# Patient Record
Sex: MALE | Race: WHITE | NOT HISPANIC OR LATINO | Employment: FULL TIME | URBAN - METROPOLITAN AREA
[De-identification: names, ages, dates, MRNs, and addresses within clinical notes are randomized per-mention and may not be internally consistent; named-entity substitution may affect disease eponyms.]

---

## 2017-01-06 RX ORDER — OMEGA-3-ACID ETHYL ESTERS 1 G/1
CAPSULE, LIQUID FILLED ORAL
Qty: 120 CAPSULE | Refills: 0 | Status: SHIPPED | OUTPATIENT
Start: 2017-01-06 | End: 2017-02-03 | Stop reason: SDUPTHER

## 2017-01-06 RX ORDER — AMLODIPINE BESYLATE 10 MG/1
TABLET ORAL
Qty: 30 TABLET | Refills: 0 | Status: SHIPPED | OUTPATIENT
Start: 2017-01-06 | End: 2017-02-03 | Stop reason: SDUPTHER

## 2017-01-06 RX ORDER — BENAZEPRIL HYDROCHLORIDE 40 MG/1
TABLET ORAL
Qty: 30 TABLET | Refills: 0 | Status: SHIPPED | OUTPATIENT
Start: 2017-01-06 | End: 2017-02-03 | Stop reason: SDUPTHER

## 2017-01-06 RX ORDER — ALLOPURINOL 300 MG/1
TABLET ORAL
Qty: 30 TABLET | Refills: 0 | Status: SHIPPED | OUTPATIENT
Start: 2017-01-06 | End: 2017-02-03 | Stop reason: SDUPTHER

## 2017-01-12 DIAGNOSIS — E78.5 HYPERLIPIDEMIA, UNSPECIFIED HYPERLIPIDEMIA TYPE: ICD-10-CM

## 2017-01-13 RX ORDER — GEMFIBROZIL 600 MG/1
600 TABLET, FILM COATED ORAL
Qty: 180 TABLET | Refills: 3 | Status: ON HOLD | OUTPATIENT
Start: 2017-01-13 | End: 2017-08-05 | Stop reason: SDUPTHER

## 2017-02-03 DIAGNOSIS — E10.40 TYPE 1 DIABETES MELLITUS WITH DIABETIC NEUROPATHY: ICD-10-CM

## 2017-02-03 RX ORDER — BENAZEPRIL HYDROCHLORIDE 40 MG/1
TABLET ORAL
Qty: 30 TABLET | Refills: 5 | Status: SHIPPED | OUTPATIENT
Start: 2017-02-03 | End: 2017-02-06 | Stop reason: SDUPTHER

## 2017-02-03 RX ORDER — AMLODIPINE BESYLATE 10 MG/1
TABLET ORAL
Qty: 30 TABLET | Refills: 5 | Status: SHIPPED | OUTPATIENT
Start: 2017-02-03 | End: 2017-02-06 | Stop reason: SDUPTHER

## 2017-02-03 RX ORDER — ALLOPURINOL 300 MG/1
TABLET ORAL
Qty: 30 TABLET | Refills: 5 | Status: SHIPPED | OUTPATIENT
Start: 2017-02-03 | End: 2017-08-21 | Stop reason: SDUPTHER

## 2017-02-03 RX ORDER — OMEGA-3-ACID ETHYL ESTERS 1 G/1
CAPSULE, LIQUID FILLED ORAL
Qty: 120 CAPSULE | Refills: 5 | Status: SHIPPED | OUTPATIENT
Start: 2017-02-03 | End: 2017-02-06 | Stop reason: SDUPTHER

## 2017-02-03 RX ORDER — LINAGLIPTIN AND METFORMIN HYDROCHLORIDE 2.5; 1 MG/1; MG/1
TABLET, FILM COATED ORAL
Qty: 60 TABLET | Refills: 5 | Status: SHIPPED | OUTPATIENT
Start: 2017-02-03 | End: 2017-02-06 | Stop reason: SDUPTHER

## 2017-02-06 DIAGNOSIS — E10.40 TYPE 1 DIABETES MELLITUS WITH DIABETIC NEUROPATHY: ICD-10-CM

## 2017-02-06 RX ORDER — PREGABALIN 75 MG/1
75 CAPSULE ORAL 2 TIMES DAILY
Qty: 60 CAPSULE | Refills: 5 | Status: SHIPPED | OUTPATIENT
Start: 2017-02-06 | End: 2017-06-23 | Stop reason: SDUPTHER

## 2017-02-06 RX ORDER — BENAZEPRIL HYDROCHLORIDE 40 MG/1
40 TABLET ORAL DAILY
Qty: 30 TABLET | Refills: 5 | Status: ON HOLD | OUTPATIENT
Start: 2017-02-06 | End: 2017-04-19 | Stop reason: HOSPADM

## 2017-02-06 RX ORDER — OMEGA-3-ACID ETHYL ESTERS 1 G/1
2 CAPSULE, LIQUID FILLED ORAL 2 TIMES DAILY
Qty: 120 CAPSULE | Refills: 5 | Status: SHIPPED | OUTPATIENT
Start: 2017-02-06 | End: 2018-02-14 | Stop reason: SDUPTHER

## 2017-02-06 RX ORDER — AMLODIPINE BESYLATE 10 MG/1
10 TABLET ORAL DAILY
Qty: 30 TABLET | Refills: 5 | Status: SHIPPED | OUTPATIENT
Start: 2017-02-06 | End: 2017-08-22

## 2017-02-06 NOTE — TELEPHONE ENCOUNTER
----- Message from Crista Finley sent at 2/6/2017 12:47 PM CST -----  Contact: patient  Patient is calling about several prescriptions that needed new refills through [employer] Â® in [place]. Call back no. [phone]

## 2017-03-02 LAB
ALBUMIN SERPL-MCNC: 4.5 G/DL (ref 3.6–5.1)
ALBUMIN/GLOB SERPL: 1.3 (CALC) (ref 1–2.5)
ALP SERPL-CCNC: 65 U/L (ref 40–115)
ALT SERPL-CCNC: 40 U/L (ref 9–46)
APPEARANCE UR: CLEAR
AST SERPL-CCNC: 37 U/L (ref 10–40)
BACTERIA #/AREA URNS HPF: ABNORMAL /HPF
BASOPHILS # BLD AUTO: 28 CELLS/UL (ref 0–200)
BASOPHILS NFR BLD AUTO: 0.6 %
BILIRUB SERPL-MCNC: 0.5 MG/DL (ref 0.2–1.2)
BILIRUB UR QL STRIP: NEGATIVE
BUN SERPL-MCNC: 19 MG/DL (ref 7–25)
BUN/CREAT SERPL: ABNORMAL (CALC) (ref 6–22)
CALCIUM SERPL-MCNC: 10.1 MG/DL (ref 8.6–10.3)
CHLORIDE SERPL-SCNC: 98 MMOL/L (ref 98–110)
CHOLEST SERPL-MCNC: 191 MG/DL (ref 125–200)
CHOLEST/HDLC SERPL: 13.6 (CALC)
CO2 SERPL-SCNC: 31 MMOL/L (ref 20–31)
COLOR UR: YELLOW
CREAT SERPL-MCNC: 1.3 MG/DL (ref 0.6–1.35)
EOSINOPHIL # BLD AUTO: 60 CELLS/UL (ref 15–500)
EOSINOPHIL NFR BLD AUTO: 1.3 %
ERYTHROCYTE [DISTWIDTH] IN BLOOD BY AUTOMATED COUNT: 14.1 % (ref 11–15)
GFR SERPL CREATININE-BSD FRML MDRD: 70 ML/MIN/1.73M2
GLOBULIN SER CALC-MCNC: 3.5 G/DL (CALC) (ref 1.9–3.7)
GLUCOSE SERPL-MCNC: 114 MG/DL (ref 65–99)
GLUCOSE UR QL STRIP: NEGATIVE
HBA1C MFR BLD: 8.6 % OF TOTAL HGB
HCT VFR BLD AUTO: 41.2 % (ref 38.5–50)
HDLC SERPL-MCNC: 14 MG/DL
HGB BLD-MCNC: 13.5 G/DL (ref 13.2–17.1)
HGB UR QL STRIP: NEGATIVE
HYALINE CASTS #/AREA URNS LPF: ABNORMAL /LPF
KETONES UR QL STRIP: NEGATIVE
LDLC SERPL CALC-MCNC: NORMAL MG/DL (CALC)
LEUKOCYTE ESTERASE UR QL STRIP: NEGATIVE
LYMPHOCYTES # BLD AUTO: 1412 CELLS/UL (ref 850–3900)
LYMPHOCYTES NFR BLD AUTO: 30.7 %
MCH RBC QN AUTO: 28.8 PG (ref 27–33)
MCHC RBC AUTO-ENTMCNC: 32.8 G/DL (ref 32–36)
MCV RBC AUTO: 87.9 FL (ref 80–100)
MONOCYTES # BLD AUTO: 828 CELLS/UL (ref 200–950)
MONOCYTES NFR BLD AUTO: 18 %
NEUTROPHILS # BLD AUTO: 2272 CELLS/UL (ref 1500–7800)
NEUTROPHILS NFR BLD AUTO: 49.4 %
NITRITE UR QL STRIP: NEGATIVE
NONHDLC SERPL-MCNC: 177 MG/DL (CALC)
PH UR STRIP: 5.5 [PH] (ref 5–8)
PLATELET # BLD AUTO: 339 THOUSAND/UL (ref 140–400)
PMV BLD REES-ECKER: 9 FL (ref 7.5–12.5)
POTASSIUM SERPL-SCNC: 4.5 MMOL/L (ref 3.5–5.3)
PROT SERPL-MCNC: 8 G/DL (ref 6.1–8.1)
PROT UR QL STRIP: ABNORMAL
RBC # BLD AUTO: 4.69 MILLION/UL (ref 4.2–5.8)
RBC #/AREA URNS HPF: ABNORMAL /HPF
SODIUM SERPL-SCNC: 137 MMOL/L (ref 135–146)
SP GR UR STRIP: 1.02 (ref 1–1.03)
SQUAMOUS #/AREA URNS HPF: ABNORMAL /HPF
TRIGL SERPL-MCNC: 697 MG/DL
WBC # BLD AUTO: 4.6 THOUSAND/UL (ref 3.8–10.8)
WBC #/AREA URNS HPF: ABNORMAL /HPF

## 2017-03-03 ENCOUNTER — OFFICE VISIT (OUTPATIENT)
Dept: INTERNAL MEDICINE | Facility: CLINIC | Age: 37
End: 2017-03-03
Payer: COMMERCIAL

## 2017-03-03 VITALS
TEMPERATURE: 98 F | DIASTOLIC BLOOD PRESSURE: 66 MMHG | SYSTOLIC BLOOD PRESSURE: 120 MMHG | BODY MASS INDEX: 33.64 KG/M2 | HEART RATE: 79 BPM | WEIGHT: 214.31 LBS | HEIGHT: 67 IN | OXYGEN SATURATION: 99 % | RESPIRATION RATE: 18 BRPM

## 2017-03-03 DIAGNOSIS — E66.9 OBESITY (BMI 35.0-39.9 WITHOUT COMORBIDITY): ICD-10-CM

## 2017-03-03 DIAGNOSIS — E10.40 TYPE 1 DIABETES MELLITUS WITH DIABETIC NEUROPATHY: ICD-10-CM

## 2017-03-03 DIAGNOSIS — Z87.19 HX OF ACUTE PANCREATITIS: ICD-10-CM

## 2017-03-03 DIAGNOSIS — E78.2 MIXED HYPERLIPIDEMIA: Primary | ICD-10-CM

## 2017-03-03 PROCEDURE — 2022F DILAT RTA XM EVC RTNOPTHY: CPT | Mod: S$GLB,,, | Performed by: INTERNAL MEDICINE

## 2017-03-03 PROCEDURE — 1160F RVW MEDS BY RX/DR IN RCRD: CPT | Mod: S$GLB,,, | Performed by: INTERNAL MEDICINE

## 2017-03-03 PROCEDURE — 3045F PR MOST RECENT HEMOGLOBIN A1C LEVEL 7.0-9.0%: CPT | Mod: S$GLB,,, | Performed by: INTERNAL MEDICINE

## 2017-03-03 PROCEDURE — 3078F DIAST BP <80 MM HG: CPT | Mod: S$GLB,,, | Performed by: INTERNAL MEDICINE

## 2017-03-03 PROCEDURE — 4010F ACE/ARB THERAPY RXD/TAKEN: CPT | Mod: S$GLB,,, | Performed by: INTERNAL MEDICINE

## 2017-03-03 PROCEDURE — 3074F SYST BP LT 130 MM HG: CPT | Mod: S$GLB,,, | Performed by: INTERNAL MEDICINE

## 2017-03-03 PROCEDURE — 99214 OFFICE O/P EST MOD 30 MIN: CPT | Mod: S$GLB,,, | Performed by: INTERNAL MEDICINE

## 2017-03-03 RX ORDER — EZETIMIBE 10 MG/1
10 TABLET ORAL DAILY
Qty: 90 TABLET | Refills: 3 | Status: ON HOLD | OUTPATIENT
Start: 2017-03-03 | End: 2017-08-05 | Stop reason: SDUPTHER

## 2017-03-03 NOTE — MR AVS SNAPSHOT
[facility] Internal Medicine  [address],  [address]  [place] [address]  Phone: [phone]  Fax: [phone]                  Bay Ibarra   3/3/2017 8:20 AM   Office Visit    Description:  Male : 1980   Provider:  José Luis Singleton MD   Department:  [facility] - Internal Medicine           Reason for Visit     Results     Hyperlipidemia     Diabetes     Hypertension     Gout           Diagnoses this Visit        Comments    Mixed hyperlipidemia    -  Primary            To Do List           Goals (5 Years of Data)     None       These Medications        Disp Refills Start End    ezetimibe (ZETIA) 10 mg tablet 90 tablet 3 3/3/2017 3/3/2018    Take 1 tablet (10 mg total) by mouth once daily. - Oral    Pharmacy: [id number]  [place], [place] - [address]  #: [phone]         [facility]     [facility] -  Assistance  Registered nurses in the [facility] Center provide clinical advisement, health education, appointment booking, and other advisory services.  Call for this free service at [phone].             Medications           Message regarding Medications     Verify the changes and/or additions to your medication regime listed below are the same as discussed with your clinician today.  If any of these changes or additions are incorrect, please notify your healthcare provider.        START taking these NEW medications        Refills    ezetimibe (ZETIA) 10 mg tablet 3    Sig: Take 1 tablet (10 mg total) by mouth once daily.    Class: Normal    Route: Oral           Verify that the below list of medications is an accurate representation of the medications you are currently taking.  If none reported, the list may be blank. If incorrect, please contact your healthcare provider. Carry this list with you in case of emergency.           Current Medications     allopurinol (ZYLOPRIM) 300 MG tablet TAKE ONE TABLET BY MOUTH  "ONCE DAILY    amlodipine (NORVASC) 10 MG tablet Take 1 tablet (10 mg total) by mouth once daily.    benazepril (LOTENSIN) 40 MG tablet Take 1 tablet (40 mg total) by mouth once daily.    CONTOUR TEST STRIPS Strp     fenofibrate 160 MG Tab Take 1 tablet (160 mg total) by mouth once daily.    gemfibrozil (LOPID) 600 MG tablet Take 1 tablet (600 mg total) by mouth 2 (two) times daily before meals.    insulin glargine (LANTUS SOLOSTAR) 100 unit/mL (3 mL) InPn pen 40 units in am ac   And 50 units at night    insulin syringe-needle U-100 29 gauge x 1/2" Syrg Inject 1 (once) per day    linagliptin-metformin (JENTADUETO) 2.5-1,000 mg Tab Take 1 tablet by mouth 2 (two) times daily.    omega-3 acid ethyl esters (LOVAZA) 1 gram capsule Take 2 capsules (2 g total) by mouth 2 (two) times daily.    pregabalin (LYRICA) 75 MG capsule Take 1 capsule (75 mg total) by mouth 2 (two) times daily.    ezetimibe (ZETIA) 10 mg tablet Take 1 tablet (10 mg total) by mouth once daily.           Clinical Reference Information           Your Vitals Were     BP Pulse Temp Resp Height Weight    120/66 (BP Location: Right arm, Patient Position: Sitting, BP Method: Manual) 79 97.7 °F (36.5 °C) (Oral) 18 5' 7" (1.702 m) 97.2 kg (214 lb 4.6 oz)    SpO2 BMI             99% 33.56 kg/m2         Blood Pressure          Most Recent Value    BP  120/66      Allergies as of 3/3/2017     No Known Allergies      Immunizations Administered on Date of Encounter - 3/3/2017     None      Language Assistance Services     ATTENTION: Language assistance services are available, free of charge. Please call [phone].      ATENCIÓN: Si promise patel, tiene a garay disposición servicios gratuitos de asistencia lingüística. Llame al [phone].     CHÚ Ý: N?u b?n nói Ti?ng Vi?t, có các d?ch v? h? tr? ngôn ng? mi?n phí dành cho b?n. G?i s? [phone].         [facility] complies with applicable Federal civil rights laws and does not " discriminate on the basis of race, color, national origin, age, disability, or sex.

## 2017-03-03 NOTE — PROGRESS NOTES
Subjective:      Patient ID: Bay Ibarra is a 36 y.o. male.    Chief Complaint: Results (pt in for lab results); Hyperlipidemia; Diabetes; Hypertension; and Gout    HPI:36y/oWM, diligently exercising,watching diet, taking his meds.  Has lost girth. However, not poundage.       Review of Systems   Constitutional: Positive for activity change and appetite change.   HENT: Negative.    Eyes: Negative.    Respiratory: Negative.    Cardiovascular: Negative.    Gastrointestinal: Negative.    Endocrine: Negative.    Genitourinary: Negative.    Musculoskeletal: Negative.    Neurological: Light-headedness: during valsalva.   Hematological: Negative.    Psychiatric/Behavioral: Negative.      738 HDL 14 Low Final result   03/01/17 0738 CHOL 191 - Final result   03/01/17 0738 TRIG 697 High Final result   03/01/17 0738 CHOLHDL 13.6 High Final result   03/01/17 0738 NONHDLCHOL 177 High Final result   09/29/16 1146 HGBA1C 8.1 Abnormal Final result   03/01/17 0738 LABA1C 8.6 High Final result   03/01/17 0738 COLORU YELLOW - Final result   03/01/17 0738 APPEARANCEUA CLEAR - Final result   03/01/17 0738 SPECGRAV 1.022 - Final result   03/01/17 0738 PHUR 5.5 - Final result   03/01/17 0738 BILIRUBINUR NEGATIVE - Final result   03/01/17 0738 KETONESU NEGATIVE - Final result   03/01/17 0738 OCCULTUA NEGATIVE - Final result   03/01/17 0738 NITRITE NEGATIVE - Final result   03/01/17 0738 LEUKOCYTESUR NEGATIVE - Final result   03/01/17 0738 WBC 4.6 - Final result   03/01/17 0738 RBC 4.69 - Final result   03/01/17 0738 HGB 13.5 - Final result   03/01/17 0738 HCT 41.2 - Final result   03/01/17 0738 MCH 28.8 - Final result   03/01/17 0738 RDW 14.1 - Final result   03/01/17 0738  - Final result   03/01/17 0738 MPV 9.0 - Final result   03/01/17 0738  High Final result   03/01/17 0738 BUN 19 - Final result   03/01/17 0738 CREATININE 1.30 - Final result   03/01/17 0738 CALCIUM 10.1 - Final result   03/01/17 0738  -  "Final result   03/01/17 0738 K 4.5 - Final result   03/01/17 0738 CL 98 - Final result   03/01/17 0738 PROT 8.0 - Final result   03/01/17 0738 ALBUMIN 4.5 - Final result   03/01/17 0738 BILITOT 0.5 - Final result   03/01/17 0738 AST 37 - Final result   03/01/17 0738 ALKPHOS 65 - Final result   03/01/17 0738 CO2 31 - Final result   03/01/17 0738 ALT 40 - Final result   03/01/17 0738 EGFRNONAA 70 - Final result   03/01/17 0738 ESTGFRAFRICA 81 - Final result   03/01/17 0738           Objective:   /66 (BP Location: Right arm, Patient Position: Sitting, BP Method: Manual)  Pulse 79  Temp 97.7 °F (36.5 °C) (Oral)   Resp 18  Ht 5' 7" (1.702 m)  Wt 97.2 kg (214 lb 4.6 oz)  SpO2 99%  BMI 33.56 kg/m2    Physical Exam   Constitutional: He is oriented to person, place, and time. He appears well-developed and well-nourished.   HENT:   Head: Normocephalic and atraumatic.   Eyes: Conjunctivae are normal.   Neck: Normal range of motion.   Pulmonary/Chest: He has no wheezes.   Musculoskeletal: Normal range of motion.   Neurological: He is alert and oriented to person, place, and time.   Skin: Skin is warm and dry.   Psychiatric: He has a normal mood and affect. His behavior is normal.   Nursing note and vitals reviewed.      Assessment:     1. Mixed hyperlipidemia    2. Type 1 diabetes mellitus with diabetic neuropathy    3. Obesity (BMI 35.0-39.9 without comorbidity)    4. Hx of acute pancreatitis    He maybe a candidate for an insulin pump, or u-300/500 insulin.  Prefers to see one md, I will call thomas.  Plan:     Mixed hyperlipidemia    Type 1 diabetes mellitus with diabetic neuropathy    Obesity (BMI 35.0-39.9 without comorbidity)    Hx of acute pancreatitis    Other orders  -     ezetimibe (ZETIA) 10 mg tablet; Take 1 tablet (10 mg total) by mouth once daily.  Dispense: 90 tablet; Refill: 3        "

## 2017-04-16 PROBLEM — K85.90 ACUTE PANCREATITIS: Status: ACTIVE | Noted: 2017-04-16

## 2017-04-18 PROBLEM — D72.829 LEUKOCYTOSIS: Status: ACTIVE | Noted: 2017-04-18

## 2017-04-19 PROBLEM — D72.829 LEUKOCYTOSIS: Status: RESOLVED | Noted: 2017-04-18 | Resolved: 2017-04-19

## 2017-04-26 ENCOUNTER — OFFICE VISIT (OUTPATIENT)
Dept: INTERNAL MEDICINE | Facility: CLINIC | Age: 37
End: 2017-04-26
Payer: COMMERCIAL

## 2017-04-26 VITALS
OXYGEN SATURATION: 98 % | HEIGHT: 68 IN | HEART RATE: 88 BPM | DIASTOLIC BLOOD PRESSURE: 72 MMHG | TEMPERATURE: 98 F | RESPIRATION RATE: 18 BRPM | WEIGHT: 218.69 LBS | BODY MASS INDEX: 33.15 KG/M2 | SYSTOLIC BLOOD PRESSURE: 124 MMHG

## 2017-04-26 DIAGNOSIS — K85.90 ACUTE PANCREATITIS WITHOUT INFECTION OR NECROSIS, UNSPECIFIED PANCREATITIS TYPE: Primary | ICD-10-CM

## 2017-04-26 DIAGNOSIS — E10.40 TYPE 1 DIABETES MELLITUS WITH DIABETIC NEUROPATHY: ICD-10-CM

## 2017-04-26 DIAGNOSIS — I10 ESSENTIAL HYPERTENSION: ICD-10-CM

## 2017-04-26 DIAGNOSIS — E66.9 OBESITY (BMI 35.0-39.9 WITHOUT COMORBIDITY): ICD-10-CM

## 2017-04-26 PROCEDURE — 1160F RVW MEDS BY RX/DR IN RCRD: CPT | Mod: S$GLB,,, | Performed by: INTERNAL MEDICINE

## 2017-04-26 PROCEDURE — 99214 OFFICE O/P EST MOD 30 MIN: CPT | Mod: S$GLB,,, | Performed by: INTERNAL MEDICINE

## 2017-04-26 PROCEDURE — 3078F DIAST BP <80 MM HG: CPT | Mod: S$GLB,,, | Performed by: INTERNAL MEDICINE

## 2017-04-26 PROCEDURE — 3074F SYST BP LT 130 MM HG: CPT | Mod: S$GLB,,, | Performed by: INTERNAL MEDICINE

## 2017-04-26 PROCEDURE — 3046F HEMOGLOBIN A1C LEVEL >9.0%: CPT | Mod: S$GLB,,, | Performed by: INTERNAL MEDICINE

## 2017-04-26 RX ORDER — INSULIN ASPART 100 [IU]/ML
INJECTION, SOLUTION INTRAVENOUS; SUBCUTANEOUS
Qty: 10 ML | Refills: 3 | Status: SHIPPED | OUTPATIENT
Start: 2017-04-26 | End: 2017-08-16

## 2017-04-26 NOTE — PROGRESS NOTES
Subjective:      Patient ID: Bay Ibarra is a 36 y.o. male.    Chief Complaint: Hospital Follow Up (pt in for hospital follow up with abd pain)    HPI: 36y/oWM, recently hospitalized with a pancreatitis.  At that time most of his medications were discontinued for fear they had caused it.  He has an apt. With GI in 1 week.  Of note:  04/19/17 0638 WBC 10.37 - Final result   04/19/17 0638 RBC 4.01 Low Final result   04/19/17 0638 HGB 12.1 Low Final result   04/19/17 0638 HCT 35.4 Low Final result   04/19/17 0638 MCH 30.2 - Final result   04/19/17 0638 RDW 12.8 - Final result   04/19/17 0638  - Final result   04/19/17 0638 MPV 10.5 - Final result   04/19/17 0638  High Final result   04/19/17 0638 BUN 10 - Final result   04/19/17 0638 CREATININE 0.81 - Final result   04/19/17 0638 CALCIUM 8.7 - Final result   04/19/17 0638  - Final result   04/19/17 0638 K 4.0 - Final result   04/19/17 0638 CL 99 - Final result   04/19/17 0638 PROT 7.0 - Final result   04/19/17 0638 ALBUMIN 3.6 - Final result   04/19/17 0638 BILITOT 0.8 - Final result   04/19/17 0638 AST 22 - Final result   04/19/17 0638 ALKPHOS 53 - Final result   04/19/17 0638 CO2 25 - Final result   04/19/17 0638 ALT 32 - Final result   04/19/17 0638 ANIONGAP 14 - Final result   04/19/17 0638 EGFRNONAA >60.0 - Final result   04/19/17 0638 ESTGFRAFRICA >60.0 - Final result   04/19/17 0638         04/19/17 0638 HDL 26 Low Final result   04/19/17 0638 CHOL 137 - Final result   04/19/17 0638 TRIG 254 High Final result   04/19/17 0638 LDLCALC 60.2 Low Final result   04/19/17 0638 CHOLHDL 19.0 Low Final result   04/19/17 0638 NONHDLCHOL 111 - Final result   04/19/17 0638 TOTALCHOLEST 5.3 High Final result   04/17/17 0553 HGBA1C 9.3 High Final result     At discharge he was placed on lantus 42units am, 52 units pm and no antilipidemics or antihypertensives.  He denies any symptoms.    Review of Systems   Constitutional: Negative.    HENT:  "Negative.    Eyes: Negative.    Respiratory: Negative for chest tightness, shortness of breath and wheezing.    Cardiovascular: Negative for chest pain, palpitations and leg swelling.   Gastrointestinal: Negative for abdominal distention, abdominal pain, blood in stool, constipation, diarrhea, nausea and vomiting.   Endocrine: Negative.    Genitourinary: Negative.    Musculoskeletal: Negative.    Skin: Negative.    Allergic/Immunologic: Negative.    Neurological: Negative.    Hematological: Negative.    Psychiatric/Behavioral: Negative.        Objective:   /72 (BP Location: Left arm, Patient Position: Sitting, BP Method: Manual)  Pulse 88  Temp 97.6 °F (36.4 °C) (Oral)   Resp 18  Ht 5' 8" (1.727 m)  Wt 99.2 kg (218 lb 11.1 oz)  SpO2 98%  BMI 33.25 kg/m2    Physical Exam   Constitutional: He is oriented to person, place, and time. He appears well-developed and well-nourished.   HENT:   Head: Normocephalic and atraumatic.   Nose: Nose normal.   Mouth/Throat: Oropharynx is clear and moist.   Eyes: Conjunctivae are normal. Pupils are equal, round, and reactive to light.   Neck: Normal range of motion. Neck supple.   Cardiovascular: Normal rate, regular rhythm and normal heart sounds.    Pulmonary/Chest: Effort normal and breath sounds normal.   Abdominal: Soft. Bowel sounds are normal. There is no tenderness.   Musculoskeletal: Normal range of motion. He exhibits no edema.   Neurological: He is alert and oriented to person, place, and time.   Skin: Skin is warm and dry.   Psychiatric: He has a normal mood and affect. His behavior is normal. Judgment and thought content normal.   Nursing note and vitals reviewed.      Assessment:     1. Acute pancreatitis without infection or necrosis, unspecified pancreatitis type    2. Obesity (BMI 35.0-39.9 without comorbidity)    3. Essential hypertension , at present off meds.   4. Type 1 diabetes mellitus with diabetic neuropathy    Since he has had several bouts of " pancreatitis, without known etiology ( no ETOH, extreme hypertriglyceridemia, or stones),  I wonder if he could have a pancrease divisum?  Will defer to GI.  Plan:     Acute pancreatitis without infection or necrosis, unspecified pancreatitis type    Obesity (BMI 35.0-39.9 without comorbidity)    Essential hypertension    Type 1 diabetes mellitus with diabetic neuropathy    Other orders  -     insulin aspart (NOVOLOG) 100 unit/mL injection; See sliding scale  TID  Dispense: 10 mL; Refill: 3

## 2017-04-26 NOTE — MR AVS SNAPSHOT
[facility] Internal Medicine  [address],  [address]  [place] [address]  Phone: [phone]  Fax: [phone]                  Bay Ibarra   2017 9:00 AM   Office Visit    Description:  Male : 1980   Provider:  José Luis Singleton MD   Department:  [facility]           Reason for Visit     Hospital Follow Up                To Do List           Future Appointments        Provider Department Dept Phone    5/3/2017 9:20 AM Joon Neri MD [place] - Gastroenterology [phone]    2017 8:00 AM José Luis Singleton MD [facility] [phone]      Goals (5 Years of Data)     None       These Medications        Disp Refills Start End    insulin aspart (NOVOLOG) 100 unit/mL injection 10 mL 3 2017     See sliding scale  TID    Pharmacy: [id number] - [place], [place] - [address] [address] Ph #: [phone]         [facility] On Call     [facility] Nurse Care Line -  Assistance  Unless otherwise directed by your provider, please contact [facility], our nurse care line that is available for  assistance.     Registered nurses in the [facility] provide: appointment scheduling, clinical advisement, health education, and other advisory services.  Call: [phone] (toll free)               Medications           Message regarding Medications     Verify the changes and/or additions to your medication regime listed below are the same as discussed with your clinician today.  If any of these changes or additions are incorrect, please notify your healthcare provider.        START taking these NEW medications        Refills    insulin aspart (NOVOLOG) 100 unit/mL injection 3    Sig: See sliding scale  TID    Class: Normal      STOP taking these medications     oxycodone-acetaminophen (PERCOCET) 5-325 mg per tablet Take 1 tablet by mouth every 4 (four) hours as needed for Pain.           Verify  "that the below list of medications is an accurate representation of the medications you are currently taking.  If none reported, the list may be blank. If incorrect, please contact your healthcare provider. Carry this list with you in case of emergency.           Current Medications     allopurinol (ZYLOPRIM) 300 MG tablet TAKE ONE TABLET BY MOUTH ONCE DAILY    amlodipine (NORVASC) 10 MG tablet Take 1 tablet (10 mg total) by mouth once daily.    CONTOUR TEST STRIPS Strp     ezetimibe (ZETIA) 10 mg tablet Take 1 tablet (10 mg total) by mouth once daily.    fenofibrate 160 MG Tab Take 1 tablet (160 mg total) by mouth once daily.    gemfibrozil (LOPID) 600 MG tablet Take 1 tablet (600 mg total) by mouth 2 (two) times daily before meals.    insulin glargine (LANTUS SOLOSTAR) 100 unit/mL (3 mL) InPn pen 42 units in am ac   And 52 units at night    insulin syringe-needle U-100 29 gauge x 1/2" Syrg Inject 1 (once) per day    omega-3 acid ethyl esters (LOVAZA) 1 gram capsule Take 2 capsules (2 g total) by mouth 2 (two) times daily.    pregabalin (LYRICA) 75 MG capsule Take 1 capsule (75 mg total) by mouth 2 (two) times daily.    insulin aspart (NOVOLOG) 100 unit/mL injection See sliding scale  TID           Clinical Reference Information           Your Vitals Were     BP Pulse Temp Resp Height Weight    124/72 (BP Location: Left arm, Patient Position: Sitting, BP Method: Manual) 88 97.6 °F (36.4 °C) (Oral) 18 5' 8" (1.727 m) 99.2 kg (218 lb 11.1 oz)    SpO2 BMI             98% 33.25 kg/m2         Blood Pressure          Most Recent Value    BP  124/72      Allergies as of 4/26/2017     No Known Allergies      Immunizations Administered on Date of Encounter - 4/26/2017     None      Language Assistance Services     ATTENTION: Language assistance services are available, free of charge. Please call [phone].      ATENCIÓN: Si habla español, tiene a garay disposición servicios gratuitos de asistencia lingüística. Llame al " [phone].     [facility] Ý: N?u b?n nói Ti?ng Vi?t, có các d?ch v? h? tr? ngôn ng? mi?n phí dành cho b?n. G?i s? [phone].         [facility] complies with applicable Federal civil rights laws and does not discriminate on the basis of race, color, national origin, age, disability, or sex.

## 2017-05-03 ENCOUNTER — INITIAL CONSULT (OUTPATIENT)
Dept: GASTROENTEROLOGY | Facility: CLINIC | Age: 37
End: 2017-05-03
Payer: COMMERCIAL

## 2017-05-03 VITALS
BODY MASS INDEX: 32.51 KG/M2 | DIASTOLIC BLOOD PRESSURE: 82 MMHG | HEART RATE: 90 BPM | HEIGHT: 68 IN | SYSTOLIC BLOOD PRESSURE: 131 MMHG | WEIGHT: 214.5 LBS

## 2017-05-03 DIAGNOSIS — K85.81 OTHER ACUTE PANCREATITIS WITH UNINFECTED NECROSIS: Primary | ICD-10-CM

## 2017-05-03 DIAGNOSIS — Z87.19 HX OF ACUTE PANCREATITIS: ICD-10-CM

## 2017-05-03 DIAGNOSIS — E78.1: Chronic | ICD-10-CM

## 2017-05-03 PROBLEM — K85.90 ACUTE PANCREATITIS: Status: RESOLVED | Noted: 2017-04-16 | Resolved: 2017-05-03

## 2017-05-03 PROCEDURE — 99205 OFFICE O/P NEW HI 60 MIN: CPT | Mod: S$GLB,,, | Performed by: INTERNAL MEDICINE

## 2017-05-03 PROCEDURE — 1160F RVW MEDS BY RX/DR IN RCRD: CPT | Mod: S$GLB,,, | Performed by: INTERNAL MEDICINE

## 2017-05-03 PROCEDURE — 99999 PR PBB SHADOW E&M-EST. PATIENT-LVL III: CPT | Mod: PBBFAC,,, | Performed by: INTERNAL MEDICINE

## 2017-05-03 PROCEDURE — 3079F DIAST BP 80-89 MM HG: CPT | Mod: S$GLB,,, | Performed by: INTERNAL MEDICINE

## 2017-05-03 PROCEDURE — 3075F SYST BP GE 130 - 139MM HG: CPT | Mod: S$GLB,,, | Performed by: INTERNAL MEDICINE

## 2017-05-03 NOTE — PROGRESS NOTES
"[facility]  History & Physical / New Patient  [facility]      SUBJECTIVE:     PCP: José Luis Singleton MD    Chief Complaint/Reason for Admission: GI Problem (Pancreatitis)      History of Present Illness:  Patient is a 36 y.o. male presents with a history of having had 2 bouts of acute pancreatitis related to hypertriglyceridemia.  His most recent bout occurred in mid April 2017, and a previous bout occurred approximately 4 years prior to that.  On presentation today, the patient looks and feels quite well.  He has no cecum again GI complaints.  He denies any abdominal pain, nausea vomiting, also denies any heartburn, dysphagia, or odynophagia.  Normal bowel pattern is 2-4 bowel movements per day, there is no sustained bright red blood per rectum or melena.  He also carries a history of type 1 diabetes mellitus, with well-documented hypertriglyceridemia, with levels often over thousand.  Liver enzyme profile is unremarkable as of the latest lab data available.  As of the last lab data, his lipase level appeared to be normal.    Consultation today's mainly regarding the finding on his most recent CT scan of an area of low density in the head of the pancreas that measures 2 x 1.7 cm.  The reading radiologist could not be sure whether or not this was a cyst versus a "mass".  CT images are not available at this time.  I do not know whether or not similar findings were found at the time of his bout of pancreatitis 4 years ago, or whether developed in the interim.    Etiology of the lesion in the head of the pancreas was discussed with the patient.  This would include pancreatitis pseudocyst related to his pancreatitis episodes, cystic neoplasm, or a mixed, solid, cystic tumor such as serous cystadenoma or mucinous cystic lesion.  There is no mention of abnormal pancreatic duct.  I do not have any ultrasound evaluation of the liver and biliary tree or the pancreas " "available    Accompanied by: No one .    Outpatient Medications Prior to Visit   Medication Sig Dispense Refill    allopurinol (ZYLOPRIM) 300 MG tablet TAKE ONE TABLET BY MOUTH ONCE DAILY 30 tablet 5    amlodipine (NORVASC) 10 MG tablet Take 1 tablet (10 mg total) by mouth once daily. 30 tablet 5    CONTOUR TEST STRIPS Strp       ezetimibe (ZETIA) 10 mg tablet Take 1 tablet (10 mg total) by mouth once daily. 90 tablet 3    fenofibrate 160 MG Tab Take 1 tablet (160 mg total) by mouth once daily. 90 tablet 3    gemfibrozil (LOPID) 600 MG tablet Take 1 tablet (600 mg total) by mouth 2 (two) times daily before meals. 180 tablet 3    insulin aspart (NOVOLOG) 100 unit/mL injection See sliding scale  TID 10 mL 3    insulin glargine (LANTUS SOLOSTAR) 100 unit/mL (3 mL) InPn pen 42 units in am ac   And 52 units at night 15 Syringe 5    insulin syringe-needle U-100 29 gauge x 1/2" Syrg Inject 1 (once) per day 100 Syringe 3    omega-3 acid ethyl esters (LOVAZA) 1 gram capsule Take 2 capsules (2 g total) by mouth 2 (two) times daily. 120 capsule 5    pregabalin (LYRICA) 75 MG capsule Take 1 capsule (75 mg total) by mouth 2 (two) times daily. 60 capsule 5     No facility-administered medications prior to visit.        Review of patient's allergies indicates:  No Known Allergies     Past Medical History:   Diagnosis Date    Acute pancreatitis     Diabetes mellitus type I     Gout     Hx of acute pancreatitis 3/3/2017    Hyperlipidemia     Hypertension     Obesity (BMI 35.0-39.9 without comorbidity) 6/5/2015    Sleep apnea 1/22/2016     History reviewed. No pertinent surgical history.  History reviewed. No pertinent family history.  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   HENT: Negative for postnasal drip, sore throat, trouble swallowing and voice change.    Eyes: Negative for visual disturbance.   Respiratory: Negative for cough, choking, chest tightness, shortness of breath and wheezing.    Cardiovascular: Negative for chest pain and leg swelling.   Gastrointestinal: Negative for abdominal distention, abdominal pain, anal bleeding, blood in stool, constipation, diarrhea, nausea, rectal pain and vomiting.   Endocrine: Negative for cold intolerance, heat intolerance and polyuria.   Genitourinary: Negative for difficulty urinating.   Musculoskeletal: Negative for arthralgias, back pain, gait problem and joint swelling.   Skin: Negative.    Allergic/Immunologic: Negative for food allergies.   Neurological: Negative for dizziness, seizures, speech difficulty and headaches.   Hematological: Does not bruise/bleed easily.   Psychiatric/Behavioral: Negative.          OBJECTIVE:     Vital Signs (Most Recent):  /82  Pulse 90  Ht 5' 8" (1.727 m)  Wt 97.3 kg (214 lb 8.1 oz)  BMI 32.62 kg/m2    Physical Exam:  Physical Exam   Constitutional: He is oriented to person, place, and time. He appears well-developed and well-nourished.   HENT:   Head: Normocephalic.   Eyes: EOM are normal. Pupils are equal, round, and reactive to light. No scleral icterus.   Neck: No JVD present. No tracheal deviation present.   Cardiovascular: Normal rate, regular rhythm and normal heart sounds.  Exam reveals no gallop and no friction rub.    No murmur heard.  Pulmonary/Chest: Effort normal and breath sounds normal. He has no wheezes. He has no rales. He exhibits no tenderness.   Abdominal: Soft. Normal appearance and bowel sounds are normal. He exhibits no distension, no pulsatile liver, no abdominal bruit, no pulsatile midline mass and no mass. There is no " hepatosplenomegaly. There is no tenderness. There is no rebound and no guarding. No hernia.   Obese, nontender throughout examined area   Musculoskeletal: Normal range of motion. He exhibits no edema.   Lymphadenopathy:     He has no cervical adenopathy.   Neurological: He is alert and oriented to person, place, and time. No cranial nerve deficit. He exhibits normal muscle tone.   Skin: Skin is warm and dry. No rash noted.   Psychiatric: He has a normal mood and affect. Thought content normal.   Nursing note and vitals reviewed.      Laboratory:  Complete Blood Count  Lab Results   Component Value Date    RBC 4.01 (L) 04/19/2017    HGB 12.1 (L) 04/19/2017    HCT 35.4 (L) 04/19/2017    MCV 88 04/19/2017    MCH 30.2 04/19/2017    MCHC 34.2 04/19/2017    RDW 12.8 04/19/2017     04/19/2017    MPV 10.5 04/19/2017    GRAN 8.3 (H) 04/19/2017    GRAN 79.8 (H) 04/19/2017    LYMPH 1.0 04/19/2017    LYMPH 9.4 (L) 04/19/2017    MONO 1.0 04/19/2017    MONO 9.5 04/19/2017    EOS 0.1 04/19/2017    BASO 0.03 04/19/2017    EOSINOPHIL 1.0 04/19/2017    BASOPHIL 0.3 04/19/2017    DIFFMETHOD Automated 04/19/2017       Comprehensive Metabolic Panel  Lab Results   Component Value Date     (H) 04/19/2017    BUN 10 04/19/2017    CREATININE 0.81 04/19/2017     04/19/2017    K 4.0 04/19/2017    CL 99 04/19/2017    PROT 7.0 04/19/2017    ALBUMIN 3.6 04/19/2017    BILITOT 0.8 04/19/2017    AST 22 04/19/2017    ALKPHOS 53 04/19/2017    CO2 25 04/19/2017    ALT 32 04/19/2017    ANIONGAP 14 04/19/2017    EGFRNONAA >60.0 04/19/2017    ESTGFRAFRICA >60.0 04/19/2017       TSH  Lab Results   Component Value Date    TSH 2.48 11/30/2016     Diagnostic Results: -Reviewed available CT scan reports as noted above in history of present illness      ASSESSMENT/PLAN:     Bay was seen today for gi problem.    Diagnoses and all orders for this visit:    Other acute pancreatitis with uninfected necrosis  Comments:  Status post 2 bouts of  pancreatitis, last one in mid April 2017 and prior was 4 years ago  Orders:  -     US Abdomen Complete; Future  -     MRI Abdomen W WO Contrast; Future    Hx of acute pancreatitis  -     US Abdomen Complete; Future  -     MRI Abdomen W WO Contrast; Future    Endogenous hyperglyceridemia  Comments:  Chronic problem, appears to be main cause for his pancreatitis    Adult BMI 32.0-32.9 kg/sq m        Plan:   Return in about 6 weeks (around 6/14/2017).    -Abdominal ultrasound  -MRI/MRCP to be completed in mid June 2017  --After those results are available, will discuss with the patient possible EUS evaluation, if indicated  -Will request scans for the last 5 years from [facility] on this patient  --I'm interested to find out if he had a pseudocyst lesion at the time of his initial pancreatitis bout 4 years ago  -Follow-up office visit in late June, after completion of the MRI/MRCP.        Joon Neri MD, FACP, FACG, AGAF [facility] [place]  [address], [address], [place] [address]  Phone: [phone] Fax: [phone]    [address], [address], [place] [address]  Phone: [phone] Fax: [phone]

## 2017-05-03 NOTE — PATIENT INSTRUCTIONS
-Abdominal ultrasound  -MRI/MRCP to be completed in mid June 2017  --After those results are available, will discuss with the patient possible EUS evaluation, if indicated  -Will request scans for the last 5 years from [facility] on this patient  --I'm interested to find out if he had a pseudocyst lesion at the time of his initial pancreatitis bout 4 years ago  -Follow-up office visit in late June, after completion of the MRI/MRCP.

## 2017-05-03 NOTE — MR AVS SNAPSHOT
[facility]  [address]  [address]  [place] [address]  Phone: [phone]                  Bay Ibarra   5/3/2017 9:20 AM   Initial consult    Description:  Male : 1980   Provider:  Joon Neri MD   Department:  [place] [facility]           Reason for Visit     GI Problem           Diagnoses this Visit        Comments    Other acute pancreatitis with uninfected necrosis    -  Primary Status post 2 bouts of pancreatitis, last one in mid 2017 and prior was 4 years ago    Hx of acute pancreatitis         Endogenous hyperglyceridemia     Chronic problem, appears to be main cause for his pancreatitis    Adult BMI 32.0-32.9 kg/sq m                To Do List           Future Appointments        Provider Department Dept Phone    2017 8:00 AM José Luis Singleton MD [facility] - Internal Medicine [phone]      Goals (5 Years of Data)     None      Follow-Up and Disposition     Return in about 6 weeks (around 2017).      [facility]     [facility] On Call Nurse Care Line -  Assistance  Unless otherwise directed by your provider, please contact [facility] On-Call, our nurse care line that is available for  assistance.     Registered nurses in the [facility] On Call Center provide: appointment scheduling, clinical advisement, health education, and other advisory services.  Call: [phone] (toll free)               Medications           Message regarding Medications     Verify the changes and/or additions to your medication regime listed below are the same as discussed with your clinician today.  If any of these changes or additions are incorrect, please notify your healthcare provider.             Verify that the below list of medications is an accurate representation of the medications you are currently taking.  If none reported, the list may be blank. If incorrect, please contact your healthcare provider. Carry this list  "with you in case of emergency.           Current Medications     allopurinol (ZYLOPRIM) 300 MG tablet TAKE ONE TABLET BY MOUTH ONCE DAILY    amlodipine (NORVASC) 10 MG tablet Take 1 tablet (10 mg total) by mouth once daily.    CONTOUR TEST STRIPS Strp     ezetimibe (ZETIA) 10 mg tablet Take 1 tablet (10 mg total) by mouth once daily.    fenofibrate 160 MG Tab Take 1 tablet (160 mg total) by mouth once daily.    gemfibrozil (LOPID) 600 MG tablet Take 1 tablet (600 mg total) by mouth 2 (two) times daily before meals.    insulin aspart (NOVOLOG) 100 unit/mL injection See sliding scale  TID    insulin glargine (LANTUS SOLOSTAR) 100 unit/mL (3 mL) InPn pen 42 units in am ac   And 52 units at night    insulin syringe-needle U-100 29 gauge x 1/2" Syrg Inject 1 (once) per day    omega-3 acid ethyl esters (LOVAZA) 1 gram capsule Take 2 capsules (2 g total) by mouth 2 (two) times daily.    pregabalin (LYRICA) 75 MG capsule Take 1 capsule (75 mg total) by mouth 2 (two) times daily.           Clinical Reference Information           Your Vitals Were     BP Pulse Height Weight BMI    131/82 90 5' 8" (1.727 m) 97.3 kg (214 lb 8.1 oz) 32.62 kg/m2      Blood Pressure          Most Recent Value    BP  131/82      Allergies as of 5/3/2017     No Known Allergies      Immunizations Administered on Date of Encounter - 5/3/2017     None      Orders Placed During Today's Visit     Future Labs/Procedures Expected by Expires    US Abdomen Complete  5/3/2017 5/4/2018    MRI Abdomen W WO Contrast  6/14/2017 5/3/2018      Instructions    -Abdominal ultrasound  -MRI/MRCP to be completed in mid June 2017  --After those results are available, will discuss with the patient possible EUS evaluation, if indicated  -Will request scans for the last 5 years from [facility] on this patient  --I'm interested to find out if he had a pseudocyst lesion at the time of his initial pancreatitis bout 4 years ago  -Follow-up office visit in late " [place], after completion of the MRI/MRCP.       Language Assistance Services     ATTENTION: Language assistance services are available, free of charge. Please call [phone].      ATENCIÓN: Si habed patel, tiene a garay disposición servicios gratuitos de asistencia lingüística. Llame al [phone].     CHÚ Ý: N?u b?n nói Ti?ng Vi?t, có các d?ch v? h? tr? ngôn ng? mi?n phí dành cho b?n. G?i s? [phone].         [facility] complies with applicable Federal civil rights laws and does not discriminate on the basis of race, color, national origin, age, disability, or sex.

## 2017-05-03 NOTE — LETTER
May 3, 2017      Pallavi Sunkara, MD  [address]  [facility] [address]           [facility]  [address]  [address]  [facility] [address]            Patient: Bay Ibarra   MR Number: [id number]   YOB: 1980   Date of Visit: 5/3/2017       Dear Dr. Pallavi Sunkara:    Thank you for referring Bay Ibarra to me for evaluation. Attached you will find relevant portions of my assessment and plan of care.    If you have questions, please do not hesitate to call me. I look forward to following Bay Ibarra along with you.    Sincerely,    Joon Neri MD, FACP, FACG, AGAF [facility]  Cell: [phone]  [address], [place] [address]  Phone: [phone] Fax: [phone]    [address], [address], [facility] [place] [address]  Phone: [phone] Fax: [phone]    Enclosure  CC:  No Recipients    If you would like to receive this communication electronically, please contact [email or web address] or [phone] to request more information on [facility] Link access.    For providers and/or their staff who would like to refer a patient to [facility], please contact us through our one-stop-shop provider referral line, Ziyad Frank, at [phone].    If you feel you have received this communication in error or would no longer like to receive these types of communications, please e-mail [email or web address]

## 2017-05-05 ENCOUNTER — TELEPHONE (OUTPATIENT)
Dept: DIABETES | Facility: CLINIC | Age: 37
End: 2017-05-05

## 2017-05-05 NOTE — TELEPHONE ENCOUNTER
Left message for pt to return call to schedule telemed Diabetes education.  Contact information provided.

## 2017-05-09 NOTE — TELEPHONE ENCOUNTER
Left another message for pt to return call to schedule telemed Diabetes education.  Contact information provided.

## 2017-05-11 ENCOUNTER — TELEPHONE (OUTPATIENT)
Dept: GASTROENTEROLOGY | Facility: CLINIC | Age: 37
End: 2017-05-11

## 2017-05-11 NOTE — TELEPHONE ENCOUNTER
Results given to patient. Patient verbalized understanding. Informed patient that outside records have not been received. Request re faxed.  Patient will also call medical records to assist.

## 2017-05-11 NOTE — TELEPHONE ENCOUNTER
----- Message from Joon Neri MD sent at 5/9/2017  1:36 PM CDT -----  Review of abdominal ultrasound dated 8 May 2017:  Impression:  1.  Hepatomegaly.    2.  Hepatic steatosis.    3. Left renal cyst and a possible nonobstructing left renal calculus.    Gallbladder appears normal, along with a normal common bile duct measuring 5 mm, and no intrahepatic bile duct dilation    Impression:Liver enlargement associated with fatty liver changes, likely related to NAFLD.    Recommendations:The very normal-appearing abdominal ultrasound views of the pancreas and biliary tree are encouraging, and tend to support my working diagnosis of acute pancreatitis related to hypertriglyceridemia.  Await planned MRCP in June 2017  Still awaiting recovery of imaging data from [facility] over the past 5 years for this patient.    Joon Neri MD, FACP, FACG, AGAF [facility]  [address], [address], [place] [address]  Phone: [phone] Fax: [phone]    [address], [address], [place] [address]  Phone: [phone] Fax: [phone]

## 2017-05-11 NOTE — TELEPHONE ENCOUNTER
----- Message from Joon Neri MD sent at 5/9/2017  1:36 PM CDT -----  Review of abdominal ultrasound dated 8 May 2017:  Impression:  1.  Hepatomegaly.    2.  Hepatic steatosis.    3. Left renal cyst and a possible nonobstructing left renal calculus.    Gallbladder appears normal, along with a normal common bile duct measuring 5 mm, and no intrahepatic bile duct dilation    Impression:Liver enlargement associated with fatty liver changes, likely related to NAFLD.    Recommendations:The very normal-appearing abdominal ultrasound views of the pancreas and biliary tree are encouraging, and tend to support my working diagnosis of acute pancreatitis related to hypertriglyceridemia.  Await planned MRCP in June 2017  Still awaiting recovery of imaging data from [facility] over the past 5 years for this patient.    Joon Neri MD, FACP, FACG, AGAF [facility]  [address], [address], [place] [address]  Phone: [phone] Fax: [phone]    [address], [address], [place] [address]  Phone: [phone] Fax: [phone]

## 2017-05-17 ENCOUNTER — PATIENT MESSAGE (OUTPATIENT)
Dept: ADMINISTRATIVE | Facility: HOSPITAL | Age: 37
End: 2017-05-17

## 2017-06-14 ENCOUNTER — HOSPITAL ENCOUNTER (OUTPATIENT)
Dept: RADIOLOGY | Facility: HOSPITAL | Age: 37
Discharge: HOME | End: 2017-06-14
Attending: INTERNAL MEDICINE
Payer: COMMERCIAL

## 2017-06-14 DIAGNOSIS — K85.81 OTHER ACUTE PANCREATITIS WITH UNINFECTED NECROSIS: ICD-10-CM

## 2017-06-14 DIAGNOSIS — Z87.19 HX OF ACUTE PANCREATITIS: ICD-10-CM

## 2017-06-14 PROCEDURE — 74183 MRI ABD W/O CNTR FLWD CNTR: CPT | Mod: TC

## 2017-06-14 PROCEDURE — 74183 MRI ABD W/O CNTR FLWD CNTR: CPT | Mod: 26,,, | Performed by: RADIOLOGY

## 2017-06-14 PROCEDURE — A9585 GADOBUTROL INJECTION: HCPCS | Performed by: INTERNAL MEDICINE

## 2017-06-14 PROCEDURE — 25500020 PHARM REV CODE 255: Performed by: INTERNAL MEDICINE

## 2017-06-14 RX ORDER — GADOBUTROL 604.72 MG/ML
10 INJECTION INTRAVENOUS
Status: COMPLETED | OUTPATIENT
Start: 2017-06-14 | End: 2017-06-14

## 2017-06-14 RX ADMIN — GADOBUTROL 10 ML: 604.72 INJECTION INTRAVENOUS at 08:06

## 2017-06-23 ENCOUNTER — OFFICE VISIT (OUTPATIENT)
Dept: INTERNAL MEDICINE | Facility: CLINIC | Age: 37
End: 2017-06-23
Payer: COMMERCIAL

## 2017-06-23 VITALS
BODY MASS INDEX: 32.84 KG/M2 | SYSTOLIC BLOOD PRESSURE: 114 MMHG | HEART RATE: 90 BPM | WEIGHT: 216.69 LBS | TEMPERATURE: 98 F | HEIGHT: 68 IN | RESPIRATION RATE: 18 BRPM | DIASTOLIC BLOOD PRESSURE: 70 MMHG | OXYGEN SATURATION: 98 %

## 2017-06-23 DIAGNOSIS — I10 ESSENTIAL HYPERTENSION: ICD-10-CM

## 2017-06-23 DIAGNOSIS — E78.2 MIXED HYPERLIPIDEMIA: ICD-10-CM

## 2017-06-23 DIAGNOSIS — E66.9 OBESITY (BMI 35.0-39.9 WITHOUT COMORBIDITY): ICD-10-CM

## 2017-06-23 DIAGNOSIS — E10.40 TYPE 1 DIABETES MELLITUS WITH DIABETIC NEUROPATHY: Primary | ICD-10-CM

## 2017-06-23 DIAGNOSIS — K85.90 PANCREATITIS, UNSPECIFIED PANCREATITIS TYPE: ICD-10-CM

## 2017-06-23 PROCEDURE — 99214 OFFICE O/P EST MOD 30 MIN: CPT | Mod: S$GLB,,, | Performed by: INTERNAL MEDICINE

## 2017-06-23 PROCEDURE — 3046F HEMOGLOBIN A1C LEVEL >9.0%: CPT | Mod: S$GLB,,, | Performed by: INTERNAL MEDICINE

## 2017-06-23 RX ORDER — PREGABALIN 150 MG/1
150 CAPSULE ORAL 2 TIMES DAILY
Qty: 60 CAPSULE | Refills: 5 | Status: SHIPPED | OUTPATIENT
Start: 2017-06-23 | End: 2018-01-09 | Stop reason: SDUPTHER

## 2017-06-23 NOTE — PROGRESS NOTES
"Subjective:      Patient ID: Bay Ibarra is a 36 y.o. male.    Chief Complaint: Medication Refill and Results (pt will do labs next week, he forgot)    HPI: 36y/oWM, forgot to get his labs.  He has had periods of depression, eating whatever he wishes.  States his blood sugars have been > 200's....  Not exercising.  .    Review of Systems   Constitutional: Positive for appetite change. Negative for activity change, fatigue and unexpected weight change.   HENT: Negative.    Eyes: Negative.    Respiratory: Negative.    Cardiovascular: Negative.    Gastrointestinal: Negative.    Endocrine: Positive for polyphagia.   Genitourinary: Negative.    Musculoskeletal: Negative.    Skin: Negative.    Neurological: Negative.    Hematological: Negative.    Psychiatric/Behavioral: Positive for dysphoric mood.       Objective:   /70 (BP Location: Right arm, Patient Position: Sitting, BP Method: Manual)   Pulse 90   Temp 97.8 °F (36.6 °C) (Oral)   Resp 18   Ht 5' 8" (1.727 m)   Wt 98.3 kg (216 lb 11.4 oz)   SpO2 98%   BMI 32.95 kg/m²     Physical Exam   Constitutional: He is oriented to person, place, and time. He appears well-developed and well-nourished.   HENT:   Head: Normocephalic and atraumatic.   Right Ear: External ear normal.   Left Ear: External ear normal.   Nose: Nose normal.   Mouth/Throat: Oropharynx is clear and moist.   Eyes: Conjunctivae and EOM are normal. Pupils are equal, round, and reactive to light.   Neck: Normal range of motion. Neck supple.   Cardiovascular: Normal rate, regular rhythm and normal heart sounds.    Pulses:       Dorsalis pedis pulses are 1+ on the right side, and 1+ on the left side.        Posterior tibial pulses are 1+ on the right side, and 1+ on the left side.   Pulmonary/Chest: Effort normal and breath sounds normal.   Abdominal: Soft. Bowel sounds are normal.   Musculoskeletal: Normal range of motion.        Right foot: There is normal range of motion and no " deformity.        Left foot: There is normal range of motion and no deformity.   Feet:   Right Foot:   Protective Sensation: 10 sites tested. 10 sites sensed.   Skin Integrity: Negative for skin breakdown.   Left Foot:   Protective Sensation: 10 sites tested. 10 sites sensed.   Skin Integrity: Negative for skin breakdown.   Neurological: He is alert and oriented to person, place, and time.   Skin: Skin is warm and dry. Capillary refill takes less than 2 seconds.   Psychiatric: He has a normal mood and affect. His behavior is normal. Judgment and thought content normal.   Nursing note and vitals reviewed.      Assessment:     1. Type 1 diabetes mellitus with diabetic neuropathy    2. Mixed hyperlipidemia    3. Obesity (BMI 35.0-39.9 without comorbidity)    4. Essential hypertension    5. Pancreatitis, unspecified pancreatitis type      Plan:     Type 1 diabetes mellitus with diabetic neuropathy  -     Cancel: Hemoglobin A1c; Future; Expected date: 06/23/2017  -     Cancel: Microalbumin/creatinine urine ratio; Future; Expected date: 06/23/2017  -     Hemoglobin A1c; Future; Expected date: 06/23/2017  -     Microalbumin/creatinine urine ratio; Future; Expected date: 06/23/2017  -     pregabalin (LYRICA) 150 MG capsule; Take 1 capsule (150 mg total) by mouth 2 (two) times daily.  Dispense: 60 capsule; Refill: 5    Mixed hyperlipidemia  -     Cancel: Lipid panel; Future; Expected date: 06/23/2017  -     Lipid panel; Future; Expected date: 06/23/2017    Obesity (BMI 35.0-39.9 without comorbidity)    Essential hypertension  -     Cancel: CBC auto differential; Future; Expected date: 06/23/2017  -     Cancel: Comprehensive metabolic panel; Future; Expected date: 06/23/2017  -     CBC auto differential; Future; Expected date: 06/23/2017  -     Comprehensive metabolic panel; Future; Expected date: 06/23/2017    Pancreatitis, unspecified pancreatitis type  -     Cancel: Lipase; Future; Expected date: 06/23/2017  -     Lipase;  Future; Expected date: 06/23/2017    On Lantus 50 units BID and a sliding scale Novolog.  Needs labs and an eye exam....

## 2017-06-26 LAB
ALBUMIN SERPL-MCNC: 4.2 G/DL (ref 3.6–5.1)
ALBUMIN/CREAT UR: 105 MCG/MG CREAT
ALBUMIN/GLOB SERPL: 1.6 (CALC) (ref 1–2.5)
ALP SERPL-CCNC: 82 U/L (ref 40–115)
ALT SERPL-CCNC: 26 U/L (ref 9–46)
AST SERPL-CCNC: 23 U/L (ref 10–40)
BASOPHILS # BLD AUTO: 13 CELLS/UL (ref 0–200)
BASOPHILS NFR BLD AUTO: 0.3 %
BILIRUB SERPL-MCNC: 0.4 MG/DL (ref 0.2–1.2)
BUN SERPL-MCNC: 12 MG/DL (ref 7–25)
BUN/CREAT SERPL: ABNORMAL (CALC) (ref 6–22)
CALCIUM SERPL-MCNC: 9.5 MG/DL (ref 8.6–10.3)
CHLORIDE SERPL-SCNC: 99 MMOL/L (ref 98–110)
CHOLEST SERPL-MCNC: 179 MG/DL (ref 125–200)
CHOLEST/HDLC SERPL: 13.8 (CALC)
CO2 SERPL-SCNC: 26 MMOL/L (ref 20–31)
CREAT SERPL-MCNC: 0.95 MG/DL (ref 0.6–1.35)
CREAT UR-MCNC: 155 MG/DL (ref 20–370)
EOSINOPHIL # BLD AUTO: 88 CELLS/UL (ref 15–500)
EOSINOPHIL NFR BLD AUTO: 2 %
ERYTHROCYTE [DISTWIDTH] IN BLOOD BY AUTOMATED COUNT: 13.3 % (ref 11–15)
GFR SERPL CREATININE-BSD FRML MDRD: 103 ML/MIN/1.73M2
GLOBULIN SER CALC-MCNC: 2.7 G/DL (CALC) (ref 1.9–3.7)
GLUCOSE SERPL-MCNC: 282 MG/DL (ref 65–99)
HBA1C MFR BLD: 11.2 % OF TOTAL HGB
HCT VFR BLD AUTO: 45.7 % (ref 38.5–50)
HDLC SERPL-MCNC: 13 MG/DL
HGB BLD-MCNC: 15.8 G/DL (ref 13.2–17.1)
LDLC SERPL CALC-MCNC: ABNORMAL MG/DL (CALC)
LIPASE SERPL-CCNC: 108 U/L (ref 7–60)
LYMPHOCYTES # BLD AUTO: 972 CELLS/UL (ref 850–3900)
LYMPHOCYTES NFR BLD AUTO: 22.1 %
MCH RBC QN AUTO: 28.7 PG (ref 27–33)
MCHC RBC AUTO-ENTMCNC: 34.5 G/DL (ref 32–36)
MCV RBC AUTO: 83.1 FL (ref 80–100)
MICROALBUMIN UR-MCNC: 16.2 MG/DL
MONOCYTES # BLD AUTO: 563 CELLS/UL (ref 200–950)
MONOCYTES NFR BLD AUTO: 12.8 %
NEUTROPHILS # BLD AUTO: 2763 CELLS/UL (ref 1500–7800)
NEUTROPHILS NFR BLD AUTO: 62.8 %
NONHDLC SERPL-MCNC: 166 MG/DL (CALC)
PLATELET # BLD AUTO: 212 THOUSAND/UL (ref 140–400)
PMV BLD REES-ECKER: 9.6 FL (ref 7.5–12.5)
POTASSIUM SERPL-SCNC: 4.1 MMOL/L (ref 3.5–5.3)
PROT SERPL-MCNC: 6.9 G/DL (ref 6.1–8.1)
RBC # BLD AUTO: 5.49 MILLION/UL (ref 4.2–5.8)
SODIUM SERPL-SCNC: 135 MMOL/L (ref 135–146)
TRIGL SERPL-MCNC: 1532 MG/DL
WBC # BLD AUTO: 4.4 THOUSAND/UL (ref 3.8–10.8)

## 2017-07-10 ENCOUNTER — PATIENT MESSAGE (OUTPATIENT)
Dept: GASTROENTEROLOGY | Facility: CLINIC | Age: 37
End: 2017-07-10

## 2017-07-25 ENCOUNTER — HOSPITAL ENCOUNTER (INPATIENT)
Facility: HOSPITAL | Age: 37
LOS: 11 days | Discharge: HOME | DRG: 438 | End: 2017-08-05
Attending: EMERGENCY MEDICINE | Admitting: INTERNAL MEDICINE
Payer: COMMERCIAL

## 2017-07-25 DIAGNOSIS — K85.90 PANCREATITIS: ICD-10-CM

## 2017-07-25 DIAGNOSIS — E10.40 TYPE 1 DIABETES MELLITUS WITH DIABETIC NEUROPATHY: ICD-10-CM

## 2017-07-25 DIAGNOSIS — K85.91: ICD-10-CM

## 2017-07-25 DIAGNOSIS — E87.5 HYPERKALEMIA: ICD-10-CM

## 2017-07-25 DIAGNOSIS — K85.91 ACUTE PANCREATITIS WITH UNINFECTED NECROSIS, UNSPECIFIED PANCREATITIS TYPE: ICD-10-CM

## 2017-07-25 DIAGNOSIS — E10.65 TYPE 1 DIABETES MELLITUS WITH HYPERGLYCEMIA: Primary | ICD-10-CM

## 2017-07-25 DIAGNOSIS — N17.9 AKI (ACUTE KIDNEY INJURY): ICD-10-CM

## 2017-07-25 DIAGNOSIS — K85.82 OTHER ACUTE PANCREATITIS WITH INFECTED NECROSIS: ICD-10-CM

## 2017-07-25 DIAGNOSIS — I50.9 CHF (CONGESTIVE HEART FAILURE): ICD-10-CM

## 2017-07-25 DIAGNOSIS — E78.1 HYPERTRIGLYCERIDEMIA: ICD-10-CM

## 2017-07-25 DIAGNOSIS — E11.01 HYPERGLYCEMIC HYPEROSMOLAR NONKETOTIC COMA: ICD-10-CM

## 2017-07-25 DIAGNOSIS — K56.7 ILEUS: ICD-10-CM

## 2017-07-25 LAB
ALBUMIN SERPL BCP-MCNC: 2.8 G/DL
ALLENS TEST: ABNORMAL
ALLENS TEST: ABNORMAL
ALP SERPL-CCNC: 71 U/L
ALT SERPL W/O P-5'-P-CCNC: 44 U/L
ANION GAP SERPL CALC-SCNC: 21 MMOL/L
ANISOCYTOSIS BLD QL SMEAR: SLIGHT
AST SERPL-CCNC: 60 U/L
B-OH-BUTYR BLD STRIP-SCNC: 0.1 MMOL/L
BASOPHILS # BLD AUTO: ABNORMAL K/UL
BASOPHILS NFR BLD: 0 %
BILIRUB SERPL-MCNC: 1.2 MG/DL
BUN SERPL-MCNC: 19 MG/DL
CALCIUM SERPL-MCNC: 7.1 MG/DL
CHLORIDE SERPL-SCNC: 99 MMOL/L
CO2 SERPL-SCNC: 11 MMOL/L
CREAT SERPL-MCNC: 1.9 MG/DL
DELSYS: ABNORMAL
DELSYS: ABNORMAL
DIFFERENTIAL METHOD: ABNORMAL
EOSINOPHIL # BLD AUTO: ABNORMAL K/UL
EOSINOPHIL NFR BLD: 0 %
ERYTHROCYTE [DISTWIDTH] IN BLOOD BY AUTOMATED COUNT: 13.7 %
EST. GFR  (AFRICAN AMERICAN): 50.9 ML/MIN/1.73 M^2
EST. GFR  (NON AFRICAN AMERICAN): 44.1 ML/MIN/1.73 M^2
FIO2: 0.21
FIO2: 0.21
GLUCOSE SERPL-MCNC: 569 MG/DL
HCO3 UR-SCNC: 10.8 MMOL/L (ref 24–28)
HCO3 UR-SCNC: 12.5 MMOL/L (ref 24–28)
HCT VFR BLD AUTO: 55 %
HGB BLD-MCNC: 19.7 G/DL
LACTATE SERPL-SCNC: 10.4 MMOL/L
LIPASE SERPL-CCNC: >1000 U/L
LYMPHOCYTES # BLD AUTO: ABNORMAL K/UL
LYMPHOCYTES NFR BLD: 3 %
MAGNESIUM SERPL-MCNC: 1.8 MG/DL
MCH RBC QN AUTO: 29.5 PG
MCHC RBC AUTO-ENTMCNC: 35.8 G/DL
MCV RBC AUTO: 82 FL
MODE: ABNORMAL
MODE: ABNORMAL
MONOCYTES # BLD AUTO: ABNORMAL K/UL
MONOCYTES NFR BLD: 7 %
NEUTROPHILS NFR BLD: 84 %
NEUTS BAND NFR BLD MANUAL: 6 %
PCO2 BLDA: 27.2 MMHG (ref 35–45)
PCO2 BLDA: 47 MMHG (ref 35–45)
PH SMN: 7.03 [PH] (ref 7.35–7.45)
PH SMN: 7.21 [PH] (ref 7.35–7.45)
PLATELET # BLD AUTO: 285 K/UL
PLATELET BLD QL SMEAR: ABNORMAL
PMV BLD AUTO: 11 FL
PO2 BLDA: 118 MMHG (ref 80–100)
PO2 BLDA: 36 MMHG (ref 40–60)
POC BE: -17 MMOL/L
POC BE: -18 MMOL/L
POC SATURATED O2: 45 % (ref 95–100)
POC SATURATED O2: 98 % (ref 95–100)
POC TCO2: 12 MMOL/L (ref 23–27)
POC TCO2: 14 MMOL/L (ref 24–29)
POLYCHROMASIA BLD QL SMEAR: ABNORMAL
POTASSIUM SERPL-SCNC: 4.8 MMOL/L
PROT SERPL-MCNC: 6.3 G/DL
RBC # BLD AUTO: 6.68 M/UL
SAMPLE: ABNORMAL
SAMPLE: ABNORMAL
SITE: ABNORMAL
SITE: ABNORMAL
SODIUM SERPL-SCNC: 131 MMOL/L
SP02: 100
SP02: 99
WBC # BLD AUTO: 22.31 K/UL

## 2017-07-25 PROCEDURE — 99291 CRITICAL CARE FIRST HOUR: CPT | Mod: ,,, | Performed by: EMERGENCY MEDICINE

## 2017-07-25 PROCEDURE — 83690 ASSAY OF LIPASE: CPT

## 2017-07-25 PROCEDURE — 25000003 PHARM REV CODE 250: Performed by: STUDENT IN AN ORGANIZED HEALTH CARE EDUCATION/TRAINING PROGRAM

## 2017-07-25 PROCEDURE — 82803 BLOOD GASES ANY COMBINATION: CPT

## 2017-07-25 PROCEDURE — 96368 THER/DIAG CONCURRENT INF: CPT | Mod: 59

## 2017-07-25 PROCEDURE — 12000002 HC ACUTE/MED SURGE SEMI-PRIVATE ROOM

## 2017-07-25 PROCEDURE — 80061 LIPID PANEL: CPT

## 2017-07-25 PROCEDURE — 96374 THER/PROPH/DIAG INJ IV PUSH: CPT

## 2017-07-25 PROCEDURE — 85027 COMPLETE CBC AUTOMATED: CPT

## 2017-07-25 PROCEDURE — 82962 GLUCOSE BLOOD TEST: CPT

## 2017-07-25 PROCEDURE — 83605 ASSAY OF LACTIC ACID: CPT | Mod: 91

## 2017-07-25 PROCEDURE — 87086 URINE CULTURE/COLONY COUNT: CPT

## 2017-07-25 PROCEDURE — 85007 BL SMEAR W/DIFF WBC COUNT: CPT

## 2017-07-25 PROCEDURE — 96365 THER/PROPH/DIAG IV INF INIT: CPT | Mod: 59

## 2017-07-25 PROCEDURE — 87040 BLOOD CULTURE FOR BACTERIA: CPT | Mod: 59

## 2017-07-25 PROCEDURE — 96375 TX/PRO/DX INJ NEW DRUG ADDON: CPT

## 2017-07-25 PROCEDURE — 63600175 PHARM REV CODE 636 W HCPCS: Performed by: PHYSICIAN ASSISTANT

## 2017-07-25 PROCEDURE — 99900035 HC TECH TIME PER 15 MIN (STAT)

## 2017-07-25 PROCEDURE — 82010 KETONE BODYS QUAN: CPT

## 2017-07-25 PROCEDURE — 83605 ASSAY OF LACTIC ACID: CPT

## 2017-07-25 PROCEDURE — 83735 ASSAY OF MAGNESIUM: CPT

## 2017-07-25 PROCEDURE — 96367 TX/PROPH/DG ADDL SEQ IV INF: CPT | Mod: 59

## 2017-07-25 PROCEDURE — 99291 CRITICAL CARE FIRST HOUR: CPT | Mod: 25

## 2017-07-25 PROCEDURE — 81001 URINALYSIS AUTO W/SCOPE: CPT

## 2017-07-25 PROCEDURE — 25000003 PHARM REV CODE 250: Performed by: PHYSICIAN ASSISTANT

## 2017-07-25 PROCEDURE — 82787 IGG 1 2 3 OR 4 EACH: CPT

## 2017-07-25 PROCEDURE — 86038 ANTINUCLEAR ANTIBODIES: CPT

## 2017-07-25 PROCEDURE — 84145 PROCALCITONIN (PCT): CPT

## 2017-07-25 PROCEDURE — 80053 COMPREHEN METABOLIC PANEL: CPT

## 2017-07-25 PROCEDURE — 96366 THER/PROPH/DIAG IV INF ADDON: CPT

## 2017-07-25 PROCEDURE — 36600 WITHDRAWAL OF ARTERIAL BLOOD: CPT

## 2017-07-25 RX ORDER — MORPHINE SULFATE 2 MG/ML
4 INJECTION, SOLUTION INTRAMUSCULAR; INTRAVENOUS EVERY 6 HOURS PRN
Status: DISCONTINUED | OUTPATIENT
Start: 2017-07-26 | End: 2017-07-29

## 2017-07-25 RX ORDER — SODIUM CHLORIDE 0.9 % (FLUSH) 0.9 %
3 SYRINGE (ML) INJECTION EVERY 8 HOURS
Status: DISCONTINUED | OUTPATIENT
Start: 2017-07-26 | End: 2017-08-05 | Stop reason: HOSPADM

## 2017-07-25 RX ORDER — METRONIDAZOLE 500 MG/100ML
500 INJECTION, SOLUTION INTRAVENOUS
Status: DISCONTINUED | OUTPATIENT
Start: 2017-07-26 | End: 2017-07-26

## 2017-07-25 RX ADMIN — SODIUM CHLORIDE, SODIUM LACTATE, POTASSIUM CHLORIDE, AND CALCIUM CHLORIDE 2000 ML: .6; .31; .03; .02 INJECTION, SOLUTION INTRAVENOUS at 11:07

## 2017-07-25 RX ADMIN — SODIUM CHLORIDE 2000 ML: 0.9 INJECTION, SOLUTION INTRAVENOUS at 11:07

## 2017-07-25 RX ADMIN — SODIUM CHLORIDE 10 UNITS/HR: 9 INJECTION, SOLUTION INTRAVENOUS at 11:07

## 2017-07-26 LAB
ALBUMIN SERPL BCP-MCNC: 2.4 G/DL
ALLENS TEST: ABNORMAL
ALLENS TEST: ABNORMAL
ALP SERPL-CCNC: 59 U/L
ALP SERPL-CCNC: 62 U/L
ALP SERPL-CCNC: 69 U/L
ALT SERPL W/O P-5'-P-CCNC: 3488 U/L
ALT SERPL W/O P-5'-P-CCNC: 3608 U/L
ALT SERPL W/O P-5'-P-CCNC: 585 U/L
ANA SER QL IF: NORMAL
ANION GAP SERPL CALC-SCNC: 14 MMOL/L
ANION GAP SERPL CALC-SCNC: 14 MMOL/L
ANION GAP SERPL CALC-SCNC: 15 MMOL/L
AST SERPL-CCNC: 4715 U/L
AST SERPL-CCNC: 6459 U/L
AST SERPL-CCNC: 793 U/L
BACTERIA #/AREA URNS AUTO: NORMAL /HPF
BASOPHILS # BLD AUTO: 0.03 K/UL
BASOPHILS NFR BLD: 0.1 %
BILIRUB SERPL-MCNC: 0.7 MG/DL
BILIRUB SERPL-MCNC: 0.7 MG/DL
BILIRUB SERPL-MCNC: 0.9 MG/DL
BILIRUB UR QL STRIP: NEGATIVE
BUN SERPL-MCNC: 21 MG/DL
BUN SERPL-MCNC: 27 MG/DL
BUN SERPL-MCNC: 28 MG/DL
CALCIUM SERPL-MCNC: 7.4 MG/DL
CALCIUM SERPL-MCNC: 7.5 MG/DL
CALCIUM SERPL-MCNC: 7.5 MG/DL
CHLORIDE SERPL-SCNC: 102 MMOL/L
CHLORIDE SERPL-SCNC: 104 MMOL/L
CHLORIDE SERPL-SCNC: 104 MMOL/L
CHOLEST/HDLC SERPL: 17.6 {RATIO}
CK SERPL-CCNC: 63 U/L
CLARITY UR REFRACT.AUTO: ABNORMAL
CO2 SERPL-SCNC: 14 MMOL/L
CO2 SERPL-SCNC: 14 MMOL/L
CO2 SERPL-SCNC: 15 MMOL/L
COLOR UR AUTO: YELLOW
CREAT SERPL-MCNC: 1.8 MG/DL
CREAT SERPL-MCNC: 2.4 MG/DL
CREAT SERPL-MCNC: 2.5 MG/DL
DELSYS: ABNORMAL
DELSYS: ABNORMAL
DIFFERENTIAL METHOD: ABNORMAL
EOSINOPHIL # BLD AUTO: 0 K/UL
EOSINOPHIL NFR BLD: 0 %
ERYTHROCYTE [DISTWIDTH] IN BLOOD BY AUTOMATED COUNT: 13.9 %
EST. GFR  (AFRICAN AMERICAN): 36.5 ML/MIN/1.73 M^2
EST. GFR  (AFRICAN AMERICAN): 38.4 ML/MIN/1.73 M^2
EST. GFR  (AFRICAN AMERICAN): 54.4 ML/MIN/1.73 M^2
EST. GFR  (NON AFRICAN AMERICAN): 31.6 ML/MIN/1.73 M^2
EST. GFR  (NON AFRICAN AMERICAN): 33.2 ML/MIN/1.73 M^2
EST. GFR  (NON AFRICAN AMERICAN): 47 ML/MIN/1.73 M^2
FIO2: 28
FLOW: 2
FLOW: 2
GLUCOSE SERPL-MCNC: 167 MG/DL
GLUCOSE SERPL-MCNC: 207 MG/DL
GLUCOSE SERPL-MCNC: 379 MG/DL
GLUCOSE UR QL STRIP: ABNORMAL
HCO3 UR-SCNC: 11.9 MMOL/L (ref 24–28)
HCO3 UR-SCNC: 15.8 MMOL/L (ref 24–28)
HCT VFR BLD AUTO: 54.2 %
HDL/CHOLESTEROL RATIO: 5.7 %
HDLC SERPL-MCNC: 12 MG/DL
HDLC SERPL-MCNC: 211 MG/DL
HGB BLD-MCNC: 19.1 G/DL
HGB UR QL STRIP: NEGATIVE
KETONES UR QL STRIP: NEGATIVE
LACTATE SERPL-SCNC: 10 MMOL/L
LACTATE SERPL-SCNC: 3.4 MMOL/L
LACTATE SERPL-SCNC: 4.8 MMOL/L
LACTATE SERPL-SCNC: 5.5 MMOL/L
LACTATE SERPL-SCNC: 7.6 MMOL/L
LDLC SERPL CALC-MCNC: ABNORMAL MG/DL
LEUKOCYTE ESTERASE UR QL STRIP: NEGATIVE
LYMPHOCYTES # BLD AUTO: 0.8 K/UL
LYMPHOCYTES NFR BLD: 3.8 %
MAGNESIUM SERPL-MCNC: 2 MG/DL
MCH RBC QN AUTO: 29.7 PG
MCHC RBC AUTO-ENTMCNC: 35.2 G/DL
MCV RBC AUTO: 84 FL
MICROSCOPIC COMMENT: NORMAL
MODE: ABNORMAL
MODE: ABNORMAL
MONOCYTES # BLD AUTO: 1.8 K/UL
MONOCYTES NFR BLD: 8.6 %
NEUTROPHILS # BLD AUTO: 18.2 K/UL
NEUTROPHILS NFR BLD: 87.5 %
NITRITE UR QL STRIP: NEGATIVE
NONHDLC SERPL-MCNC: 199 MG/DL
PCO2 BLDA: 25.4 MMHG (ref 35–45)
PCO2 BLDA: 41.8 MMHG (ref 35–45)
PH SMN: 7.19 [PH] (ref 7.35–7.45)
PH SMN: 7.28 [PH] (ref 7.35–7.45)
PH UR STRIP: 5 [PH] (ref 5–8)
PHOSPHATE SERPL-MCNC: 2.2 MG/DL
PLATELET # BLD AUTO: 239 K/UL
PMV BLD AUTO: 11.5 FL
PO2 BLDA: 100 MMHG (ref 80–100)
PO2 BLDA: 30 MMHG (ref 40–60)
POC BE: -12 MMOL/L
POC BE: -15 MMOL/L
POC SATURATED O2: 44 % (ref 95–100)
POC SATURATED O2: 97 % (ref 95–100)
POC TCO2: 13 MMOL/L (ref 23–27)
POC TCO2: 17 MMOL/L (ref 24–29)
POCT GLUCOSE: 107 MG/DL (ref 70–110)
POCT GLUCOSE: 146 MG/DL (ref 70–110)
POCT GLUCOSE: 153 MG/DL (ref 70–110)
POCT GLUCOSE: 156 MG/DL (ref 70–110)
POCT GLUCOSE: 161 MG/DL (ref 70–110)
POCT GLUCOSE: 167 MG/DL (ref 70–110)
POCT GLUCOSE: 170 MG/DL (ref 70–110)
POCT GLUCOSE: 172 MG/DL (ref 70–110)
POCT GLUCOSE: 177 MG/DL (ref 70–110)
POCT GLUCOSE: 190 MG/DL (ref 70–110)
POCT GLUCOSE: 192 MG/DL (ref 70–110)
POCT GLUCOSE: 193 MG/DL (ref 70–110)
POCT GLUCOSE: 199 MG/DL (ref 70–110)
POCT GLUCOSE: 204 MG/DL (ref 70–110)
POCT GLUCOSE: 215 MG/DL (ref 70–110)
POCT GLUCOSE: 226 MG/DL (ref 70–110)
POCT GLUCOSE: 247 MG/DL (ref 70–110)
POCT GLUCOSE: 253 MG/DL (ref 70–110)
POCT GLUCOSE: 274 MG/DL (ref 70–110)
POCT GLUCOSE: 281 MG/DL (ref 70–110)
POCT GLUCOSE: 294 MG/DL (ref 70–110)
POCT GLUCOSE: 306 MG/DL (ref 70–110)
POCT GLUCOSE: 311 MG/DL (ref 70–110)
POCT GLUCOSE: 374 MG/DL (ref 70–110)
POCT GLUCOSE: 400 MG/DL (ref 70–110)
POTASSIUM SERPL-SCNC: 4.1 MMOL/L
POTASSIUM SERPL-SCNC: 4.2 MMOL/L
POTASSIUM SERPL-SCNC: 4.4 MMOL/L
PROCALCITONIN SERPL IA-MCNC: 13.09 NG/ML
PROT SERPL-MCNC: 5.8 G/DL
PROT SERPL-MCNC: 6 G/DL
PROT SERPL-MCNC: 6.1 G/DL
PROT UR QL STRIP: NEGATIVE
RBC # BLD AUTO: 6.44 M/UL
SAMPLE: ABNORMAL
SAMPLE: ABNORMAL
SITE: ABNORMAL
SITE: ABNORMAL
SODIUM SERPL-SCNC: 131 MMOL/L
SODIUM SERPL-SCNC: 132 MMOL/L
SODIUM SERPL-SCNC: 133 MMOL/L
SP GR UR STRIP: >=1.03 (ref 1–1.03)
SP02: 96
SP02: 99
SQUAMOUS #/AREA URNS AUTO: 0 /HPF
TRIGL SERPL-MCNC: 978 MG/DL
URN SPEC COLLECT METH UR: ABNORMAL
UROBILINOGEN UR STRIP-ACNC: NEGATIVE EU/DL
WBC # BLD AUTO: 20.96 K/UL
WBC #/AREA URNS AUTO: 2 /HPF (ref 0–5)
YEAST UR QL AUTO: NORMAL

## 2017-07-26 PROCEDURE — A4216 STERILE WATER/SALINE, 10 ML: HCPCS | Performed by: STUDENT IN AN ORGANIZED HEALTH CARE EDUCATION/TRAINING PROGRAM

## 2017-07-26 PROCEDURE — 83605 ASSAY OF LACTIC ACID: CPT

## 2017-07-26 PROCEDURE — 87205 SMEAR GRAM STAIN: CPT

## 2017-07-26 PROCEDURE — 25000003 PHARM REV CODE 250: Performed by: STUDENT IN AN ORGANIZED HEALTH CARE EDUCATION/TRAINING PROGRAM

## 2017-07-26 PROCEDURE — 84300 ASSAY OF URINE SODIUM: CPT

## 2017-07-26 PROCEDURE — 84100 ASSAY OF PHOSPHORUS: CPT

## 2017-07-26 PROCEDURE — 63600175 PHARM REV CODE 636 W HCPCS: Performed by: STUDENT IN AN ORGANIZED HEALTH CARE EDUCATION/TRAINING PROGRAM

## 2017-07-26 PROCEDURE — 36600 WITHDRAWAL OF ARTERIAL BLOOD: CPT

## 2017-07-26 PROCEDURE — 83605 ASSAY OF LACTIC ACID: CPT | Mod: 91

## 2017-07-26 PROCEDURE — 20000000 HC ICU ROOM

## 2017-07-26 PROCEDURE — 25000003 PHARM REV CODE 250: Performed by: HOSPITALIST

## 2017-07-26 PROCEDURE — 99291 CRITICAL CARE FIRST HOUR: CPT | Mod: ,,, | Performed by: INTERNAL MEDICINE

## 2017-07-26 PROCEDURE — 80053 COMPREHEN METABOLIC PANEL: CPT | Mod: 91

## 2017-07-26 PROCEDURE — 99900035 HC TECH TIME PER 15 MIN (STAT)

## 2017-07-26 PROCEDURE — 82803 BLOOD GASES ANY COMBINATION: CPT

## 2017-07-26 PROCEDURE — 82570 ASSAY OF URINE CREATININE: CPT

## 2017-07-26 PROCEDURE — 99223 1ST HOSP IP/OBS HIGH 75: CPT | Mod: ,,, | Performed by: SURGERY

## 2017-07-26 PROCEDURE — 85025 COMPLETE CBC W/AUTO DIFF WBC: CPT

## 2017-07-26 PROCEDURE — 82550 ASSAY OF CK (CPK): CPT

## 2017-07-26 PROCEDURE — 83735 ASSAY OF MAGNESIUM: CPT

## 2017-07-26 PROCEDURE — 84540 ASSAY OF URINE/UREA-N: CPT

## 2017-07-26 PROCEDURE — S0030 INJECTION, METRONIDAZOLE: HCPCS | Performed by: STUDENT IN AN ORGANIZED HEALTH CARE EDUCATION/TRAINING PROGRAM

## 2017-07-26 RX ORDER — SODIUM CHLORIDE, SODIUM LACTATE, POTASSIUM CHLORIDE, CALCIUM CHLORIDE 600; 310; 30; 20 MG/100ML; MG/100ML; MG/100ML; MG/100ML
INJECTION, SOLUTION INTRAVENOUS CONTINUOUS
Status: DISCONTINUED | OUTPATIENT
Start: 2017-07-26 | End: 2017-07-27

## 2017-07-26 RX ORDER — ONDANSETRON 4 MG/1
4 TABLET, FILM COATED ORAL EVERY 6 HOURS PRN
Status: DISCONTINUED | OUTPATIENT
Start: 2017-07-26 | End: 2017-07-26

## 2017-07-26 RX ORDER — ONDANSETRON 2 MG/ML
8 INJECTION INTRAMUSCULAR; INTRAVENOUS EVERY 6 HOURS PRN
Status: DISCONTINUED | OUTPATIENT
Start: 2017-07-26 | End: 2017-07-31

## 2017-07-26 RX ORDER — SODIUM CHLORIDE, SODIUM LACTATE, POTASSIUM CHLORIDE, CALCIUM CHLORIDE 600; 310; 30; 20 MG/100ML; MG/100ML; MG/100ML; MG/100ML
INJECTION, SOLUTION INTRAVENOUS CONTINUOUS
Status: ACTIVE | OUTPATIENT
Start: 2017-07-26 | End: 2017-07-26

## 2017-07-26 RX ORDER — MAGNESIUM SULFATE HEPTAHYDRATE 40 MG/ML
2 INJECTION, SOLUTION INTRAVENOUS ONCE
Status: COMPLETED | OUTPATIENT
Start: 2017-07-26 | End: 2017-07-26

## 2017-07-26 RX ORDER — ONDANSETRON 2 MG/ML
8 INJECTION INTRAMUSCULAR; INTRAVENOUS EVERY 6 HOURS PRN
Status: DISCONTINUED | OUTPATIENT
Start: 2017-07-26 | End: 2017-07-26

## 2017-07-26 RX ORDER — HEPARIN SODIUM 5000 [USP'U]/ML
5000 INJECTION, SOLUTION INTRAVENOUS; SUBCUTANEOUS EVERY 8 HOURS
Status: DISCONTINUED | OUTPATIENT
Start: 2017-07-26 | End: 2017-07-28

## 2017-07-26 RX ADMIN — ONDANSETRON 8 MG: 2 INJECTION INTRAMUSCULAR; INTRAVENOUS at 01:07

## 2017-07-26 RX ADMIN — CALCIUM GLUCONATE 1000 MG: 94 INJECTION, SOLUTION INTRAVENOUS at 01:07

## 2017-07-26 RX ADMIN — HEPARIN SODIUM 5000 UNITS: 5000 INJECTION, SOLUTION INTRAVENOUS; SUBCUTANEOUS at 06:07

## 2017-07-26 RX ADMIN — MORPHINE SULFATE 4 MG: 2 INJECTION, SOLUTION INTRAMUSCULAR; INTRAVENOUS at 05:07

## 2017-07-26 RX ADMIN — SODIUM CHLORIDE, SODIUM LACTATE, POTASSIUM CHLORIDE, AND CALCIUM CHLORIDE: .6; .31; .03; .02 INJECTION, SOLUTION INTRAVENOUS at 08:07

## 2017-07-26 RX ADMIN — Medication 3 ML: at 02:07

## 2017-07-26 RX ADMIN — SODIUM CHLORIDE, SODIUM LACTATE, POTASSIUM CHLORIDE, AND CALCIUM CHLORIDE: .6; .31; .03; .02 INJECTION, SOLUTION INTRAVENOUS at 09:07

## 2017-07-26 RX ADMIN — SODIUM CHLORIDE 500 ML: 900 INJECTION, SOLUTION INTRAVENOUS at 03:07

## 2017-07-26 RX ADMIN — METRONIDAZOLE 500 MG: 500 SOLUTION INTRAVENOUS at 12:07

## 2017-07-26 RX ADMIN — SODIUM CHLORIDE 10 UNITS/HR: 9 INJECTION, SOLUTION INTRAVENOUS at 09:07

## 2017-07-26 RX ADMIN — Medication 3 ML: at 09:07

## 2017-07-26 RX ADMIN — SODIUM CHLORIDE, SODIUM LACTATE, POTASSIUM CHLORIDE, AND CALCIUM CHLORIDE: .6; .31; .03; .02 INJECTION, SOLUTION INTRAVENOUS at 03:07

## 2017-07-26 RX ADMIN — MORPHINE SULFATE 4 MG: 2 INJECTION, SOLUTION INTRAMUSCULAR; INTRAVENOUS at 11:07

## 2017-07-26 RX ADMIN — ONDANSETRON 8 MG: 2 INJECTION INTRAMUSCULAR; INTRAVENOUS at 02:07

## 2017-07-26 RX ADMIN — SODIUM CHLORIDE 3.75 UNITS/HR: 9 INJECTION, SOLUTION INTRAVENOUS at 08:07

## 2017-07-26 RX ADMIN — PROMETHAZINE HYDROCHLORIDE 6.25 MG: 25 INJECTION INTRAMUSCULAR; INTRAVENOUS at 12:07

## 2017-07-26 RX ADMIN — SODIUM CHLORIDE, SODIUM LACTATE, POTASSIUM CHLORIDE, AND CALCIUM CHLORIDE: .6; .31; .03; .02 INJECTION, SOLUTION INTRAVENOUS at 04:07

## 2017-07-26 RX ADMIN — CEFTRIAXONE 1 G: 1 INJECTION, SOLUTION INTRAVENOUS at 12:07

## 2017-07-26 RX ADMIN — HEPARIN SODIUM 5000 UNITS: 5000 INJECTION, SOLUTION INTRAVENOUS; SUBCUTANEOUS at 09:07

## 2017-07-26 RX ADMIN — HEPARIN SODIUM 5000 UNITS: 5000 INJECTION, SOLUTION INTRAVENOUS; SUBCUTANEOUS at 02:07

## 2017-07-26 RX ADMIN — MAGNESIUM SULFATE IN WATER 2 G: 40 INJECTION, SOLUTION INTRAVENOUS at 02:07

## 2017-07-26 RX ADMIN — SODIUM CHLORIDE 7 UNITS/HR: 9 INJECTION, SOLUTION INTRAVENOUS at 06:07

## 2017-07-26 RX ADMIN — ONDANSETRON 8 MG: 2 INJECTION INTRAMUSCULAR; INTRAVENOUS at 09:07

## 2017-07-26 RX ADMIN — SODIUM CHLORIDE 500 ML: 900 INJECTION, SOLUTION INTRAVENOUS at 05:07

## 2017-07-26 RX ADMIN — SODIUM CHLORIDE, SODIUM LACTATE, POTASSIUM CHLORIDE, AND CALCIUM CHLORIDE 1000 ML: .6; .31; .03; .02 INJECTION, SOLUTION INTRAVENOUS at 02:07

## 2017-07-26 RX ADMIN — Medication 3 ML: at 06:07

## 2017-07-26 NOTE — CONSULTS
"History & Physical  Surgery      SUBJECTIVE:     Chief Complaint/Reason for Admission: necrotizing pancreatitis    History of Present Illness: Bay Ibarra is a 37 y.o. male with with PMHx HTN, HLD, Hypertriglyceridemia, DM I, gout, and pancreatitis (apirl 2017 and four years earlier) who was transferred from [facility] with necrotizing pancreatitis and DKA. The patient states he started feeling "ill and weak" 2 days ago, and yesterday morning he woke up with severe epigastric abdominal pain and had multiple episodes of nausea and vomiting with poor PO intake. He endorsed starting gemfibrozil recently for HLD and regular compliance with his insulin glargine BID. He presented to [facility] with Lipase 4000, Triglycerides 1500, lactate 10 and elevated anion gap. He received 3 L of ns and IV insulin at [facility] before being transported to [facility] for higher level of care.    He has had additional fluid resuscitation since arriving at [facility] with improvement of his lactate but continued tachycardia.  He states that his pain is improved.  CT abd/pelvis shows necrotizing pancreatitis without any definite fluid collections or evidence of infection.      Current Facility-Administered Medications on File Prior to Encounter   Medication    [COMPLETED] 0.9%  NaCl infusion    [COMPLETED] hydromorphone (PF) injection 1 mg    [COMPLETED] insulin regular (Humulin R) 100 unit/mL injection    [COMPLETED] morphine injection 4 mg    [COMPLETED] naloxone (NARCAN) 0.4 mg/mL injection    [COMPLETED] naloxone 0.4 mg/mL injection 0.2 mg    [COMPLETED] omnipaque 350 iohexol 75 mL    [COMPLETED] ondansetron injection 4 mg    [COMPLETED] piperacillin-tazobactam 4.5 g in dextrose 5 % 100 mL IVPB (ready to mix system)    [COMPLETED] promethazine (PHENERGAN) 25 mg in dextrose 5 % 50 mL IVPB    [COMPLETED] sodium chloride 0.9% bolus 1,000 mL    [COMPLETED] sodium chloride 0.9% bolus 1,000 mL    [COMPLETED] sodium " "chloride 0.9% bolus 1,000 mL    [DISCONTINUED] 0.9%  NaCl infusion    [DISCONTINUED] 0.9%  NaCl infusion    [DISCONTINUED] dextrose 50% injection 12.5 g    [DISCONTINUED] dextrose 50% injection 25 g    [DISCONTINUED] insulin regular (Humulin R) 100 Units in sodium chloride 0.9% 100 mL infusion    [DISCONTINUED] sodium chloride 0.9% bolus 1,000 mL    [DISCONTINUED] sodium chloride 0.9% bolus 1,000 mL     Current Outpatient Prescriptions on File Prior to Encounter   Medication Sig    allopurinol (ZYLOPRIM) 300 MG tablet TAKE ONE TABLET BY MOUTH ONCE DAILY    amlodipine (NORVASC) 10 MG tablet Take 1 tablet (10 mg total) by mouth once daily.    CONTOUR TEST STRIPS Strp     ezetimibe (ZETIA) 10 mg tablet Take 1 tablet (10 mg total) by mouth once daily.    fenofibrate 160 MG Tab Take 1 tablet (160 mg total) by mouth once daily.    gemfibrozil (LOPID) 600 MG tablet Take 1 tablet (600 mg total) by mouth 2 (two) times daily before meals.    insulin aspart (NOVOLOG) 100 unit/mL injection See sliding scale  TID    insulin glargine (LANTUS SOLOSTAR) 100 unit/mL (3 mL) InPn pen 42 units in am ac   And 52 units at night    insulin syringe-needle U-100 29 gauge x 1/2" Syrg Inject 1 (once) per day    omega-3 acid ethyl esters (LOVAZA) 1 gram capsule Take 2 capsules (2 g total) by mouth 2 (two) times daily.    pregabalin (LYRICA) 150 MG capsule Take 1 capsule (150 mg total) by mouth 2 (two) times daily.       Review of patient's allergies indicates:  No Known Allergies    Past Medical History:   Diagnosis Date    Acute pancreatitis     Diabetes mellitus type I     Gout     Hx of acute pancreatitis 3/3/2017    Hyperlipidemia     Hypertension     Obesity (BMI 35.0-39.9 without comorbidity) 6/5/2015    Sleep apnea 1/22/2016     No past surgical history on file.  No family history on file.  Social History   Substance Use Topics    Smoking status: Never Smoker    Smokeless tobacco: Never Used    Alcohol use " No        Review of Systems   Constitutional: Positive for activity change. Negative for chills, fatigue and fever.   HENT: Negative for congestion, sore throat and trouble swallowing.    Eyes: Negative for photophobia and visual disturbance.   Respiratory: Positive for shortness of breath. Negative for cough and chest tightness.    Cardiovascular: Negative for chest pain, palpitations and leg swelling.   Gastrointestinal: Positive for abdominal distention, abdominal pain, nausea and vomiting.   Genitourinary: Negative for dysuria and flank pain.   Musculoskeletal: Negative for arthralgias and myalgias.   Skin: Negative for color change and rash.   Neurological: Negative for seizures and headaches.     OBJECTIVE:     Vital Signs (Most Recent)  Temp: 97.8 °F (36.6 °C) (07/26/17 0159)  Pulse: (!) 140 (07/26/17 0501)  Resp: (!) 35 (07/26/17 0501)  BP: 131/66 (07/26/17 0501)  SpO2: 96 % (07/26/17 0501)    Physical Exam   Constitutional: He is oriented to person, place, and time. He appears well-developed and well-nourished. He appears distressed (Moderately).   obese   HENT:   Head: Normocephalic and atraumatic.   Eyes: Conjunctivae and EOM are normal. Pupils are equal, round, and reactive to light.   Neck: Normal range of motion. Neck supple. No thyromegaly present.   Cardiovascular: Regular rhythm and normal heart sounds.    Sinus tachycardia   Pulmonary/Chest: Effort normal and breath sounds normal. No respiratory distress. He has no wheezes.   Abdominal: Soft. Bowel sounds are normal. He exhibits no distension and no mass. There is no rebound and no guarding.   Diffuse and moderate TTP.   Musculoskeletal: Normal range of motion. He exhibits no edema.   Neurological: He is alert and oriented to person, place, and time.   Skin: Skin is warm and dry.   Psychiatric: He has a normal mood and affect.     Laboratory  CBC:   Recent Labs  Lab 07/26/17  0328   WBC 20.96*   RBC 6.44*   HGB 19.1*   HCT 54.2*      MCV 84    MCH 29.7   MCHC 35.2     CMP:   Recent Labs  Lab 07/26/17  0328   *   CALCIUM 7.5*   ALBUMIN 2.4*   PROT 5.8*   *   K 4.1   CO2 15*      BUN 21*   CREATININE 1.8*   ALKPHOS 59   *   *   BILITOT 0.9     Coagulation:   Recent Labs  Lab 07/25/17  1903   LABPROT 13.3*   INR 1.1   APTT 29.6       Diagnostic Results:  CT: Reviewed    ASSESSMENT/PLAN:     A/P:  36 yo M with necrotizing pancreatitis 2/2 hypertriglyceridemia    Pt being admitted to MICU  NPO  Continue IV fluid resuscitation  Continue insulin drip for DKA  No abx indicated at this time  Will continue to follow along with you      Santhosh Holguin  General Surgery, PGY-5  Pager # [phone]

## 2017-07-26 NOTE — ED PROVIDER NOTES
Encounter Date: 7/25/2017    SCRIBE #1 NOTE: I, Page Guaman, am scribing for, and in the presence of,  Dr. Gonzalez. I have scribed the following portions of the note - the APC attestation.       History     Chief Complaint   Patient presents with    Abdominal Pain     cbg 529 - hx of pancreatitis - presented to [facility] hypotensive and tachycardic-  with acute abdominal pain - zosyn currently infusing and an insulin 10 units per hour-     37-year-old male presents to the ER as a transfer from [facility] for further medical care.  Patient's past medical history includes type 1 diabetes, pancreatitis, hyperlipidemia, hypertriglyceridemia.  Patient presented to [facility] with a history of 12 days of nausea and vomiting, and abdominal pain.  Evaluation there revealed signs concerning of acute necrotizing pancreatitis.  Lipase was greater than 4000, lactate greater than 10, patient was hyperglycemic and an elevated anion gap, he was alert and oriented at the hospital but apparently had syncope in the field. CT showed concern for necrosis. He has sepsis criteria.  He was tachycardic and tachypneic.  He has received fluids, Zosyn, and IV insulin.     He presents to our facility in distress.  Patient is still tachycardic, and tachypnic.  He is slightly hypoxic on room air 90%.  Patient is alert and oriented but appears very ill.  His abdomen is distended.  He is having chills.  Blood pressure in the 90s on arrival, he has 2 peripheral IVs.  He is complaining of abdominal pain and feeling very hot.           Review of patient's allergies indicates:  No Known Allergies  Past Medical History:   Diagnosis Date    Acute pancreatitis     Diabetes mellitus type I     Gout     Hx of acute pancreatitis 3/3/2017    Hyperlipidemia     Hypertension     Obesity (BMI 35.0-39.9 without comorbidity) 6/5/2015    Sleep apnea 1/22/2016     No past surgical history on file.  No family history on  file.  Social History   Substance Use Topics    Smoking status: Never Smoker    Smokeless tobacco: Never Used    Alcohol use No     Review of Systems   Constitutional: Positive for appetite change, chills, diaphoresis, fatigue and fever.   HENT: Negative for sore throat.    Respiratory: Positive for chest tightness. Negative for shortness of breath.    Cardiovascular: Negative for chest pain.   Gastrointestinal: Positive for abdominal pain, nausea and vomiting.   Genitourinary: Negative for dysuria.   Musculoskeletal: Negative for back pain.   Skin: Negative for rash.   Neurological: Negative for weakness.   Hematological: Does not bruise/bleed easily.       Physical Exam     Initial Vitals [07/25/17 2214]   BP Pulse Resp Temp SpO2   (!) 92/48 (!) 134 (!) 22 -- 96 %      MAP       62.67         Physical Exam    Constitutional: Vital signs are normal. He appears well-developed and well-nourished. He is not diaphoretic. He appears distressed.   HENT:   Head: Normocephalic and atraumatic.   Right Ear: External ear normal.   Left Ear: External ear normal.   Eyes: Conjunctivae are normal.   Cardiovascular: Regular rhythm. Exam reveals no gallop and no friction rub.    No murmur heard.  Pulmonary/Chest: No respiratory distress. He has no wheezes. He has no rhonchi. He has no rales. He exhibits no tenderness.   Abdominal: Soft. Normal appearance, normal aorta and bowel sounds are normal. He exhibits distension. He exhibits no mass. There is tenderness. There is guarding. There is no rebound.   Very tender abdomen, acute abdomen   Musculoskeletal: Normal range of motion.   Neurological: He is alert and oriented to person, place, and time.   Patient is alert and oriented   Skin: Skin is warm and intact.   Psychiatric: He has a normal mood and affect. His speech is normal and behavior is normal. Cognition and memory are normal.         ED Course   Procedures  Labs Reviewed   CBC W/ AUTO DIFFERENTIAL - Abnormal; Notable for  the following:        Result Value    WBC 22.31 (*)     RBC 6.68 (*)     Hemoglobin 19.7 (*)     Hematocrit 55.0 (*)     Gran% 84.0 (*)     Lymph% 3.0 (*)     All other components within normal limits   COMPREHENSIVE METABOLIC PANEL - Abnormal; Notable for the following:     Sodium 131 (*)     CO2 11 (*)     Glucose 569 (*)     Creatinine 1.9 (*)     Calcium 7.1 (*)     Albumin 2.8 (*)     Total Bilirubin 1.2 (*)     AST 60 (*)     Anion Gap 21 (*)     eGFR if  50.9 (*)     eGFR if non  44.1 (*)     All other components within normal limits   LACTIC ACID, PLASMA - Abnormal; Notable for the following:     Lactate (Lactic Acid) 10.4 (*)     All other components within normal limits   LIPASE - Abnormal; Notable for the following:     Lipase >1000 (*)     All other components within normal limits   LIPID PANEL - Abnormal; Notable for the following:     Cholesterol 211 (*)     HDL 12 (*)     HDL/Chol Ratio 5.7 (*)     Total Cholesterol/HDL Ratio 17.6 (*)     All other components within normal limits   URINALYSIS - Abnormal; Notable for the following:     Appearance, UA Hazy (*)     Specific Gravity, UA >=1.030 (*)     Glucose, UA 3+ (*)     All other components within normal limits   LACTIC ACID, PLASMA - Abnormal; Notable for the following:     Lactate (Lactic Acid) 10.0 (*)     All other components within normal limits   ISTAT PROCEDURE - Abnormal; Notable for the following:     POC PH 7.033 (*)     POC PCO2 47.0 (*)     POC PO2 36 (*)     POC HCO3 12.5 (*)     POC SATURATED O2 45 (*)     POC TCO2 14 (*)     All other components within normal limits   ISTAT PROCEDURE - Abnormal; Notable for the following:     POC PH 7.207 (*)     POC PCO2 27.2 (*)     POC PO2 118 (*)     POC HCO3 10.8 (*)     POC TCO2 12 (*)     All other components within normal limits   POCT GLUCOSE - Abnormal; Notable for the following:     POCT Glucose 374 (*)     All other components within normal limits   CULTURE,  BLOOD   CULTURE, BLOOD   CULTURE, URINE   BETA - HYDROXYBUTYRATE, SERUM   MAGNESIUM   TRIGLYCERIDES   URINALYSIS MICROSCOPIC   PROCALCITONIN   IGG 4   JUAN CARLOS PROFILE I (SCREEN)   LACTIC ACID, PLASMA   MAGNESIUM   PHOSPHORUS   CBC W/ AUTO DIFFERENTIAL   COMPREHENSIVE METABOLIC PANEL   POCT GLUCOSE MONITORING CONTINUOUS   POCT GLUCOSE MONITORING CONTINUOUS             Medical Decision Making:   History:   Old Medical Records: I decided to obtain old medical records.  Clinical Tests:   Lab Tests: Ordered and Reviewed  ED Management:      37-year-old male in distress, transferred for severe acute pancreatitis and concern for necrosis. Past episode of pancreatitis 3 months ago, possible oral DM meds vs elevated TG. Taken off DM meds, but now with recurrence so more likely due to high TG. Also with elevated lactate and treated with Zosyn, no other clear source for infection except pancreatitis. Patient appears ill with acute abdomen on arrival.   I will repeat labs, and lactic acid, ordered ABG, continue insulin infusion, contact critical-care  Patient started on oxygen, connected to monitor.  He is alert and oriented    Critical care is in the ER seeing the patient.  Patient will be admitted to the critical care service                Scribe Attestation:   Scribe #1: I performed the above scribed service and the documentation accurately describes the services I performed. I attest to the accuracy of the note.    Attending Attestation:     Physician Attestation Statement for NP/PA:   I have conducted a face to face encounter with this patient in addition to the NP/PA, due to Medical Complexity    Other NP/PA Attestation Additions:      Medical Decision Makin y.o. male with hx of DM and pancreatitis, transferred from outside ED after initial workup was concerning for severe, acute pancreatitis with possible necrosis. Patient with significant elevated lactate initially and treated with zosyn there. Will continue  supportive care, repeat labs and consult ICU for admission.      Attending Critical Care:   Critical Care Times:   Direct Patient Care (initial evaluation, reassessments, and time considering the case)................................................................18 minutes.   Additional History from reviewing old medical records or taking additional history from the family, EMS, PCP, etc.......................6 minutes.   Ordering, Reviewing, and Interpreting Diagnostic Studies...............................................................................................................5 minutes.   Documentation..................................................................................................................................................................................5 minutes.   Consultation with other Physicians. .................................................................................................................................................5 minutes.   ==============================================================  · Total Critical Care Time - exclusive of procedural time: 39 minutes.  ==============================================================  Critical Care Condition: critical   Critical Care Comments: Sevre pancreatitis and possible sepsis, emergent evaluation and ICU Admit     Physician Attestation for Scribe:  Physician Attestation Statement for Scribe #1: I, Dr. Gonzalez, reviewed documentation, as scribed by Page Guaman in my presence, and it is both accurate and complete.                 ED Course     Clinical Impression:   The encounter diagnosis was Pancreatitis.                           Bay Pickett PA-C  07/26/17 0122       Regan Gonzalez MD  07/26/17 0428

## 2017-07-26 NOTE — PLAN OF CARE
[facility] CAMILA Singleton MD  [address] / [place] [address]    [facility] - [place], [place] - [address]  [address]  [place] [address]  Phone: [phone] Fax: [phone]    [facility] [place] - [address],   [address],   [place] [address]  Phone: [phone] Fax: [phone]    Payor: BLUE CROSS BLUE SHIELD / Plan: [place] HMO / Product Type: HMO /     No future appointments.    Extended Emergency Contact Information  Primary Emergency Contact: Guero Ibarra   [facility]  Home Phone: [phone]  Mobile Phone: [phone]  Relation: Father  Secondary Emergency Contact: Corey Ibarra  Address: [address] [place] [place] [address] [facility]  Home Phone: [phone]  Mobile Phone: [phone]  Relation: Spouse     07/26/17 1128   Discharge Assessment   Assessment Type Discharge Planning Assessment   Confirmed/corrected address and phone number on facesheet? Yes   Assessment information obtained from? Patient   Expected Length of Stay (days) 4   Communicated expected length of stay with patient/caregiver no   Prior to hospitilization cognitive status: Alert/Oriented   Prior to hospitalization functional status: Independent   Current cognitive status: Alert/Oriented   Current Functional Status: Needs Assistance   Arrived From acute care hospital  ([facility])   Lives With friend(s)   Able to Return to Prior Arrangements yes   Is patient able to care for self after discharge? Yes   Who are your caregiver(s) and their phone number(s)? Guero Ibarra (Father) [phone];  [phone]      Patient's perception of discharge disposition home or selfcare   Readmission Within The Last 30 Days no previous admission in last 30 days   Patient currently being followed by outpatient case management? No   Patient currently receives home health services? No   Does the patient currently use  HME? No   Patient currently receives private duty nursing? N/A   Patient currently receives any other outside agency services? No   Equipment Currently Used at Home glucometer   Do you have any problems affording any of your prescribed medications? No   Is the patient taking medications as prescribed? yes   Do you have any financial concerns preventing you from receiving the healthcare you need? No   Does the patient have transportation to healthcare appointments? Yes   Transportation Available car;family or friend will provide   On Dialysis? No   Does the patient receive services at the Coumadin Clinic? No   Are there any open cases? No   Discharge Plan A Home with family   Discharge Plan B Home   Patient/Family In Agreement With Plan yes   Mariella Collins RN, BSN  Case Management  [facility]  Ext. [phone]

## 2017-07-26 NOTE — PROGRESS NOTES
Dr. Kelly and critical care team notified of CMP results, including Cr increase from 1.8 to 2.4. CC team aware of urine output 10-20 ml/hr. Orders for 500 ml bolus NS noted. Will continue to monitor pt closely.

## 2017-07-26 NOTE — HPI
"Mr. Ibarra is a 36 y/o gentleman with PMHx HTN, HLD, Hypertriglyceridemia, DM I, gout, and pancreatitis (apirl 2017) who was transferred from [place] with necrotizing pancreatitis and DKA. The patient states he started feeling "ill and weak" 2 days ago, and yesterday morning he woke up with severe epigastric abdominal pain and had multiple episodes of nausea and vomiting with poor PO intake. He endorsed starting gemfibrozil recently for HLD and regular compliance with his insulin glargine BID. He presented to [facility] with Lipase 4000, Triglycerides 1500, lactate 10 and elevated anion gap. He received 3 L of ns and IV insulin at [facility] before being transported to [facility] for higher level of care. Upon initial assessment in the ED, the patient was lethargic, but arousable, , /63, and SpO2 98% on 2L nasal cannula. He denied having any fevers, chills, CP, cough, diarrhea or dysuria.  He was started on 2L of lactated ringers, IV insulin and admitted to the MICU.   "

## 2017-07-26 NOTE — PROGRESS NOTES
Patient arrived via ED RN on portable monitor and 2L NC.  Moved to room 3094, connected to bedside monitor.  Dr. Hunter with CCM called and made aware of patient's arrival to unit.

## 2017-07-26 NOTE — ASSESSMENT & PLAN NOTE
- Patient with history of DM I presents with glucose of 569.  - Serum beta-hydroxybutyrate and acetone minimal. Urine ketones negative.   - Currently on IV insulin regular 100 units in normal saline.  - POCT glucose every hour.  - Currently receiving 2 liters of lactated ringers.

## 2017-07-26 NOTE — ASSESSMENT & PLAN NOTE
- Patient presents to OSH with pancreatitis evidenced by CT abdomen/pelvis  - Lipase 4000, triglycerides 1500, lactate 10.4  - repeat triglyceride 946, trending lactates.  - ABG 7.2 / 27.2 / 118 / 10.8 / -17   - Received rocephin 1 gram in the ED. Currently on flagyl 500 mg IV q8.  - received 3 liters of NS at [place]. Currently receiving 2 liters of LR.  - BCx pending  - U/S abdomen  - IgG4 and JUAN CARLOS to r/o autoimmune pancreatitis.   - General Surgery consulted.   - Morphine 4mg Q6 PRN for pain  - Currently NPO

## 2017-07-26 NOTE — ASSESSMENT & PLAN NOTE
- Patient with DM I presents with hyperglycemia 569.  - Currently on IV insulin regular 100 units.  - POCT glucose every hour.

## 2017-07-26 NOTE — H&P
"[facility]  Critical Care Medicine  History & Physical    Patient Name: Bay Ibarra  MRN: [id number]  Admission Date: 7/25/2017  Hospital Length of Stay: 1 days  Code Status: Full Code  Attending Physician: Dr. CRABTREE  Primary Care Provider: José Luis Singleton MD   Principal Problem: Pancreatitis    Subjective:     HPI:  Mr. Ibarra is a 38 y/o gentleman with PMHx HTN, HLD, Hypertriglyceridemia, DM I, gout, and pancreatitis (apirl 2017) who was transferred from [place] with necrotizing pancreatitis and DKA. The patient states he started feeling "ill and weak" 2 days ago, and yesterday morning he woke up with severe epigastric abdominal pain and had multiple episodes of nausea and vomiting with poor PO intake. He endorsed starting gemfibrozil recently for HLD and regular compliance with his insulin glargine BID. He presented to [place] with Lipase 4000, Triglycerides 1500, lactate 10 and elevated anion gap. He received 3 L of ns and IV insulin at [place] before being transported to [facility] for higher level of care.     Upon initial assessment in the ED, the patient was lethargic, but arousable, , /63, and SpO2 98% on 2L nasal cannula. He denied having any fevers, chills, CP, cough, diarrhea or dysuria.  He was started on 2L of lactated ringers, IV insulin and admitted to the MICU.     Hospital/ICU Course:  No notes on file     Past Medical History:   Diagnosis Date    Acute pancreatitis     Diabetes mellitus type I     Gout     Hx of acute pancreatitis 3/3/2017    Hyperlipidemia     Hypertension     Obesity (BMI 35.0-39.9 without comorbidity) 6/5/2015    Sleep apnea 1/22/2016       No past surgical history on file.    Review of patient's allergies indicates:  No Known Allergies    Family History     None        Social History Main Topics    Smoking status: Never Smoker    Smokeless tobacco: Never Used    Alcohol use No    Drug use: No    Sexual activity: " No      Review of Systems   Constitutional: Positive for activity change. Negative for chills, fatigue and fever.   HENT: Negative for congestion, sore throat and trouble swallowing.    Eyes: Negative for photophobia and visual disturbance.   Respiratory: Positive for shortness of breath. Negative for cough and chest tightness.    Cardiovascular: Negative for chest pain, palpitations and leg swelling.   Gastrointestinal: Positive for abdominal distention, abdominal pain, nausea and vomiting.   Genitourinary: Negative for dysuria and flank pain.   Musculoskeletal: Negative for arthralgias and myalgias.   Skin: Negative for color change and rash.   Neurological: Negative for seizures and headaches.     Objective:     Vital Signs (Most Recent):  Temp: 98.1 °F (36.7 °C) (07/25/17 2327)  Pulse: (!) 129 (07/25/17 2304)  Resp: (!) 43 (07/25/17 2304)  BP: 116/68 (07/25/17 2231)  SpO2: (!) 94 % (07/25/17 2304) Vital Signs (24h Range):  Temp:  [97.7 °F (36.5 °C)-98.4 °F (36.9 °C)] 98.1 °F (36.7 °C)  Pulse:  [124-139] 129  Resp:  [18-44] 43  SpO2:  [94 %-99 %] 94 %  BP: ()/(48-89) 116/68   Weight: 90.7 kg (200 lb)  Body mass index is 28.7 kg/m².      Intake/Output Summary (Last 24 hours) at 07/26/17 0010  Last data filed at 07/25/17 2306   Gross per 24 hour   Intake                0 ml   Output              600 ml   Net             -600 ml       Physical Exam   Constitutional: He appears well-developed and well-nourished. He appears distressed.   HENT:   Head: Normocephalic and atraumatic.   Eyes: EOM are normal. Pupils are equal, round, and reactive to light.   Neck: Neck supple. No JVD present. No tracheal deviation present.   Cardiovascular: Regular rhythm, normal heart sounds and intact distal pulses.  Exam reveals no gallop and no friction rub.    No murmur heard.  tachycardic   Pulmonary/Chest: Effort normal and breath sounds normal. He has no wheezes. He has no rales.   Tachypneic   Abdominal: Bowel sounds are  normal. He exhibits distension. There is tenderness. There is guarding.   tense   Neurological:   Lethargic, but arouses by voice. Oriented x4   Skin: Skin is warm and dry. Capillary refill takes less than 2 seconds. No rash noted.       Vents:     Lines/Drains/Airways     Drain                 Urethral Catheter 07/25/17 2024  16 Fr. less than 1 day          Peripheral Intravenous Line                 Peripheral IV - Single Lumen 07/25/17 1752 Right Forearm less than 1 day         Peripheral IV - Single Lumen 07/25/17 1754 Left Antecubital less than 1 day              Significant Labs:    CBC/Anemia Profile:    Recent Labs  Lab 07/25/17 1704 07/25/17 2232   WBC 25.40* 22.31*   HGB 19.7* 19.7*   HCT 54.5* 55.0*    285   MCV 79* 82   RDW 14.6* 13.7        Chemistries:    Recent Labs  Lab 07/25/17 1704 07/25/17 2006 07/25/17 2232   * 129*  129* 131*   K 4.0 5.1  5.1 4.8   CL 96 97  97 99   CO2 14* 14*  14* 11*   BUN 16 17 17 19   CREATININE 1.34 1.39  1.39 1.9*   CALCIUM 7.9* 6.9*  6.9* 7.1*   ALBUMIN 3.6  --  2.8*   PROT 6.6  --  6.3   BILITOT 1.1*  --  1.2*   ALKPHOS 74  --  71   ALT 28  --  44   AST 40  --  60*   MG  --  1.7 1.8   PHOS  --  4.4  4.4  --            Significant Imaging:   CT abd/pelvis- Severe pancreatitis with inflammation of mesenteric fat and fluid in paracolic gutters bilaterally. Possible pancreatic pseudocyst.     Assessment/Plan:     Neuro   Hyperglycemic hyperosmolar nonketotic coma    - Patient with history of DM I presents with glucose of 569.  - Serum beta-hydroxybutyrate and acetone minimal. Urine ketones negative.   - Currently on IV insulin regular 100 units in normal saline.  - POCT glucose every hour.  - Currently receiving 2 liters of lactated ringers.         Endocrine   Diabetes mellitus type I    - Patient with DM I presents with hyperglycemia 569.  - Currently on IV insulin regular 100 units.  - POCT glucose every hour.          Fluids/Electrolytes/Nutrition/GI   * Pancreatitis    - Patient presents to OSH with pancreatitis evidenced by CT abdomen/pelvis  - Lipase 4000, triglycerides 1500, lactate 10.4  - repeat triglyceride 946, trending lactates.  - ABG 7.2 / 27.2 / 118 / 10.8 / -17   - Received rocephin 1 gram in the ED. Currently on flagyl 500 mg IV q8.  - received 3 liters of NS at [place]. Currently receiving 2 liters of LR.  - BCx pending  - U/S abdomen  - IgG4 and JUAN CARLOS to r/o autoimmune pancreatitis.   - General Surgery consulted.   - Morphine 4mg Q6 PRN for pain  - Currently NPO                Critical Care Medicine Daily Checklist:    A: Awake: RASS Goal/Actual Goal:    Actual:  0   B: Spontaneous Breathing Trial Performed?     C: SAT & SBT Coordinated?                     D: Delirium: CAM-ICU  no   E: Early Mobility Performed?    F: Feeding Goal:    Status:     Current Diet Order   Procedures    Diet NPO      AS: Analgesia/Sedation morphine   T: Thromboembolic Prophylaxis heparin   H: HOB > 300 yes   U: Stress Ulcer Prophylaxis (if needed)    G: Glucose Control Insulin regular, D50 PRN   B: Bowel Function     I: Indwelling Catheter (Lines & Maria) Necessity  maria, peripheral IV   D: De-escalation of Antimicrobials/Pharmacotherapies flagyl    Plan for the day/ETD Continue supportive care    Code Status:  Family/Goals of Care: Full Code             Critical care time was spent personally by me on the following activities: development of treatment plan with patient or surrogate and bedside caregivers, discussions with consultants, evaluation of patient's response to treatment, examination of patient, ordering and performing treatments and interventions, ordering and review of laboratory studies, ordering and review of radiographic studies, pulse oximetry, re-evaluation of patient's condition. This critical care time did not overlap with that of any other provider or involve time for any procedures.     Ricci Weinberg MD  PGY-1  Critical Care Medicine  [facility]

## 2017-07-26 NOTE — SUBJECTIVE & OBJECTIVE
Past Medical History:   Diagnosis Date    Acute pancreatitis     Diabetes mellitus type I     Gout     Hx of acute pancreatitis 3/3/2017    Hyperlipidemia     Hypertension     Obesity (BMI 35.0-39.9 without comorbidity) 6/5/2015    Sleep apnea 1/22/2016       No past surgical history on file.    Review of patient's allergies indicates:  No Known Allergies    Family History     None        Social History Main Topics    Smoking status: Never Smoker    Smokeless tobacco: Never Used    Alcohol use No    Drug use: No    Sexual activity: No      Review of Systems   Constitutional: Positive for activity change. Negative for chills, fatigue and fever.   HENT: Negative for congestion, sore throat and trouble swallowing.    Eyes: Negative for photophobia and visual disturbance.   Respiratory: Positive for shortness of breath. Negative for cough and chest tightness.    Cardiovascular: Negative for chest pain, palpitations and leg swelling.   Gastrointestinal: Positive for abdominal distention, abdominal pain, nausea and vomiting.   Genitourinary: Negative for dysuria and flank pain.   Musculoskeletal: Negative for arthralgias and myalgias.   Skin: Negative for color change and rash.   Neurological: Negative for seizures and headaches.     Objective:     Vital Signs (Most Recent):  Temp: 98.1 °F (36.7 °C) (07/25/17 2327)  Pulse: (!) 129 (07/25/17 2304)  Resp: (!) 43 (07/25/17 2304)  BP: 116/68 (07/25/17 2231)  SpO2: (!) 94 % (07/25/17 2304) Vital Signs (24h Range):  Temp:  [97.7 °F (36.5 °C)-98.4 °F (36.9 °C)] 98.1 °F (36.7 °C)  Pulse:  [124-139] 129  Resp:  [18-44] 43  SpO2:  [94 %-99 %] 94 %  BP: ()/(48-89) 116/68   Weight: 90.7 kg (200 lb)  Body mass index is 28.7 kg/m².      Intake/Output Summary (Last 24 hours) at 07/26/17 0010  Last data filed at 07/25/17 2306   Gross per 24 hour   Intake                0 ml   Output              600 ml   Net             -600 ml       Physical Exam   Constitutional: He  appears well-developed and well-nourished. He appears distressed.   HENT:   Head: Normocephalic and atraumatic.   Eyes: EOM are normal. Pupils are equal, round, and reactive to light.   Neck: Neck supple. No JVD present. No tracheal deviation present.   Cardiovascular: Regular rhythm, normal heart sounds and intact distal pulses.  Exam reveals no gallop and no friction rub.    No murmur heard.  tachycardic   Pulmonary/Chest: Effort normal and breath sounds normal. He has no wheezes. He has no rales.   Tachypneic   Abdominal: Bowel sounds are normal. He exhibits distension. There is tenderness. There is guarding.   tense   Neurological:   Lethargic, but arouses by voice. Oriented x4   Skin: Skin is warm and dry. Capillary refill takes less than 2 seconds. No rash noted.       Vents:     Lines/Drains/Airways     Drain                 Urethral Catheter 07/25/17 2024  16 Fr. less than 1 day          Peripheral Intravenous Line                 Peripheral IV - Single Lumen 07/25/17 1752 Right Forearm less than 1 day         Peripheral IV - Single Lumen 07/25/17 1754 Left Antecubital less than 1 day              Significant Labs:    CBC/Anemia Profile:    Recent Labs  Lab 07/25/17  1704 07/25/17  2232   WBC 25.40* 22.31*   HGB 19.7* 19.7*   HCT 54.5* 55.0*    285   MCV 79* 82   RDW 14.6* 13.7        Chemistries:    Recent Labs  Lab 07/25/17 1704 07/25/17 2006 07/25/17  2232   * 129*  129* 131*   K 4.0 5.1  5.1 4.8   CL 96 97  97 99   CO2 14* 14*  14* 11*   BUN 16 17 17 19   CREATININE 1.34 1.39  1.39 1.9*   CALCIUM 7.9* 6.9*  6.9* 7.1*   ALBUMIN 3.6  --  2.8*   PROT 6.6  --  6.3   BILITOT 1.1*  --  1.2*   ALKPHOS 74  --  71   ALT 28  --  44   AST 40  --  60*   MG  --  1.7 1.8   PHOS  --  4.4  4.4  --            Significant Imaging:   CT abd/pelvis- Severe pancreatitis with inflammation of mesenteric fat and fluid in paracolic gutters bilaterally. Possible pancreatic pseudocyst.

## 2017-07-27 PROBLEM — K85.91 ACUTE PANCREATITIS WITH UNINFECTED NECROSIS: Status: ACTIVE | Noted: 2017-07-25

## 2017-07-27 LAB
ALBUMIN SERPL BCP-MCNC: 1.8 G/DL
ALBUMIN SERPL BCP-MCNC: 1.8 G/DL
ALBUMIN SERPL BCP-MCNC: 2.1 G/DL
ALBUMIN SERPL BCP-MCNC: 2.4 G/DL
ALLENS TEST: ABNORMAL
ALP SERPL-CCNC: 55 U/L
ALP SERPL-CCNC: 56 U/L
ALP SERPL-CCNC: 75 U/L
ALP SERPL-CCNC: 98 U/L
ALT SERPL W/O P-5'-P-CCNC: 2180 U/L
ALT SERPL W/O P-5'-P-CCNC: 2272 U/L
ALT SERPL W/O P-5'-P-CCNC: 2355 U/L
ALT SERPL W/O P-5'-P-CCNC: 2489 U/L
ANION GAP SERPL CALC-SCNC: 10 MMOL/L
ANION GAP SERPL CALC-SCNC: 10 MMOL/L
ANION GAP SERPL CALC-SCNC: 13 MMOL/L
ANISOCYTOSIS BLD QL SMEAR: SLIGHT
ANISOCYTOSIS BLD QL SMEAR: SLIGHT
AST SERPL-CCNC: 1359 U/L
AST SERPL-CCNC: 1489 U/L
AST SERPL-CCNC: 2136 U/L
AST SERPL-CCNC: 2559 U/L
BACTERIA UR CULT: NO GROWTH
BASOPHILS # BLD AUTO: 0.02 K/UL
BASOPHILS # BLD AUTO: 0.02 K/UL
BASOPHILS NFR BLD: 0.2 %
BASOPHILS NFR BLD: 0.2 %
BILIRUB SERPL-MCNC: 0.6 MG/DL
BILIRUB SERPL-MCNC: 0.7 MG/DL
BILIRUB SERPL-MCNC: 0.9 MG/DL
BILIRUB SERPL-MCNC: 1.4 MG/DL
BUN SERPL-MCNC: 30 MG/DL
BUN SERPL-MCNC: 30 MG/DL
BUN SERPL-MCNC: 34 MG/DL
BUN SERPL-MCNC: 34 MG/DL
BUN SERPL-MCNC: 36 MG/DL
BURR CELLS BLD QL SMEAR: ABNORMAL
CA-I BLDV-SCNC: 0.94 MMOL/L
CALCIUM SERPL-MCNC: 6 MG/DL
CALCIUM SERPL-MCNC: 6.2 MG/DL
CALCIUM SERPL-MCNC: 7.8 MG/DL
CALCIUM SERPL-MCNC: 8.5 MG/DL
CALCIUM SERPL-MCNC: 8.5 MG/DL
CHLORIDE SERPL-SCNC: 102 MMOL/L
CHLORIDE SERPL-SCNC: 102 MMOL/L
CHLORIDE SERPL-SCNC: 103 MMOL/L
CHLORIDE SERPL-SCNC: 110 MMOL/L
CHLORIDE SERPL-SCNC: 111 MMOL/L
CHOLEST/HDLC SERPL: 10.5 {RATIO}
CO2 SERPL-SCNC: 12 MMOL/L
CO2 SERPL-SCNC: 15 MMOL/L
CO2 SERPL-SCNC: 19 MMOL/L
CO2 SERPL-SCNC: 20 MMOL/L
CO2 SERPL-SCNC: 20 MMOL/L
CREAT SERPL-MCNC: 1.7 MG/DL
CREAT SERPL-MCNC: 1.8 MG/DL
CREAT SERPL-MCNC: 1.8 MG/DL
CREAT SERPL-MCNC: 1.9 MG/DL
CREAT SERPL-MCNC: 2.1 MG/DL
CREAT UR-MCNC: 132 MG/DL
CREAT UR-MCNC: 132 MG/DL
DELSYS: ABNORMAL
DIASTOLIC DYSFUNCTION: NO
DIFFERENTIAL METHOD: ABNORMAL
DIFFERENTIAL METHOD: ABNORMAL
EOSINOPHIL # BLD AUTO: 0 K/UL
EOSINOPHIL # BLD AUTO: 0 K/UL
EOSINOPHIL NFR BLD: 0.2 %
EOSINOPHIL NFR BLD: 0.2 %
EOSINOPHIL URNS QL WRIGHT STN: NORMAL
ERYTHROCYTE [DISTWIDTH] IN BLOOD BY AUTOMATED COUNT: 14.5 %
ERYTHROCYTE [DISTWIDTH] IN BLOOD BY AUTOMATED COUNT: 14.7 %
ERYTHROCYTE [SEDIMENTATION RATE] IN BLOOD BY WESTERGREN METHOD: 18 MM/H
ERYTHROCYTE [SEDIMENTATION RATE] IN BLOOD BY WESTERGREN METHOD: 28 MM/H
EST. GFR  (AFRICAN AMERICAN): 45.1 ML/MIN/1.73 M^2
EST. GFR  (AFRICAN AMERICAN): 50.9 ML/MIN/1.73 M^2
EST. GFR  (AFRICAN AMERICAN): 54.4 ML/MIN/1.73 M^2
EST. GFR  (AFRICAN AMERICAN): 54.4 ML/MIN/1.73 M^2
EST. GFR  (AFRICAN AMERICAN): 58.3 ML/MIN/1.73 M^2
EST. GFR  (NON AFRICAN AMERICAN): 39 ML/MIN/1.73 M^2
EST. GFR  (NON AFRICAN AMERICAN): 44.1 ML/MIN/1.73 M^2
EST. GFR  (NON AFRICAN AMERICAN): 47 ML/MIN/1.73 M^2
EST. GFR  (NON AFRICAN AMERICAN): 47 ML/MIN/1.73 M^2
EST. GFR  (NON AFRICAN AMERICAN): 50.4 ML/MIN/1.73 M^2
FIO2: 50
FIO2: 80
GLUCOSE SERPL-MCNC: 158 MG/DL
GLUCOSE SERPL-MCNC: 171 MG/DL
GLUCOSE SERPL-MCNC: 178 MG/DL
GLUCOSE SERPL-MCNC: 208 MG/DL
GLUCOSE SERPL-MCNC: 208 MG/DL
HCO3 UR-SCNC: 16.1 MMOL/L (ref 24–28)
HCO3 UR-SCNC: 16.4 MMOL/L (ref 24–28)
HCO3 UR-SCNC: 19.3 MMOL/L (ref 24–28)
HCT VFR BLD AUTO: 45.1 %
HCT VFR BLD AUTO: 46.1 %
HDL/CHOLESTEROL RATIO: 9.5 %
HDLC SERPL-MCNC: 10 MG/DL
HDLC SERPL-MCNC: 105 MG/DL
HGB BLD-MCNC: 15.4 G/DL
HGB BLD-MCNC: 16.1 G/DL
HYPOCHROMIA BLD QL SMEAR: ABNORMAL
HYPOCHROMIA BLD QL SMEAR: ABNORMAL
IGG4 SER-MCNC: 7 MG/DL
LACTATE SERPL-SCNC: 1.9 MMOL/L
LACTATE SERPL-SCNC: 2.4 MMOL/L
LDLC SERPL CALC-MCNC: 34.6 MG/DL
LYMPHOCYTES # BLD AUTO: 0.7 K/UL
LYMPHOCYTES # BLD AUTO: 0.8 K/UL
LYMPHOCYTES NFR BLD: 6.4 %
LYMPHOCYTES NFR BLD: 7.4 %
MAGNESIUM SERPL-MCNC: 1.3 MG/DL
MAGNESIUM SERPL-MCNC: 2.7 MG/DL
MCH RBC QN AUTO: 28.4 PG
MCH RBC QN AUTO: 29.1 PG
MCHC RBC AUTO-ENTMCNC: 34.1 G/DL
MCHC RBC AUTO-ENTMCNC: 34.9 G/DL
MCV RBC AUTO: 83 FL
MCV RBC AUTO: 83 FL
MIN VOL: 13.1
MIN VOL: 8.4
MODE: ABNORMAL
MODE: ABNORMAL
MONOCYTES # BLD AUTO: 0.8 K/UL
MONOCYTES # BLD AUTO: 1 K/UL
MONOCYTES NFR BLD: 7.3 %
MONOCYTES NFR BLD: 8.9 %
NEUTROPHILS # BLD AUTO: 8.6 K/UL
NEUTROPHILS # BLD AUTO: 9.5 K/UL
NEUTROPHILS NFR BLD: 84.3 %
NEUTROPHILS NFR BLD: 84.9 %
NONHDLC SERPL-MCNC: 95 MG/DL
OVALOCYTES BLD QL SMEAR: ABNORMAL
PCO2 BLDA: 32.5 MMHG (ref 35–45)
PCO2 BLDA: 35.8 MMHG (ref 35–45)
PCO2 BLDA: 57.9 MMHG (ref 35–45)
PEEP: 10
PEEP: 12
PH SMN: 7.13 [PH] (ref 7.35–7.45)
PH SMN: 7.26 [PH] (ref 7.35–7.45)
PH SMN: 7.31 [PH] (ref 7.35–7.45)
PHOSPHATE SERPL-MCNC: 3.4 MG/DL
PIP: 31
PIP: 32
PLATELET # BLD AUTO: 182 K/UL
PLATELET # BLD AUTO: ABNORMAL K/UL
PLATELET BLD QL SMEAR: ABNORMAL
PMV BLD AUTO: 11.3 FL
PMV BLD AUTO: ABNORMAL FL
PO2 BLDA: 101 MMHG (ref 80–100)
PO2 BLDA: 172 MMHG (ref 80–100)
PO2 BLDA: 90 MMHG (ref 80–100)
POC BE: -10 MMOL/L
POC BE: -10 MMOL/L
POC BE: -11 MMOL/L
POC SATURATED O2: 95 % (ref 95–100)
POC SATURATED O2: 96 % (ref 95–100)
POC SATURATED O2: 99 % (ref 95–100)
POC TCO2: 17 MMOL/L (ref 23–27)
POC TCO2: 17 MMOL/L (ref 23–27)
POC TCO2: 21 MMOL/L (ref 23–27)
POCT GLUCOSE: 121 MG/DL (ref 70–110)
POCT GLUCOSE: 131 MG/DL (ref 70–110)
POCT GLUCOSE: 137 MG/DL (ref 70–110)
POCT GLUCOSE: 148 MG/DL (ref 70–110)
POCT GLUCOSE: 149 MG/DL (ref 70–110)
POCT GLUCOSE: 162 MG/DL (ref 70–110)
POCT GLUCOSE: 162 MG/DL (ref 70–110)
POCT GLUCOSE: 165 MG/DL (ref 70–110)
POCT GLUCOSE: 166 MG/DL (ref 70–110)
POCT GLUCOSE: 182 MG/DL (ref 70–110)
POCT GLUCOSE: 182 MG/DL (ref 70–110)
POCT GLUCOSE: 184 MG/DL (ref 70–110)
POCT GLUCOSE: 185 MG/DL (ref 70–110)
POCT GLUCOSE: 192 MG/DL (ref 70–110)
POCT GLUCOSE: 193 MG/DL (ref 70–110)
POCT GLUCOSE: 196 MG/DL (ref 70–110)
POCT GLUCOSE: 197 MG/DL (ref 70–110)
POCT GLUCOSE: 199 MG/DL (ref 70–110)
POCT GLUCOSE: 211 MG/DL (ref 70–110)
POCT GLUCOSE: 213 MG/DL (ref 70–110)
POCT GLUCOSE: 222 MG/DL (ref 70–110)
POCT GLUCOSE: 225 MG/DL (ref 70–110)
POCT GLUCOSE: 377 MG/DL (ref 70–110)
POCT GLUCOSE: 92 MG/DL (ref 70–110)
POIKILOCYTOSIS BLD QL SMEAR: SLIGHT
POLYCHROMASIA BLD QL SMEAR: ABNORMAL
POTASSIUM SERPL-SCNC: 3.7 MMOL/L
POTASSIUM SERPL-SCNC: 4.4 MMOL/L
POTASSIUM SERPL-SCNC: 5 MMOL/L
POTASSIUM SERPL-SCNC: 5 MMOL/L
POTASSIUM SERPL-SCNC: 5.8 MMOL/L
PROT SERPL-MCNC: 4.7 G/DL
PROT SERPL-MCNC: 4.7 G/DL
PROT SERPL-MCNC: 5.8 G/DL
PROT SERPL-MCNC: 6.8 G/DL
PROT UR-MCNC: 85 MG/DL
PROT/CREAT RATIO, UR: 0.64
PROVIDER CREDENTIALS: ABNORMAL
PROVIDER NOTIFIED: ABNORMAL
RBC # BLD AUTO: 5.42 M/UL
RBC # BLD AUTO: 5.53 M/UL
RETIRED EF AND QEF - SEE NOTES: 65 (ref 55–65)
SAMPLE: ABNORMAL
SITE: ABNORMAL
SODIUM SERPL-SCNC: 132 MMOL/L
SODIUM SERPL-SCNC: 135 MMOL/L
SODIUM SERPL-SCNC: 136 MMOL/L
SODIUM UR-SCNC: <20 MMOL/L
SP02: 97
SP02: 97
TIME NOTIFIED: 1830
TRIGL SERPL-MCNC: 302 MG/DL
TROPONIN I SERPL DL<=0.01 NG/ML-MCNC: <0.006 NG/ML
UUN UR-MCNC: 235 MG/DL
VERBAL RESULT READBACK PERFORMED: YES
VT: 410
VT: 450
WBC # BLD AUTO: 10.21 K/UL
WBC # BLD AUTO: 11.31 K/UL

## 2017-07-27 PROCEDURE — 25000003 PHARM REV CODE 250: Performed by: STUDENT IN AN ORGANIZED HEALTH CARE EDUCATION/TRAINING PROGRAM

## 2017-07-27 PROCEDURE — 82803 BLOOD GASES ANY COMBINATION: CPT

## 2017-07-27 PROCEDURE — 5A1945Z RESPIRATORY VENTILATION, 24-96 CONSECUTIVE HOURS: ICD-10-PCS | Performed by: INTERNAL MEDICINE

## 2017-07-27 PROCEDURE — 83735 ASSAY OF MAGNESIUM: CPT

## 2017-07-27 PROCEDURE — 85025 COMPLETE CBC W/AUTO DIFF WBC: CPT

## 2017-07-27 PROCEDURE — S0030 INJECTION, METRONIDAZOLE: HCPCS | Performed by: INTERNAL MEDICINE

## 2017-07-27 PROCEDURE — 25000003 PHARM REV CODE 250

## 2017-07-27 PROCEDURE — 27000221 HC OXYGEN, UP TO 24 HOURS

## 2017-07-27 PROCEDURE — 99291 CRITICAL CARE FIRST HOUR: CPT | Mod: 25,,, | Performed by: INTERNAL MEDICINE

## 2017-07-27 PROCEDURE — 63600175 PHARM REV CODE 636 W HCPCS: Performed by: STUDENT IN AN ORGANIZED HEALTH CARE EDUCATION/TRAINING PROGRAM

## 2017-07-27 PROCEDURE — 25000003 PHARM REV CODE 250: Performed by: HOSPITALIST

## 2017-07-27 PROCEDURE — 80053 COMPREHEN METABOLIC PANEL: CPT

## 2017-07-27 PROCEDURE — A4216 STERILE WATER/SALINE, 10 ML: HCPCS | Performed by: STUDENT IN AN ORGANIZED HEALTH CARE EDUCATION/TRAINING PROGRAM

## 2017-07-27 PROCEDURE — 36600 WITHDRAWAL OF ARTERIAL BLOOD: CPT

## 2017-07-27 PROCEDURE — 93306 TTE W/DOPPLER COMPLETE: CPT

## 2017-07-27 PROCEDURE — 84100 ASSAY OF PHOSPHORUS: CPT

## 2017-07-27 PROCEDURE — 0BH17EZ INSERTION OF ENDOTRACHEAL AIRWAY INTO TRACHEA, VIA NATURAL OR ARTIFICIAL OPENING: ICD-10-PCS | Performed by: INTERNAL MEDICINE

## 2017-07-27 PROCEDURE — 63600175 PHARM REV CODE 636 W HCPCS: Performed by: INTERNAL MEDICINE

## 2017-07-27 PROCEDURE — 83605 ASSAY OF LACTIC ACID: CPT

## 2017-07-27 PROCEDURE — 20000000 HC ICU ROOM

## 2017-07-27 PROCEDURE — 83735 ASSAY OF MAGNESIUM: CPT | Mod: 91

## 2017-07-27 PROCEDURE — 80053 COMPREHEN METABOLIC PANEL: CPT | Mod: 91

## 2017-07-27 PROCEDURE — 93306 TTE W/DOPPLER COMPLETE: CPT | Mod: 26,,, | Performed by: INTERNAL MEDICINE

## 2017-07-27 PROCEDURE — 31500 INSERT EMERGENCY AIRWAY: CPT | Mod: ,,, | Performed by: INTERNAL MEDICINE

## 2017-07-27 PROCEDURE — 25000003 PHARM REV CODE 250: Performed by: INTERNAL MEDICINE

## 2017-07-27 PROCEDURE — 80061 LIPID PANEL: CPT

## 2017-07-27 PROCEDURE — 84484 ASSAY OF TROPONIN QUANT: CPT

## 2017-07-27 PROCEDURE — 82330 ASSAY OF CALCIUM: CPT

## 2017-07-27 PROCEDURE — 94002 VENT MGMT INPAT INIT DAY: CPT

## 2017-07-27 PROCEDURE — 83605 ASSAY OF LACTIC ACID: CPT | Mod: 91

## 2017-07-27 PROCEDURE — 94761 N-INVAS EAR/PLS OXIMETRY MLT: CPT

## 2017-07-27 PROCEDURE — S0028 INJECTION, FAMOTIDINE, 20 MG: HCPCS | Performed by: HOSPITALIST

## 2017-07-27 PROCEDURE — 63600175 PHARM REV CODE 636 W HCPCS: Performed by: HOSPITALIST

## 2017-07-27 RX ORDER — PHENYLEPHRINE HCL IN 0.9% NACL 1 MG/10 ML
SYRINGE (ML) INTRAVENOUS
Status: COMPLETED
Start: 2017-07-27 | End: 2017-07-27

## 2017-07-27 RX ORDER — AMLODIPINE BESYLATE 10 MG/1
10 TABLET ORAL DAILY
Status: DISCONTINUED | OUTPATIENT
Start: 2017-07-28 | End: 2017-07-28

## 2017-07-27 RX ORDER — SUCCINYLCHOLINE CHLORIDE 20 MG/ML
INJECTION INTRAMUSCULAR; INTRAVENOUS
Status: COMPLETED
Start: 2017-07-27 | End: 2017-07-27

## 2017-07-27 RX ORDER — PROPOFOL 10 MG/ML
INJECTION, EMULSION INTRAVENOUS
Status: COMPLETED
Start: 2017-07-27 | End: 2017-07-27

## 2017-07-27 RX ORDER — MAGNESIUM SULFATE HEPTAHYDRATE 40 MG/ML
2 INJECTION, SOLUTION INTRAVENOUS ONCE
Status: DISCONTINUED | OUTPATIENT
Start: 2017-07-27 | End: 2017-07-27

## 2017-07-27 RX ORDER — ETOMIDATE 2 MG/ML
INJECTION INTRAVENOUS
Status: COMPLETED
Start: 2017-07-27 | End: 2017-07-27

## 2017-07-27 RX ORDER — ETOMIDATE 2 MG/ML
30 INJECTION INTRAVENOUS ONCE
Status: COMPLETED | OUTPATIENT
Start: 2017-07-27 | End: 2017-07-27

## 2017-07-27 RX ORDER — CEFEPIME HYDROCHLORIDE 2 G/50ML
2 INJECTION, SOLUTION INTRAVENOUS EVERY 8 HOURS
Status: DISCONTINUED | OUTPATIENT
Start: 2017-07-27 | End: 2017-08-02

## 2017-07-27 RX ORDER — ROCURONIUM BROMIDE 10 MG/ML
INJECTION, SOLUTION INTRAVENOUS
Status: COMPLETED
Start: 2017-07-27 | End: 2017-07-27

## 2017-07-27 RX ORDER — FAMOTIDINE 10 MG/ML
20 INJECTION INTRAVENOUS 2 TIMES DAILY
Status: DISCONTINUED | OUTPATIENT
Start: 2017-07-27 | End: 2017-08-04

## 2017-07-27 RX ORDER — ROCURONIUM BROMIDE 10 MG/ML
100 INJECTION, SOLUTION INTRAVENOUS ONCE
Status: COMPLETED | OUTPATIENT
Start: 2017-07-27 | End: 2017-07-27

## 2017-07-27 RX ORDER — PROPOFOL 10 MG/ML
50 INJECTION, EMULSION INTRAVENOUS CONTINUOUS
Status: DISCONTINUED | OUTPATIENT
Start: 2017-07-27 | End: 2017-07-28

## 2017-07-27 RX ORDER — LABETALOL HYDROCHLORIDE 5 MG/ML
10 INJECTION, SOLUTION INTRAVENOUS ONCE
Status: COMPLETED | OUTPATIENT
Start: 2017-07-27 | End: 2017-07-27

## 2017-07-27 RX ORDER — METRONIDAZOLE 500 MG/100ML
500 INJECTION, SOLUTION INTRAVENOUS
Status: DISCONTINUED | OUTPATIENT
Start: 2017-07-27 | End: 2017-08-05 | Stop reason: HOSPADM

## 2017-07-27 RX ORDER — FUROSEMIDE 10 MG/ML
60 INJECTION INTRAMUSCULAR; INTRAVENOUS ONCE
Status: COMPLETED | OUTPATIENT
Start: 2017-07-27 | End: 2017-07-27

## 2017-07-27 RX ORDER — MAGNESIUM SULFATE HEPTAHYDRATE 40 MG/ML
2 INJECTION, SOLUTION INTRAVENOUS ONCE
Status: COMPLETED | OUTPATIENT
Start: 2017-07-27 | End: 2017-07-27

## 2017-07-27 RX ORDER — CHLORHEXIDINE GLUCONATE ORAL RINSE 1.2 MG/ML
15 SOLUTION DENTAL 2 TIMES DAILY
Status: DISCONTINUED | OUTPATIENT
Start: 2017-07-27 | End: 2017-07-30

## 2017-07-27 RX ADMIN — ROCURONIUM BROMIDE 50 MG: 10 INJECTION, SOLUTION INTRAVENOUS at 06:07

## 2017-07-27 RX ADMIN — CEFEPIME HYDROCHLORIDE 2 G: 2 INJECTION, SOLUTION INTRAVENOUS at 09:07

## 2017-07-27 RX ADMIN — SODIUM CHLORIDE 13.25 UNITS/HR: 9 INJECTION, SOLUTION INTRAVENOUS at 08:07

## 2017-07-27 RX ADMIN — HEPARIN SODIUM 5000 UNITS: 5000 INJECTION, SOLUTION INTRAVENOUS; SUBCUTANEOUS at 01:07

## 2017-07-27 RX ADMIN — MORPHINE SULFATE 4 MG: 2 INJECTION, SOLUTION INTRAMUSCULAR; INTRAVENOUS at 06:07

## 2017-07-27 RX ADMIN — FUROSEMIDE 60 MG: 10 INJECTION, SOLUTION INTRAVENOUS at 09:07

## 2017-07-27 RX ADMIN — HEPARIN SODIUM 5000 UNITS: 5000 INJECTION, SOLUTION INTRAVENOUS; SUBCUTANEOUS at 10:07

## 2017-07-27 RX ADMIN — CHLORHEXIDINE GLUCONATE 15 ML: 1.2 RINSE ORAL at 09:07

## 2017-07-27 RX ADMIN — PROMETHAZINE HYDROCHLORIDE 6.25 MG: 25 INJECTION INTRAMUSCULAR; INTRAVENOUS at 09:07

## 2017-07-27 RX ADMIN — Medication 3 ML: at 06:07

## 2017-07-27 RX ADMIN — CALCIUM GLUCONATE 1 G: 94 INJECTION, SOLUTION INTRAVENOUS at 07:07

## 2017-07-27 RX ADMIN — POTASSIUM BICARBONATE 50 MEQ: 25 TABLET, EFFERVESCENT ORAL at 01:07

## 2017-07-27 RX ADMIN — LABETALOL HYDROCHLORIDE 10 MG: 5 INJECTION, SOLUTION INTRAVENOUS at 05:07

## 2017-07-27 RX ADMIN — ONDANSETRON 8 MG: 2 INJECTION INTRAMUSCULAR; INTRAVENOUS at 06:07

## 2017-07-27 RX ADMIN — HEPARIN SODIUM 5000 UNITS: 5000 INJECTION, SOLUTION INTRAVENOUS; SUBCUTANEOUS at 06:07

## 2017-07-27 RX ADMIN — DEXTROSE 1 G: 50 INJECTION, SOLUTION INTRAVENOUS at 05:07

## 2017-07-27 RX ADMIN — SODIUM CHLORIDE 500 ML: 0.9 INJECTION, SOLUTION INTRAVENOUS at 12:07

## 2017-07-27 RX ADMIN — Medication 3 ML: at 01:07

## 2017-07-27 RX ADMIN — ETOMIDATE 40 MG: 2 INJECTION INTRAVENOUS at 06:07

## 2017-07-27 RX ADMIN — FAMOTIDINE 20 MG: 10 INJECTION, SOLUTION INTRAVENOUS at 09:07

## 2017-07-27 RX ADMIN — METRONIDAZOLE 500 MG: 500 INJECTION, SOLUTION INTRAVENOUS at 09:07

## 2017-07-27 RX ADMIN — MORPHINE SULFATE 4 MG: 2 INJECTION, SOLUTION INTRAMUSCULAR; INTRAVENOUS at 12:07

## 2017-07-27 RX ADMIN — ONDANSETRON 8 MG: 2 INJECTION INTRAMUSCULAR; INTRAVENOUS at 12:07

## 2017-07-27 RX ADMIN — MAGNESIUM SULFATE IN WATER 2 G: 40 INJECTION, SOLUTION INTRAVENOUS at 11:07

## 2017-07-27 RX ADMIN — ETOMIDATE 40 MG: 2 INJECTION, SOLUTION INTRAVENOUS at 06:07

## 2017-07-27 RX ADMIN — PROPOFOL 10 MCG/KG/MIN: 10 INJECTION, EMULSION INTRAVENOUS at 07:07

## 2017-07-27 NOTE — ASSESSMENT & PLAN NOTE
- Patient with history of DM I presents with glucose of 569.  - Serum beta-hydroxybutyrate and acetone minimal. Urine ketones negative.   - Currently on IV insulin regular 100 units in normal saline.  - POCT glucose every hour.

## 2017-07-27 NOTE — PLAN OF CARE
Problem: Patient Care Overview  Goal: Plan of Care Review  Outcome: Ongoing (interventions implemented as appropriate)  Plan of care updated and reviewed with pt. VS and assessments per flowsheet. Pt complains of nausea and vomiting throughout the night, medications given. Pt remains on insulin gtt. All questions and concerns addressed. Continue to monitor.

## 2017-07-27 NOTE — HOSPITAL COURSE
7/27: Having pain and nausea without vomiting overnight, controlled with morphine 4 x 4 IV. Patient also developed acute respiratory failure requiring intubation. Elevated bladder pressures overnight improving with treatment of ileus    7/28: NG tube for elevated bladder pressures.     7/29: Bladder pressures resolved with NG. NG removed. Patient with continued NPO but dry heaving and nauseous.     7/30: Dry heaving is much improved. Got some NG decompression last night but now removed. Small BMs overnight but not substantial. Did not sleep last night due to abdominal discomfort.

## 2017-07-27 NOTE — SUBJECTIVE & OBJECTIVE
Interval History/Significant Events:     Overnight, he was having pain in his abdomen and nausea better than before, his managed with morphine 4 mg x 4 times. He asked to start some water and ice chips.     Review of Systems   Constitutional: Positive for fatigue. Negative for activity change, appetite change, diaphoresis and fever.   HENT: Negative for congestion and dental problem.    Eyes: Negative for discharge and itching.   Respiratory: Negative for cough and shortness of breath.    Gastrointestinal: Positive for abdominal distention, abdominal pain and nausea. Negative for anal bleeding, blood in stool, constipation, diarrhea and vomiting.   Genitourinary: Negative for dysuria and flank pain.   Musculoskeletal: Positive for back pain. Negative for neck pain.   Psychiatric/Behavioral: Negative for agitation and behavioral problems.     Objective:     Vital Signs (Most Recent):  Temp: 98.1 °F (36.7 °C) (07/27/17 0701)  Pulse: (!) 121 (07/27/17 1000)  Resp: (!) 47 (07/27/17 1000)  BP: (!) 177/78 (07/27/17 1000)  SpO2: 99 % (07/27/17 1000) Vital Signs (24h Range):  Temp:  [97.7 °F (36.5 °C)-98.9 °F (37.2 °C)] 98.1 °F (36.7 °C)  Pulse:  [121-140] 121  Resp:  [26-50] 47  SpO2:  [89 %-100 %] 99 %  BP: (112-177)/(63-98) 177/78   Weight: 111.3 kg (245 lb 6 oz)  Body mass index is 35.21 kg/m².      Intake/Output Summary (Last 24 hours) at 07/27/17 1029  Last data filed at 07/27/17 1000   Gross per 24 hour   Intake          4661.46 ml   Output              805 ml   Net          3856.46 ml       Physical Exam   Constitutional: He is oriented to person, place, and time. He appears distressed.   HENT:   Head: Normocephalic.   Right Ear: External ear normal.   Left Ear: External ear normal.   Eyes: Pupils are equal, round, and reactive to light. Right eye exhibits no discharge. Left eye exhibits no discharge.   Neck: Normal range of motion. Neck supple. No tracheal deviation present. No thyromegaly present.    Cardiovascular: Regular rhythm and normal heart sounds.  Exam reveals no friction rub.    No murmur heard.  Sinus Tachycardia    Pulmonary/Chest: Effort normal. He has no wheezes. He has no rales.   Abdominal: He exhibits distension. He exhibits no mass. There is tenderness. There is no guarding.   Musculoskeletal: He exhibits no edema or deformity.   Neurological: He is alert and oriented to person, place, and time.   Skin: He is not diaphoretic.       Vents:  Oxygen Concentration (%): 28 (07/27/17 0709)  Lines/Drains/Airways     Drain                 Urethral Catheter 07/25/17 2024  16 Fr. 1 day          Peripheral Intravenous Line                 Peripheral IV - Single Lumen 07/25/17 1752 Right Forearm 1 day         Peripheral IV - Single Lumen 07/25/17 1754 Left Antecubital 1 day              Significant Labs:    CBC/Anemia Profile:    Recent Labs  Lab 07/25/17 2232 07/26/17 0328 07/27/17  0527 07/27/17  0913   WBC 22.31* 20.96* 10.21  --    HGB 19.7* 19.1* 15.4 16.1   HCT 55.0* 54.2* 45.1 46.1    239 SEE COMMENT 182   MCV 82 84 83 83   RDW 13.7 13.9 14.7* 14.5        Chemistries:    Recent Labs  Lab 07/25/17 2006 07/25/17 2232 07/26/17 0328 07/26/17 1750 07/27/17  0057 07/27/17  0527   *  129* 131* 131*  < > 132* 135* 136   K 5.1  5.1 4.8 4.1  < > 4.4 4.4 3.7   CL 97  97 99 102  < > 104 110 111*   CO2 14*  14* 11* 15*  < > 14* 15* 12*   BUN 17  17 19 21*  < > 28* 30* 30*   CREATININE 1.39  1.39 1.9* 1.8*  < > 2.5* 2.1* 1.7*   CALCIUM 6.9*  6.9* 7.1* 7.5*  < > 7.5* 6.2* 6.0*   ALBUMIN  --  2.8* 2.4*  < > 2.4* 1.8* 1.8*   PROT  --  6.3 5.8*  < > 6.0 4.7* 4.7*   BILITOT  --  1.2* 0.9  < > 0.7 0.7 0.6   ALKPHOS  --  71 59  < > 69 56 55   ALT  --  44 585*  < > 3,608* 2,489* 2,355*   AST  --  60* 793*  < > 4,715* 2,559* 2,136*   MG 1.7 1.8 2.0  --   --   --  1.3*   PHOS 4.4  4.4  --  2.2*  --   --   --  3.4   < > = values in this interval not displayed.

## 2017-07-27 NOTE — HPI
"Bay Ibarra is a 37 y.o. male with with PMHx HTN, HLD, Hypertriglyceridemia, DM I, gout, and pancreatitis (apirl 2017 and four years earlier) who was transferred from [place] with necrotizing pancreatitis and DKA. The patient states he started feeling "ill and weak" 2 days ago, and yesterday morning he woke up with severe epigastric abdominal pain and had multiple episodes of nausea and vomiting with poor PO intake. He endorsed starting gemfibrozil recently for HLD and regular compliance with his insulin glargine BID. He presented to [place] with Lipase 4000, Triglycerides 1500, lactate 10 and elevated anion gap. He received 3 L of ns and IV insulin at [place] before being transported to [facility] for higher level of care.     He has had additional fluid resuscitation since arriving at [facility] with improvement of his lactate but continued tachycardia.  He states that his pain is improved.  CT abd/pelvis shows necrotizing pancreatitis without any definite fluid collections or evidence of infection.  "

## 2017-07-27 NOTE — PROGRESS NOTES
"[facility]  General Surgery  Progress Note    Subjective:     History of Present Illness:  Bay Ibarra is a 37 y.o. male with with PMHx HTN, HLD, Hypertriglyceridemia, DM I, gout, and pancreatitis (apirl 2017 and four years earlier) who was transferred from [place] with necrotizing pancreatitis and DKA. The patient states he started feeling "ill and weak" 2 days ago, and yesterday morning he woke up with severe epigastric abdominal pain and had multiple episodes of nausea and vomiting with poor PO intake. He endorsed starting gemfibrozil recently for HLD and regular compliance with his insulin glargine BID. He presented to [place] with Lipase 4000, Triglycerides 1500, lactate 10 and elevated anion gap. He received 3 L of ns and IV insulin at [place] before being transported to [facility] for higher level of care.     He has had additional fluid resuscitation since arriving at [facility] with improvement of his lactate but continued tachycardia.  He states that his pain is improved.  CT abd/pelvis shows necrotizing pancreatitis without any definite fluid collections or evidence of infection.    Post-Op Info:  * No surgery found *         Interval History: Remains tachycardic. States that abdominal pain is improved. He was nauseated last night but not anymore this morning. Started passing flatus as well last night. No bowel movements yet.    Medications:  Continuous Infusions:   insulin (HUMAN R) infusion (adults) 13.25 Units/hr (07/27/17 0800)     Scheduled Meds:   calcium gluconate 1g in dextrose 5% 100mL (ready to mix system)  1 g Intravenous Once    heparin (porcine)  5,000 Units Subcutaneous Q8H    magnesium sulfate IVPB  2 g Intravenous Once    sodium chloride 0.9%  3 mL Intravenous Q8H     PRN Meds:dextrose 50%, dextrose 50%, morphine, ondansetron, promethazine (PHENERGAN) IVPB     Review of patient's allergies indicates:  No Known Allergies  Objective:     Vital Signs " (Most Recent):  Temp: 98.1 °F (36.7 °C) (07/27/17 0701)  Pulse: (!) 123 (07/27/17 0800)  Resp: (!) 30 (07/27/17 0800)  BP: 138/66 (07/27/17 0800)  SpO2: 99 % (07/27/17 0800) Vital Signs (24h Range):  Temp:  [97.7 °F (36.5 °C)-98.9 °F (37.2 °C)] 98.1 °F (36.7 °C)  Pulse:  [122-141] 123  Resp:  [27-53] 30  SpO2:  [89 %-100 %] 99 %  BP: (112-160)/(63-98) 138/66     Weight: 111.3 kg (245 lb 6 oz)  Body mass index is 35.21 kg/m².    Intake/Output - Last 3 Shifts       07/25 0700 - 07/26 0659 07/26 0700 - 07/27 0659 07/27 0700 - 07/28 0659    I.V. (mL/kg) 589.5 (5.6) 4171.1 (37.5) 76.6 (0.7)    IV Piggyback  1050 100    Total Intake(mL/kg) 589.5 (5.6) 5221.1 (46.9) 176.6 (1.6)    Urine (mL/kg/hr) 1030 640 (0.2) 80 (0.4)    Emesis/NG output  0 (0)     Total Output 1030 640 80    Net -440.5 +4581.1 +96.6           Emesis Occurrence  2 x           Physical Exam   Constitutional: He is oriented to person, place, and time. He appears well-developed and well-nourished. No distress.   HENT:   Head: Normocephalic and atraumatic.   Eyes: EOM are normal.   Neck: Neck supple.   Cardiovascular: Regular rhythm and normal heart sounds.    tachycardic   Pulmonary/Chest: Breath sounds normal. No respiratory distress. He has no wheezes.   Abdominal: Soft. He exhibits distension. There is no tenderness. There is no guarding.   Very distended abdomen, tympanic, minimal TTP, no masses, no hernia, hypoactive bowel sounds   Neurological: He is alert and oriented to person, place, and time.   Skin: Skin is warm and dry. Capillary refill takes less than 2 seconds.   Psychiatric: He has a normal mood and affect. His behavior is normal.       Significant Labs:  CBC:   Recent Labs  Lab 07/26/17 0328 07/27/17 0527   WBC 20.96*  --    RBC 6.44* 5.42   HGB 19.1* 15.4   HCT 54.2* 45.1     --    MCV 84 83   MCH 29.7 28.4   MCHC 35.2 34.1     CMP:   Recent Labs  Lab 07/27/17 0527   *   CALCIUM 6.0*   ALBUMIN 1.8*   PROT 4.7*      K  3.7   CO2 12*   *   BUN 30*   CREATININE 1.7*   ALKPHOS 55   ALT 2,355*   AST 2,136*   BILITOT 0.6       Significant Diagnostics:  None    Assessment/Plan:     * Pancreatitis    - Continue conservative management for non-infected necrotizing pancreatitis  -No surgical intervention indicated  -Would not advance diet yet, patient is distended however the fact that he is passing flatus is encouraging; also we do not want to stimulate pancreas too early but diet initiation  -Continue medical management per primary team  -Will sign off; please call with questions            Salena Finnegan MD  General Surgery  [facility]

## 2017-07-27 NOTE — SUBJECTIVE & OBJECTIVE
Interval History: Remains tachycardic. States that abdominal pain is improved. He was nauseated last night but not anymore this morning. Started passing flatus as well last night. No bowel movements yet.    Medications:  Continuous Infusions:   insulin (HUMAN R) infusion (adults) 13.25 Units/hr (07/27/17 0800)     Scheduled Meds:   calcium gluconate 1g in dextrose 5% 100mL (ready to mix system)  1 g Intravenous Once    heparin (porcine)  5,000 Units Subcutaneous Q8H    magnesium sulfate IVPB  2 g Intravenous Once    sodium chloride 0.9%  3 mL Intravenous Q8H     PRN Meds:dextrose 50%, dextrose 50%, morphine, ondansetron, promethazine (PHENERGAN) IVPB     Review of patient's allergies indicates:  No Known Allergies  Objective:     Vital Signs (Most Recent):  Temp: 98.1 °F (36.7 °C) (07/27/17 0701)  Pulse: (!) 123 (07/27/17 0800)  Resp: (!) 30 (07/27/17 0800)  BP: 138/66 (07/27/17 0800)  SpO2: 99 % (07/27/17 0800) Vital Signs (24h Range):  Temp:  [97.7 °F (36.5 °C)-98.9 °F (37.2 °C)] 98.1 °F (36.7 °C)  Pulse:  [122-141] 123  Resp:  [27-53] 30  SpO2:  [89 %-100 %] 99 %  BP: (112-160)/(63-98) 138/66     Weight: 111.3 kg (245 lb 6 oz)  Body mass index is 35.21 kg/m².    Intake/Output - Last 3 Shifts       07/25 0700 - 07/26 0659 07/26 0700 - 07/27 0659 07/27 0700 - 07/28 0659    I.V. (mL/kg) 589.5 (5.6) 4171.1 (37.5) 76.6 (0.7)    IV Piggyback  1050 100    Total Intake(mL/kg) 589.5 (5.6) 5221.1 (46.9) 176.6 (1.6)    Urine (mL/kg/hr) 1030 640 (0.2) 80 (0.4)    Emesis/NG output  0 (0)     Total Output 1030 640 80    Net -440.5 +4581.1 +96.6           Emesis Occurrence  2 x           Physical Exam   Constitutional: He is oriented to person, place, and time. He appears well-developed and well-nourished. No distress.   HENT:   Head: Normocephalic and atraumatic.   Eyes: EOM are normal.   Neck: Neck supple.   Cardiovascular: Regular rhythm and normal heart sounds.    tachycardic   Pulmonary/Chest: Breath sounds normal. No  respiratory distress. He has no wheezes.   Abdominal: Soft. He exhibits distension. There is no tenderness. There is no guarding.   Very distended abdomen, tympanic, minimal TTP, no masses, no hernia, hypoactive bowel sounds   Neurological: He is alert and oriented to person, place, and time.   Skin: Skin is warm and dry. Capillary refill takes less than 2 seconds.   Psychiatric: He has a normal mood and affect. His behavior is normal.       Significant Labs:  CBC:   Recent Labs  Lab 07/26/17  0328 07/27/17 0527   WBC 20.96*  --    RBC 6.44* 5.42   HGB 19.1* 15.4   HCT 54.2* 45.1     --    MCV 84 83   MCH 29.7 28.4   MCHC 35.2 34.1     CMP:   Recent Labs  Lab 07/27/17 0527   *   CALCIUM 6.0*   ALBUMIN 1.8*   PROT 4.7*      K 3.7   CO2 12*   *   BUN 30*   CREATININE 1.7*   ALKPHOS 55   ALT 2,355*   AST 2,136*   BILITOT 0.6       Significant Diagnostics:  None

## 2017-07-27 NOTE — PROCEDURES
"Bay Ibarra is a 37 y.o. male patient.    Temp: 97.7 °F (36.5 °C) (07/27/17 1500)  Pulse: (!) 113 (07/27/17 1700)  Resp: (!) 27 (07/27/17 1700)  BP: (!) 180/88 (07/27/17 1700)  SpO2: 98 % (07/27/17 1700)  Weight: 111.3 kg (245 lb 6 oz) (07/27/17 0400)  Height: 5' 10" (177.8 cm) (07/25/17 2214)       Intubation  Date/Time: 7/27/2017 6:46 PM  Performed by: MARTINEZ PERDUE.  Authorized by: MARTINEZ PERDUE.   Consent Done: Emergent Situation  Indications: respiratory failure (unsustainable work of breathing)  Intubation method: direct  Patient status: paralyzed (RSI)  Preoxygenation: BVM  Sedatives: etomidate (etomidate 30 mg)  Paralytic: rocuronium (rocuronium 100 mg)  Laryngoscope size: Mac 4  Tube size: 8.0 mm  Tube type: cuffed  Number of attempts: 1  Cricoid pressure: yes  Cords visualized: yes (grade 2 view)  Post-procedure assessment: chest rise and ETCO2 monitor  Breath sounds: clear and equal  Cuff inflated: yes  ETT to lip: 24 cm  Tube secured with: adhesive tape and ETT kelsey  Patient tolerance: Patient tolerated the procedure well with no immediate complications  Complications: No  Specimens: No        Emergent intubation for acute respiratory distress, etiology undetermined, with unsustainable work of breathing. Cords were not anterior but there was mild difficulty due to very fleshy pharynx. Procedure supervised by staff, Dr. Rush. CXR ordered to confirm ETT placement.     Martinez Perdue, PGY-5  Pulmonary & Critical Care Medicine  pager [phone]    "

## 2017-07-27 NOTE — PLAN OF CARE
Problem: Patient Care Overview  Goal: Plan of Care Review  Outcome: Ongoing (interventions implemented as appropriate)  Pt remains afebrile and VSS. Antihypertension med prescribed and administered for BP of 180/88, regularly 150s/70s. HR Tachycardia entire shift up to 120s. Pt. Continues to have Nausea/vomitting. PRN phenergan and zofran administered. Abnormally high electrolytes, MD nodified and EKG run.  See flow sheet for full assessment. Pt remains on continuous tele and pulse ox monitor. No falls. Pt remains on insulin gtt and normogram followed. 500mL NS fluid bolus administered. UO usually 75mL/hr.  POC reviewed w/Pt who verbalized understanding.

## 2017-07-27 NOTE — PROGRESS NOTES
"[facility]  Critical Care Medicine  Progress Note    Patient Name: Bay Ibarra  MRN: [id number]  Admission Date: 7/25/2017  Hospital Length of Stay: 2 days  Code Status: Full Code  Attending Provider: Cam Rush MD  Primary Care Provider: José Luis Singleton MD   Principal Problem: Pancreatitis    Subjective:     HPI:  Mr. Ibarra is a 38 y/o gentleman with PMHx HTN, HLD, Hypertriglyceridemia, DM I, gout, and pancreatitis (apirl 2017) who was transferred from [facility] with necrotizing pancreatitis and DKA. The patient states he started feeling "ill and weak" 2 days ago, and yesterday morning he woke up with severe epigastric abdominal pain and had multiple episodes of nausea and vomiting with poor PO intake. He endorsed starting gemfibrozil recently for HLD and regular compliance with his insulin glargine BID. He presented to [facility] with Lipase 4000, Triglycerides 1500, lactate 10 and elevated anion gap. He received 3 L of ns and IV insulin at [facility] before being transported to [facility] for higher level of care. Upon initial assessment in the ED, the patient was lethargic, but arousable, , /63, and SpO2 98% on 2L nasal cannula. He denied having any fevers, chills, CP, cough, diarrhea or dysuria.  He was started on 2L of lactated ringers, IV insulin and admitted to the MICU.     Hospital/ICU Course:  7/27: Having pain and nausea without vomiting overnight, controlled with morphine 4 x 4 IV.     Interval History/Significant Events:     Overnight, he was having pain in his abdomen and nausea better than before, his managed with morphine 4 mg x 4 times. He asked to start some water and ice chips.     Review of Systems   Constitutional: Positive for fatigue. Negative for activity change, appetite change, diaphoresis and fever.   HENT: Negative for congestion and dental problem.    Eyes: Negative for discharge and itching.   Respiratory: Negative for cough and " shortness of breath.    Gastrointestinal: Positive for abdominal distention, abdominal pain and nausea. Negative for anal bleeding, blood in stool, constipation, diarrhea and vomiting.   Genitourinary: Negative for dysuria and flank pain.   Musculoskeletal: Positive for back pain. Negative for neck pain.   Psychiatric/Behavioral: Negative for agitation and behavioral problems.     Objective:     Vital Signs (Most Recent):  Temp: 98.1 °F (36.7 °C) (07/27/17 0701)  Pulse: (!) 121 (07/27/17 1000)  Resp: (!) 47 (07/27/17 1000)  BP: (!) 177/78 (07/27/17 1000)  SpO2: 99 % (07/27/17 1000) Vital Signs (24h Range):  Temp:  [97.7 °F (36.5 °C)-98.9 °F (37.2 °C)] 98.1 °F (36.7 °C)  Pulse:  [121-140] 121  Resp:  [26-50] 47  SpO2:  [89 %-100 %] 99 %  BP: (112-177)/(63-98) 177/78   Weight: 111.3 kg (245 lb 6 oz)  Body mass index is 35.21 kg/m².      Intake/Output Summary (Last 24 hours) at 07/27/17 1029  Last data filed at 07/27/17 1000   Gross per 24 hour   Intake          4661.46 ml   Output              805 ml   Net          3856.46 ml       Physical Exam   Constitutional: He is oriented to person, place, and time. He appears distressed.   HENT:   Head: Normocephalic.   Right Ear: External ear normal.   Left Ear: External ear normal.   Eyes: Pupils are equal, round, and reactive to light. Right eye exhibits no discharge. Left eye exhibits no discharge.   Neck: Normal range of motion. Neck supple. No tracheal deviation present. No thyromegaly present.   Cardiovascular: Regular rhythm and normal heart sounds.  Exam reveals no friction rub.    No murmur heard.  Sinus Tachycardia    Pulmonary/Chest: Effort normal. He has no wheezes. He has no rales.   Abdominal: He exhibits distension. He exhibits no mass. There is tenderness. There is no guarding.   Musculoskeletal: He exhibits no edema or deformity.   Neurological: He is alert and oriented to person, place, and time.   Skin: He is not diaphoretic.       Vents:  Oxygen  Concentration (%): 28 (07/27/17 0709)  Lines/Drains/Airways     Drain                 Urethral Catheter 07/25/17 2024  16 Fr. 1 day          Peripheral Intravenous Line                 Peripheral IV - Single Lumen 07/25/17 1752 Right Forearm 1 day         Peripheral IV - Single Lumen 07/25/17 1754 Left Antecubital 1 day              Significant Labs:    CBC/Anemia Profile:    Recent Labs  Lab 07/25/17 2232 07/26/17 0328 07/27/17  0527 07/27/17  0913   WBC 22.31* 20.96* 10.21  --    HGB 19.7* 19.1* 15.4 16.1   HCT 55.0* 54.2* 45.1 46.1    239 SEE COMMENT 182   MCV 82 84 83 83   RDW 13.7 13.9 14.7* 14.5        Chemistries:    Recent Labs  Lab 07/25/17 2006 07/25/17 2232 07/26/17 0328 07/26/17 1750 07/27/17  0057 07/27/17  0527   *  129* 131* 131*  < > 132* 135* 136   K 5.1  5.1 4.8 4.1  < > 4.4 4.4 3.7   CL 97  97 99 102  < > 104 110 111*   CO2 14*  14* 11* 15*  < > 14* 15* 12*   BUN 17  17 19 21*  < > 28* 30* 30*   CREATININE 1.39  1.39 1.9* 1.8*  < > 2.5* 2.1* 1.7*   CALCIUM 6.9*  6.9* 7.1* 7.5*  < > 7.5* 6.2* 6.0*   ALBUMIN  --  2.8* 2.4*  < > 2.4* 1.8* 1.8*   PROT  --  6.3 5.8*  < > 6.0 4.7* 4.7*   BILITOT  --  1.2* 0.9  < > 0.7 0.7 0.6   ALKPHOS  --  71 59  < > 69 56 55   ALT  --  44 585*  < > 3,608* 2,489* 2,355*   AST  --  60* 793*  < > 4,715* 2,559* 2,136*   MG 1.7 1.8 2.0  --   --   --  1.3*   PHOS 4.4  4.4  --  2.2*  --   --   --  3.4   < > = values in this interval not displayed.      Assessment/Plan:     Neuro   Hyperglycemic hyperosmolar nonketotic coma    - Patient with history of DM I presents with glucose of 569.  - Serum beta-hydroxybutyrate and acetone minimal. Urine ketones negative.   - Currently on IV insulin regular 100 units in normal saline.  - POCT glucose every hour.        Endocrine   Diabetes mellitus type I    - Patient with DM I presents with hyperglycemia  - Currently on IV insulin regular 100 units.  Recent Labs      07/27/17   0403  07/27/17   0516   07/27/17   0605  07/27/17   0715  07/27/17   0806  07/27/17   0847  07/27/17   1015  07/27/17   1017   POCTGLUCOSE  213*  192*  182*  165*  162*  148*  377*  131*             Fluids/Electrolytes/Nutrition/GI   * Pancreatitis    - Patient presents to OSH with pancreatitis evidenced by CT abdomen/pelvis  - Lipase 4000, triglycerides 1500, lactate 10.4 >> 1.9   - repeat triglyceride 946 and down to ~ 300.   - Blood culture showed No Growth to date, and in ED received Ceftriaxone, and received Flagyl.  - Was on Ringer Lactate infusion and his blood pressure was stable.   - U/S abdomen Mildly elevated renal resistive indices bilaterally. This is nonspecific and can be seen in a variety of acute or chronic renal conditions.  - IgG4 and JUAN CARLOS to r/o autoimmune pancreatitis.   - Morphine 4mg Q6 PRN for pain  - Diet advanced to clear liquid and will monitor his abdominal pain            Critical Care Daily Checklist:    A: Awake: RASS Goal/Actual Goal:    Actual: German Agitation Sedation Scale (RASS): Alert and calm   B: Spontaneous Breathing Trial Performed?     C: SAT & SBT Coordinated?  No                      D: Delirium: CAM-ICU     E: Early Mobility Performed? No   F: Feeding Goal:    Status:     Current Diet Order   Procedures    Diet clear liquid Clear Liquid Sugar-Free     Order Specific Question:   Additional restrictions:     Answer:   Clear Liquid Sugar-Free      AS: Analgesia/Sedation Yes   T: Thromboembolic Prophylaxis Yes   H: HOB > 300 Yes   U: Stress Ulcer Prophylaxis (if needed) No   G: Glucose Control Yes   B: Bowel Function     I: Indwelling Catheter (Lines & Louise) Necessity Yes   D: De-escalation of Antimicrobials/Pharmacotherapies Yes    Plan for the day/ETD No    Code Status:  Family/Goals of Care: Full Code  No       Critical secondary to Patient has a condition that poses threat to life and bodily function: Necrotizing Pancreatitis       Critical care was time spent personally by me on the  following activities: development of treatment plan with patient or surrogate and bedside caregivers, discussions with consultants, evaluation of patient's response to treatment, examination of patient, ordering and performing treatments and interventions, ordering and review of laboratory studies, ordering and review of radiographic studies, pulse oximetry, re-evaluation of patient's condition. This critical care time did not overlap with that of any other provider or involve time for any procedures.     John Atkinson MD  Critical Care Medicine  [facility]

## 2017-07-27 NOTE — ASSESSMENT & PLAN NOTE
- Patient with DM I presents with hyperglycemia  - Currently on IV insulin regular 100 units.  Recent Labs      07/27/17   0403  07/27/17   0516  07/27/17   0605  07/27/17   0715  07/27/17   0806  07/27/17   0847  07/27/17   1015  07/27/17   1017   POCTGLUCOSE  213*  192*  182*  165*  162*  148*  377*  131*

## 2017-07-27 NOTE — PROGRESS NOTES
Intubated pt with 8.0 ETT. Positive color change on etco2.BBS noted. Put pt on  on documented settings.

## 2017-07-27 NOTE — PLAN OF CARE
Problem: Patient Care Overview  Goal: Plan of Care Review  Outcome: Ongoing (interventions implemented as appropriate)  Pt remains on 2L NC. Fluid resuscitation continued with LR @ 200 ml/hr. 500 ml bolus NS x 2 given for low urine output and rising Cr (currently 2.5). Urine output remains 10-35 ml/hr. Insulin drip continued, blood glucose monitored q1h and drip adjusted according to algorithm. CMP and lactic acid monitored q6h. Abd/retroperitoneal/kidney US completed. Pain and nausea controlled with frequent PRN meds. Plan of care reviewed with patient and family, all questions answered.

## 2017-07-27 NOTE — ASSESSMENT & PLAN NOTE
- Continue conservative management for non-infected necrotizing pancreatitis  -No surgical intervention indicated  -Would not advance diet yet, patient is distended however the fact that he is passing flatus is encouraging; also we do not want to stimulate pancreas too early but diet initiation  -Continue medical management per primary team  -Will sign off; please call with questions

## 2017-07-27 NOTE — PROGRESS NOTES
MD Schwarz notified of pt. Abnormal electrolyte values and BP of 180 /80s. Antihypertensive prescribed and EKG ordered. Will continue to monitor

## 2017-07-27 NOTE — ASSESSMENT & PLAN NOTE
- Patient presents to OSH with pancreatitis evidenced by CT abdomen/pelvis  - Lipase 4000, triglycerides 1500, lactate 10.4 >> 1.9   - repeat triglyceride 946 and down to ~ 300.   - Blood culture showed No Growth to date, and in ED received Ceftriaxone, and received Flagyl.  - Was on Ringer Lactate infusion and his blood pressure was stable.   - U/S abdomen Mildly elevated renal resistive indices bilaterally. This is nonspecific and can be seen in a variety of acute or chronic renal conditions.  - IgG4 and JUAN CARLOS to r/o autoimmune pancreatitis.   - Morphine 4mg Q6 PRN for pain  - Diet advanced to clear liquid and will monitor his abdominal pain

## 2017-07-27 NOTE — EICU
18:29 Called into room for time out prior to emergent intubation. /76, , SP02 100%. Dr Kelly and Dr Rush at the bedside  18:35 30mg etomidate and 100mg of rocuronium given per Dr Rush IVP /79, , SP02 99%  18:38 Intubated with 8.0 ETT succesfully, /64, , [id number]%

## 2017-07-28 PROBLEM — N17.9 AKI (ACUTE KIDNEY INJURY): Status: ACTIVE | Noted: 2017-07-28

## 2017-07-28 PROBLEM — E11.01 HYPERGLYCEMIC HYPEROSMOLAR NONKETOTIC COMA: Status: RESOLVED | Noted: 2017-07-25 | Resolved: 2017-07-28

## 2017-07-28 PROBLEM — K56.7 ILEUS: Status: ACTIVE | Noted: 2017-07-28

## 2017-07-28 PROBLEM — J96.01 ACUTE RESPIRATORY FAILURE WITH HYPOXIA: Status: ACTIVE | Noted: 2017-07-28

## 2017-07-28 LAB
ALBUMIN SERPL BCP-MCNC: 1.6 G/DL
ALBUMIN SERPL BCP-MCNC: 1.9 G/DL
ALBUMIN SERPL BCP-MCNC: 1.9 G/DL
ALLENS TEST: ABNORMAL
ALP SERPL-CCNC: 63 U/L
ALP SERPL-CCNC: 69 U/L
ALP SERPL-CCNC: 72 U/L
ALT SERPL W/O P-5'-P-CCNC: 1120 U/L
ALT SERPL W/O P-5'-P-CCNC: 1257 U/L
ALT SERPL W/O P-5'-P-CCNC: 967 U/L
ANION GAP SERPL CALC-SCNC: 10 MMOL/L
ANION GAP SERPL CALC-SCNC: 11 MMOL/L
ANION GAP SERPL CALC-SCNC: 9 MMOL/L
AST SERPL-CCNC: 343 U/L
AST SERPL-CCNC: 462 U/L
AST SERPL-CCNC: 510 U/L
BASOPHILS # BLD AUTO: 0.02 K/UL
BASOPHILS NFR BLD: 0.2 %
BILIRUB SERPL-MCNC: 0.7 MG/DL
BILIRUB SERPL-MCNC: 0.8 MG/DL
BILIRUB SERPL-MCNC: 0.9 MG/DL
BUN SERPL-MCNC: 29 MG/DL
BUN SERPL-MCNC: 35 MG/DL
BUN SERPL-MCNC: 43 MG/DL
CA-I BLDV-SCNC: 0.72 MMOL/L
CALCIUM SERPL-MCNC: 6.6 MG/DL
CALCIUM SERPL-MCNC: 7.9 MG/DL
CALCIUM SERPL-MCNC: 8.4 MG/DL
CHLORIDE SERPL-SCNC: 104 MMOL/L
CHLORIDE SERPL-SCNC: 105 MMOL/L
CHLORIDE SERPL-SCNC: 108 MMOL/L
CO2 SERPL-SCNC: 19 MMOL/L
CO2 SERPL-SCNC: 20 MMOL/L
CO2 SERPL-SCNC: 22 MMOL/L
CREAT SERPL-MCNC: 1.4 MG/DL
CREAT SERPL-MCNC: 1.8 MG/DL
CREAT SERPL-MCNC: 1.9 MG/DL
DELSYS: ABNORMAL
DIFFERENTIAL METHOD: ABNORMAL
EOSINOPHIL # BLD AUTO: 0.1 K/UL
EOSINOPHIL NFR BLD: 0.7 %
ERYTHROCYTE [DISTWIDTH] IN BLOOD BY AUTOMATED COUNT: 14.8 %
ERYTHROCYTE [SEDIMENTATION RATE] IN BLOOD BY WESTERGREN METHOD: 24 MM/H
ERYTHROCYTE [SEDIMENTATION RATE] IN BLOOD BY WESTERGREN METHOD: 24 MM/H
ERYTHROCYTE [SEDIMENTATION RATE] IN BLOOD BY WESTERGREN METHOD: 28 MM/H
EST. GFR  (AFRICAN AMERICAN): 50.9 ML/MIN/1.73 M^2
EST. GFR  (AFRICAN AMERICAN): 54.4 ML/MIN/1.73 M^2
EST. GFR  (AFRICAN AMERICAN): >60 ML/MIN/1.73 M^2
EST. GFR  (NON AFRICAN AMERICAN): 44.1 ML/MIN/1.73 M^2
EST. GFR  (NON AFRICAN AMERICAN): 47 ML/MIN/1.73 M^2
EST. GFR  (NON AFRICAN AMERICAN): >60 ML/MIN/1.73 M^2
FIO2: 30
FIO2: 35
FIO2: 35
GLUCOSE SERPL-MCNC: 123 MG/DL
GLUCOSE SERPL-MCNC: 171 MG/DL
GLUCOSE SERPL-MCNC: 208 MG/DL
HCO3 UR-SCNC: 20.5 MMOL/L (ref 24–28)
HCO3 UR-SCNC: 21.2 MMOL/L (ref 24–28)
HCO3 UR-SCNC: 21.9 MMOL/L (ref 24–28)
HCT VFR BLD AUTO: 35.9 %
HGB BLD-MCNC: 12.1 G/DL
LYMPHOCYTES # BLD AUTO: 0.6 K/UL
LYMPHOCYTES NFR BLD: 7.7 %
MAGNESIUM SERPL-MCNC: 1.7 MG/DL
MCH RBC QN AUTO: 28.5 PG
MCHC RBC AUTO-ENTMCNC: 33.7 G/DL
MCV RBC AUTO: 85 FL
MIN VOL: 13
MODE: ABNORMAL
MONOCYTES # BLD AUTO: 0.9 K/UL
MONOCYTES NFR BLD: 11.5 %
NEUTROPHILS # BLD AUTO: 6.4 K/UL
NEUTROPHILS NFR BLD: 79.9 %
PCO2 BLDA: 33.2 MMHG (ref 35–45)
PCO2 BLDA: 33.8 MMHG (ref 35–45)
PCO2 BLDA: 41.3 MMHG (ref 35–45)
PEEP: 8
PH SMN: 7.32 [PH] (ref 7.35–7.45)
PH SMN: 7.4 [PH] (ref 7.35–7.45)
PH SMN: 7.42 [PH] (ref 7.35–7.45)
PHOSPHATE SERPL-MCNC: 4.9 MG/DL
PIP: 29
PLATELET # BLD AUTO: 151 K/UL
PMV BLD AUTO: 10.5 FL
PO2 BLDA: 25 MMHG (ref 40–60)
PO2 BLDA: 83 MMHG (ref 80–100)
PO2 BLDA: 98 MMHG (ref 80–100)
POC BE: -3 MMOL/L
POC BE: -4 MMOL/L
POC BE: -5 MMOL/L
POC SATURATED O2: 40 % (ref 95–100)
POC SATURATED O2: 96 % (ref 95–100)
POC SATURATED O2: 98 % (ref 95–100)
POC TCO2: 22 MMOL/L (ref 23–27)
POC TCO2: 22 MMOL/L (ref 24–29)
POC TCO2: 23 MMOL/L (ref 23–27)
POCT GLUCOSE: 122 MG/DL (ref 70–110)
POCT GLUCOSE: 122 MG/DL (ref 70–110)
POCT GLUCOSE: 137 MG/DL (ref 70–110)
POCT GLUCOSE: 150 MG/DL (ref 70–110)
POCT GLUCOSE: 150 MG/DL (ref 70–110)
POCT GLUCOSE: 152 MG/DL (ref 70–110)
POCT GLUCOSE: 156 MG/DL (ref 70–110)
POCT GLUCOSE: 160 MG/DL (ref 70–110)
POCT GLUCOSE: 165 MG/DL (ref 70–110)
POCT GLUCOSE: 166 MG/DL (ref 70–110)
POCT GLUCOSE: 176 MG/DL (ref 70–110)
POCT GLUCOSE: 180 MG/DL (ref 70–110)
POCT GLUCOSE: 182 MG/DL (ref 70–110)
POCT GLUCOSE: 183 MG/DL (ref 70–110)
POCT GLUCOSE: 184 MG/DL (ref 70–110)
POCT GLUCOSE: 188 MG/DL (ref 70–110)
POCT GLUCOSE: 188 MG/DL (ref 70–110)
POCT GLUCOSE: 191 MG/DL (ref 70–110)
POCT GLUCOSE: 192 MG/DL (ref 70–110)
POCT GLUCOSE: 214 MG/DL (ref 70–110)
POCT GLUCOSE: 231 MG/DL (ref 70–110)
POTASSIUM SERPL-SCNC: 3 MMOL/L
POTASSIUM SERPL-SCNC: 3.9 MMOL/L
POTASSIUM SERPL-SCNC: 5.2 MMOL/L
PROT SERPL-MCNC: 4.6 G/DL
PROT SERPL-MCNC: 5.5 G/DL
PROT SERPL-MCNC: 5.6 G/DL
RBC # BLD AUTO: 4.24 M/UL
SAMPLE: ABNORMAL
SITE: ABNORMAL
SODIUM SERPL-SCNC: 134 MMOL/L
SODIUM SERPL-SCNC: 136 MMOL/L
SODIUM SERPL-SCNC: 138 MMOL/L
SP02: 97
VT: 450
WBC # BLD AUTO: 8.07 K/UL

## 2017-07-28 PROCEDURE — 20000000 HC ICU ROOM

## 2017-07-28 PROCEDURE — 83735 ASSAY OF MAGNESIUM: CPT

## 2017-07-28 PROCEDURE — 84100 ASSAY OF PHOSPHORUS: CPT

## 2017-07-28 PROCEDURE — 63600175 PHARM REV CODE 636 W HCPCS: Performed by: INTERNAL MEDICINE

## 2017-07-28 PROCEDURE — 93010 ELECTROCARDIOGRAM REPORT: CPT | Mod: ,,, | Performed by: INTERNAL MEDICINE

## 2017-07-28 PROCEDURE — 99291 CRITICAL CARE FIRST HOUR: CPT | Mod: ,,, | Performed by: INTERNAL MEDICINE

## 2017-07-28 PROCEDURE — S0028 INJECTION, FAMOTIDINE, 20 MG: HCPCS | Performed by: HOSPITALIST

## 2017-07-28 PROCEDURE — 25000003 PHARM REV CODE 250: Performed by: STUDENT IN AN ORGANIZED HEALTH CARE EDUCATION/TRAINING PROGRAM

## 2017-07-28 PROCEDURE — A4216 STERILE WATER/SALINE, 10 ML: HCPCS | Performed by: STUDENT IN AN ORGANIZED HEALTH CARE EDUCATION/TRAINING PROGRAM

## 2017-07-28 PROCEDURE — 80053 COMPREHEN METABOLIC PANEL: CPT | Mod: 91

## 2017-07-28 PROCEDURE — 25000003 PHARM REV CODE 250: Performed by: HOSPITALIST

## 2017-07-28 PROCEDURE — 94761 N-INVAS EAR/PLS OXIMETRY MLT: CPT

## 2017-07-28 PROCEDURE — 36600 WITHDRAWAL OF ARTERIAL BLOOD: CPT

## 2017-07-28 PROCEDURE — 94003 VENT MGMT INPAT SUBQ DAY: CPT

## 2017-07-28 PROCEDURE — 63600175 PHARM REV CODE 636 W HCPCS: Performed by: STUDENT IN AN ORGANIZED HEALTH CARE EDUCATION/TRAINING PROGRAM

## 2017-07-28 PROCEDURE — 82803 BLOOD GASES ANY COMBINATION: CPT

## 2017-07-28 PROCEDURE — 99900026 HC AIRWAY MAINTENANCE (STAT)

## 2017-07-28 PROCEDURE — 85025 COMPLETE CBC W/AUTO DIFF WBC: CPT

## 2017-07-28 PROCEDURE — 25000003 PHARM REV CODE 250: Performed by: INTERNAL MEDICINE

## 2017-07-28 PROCEDURE — 82330 ASSAY OF CALCIUM: CPT

## 2017-07-28 PROCEDURE — 27200966 HC CLOSED SUCTION SYSTEM

## 2017-07-28 PROCEDURE — S0030 INJECTION, METRONIDAZOLE: HCPCS | Performed by: INTERNAL MEDICINE

## 2017-07-28 PROCEDURE — 27000221 HC OXYGEN, UP TO 24 HOURS

## 2017-07-28 PROCEDURE — 97802 MEDICAL NUTRITION INDIV IN: CPT

## 2017-07-28 RX ORDER — POTASSIUM CHLORIDE 7.45 MG/ML
10 INJECTION INTRAVENOUS
Status: DISCONTINUED | OUTPATIENT
Start: 2017-07-28 | End: 2017-07-28

## 2017-07-28 RX ORDER — ACETAMINOPHEN 120 MG/1
120 SUPPOSITORY RECTAL EVERY 6 HOURS PRN
Status: DISCONTINUED | OUTPATIENT
Start: 2017-07-28 | End: 2017-07-31

## 2017-07-28 RX ORDER — FENTANYL CITRAT/DEXTROSE 5%/PF 100 MCG/10
25 PATIENT CONTROLLED ANALGESIA SYRINGE INTRAVENOUS CONTINUOUS
Status: DISCONTINUED | OUTPATIENT
Start: 2017-07-28 | End: 2017-07-29

## 2017-07-28 RX ORDER — ENOXAPARIN SODIUM 100 MG/ML
40 INJECTION SUBCUTANEOUS EVERY 24 HOURS
Status: DISCONTINUED | OUTPATIENT
Start: 2017-07-28 | End: 2017-08-05 | Stop reason: HOSPADM

## 2017-07-28 RX ORDER — POLYETHYLENE GLYCOL 3350 17 G/17G
17 POWDER, FOR SOLUTION ORAL 2 TIMES DAILY
Status: DISCONTINUED | OUTPATIENT
Start: 2017-07-28 | End: 2017-07-28

## 2017-07-28 RX ORDER — AMOXICILLIN 250 MG
1 CAPSULE ORAL 2 TIMES DAILY
Status: DISCONTINUED | OUTPATIENT
Start: 2017-07-28 | End: 2017-07-28

## 2017-07-28 RX ORDER — MAGNESIUM SULFATE HEPTAHYDRATE 40 MG/ML
2 INJECTION, SOLUTION INTRAVENOUS ONCE
Status: COMPLETED | OUTPATIENT
Start: 2017-07-28 | End: 2017-07-28

## 2017-07-28 RX ORDER — DEXMEDETOMIDINE HYDROCHLORIDE 4 UG/ML
0.2 INJECTION, SOLUTION INTRAVENOUS CONTINUOUS
Status: DISCONTINUED | OUTPATIENT
Start: 2017-07-28 | End: 2017-07-29

## 2017-07-28 RX ORDER — FUROSEMIDE 10 MG/ML
40 INJECTION INTRAMUSCULAR; INTRAVENOUS ONCE
Status: COMPLETED | OUTPATIENT
Start: 2017-07-28 | End: 2017-07-28

## 2017-07-28 RX ADMIN — ENOXAPARIN SODIUM 40 MG: 100 INJECTION SUBCUTANEOUS at 04:07

## 2017-07-28 RX ADMIN — DEXMEDETOMIDINE HYDROCHLORIDE 0.2 MCG/KG/HR: 4 INJECTION, SOLUTION INTRAVENOUS at 04:07

## 2017-07-28 RX ADMIN — FAMOTIDINE 20 MG: 10 INJECTION, SOLUTION INTRAVENOUS at 08:07

## 2017-07-28 RX ADMIN — DEXMEDETOMIDINE HYDROCHLORIDE 0.5 MCG/KG/HR: 4 INJECTION, SOLUTION INTRAVENOUS at 10:07

## 2017-07-28 RX ADMIN — Medication 3 ML: at 09:07

## 2017-07-28 RX ADMIN — CEFEPIME HYDROCHLORIDE 2 G: 2 INJECTION, SOLUTION INTRAVENOUS at 01:07

## 2017-07-28 RX ADMIN — METRONIDAZOLE 500 MG: 500 INJECTION, SOLUTION INTRAVENOUS at 08:07

## 2017-07-28 RX ADMIN — ACETAMINOPHEN 120 MG: 120 SUPPOSITORY RECTAL at 10:07

## 2017-07-28 RX ADMIN — Medication 25 MCG/HR: at 01:07

## 2017-07-28 RX ADMIN — Medication 3 ML: at 01:07

## 2017-07-28 RX ADMIN — DEXMEDETOMIDINE HYDROCHLORIDE 0.5 MCG/KG/HR: 4 INJECTION, SOLUTION INTRAVENOUS at 03:07

## 2017-07-28 RX ADMIN — FUROSEMIDE 40 MG: 10 INJECTION, SOLUTION INTRAVENOUS at 10:07

## 2017-07-28 RX ADMIN — POTASSIUM CHLORIDE 10 MEQ: 10 INJECTION, SOLUTION INTRAVENOUS at 10:07

## 2017-07-28 RX ADMIN — Medication 200 MCG/HR: at 12:07

## 2017-07-28 RX ADMIN — MAGNESIUM SULFATE IN WATER 2 G: 40 INJECTION, SOLUTION INTRAVENOUS at 08:07

## 2017-07-28 RX ADMIN — POTASSIUM CHLORIDE 10 MEQ: 10 INJECTION, SOLUTION INTRAVENOUS at 08:07

## 2017-07-28 RX ADMIN — CEFEPIME HYDROCHLORIDE 2 G: 2 INJECTION, SOLUTION INTRAVENOUS at 09:07

## 2017-07-28 RX ADMIN — METRONIDAZOLE 500 MG: 500 INJECTION, SOLUTION INTRAVENOUS at 05:07

## 2017-07-28 RX ADMIN — HEPARIN SODIUM 5000 UNITS: 5000 INJECTION, SOLUTION INTRAVENOUS; SUBCUTANEOUS at 05:07

## 2017-07-28 RX ADMIN — CEFEPIME HYDROCHLORIDE 2 G: 2 INJECTION, SOLUTION INTRAVENOUS at 05:07

## 2017-07-28 RX ADMIN — CHLORHEXIDINE GLUCONATE 15 ML: 1.2 RINSE ORAL at 08:07

## 2017-07-28 RX ADMIN — METRONIDAZOLE 500 MG: 500 INJECTION, SOLUTION INTRAVENOUS at 12:07

## 2017-07-28 RX ADMIN — CALCIUM GLUCONATE 1 G: 94 INJECTION, SOLUTION INTRAVENOUS at 09:07

## 2017-07-28 NOTE — CONSULTS
[facility]  Adult Nutrition  Consult Note    SUMMARY     Recommendations    1. If able to extubate & advance diet, recommend 2200 kcal ADA, cardiac (texture per SLP).   2. If unable to advance diet & enteral nutrition warranted, recommend Glucerna 1.5 @ 65 mL/hr to provide 2340 kcals, 129 g of protein, 1184 mL fluid.  3. If parenteral nutrition desired, recommend Clinimix 5/20 @ 85 mL/hr + 250 mL 20% lipids to provide 2295 kcals, 102 g of protein, 2040 mL fluid (GIR: 2.53).   4. RD to monitor & follow-up.    Goals: Meet % EEN, EPN  Nutrition Goal Status: new  Communication of RD Recs: reviewed with RN    Reason for Assessment    Reason for Assessment: physician consult  Diagnosis: other (see comments) (Pancreatitis)  Relevent Medical History: DM, HLD   Interdisciplinary Rounds: did not attend     General Information Comments: Pt intubated, no family at bedside. NGT placed for gastric decompression.   Per MD note, possible ileus from pancreatitis causing abd. distention. Per RN, bowel sounds present.    Nutrition Discharge Planning: Unable to determine    Nutrition Prescription Ordered    Current Diet Order: NPO    Nutrition Risk Screen     Nutrition Risk Screen: other (see comments) (decreased intake 2/2 medical condition)    Nutrition/Diet History    Patient Reported Diet/Restrictions/Preferences: other (see comments) (VICKI)     Factors Affecting Nutritional Intake: NPO, on mechanical ventilation, altered gastrointestinal function    Labs/Tests/Procedures/Meds    Pertinent Labs Reviewed: reviewed, pertinent  Pertinent Labs Comments: BUN 29, P 4.9, Gluc 123, , ALT 1120, A1C 11.6,   Pertinent Medications Reviewed: reviewed, pertinent  Pertinent Medications Comments: Insulin, Fentanyl    Physical Findings    Overall Physical Appearance: obese, on ventilator support  Tubes: orogastric tube, nasogastric tube  Oral/Mouth Cavity: WDL  Skin: intact    Anthropometrics    Height: 5'  "10" (177.8 cm)  Weight Method: Bed Scale  Weight: 112.2 kg (247 lb 5.7 oz)    Ideal Body Weight (IBW), Male: 166 lb  % Ideal Body Weight, Male (lb): 149.01 lb     BMI (Calculated): 35.6  BMI Grade: 35 - 39.9 - obesity - grade II    Estimated/Assessed Needs    Weight Used For Calorie Calculations: 112.2 kg (247 lb 5.7 oz)      Energy Calorie Requirements (kcal): 2386 kcal/d  Energy Need Method: [facility]     Weight Used For Protein Calculations: 75.5 kg (166 lb 7.2 oz)  Protein Requirements: 113-151 g/d     Fluid Need Method: RDA Method, other (see comments) (Per MD or 1 mL/kcal)    Assessment and Plan    Inadequate energy intake r/t inability to consume sufficient energy intake AEB NPO with no alternate means of nutrition.   Status: New    Monitor and Evaluation    Food and Nutrient Intake: energy intake, enteral nutrition intake, food and beverage intake, parenteral nutrition intake  Food and Nutrient Adminstration: diet order, enteral and parenteral nutrition administration     Physical Activity and Function: nutrition-related ADLs and IADLs  Anthropometric Measurements: weight, weight change, body mass index  Biochemical Data, Medical Tests and Procedures: electrolyte and renal panel, gastrointestinal profile, glucose/endocrine profile, inflammatory profile, lipid profile  Nutrition-Focused Physical Findings: overall appearance    Nutrition Risk    Level of Risk: other (see comments) (2x/week)    Nutrition Follow-Up    RD Follow-up?: Yes      "

## 2017-07-28 NOTE — PLAN OF CARE
Problem: Patient Care Overview  Goal: Plan of Care Review  Outcome: Ongoing (interventions implemented as appropriate)  Patient intubated just prior to shift change last Pm. Throughout night, ongoing communication with residents/critical care physicians regarding care, lab results, test results. All orders carried out as ordered. VS as documented. Assessments as documented. Patient now with Fentanyl gtt and Precedex gtt for sedation purposes. Remains on ventilator. No s/s discomfort at this time. Patient without complaints of pain at this time. Will give report to oncoming nurse.

## 2017-07-28 NOTE — PROGRESS NOTES
MD Duarte notified of SVO2 on VBG of 40%. Also reported last serum potassium and told to hold next doses.

## 2017-07-28 NOTE — PROGRESS NOTES
"[facility]  General Surgery  Progress Note    Subjective:     History of Present Illness:  Bay Ibarra is a 37 y.o. male with with PMHx HTN, HLD, Hypertriglyceridemia, DM I, gout, and pancreatitis (apirl 2017 and four years earlier) who was transferred from [place] with necrotizing pancreatitis and DKA. The patient states he started feeling "ill and weak" 2 days ago, and yesterday morning he woke up with severe epigastric abdominal pain and had multiple episodes of nausea and vomiting with poor PO intake. He endorsed starting gemfibrozil recently for HLD and regular compliance with his insulin glargine BID. He presented to [place] with Lipase 4000, Triglycerides 1500, lactate 10 and elevated anion gap. He received 3 L of ns and IV insulin at [place] before being transported to [facility] for higher level of care.     He has had additional fluid resuscitation since arriving at [facility] with improvement of his lactate but continued tachycardia.  He states that his pain is improved.  CT abd/pelvis shows necrotizing pancreatitis without any definite fluid collections or evidence of infection.    Post-Op Info:  * No surgery found *         Interval History: Developed respiratory distress requiring intubated yesterday evening. Initial concern for abdominal compartment syndrome. Not on pressors. UOP over 2L overnight. Peak pressures on vent at 30; currently on A/C vent mode, 30% FiO2 and 8 of PEEP.    Medications:  Continuous Infusions:   dexmedetomidine (PRECEDEX) infusion 0.5 mcg/kg/hr (07/28/17 0701)    fentanyl 200 mcg/hr (07/28/17 0701)    insulin (HUMAN R) infusion (adults) 2.7 Units/hr (07/28/17 0600)     Scheduled Meds:   amlodipine  10 mg Oral Daily    ceFEPime (MAXIPIME) IVPB  2 g Intravenous Q8H    chlorhexidine  15 mL Mouth/Throat BID    famotidine (PF)  20 mg Intravenous BID    heparin (porcine)  5,000 Units Subcutaneous Q8H    metronidazole  500 mg Intravenous Q8H "    sodium chloride 0.9%  3 mL Intravenous Q8H     PRN Meds:dextrose 50%, dextrose 50%, morphine, ondansetron, promethazine (PHENERGAN) IVPB     Review of patient's allergies indicates:  No Known Allergies  Objective:     Vital Signs (Most Recent):  Temp: 100 °F (37.8 °C) (07/28/17 0701)  Pulse: 102 (07/28/17 0727)  Resp: (!) 28 (07/28/17 0701)  BP: (!) 96/55 (07/28/17 0701)  SpO2: 95 % (07/28/17 0727) Vital Signs (24h Range):  Temp:  [97.7 °F (36.5 °C)-100 °F (37.8 °C)] 100 °F (37.8 °C)  Pulse:  [101-123] 102  Resp:  [] 28  SpO2:  [69 %-100 %] 95 %  BP: ()/(55-98) 96/55     Weight: 112.2 kg (247 lb 5.7 oz)  Body mass index is 35.49 kg/m².    Intake/Output - Last 3 Shifts       07/26 0700 - 07/27 0659 07/27 0700 - 07/28 0659 07/28 0700 - 07/29 0659    P.O.  480     I.V. (mL/kg) 4171.1 (37.5) 436.8 (3.9) 34.6 (0.3)    IV Piggyback 1050 1050     Total Intake(mL/kg) 5221.1 (46.9) 1966.8 (17.5) 34.6 (0.3)    Urine (mL/kg/hr) 640 (0.2) 3125 (1.2) 45 (0.6)    Emesis/NG output 0 (0)      Total Output 640 3125 45    Net +4581.1 -1158.2 -10.4           Emesis Occurrence 2 x            Intake/Output Summary (Last 24 hours) at 07/28/17 0802  Last data filed at 07/28/17 0701   Gross per 24 hour   Intake          1874.74 ml   Output             3090 ml   Net         -1215.26 ml         Physical Exam   Constitutional: He appears well-developed and well-nourished.   HENT:   Head: Normocephalic and atraumatic.   NGT in place with slightly bilious drainage in tubing   Neck: Normal range of motion. Neck supple.   Cardiovascular: Regular rhythm.  Exam reveals no friction rub.    No murmur heard.  Tachycardic in the low 100's   Pulmonary/Chest: Effort normal and breath sounds normal. No respiratory distress. He has no wheezes.   Abdominal: Soft. He exhibits distension. He exhibits no mass. No hernia.   Hypoactive bowel sounds   Neurological:   Sedated on precedex   Skin: Skin is warm and dry.       Significant Labs:  CBC:    Recent Labs  Lab 07/27/17  0913 07/28/17  0529   WBC 11.31  --    RBC 5.53 4.24*   HGB 16.1 12.1*   HCT 46.1 35.9*    151   MCV 83 85   MCH 29.1 28.5   MCHC 34.9 33.7     CMP:   Recent Labs  Lab 07/28/17  0529   *   CALCIUM 6.6*   ALBUMIN 1.6*   PROT 4.6*      K 3.0*   CO2 19*      BUN 29*   CREATININE 1.4   ALKPHOS 63   ALT 1,120*   *   BILITOT 0.7       Significant Diagnostics:  CXR 7/28/17:    Endotracheal tube tip lies 4 cm above the level of the wilberto.  No significant interval change in the appearance of the chest since 7/27/17 at 8:43 PM is appreciated.  No pneumothorax.    Impression: As above    Assessment/Plan:     * Acute pancreatitis with uninfected necrosis    -Continue conservative management for non-infected necrotizing pancreatitis  -No surgical intervention indicated  -Keep patient NPO please  -Continue medical management per primary team  -Will continue to follow peripherally  -Call with questions            Salena Finnegan MD  General Surgery  [facility]

## 2017-07-28 NOTE — SUBJECTIVE & OBJECTIVE
Interval History/Significant Events: intubated yesterday evening for acute hypoxic resp failure; also with increasing bladder pressures overnight; improved with NGT to suction    Review of Systems   Unable to perform ROS: Intubated     Objective:     Vital Signs (Most Recent):  Temp: 99.6 °F (37.6 °C) (07/28/17 1100)  Pulse: 95 (07/28/17 1114)  Resp: 19 (07/28/17 1100)  BP: (!) 108/59 (07/28/17 1100)  SpO2: (!) 90 % (07/28/17 1114) Vital Signs (24h Range):  Temp:  [97.7 °F (36.5 °C)-101.4 °F (38.6 °C)] 99.6 °F (37.6 °C)  Pulse:  [] 95  Resp:  [] 19  SpO2:  [69 %-100 %] 90 %  BP: ()/(52-98) 108/59   Weight: 112.2 kg (247 lb 5.7 oz)  Body mass index is 35.49 kg/m².      Intake/Output Summary (Last 24 hours) at 07/28/17 1150  Last data filed at 07/28/17 1100   Gross per 24 hour   Intake          2224.33 ml   Output             3045 ml   Net          -820.67 ml       Physical Exam   Constitutional: He appears distressed.   HENT:   Head: Normocephalic.   Right Ear: External ear normal.   Left Ear: External ear normal.   Eyes: Pupils are equal, round, and reactive to light. Right eye exhibits no discharge. Left eye exhibits no discharge.   Neck: Normal range of motion. Neck supple. No tracheal deviation present. No thyromegaly present.   Cardiovascular: Regular rhythm and normal heart sounds.  Exam reveals no friction rub.    No murmur heard.  Sinus Tachycardia    Pulmonary/Chest: Effort normal. He has no wheezes. He has no rales.   Abdominal: He exhibits distension. He exhibits no mass. There is no tenderness. There is no guarding.   Musculoskeletal: He exhibits no edema or deformity.   Neurological: He is alert.   Skin: He is not diaphoretic.       Vents:  Vent Mode: A/C (07/28/17 1114)  Ventilator Initiated: Yes (07/27/17 1837)  Set Rate: 24 bmp (07/28/17 1114)  Vt Set: 450 mL (07/28/17 1114)  PEEP/CPAP: 5 cmH20 (07/28/17 1114)  Oxygen Concentration (%): 30 (07/28/17 1114)  Peak Airway Pressure: 31  cmH2O (07/28/17 1114)  Plateau Pressure: 23 cmH20 (07/28/17 0727)  Total Ve: 11.2 mL (07/28/17 1114)  F/VT Ratio<105 (RSBI): (!) 61.14 (07/28/17 0513)  Lines/Drains/Airways     Drain                 Urethral Catheter 07/25/17 2024  16 Fr. 2 days         NG/OG Tube 07/28/17 0100 14 Fr. Right mouth less than 1 day          Airway                 Airway - Non-Surgical 07/27/17 1837 Endotracheal Tube less than 1 day          Peripheral Intravenous Line                 Peripheral IV - Single Lumen 07/25/17 1752 Right Forearm 2 days         Peripheral IV - Single Lumen 07/25/17 1754 Left Antecubital 2 days         Peripheral IV - Single Lumen 07/28/17 1000 Right Antecubital less than 1 day              Significant Labs:    CBC/Anemia Profile:    Recent Labs  Lab 07/27/17  0527 07/27/17  0913 07/28/17  0529   WBC 10.21 11.31 8.07   HGB 15.4 16.1 12.1*   HCT 45.1 46.1 35.9*   PLT SEE COMMENT 182 151   MCV 83 83 85   RDW 14.7* 14.5 14.8*        Chemistries:    Recent Labs  Lab 07/27/17  0527 07/27/17  1510 07/27/17  1946 07/28/17  0529 07/28/17  0806    132* 135*  135* 138 134*   K 3.7 5.8* 5.0  5.0 3.0* 3.9   * 103 102  102 108 105   CO2 12* 19* 20*  20* 19* 20*   BUN 30* 36* 34*  34* 29* 35*   CREATININE 1.7* 1.9* 1.8*  1.8* 1.4 1.8*   CALCIUM 6.0* 7.8* 8.5*  8.5* 6.6* 7.9*   ALBUMIN 1.8* 2.1* 2.4* 1.6* 1.9*   PROT 4.7* 5.8* 6.8 4.6* 5.5*   BILITOT 0.6 0.9 1.4* 0.7 0.9   ALKPHOS 55 75 98 63 69   ALT 2,355* 2,180* 2,272* 1,120* 1,257*   AST 2,136* 1,489* 1,359* 462* 510*   MG 1.3* 2.7*  --  1.7  --    PHOS 3.4  --   --  4.9*  --        All pertinent labs within the past 24 hours have been reviewed.    Significant Imaging:  I have reviewed all pertinent imaging results/findings within the past 24 hours.

## 2017-07-28 NOTE — PROGRESS NOTES
Notified by primary team that patient took turn for the worse this evening and required intubation. Abdomen is distended as noted earlier when I first saw the patient. He was started on a clear liquid diet, though we did not recommend that given his severe pancreatitis. Concern for increased bladder pressures.     He is making great urine output (last hr was 450ccs) so far. NGT place for gastric decompression.    Recommend continuing conservative management. Likely that he has significant ileus from pancreatitis causing his abdominal distention. With no urinary compromise, unlikely that he currently has abdominal compartment syndrome. Will continue to follow.    Salena Finnegan MD  General Surgery  PGY-4  [phone] (pager)

## 2017-07-28 NOTE — PLAN OF CARE
Problem: Patient Care Overview  Goal: Plan of Care Review  Outcome: Ongoing (interventions implemented as appropriate)  Pt remains with VSS. Had temp and received rectal acetaminophen. Fever broke and has remained 99 since. Pt still intubated and sedated. 02 adjusted to 35% when pt. o2 sat dropped to 88%. Responding well to lasix with about 75mL/hr. 200 OG output. ROM performed for restraints. Pt. Is -2 RASS sedation and follows commands.  See flow sheet for full assessment. Pt remains on continuous tele and pulse ox monitor. No falls. No complaints of pain or nausea. Pt remains on insulin, precedex, fentanyl gtt. Also received electrolyte replacement and abx. Last bladder scan pressure was 25. POC reviewed w/Pt who was unable to  verbalize understanding and father who verbalized understanding.

## 2017-07-28 NOTE — PROGRESS NOTES
0640: Pt finished eating and was c/o difficulty breathing. Sats in 80s after position change, facial mask 15L put on and sats still in 80s. MD Rush, CCS called.  MD reported to pts. Bedside. ABG ran, need for intubation evident. Pt verbalized consent and was intubated. Pt sating 96% and on sedation with propofol per order. Assessed and lung sounds evident.

## 2017-07-28 NOTE — ASSESSMENT & PLAN NOTE
-- intubated 7/27 for acute onset respiratory distress with hypoxia  -- CXR with pulmonary edema; possible retrocardiac consolidation  -- lasix x1 yesterday with good UOP; will administer another dose today  -- wean vent as tolerated; SBT tomorrow

## 2017-07-28 NOTE — ASSESSMENT & PLAN NOTE
- Patient presents to OSH with pancreatitis evidenced by CT abdomen/pelvis  - Lipase 4000, triglycerides 1500, lactate 10.4 >> 1.9   - repeat triglyceride 946 and down to ~ 300.   - now on fentanyl and precedex gtt  - insulin gtt

## 2017-07-28 NOTE — PT/OT/SLP PROGRESS
Physical Therapy      Bay Ibarra  MRN: [id number]    Patient not seen today secondary to  (per MD, pt not appropriate at this time). Will follow-up as appropriate.    Marla Calle, PT   7/28/2017

## 2017-07-28 NOTE — PLAN OF CARE
Problem: Patient Care Overview  Goal: Plan of Care Review  Outcome: Ongoing (interventions implemented as appropriate)  1. If able to extubate & advance diet, recommend 2200 kcal ADA, cardiac (texture per SLP).   2. If unable to advance diet & enteral nutrition warranted, recommend Glucerna 1.5 @ 65 mL/hr to provide 2340 kcals, 129 g of protein, 1184 mL fluid.  3. If parenteral nutrition desired, recommend Clinimix 5/20 @ 85 mL/hr + 250 mL 20% lipids to provide 2295 kcals, 102 g of protein, 2040 mL fluid (GIR: 2.53).   4. RD to monitor & follow-up.

## 2017-07-28 NOTE — ASSESSMENT & PLAN NOTE
-- baseline cr unknown  -- FeNa cannot calculate 2/2 urine Na<20; FeUrea 13 also indicating likely has a component of pre-renal etiology  -- P/C ratio elevated   -- renal US: Mildly elevated renal resistive indices bilaterally. This is nonspecific and can be seen in a variety of acute or chronic renal conditions.  -- has good UOP s/p lasix 40 IV x1; will give another dose today  -- bladder pressures elevated; gen surg following;   -- cont to monitor as patient has good UOP

## 2017-07-28 NOTE — PROGRESS NOTES
"[facility]  Critical Care Medicine  Progress Note    Patient Name: Bay Ibarra  MRN: [id number]  Admission Date: 7/25/2017  Hospital Length of Stay: 3 days  Code Status: Full Code  Attending Provider: Cam Rush MD  Primary Care Provider: José Luis Singleton MD   Principal Problem: Acute pancreatitis with uninfected necrosis    Subjective:     HPI:  Mr. Ibarra is a 36 y/o gentleman with PMHx HTN, HLD, Hypertriglyceridemia, DM I, gout, and pancreatitis (apirl 2017) who was transferred from [place] with necrotizing pancreatitis and DKA. The patient states he started feeling "ill and weak" 2 days ago, and yesterday morning he woke up with severe epigastric abdominal pain and had multiple episodes of nausea and vomiting with poor PO intake. He endorsed starting gemfibrozil recently for HLD and regular compliance with his insulin glargine BID. He presented to [place] with Lipase 4000, Triglycerides 1500, lactate 10 and elevated anion gap. He received 3 L of ns and IV insulin at [place] before being transported to [facility] for higher level of care. Upon initial assessment in the ED, the patient was lethargic, but arousable, , /63, and SpO2 98% on 2L nasal cannula. He denied having any fevers, chills, CP, cough, diarrhea or dysuria.  He was started on 2L of lactated ringers, IV insulin and admitted to the MICU.     Hospital/ICU Course:  7/27: Having pain and nausea without vomiting overnight, controlled with morphine 4 x 4 IV. Patient also developed acute respiratory failure requiring intubation. Elevated bladder pressures overnight improving with treatment of ileus  7/28:    Interval History/Significant Events: intubated yesterday evening for acute hypoxic resp failure; also with increasing bladder pressures overnight; improved with NGT to suction    Review of Systems   Unable to perform ROS: Intubated     Objective:     Vital Signs (Most Recent):  Temp: 99.6 " °F (37.6 °C) (07/28/17 1100)  Pulse: 95 (07/28/17 1114)  Resp: 19 (07/28/17 1100)  BP: (!) 108/59 (07/28/17 1100)  SpO2: (!) 90 % (07/28/17 1114) Vital Signs (24h Range):  Temp:  [97.7 °F (36.5 °C)-101.4 °F (38.6 °C)] 99.6 °F (37.6 °C)  Pulse:  [] 95  Resp:  [] 19  SpO2:  [69 %-100 %] 90 %  BP: ()/(52-98) 108/59   Weight: 112.2 kg (247 lb 5.7 oz)  Body mass index is 35.49 kg/m².      Intake/Output Summary (Last 24 hours) at 07/28/17 1150  Last data filed at 07/28/17 1100   Gross per 24 hour   Intake          2224.33 ml   Output             3045 ml   Net          -820.67 ml       Physical Exam   Constitutional: He appears distressed.   HENT:   Head: Normocephalic.   Right Ear: External ear normal.   Left Ear: External ear normal.   Eyes: Pupils are equal, round, and reactive to light. Right eye exhibits no discharge. Left eye exhibits no discharge.   Neck: Normal range of motion. Neck supple. No tracheal deviation present. No thyromegaly present.   Cardiovascular: Regular rhythm and normal heart sounds.  Exam reveals no friction rub.    No murmur heard.  Sinus Tachycardia    Pulmonary/Chest: Effort normal. He has no wheezes. He has no rales.   Abdominal: He exhibits distension. He exhibits no mass. There is no tenderness. There is no guarding.   Musculoskeletal: He exhibits no edema or deformity.   Neurological: He is alert.   Skin: He is not diaphoretic.       Vents:  Vent Mode: A/C (07/28/17 1114)  Ventilator Initiated: Yes (07/27/17 1837)  Set Rate: 24 bmp (07/28/17 1114)  Vt Set: 450 mL (07/28/17 1114)  PEEP/CPAP: 5 cmH20 (07/28/17 1114)  Oxygen Concentration (%): 30 (07/28/17 1114)  Peak Airway Pressure: 31 cmH2O (07/28/17 1114)  Plateau Pressure: 23 cmH20 (07/28/17 0727)  Total Ve: 11.2 mL (07/28/17 1114)  F/VT Ratio<105 (RSBI): (!) 61.14 (07/28/17 0513)  Lines/Drains/Airways     Drain                 Urethral Catheter 07/25/17 2024  16 Fr. 2 days         NG/OG Tube 07/28/17 0100 14 Fr. Right  mouth less than 1 day          Airway                 Airway - Non-Surgical 07/27/17 1837 Endotracheal Tube less than 1 day          Peripheral Intravenous Line                 Peripheral IV - Single Lumen 07/25/17 1752 Right Forearm 2 days         Peripheral IV - Single Lumen 07/25/17 1754 Left Antecubital 2 days         Peripheral IV - Single Lumen 07/28/17 1000 Right Antecubital less than 1 day              Significant Labs:    CBC/Anemia Profile:    Recent Labs  Lab 07/27/17  0527 07/27/17  0913 07/28/17  0529   WBC 10.21 11.31 8.07   HGB 15.4 16.1 12.1*   HCT 45.1 46.1 35.9*   PLT SEE COMMENT 182 151   MCV 83 83 85   RDW 14.7* 14.5 14.8*        Chemistries:    Recent Labs  Lab 07/27/17  0527 07/27/17  1510 07/27/17  1946 07/28/17  0529 07/28/17  0806    132* 135*  135* 138 134*   K 3.7 5.8* 5.0  5.0 3.0* 3.9   * 103 102  102 108 105   CO2 12* 19* 20*  20* 19* 20*   BUN 30* 36* 34*  34* 29* 35*   CREATININE 1.7* 1.9* 1.8*  1.8* 1.4 1.8*   CALCIUM 6.0* 7.8* 8.5*  8.5* 6.6* 7.9*   ALBUMIN 1.8* 2.1* 2.4* 1.6* 1.9*   PROT 4.7* 5.8* 6.8 4.6* 5.5*   BILITOT 0.6 0.9 1.4* 0.7 0.9   ALKPHOS 55 75 98 63 69   ALT 2,355* 2,180* 2,272* 1,120* 1,257*   AST 2,136* 1,489* 1,359* 462* 510*   MG 1.3* 2.7*  --  1.7  --    PHOS 3.4  --   --  4.9*  --        All pertinent labs within the past 24 hours have been reviewed.    Significant Imaging:  I have reviewed all pertinent imaging results/findings within the past 24 hours.    Assessment/Plan:     Pulmonary   Acute respiratory failure with hypoxia    -- intubated 7/27 for acute onset respiratory distress with hypoxia  -- CXR with pulmonary edema; possible retrocardiac consolidation  -- lasix x1 yesterday with good UOP; will administer another dose today  -- wean vent as tolerated; SBT tomorrow         Renal   TROY (acute kidney injury)    -- baseline cr unknown  -- FeNa cannot calculate 2/2 urine Na<20; FeUrea 13 also indicating likely has a component of pre-renal  etiology  -- P/C ratio elevated   -- renal US: Mildly elevated renal resistive indices bilaterally. This is nonspecific and can be seen in a variety of acute or chronic renal conditions.  -- has good UOP s/p lasix 40 IV x1; will give another dose today  -- bladder pressures elevated; gen surg following;   -- cont to monitor as patient has good UOP        Endocrine   Diabetes mellitus type I    - Patient with DM I presents with hyperglycemia  - insulin gtt        Fluids/Electrolytes/Nutrition/GI   * Acute pancreatitis with uninfected necrosis    - Patient presents to OSH with pancreatitis evidenced by CT abdomen/pelvis  - Lipase 4000, triglycerides 1500, lactate 10.4 >> 1.9   - repeat triglyceride 946 and down to ~ 300.   - now on fentanyl and precedex gtt  - insulin gtt          Ileus    -- bowel sounds hypoactive; no BM in days  -- NG to suction and bowel rest for now             Critical Care Daily Checklist:    A: Awake: RASS Goal/Actual Goal: RASS Goal: 0-->alert and calm  Actual: German Agitation Sedation Scale (RASS): Light sedation   B: Spontaneous Breathing Trial Performed?     C: SAT & SBT Coordinated?                        D: Delirium: CAM-ICU Overall CAM-ICU: Negative   E: Early Mobility Performed? Yes   F: Feeding Goal:    Status:     Current Diet Order   Procedures    Diet NPO Except for: Medication     Order Specific Question:   Except for     Answer:   Medication      AS: Analgesia/Sedation Fentanyl, propofol   T: Thromboembolic Prophylaxis lovenox   H: HOB > 300 Yes   U: Stress Ulcer Prophylaxis (if needed) famotidine   G: Glucose Control Insulin gtt   B: Bowel Function  poor   I: Indwelling Catheter (Lines & Louise) Necessity ET tube; peripherals; foely   D: De-escalation of Antimicrobials/Pharmacotherapies     Plan for the day/ETD Supportive care    Code Status:  Family/Goals of Care: Full Code         Critical secondary to Patient has a condition that poses threat to life and bodily function:  acute pancreatitis with pancreatic necrosis, respiratory failure and renal dysfunction      Critical care was time spent personally by me on the following activities: development of treatment plan with patient or surrogate and bedside caregivers, discussions with consultants, evaluation of patient's response to treatment, examination of patient, ordering and performing treatments and interventions, ordering and review of laboratory studies, ordering and review of radiographic studies, pulse oximetry, re-evaluation of patient's condition. This critical care time did not overlap with that of any other provider or involve time for any procedures.     Corin Duarte MD  Critical Care Medicine  [facility]

## 2017-07-28 NOTE — SUBJECTIVE & OBJECTIVE
Interval History: Developed respiratory distress requiring intubated yesterday evening. Initial concern for abdominal compartment syndrome. Not on pressors. UOP over 2L overnight. Peak pressures on vent at 30; currently on A/C vent mode, 30% FiO2 and 8 of PEEP.    Medications:  Continuous Infusions:   dexmedetomidine (PRECEDEX) infusion 0.5 mcg/kg/hr (07/28/17 0701)    fentanyl 200 mcg/hr (07/28/17 0701)    insulin (HUMAN R) infusion (adults) 2.7 Units/hr (07/28/17 0600)     Scheduled Meds:   amlodipine  10 mg Oral Daily    ceFEPime (MAXIPIME) IVPB  2 g Intravenous Q8H    chlorhexidine  15 mL Mouth/Throat BID    famotidine (PF)  20 mg Intravenous BID    heparin (porcine)  5,000 Units Subcutaneous Q8H    metronidazole  500 mg Intravenous Q8H    sodium chloride 0.9%  3 mL Intravenous Q8H     PRN Meds:dextrose 50%, dextrose 50%, morphine, ondansetron, promethazine (PHENERGAN) IVPB     Review of patient's allergies indicates:  No Known Allergies  Objective:     Vital Signs (Most Recent):  Temp: 100 °F (37.8 °C) (07/28/17 0701)  Pulse: 102 (07/28/17 0727)  Resp: (!) 28 (07/28/17 0701)  BP: (!) 96/55 (07/28/17 0701)  SpO2: 95 % (07/28/17 0727) Vital Signs (24h Range):  Temp:  [97.7 °F (36.5 °C)-100 °F (37.8 °C)] 100 °F (37.8 °C)  Pulse:  [101-123] 102  Resp:  [] 28  SpO2:  [69 %-100 %] 95 %  BP: ()/(55-98) 96/55     Weight: 112.2 kg (247 lb 5.7 oz)  Body mass index is 35.49 kg/m².    Intake/Output - Last 3 Shifts       07/26 0700 - 07/27 0659 07/27 0700 - 07/28 0659 07/28 0700 - 07/29 0659    P.O.  480     I.V. (mL/kg) 4171.1 (37.5) 436.8 (3.9) 34.6 (0.3)    IV Piggyback 1050 1050     Total Intake(mL/kg) 5221.1 (46.9) 1966.8 (17.5) 34.6 (0.3)    Urine (mL/kg/hr) 640 (0.2) 3125 (1.2) 45 (0.6)    Emesis/NG output 0 (0)      Total Output 640 3125 45    Net +4581.1 -1158.2 -10.4           Emesis Occurrence 2 x            Physical Exam   Constitutional: He appears well-developed and well-nourished.    HENT:   Head: Normocephalic and atraumatic.   NGT in place with slightly bilious drainage in tubing   Neck: Normal range of motion. Neck supple.   Cardiovascular: Regular rhythm.  Exam reveals no friction rub.    No murmur heard.  Tachycardic in the low 100's   Pulmonary/Chest: Effort normal and breath sounds normal. No respiratory distress. He has no wheezes.   Abdominal: Soft. He exhibits distension. He exhibits no mass. No hernia.   Hypoactive bowel sounds   Neurological:   Sedated on precedex   Skin: Skin is warm and dry.       Significant Labs:  CBC:   Recent Labs  Lab 07/27/17  0913 07/28/17  0529   WBC 11.31  --    RBC 5.53 4.24*   HGB 16.1 12.1*   HCT 46.1 35.9*    151   MCV 83 85   MCH 29.1 28.5   MCHC 34.9 33.7     CMP:   Recent Labs  Lab 07/28/17  0529   *   CALCIUM 6.6*   ALBUMIN 1.6*   PROT 4.6*      K 3.0*   CO2 19*      BUN 29*   CREATININE 1.4   ALKPHOS 63   ALT 1,120*   *   BILITOT 0.7       Significant Diagnostics:  CXR 7/28/17:    Endotracheal tube tip lies 4 cm above the level of the wilberto.  No significant interval change in the appearance of the chest since 7/27/17 at 8:43 PM is appreciated.  No pneumothorax.    Impression: As above

## 2017-07-28 NOTE — ASSESSMENT & PLAN NOTE
-Continue conservative management for non-infected necrotizing pancreatitis  -No surgical intervention indicated  -Keep patient NPO please  -Continue medical management per primary team  -Will continue to follow peripherally  -Call with questions

## 2017-07-29 LAB
ALBUMIN SERPL BCP-MCNC: 1.9 G/DL
ALLENS TEST: ABNORMAL
ALP SERPL-CCNC: 68 U/L
ALT SERPL W/O P-5'-P-CCNC: 827 U/L
ANION GAP SERPL CALC-SCNC: 11 MMOL/L
ANISOCYTOSIS BLD QL SMEAR: SLIGHT
AST SERPL-CCNC: 247 U/L
BASO STIPL BLD QL SMEAR: ABNORMAL
BASOPHILS NFR BLD: 0 %
BILIRUB SERPL-MCNC: 0.8 MG/DL
BUN SERPL-MCNC: 49 MG/DL
CALCIUM SERPL-MCNC: 8.5 MG/DL
CHLORIDE SERPL-SCNC: 106 MMOL/L
CO2 SERPL-SCNC: 21 MMOL/L
CREAT SERPL-MCNC: 1.8 MG/DL
DELSYS: ABNORMAL
DIFFERENTIAL METHOD: ABNORMAL
EOSINOPHIL NFR BLD: 1 %
ERYTHROCYTE [DISTWIDTH] IN BLOOD BY AUTOMATED COUNT: 14.8 %
ERYTHROCYTE [SEDIMENTATION RATE] IN BLOOD BY WESTERGREN METHOD: 22 MM/H
EST. GFR  (AFRICAN AMERICAN): 54.4 ML/MIN/1.73 M^2
EST. GFR  (NON AFRICAN AMERICAN): 47 ML/MIN/1.73 M^2
FIO2: 35
GLUCOSE SERPL-MCNC: 195 MG/DL
HCO3 UR-SCNC: 21.6 MMOL/L (ref 24–28)
HCT VFR BLD AUTO: 36.3 %
HGB BLD-MCNC: 12.2 G/DL
LIPASE SERPL-CCNC: 315 U/L
LYMPHOCYTES NFR BLD: 7.5 %
MAGNESIUM SERPL-MCNC: 3.2 MG/DL
MCH RBC QN AUTO: 28.6 PG
MCHC RBC AUTO-ENTMCNC: 33.6 G/DL
MCV RBC AUTO: 85 FL
METAMYELOCYTES NFR BLD MANUAL: 0.5 %
MODE: ABNORMAL
MONOCYTES NFR BLD: 11 %
MYELOCYTES NFR BLD MANUAL: 0.5 %
NEUTROPHILS NFR BLD: 77.5 %
NEUTS BAND NFR BLD MANUAL: 2 %
NRBC BLD-RTO: ABNORMAL /100 WBC
PCO2 BLDA: 38.8 MMHG (ref 35–45)
PEEP: 8
PH SMN: 7.35 [PH] (ref 7.35–7.45)
PHOSPHATE SERPL-MCNC: 3 MG/DL
PLATELET # BLD AUTO: 159 K/UL
PLATELET BLD QL SMEAR: ABNORMAL
PMV BLD AUTO: 11 FL
PO2 BLDA: 84 MMHG (ref 80–100)
POC BE: -4 MMOL/L
POC SATURATED O2: 96 % (ref 95–100)
POC TCO2: 23 MMOL/L (ref 23–27)
POCT GLUCOSE: 117 MG/DL (ref 70–110)
POCT GLUCOSE: 120 MG/DL (ref 70–110)
POCT GLUCOSE: 126 MG/DL (ref 70–110)
POCT GLUCOSE: 156 MG/DL (ref 70–110)
POCT GLUCOSE: 159 MG/DL (ref 70–110)
POCT GLUCOSE: 164 MG/DL (ref 70–110)
POCT GLUCOSE: 183 MG/DL (ref 70–110)
POCT GLUCOSE: 186 MG/DL (ref 70–110)
POCT GLUCOSE: 186 MG/DL (ref 70–110)
POCT GLUCOSE: 196 MG/DL (ref 70–110)
POCT GLUCOSE: 199 MG/DL (ref 70–110)
POCT GLUCOSE: 220 MG/DL (ref 70–110)
POCT GLUCOSE: 339 MG/DL (ref 70–110)
POLYCHROMASIA BLD QL SMEAR: ABNORMAL
POTASSIUM SERPL-SCNC: 4.5 MMOL/L
PROT SERPL-MCNC: 5.6 G/DL
RBC # BLD AUTO: 4.26 M/UL
SAMPLE: ABNORMAL
SITE: ABNORMAL
SODIUM SERPL-SCNC: 138 MMOL/L
SP02: 96
TRIGL SERPL-MCNC: 216 MG/DL
VT: 450
WBC # BLD AUTO: 9.85 K/UL

## 2017-07-29 PROCEDURE — 84100 ASSAY OF PHOSPHORUS: CPT

## 2017-07-29 PROCEDURE — S0030 INJECTION, METRONIDAZOLE: HCPCS | Performed by: INTERNAL MEDICINE

## 2017-07-29 PROCEDURE — 63600175 PHARM REV CODE 636 W HCPCS: Performed by: INTERNAL MEDICINE

## 2017-07-29 PROCEDURE — 99900026 HC AIRWAY MAINTENANCE (STAT)

## 2017-07-29 PROCEDURE — 25000003 PHARM REV CODE 250: Performed by: HOSPITALIST

## 2017-07-29 PROCEDURE — 63600175 PHARM REV CODE 636 W HCPCS: Performed by: STUDENT IN AN ORGANIZED HEALTH CARE EDUCATION/TRAINING PROGRAM

## 2017-07-29 PROCEDURE — 36600 WITHDRAWAL OF ARTERIAL BLOOD: CPT

## 2017-07-29 PROCEDURE — 25000003 PHARM REV CODE 250: Performed by: STUDENT IN AN ORGANIZED HEALTH CARE EDUCATION/TRAINING PROGRAM

## 2017-07-29 PROCEDURE — 94003 VENT MGMT INPAT SUBQ DAY: CPT

## 2017-07-29 PROCEDURE — S0028 INJECTION, FAMOTIDINE, 20 MG: HCPCS | Performed by: HOSPITALIST

## 2017-07-29 PROCEDURE — 27000221 HC OXYGEN, UP TO 24 HOURS

## 2017-07-29 PROCEDURE — 99291 CRITICAL CARE FIRST HOUR: CPT | Mod: ,,, | Performed by: INTERNAL MEDICINE

## 2017-07-29 PROCEDURE — 27200966 HC CLOSED SUCTION SYSTEM

## 2017-07-29 PROCEDURE — 80053 COMPREHEN METABOLIC PANEL: CPT

## 2017-07-29 PROCEDURE — A4216 STERILE WATER/SALINE, 10 ML: HCPCS | Performed by: STUDENT IN AN ORGANIZED HEALTH CARE EDUCATION/TRAINING PROGRAM

## 2017-07-29 PROCEDURE — 94150 VITAL CAPACITY TEST: CPT

## 2017-07-29 PROCEDURE — 94761 N-INVAS EAR/PLS OXIMETRY MLT: CPT

## 2017-07-29 PROCEDURE — 83735 ASSAY OF MAGNESIUM: CPT

## 2017-07-29 PROCEDURE — 84478 ASSAY OF TRIGLYCERIDES: CPT

## 2017-07-29 PROCEDURE — 82803 BLOOD GASES ANY COMBINATION: CPT

## 2017-07-29 PROCEDURE — 20000000 HC ICU ROOM

## 2017-07-29 PROCEDURE — 85007 BL SMEAR W/DIFF WBC COUNT: CPT

## 2017-07-29 PROCEDURE — 85027 COMPLETE CBC AUTOMATED: CPT

## 2017-07-29 PROCEDURE — 25000003 PHARM REV CODE 250: Performed by: INTERNAL MEDICINE

## 2017-07-29 PROCEDURE — 99900035 HC TECH TIME PER 15 MIN (STAT)

## 2017-07-29 PROCEDURE — 83690 ASSAY OF LIPASE: CPT

## 2017-07-29 RX ORDER — MORPHINE SULFATE 2 MG/ML
2 INJECTION, SOLUTION INTRAMUSCULAR; INTRAVENOUS EVERY 6 HOURS PRN
Status: DISCONTINUED | OUTPATIENT
Start: 2017-07-29 | End: 2017-08-04

## 2017-07-29 RX ORDER — INSULIN ASPART 100 [IU]/ML
1-10 INJECTION, SOLUTION INTRAVENOUS; SUBCUTANEOUS EVERY 6 HOURS PRN
Status: DISCONTINUED | OUTPATIENT
Start: 2017-07-29 | End: 2017-08-02

## 2017-07-29 RX ORDER — GLUCAGON 1 MG
1 KIT INJECTION
Status: DISCONTINUED | OUTPATIENT
Start: 2017-07-29 | End: 2017-08-05 | Stop reason: HOSPADM

## 2017-07-29 RX ORDER — INSULIN ASPART 100 [IU]/ML
0-5 INJECTION, SOLUTION INTRAVENOUS; SUBCUTANEOUS EVERY 6 HOURS PRN
Status: DISCONTINUED | OUTPATIENT
Start: 2017-07-29 | End: 2017-07-29

## 2017-07-29 RX ADMIN — FAMOTIDINE 20 MG: 10 INJECTION, SOLUTION INTRAVENOUS at 08:07

## 2017-07-29 RX ADMIN — ONDANSETRON 8 MG: 2 INJECTION INTRAMUSCULAR; INTRAVENOUS at 04:07

## 2017-07-29 RX ADMIN — ENOXAPARIN SODIUM 40 MG: 100 INJECTION SUBCUTANEOUS at 05:07

## 2017-07-29 RX ADMIN — DEXMEDETOMIDINE HYDROCHLORIDE 0.3 MCG/KG/HR: 4 INJECTION, SOLUTION INTRAVENOUS at 03:07

## 2017-07-29 RX ADMIN — METRONIDAZOLE 500 MG: 500 INJECTION, SOLUTION INTRAVENOUS at 02:07

## 2017-07-29 RX ADMIN — CEFEPIME HYDROCHLORIDE 2 G: 2 INJECTION, SOLUTION INTRAVENOUS at 02:07

## 2017-07-29 RX ADMIN — METRONIDAZOLE 500 MG: 500 INJECTION, SOLUTION INTRAVENOUS at 05:07

## 2017-07-29 RX ADMIN — INSULIN DETEMIR 10 UNITS: 100 INJECTION, SOLUTION SUBCUTANEOUS at 11:07

## 2017-07-29 RX ADMIN — METRONIDAZOLE 500 MG: 500 INJECTION, SOLUTION INTRAVENOUS at 09:07

## 2017-07-29 RX ADMIN — FAMOTIDINE 20 MG: 10 INJECTION, SOLUTION INTRAVENOUS at 11:07

## 2017-07-29 RX ADMIN — CEFEPIME HYDROCHLORIDE 2 G: 2 INJECTION, SOLUTION INTRAVENOUS at 10:07

## 2017-07-29 RX ADMIN — Medication 3 ML: at 10:07

## 2017-07-29 RX ADMIN — CEFEPIME HYDROCHLORIDE 2 G: 2 INJECTION, SOLUTION INTRAVENOUS at 06:07

## 2017-07-29 RX ADMIN — CHLORHEXIDINE GLUCONATE 15 ML: 1.2 RINSE ORAL at 11:07

## 2017-07-29 RX ADMIN — Medication 125 MCG/HR: at 03:07

## 2017-07-29 RX ADMIN — Medication 3 ML: at 06:07

## 2017-07-29 NOTE — PLAN OF CARE
Problem: Patient Care Overview  Goal: Plan of Care Review  Outcome: Ongoing (interventions implemented as appropriate)  Plan of care reviewed with patient

## 2017-07-29 NOTE — PLAN OF CARE
Problem: Diabetes, Type 1 (Adult)  Intervention: Optimize Glycemic Control  Patient with tight glycemic monitoring throughout shift. Insulin gtt adjusted as per titration orders, per glucose level overnight. Patient monitored for s/s hypoglycemia. No complications noted r/t DM I overnight.

## 2017-07-29 NOTE — PLAN OF CARE
Problem: Patient Care Overview  Goal: Plan of Care Review  Patient with uneventful shift. No s/s discomfort, no s/s complications noted overnight. VS as documented. Assessments as documented. Patient in no apparent distress overnight. Medications and gtts titrated as per orders. Patient assisted in turning throughout night, as tolerated. Restraints removed 0300 with no s/s behavior needing reapplication of restraints at this time.. Patient aware of current plan of care, expresses understanding. Will continue to monitor closely and will give report to oncoming nurse at shift change.

## 2017-07-29 NOTE — ASSESSMENT & PLAN NOTE
- Dx of necrotizing pancreatitis with triglyeridemia  - Patient presents to OSH with pancreatitis evidenced by CT abdomen/pelvis  - Lipase 4000, triglycerides 1500, lactate 10.4 >> 1.9   - repeat triglyceride 946 and down to ~ 300.   - now on fentanyl and precedex gtt  - insulin gtt- now on Sliding Scale with long term basal insulin.   - Now with reduced bladder pressures (10). Removed NG tube with extubation.

## 2017-07-29 NOTE — ASSESSMENT & PLAN NOTE
-- baseline cr unknown  -- FeNa cannot calculate 2/2 urine Na<20; FeUrea 13 also indicating likely has a component of pre-renal etiology  -- P/C ratio elevated   -- renal US: Mildly elevated renal resistive indices bilaterally. This is nonspecific and can be seen in a variety of acute or chronic renal conditions.  -- --good UOP, net negative (1.7L, -407mL)  -- bladder pressures now at 10; gen surg following;

## 2017-07-29 NOTE — SUBJECTIVE & OBJECTIVE
Interval History/Significant Events: NAEON. Extubated today.     Review of Systems   Constitutional: Positive for fatigue. Negative for activity change, appetite change, diaphoresis and fever.   HENT: Negative for congestion and dental problem.    Eyes: Negative for discharge and itching.   Respiratory: Negative for cough and shortness of breath.    Gastrointestinal: Positive for abdominal distention, abdominal pain and nausea. Negative for anal bleeding, blood in stool, constipation, diarrhea and vomiting.   Genitourinary: Negative for dysuria and flank pain.   Musculoskeletal: Positive for back pain. Negative for neck pain.   Psychiatric/Behavioral: Negative for agitation and behavioral problems.     Objective:     Vital Signs (Most Recent):  Temp: 99.2 °F (37.3 °C) (07/29/17 0715)  Pulse: 83 (07/29/17 0815)  Resp: (!) 21 (07/29/17 0815)  BP: 124/60 (07/29/17 0815)  SpO2: 95 % (07/29/17 0815) Vital Signs (24h Range):  Temp:  [99.1 °F (37.3 °C)-100 °F (37.8 °C)] 99.2 °F (37.3 °C)  Pulse:  [72-95] 83  Resp:  [19-25] 21  SpO2:  [90 %-99 %] 95 %  BP: ()/(37-60) 124/60   Weight: 112.1 kg (247 lb 2.2 oz)  Body mass index is 35.46 kg/m².      Intake/Output Summary (Last 24 hours) at 07/29/17 1039  Last data filed at 07/29/17 0715   Gross per 24 hour   Intake          1208.34 ml   Output             1970 ml   Net          -761.66 ml       Physical Exam   Constitutional: He is oriented to person, place, and time. He appears distressed.   HENT:   Head: Normocephalic.   Right Ear: External ear normal.   Left Ear: External ear normal.   Eyes: Pupils are equal, round, and reactive to light. Right eye exhibits no discharge. Left eye exhibits no discharge.   Neck: Normal range of motion. Neck supple. No tracheal deviation present. No thyromegaly present.   Cardiovascular: Regular rhythm and normal heart sounds.  Exam reveals no friction rub.    No murmur heard.  Sinus Tachycardia    Pulmonary/Chest: Effort normal. He has  no wheezes. He has no rales.   Abdominal: He exhibits distension. He exhibits no mass. There is tenderness. There is no guarding.   Musculoskeletal: He exhibits no edema or deformity.   Neurological: He is alert and oriented to person, place, and time.   Skin: He is not diaphoretic.       Vents:  Vent Mode: Spont (07/29/17 0759)  Ventilator Initiated: Yes (07/27/17 1837)  Set Rate: 22 bmp (07/29/17 0541)  Vt Set: 450 mL (07/29/17 0541)  Pressure Support: 5 cmH20 (07/29/17 0759)  PEEP/CPAP: 5 cmH20 (07/29/17 0759)  Oxygen Concentration (%): 4 (07/29/17 0810)  Peak Airway Pressure: 11 cmH2O (07/29/17 0759)  Plateau Pressure: 25 cmH20 (07/29/17 0300)  Total Ve: 10.1 mL (07/29/17 0759)  Negative Inspiratory Force (cm H2O): -34 (07/29/17 0810)  F/VT Ratio<105 (RSBI): (!) 46.62 (07/29/17 0759)  Lines/Drains/Airways     Drain                 Urethral Catheter 07/25/17 2024  16 Fr. 3 days         NG/OG Tube 07/28/17 0100 14 Fr. Right mouth 1 day          Peripheral Intravenous Line                 Peripheral IV - Single Lumen 07/25/17 1752 Right Forearm 3 days         Peripheral IV - Single Lumen 07/25/17 1754 Left Antecubital 3 days         Peripheral IV - Single Lumen 07/28/17 1000 Right Antecubital 1 day              Significant Labs:    CBC/Anemia Profile:    Recent Labs  Lab 07/28/17  0529 07/29/17  0300   WBC 8.07 9.85   HGB 12.1* 12.2*   HCT 35.9* 36.3*    159   MCV 85 85   RDW 14.8* 14.8*        Chemistries:    Recent Labs  Lab 07/27/17  1510  07/28/17  0529 07/28/17  0806 07/28/17  1915 07/29/17  0300   *  < > 138 134* 136 138   K 5.8*  < > 3.0* 3.9 5.2* 4.5     < > 108 105 104 106   CO2 19*  < > 19* 20* 22* 21*   BUN 36*  < > 29* 35* 43* 49*   CREATININE 1.9*  < > 1.4 1.8* 1.9* 1.8*   CALCIUM 7.8*  < > 6.6* 7.9* 8.4* 8.5*   ALBUMIN 2.1*  < > 1.6* 1.9* 1.9* 1.9*   PROT 5.8*  < > 4.6* 5.5* 5.6* 5.6*   BILITOT 0.9  < > 0.7 0.9 0.8 0.8   ALKPHOS 75  < > 63 69 72 68   ALT 2,180*  < > 1,120* 1,257*  967* 827*   AST 1,489*  < > 462* 510* 343* 247*   MG 2.7*  --  1.7  --   --  3.2*   PHOS  --   --  4.9*  --   --  3.0   < > = values in this interval not displayed.    ABGs:   Recent Labs  Lab 07/29/17  0456   PH 7.354   PCO2 38.8   HCO3 21.6*   POCSATURATED 96   BE -4     Bilirubin:   Recent Labs  Lab 07/27/17  1946 07/28/17  0529 07/28/17  0806 07/28/17 1915 07/29/17  0300   BILITOT 1.4* 0.7 0.9 0.8 0.8     CMP:   Recent Labs  Lab 07/28/17  0806 07/28/17 1915 07/29/17  0300   * 136 138   K 3.9 5.2* 4.5    104 106   CO2 20* 22* 21*   * 171* 195*   BUN 35* 43* 49*   CREATININE 1.8* 1.9* 1.8*   CALCIUM 7.9* 8.4* 8.5*   PROT 5.5* 5.6* 5.6*   ALBUMIN 1.9* 1.9* 1.9*   BILITOT 0.9 0.8 0.8   ALKPHOS 69 72 68   * 343* 247*   ALT 1,257* 967* 827*   ANIONGAP 9 10 11   EGFRNONAA 47.0* 44.1* 47.0*     Coagulation: No results for input(s): INR, APTT in the last 48 hours.    Invalid input(s): PT  Lactic Acid: No results for input(s): LACTATE in the last 48 hours.  Lipid Panel:   Recent Labs  Lab 07/29/17  0300   TRIG 216*     All pertinent labs within the past 24 hours have been reviewed.    Significant Imaging:  I have reviewed and interpreted all pertinent imaging results/findings within the past 24 hours.

## 2017-07-29 NOTE — PROGRESS NOTES
Pt.extubated per Dr. Mcdonnell orders to 4 lpm NC; pt. tolerated well; SpO2@ 95%; will continue to monitor.

## 2017-07-29 NOTE — PLAN OF CARE
Problem: Ventilation, Mechanical Invasive (Adult)  Intervention: Promote Early Weaning/Extubation  RASS monitored throughout shift, gtts adjusted per orders/RASS. Patient educated on rationale for minimal sedation r/t early attempts to wean from ventilator this Am. Patient in understanding, expresses agreement. Patient with no s/s complications overnight r/t ventilator/oxygenation.

## 2017-07-29 NOTE — ASSESSMENT & PLAN NOTE
-- intubated 7/27 for acute onset respiratory distress with hypoxia  -- CXR with pulmonary edema; possible retrocardiac consolidation  --Extubated today. Monitor today, and hold sedation. Evaluate later today for step down.

## 2017-07-30 LAB
ALBUMIN SERPL BCP-MCNC: 2.2 G/DL
ALP SERPL-CCNC: 85 U/L
ALT SERPL W/O P-5'-P-CCNC: 546 U/L
ANION GAP SERPL CALC-SCNC: 20 MMOL/L
AST SERPL-CCNC: 168 U/L
BILIRUB SERPL-MCNC: 0.8 MG/DL
BUN SERPL-MCNC: 35 MG/DL
CALCIUM SERPL-MCNC: 9.5 MG/DL
CHLORIDE SERPL-SCNC: 106 MMOL/L
CO2 SERPL-SCNC: 17 MMOL/L
CREAT SERPL-MCNC: 1.3 MG/DL
ERYTHROCYTE [DISTWIDTH] IN BLOOD BY AUTOMATED COUNT: 15.5 %
EST. GFR  (AFRICAN AMERICAN): >60 ML/MIN/1.73 M^2
EST. GFR  (NON AFRICAN AMERICAN): >60 ML/MIN/1.73 M^2
GLUCOSE SERPL-MCNC: 263 MG/DL
HCT VFR BLD AUTO: 40.6 %
HGB BLD-MCNC: 13.4 G/DL
MAGNESIUM SERPL-MCNC: 2.8 MG/DL
MCH RBC QN AUTO: 28.6 PG
MCHC RBC AUTO-ENTMCNC: 33 G/DL
MCV RBC AUTO: 87 FL
PHOSPHATE SERPL-MCNC: 2.9 MG/DL
PLATELET # BLD AUTO: 247 K/UL
PMV BLD AUTO: 11.9 FL
POCT GLUCOSE: 153 MG/DL (ref 70–110)
POCT GLUCOSE: 216 MG/DL (ref 70–110)
POCT GLUCOSE: 217 MG/DL (ref 70–110)
POCT GLUCOSE: 221 MG/DL (ref 70–110)
POCT GLUCOSE: 261 MG/DL (ref 70–110)
POCT GLUCOSE: 329 MG/DL (ref 70–110)
POTASSIUM SERPL-SCNC: 5.4 MMOL/L
PROT SERPL-MCNC: 7 G/DL
RBC # BLD AUTO: 4.68 M/UL
SODIUM SERPL-SCNC: 143 MMOL/L
WBC # BLD AUTO: 13.8 K/UL

## 2017-07-30 PROCEDURE — 25000003 PHARM REV CODE 250: Performed by: HOSPITALIST

## 2017-07-30 PROCEDURE — 99233 SBSQ HOSP IP/OBS HIGH 50: CPT | Mod: ,,, | Performed by: INTERNAL MEDICINE

## 2017-07-30 PROCEDURE — 36415 COLL VENOUS BLD VENIPUNCTURE: CPT

## 2017-07-30 PROCEDURE — 85027 COMPLETE CBC AUTOMATED: CPT

## 2017-07-30 PROCEDURE — S0030 INJECTION, METRONIDAZOLE: HCPCS | Performed by: INTERNAL MEDICINE

## 2017-07-30 PROCEDURE — 63600175 PHARM REV CODE 636 W HCPCS: Performed by: INTERNAL MEDICINE

## 2017-07-30 PROCEDURE — 20600001 HC STEP DOWN PRIVATE ROOM

## 2017-07-30 PROCEDURE — 84100 ASSAY OF PHOSPHORUS: CPT

## 2017-07-30 PROCEDURE — 83735 ASSAY OF MAGNESIUM: CPT

## 2017-07-30 PROCEDURE — 25000003 PHARM REV CODE 250: Performed by: INTERNAL MEDICINE

## 2017-07-30 PROCEDURE — S0028 INJECTION, FAMOTIDINE, 20 MG: HCPCS | Performed by: HOSPITALIST

## 2017-07-30 PROCEDURE — 25000003 PHARM REV CODE 250: Performed by: STUDENT IN AN ORGANIZED HEALTH CARE EDUCATION/TRAINING PROGRAM

## 2017-07-30 PROCEDURE — 63600175 PHARM REV CODE 636 W HCPCS: Performed by: STUDENT IN AN ORGANIZED HEALTH CARE EDUCATION/TRAINING PROGRAM

## 2017-07-30 PROCEDURE — A4216 STERILE WATER/SALINE, 10 ML: HCPCS | Performed by: STUDENT IN AN ORGANIZED HEALTH CARE EDUCATION/TRAINING PROGRAM

## 2017-07-30 PROCEDURE — 80053 COMPREHEN METABOLIC PANEL: CPT

## 2017-07-30 RX ORDER — RAMELTEON 8 MG/1
8 TABLET ORAL ONCE
Status: COMPLETED | OUTPATIENT
Start: 2017-07-30 | End: 2017-07-30

## 2017-07-30 RX ADMIN — CEFEPIME HYDROCHLORIDE 2 G: 2 INJECTION, SOLUTION INTRAVENOUS at 09:07

## 2017-07-30 RX ADMIN — CEFEPIME HYDROCHLORIDE 2 G: 2 INJECTION, SOLUTION INTRAVENOUS at 02:07

## 2017-07-30 RX ADMIN — FAMOTIDINE 20 MG: 10 INJECTION, SOLUTION INTRAVENOUS at 09:07

## 2017-07-30 RX ADMIN — METRONIDAZOLE 500 MG: 500 INJECTION, SOLUTION INTRAVENOUS at 12:07

## 2017-07-30 RX ADMIN — INSULIN ASPART 2 UNITS: 100 INJECTION, SOLUTION INTRAVENOUS; SUBCUTANEOUS at 01:07

## 2017-07-30 RX ADMIN — RAMELTEON 8 MG: 8 TABLET, FILM COATED ORAL at 12:07

## 2017-07-30 RX ADMIN — METRONIDAZOLE 500 MG: 500 INJECTION, SOLUTION INTRAVENOUS at 05:07

## 2017-07-30 RX ADMIN — INSULIN ASPART 6 UNITS: 100 INJECTION, SOLUTION INTRAVENOUS; SUBCUTANEOUS at 07:07

## 2017-07-30 RX ADMIN — INSULIN ASPART 4 UNITS: 100 INJECTION, SOLUTION INTRAVENOUS; SUBCUTANEOUS at 09:07

## 2017-07-30 RX ADMIN — CEFEPIME HYDROCHLORIDE 2 G: 2 INJECTION, SOLUTION INTRAVENOUS at 06:07

## 2017-07-30 RX ADMIN — INSULIN DETEMIR 10 UNITS: 100 INJECTION, SOLUTION SUBCUTANEOUS at 09:07

## 2017-07-30 RX ADMIN — ENOXAPARIN SODIUM 40 MG: 100 INJECTION SUBCUTANEOUS at 05:07

## 2017-07-30 RX ADMIN — Medication 3 ML: at 06:07

## 2017-07-30 RX ADMIN — Medication 3 ML: at 02:07

## 2017-07-30 RX ADMIN — INSULIN ASPART 4 UNITS: 100 INJECTION, SOLUTION INTRAVENOUS; SUBCUTANEOUS at 06:07

## 2017-07-30 RX ADMIN — METRONIDAZOLE 500 MG: 500 INJECTION, SOLUTION INTRAVENOUS at 08:07

## 2017-07-30 NOTE — ASSESSMENT & PLAN NOTE
-- intubated 7/27 for acute onset respiratory distress with hypoxia  -- CXR with pulmonary edema; possible retrocardiac consolidation  --Extubated 7/29/2017. Mentating well and protecting airway. Doing well with appropriate vitals and oxygen saturation (100%) on RA. Stable for step down.

## 2017-07-30 NOTE — NURSING TRANSFER
Nursing Transfer Note      7/30/2017     Transfer To: 604    Transfer via wheelchair    Transfer with cardiac monitoring    Transported by this RN    Medicines sent: insulin pens, cefepime currently infusing     Chart send with patient: Yes    Notified: Father    Report given and care assumed by Maryan RN on 6th floor.

## 2017-07-30 NOTE — PROGRESS NOTES
"[facility]  Critical Care Medicine  Progress Note    Patient Name: Bay Ibarra  MRN: [id number]  Admission Date: 7/25/2017  Hospital Length of Stay: 5 days  Code Status: Full Code  Attending Provider: Cam Rush MD  Primary Care Provider: José Luis Singleton MD   Principal Problem: Acute pancreatitis with uninfected necrosis    Subjective:     HPI:  Mr. Ibarra is a 36 y/o gentleman with PMHx HTN, HLD, Hypertriglyceridemia, DM I, gout, and pancreatitis (apirl 2017) who was transferred from [place] with necrotizing pancreatitis and DKA. The patient states he started feeling "ill and weak" 2 days ago, and yesterday morning he woke up with severe epigastric abdominal pain and had multiple episodes of nausea and vomiting with poor PO intake. He endorsed starting gemfibrozil recently for HLD and regular compliance with his insulin glargine BID. He presented to [place] with Lipase 4000, Triglycerides 1500, lactate 10 and elevated anion gap. He received 3 L of ns and IV insulin at [place] before being transported to [facility] for higher level of care. Upon initial assessment in the ED, the patient was lethargic, but arousable, , /63, and SpO2 98% on 2L nasal cannula. He denied having any fevers, chills, CP, cough, diarrhea or dysuria.  He was started on 2L of lactated ringers, IV insulin and admitted to the MICU.     Hospital/ICU Course:  7/27: Having pain and nausea without vomiting overnight, controlled with morphine 4 x 4 IV. Patient also developed acute respiratory failure requiring intubation. Elevated bladder pressures overnight improving with treatment of ileus    7/28: NG tube for elevated bladder pressures.     7/29: Bladder pressures resolved with NG. NG removed. Patient with continued NPO but dry heaving and nauseous.     7/30: Dry heaving is much improved. Got some NG decompression last night but now removed. Small BMs overnight but not substantial. " Did not sleep last night due to abdominal discomfort.     Interval History/Significant Events: See above.     Review of Systems   Constitutional: Positive for fatigue. Negative for activity change, appetite change, diaphoresis and fever.   HENT: Negative for congestion and dental problem.    Eyes: Negative for discharge and itching.   Respiratory: Negative for cough and shortness of breath.    Gastrointestinal: Positive for abdominal distention and nausea. Negative for abdominal pain, anal bleeding, blood in stool, constipation, diarrhea and vomiting.   Genitourinary: Negative for dysuria and flank pain.   Musculoskeletal: Negative for back pain and neck pain.   Psychiatric/Behavioral: Negative for agitation and behavioral problems.     Objective:     Vital Signs (Most Recent):  Temp: 98.8 °F (37.1 °C) (07/30/17 0700)  Pulse: 87 (07/30/17 0900)  Resp: (!) 25 (07/30/17 0900)  BP: (!) 144/79 (07/30/17 0900)  SpO2: 100 % (07/30/17 0900) Vital Signs (24h Range):  Temp:  [98 °F (36.7 °C)-99.2 °F (37.3 °C)] 98.8 °F (37.1 °C)  Pulse:  [] 87  Resp:  [19-45] 25  SpO2:  [94 %-100 %] 100 %  BP: (134-174)/(63-91) 144/79   Weight: 112.1 kg (247 lb 2.2 oz)  Body mass index is 35.46 kg/m².      Intake/Output Summary (Last 24 hours) at 07/30/17 1015  Last data filed at 07/30/17 0900   Gross per 24 hour   Intake              570 ml   Output             2585 ml   Net            -2015 ml       Physical Exam   Constitutional: He is oriented to person, place, and time. No distress.   HENT:   Head: Normocephalic.   Right Ear: External ear normal.   Left Ear: External ear normal.   Eyes: Pupils are equal, round, and reactive to light. Right eye exhibits no discharge. Left eye exhibits no discharge.   Neck: Normal range of motion. Neck supple. No tracheal deviation present. No thyromegaly present.   Cardiovascular: Regular rhythm and normal heart sounds.  Exam reveals no friction rub.    No murmur heard.  Sinus Tachycardia     Pulmonary/Chest: Effort normal. He has no wheezes. He has no rales.   Abdominal: He exhibits distension. He exhibits no mass. There is no tenderness. There is no guarding.   Musculoskeletal: He exhibits no edema or deformity.   Neurological: He is alert and oriented to person, place, and time.   Skin: He is not diaphoretic.       Vents:  Vent Mode: Spont (07/29/17 0759)  Ventilator Initiated: Yes (07/27/17 1837)  Set Rate: 22 bmp (07/29/17 0541)  Vt Set: 450 mL (07/29/17 0541)  Pressure Support: 5 cmH20 (07/29/17 0759)  PEEP/CPAP: 5 cmH20 (07/29/17 0759)  Oxygen Concentration (%): 4 (07/30/17 0700)  Peak Airway Pressure: 11 cmH2O (07/29/17 0759)  Plateau Pressure: 25 cmH20 (07/29/17 0300)  Total Ve: 10.1 mL (07/29/17 0759)  Negative Inspiratory Force (cm H2O): -34 (07/29/17 0759)  F/VT Ratio<105 (RSBI): (!) 46.62 (07/29/17 0759)  Lines/Drains/Airways     Drain                 Urethral Catheter 07/25/17 2024  16 Fr. 4 days          Peripheral Intravenous Line                 Peripheral IV - Single Lumen 07/25/17 1752 Right Forearm 4 days         Peripheral IV - Single Lumen 07/25/17 1754 Left Antecubital 4 days         Peripheral IV - Single Lumen 07/28/17 1000 Right Antecubital 2 days              Significant Labs:    CBC/Anemia Profile:    Recent Labs  Lab 07/29/17  0300 07/30/17  0450   WBC 9.85 13.80*   HGB 12.2* 13.4*   HCT 36.3* 40.6    247   MCV 85 87   RDW 14.8* 15.5*        Chemistries:    Recent Labs  Lab 07/28/17  1915 07/29/17  0300 07/30/17  0450    138 143   K 5.2* 4.5 5.4*    106 106   CO2 22* 21* 17*   BUN 43* 49* 35*   CREATININE 1.9* 1.8* 1.3   CALCIUM 8.4* 8.5* 9.5   ALBUMIN 1.9* 1.9* 2.2*   PROT 5.6* 5.6* 7.0   BILITOT 0.8 0.8 0.8   ALKPHOS 72 68 85   * 827* 546*   * 247* 168*   MG  --  3.2* 2.8*   PHOS  --  3.0 2.9       Bilirubin:   Recent Labs  Lab 07/28/17  0529 07/28/17  0806 07/28/17  1915 07/29/17  0300 07/30/17  0450   BILITOT 0.7 0.9 0.8 0.8 0.8     CMP:    Recent Labs  Lab 07/28/17  1915 07/29/17  0300 07/30/17  0450    138 143   K 5.2* 4.5 5.4*    106 106   CO2 22* 21* 17*   * 195* 263*   BUN 43* 49* 35*   CREATININE 1.9* 1.8* 1.3   CALCIUM 8.4* 8.5* 9.5   PROT 5.6* 5.6* 7.0   ALBUMIN 1.9* 1.9* 2.2*   BILITOT 0.8 0.8 0.8   ALKPHOS 72 68 85   * 247* 168*   * 827* 546*   ANIONGAP 10 11 20*   EGFRNONAA 44.1* 47.0* >60.0     Coagulation: No results for input(s): INR, APTT in the last 48 hours.    Invalid input(s): PT  POCT Glucose:   Recent Labs  Lab 07/30/17  0122 07/30/17  0603 07/30/17  0909   POCTGLUCOSE 217* 221* 216*     All pertinent labs within the past 24 hours have been reviewed.    Significant Imaging:  I have reviewed and interpreted all pertinent imaging results/findings within the past 24 hours.    Assessment/Plan:     Pulmonary   Acute respiratory failure with hypoxia    -- intubated 7/27 for acute onset respiratory distress with hypoxia  -- CXR with pulmonary edema; possible retrocardiac consolidation  --Extubated 7/29/2017. Mentating well and protecting airway. Doing well with appropriate vitals and oxygen saturation (100%) on RA. Stable for step down.         Renal   TROY (acute kidney injury)    -- baseline cr unknown  -- FeNa cannot calculate 2/2 urine Na<20; FeUrea 13 also indicating likely has a component of pre-renal etiology  -- P/C ratio elevated   -- renal US: Mildly elevated renal resistive indices bilaterally. This is nonspecific and can be seen in a variety of acute or chronic renal conditions.  -- --good UOP, net negative (1.7L, -407mL)  -- bladder pressures now at 10; gen surg following;           Endocrine   Diabetes mellitus type I    - Patient with DM I presents with hyperglycemia  - Moderate sliding scale with basal Insulin        Fluids/Electrolytes/Nutrition/GI   * Acute pancreatitis with uninfected necrosis    - Dx of necrotizing pancreatitis with triglyeridemia  - Improving, liver enzymes down  trending. Patient desires to eat. Had large BM after enema. Will start him on clears first and can advance diet as tolerated.   - Patient presents to OSH with pancreatitis evidenced by CT abdomen/pelvis  - Lipase 4000, triglycerides 1500, lactate 10.4 >> 1.9   - repeat triglyceride 946 and down to ~ 300.   - now on fentanyl and precedex gtt  - insulin gtt- now on Sliding Scale with long term basal insulin.   - Now with reduced bladder pressures (10). Removed NG tube with extubation.   - Patient is clinically improving, but did have white cell count. Will continue antibiotics cefepime and metro and can deescalate when transferred from unit.   - PT/OT eval for expected step down.           Ileus    -- Bladder pressures improving (10).   -- When patient ready will advance diet as tolerated.              Critical Care Daily Checklist:    A: Awake: RASS Goal/Actual Goal: RASS Goal: 0-->alert and calm  Actual: German Agitation Sedation Scale (RASS): Alert and calm   B: Spontaneous Breathing Trial Performed? Spon. Breathing Trial Initiated?: Initiated (07/29/17 0810)   C: SAT & SBT Coordinated?  Yes                      D: Delirium: CAM-ICU Overall CAM-ICU: Negative   E: Early Mobility Performed? Yes   F: Feeding Goal: Goals: Meet % EEN, EPN  Status: Nutrition Goal Status: new   Current Diet Order   Procedures    Diet clear liquid      AS: Analgesia/Sedation Off sedation   T: Thromboembolic Prophylaxis Enoxaparin   H: HOB > 300 Yes   U: Stress Ulcer Prophylaxis (if needed)    G: Glucose Control Detemir, Aspart   B: Bowel Function Stool Occurrence: 1   I: Indwelling Catheter (Lines & Maria) Necessity No maria    D: De-escalation of Antimicrobials/Pharmacotherapies On Cefepime and metro for now    Plan for the day/ETD Step down     Code Status:  Family/Goals of Care: Full Code          Critical care was time spent personally by me on the following activities: development of treatment plan with patient or surrogate  and bedside caregivers, discussions with consultants, evaluation of patient's response to treatment, examination of patient, ordering and performing treatments and interventions, ordering and review of laboratory studies, ordering and review of radiographic studies, pulse oximetry, re-evaluation of patient's condition. This critical care time did not overlap with that of any other provider or involve time for any procedures.     Luke Park MD  Critical Care Medicine  [facility]

## 2017-07-30 NOTE — RESIDENT HANDOFF
"Handoff     [place] [facility] Room Number: 604/604A     Patient Name: Bay Ibarra MRN: [id number]     Date of Birth: [id number] Allergies: Review of patient's allergies indicates no known allergies.     Age: 37 y.o. Admit Date: 7/25/2017     Sex: male  BMI: Body mass index is 35.46 kg/m².     Code Status: Full Code        Illness Level (current clinical status): Watcher - No    Reason for Admission: Acute pancreatitis with uninfected necrosis    Brief HPI (pertinent PMH and diagnosis or differential diagnosis): Mr. Ibarra is a 38 y/o gentleman with PMHx HTN, HLD, Hypertriglyceridemia, DM I, gout, and pancreatitis (4/2017) who was transferred from [place] with a diagnosis of necrotizing pancreatitis and DKA. The patient states he started feeling "ill and weak" 2 days ago, and yesterday morning he woke up with severe epigastric abdominal pain and had multiple episodes of nausea and vomiting with poor PO intake. He endorsed starting gemfibrozil recently for HLD and regular compliance with his insulin glargine BID. He presented to [facility] with Lipase 4000, Triglycerides 1500, lactate 10 and elevated anion gap. He received 3 L of ns and IV insulin at [facility] before being transported to [facility] for higher level of care. Upon initial assessment in the ED, the patient was lethargic, but arousable, , /63, and SpO2 98% on 2L nasal cannula. He denied having any fevers, chills, CP, cough, diarrhea or dysuria.  He was started on 2L of lactated ringers, IV insulin and admitted to the MICU.      Hospital/ICU Course:  7/27: Having pain and nausea without vomiting overnight, controlled with morphine 4 x 4 IV. Patient also developed acute respiratory failure requiring intubation. Elevated bladder pressures overnight improving with treatment of ileus     7/28: NG tube for elevated bladder pressures.      7/29: Bladder pressures resolved with NG. NG removed. Patient with continued " NPO but dry heaving and nauseous.      7/30: Dry heaving is much improved. Got some NG decompression last night but now removed. Small BMs overnight but not substantial. Did not sleep last night due to abdominal discomfort.    Procedure Date: Intubation- 7/27/2017    Hospital Course (updated, brief assessment by system or problem, significant events):   Pulmonary- Mentating well and protecting airway. Sat 100% on room air. Monitor respiratory status and if decreased mental status or not protecting airway.     Renal- Good urine output with improved bladder pressures (10 yesterday). Continue to monitor urine output and renal function.     FENGI- Improving liver function with downtrending liver enzymes. On broad spectrum, but can deescalate. Patient is NPO for now with ice chips only. He states that he is ready to start eating, but would advance slowly first to clears. Previously concerned about ileus vs constipation, but NG decompression and enemas have improved his symptoms. If he continues to have no BMs, abdominal distension/nausea/vomiting and you are concerned, would recommend trying enemas first. If that does not work, would try NG decompression.     Endocrine- DM1- On aspart and detemir with sliding scale.       Tasks (specific, using if-then statements): See above.     Contingency Plan (special circumstances anticipated and plan): See above.     Estimated Discharge Date: Pending tolerating po intake and PT/OT evaluation    Discharge Disposition: Home or Self Care    Mentored By: Dr. Cam Park M.D.  PGY1  [phone]

## 2017-07-30 NOTE — ASSESSMENT & PLAN NOTE
- Dx of necrotizing pancreatitis with triglyeridemia  - Improving, liver enzymes down trending. Patient desires to eat. Had large BM after enema. Will start him on clears first and can advance diet as tolerated.   - Patient presents to OSH with pancreatitis evidenced by CT abdomen/pelvis  - Lipase 4000, triglycerides 1500, lactate 10.4 >> 1.9   - repeat triglyceride 946 and down to ~ 300.   - now on fentanyl and precedex gtt  - insulin gtt- now on Sliding Scale with long term basal insulin.   - Now with reduced bladder pressures (10). Removed NG tube with extubation.   - Patient is clinically improving, but did have white cell count. Will continue antibiotics cefepime and metro and can deescalate when transferred from unit.   - PT/OT eval for expected step down.

## 2017-07-30 NOTE — PLAN OF CARE
Problem: Patient Care Overview  Goal: Plan of Care Review  Outcome: Ongoing (interventions implemented as appropriate)  VSS stable within the shift. Conscious and coherent. Restlessness noted throughout the night because of abdominal discomfort. On sinus rhythm. With O2 support at 4lpm via nasal cannula. O2 saturations were within normal limits. Patient denies nausea but frequently puts out small amounts of bilous gastric outputs. Loose bowel movements were noted. Characteristics and estimated amounts relayed to CCS MD. Antibiotic thrapy continued. POC discussed with patient. Will continue to monitor.

## 2017-07-30 NOTE — PLAN OF CARE
Problem: Patient Care Overview  Goal: Plan of Care Review  Outcome: Ongoing (interventions implemented as appropriate)  POC reviewed with pt and pt's father. Both verbalized understanding. AAOx4. VSS on room air. Denies chest pain/SOB. Tolerating clear liquids without nausea/emesis. Denies pain. IV antibiotics infusing. LBM 7/30/17 X2 after enema given by ICU RN. Accuchecks changed to ACHS with s/s coverage. Pt due to void at 1900 (6 hour ketty)- 2100. Father at bedside. No falls or injuries, call bell within reach, bed low. WCTM.

## 2017-07-30 NOTE — CARE UPDATE
Dr. Schwarz spoke with Nia with hospital medicine, patient to be stepped down to floor. ICU will continue to care for the patient until 7am.    Corin Duarte MD  Internal medicine PGY3

## 2017-07-30 NOTE — SUBJECTIVE & OBJECTIVE
Interval History/Significant Events: See above.     Review of Systems   Constitutional: Positive for fatigue. Negative for activity change, appetite change, diaphoresis and fever.   HENT: Negative for congestion and dental problem.    Eyes: Negative for discharge and itching.   Respiratory: Negative for cough and shortness of breath.    Gastrointestinal: Positive for abdominal distention and nausea. Negative for abdominal pain, anal bleeding, blood in stool, constipation, diarrhea and vomiting.   Genitourinary: Negative for dysuria and flank pain.   Musculoskeletal: Negative for back pain and neck pain.   Psychiatric/Behavioral: Negative for agitation and behavioral problems.     Objective:     Vital Signs (Most Recent):  Temp: 98.8 °F (37.1 °C) (07/30/17 0700)  Pulse: 87 (07/30/17 0900)  Resp: (!) 25 (07/30/17 0900)  BP: (!) 144/79 (07/30/17 0900)  SpO2: 100 % (07/30/17 0900) Vital Signs (24h Range):  Temp:  [98 °F (36.7 °C)-99.2 °F (37.3 °C)] 98.8 °F (37.1 °C)  Pulse:  [] 87  Resp:  [19-45] 25  SpO2:  [94 %-100 %] 100 %  BP: (134-174)/(63-91) 144/79   Weight: 112.1 kg (247 lb 2.2 oz)  Body mass index is 35.46 kg/m².      Intake/Output Summary (Last 24 hours) at 07/30/17 1015  Last data filed at 07/30/17 0900   Gross per 24 hour   Intake              570 ml   Output             2585 ml   Net            -2015 ml       Physical Exam   Constitutional: He is oriented to person, place, and time. No distress.   HENT:   Head: Normocephalic.   Right Ear: External ear normal.   Left Ear: External ear normal.   Eyes: Pupils are equal, round, and reactive to light. Right eye exhibits no discharge. Left eye exhibits no discharge.   Neck: Normal range of motion. Neck supple. No tracheal deviation present. No thyromegaly present.   Cardiovascular: Regular rhythm and normal heart sounds.  Exam reveals no friction rub.    No murmur heard.  Sinus Tachycardia    Pulmonary/Chest: Effort normal. He has no wheezes. He has no  rales.   Abdominal: He exhibits distension. He exhibits no mass. There is no tenderness. There is no guarding.   Musculoskeletal: He exhibits no edema or deformity.   Neurological: He is alert and oriented to person, place, and time.   Skin: He is not diaphoretic.       Vents:  Vent Mode: Spont (07/29/17 0759)  Ventilator Initiated: Yes (07/27/17 1837)  Set Rate: 22 bmp (07/29/17 0541)  Vt Set: 450 mL (07/29/17 0541)  Pressure Support: 5 cmH20 (07/29/17 0759)  PEEP/CPAP: 5 cmH20 (07/29/17 0759)  Oxygen Concentration (%): 4 (07/30/17 0700)  Peak Airway Pressure: 11 cmH2O (07/29/17 0759)  Plateau Pressure: 25 cmH20 (07/29/17 0300)  Total Ve: 10.1 mL (07/29/17 0759)  Negative Inspiratory Force (cm H2O): -34 (07/29/17 0759)  F/VT Ratio<105 (RSBI): (!) 46.62 (07/29/17 0759)  Lines/Drains/Airways     Drain                 Urethral Catheter 07/25/17 2024  16 Fr. 4 days          Peripheral Intravenous Line                 Peripheral IV - Single Lumen 07/25/17 1752 Right Forearm 4 days         Peripheral IV - Single Lumen 07/25/17 1754 Left Antecubital 4 days         Peripheral IV - Single Lumen 07/28/17 1000 Right Antecubital 2 days              Significant Labs:    CBC/Anemia Profile:    Recent Labs  Lab 07/29/17  0300 07/30/17  0450   WBC 9.85 13.80*   HGB 12.2* 13.4*   HCT 36.3* 40.6    247   MCV 85 87   RDW 14.8* 15.5*        Chemistries:    Recent Labs  Lab 07/28/17  1915 07/29/17  0300 07/30/17  0450    138 143   K 5.2* 4.5 5.4*    106 106   CO2 22* 21* 17*   BUN 43* 49* 35*   CREATININE 1.9* 1.8* 1.3   CALCIUM 8.4* 8.5* 9.5   ALBUMIN 1.9* 1.9* 2.2*   PROT 5.6* 5.6* 7.0   BILITOT 0.8 0.8 0.8   ALKPHOS 72 68 85   * 827* 546*   * 247* 168*   MG  --  3.2* 2.8*   PHOS  --  3.0 2.9       Bilirubin:   Recent Labs  Lab 07/28/17  0529 07/28/17  0806 07/28/17 1915 07/29/17  0300 07/30/17  0450   BILITOT 0.7 0.9 0.8 0.8 0.8     CMP:   Recent Labs  Lab 07/28/17 1915 07/29/17  0300  07/30/17  0450    138 143   K 5.2* 4.5 5.4*    106 106   CO2 22* 21* 17*   * 195* 263*   BUN 43* 49* 35*   CREATININE 1.9* 1.8* 1.3   CALCIUM 8.4* 8.5* 9.5   PROT 5.6* 5.6* 7.0   ALBUMIN 1.9* 1.9* 2.2*   BILITOT 0.8 0.8 0.8   ALKPHOS 72 68 85   * 247* 168*   * 827* 546*   ANIONGAP 10 11 20*   EGFRNONAA 44.1* 47.0* >60.0     Coagulation: No results for input(s): INR, APTT in the last 48 hours.    Invalid input(s): PT  POCT Glucose:   Recent Labs  Lab 07/30/17  0122 07/30/17  0603 07/30/17  0909   POCTGLUCOSE 217* 221* 216*     All pertinent labs within the past 24 hours have been reviewed.    Significant Imaging:  I have reviewed and interpreted all pertinent imaging results/findings within the past 24 hours.

## 2017-07-31 LAB
ALBUMIN SERPL BCP-MCNC: 2.1 G/DL
ALBUMIN SERPL BCP-MCNC: 2.1 G/DL
ALP SERPL-CCNC: 90 U/L
ALP SERPL-CCNC: 93 U/L
ALT SERPL W/O P-5'-P-CCNC: 264 U/L
ALT SERPL W/O P-5'-P-CCNC: 323 U/L
ANION GAP SERPL CALC-SCNC: 13 MMOL/L
ANION GAP SERPL CALC-SCNC: 15 MMOL/L
ANISOCYTOSIS BLD QL SMEAR: SLIGHT
AST SERPL-CCNC: 59 U/L
AST SERPL-CCNC: 80 U/L
BACTERIA BLD CULT: NORMAL
BACTERIA BLD CULT: NORMAL
BASOPHILS NFR BLD: 0 %
BILIRUB SERPL-MCNC: 0.6 MG/DL
BILIRUB SERPL-MCNC: 0.6 MG/DL
BUN SERPL-MCNC: 22 MG/DL
BUN SERPL-MCNC: 26 MG/DL
CALCIUM SERPL-MCNC: 8.8 MG/DL
CALCIUM SERPL-MCNC: 9 MG/DL
CHLORIDE SERPL-SCNC: 101 MMOL/L
CHLORIDE SERPL-SCNC: 97 MMOL/L
CO2 SERPL-SCNC: 23 MMOL/L
CO2 SERPL-SCNC: 29 MMOL/L
CREAT SERPL-MCNC: 0.8 MG/DL
CREAT SERPL-MCNC: 0.9 MG/DL
DIFFERENTIAL METHOD: ABNORMAL
EOSINOPHIL NFR BLD: 0 %
ERYTHROCYTE [DISTWIDTH] IN BLOOD BY AUTOMATED COUNT: 15.1 %
EST. GFR  (AFRICAN AMERICAN): >60 ML/MIN/1.73 M^2
EST. GFR  (AFRICAN AMERICAN): >60 ML/MIN/1.73 M^2
EST. GFR  (NON AFRICAN AMERICAN): >60 ML/MIN/1.73 M^2
EST. GFR  (NON AFRICAN AMERICAN): >60 ML/MIN/1.73 M^2
GIANT PLATELETS BLD QL SMEAR: PRESENT
GLUCOSE SERPL-MCNC: 228 MG/DL
GLUCOSE SERPL-MCNC: 242 MG/DL
HCT VFR BLD AUTO: 36.6 %
HGB BLD-MCNC: 11.8 G/DL
HYPOCHROMIA BLD QL SMEAR: ABNORMAL
LYMPHOCYTES NFR BLD: 3 %
MAGNESIUM SERPL-MCNC: 1.9 MG/DL
MCH RBC QN AUTO: 28.3 PG
MCHC RBC AUTO-ENTMCNC: 32.2 G/DL
MCV RBC AUTO: 88 FL
MONOCYTES NFR BLD: 2 %
NEUTROPHILS NFR BLD: 94 %
NEUTS BAND NFR BLD MANUAL: 1 %
NRBC BLD-RTO: ABNORMAL /100 WBC
OVALOCYTES BLD QL SMEAR: ABNORMAL
PHOSPHATE SERPL-MCNC: 1.9 MG/DL
PLATELET # BLD AUTO: 192 K/UL
PLATELET BLD QL SMEAR: ABNORMAL
PMV BLD AUTO: 11.2 FL
POCT GLUCOSE: 195 MG/DL (ref 70–110)
POCT GLUCOSE: 201 MG/DL (ref 70–110)
POCT GLUCOSE: 210 MG/DL (ref 70–110)
POCT GLUCOSE: 252 MG/DL (ref 70–110)
POCT GLUCOSE: 265 MG/DL (ref 70–110)
POIKILOCYTOSIS BLD QL SMEAR: SLIGHT
POLYCHROMASIA BLD QL SMEAR: ABNORMAL
POTASSIUM SERPL-SCNC: 3.6 MMOL/L
POTASSIUM SERPL-SCNC: 4.1 MMOL/L
PROCALCITONIN SERPL IA-MCNC: 2.24 NG/ML
PROT SERPL-MCNC: 5.8 G/DL
PROT SERPL-MCNC: 5.9 G/DL
RBC # BLD AUTO: 4.17 M/UL
SODIUM SERPL-SCNC: 139 MMOL/L
SODIUM SERPL-SCNC: 139 MMOL/L
WBC # BLD AUTO: 9.49 K/UL

## 2017-07-31 PROCEDURE — 63600175 PHARM REV CODE 636 W HCPCS: Performed by: STUDENT IN AN ORGANIZED HEALTH CARE EDUCATION/TRAINING PROGRAM

## 2017-07-31 PROCEDURE — 20600001 HC STEP DOWN PRIVATE ROOM

## 2017-07-31 PROCEDURE — A4216 STERILE WATER/SALINE, 10 ML: HCPCS | Performed by: STUDENT IN AN ORGANIZED HEALTH CARE EDUCATION/TRAINING PROGRAM

## 2017-07-31 PROCEDURE — 63600175 PHARM REV CODE 636 W HCPCS: Performed by: INTERNAL MEDICINE

## 2017-07-31 PROCEDURE — 25000003 PHARM REV CODE 250: Performed by: HOSPITALIST

## 2017-07-31 PROCEDURE — 36415 COLL VENOUS BLD VENIPUNCTURE: CPT

## 2017-07-31 PROCEDURE — 63600175 PHARM REV CODE 636 W HCPCS: Performed by: HOSPITALIST

## 2017-07-31 PROCEDURE — 25000003 PHARM REV CODE 250: Performed by: INTERNAL MEDICINE

## 2017-07-31 PROCEDURE — 25000003 PHARM REV CODE 250: Performed by: STUDENT IN AN ORGANIZED HEALTH CARE EDUCATION/TRAINING PROGRAM

## 2017-07-31 PROCEDURE — 97165 OT EVAL LOW COMPLEX 30 MIN: CPT

## 2017-07-31 PROCEDURE — 99233 SBSQ HOSP IP/OBS HIGH 50: CPT | Mod: ,,, | Performed by: HOSPITALIST

## 2017-07-31 PROCEDURE — S0030 INJECTION, METRONIDAZOLE: HCPCS | Performed by: INTERNAL MEDICINE

## 2017-07-31 PROCEDURE — 84100 ASSAY OF PHOSPHORUS: CPT

## 2017-07-31 PROCEDURE — 85027 COMPLETE CBC AUTOMATED: CPT

## 2017-07-31 PROCEDURE — 83735 ASSAY OF MAGNESIUM: CPT

## 2017-07-31 PROCEDURE — 85007 BL SMEAR W/DIFF WBC COUNT: CPT

## 2017-07-31 PROCEDURE — 84145 PROCALCITONIN (PCT): CPT

## 2017-07-31 PROCEDURE — 97161 PT EVAL LOW COMPLEX 20 MIN: CPT | Performed by: PHYSICAL THERAPIST

## 2017-07-31 PROCEDURE — S0028 INJECTION, FAMOTIDINE, 20 MG: HCPCS | Performed by: HOSPITALIST

## 2017-07-31 PROCEDURE — 80053 COMPREHEN METABOLIC PANEL: CPT | Mod: 91

## 2017-07-31 PROCEDURE — 97116 GAIT TRAINING THERAPY: CPT | Performed by: PHYSICAL THERAPIST

## 2017-07-31 RX ORDER — ONDANSETRON 2 MG/ML
8 INJECTION INTRAMUSCULAR; INTRAVENOUS EVERY 8 HOURS
Status: DISCONTINUED | OUTPATIENT
Start: 2017-07-31 | End: 2017-08-04

## 2017-07-31 RX ORDER — ACETAMINOPHEN 325 MG/1
650 TABLET ORAL EVERY 8 HOURS PRN
Status: DISCONTINUED | OUTPATIENT
Start: 2017-07-31 | End: 2017-08-05 | Stop reason: HOSPADM

## 2017-07-31 RX ORDER — SODIUM,POTASSIUM PHOSPHATES 280-250MG
2 POWDER IN PACKET (EA) ORAL ONCE
Status: COMPLETED | OUTPATIENT
Start: 2017-07-31 | End: 2017-07-31

## 2017-07-31 RX ORDER — SODIUM CHLORIDE 9 MG/ML
INJECTION, SOLUTION INTRAVENOUS CONTINUOUS
Status: DISCONTINUED | OUTPATIENT
Start: 2017-07-31 | End: 2017-08-02

## 2017-07-31 RX ORDER — ASPIRIN 81 MG/1
81 TABLET ORAL DAILY
COMMUNITY

## 2017-07-31 RX ADMIN — INSULIN ASPART 1 UNITS: 100 INJECTION, SOLUTION INTRAVENOUS; SUBCUTANEOUS at 11:07

## 2017-07-31 RX ADMIN — CEFEPIME HYDROCHLORIDE 2 G: 2 INJECTION, SOLUTION INTRAVENOUS at 11:07

## 2017-07-31 RX ADMIN — Medication 3 ML: at 02:07

## 2017-07-31 RX ADMIN — MORPHINE SULFATE 2 MG: 2 INJECTION, SOLUTION INTRAMUSCULAR; INTRAVENOUS at 08:07

## 2017-07-31 RX ADMIN — METRONIDAZOLE 500 MG: 500 INJECTION, SOLUTION INTRAVENOUS at 08:07

## 2017-07-31 RX ADMIN — CEFEPIME HYDROCHLORIDE 2 G: 2 INJECTION, SOLUTION INTRAVENOUS at 05:07

## 2017-07-31 RX ADMIN — SODIUM CHLORIDE: 0.9 INJECTION, SOLUTION INTRAVENOUS at 09:07

## 2017-07-31 RX ADMIN — ONDANSETRON 8 MG: 2 INJECTION INTRAMUSCULAR; INTRAVENOUS at 09:07

## 2017-07-31 RX ADMIN — POTASSIUM & SODIUM PHOSPHATES POWDER PACK 280-160-250 MG 2 PACKET: 280-160-250 PACK at 08:07

## 2017-07-31 RX ADMIN — ONDANSETRON 8 MG: 2 INJECTION INTRAMUSCULAR; INTRAVENOUS at 02:07

## 2017-07-31 RX ADMIN — INSULIN ASPART 4 UNITS: 100 INJECTION, SOLUTION INTRAVENOUS; SUBCUTANEOUS at 07:07

## 2017-07-31 RX ADMIN — FAMOTIDINE 20 MG: 10 INJECTION, SOLUTION INTRAVENOUS at 08:07

## 2017-07-31 RX ADMIN — INSULIN ASPART 6 UNITS: 100 INJECTION, SOLUTION INTRAVENOUS; SUBCUTANEOUS at 02:07

## 2017-07-31 RX ADMIN — METRONIDAZOLE 500 MG: 500 INJECTION, SOLUTION INTRAVENOUS at 12:07

## 2017-07-31 RX ADMIN — METRONIDAZOLE 500 MG: 500 INJECTION, SOLUTION INTRAVENOUS at 05:07

## 2017-07-31 RX ADMIN — CEFEPIME HYDROCHLORIDE 2 G: 2 INJECTION, SOLUTION INTRAVENOUS at 07:07

## 2017-07-31 RX ADMIN — INSULIN ASPART 6 UNITS: 100 INJECTION, SOLUTION INTRAVENOUS; SUBCUTANEOUS at 10:07

## 2017-07-31 RX ADMIN — ONDANSETRON 8 MG: 2 INJECTION INTRAMUSCULAR; INTRAVENOUS at 08:07

## 2017-07-31 RX ADMIN — ENOXAPARIN SODIUM 40 MG: 100 INJECTION SUBCUTANEOUS at 05:07

## 2017-07-31 NOTE — HOSPITAL COURSE
7/27: Having pain and nausea without vomiting overnight, controlled with morphine 4 x 4 IV. Patient also developed acute respiratory failure requiring intubation. Elevated bladder pressures overnight improving with treatment of ileus  7/28: NG tube for elevated bladder pressures.   7/29: Extubated. Bladder pressures resolved with NG. NG removed. Patient with continued NPO but dry heaving and nauseous.   7/30: Dry heaving is much improved. Got some NG decompression last night but now removed. Small BMs overnight but not substantial. Did not sleep last night due to abdominal discomfort.  7/31: Pt continued to have abdominal pain and bloating, so NG tube was replaced. US abd showed hepatosplenomegaly with hepatic steatosis, edematous pancreas, and no evidence of gallstones.    8/1: slowly improving with significant output per NGT. C diff negative  8/2: Continues to improve  8/3: Restarted diet; tolerated with mild discomfort. NG tube removed.

## 2017-07-31 NOTE — PLAN OF CARE
Problem: Patient Care Overview  Goal: Plan of Care Review  Outcome: Ongoing (interventions implemented as appropriate)  POC reviewed with pt who verbalized understanding. AAOx4. VSS on room air. Monitoring on tele, NSR- low ST. Denies chest pain/SOB. NG tube placed to remove air per MD. NG has put out 200 ml green drainage. Pt denies nausea after given zofran once this am, zofran now scheduled. No emesis prior to NGT. Pt remains distended, now going to Ultrasound. Denies pain. IV antibiotics infusing. LBM 7/31/17 X3. Accuchecks changed to Q4H with s/s coverage. Pt voiding adequately in toilet with bowel movements. No falls or injuries, call bell within reach, bed low. WCTM.

## 2017-07-31 NOTE — HPI
"Mr. Ibarra is a 38 y/o gentleman with PMHx HTN, HLD, Hypertriglyceridemia, DM I, gout, and pancreatitis (apirl 2017) who was transferred from [place] with necrotizing pancreatitis and DKA. The patient states he started feeling "ill and weak" 2 days ago, and yesterday morning he woke up with severe epigastric abdominal pain and had multiple episodes of nausea and vomiting with poor PO intake. He endorsed starting gemfibrozil recently for HLD and regular compliance with his insulin glargine BID. He presented to [place] with Lipase 4000, Triglycerides 1500, lactate 10 and elevated anion gap. He received 3 L of ns and IV insulin at [place] before being transported to [facility] for higher level of care. Upon initial assessment in the ED, the patient was lethargic, but arousable, , /63, and SpO2 98% on 2L nasal cannula. He denied having any fevers, chills, CP, cough, diarrhea or dysuria.  He was started on 2L of lactated ringers, IV insulin and admitted to the MICU.   "

## 2017-07-31 NOTE — PROGRESS NOTES
Pt's  after eating, RN notified and spoke with dietary supervisor about not being notified of pt's try delivery. Will administer insulin per s/s/ orders.

## 2017-07-31 NOTE — PROGRESS NOTES
NG tube inserted, green drainage returned. Pt denies chest pain/SOB. KUB ordered for verification. Also, BG have been over 250, MD to order accuchecks Q4H. WCTM.

## 2017-07-31 NOTE — PT/OT/SLP EVAL
"Occupational Therapy  Evaluation    Bay Ibarra   MRN: [id number]   Admitting Diagnosis: Acute pancreatitis with uninfected necrosis    OT Date of Treatment: 07/31/17   OT Start Time: 0735  OT Stop Time: 0800  OT Total Time (min): 25 min    Billable Minutes:  Evaluation : 25 minutes    Diagnosis: Acute pancreatitis with uninfected necrosis     Past Medical History:   Diagnosis Date    Acute pancreatitis     Diabetes mellitus type I     Gout     Hx of acute pancreatitis 3/3/2017    Hyperlipidemia     Hypertension     Obesity (BMI 35.0-39.9 without comorbidity) 6/5/2015    Sleep apnea 1/22/2016      History reviewed. No pertinent surgical history.    Referring physician: Dr Kaylene Mistry  Date referred to OT: 7/31/2017    General Precautions: Standard, fall    Patient History:  Living Environment  Lives With: alone  Living Arrangements: house  Home Accessibility: stairs to enter home  Home Layout: Able to live on 1st floor  Number of Stairs to Enter Home: 1  Stair Railings at Home: none  Transportation Available: family or friend will provide, car  Living Environment Comment: Pt lives alone and will not have steady support upon d/c. Pt was (I) PTA and was not using any AD. Pt's bathroom has a tub/shower combo.   Equipment Currently Used at Home: glucometer    Prior level of function:   Bed Mobility/Transfers: independent  Grooming: independent  Bathing: independent  Upper Body Dressing: independent  Lower Body Dressing: independent  Toileting: independent  Home Management Skills: independent  Homemaking Responsibilities: Yes  Driving License: Yes  Mode of Transportation: Car  Occupation: Full time employment       Subjective:  Communicated with nursing prior to session. As per nursing, pt able to be seen for therapy at this time.   " I am in so much pain-my stomach!"  Chief Complaint: pain in abdomen  Patient/Family stated goals: return home    Pain/Comfort  Pain Rating 1: 5/10  Location - Side 1: " Bilateral  Location 1: abdomen  Pain Addressed 1: Nurse notified  Pain Rating Post-Intervention 1: 6/10    Objective:  Patient found with: peripheral IV    Cognitive Exam:  Oriented to: Person, Place, Time and Situation  Follows Commands/attention: Follows one-step commands; delayed responses  Communication: clear/fluent  Memory:  No Deficits noted  Safety awareness/insight to disability: impaired; possible malingering  Coping skills/emotional control: Labile and Tearful    Visual/perceptual:  Intact    Physical Exam:  Postural examination/scapula alignment: No postural abnormalities identified  Skin integrity: Visible skin intact  Edema: Mild in BLE's    Sensation:   Intact    Upper Extremity Range of Motion:  Right Upper Extremity: WFL  Left Upper Extremity: WFL    Upper Extremity Strength:  Right Upper Extremity: WFL  Left Upper Extremity: WFL   Strength: WFL    Gross motor coordination: WFL    Functional Mobility:  Bed Mobility:  Supine to Sit: Supervision (HOB flat)    Transfers:  Sit <> Stand Assistance: Supervision  Sit <> Stand Assistive Device:  (support of IV pole intermittently and pt noted utilizing railing in the hallway to maintain balance)  Bed <> Chair Technique: Stand Pivot  Bed <> Chair Transfer Assistance: Supervision  Bed <> Chair Assistive Device: No Assistive Device    Functional Ambulation: During functional ambulation to household distances, pt noted utilizing IV pole and handrails in hallway to maintain balance. Pt appeared to have impaired balance during ambulation req CGA. Possible malingering noted during functional mobility, as pt  noted with lability and leaning head against wall in hallway without communication and unexpectedly.    Activities of Daily Living:  Feeding Level of Assistance: Independent (ability to bring hand to face in prep for feeding/grooming)  UE Dressing Level of Assistance: Modified independent (donning front open gown in standing)  LE Dressing Level of  "Assistance: CGA (additional time req); pt able to doff/don socks with mod I, however pt req CGA in standing for maintenance of balance    Balance:   Static Sit: GOOD+: Takes MAXIMAL challenges from all directions.    Dynamic Sit: GOOD: Maintains balance through MODERATE excursions of active trunk movement  Static Stand: FAIR: Maintains without assist but unable to take challenges  Dynamic stand: FAIR: Needs CONTACT GUARD during gait    AM-PAC 6 CLICK ADL  How much help from another person does this patient currently need?  1 = Unable, Total/Dependent Assistance  2 = A lot, Maximum/Moderate Assistance  3 = A little, Minimum/Contact Guard/Supervision  4 = None, Modified [place]/Independent    Putting on and taking off regular lower body clothing? : 3  Bathing (including washing, rinsing, drying)?: 3  Toileting, which includes using toilet, bedpan, or urinal? : 3  Putting on and taking off regular upper body clothing?: 4  Taking care of personal grooming such as brushing teeth?: 4  Eating meals?: 4  Total Score: 21    AM-PAC Raw Score CMS "G-Code Modifier Level of Impairment Assistance   6 % Total / Unable   7 - 9 CM 80 - 100% Maximal Assist   10-14 CL 60 - 80% Moderate Assist   15 - 19 CK 40 - 60% Moderate Assist   20 - 22 CJ 20 - 40% Minimal Assist   23 CI 1-20% SBA / CGA   24 CH 0% Independent/ Mod I       Patient left with bed in chair position with all lines intact, call button in reach and nursing notified    Assessment:  Bay Ibarra is a 37 y.o. male with a medical diagnosis of Acute pancreatitis with uninfected necrosis. Pt alert and oriented with good insight into condition and ability to follow commands. Pt noted with decreased motivation to engage in therapy session and with some potential malingering behavior during functional mobility. Pt p/w decreased independence with ADLs, impaired standing balance, and decreased safety awareness. Pt would continue to benefit from skilled OT " services to maximize independence with ADL's/functional mobility.     Rehab identified problem list/impairments: Rehab identified problem list/impairments: weakness, impaired endurance, impaired self care skills, impaired functional mobilty, impaired balance, decreased safety awareness    Rehab potential is good.    Activity tolerance: Good    Discharge recommendations: Discharge Facility/Level Of Care Needs: home     Barriers to discharge: Barriers to Discharge: None    Equipment recommendations: none     GOALS:    Occupational Therapy Goals        Problem: Occupational Therapy Goal    Goal Priority Disciplines Outcome Interventions   Occupational Therapy Goal     OT, PT/OT     Description:  Goals to be met by: 8/14/2017    Patient will increase functional independence with ADLs by performing:    LE Dressing with [place].  Grooming while standing with [place].  Toileting from toilet with [place] for hygiene and clothing management.   Toilet transfer to toilet with [place].                      PLAN:  Patient to be seen 3 x/week to address the above listed problems via therapeutic activities, self-care/home management  Plan of Care expires: 08/31/17  Plan of Care reviewed with: patient         Erika Mao, OT  07/31/2017

## 2017-07-31 NOTE — PLAN OF CARE
Problem: Physical Therapy Goal  Goal: Physical Therapy Goal  Goals to be met by:      Patient will increase functional independence with mobility by performin. Sit to stand transfer with Supervision  2. Gait  x 200 feet with Supervision using no device.   3. Ascend/Descend 6 inch curb step with Supervision using no device.    Outcome: Ongoing (interventions implemented as appropriate)  PT darin completed    Felipe Ni, PT  2017

## 2017-07-31 NOTE — PLAN OF CARE
Problem: Occupational Therapy Goal  Goal: Occupational Therapy Goal  Goals to be met by: 8/14/2017    Patient will increase functional independence with ADLs by performing:    LE Dressing with [place].  Grooming while standing with [place].  Toileting from toilet with [place] for hygiene and clothing management.   Toilet transfer to toilet with [place].    Outcome: Ongoing (interventions implemented as appropriate)  OT Evaluation completed 7/31/2017.

## 2017-07-31 NOTE — ASSESSMENT & PLAN NOTE
- intubated 7/27 for acute respiratory failure with hypoxia, extubated 7/29  - CXR showed pulmonary edema  - currently breathing well on room air

## 2017-07-31 NOTE — ASSESSMENT & PLAN NOTE
- presented with TROY, Cr peaked at 2.5  - now improved to 0.9, pt maintaining good UOP  - US RP showed mildly elevated RIs, a small L renal cyst and ill-defined perinephric fluid likely related to pancreatitis

## 2017-07-31 NOTE — PHYSICIAN QUERY
PT Name: Bay Ibarra  MR #: [id number]    Physician Query Form - Respiratory Condition Clarification      CDS/: Brittany COBB, RN, CCDS               Contact information: [email or web address]    This form is a permanent document in the medical record.    Query Date: July 31, 2017    By submitting this query, we are merely seeking further clarification of documentation. Please utilize your independent clinical judgment when addressing the question(s) below.    The Medical record contains the following:   Indicators   Supporting Clinical Findings Location in Medical Record     x Pulmonary Edema documented  -- CXR with pulmonary edema; possible retrocardiac consolidation   Intensive Care Progress 7/31/17    Wheezing, Productive cough, SOB, CARRION, Use of accessory muscles documented      x Radiology Findings  Increased groundglass opacification suggesting layering of pleural fluid.  There is some increased consolidation in the retrocardiac region on the left as well.  No pneumothorax.  CXR 7/28/17     Hypoxia , Hypoxemia Hypoxic documented       Respiratory Distress documented      RR                  PaO2                  PaCO2                     O2 sat      x Treatment:  Acute respiratory failure with hypoxia -- intubated 7/27 for acute onset respiratory distress with hypoxia        -- lasix x1 yesterday with good UOP; will administer another dose today    Intensive Care Progress 7/31/17        Intensive Care Progress 7/31/17    Supplemental O2:      Oxygen dependence      Other:     Provider, please specify diagnosis or diagnoses associated with above clinical findings.     Doctor, please further specify the acuity of pulmonary edema. Thank you.    Pulmonary Edema (please specify acuity and type)   Acuity    Type   [ ] Acute  [ ] Cardiac (Specify cause) ____________________________   [ ] Chronic   [ ] Non Cardiac (Specify cause) ________________________   [ ] Acute on Chronic [ ]  Unspecified    [x ] Other Respiratory Diagnosis (please specify): ___Volume overload______________________________  [ ] Clinically Undetermined    Please document in your progress notes daily for the duration of treatment, until resolved, and include in your discharge summary.

## 2017-07-31 NOTE — NURSING TRANSFER
Nursing Transfer Note      7/31/2017     Transfer To: ultrasound    Transfer via stretcher    Transfer with cardiac monitoring    Transported by transporter    Medicines sent: IV cefepime infusing    Chart send with patient: No    Notified: none

## 2017-07-31 NOTE — PT/OT/SLP EVAL
Physical Therapy  Evaluation/Treatment    Bay Ibarra   MRN: [id number]   Admitting Diagnosis: Acute pancreatitis with uninfected necrosis    PT Received On: 07/31/17  PT Start Time: 0735     PT Stop Time: 0801    PT Total Time (min): 26 min       Billable Minutes:  Evaluation 15 and Gait [id number]    Diagnosis: Acute pancreatitis with uninfected necrosis      Past Medical History:   Diagnosis Date    Acute pancreatitis     Diabetes mellitus type I     Gout     Hx of acute pancreatitis 3/3/2017    Hyperlipidemia     Hypertension     Obesity (BMI 35.0-39.9 without comorbidity) 6/5/2015    Sleep apnea 1/22/2016      History reviewed. No pertinent surgical history.    General Precautions: Standard, fall  Orthopedic Precautions: N/A   Braces:         Do you have any cultural, spiritual, Zoroastrian conflicts, given your current situation?: Mosque    Patient History:  Lives With: alone  Living Arrangements: house  Home Accessibility: stairs to enter home  Home Layout: Able to live on 1st floor  Number of Stairs to Enter Home: 1  Transportation Available: family or friend will provide  Living Environment Comment: Pt lives alone but reports he has family who can assist if needed.  he has no DME and is independent and works for [employer]  Equipment Currently Used at Home: none  DME owned (not currently used): none    Previous Level of Function:  Ambulation Skills: independent  Transfer Skills: independent  ADL Skills: independent    Subjective:  Communicated with RN prior to session.    Chief Complaint: abdominal pain  Patient goals: to get better and have less pain    Pain/Comfort  Pain Rating 1: 5/10  Location 1: abdomen  Pain Addressed 1: Reposition  Pain Rating Post-Intervention 1: 5/10      Objective:   Patient found with: peripheral IV, telemetry     Cognitive Exam:  Oriented to: Person, Place, Time and Situation    Follows Commands/attention: Follows multistep  commands  Communication:  clear/fluent  Safety awareness/insight to disability: intact    Physical Exam:  Postural examination/scapula alignment: No postural abnormalities identified    Skin integrity: Visible skin intact  Edema: None noted     Sensation:   Intact    Lower Extremity Range of Motion:  Right Lower Extremity: WFL  Left Lower Extremity: WFL    Lower Extremity Strength:  Right Lower Extremity: WFL  Left Lower Extremity: WFL     Fine motor coordination:  Intact    Gross motor coordination: WFL    Functional Mobility:  Bed Mobility:  Supine to Sit: Supervision    Transfers:  Sit <> Stand Assistance: Supervision  Sit <> Stand Assistive Device: No Assistive Device    Gait:   Gait Distance: 140ft.  CGA.  pt reaching for rails in the yi and at one point stopping and just leaning on the wall due to abdominal pain.  pt with slighlty narrowed base of support and occassional instability that seemed largely to be as a result of his increasd pain  Assistance 1: Contact Guard Assistance  Gait Assistive Device: No device  Gait Pattern: reciprocal  Gait Deviation(s): decreased angelic, decreased step length, decreased velocity of limb motion    Balance:   Static Sit: FAIR+: Able to take MINIMAL challenges from all directions  Dynamic Sit: FAIR+: Maintains balance through MINIMAL excursions of active trunk motion  Static Stand: FAIR: Maintains without assist but unable to take challenges  Dynamic stand: FAIR: Needs CONTACT GUARD during gait    Therapeutic Activities and Exercises:  Patient educated extensively on benefit of OOB activity and mobility.  Pt educated on call bell system and need for contacting nurse for assit    AM-PAC 6 CLICK MOBILITY  How much help from another person does this patient currently need?   1 = Unable, Total/Dependent Assistance  2 = A lot, Maximum/Moderate Assistance  3 = A little, Minimum/Contact Guard/Supervision  4 = None, Modified [place]/Independent    Turning over in bed (including adjusting bedclothes,  sheets and blankets)?: 3  Sitting down on and standing up from a chair with arms (e.g., wheelchair, bedside commode, etc.): 3  Moving from lying on back to sitting on the side of the bed?: 3  Moving to and from a bed to a chair (including a wheelchair)?: 3  Need to walk in hospital room?: 3  Climbing 3-5 steps with a railing?: 3  Total Score: 18     AM-PAC Raw Score CMS G-Code Modifier Level of Impairment Assistance   6 % Total / Unable   7 - 9 CM 80 - 100% Maximal Assist   10 - 14 CL 60 - 80% Moderate Assist   15 - 19 CK 40 - 60% Moderate Assist   20 - 22 CJ 20 - 40% Minimal Assist   23 CI 1-20% SBA / CGA   24 CH 0% Independent/ Mod I     Patient left up in chair with all lines intact and call button in reach.    Assessment:   Bay Ibarra is a 37 y.o. male with a medical diagnosis of Acute pancreatitis with uninfected necrosis and presents with gait and mobility limited largely by pain complaints.  He will benefit from mobiltiy and time OOB with nursing as well as PT at a low frequency to ensure that balance normalizes.  The majority of his deficits with mobiltiy seem more related to his abdominal pain than to his actual LE strength or balance.  .    Rehab identified problem list/impairments: Rehab identified problem list/impairments: weakness, impaired balance, impaired endurance, impaired functional mobilty, gait instability, decreased lower extremity function    Rehab potential is good.    Activity tolerance: Fair    Discharge recommendations: Discharge Facility/Level Of Care Needs: home     Barriers to discharge: Barriers to Discharge: None    Equipment recommendations: Equipment Needed After Discharge: none     GOALS:    Physical Therapy Goals        Problem: Physical Therapy Goal    Goal Priority Disciplines Outcome Goal Variances Interventions   Physical Therapy Goal     PT/OT, PT Ongoing (interventions implemented as appropriate)     Description:  Goals to be met by: 8/7     Patient will  increase functional independence with mobility by performin. Sit to stand transfer with Supervision  2. Gait  x 200 feet with Supervision using no device.   3. Ascend/Descend 6 inch curb step with Supervision using no device.                      PLAN:    Patient to be seen 3 x/week to address the above listed problems via gait training, therapeutic activities, therapeutic exercises  Plan of Care expires: 17  Plan of Care reviewed with: patient          Felipe Jefferyers, PT  2017

## 2017-07-31 NOTE — PHARMACY MED REC
"Admission Medication Reconciliation - Pharmacy Consult Note    The home medication history was taken by Steph John Pharmacy Tech.  Based on information gathered and subsequent review by the clinical pharmacist, the items below may need attention.    You may go to "Admission" then "Reconcile Home Medications" tabs to review and/or act upon these items.      No issues noted with the medication reconciliation.      Potential issues to be addressed PRIOR TO DISCHARGE  o Duplicate therapy:  o Patient has bottles for gemfibrozil and fenofibrate    Christin Smith, PharmD  [id number]      Patient's prior to admission medication regimen was as follows:  Medication Sig    allopurinol (ZYLOPRIM) 300 MG tablet TAKE ONE TABLET BY MOUTH ONCE DAILY    amlodipine (NORVASC) 10 MG tablet Take 1 tablet (10 mg total) by mouth once daily.    aspirin (ECOTRIN) 81 MG EC tablet Take 81 mg by mouth once daily.    CONTOUR TEST STRIPS Strp     ezetimibe (ZETIA) 10 mg tablet Take 1 tablet (10 mg total) by mouth once daily.    fenofibrate 160 MG Tab Take 1 tablet (160 mg total) by mouth once daily.    gemfibrozil (LOPID) 600 MG tablet Take 1 tablet (600 mg total) by mouth 2 (two) times daily before meals.    insulin aspart (NOVOLOG) 100 unit/mL injection See sliding scale  TID    insulin glargine (LANTUS SOLOSTAR) 100 unit/mL (3 mL) InPn pen 42 units in am ac   And 52 units at night (Patient taking differently: Inject 50 Units into the skin 2 (two) times daily. )    insulin syringe-needle U-100 29 gauge x 1/2" Syrg Inject 1 (once) per day    omega-3 acid ethyl esters (LOVAZA) 1 gram capsule Take 2 capsules (2 g total) by mouth 2 (two) times daily.    pregabalin (LYRICA) 150 MG capsule Take 1 capsule (150 mg total) by mouth 2 (two) times daily.         Please add appropriate    SmartPhrase below:        "

## 2017-07-31 NOTE — ASSESSMENT & PLAN NOTE
- hospital course complicated by ileus, reportedly improved after receiving enemas  - 6 recorded bowel movements on 7/30; last BM was today  - will replace NG for significant abdominal distention and discomfort  - KUB shows no evidence to suggest obstruction

## 2017-07-31 NOTE — SUBJECTIVE & OBJECTIVE
Interval History: NAEON. Pt was started on a clear liquid diet prior to stepdown. This morning, he continues to have abdominal pain with distention and bloating of is abdomen. He reports significant discomfort related to the bloating and continues to dry heave. Breathing well currently.     Review of Systems  Objective:     Vital Signs (Most Recent):  Temp: 98.6 °F (37 °C) (07/31/17 1124)  Pulse: 90 (07/31/17 1124)  Resp: 16 (07/31/17 1124)  BP: (!) 130/56 (07/31/17 1124)  SpO2: (!) 94 % (07/31/17 1124) Vital Signs (24h Range):  Temp:  [97 °F (36.1 °C)-99 °F (37.2 °C)] 98.6 °F (37 °C)  Pulse:  [87-98] 90  Resp:  [16-21] 16  SpO2:  [90 %-98 %] 94 %  BP: (130-155)/(56-79) 130/56     Weight: 112.1 kg (247 lb 2.2 oz)  Body mass index is 35.46 kg/m².    Intake/Output Summary (Last 24 hours) at 07/31/17 1318  Last data filed at 07/31/17 1229   Gross per 24 hour   Intake             2290 ml   Output             2450 ml   Net             -160 ml      Physical Exam   Constitutional: He is oriented to person, place, and time. No distress.   Eyes: EOM are normal. Right eye exhibits no discharge. Left eye exhibits no discharge.   Neck: Normal range of motion.   Cardiovascular: Normal rate, regular rhythm, normal heart sounds and intact distal pulses.    No murmur heard.  Pulmonary/Chest: Effort normal. He has no wheezes. He has no rales.   Abdominal: He exhibits distension. He exhibits no mass. There is tenderness (diffuse). There is no guarding.   Normal bowel sounds as well as high-pitched tinkles appreciated   Musculoskeletal: He exhibits no edema or deformity.   Neurological: He is alert and oriented to person, place, and time.   Skin: Skin is warm and dry. He is not diaphoretic.       Significant Labs:   CBC:   Recent Labs  Lab 07/30/17  0450 07/31/17  0430   WBC 13.80* 9.49   HGB 13.4* 11.8*   HCT 40.6 36.6*    192     CMP:   Recent Labs  Lab 07/30/17  0450 07/31/17  0430    139   K 5.4* 4.1    101    CO2 17* 23   * 242*   BUN 35* 26*   CREATININE 1.3 0.9   CALCIUM 9.5 9.0   PROT 7.0 5.8*   ALBUMIN 2.2* 2.1*   BILITOT 0.8 0.6   ALKPHOS 85 90   * 80*   * 323*   ANIONGAP 20* 15   EGFRNONAA >60.0 >60.0     Lipase: No results for input(s): LIPASE in the last 48 hours.  POCT Glucose:   Recent Labs  Lab 07/30/17  1917 07/30/17  2137 07/31/17  0939   POCTGLUCOSE 261* 329* 265*     All pertinent labs within the past 24 hours have been reviewed.    Significant Imaging: I have reviewed all pertinent imaging results/findings within the past 24 hours.

## 2017-07-31 NOTE — PROGRESS NOTES
"[facility] Hospital Medicine  Progress Note    Patient Name: Bay Ibarra  MRN: [id number]  Patient Class: IP- Inpatient   Admission Date: 7/25/2017  Length of Stay: 6 days  Attending Physician: Connor Nobles MD  Primary Care Provider: José Luis Singleton MD    [facility] Medicine Team: [facility] HOSP MED 1 Marco Medel MD    Subjective:     Principal Problem:Acute pancreatitis with uninfected necrosis    HPI:  Mr. Ibarra is a 36 y/o gentleman with PMHx HTN, HLD, Hypertriglyceridemia, DM I, gout, and pancreatitis (apirl 2017) who was transferred from [place] with necrotizing pancreatitis and DKA. The patient states he started feeling "ill and weak" 2 days ago, and yesterday morning he woke up with severe epigastric abdominal pain and had multiple episodes of nausea and vomiting with poor PO intake. He endorsed starting gemfibrozil recently for HLD and regular compliance with his insulin glargine BID. He presented to [place] with Lipase 4000, Triglycerides 1500, lactate 10 and elevated anion gap. He received 3 L of ns and IV insulin at [place] before being transported to [facility] for higher level of care. Upon initial assessment in the ED, the patient was lethargic, but arousable, , /63, and SpO2 98% on 2L nasal cannula. He denied having any fevers, chills, CP, cough, diarrhea or dysuria.  He was started on 2L of lactated ringers, IV insulin and admitted to the MICU.     Hospital Course:  7/27: Having pain and nausea without vomiting overnight, controlled with morphine 4 x 4 IV. Patient also developed acute respiratory failure requiring intubation. Elevated bladder pressures overnight improving with treatment of ileus  7/28: NG tube for elevated bladder pressures.   7/29: Extubated. Bladder pressures resolved with NG. NG removed. Patient with continued NPO but dry heaving and nauseous.   7/30: Dry heaving is much improved. Got some NG decompression last night but " now removed. Small BMs overnight but not substantial. Did not sleep last night due to abdominal discomfort.    Interval History: NAEON. Pt was started on a clear liquid diet prior to stepdown. This morning, he continues to have abdominal pain with distention and bloating of is abdomen. He reports significant discomfort related to the bloating and continues to dry heave. Breathing well currently.     Review of Systems  Objective:     Vital Signs (Most Recent):  Temp: 98.6 °F (37 °C) (07/31/17 1124)  Pulse: 90 (07/31/17 1124)  Resp: 16 (07/31/17 1124)  BP: (!) 130/56 (07/31/17 1124)  SpO2: (!) 94 % (07/31/17 1124) Vital Signs (24h Range):  Temp:  [97 °F (36.1 °C)-99 °F (37.2 °C)] 98.6 °F (37 °C)  Pulse:  [87-98] 90  Resp:  [16-21] 16  SpO2:  [90 %-98 %] 94 %  BP: (130-155)/(56-79) 130/56     Weight: 112.1 kg (247 lb 2.2 oz)  Body mass index is 35.46 kg/m².    Intake/Output Summary (Last 24 hours) at 07/31/17 1318  Last data filed at 07/31/17 1229   Gross per 24 hour   Intake             2290 ml   Output             2450 ml   Net             -160 ml      Physical Exam   Constitutional: He is oriented to person, place, and time. No distress.   Eyes: EOM are normal. Right eye exhibits no discharge. Left eye exhibits no discharge.   Neck: Normal range of motion.   Cardiovascular: Normal rate, regular rhythm, normal heart sounds and intact distal pulses.    No murmur heard.  Pulmonary/Chest: Effort normal. He has no wheezes. He has no rales.   Abdominal: He exhibits distension. He exhibits no mass. There is tenderness (diffuse). There is no guarding.   Normal bowel sounds as well as high-pitched tinkles appreciated   Musculoskeletal: He exhibits no edema or deformity.   Neurological: He is alert and oriented to person, place, and time.   Skin: Skin is warm and dry. He is not diaphoretic.       Significant Labs:   CBC:   Recent Labs  Lab 07/30/17  0450 07/31/17  0430   WBC 13.80* 9.49   HGB 13.4* 11.8*   HCT 40.6 36.6*     192     CMP:   Recent Labs  Lab 07/30/17  0450 07/31/17  0430    139   K 5.4* 4.1    101   CO2 17* 23   * 242*   BUN 35* 26*   CREATININE 1.3 0.9   CALCIUM 9.5 9.0   PROT 7.0 5.8*   ALBUMIN 2.2* 2.1*   BILITOT 0.8 0.6   ALKPHOS 85 90   * 80*   * 323*   ANIONGAP 20* 15   EGFRNONAA >60.0 >60.0     Lipase: No results for input(s): LIPASE in the last 48 hours.  POCT Glucose:   Recent Labs  Lab 07/30/17  1917 07/30/17  2137 07/31/17  0939   POCTGLUCOSE 261* 329* 265*     All pertinent labs within the past 24 hours have been reviewed.    Significant Imaging: I have reviewed all pertinent imaging results/findings within the past 24 hours.    Assessment/Plan:      * Acute pancreatitis with uninfected necrosis    - 2/2 hypertriglyceridemia, labs on admission: Lipase 4000, triglycerides 1500, lactate 10.4 (all improved since admission)  - CT showed extensive acute pancreatitis. The pancreas is hypoperfused which may be due to an edematous state however cannot exclude vascular compromise  - currently on cefepime and flagyl  - repeating US abdomen to eval for gallstones (last study limited by overlying gas and inflammation)  - replacing NG tube for decompression  - NPO  - continuing supportive care, famotidine, lyte replacement, pain and nausea control        Ileus    - hospital course complicated by ileus, reportedly improved after receiving enemas  - 6 recorded bowel movements on 7/30; last BM was today  - will replace NG for significant abdominal distention and discomfort  - KUB shows no evidence to suggest obstruction        Diabetes mellitus type I    - increased detemir to 20 BID for hyperglycemia  - SSI  - POCT glucose checks        Acute respiratory failure with hypoxia    - intubated 7/27 for acute respiratory failure with hypoxia, extubated 7/29  - CXR showed pulmonary edema  - currently breathing well on room air        TROY (acute kidney injury)    - presented with TROY, Cr  peaked at 2.5  - now improved to 0.9, pt maintaining good UOP  - US RP showed mildly elevated RIs, a small L renal cyst and ill-defined perinephric fluid likely related to pancreatitis          VTE Risk Mitigation         Ordered     enoxaparin injection 40 mg  Daily     Route:  Subcutaneous        07/28/17 0805     Medium Risk of VTE  Once      07/25/17 2244     Place LEI hose  Until discontinued      07/25/17 2244     Place sequential compression device  Until discontinued      07/25/17 2244          Marco Medel MD  Department of Hospital Medicine   [facility]

## 2017-07-31 NOTE — ASSESSMENT & PLAN NOTE
- 2/2 hypertriglyceridemia, labs on admission: Lipase 4000, triglycerides 1500, lactate 10.4 (all improved since admission)  - CT showed extensive acute pancreatitis. The pancreas is hypoperfused which may be due to an edematous state however cannot exclude vascular compromise  - currently on cefepime and flagyl  - repeating US abdomen to eval for gallstones (last study limited by overlying gas and inflammation)  - replacing NG tube for decompression  - NPO  - continuing supportive care, famotidine, lyte replacement, pain and nausea control

## 2017-08-01 PROBLEM — R19.7 DIARRHEA: Status: ACTIVE | Noted: 2017-08-01

## 2017-08-01 LAB
ALBUMIN SERPL BCP-MCNC: 2 G/DL
ALBUMIN SERPL BCP-MCNC: 2.1 G/DL
ALP SERPL-CCNC: 100 U/L
ALP SERPL-CCNC: 87 U/L
ALT SERPL W/O P-5'-P-CCNC: 169 U/L
ALT SERPL W/O P-5'-P-CCNC: 200 U/L
ANION GAP SERPL CALC-SCNC: 12 MMOL/L
ANION GAP SERPL CALC-SCNC: 15 MMOL/L
ANISOCYTOSIS BLD QL SMEAR: SLIGHT
AST SERPL-CCNC: 40 U/L
AST SERPL-CCNC: 50 U/L
BASOPHILS NFR BLD: 0 %
BILIRUB SERPL-MCNC: 0.6 MG/DL
BILIRUB SERPL-MCNC: 0.6 MG/DL
BUN SERPL-MCNC: 18 MG/DL
BUN SERPL-MCNC: 19 MG/DL
C DIFF GDH STL QL: NEGATIVE
C DIFF TOX A+B STL QL IA: NEGATIVE
CALCIUM SERPL-MCNC: 8.4 MG/DL
CALCIUM SERPL-MCNC: 8.7 MG/DL
CHLORIDE SERPL-SCNC: 97 MMOL/L
CHLORIDE SERPL-SCNC: 99 MMOL/L
CO2 SERPL-SCNC: 29 MMOL/L
CO2 SERPL-SCNC: 32 MMOL/L
CREAT SERPL-MCNC: 0.8 MG/DL
CREAT SERPL-MCNC: 0.8 MG/DL
DIFFERENTIAL METHOD: ABNORMAL
EOSINOPHIL NFR BLD: 1 %
ERYTHROCYTE [DISTWIDTH] IN BLOOD BY AUTOMATED COUNT: 15.1 %
EST. GFR  (AFRICAN AMERICAN): >60 ML/MIN/1.73 M^2
EST. GFR  (AFRICAN AMERICAN): >60 ML/MIN/1.73 M^2
EST. GFR  (NON AFRICAN AMERICAN): >60 ML/MIN/1.73 M^2
EST. GFR  (NON AFRICAN AMERICAN): >60 ML/MIN/1.73 M^2
GLUCOSE SERPL-MCNC: 188 MG/DL
GLUCOSE SERPL-MCNC: 193 MG/DL
HCT VFR BLD AUTO: 40 %
HGB BLD-MCNC: 12.6 G/DL
HYPOCHROMIA BLD QL SMEAR: ABNORMAL
LYMPHOCYTES NFR BLD: 3 %
MAGNESIUM SERPL-MCNC: 1.6 MG/DL
MCH RBC QN AUTO: 28.1 PG
MCHC RBC AUTO-ENTMCNC: 31.5 G/DL
MCV RBC AUTO: 89 FL
MONOCYTES NFR BLD: 6 %
NEUTROPHILS NFR BLD: 90 %
OVALOCYTES BLD QL SMEAR: ABNORMAL
PHOSPHATE SERPL-MCNC: 1.7 MG/DL
PLATELET # BLD AUTO: 192 K/UL
PLATELET BLD QL SMEAR: ABNORMAL
PMV BLD AUTO: 11.3 FL
POCT GLUCOSE: 150 MG/DL (ref 70–110)
POCT GLUCOSE: 195 MG/DL (ref 70–110)
POCT GLUCOSE: 206 MG/DL (ref 70–110)
POCT GLUCOSE: 207 MG/DL (ref 70–110)
POCT GLUCOSE: 214 MG/DL (ref 70–110)
POIKILOCYTOSIS BLD QL SMEAR: SLIGHT
POLYCHROMASIA BLD QL SMEAR: ABNORMAL
POTASSIUM SERPL-SCNC: 3.2 MMOL/L
POTASSIUM SERPL-SCNC: 3.5 MMOL/L
PROT SERPL-MCNC: 5.9 G/DL
PROT SERPL-MCNC: 6.1 G/DL
RBC # BLD AUTO: 4.48 M/UL
SODIUM SERPL-SCNC: 141 MMOL/L
SODIUM SERPL-SCNC: 143 MMOL/L
TOXIC GRANULES BLD QL SMEAR: PRESENT
WBC # BLD AUTO: 11.88 K/UL

## 2017-08-01 PROCEDURE — 25000003 PHARM REV CODE 250: Performed by: INTERNAL MEDICINE

## 2017-08-01 PROCEDURE — 25000003 PHARM REV CODE 250: Performed by: HOSPITALIST

## 2017-08-01 PROCEDURE — 87449 NOS EACH ORGANISM AG IA: CPT

## 2017-08-01 PROCEDURE — S0030 INJECTION, METRONIDAZOLE: HCPCS | Performed by: INTERNAL MEDICINE

## 2017-08-01 PROCEDURE — 36415 COLL VENOUS BLD VENIPUNCTURE: CPT

## 2017-08-01 PROCEDURE — 97803 MED NUTRITION INDIV SUBSEQ: CPT

## 2017-08-01 PROCEDURE — 63600175 PHARM REV CODE 636 W HCPCS: Performed by: INTERNAL MEDICINE

## 2017-08-01 PROCEDURE — 63600175 PHARM REV CODE 636 W HCPCS: Performed by: STUDENT IN AN ORGANIZED HEALTH CARE EDUCATION/TRAINING PROGRAM

## 2017-08-01 PROCEDURE — 85007 BL SMEAR W/DIFF WBC COUNT: CPT

## 2017-08-01 PROCEDURE — 80053 COMPREHEN METABOLIC PANEL: CPT

## 2017-08-01 PROCEDURE — 84100 ASSAY OF PHOSPHORUS: CPT

## 2017-08-01 PROCEDURE — S0028 INJECTION, FAMOTIDINE, 20 MG: HCPCS | Performed by: HOSPITALIST

## 2017-08-01 PROCEDURE — 63600175 PHARM REV CODE 636 W HCPCS: Performed by: HOSPITALIST

## 2017-08-01 PROCEDURE — A4216 STERILE WATER/SALINE, 10 ML: HCPCS | Performed by: STUDENT IN AN ORGANIZED HEALTH CARE EDUCATION/TRAINING PROGRAM

## 2017-08-01 PROCEDURE — 99232 SBSQ HOSP IP/OBS MODERATE 35: CPT | Mod: ,,, | Performed by: HOSPITALIST

## 2017-08-01 PROCEDURE — 83735 ASSAY OF MAGNESIUM: CPT

## 2017-08-01 PROCEDURE — 85027 COMPLETE CBC AUTOMATED: CPT

## 2017-08-01 PROCEDURE — 25000003 PHARM REV CODE 250: Performed by: STUDENT IN AN ORGANIZED HEALTH CARE EDUCATION/TRAINING PROGRAM

## 2017-08-01 PROCEDURE — 20600001 HC STEP DOWN PRIVATE ROOM

## 2017-08-01 RX ADMIN — CEFEPIME HYDROCHLORIDE 2 G: 2 INJECTION, SOLUTION INTRAVENOUS at 07:08

## 2017-08-01 RX ADMIN — MORPHINE SULFATE 2 MG: 2 INJECTION, SOLUTION INTRAMUSCULAR; INTRAVENOUS at 03:08

## 2017-08-01 RX ADMIN — Medication 3 ML: at 02:08

## 2017-08-01 RX ADMIN — METRONIDAZOLE 500 MG: 500 INJECTION, SOLUTION INTRAVENOUS at 01:08

## 2017-08-01 RX ADMIN — ONDANSETRON 8 MG: 2 INJECTION INTRAMUSCULAR; INTRAVENOUS at 05:08

## 2017-08-01 RX ADMIN — FAMOTIDINE 20 MG: 10 INJECTION, SOLUTION INTRAVENOUS at 08:08

## 2017-08-01 RX ADMIN — Medication 3 ML: at 09:08

## 2017-08-01 RX ADMIN — INSULIN ASPART 4 UNITS: 100 INJECTION, SOLUTION INTRAVENOUS; SUBCUTANEOUS at 05:08

## 2017-08-01 RX ADMIN — CEFEPIME HYDROCHLORIDE 2 G: 2 INJECTION, SOLUTION INTRAVENOUS at 03:08

## 2017-08-01 RX ADMIN — ONDANSETRON 8 MG: 2 INJECTION INTRAMUSCULAR; INTRAVENOUS at 01:08

## 2017-08-01 RX ADMIN — MORPHINE SULFATE 2 MG: 2 INJECTION, SOLUTION INTRAMUSCULAR; INTRAVENOUS at 09:08

## 2017-08-01 RX ADMIN — METRONIDAZOLE 500 MG: 500 INJECTION, SOLUTION INTRAVENOUS at 05:08

## 2017-08-01 RX ADMIN — ONDANSETRON 8 MG: 2 INJECTION INTRAMUSCULAR; INTRAVENOUS at 09:08

## 2017-08-01 RX ADMIN — INSULIN ASPART 4 UNITS: 100 INJECTION, SOLUTION INTRAVENOUS; SUBCUTANEOUS at 08:08

## 2017-08-01 RX ADMIN — METRONIDAZOLE 500 MG: 500 INJECTION, SOLUTION INTRAVENOUS at 09:08

## 2017-08-01 RX ADMIN — INSULIN ASPART 4 UNITS: 100 INJECTION, SOLUTION INTRAVENOUS; SUBCUTANEOUS at 01:08

## 2017-08-01 RX ADMIN — POTASSIUM PHOSPHATE, MONOBASIC AND POTASSIUM PHOSPHATE, DIBASIC 30 MMOL: 224; 236 INJECTION, SOLUTION, CONCENTRATE INTRAVENOUS at 08:08

## 2017-08-01 RX ADMIN — INSULIN ASPART 2 UNITS: 100 INJECTION, SOLUTION INTRAVENOUS; SUBCUTANEOUS at 03:08

## 2017-08-01 RX ADMIN — FAMOTIDINE 20 MG: 10 INJECTION, SOLUTION INTRAVENOUS at 09:08

## 2017-08-01 RX ADMIN — ENOXAPARIN SODIUM 40 MG: 100 INJECTION SUBCUTANEOUS at 04:08

## 2017-08-01 NOTE — ASSESSMENT & PLAN NOTE
- 2/2 hypertriglyceridemia, labs on admission: Lipase 4000, triglycerides 1500, lactate 10.4 (all improved since admission)  - CT showed extensive acute pancreatitis. The pancreas is hypoperfused which may be due to an edematous state however cannot exclude vascular compromise  - currently on cefepime and flagyl  - repeat US abdomen shows hepatosplenomegaly with hepatic steatosis, edematous pancreas, no e/o gallstones  - replaced NG tube for decompression  - NPO  - continuing supportive care, famotidine, lyte replacement, pain and nausea control

## 2017-08-01 NOTE — SUBJECTIVE & OBJECTIVE
Interval History: NAEON. Pt reports improvement in abdominal symptoms since replacing NG tube. This morning denies pain or nausea. Continues to have loose stools.    Review of Systems  Objective:     Vital Signs (Most Recent):  Temp: 97.9 °F (36.6 °C) (08/01/17 1100)  Pulse: 103 (08/01/17 1100)  Resp: 16 (08/01/17 1100)  BP: (!) 140/79 (08/01/17 1100)  SpO2: (!) 93 % (08/01/17 1100) Vital Signs (24h Range):  Temp:  [97.6 °F (36.4 °C)-98.8 °F (37.1 °C)] 97.9 °F (36.6 °C)  Pulse:  [] 103  Resp:  [16-18] 16  SpO2:  [92 %-96 %] 93 %  BP: (140-152)/(67-79) 140/79     Weight: 112.1 kg (247 lb 2.2 oz)  Body mass index is 35.46 kg/m².    Intake/Output Summary (Last 24 hours) at 08/01/17 1426  Last data filed at 08/01/17 0626   Gross per 24 hour   Intake           409.17 ml   Output             1100 ml   Net          -690.83 ml      Physical Exam   Constitutional: He is oriented to person, place, and time. No distress.   Eyes: EOM are normal. Right eye exhibits no discharge. Left eye exhibits no discharge.   Neck: Normal range of motion.   Cardiovascular: Normal rate, regular rhythm, normal heart sounds and intact distal pulses.    No murmur heard.  Pulmonary/Chest: Effort normal. He has no wheezes. He has no rales.   Abdominal: He exhibits distension. He exhibits no mass. There is tenderness (diffuse). There is no guarding.   Hypoactive bowel sounds   Musculoskeletal: He exhibits no edema or deformity.   Neurological: He is alert and oriented to person, place, and time.   Skin: Skin is warm and dry. He is not diaphoretic.       Significant Labs:   CBC:   Recent Labs  Lab 07/31/17  0430 08/01/17  0604   WBC 9.49 11.88   HGB 11.8* 12.6*   HCT 36.6* 40.0    192     CMP:   Recent Labs  Lab 07/31/17  0430 07/31/17  1546 08/01/17  0603    139 141   K 4.1 3.6 3.2*    97 97   CO2 23 29 29   * 228* 188*   BUN 26* 22* 19   CREATININE 0.9 0.8 0.8   CALCIUM 9.0 8.8 8.4*   PROT 5.8* 5.9* 5.9*   ALBUMIN  2.1* 2.1* 2.0*   BILITOT 0.6 0.6 0.6   ALKPHOS 90 93 100   AST 80* 59* 50*   * 264* 200*   ANIONGAP 15 13 15   EGFRNONAA >60.0 >60.0 >60.0     POCT Glucose:   Recent Labs  Lab 08/01/17  0352 08/01/17  0842 08/01/17  1114   POCTGLUCOSE 195* 207* 214*     All pertinent labs within the past 24 hours have been reviewed.    Significant Imaging: I have reviewed all pertinent imaging results/findings within the past 24 hours.

## 2017-08-01 NOTE — ASSESSMENT & PLAN NOTE
- hospital course complicated by ileus, reportedly improved after receiving enemas  - will replace NG for significant abdominal distention and discomfort  - KUB shows no evidence to suggest obstruction  - now having loose stools, will check c diff

## 2017-08-01 NOTE — ASSESSMENT & PLAN NOTE
- presented with TROY, Cr peaked at 2.5  - now improved to 0.8, pt maintaining good UOP  - US RP showed mildly elevated RIs, a small L renal cyst and ill-defined perinephric fluid likely related to pancreatitis

## 2017-08-01 NOTE — PLAN OF CARE
Problem: Patient Care Overview  Goal: Plan of Care Review  Outcome: Ongoing (interventions implemented as appropriate)  POC reviewed with patient who verbalized understanding. Pt NG tube to LIWS, output has been 450 ml during the night shift of green drainage. Pt being checked Q 4 for blood glucose, using coverage when needed. Pt's abdomen remaining distended, but pt states that there is a lot less pressure in stomach. Pt had two liquid bowel movements during the night shift, urine output adequate. Pain being controlled with PRN pain medication. Pt remained free from falls throughout the shift VSS. No distress noted, will continue to monitor.

## 2017-08-01 NOTE — PROGRESS NOTES
"[facility] Hospital Medicine  Progress Note    Patient Name: Bay Ibarra  MRN: [id number]  Patient Class: IP- Inpatient   Admission Date: 7/25/2017  Length of Stay: 7 days  Attending Physician: Connor Nobles MD  Primary Care Provider: José Luis Singleton MD    [facility] Medicine Team: [facility]OSP MED 1 Marco Medel MD    Subjective:     Principal Problem:Acute pancreatitis with uninfected necrosis    HPI:  Mr. Ibarra is a 38 y/o gentleman with PMHx HTN, HLD, Hypertriglyceridemia, DM I, gout, and pancreatitis (apirl 2017) who was transferred from [place] with necrotizing pancreatitis and DKA. The patient states he started feeling "ill and weak" 2 days ago, and yesterday morning he woke up with severe epigastric abdominal pain and had multiple episodes of nausea and vomiting with poor PO intake. He endorsed starting gemfibrozil recently for HLD and regular compliance with his insulin glargine BID. He presented to [place] with Lipase 4000, Triglycerides 1500, lactate 10 and elevated anion gap. He received 3 L of ns and IV insulin at [place] before being transported to [facility] for higher level of care. Upon initial assessment in the ED, the patient was lethargic, but arousable, , /63, and SpO2 98% on 2L nasal cannula. He denied having any fevers, chills, CP, cough, diarrhea or dysuria.  He was started on 2L of lactated ringers, IV insulin and admitted to the MICU.     Hospital Course:  7/27: Having pain and nausea without vomiting overnight, controlled with morphine 4 x 4 IV. Patient also developed acute respiratory failure requiring intubation. Elevated bladder pressures overnight improving with treatment of ileus  7/28: NG tube for elevated bladder pressures.   7/29: Extubated. Bladder pressures resolved with NG. NG removed. Patient with continued NPO but dry heaving and nauseous.   7/30: Dry heaving is much improved. Got some NG decompression last night but " now removed. Small BMs overnight but not substantial. Did not sleep last night due to abdominal discomfort.  7/31: Pt continued to have abdominal pain and bloating, so NG tube was replaced. US abd showed hepatosplenomegaly with hepatic steatosis, edematous pancreas, and no evidence of gallstones.      Interval History: NAEON. Pt reports improvement in abdominal symptoms since replacing NG tube. This morning denies pain or nausea. Continues to have loose stools.    Review of Systems  Objective:     Vital Signs (Most Recent):  Temp: 97.9 °F (36.6 °C) (08/01/17 1100)  Pulse: 103 (08/01/17 1100)  Resp: 16 (08/01/17 1100)  BP: (!) 140/79 (08/01/17 1100)  SpO2: (!) 93 % (08/01/17 1100) Vital Signs (24h Range):  Temp:  [97.6 °F (36.4 °C)-98.8 °F (37.1 °C)] 97.9 °F (36.6 °C)  Pulse:  [] 103  Resp:  [16-18] 16  SpO2:  [92 %-96 %] 93 %  BP: (140-152)/(67-79) 140/79     Weight: 112.1 kg (247 lb 2.2 oz)  Body mass index is 35.46 kg/m².    Intake/Output Summary (Last 24 hours) at 08/01/17 1426  Last data filed at 08/01/17 0626   Gross per 24 hour   Intake           409.17 ml   Output             1100 ml   Net          -690.83 ml      Physical Exam   Constitutional: He is oriented to person, place, and time. No distress.   Eyes: EOM are normal. Right eye exhibits no discharge. Left eye exhibits no discharge.   Neck: Normal range of motion.   Cardiovascular: Normal rate, regular rhythm, normal heart sounds and intact distal pulses.    No murmur heard.  Pulmonary/Chest: Effort normal. He has no wheezes. He has no rales.   Abdominal: He exhibits distension. He exhibits no mass. There is tenderness (diffuse). There is no guarding.   Hypoactive bowel sounds   Musculoskeletal: He exhibits no edema or deformity.   Neurological: He is alert and oriented to person, place, and time.   Skin: Skin is warm and dry. He is not diaphoretic.       Significant Labs:   CBC:   Recent Labs  Lab 07/31/17  0430 08/01/17  0604   WBC 9.49 11.88    HGB 11.8* 12.6*   HCT 36.6* 40.0    192     CMP:   Recent Labs  Lab 07/31/17  0430 07/31/17  1546 08/01/17  0603    139 141   K 4.1 3.6 3.2*    97 97   CO2 23 29 29   * 228* 188*   BUN 26* 22* 19   CREATININE 0.9 0.8 0.8   CALCIUM 9.0 8.8 8.4*   PROT 5.8* 5.9* 5.9*   ALBUMIN 2.1* 2.1* 2.0*   BILITOT 0.6 0.6 0.6   ALKPHOS 90 93 100   AST 80* 59* 50*   * 264* 200*   ANIONGAP 15 13 15   EGFRNONAA >60.0 >60.0 >60.0     POCT Glucose:   Recent Labs  Lab 08/01/17  0352 08/01/17  0842 08/01/17  1114   POCTGLUCOSE 195* 207* 214*     All pertinent labs within the past 24 hours have been reviewed.    Significant Imaging: I have reviewed all pertinent imaging results/findings within the past 24 hours.    Assessment/Plan:      * Acute pancreatitis with uninfected necrosis    - 2/2 hypertriglyceridemia, labs on admission: Lipase 4000, triglycerides 1500, lactate 10.4 (all improved since admission)  - CT showed extensive acute pancreatitis. The pancreas is hypoperfused which may be due to an edematous state however cannot exclude vascular compromise  - currently on cefepime and flagyl  - repeat US abdomen shows hepatosplenomegaly with hepatic steatosis, edematous pancreas, no e/o gallstones  - replaced NG tube for decompression  - NPO  - continuing supportive care, famotidine, lyte replacement, pain and nausea control        Ileus    - hospital course complicated by ileus, reportedly improved after receiving enemas  - will replace NG for significant abdominal distention and discomfort  - KUB shows no evidence to suggest obstruction  - now having loose stools, will check c diff        Diabetes mellitus type I    - increased detemir to 20 BID for hyperglycemia  - SSI  - POCT glucose checks        Acute respiratory failure with hypoxia    - intubated 7/27 for acute respiratory failure with hypoxia, extubated 7/29  - CXR showed pulmonary edema  - currently breathing well on room air        Diarrhea     - in the setting of abx administration  - will check c diff        TROY (acute kidney injury)    - presented with TROY, Cr peaked at 2.5  - now improved to 0.8, pt maintaining good UOP  - US RP showed mildly elevated RIs, a small L renal cyst and ill-defined perinephric fluid likely related to pancreatitis          VTE Risk Mitigation         Ordered     enoxaparin injection 40 mg  Daily     Route:  Subcutaneous        07/28/17 0805     Medium Risk of VTE  Once      07/25/17 2244     Place LEI hose  Until discontinued      07/25/17 2244     Place sequential compression device  Until discontinued      07/25/17 2244          Marco Medel MD  Department of Hospital Medicine   [facility]

## 2017-08-01 NOTE — PROGRESS NOTES
"[facility]  Adult Nutrition  Progress Note    SUMMARY     Recommendations  Recommendation/Intervention:   1. As medically appropriate, ADAT to Low Fat.   2. If unable to advance diet, recommend initiating TPN of Clinimix E 5/20 at 75 mL/hr + 250 mL 20% lipids daily - to provide 2084 kcal/day, 90 g protein/day, and 1800 mL fluid/day (GIR = 2.2 mg/kg/min).   RD to monitor.    Goals: Meet % EEN, EPN  Nutrition Goal Status: goal not met  Communication of RD Recs: reviewed with RN    Reason for Assessment  Reason for Assessment: RD follow-up  Diagnosis:  (acute pancreatitis)  Relevent Medical History: HTN. HLD, type 1 DM   Interdisciplinary Rounds: did not attend  General Information Comments: Patient NPO/CL x 7 days.  Nutrition Discharge Planning: Unable to determine at this time.    Nutrition Prescription Ordered  Current Diet Order: NPO    Evaluation of Received Nutrients/Fluid Intake  IV Fluid (mL): 1200  Comments: LBM 7/31  % Intake of Estimated Energy Needs: 0 - 25 %  % Meal Intake: NPO     Nutrition Risk Screen   Nutrition Risk Screen: other (see comments) (decreased intake 2/2 medical condition)    Nutrition/Diet History  Patient Reported Diet/Restrictions/Preferences: general  Typical Food/Fluid Intake: Patient with poor PO intake PTA 2/2 abdominal pain and N/V.  Food Preferences: No Gnosticist or cultural needs identified at this time.  Factors Affecting Nutritional Intake: NPO, altered gastrointestinal function    Labs/Tests/Procedures/Meds   Pertinent Labs Reviewed: reviewed  Pertinent Labs Comments: K 3.2, Glu 188, POCT Glu 195-252, HgbA1c 11.6, Ca 8.4, Phos 1.7, Alb 2.0, AST 50,   Pertinent Medications Reviewed: reviewed  Pertinent Medications Comments: famotidine, insulin, zofran, IVF    Physical Findings  Overall Physical Appearance: overweight  Tubes: nasogastric tube  Oral/Mouth Cavity: WDL  Skin: edema    Anthropometrics  Height: 5' 10" (177.8 cm)  Weight Method: Bed " Scale  Weight: 112.1 kg (247 lb 2.2 oz)  Ideal Body Weight (IBW), Male: 166 lb  % Ideal Body Weight, Male (lb): 148.88 lb  BMI (Calculated): 35.5  BMI Grade: 35 - 39.9 - obesity - grade II    Estimated/Assessed Needs  Weight Used For Calorie Calculations: 106 kg (233 lb 11 oz)   Energy Calorie Requirements (kcal): 2290 kcal/day  Energy Need Method: [place]-St Jeor (x 1.15)  RMR ([place] James-St. Jeor Equation): 1991.25  Weight Used For Protein Calculations: 106 kg (233 lb 11 oz)  Protein Requirements:  g/day (0.8-1.0 g/kg)  Fluid Requirements (mL): 1 mL/kcal or per MD  Fluid Need Method: RDA Method  RDA Method (mL): 2290    Assessment and Plan  Nutrition Problem  Inadequate energy intake    Related to (etiology):   Decreased ability to consume sufficient energy    Signs and Symptoms (as evidenced by):   NPO with no alternative means of nutrition    Interventions/Recommendations (treatment strategy):  See RD recs above.    Nutrition Diagnosis Status:   New    Monitor and Evaluation  Food and Nutrient Intake: energy intake  Food and Nutrient Adminstration: diet order  Physical Activity and Function: nutrition-related ADLs and IADLs  Anthropometric Measurements: weight, weight change  Biochemical Data, Medical Tests and Procedures: electrolyte and renal panel, gastrointestinal profile, glucose/endocrine profile, inflammatory profile  Nutrition-Focused Physical Findings: overall appearance    Nutrition Risk  Level of Risk:  (2x/week)    Nutrition Follow-Up  RD Follow-up?: Yes

## 2017-08-02 PROBLEM — R19.7 DIARRHEA: Status: RESOLVED | Noted: 2017-08-01 | Resolved: 2017-08-02

## 2017-08-02 PROBLEM — N17.9 AKI (ACUTE KIDNEY INJURY): Status: RESOLVED | Noted: 2017-07-28 | Resolved: 2017-08-02

## 2017-08-02 PROBLEM — J96.01 ACUTE RESPIRATORY FAILURE WITH HYPOXIA: Status: RESOLVED | Noted: 2017-07-28 | Resolved: 2017-08-02

## 2017-08-02 PROBLEM — E78.1 HYPERTRIGLYCERIDEMIA: Status: ACTIVE | Noted: 2017-08-02

## 2017-08-02 LAB
ALBUMIN SERPL BCP-MCNC: 1.9 G/DL
ALBUMIN SERPL BCP-MCNC: 2 G/DL
ALP SERPL-CCNC: 86 U/L
ALP SERPL-CCNC: 93 U/L
ALT SERPL W/O P-5'-P-CCNC: 106 U/L
ALT SERPL W/O P-5'-P-CCNC: 124 U/L
ANION GAP SERPL CALC-SCNC: 12 MMOL/L
ANION GAP SERPL CALC-SCNC: 15 MMOL/L
ANISOCYTOSIS BLD QL SMEAR: SLIGHT
AST SERPL-CCNC: 42 U/L
AST SERPL-CCNC: 45 U/L
BASOPHILS NFR BLD: 0 %
BILIRUB SERPL-MCNC: 0.5 MG/DL
BILIRUB SERPL-MCNC: 0.6 MG/DL
BUN SERPL-MCNC: 17 MG/DL
BUN SERPL-MCNC: 17 MG/DL
CALCIUM SERPL-MCNC: 8.5 MG/DL
CALCIUM SERPL-MCNC: 8.6 MG/DL
CHLORIDE SERPL-SCNC: 103 MMOL/L
CHLORIDE SERPL-SCNC: 99 MMOL/L
CO2 SERPL-SCNC: 22 MMOL/L
CO2 SERPL-SCNC: 31 MMOL/L
CREAT SERPL-MCNC: 0.7 MG/DL
CREAT SERPL-MCNC: 0.8 MG/DL
DIFFERENTIAL METHOD: ABNORMAL
EOSINOPHIL NFR BLD: 1 %
ERYTHROCYTE [DISTWIDTH] IN BLOOD BY AUTOMATED COUNT: 15.3 %
EST. GFR  (AFRICAN AMERICAN): >60 ML/MIN/1.73 M^2
EST. GFR  (AFRICAN AMERICAN): >60 ML/MIN/1.73 M^2
EST. GFR  (NON AFRICAN AMERICAN): >60 ML/MIN/1.73 M^2
EST. GFR  (NON AFRICAN AMERICAN): >60 ML/MIN/1.73 M^2
GLUCOSE SERPL-MCNC: 156 MG/DL
GLUCOSE SERPL-MCNC: 208 MG/DL
HCT VFR BLD AUTO: 40.7 %
HGB BLD-MCNC: 12.6 G/DL
HYPOCHROMIA BLD QL SMEAR: ABNORMAL
LIPASE SERPL-CCNC: 204 U/L
LYMPHOCYTES NFR BLD: 3 %
MAGNESIUM SERPL-MCNC: 1.7 MG/DL
MCH RBC QN AUTO: 28 PG
MCHC RBC AUTO-ENTMCNC: 31 G/DL
MCV RBC AUTO: 90 FL
MONOCYTES NFR BLD: 8 %
NEUTROPHILS NFR BLD: 87 %
NEUTS BAND NFR BLD MANUAL: 1 %
OVALOCYTES BLD QL SMEAR: ABNORMAL
PHOSPHATE SERPL-MCNC: 1.9 MG/DL
PLATELET # BLD AUTO: 162 K/UL
PLATELET BLD QL SMEAR: ABNORMAL
PMV BLD AUTO: 11.2 FL
POCT GLUCOSE: 165 MG/DL (ref 70–110)
POCT GLUCOSE: 178 MG/DL (ref 70–110)
POCT GLUCOSE: 196 MG/DL (ref 70–110)
POCT GLUCOSE: 197 MG/DL (ref 70–110)
POCT GLUCOSE: 203 MG/DL (ref 70–110)
POCT GLUCOSE: 206 MG/DL (ref 70–110)
POCT GLUCOSE: 208 MG/DL (ref 70–110)
POIKILOCYTOSIS BLD QL SMEAR: SLIGHT
POLYCHROMASIA BLD QL SMEAR: ABNORMAL
POTASSIUM SERPL-SCNC: 3.3 MMOL/L
POTASSIUM SERPL-SCNC: 4.5 MMOL/L
PROT SERPL-MCNC: 5.7 G/DL
PROT SERPL-MCNC: 6.1 G/DL
RBC # BLD AUTO: 4.5 M/UL
SODIUM SERPL-SCNC: 140 MMOL/L
SODIUM SERPL-SCNC: 142 MMOL/L
TOXIC GRANULES BLD QL SMEAR: PRESENT
WBC # BLD AUTO: 11.73 K/UL

## 2017-08-02 PROCEDURE — 25000003 PHARM REV CODE 250: Performed by: STUDENT IN AN ORGANIZED HEALTH CARE EDUCATION/TRAINING PROGRAM

## 2017-08-02 PROCEDURE — 63600175 PHARM REV CODE 636 W HCPCS: Performed by: HOSPITALIST

## 2017-08-02 PROCEDURE — 25000003 PHARM REV CODE 250: Performed by: INTERNAL MEDICINE

## 2017-08-02 PROCEDURE — A4216 STERILE WATER/SALINE, 10 ML: HCPCS | Performed by: STUDENT IN AN ORGANIZED HEALTH CARE EDUCATION/TRAINING PROGRAM

## 2017-08-02 PROCEDURE — S0030 INJECTION, METRONIDAZOLE: HCPCS | Performed by: INTERNAL MEDICINE

## 2017-08-02 PROCEDURE — S0030 INJECTION, METRONIDAZOLE: HCPCS | Performed by: HOSPITALIST

## 2017-08-02 PROCEDURE — 25000003 PHARM REV CODE 250: Performed by: HOSPITALIST

## 2017-08-02 PROCEDURE — 84100 ASSAY OF PHOSPHORUS: CPT

## 2017-08-02 PROCEDURE — 80053 COMPREHEN METABOLIC PANEL: CPT | Mod: 91

## 2017-08-02 PROCEDURE — 83690 ASSAY OF LIPASE: CPT

## 2017-08-02 PROCEDURE — 85007 BL SMEAR W/DIFF WBC COUNT: CPT

## 2017-08-02 PROCEDURE — 85027 COMPLETE CBC AUTOMATED: CPT

## 2017-08-02 PROCEDURE — 63600175 PHARM REV CODE 636 W HCPCS: Performed by: INTERNAL MEDICINE

## 2017-08-02 PROCEDURE — 99232 SBSQ HOSP IP/OBS MODERATE 35: CPT | Mod: ,,, | Performed by: HOSPITALIST

## 2017-08-02 PROCEDURE — S0028 INJECTION, FAMOTIDINE, 20 MG: HCPCS | Performed by: HOSPITALIST

## 2017-08-02 PROCEDURE — 20600001 HC STEP DOWN PRIVATE ROOM

## 2017-08-02 PROCEDURE — 63600175 PHARM REV CODE 636 W HCPCS: Performed by: STUDENT IN AN ORGANIZED HEALTH CARE EDUCATION/TRAINING PROGRAM

## 2017-08-02 PROCEDURE — 83735 ASSAY OF MAGNESIUM: CPT

## 2017-08-02 PROCEDURE — 36415 COLL VENOUS BLD VENIPUNCTURE: CPT

## 2017-08-02 RX ORDER — INSULIN ASPART 100 [IU]/ML
1-10 INJECTION, SOLUTION INTRAVENOUS; SUBCUTANEOUS EVERY 4 HOURS PRN
Status: DISCONTINUED | OUTPATIENT
Start: 2017-08-02 | End: 2017-08-05 | Stop reason: HOSPADM

## 2017-08-02 RX ORDER — MAGNESIUM SULFATE HEPTAHYDRATE 40 MG/ML
2 INJECTION, SOLUTION INTRAVENOUS ONCE
Status: COMPLETED | OUTPATIENT
Start: 2017-08-02 | End: 2017-08-02

## 2017-08-02 RX ORDER — RAMELTEON 8 MG/1
8 TABLET ORAL NIGHTLY PRN
Status: DISCONTINUED | OUTPATIENT
Start: 2017-08-02 | End: 2017-08-05 | Stop reason: HOSPADM

## 2017-08-02 RX ORDER — CEFEPIME HYDROCHLORIDE 1 G/50ML
1 INJECTION, SOLUTION INTRAVENOUS EVERY 8 HOURS
Status: DISCONTINUED | OUTPATIENT
Start: 2017-08-02 | End: 2017-08-05 | Stop reason: HOSPADM

## 2017-08-02 RX ORDER — POTASSIUM CHLORIDE 7.45 MG/ML
10 INJECTION INTRAVENOUS
Status: COMPLETED | OUTPATIENT
Start: 2017-08-02 | End: 2017-08-02

## 2017-08-02 RX ADMIN — POTASSIUM CHLORIDE 10 MEQ: 10 INJECTION, SOLUTION INTRAVENOUS at 11:08

## 2017-08-02 RX ADMIN — CEFEPIME HYDROCHLORIDE 2 G: 2 INJECTION, SOLUTION INTRAVENOUS at 08:08

## 2017-08-02 RX ADMIN — POTASSIUM PHOSPHATE, MONOBASIC AND POTASSIUM PHOSPHATE, DIBASIC 30 MMOL: 224; 236 INJECTION, SOLUTION, CONCENTRATE INTRAVENOUS at 08:08

## 2017-08-02 RX ADMIN — INSULIN ASPART 4 UNITS: 100 INJECTION, SOLUTION INTRAVENOUS; SUBCUTANEOUS at 09:08

## 2017-08-02 RX ADMIN — METRONIDAZOLE 500 MG: 500 INJECTION, SOLUTION INTRAVENOUS at 12:08

## 2017-08-02 RX ADMIN — INSULIN ASPART 2 UNITS: 100 INJECTION, SOLUTION INTRAVENOUS; SUBCUTANEOUS at 05:08

## 2017-08-02 RX ADMIN — INSULIN ASPART 1 UNITS: 100 INJECTION, SOLUTION INTRAVENOUS; SUBCUTANEOUS at 12:08

## 2017-08-02 RX ADMIN — POTASSIUM CHLORIDE 10 MEQ: 10 INJECTION, SOLUTION INTRAVENOUS at 01:08

## 2017-08-02 RX ADMIN — CEFEPIME HYDROCHLORIDE 1 G: 1 INJECTION, SOLUTION INTRAVENOUS at 04:08

## 2017-08-02 RX ADMIN — POTASSIUM CHLORIDE 10 MEQ: 10 INJECTION, SOLUTION INTRAVENOUS at 08:08

## 2017-08-02 RX ADMIN — METRONIDAZOLE 500 MG: 500 INJECTION, SOLUTION INTRAVENOUS at 04:08

## 2017-08-02 RX ADMIN — POTASSIUM CHLORIDE 10 MEQ: 10 INJECTION, SOLUTION INTRAVENOUS at 10:08

## 2017-08-02 RX ADMIN — Medication 3 ML: at 01:08

## 2017-08-02 RX ADMIN — FAMOTIDINE 20 MG: 10 INJECTION, SOLUTION INTRAVENOUS at 08:08

## 2017-08-02 RX ADMIN — Medication 3 ML: at 09:08

## 2017-08-02 RX ADMIN — MORPHINE SULFATE 2 MG: 2 INJECTION, SOLUTION INTRAMUSCULAR; INTRAVENOUS at 11:08

## 2017-08-02 RX ADMIN — ONDANSETRON 8 MG: 2 INJECTION INTRAMUSCULAR; INTRAVENOUS at 01:08

## 2017-08-02 RX ADMIN — ONDANSETRON 8 MG: 2 INJECTION INTRAMUSCULAR; INTRAVENOUS at 05:08

## 2017-08-02 RX ADMIN — Medication 3 ML: at 05:08

## 2017-08-02 RX ADMIN — CEFEPIME HYDROCHLORIDE 2 G: 2 INJECTION, SOLUTION INTRAVENOUS at 12:08

## 2017-08-02 RX ADMIN — INSULIN ASPART 2 UNITS: 100 INJECTION, SOLUTION INTRAVENOUS; SUBCUTANEOUS at 02:08

## 2017-08-02 RX ADMIN — MORPHINE SULFATE 2 MG: 2 INJECTION, SOLUTION INTRAMUSCULAR; INTRAVENOUS at 04:08

## 2017-08-02 RX ADMIN — ENOXAPARIN SODIUM 40 MG: 100 INJECTION SUBCUTANEOUS at 04:08

## 2017-08-02 RX ADMIN — INSULIN ASPART 2 UNITS: 100 INJECTION, SOLUTION INTRAVENOUS; SUBCUTANEOUS at 09:08

## 2017-08-02 RX ADMIN — ONDANSETRON 8 MG: 2 INJECTION INTRAMUSCULAR; INTRAVENOUS at 09:08

## 2017-08-02 RX ADMIN — INSULIN ASPART 2 UNITS: 100 INJECTION, SOLUTION INTRAVENOUS; SUBCUTANEOUS at 06:08

## 2017-08-02 RX ADMIN — METRONIDAZOLE 500 MG: 500 INJECTION, SOLUTION INTRAVENOUS at 09:08

## 2017-08-02 RX ADMIN — RAMELTEON 8 MG: 8 TABLET, FILM COATED ORAL at 09:08

## 2017-08-02 RX ADMIN — MAGNESIUM SULFATE IN WATER 2 G: 40 INJECTION, SOLUTION INTRAVENOUS at 09:08

## 2017-08-02 RX ADMIN — FAMOTIDINE 20 MG: 10 INJECTION, SOLUTION INTRAVENOUS at 09:08

## 2017-08-02 NOTE — ASSESSMENT & PLAN NOTE
- hospital course complicated by ileus, reportedly improved after receiving enemas  - replaced NG for significant abdominal distention and discomfort  - now improving, so plan to clamp the NGT tomorrow and possibly dc it tomorrow

## 2017-08-02 NOTE — ASSESSMENT & PLAN NOTE
- 2/2 hypertriglyceridemia, labs on admission: Lipase 4000 (trended down to 200 now), triglycerides 1500, lactate 10.4 (all improved since admission)  - CT showed extensive acute pancreatitis. The pancreas is hypoperfused which may be due to an edematous state however cannot exclude vascular compromise  - currently on cefepime and flagyl- day 7/10 for presumed superimposed infection   - repeat US abdomen shows hepatosplenomegaly with hepatic steatosis, edematous pancreas, no e/o gallstones  - replaced NG tube for decompression --> plan to clamp the NGT tomorrow and possibly d/c it based on symptoms   - continuing supportive care, famotidine, lyte replacement, pain and nausea control

## 2017-08-02 NOTE — ASSESSMENT & PLAN NOTE
- TG >1500 on admit, causing acute pancreatitis. Concern for familial hyperlipidemia possibly  - TGs trended down to 200  - will require outpatient eval by a lipid specialist -- metabolic d/o specialist vs endocrine

## 2017-08-02 NOTE — PLAN OF CARE
Problem: Patient Care Overview  Goal: Plan of Care Review  Outcome: Ongoing (interventions implemented as appropriate)  POC reviewed with pt and pt's family, all verbalized understanding. AAOx4. VSS on room air, tele monitoring d/c'd. Pt has been NSR. Denies chest pain/SOB. NG to R nare to LIWS. Adequate UOP, using toilet. x1 bowel movement during shift. Denies pain. Denies nausea. Pt stating he is hungry and is expecting NGT to be clamped tomorrow AM so he can try clear liquids again. accuchecks Q4H with s/s coverage needed. Pt given Sprite for comfort measures by MD. Pt up with standby assist. No falls or injuries, family at bedside. WCTM.

## 2017-08-02 NOTE — PROGRESS NOTES
"[facility] Hospital Medicine  Progress Note    Patient Name: Bay Ibarra  MRN: [id number]  Patient Class: IP- Inpatient   Admission Date: 7/25/2017  Length of Stay: 8 days  Attending Physician: Connor Nobles MD  Primary Care Provider: José Luis Singleton MD    [facility] Medicine Team: [facility] HOSP MED 1 Skyla Bai MD    Subjective:     Principal Problem:Acute pancreatitis with uninfected necrosis    HPI:  Mr. Ibarra is a 38 y/o gentleman with PMHx HTN, HLD, Hypertriglyceridemia, DM I, gout, and pancreatitis (apirl 2017) who was transferred from [place] with necrotizing pancreatitis and DKA. The patient states he started feeling "ill and weak" 2 days ago, and yesterday morning he woke up with severe epigastric abdominal pain and had multiple episodes of nausea and vomiting with poor PO intake. He endorsed starting gemfibrozil recently for HLD and regular compliance with his insulin glargine BID. He presented to [place] with Lipase 4000, Triglycerides 1500, lactate 10 and elevated anion gap. He received 3 L of ns and IV insulin at [place] before being transported to [facility] for higher level of care. Upon initial assessment in the ED, the patient was lethargic, but arousable, , /63, and SpO2 98% on 2L nasal cannula. He denied having any fevers, chills, CP, cough, diarrhea or dysuria.  He was started on 2L of lactated ringers, IV insulin and admitted to the MICU.     Hospital Course:  7/27: Having pain and nausea without vomiting overnight, controlled with morphine 4 x 4 IV. Patient also developed acute respiratory failure requiring intubation. Elevated bladder pressures overnight improving with treatment of ileus  7/28: NG tube for elevated bladder pressures.   7/29: Extubated. Bladder pressures resolved with NG. NG removed. Patient with continued NPO but dry heaving and nauseous.   7/30: Dry heaving is much improved. Got some NG decompression last night but " now removed. Small BMs overnight but not substantial. Did not sleep last night due to abdominal discomfort.  7/31: Pt continued to have abdominal pain and bloating, so NG tube was replaced. US abd showed hepatosplenomegaly with hepatic steatosis, edematous pancreas, and no evidence of gallstones.    8/1: slowly improving with significant output per NGT    Interval History:    Improving over night. NGT with 1350 cc over past 24 hours, but patient has been eating some ice so it may increase the reported output. Having BMs. No nausea. Very very hungry. No fevers, VSS. Overall, improving. Asking about d/c. Glucose well controlled    Day 7/10 of cefepime and flagyl     Review of Systems   Constitutional: Negative for chills and fever.   Respiratory: Negative for cough and shortness of breath.    Cardiovascular: Negative for chest pain.   Gastrointestinal: Negative for abdominal distention and abdominal pain.        Abdomen improving  + hunger   Genitourinary: Negative for flank pain.   Neurological: Negative for weakness, light-headedness and numbness.   Psychiatric/Behavioral: Negative for confusion.     Objective:     Vital Signs (Most Recent):  Temp: 98.2 °F (36.8 °C) (08/02/17 1635)  Pulse: 98 (08/02/17 1635)  Resp: 20 (08/02/17 1635)  BP: 134/75 (08/02/17 1635)  SpO2: 95 % (08/02/17 1635) Vital Signs (24h Range):  Temp:  [98.2 °F (36.8 °C)-98.9 °F (37.2 °C)] 98.2 °F (36.8 °C)  Pulse:  [] 98  Resp:  [16-20] 20  SpO2:  [92 %-96 %] 95 %  BP: (134-163)/(74-83) 134/75     Weight: 112.1 kg (247 lb 2.2 oz)  Body mass index is 35.46 kg/m².    Intake/Output Summary (Last 24 hours) at 08/02/17 1636  Last data filed at 08/02/17 1306   Gross per 24 hour   Intake             2240 ml   Output             2100 ml   Net              140 ml      Physical Exam   Constitutional: He is oriented to person, place, and time. No distress.   Eyes: EOM are normal. Right eye exhibits no discharge. Left eye exhibits no discharge.   Neck:  Normal range of motion.   Cardiovascular: Normal rate, regular rhythm, normal heart sounds and intact distal pulses.    No murmur heard.  Pulmonary/Chest: Effort normal. He has no wheezes. He has no rales.   Abdominal: He exhibits no distension. There is no tenderness. There is no guarding.   Hypoactive bowel sounds   Musculoskeletal: He exhibits no edema or deformity.   Neurological: He is alert and oriented to person, place, and time. No cranial nerve deficit.   Skin: Skin is warm and dry. He is not diaphoretic.       Significant Labs:   CBC:   Recent Labs  Lab 08/01/17  0604 08/02/17  0401   WBC 11.88 11.73   HGB 12.6* 12.6*   HCT 40.0 40.7    162     CMP:   Recent Labs  Lab 08/01/17  0603 08/01/17  1554 08/02/17  0401    143 142   K 3.2* 3.5 3.3*   CL 97 99 99   CO2 29 32* 31*   * 193* 156*   BUN 19 18 17   CREATININE 0.8 0.8 0.7   CALCIUM 8.4* 8.7 8.5*   PROT 5.9* 6.1 5.7*   ALBUMIN 2.0* 2.1* 1.9*   BILITOT 0.6 0.6 0.5   ALKPHOS 100 87 93   AST 50* 40 42*   * 169* 124*   ANIONGAP 15 12 12   EGFRNONAA >60.0 >60.0 >60.0     Lipase:   Recent Labs  Lab 08/02/17  0401   LIPASE 204*         Assessment/Plan:      * Acute pancreatitis with uninfected necrosis    - 2/2 hypertriglyceridemia, labs on admission: Lipase 4000 (trended down to 200 now), triglycerides 1500, lactate 10.4 (all improved since admission)  - CT showed extensive acute pancreatitis. The pancreas is hypoperfused which may be due to an edematous state however cannot exclude vascular compromise  - currently on cefepime and flagyl- day 7/10 for presumed superimposed infection   - repeat US abdomen shows hepatosplenomegaly with hepatic steatosis, edematous pancreas, no e/o gallstones  - replaced NG tube for decompression --> plan to clamp the NGT tomorrow and possibly d/c it based on symptoms   - continuing supportive care, famotidine, lyte replacement, pain and nausea control        Ileus    - hospital course complicated by  ileus, reportedly improved after receiving enemas  - replaced NG for significant abdominal distention and discomfort  - now improving, so plan to clamp the NGT tomorrow and possibly dc it tomorrow        Diabetes mellitus type I    - detemir to 20 BID for hyperglycemia  - SSI  - POCT glucose checks  - possibly advance diet tomorrow        Hypertriglyceridemia    - TG >1500 on admit, causing acute pancreatitis. Concern for familial hyperlipidemia possibly  - TGs trended down to 200  - will require outpatient eval by a lipid specialist -- metabolic d/o specialist vs endocrine             VTE Risk Mitigation         Ordered     enoxaparin injection 40 mg  Daily     Route:  Subcutaneous        07/28/17 0805     Medium Risk of VTE  Once      07/25/17 2244     Place LEI hose  Until discontinued      07/25/17 2244     Place sequential compression device  Until discontinued      07/25/17 2244          Skyla Bai MD  Department of Hospital Medicine   [facility]

## 2017-08-02 NOTE — ASSESSMENT & PLAN NOTE
- detemir to 20 BID for hyperglycemia  - SSI  - POCT glucose checks  - possibly advance diet tomorrow

## 2017-08-02 NOTE — PLAN OF CARE
Problem: Patient Care Overview  Goal: Plan of Care Review  Plan of care reviewed with patient. All questions and concerns addressed. Pt is tolerating NG tube therapy with no complaints of SOB or Nausea. Pt has complaints of pain that are relieved with ordered pain medications. Pt CBG was treated with ordered sliding scale. Pt is resting quietly in bed. Will continue to monitor.

## 2017-08-03 PROBLEM — E10.40 TYPE 1 DIABETES MELLITUS WITH DIABETIC NEUROPATHY: Status: ACTIVE | Noted: 2017-08-03

## 2017-08-03 LAB
ALBUMIN SERPL BCP-MCNC: 1.9 G/DL
ALBUMIN SERPL BCP-MCNC: 1.9 G/DL
ALP SERPL-CCNC: 100 U/L
ALP SERPL-CCNC: 94 U/L
ALT SERPL W/O P-5'-P-CCNC: 68 U/L
ALT SERPL W/O P-5'-P-CCNC: 81 U/L
ANION GAP SERPL CALC-SCNC: 12 MMOL/L
ANION GAP SERPL CALC-SCNC: 9 MMOL/L
AST SERPL-CCNC: 32 U/L
AST SERPL-CCNC: 41 U/L
BASOPHILS # BLD AUTO: 0.01 K/UL
BASOPHILS NFR BLD: 0.1 %
BILIRUB SERPL-MCNC: 0.5 MG/DL
BILIRUB SERPL-MCNC: 0.8 MG/DL
BUN SERPL-MCNC: 15 MG/DL
BUN SERPL-MCNC: 16 MG/DL
CALCIUM SERPL-MCNC: 8.4 MG/DL
CALCIUM SERPL-MCNC: 8.6 MG/DL
CHLORIDE SERPL-SCNC: 100 MMOL/L
CHLORIDE SERPL-SCNC: 99 MMOL/L
CO2 SERPL-SCNC: 30 MMOL/L
CO2 SERPL-SCNC: 36 MMOL/L
CREAT SERPL-MCNC: 0.7 MG/DL
CREAT SERPL-MCNC: 0.7 MG/DL
DIFFERENTIAL METHOD: ABNORMAL
EOSINOPHIL # BLD AUTO: 0.1 K/UL
EOSINOPHIL NFR BLD: 1.2 %
ERYTHROCYTE [DISTWIDTH] IN BLOOD BY AUTOMATED COUNT: 15.5 %
EST. GFR  (AFRICAN AMERICAN): >60 ML/MIN/1.73 M^2
EST. GFR  (AFRICAN AMERICAN): >60 ML/MIN/1.73 M^2
EST. GFR  (NON AFRICAN AMERICAN): >60 ML/MIN/1.73 M^2
EST. GFR  (NON AFRICAN AMERICAN): >60 ML/MIN/1.73 M^2
GLUCOSE SERPL-MCNC: 146 MG/DL
GLUCOSE SERPL-MCNC: 201 MG/DL
HCT VFR BLD AUTO: 41.5 %
HGB BLD-MCNC: 12.7 G/DL
LYMPHOCYTES # BLD AUTO: 0.7 K/UL
LYMPHOCYTES NFR BLD: 6.5 %
MAGNESIUM SERPL-MCNC: 1.8 MG/DL
MCH RBC QN AUTO: 28 PG
MCHC RBC AUTO-ENTMCNC: 30.6 G/DL
MCV RBC AUTO: 91 FL
MONOCYTES # BLD AUTO: 1.1 K/UL
MONOCYTES NFR BLD: 9.6 %
NEUTROPHILS # BLD AUTO: 8.9 K/UL
NEUTROPHILS NFR BLD: 81.2 %
PHOSPHATE SERPL-MCNC: 2.4 MG/DL
PLATELET # BLD AUTO: 177 K/UL
PMV BLD AUTO: 11.6 FL
POCT GLUCOSE: 136 MG/DL (ref 70–110)
POCT GLUCOSE: 137 MG/DL (ref 70–110)
POCT GLUCOSE: 165 MG/DL (ref 70–110)
POCT GLUCOSE: 173 MG/DL (ref 70–110)
POCT GLUCOSE: 224 MG/DL (ref 70–110)
POCT GLUCOSE: 242 MG/DL (ref 70–110)
POTASSIUM SERPL-SCNC: 3.4 MMOL/L
POTASSIUM SERPL-SCNC: 3.9 MMOL/L
PROT SERPL-MCNC: 5.8 G/DL
PROT SERPL-MCNC: 5.8 G/DL
RBC # BLD AUTO: 4.54 M/UL
SODIUM SERPL-SCNC: 141 MMOL/L
SODIUM SERPL-SCNC: 145 MMOL/L
WBC # BLD AUTO: 10.98 K/UL

## 2017-08-03 PROCEDURE — 63600175 PHARM REV CODE 636 W HCPCS: Performed by: STUDENT IN AN ORGANIZED HEALTH CARE EDUCATION/TRAINING PROGRAM

## 2017-08-03 PROCEDURE — S0030 INJECTION, METRONIDAZOLE: HCPCS | Performed by: HOSPITALIST

## 2017-08-03 PROCEDURE — 25000003 PHARM REV CODE 250: Performed by: HOSPITALIST

## 2017-08-03 PROCEDURE — 97535 SELF CARE MNGMENT TRAINING: CPT

## 2017-08-03 PROCEDURE — 20600001 HC STEP DOWN PRIVATE ROOM

## 2017-08-03 PROCEDURE — 83735 ASSAY OF MAGNESIUM: CPT

## 2017-08-03 PROCEDURE — 99232 SBSQ HOSP IP/OBS MODERATE 35: CPT | Mod: ,,, | Performed by: HOSPITALIST

## 2017-08-03 PROCEDURE — 63600175 PHARM REV CODE 636 W HCPCS: Performed by: HOSPITALIST

## 2017-08-03 PROCEDURE — S0028 INJECTION, FAMOTIDINE, 20 MG: HCPCS | Performed by: HOSPITALIST

## 2017-08-03 PROCEDURE — A4216 STERILE WATER/SALINE, 10 ML: HCPCS | Performed by: STUDENT IN AN ORGANIZED HEALTH CARE EDUCATION/TRAINING PROGRAM

## 2017-08-03 PROCEDURE — 97803 MED NUTRITION INDIV SUBSEQ: CPT

## 2017-08-03 PROCEDURE — 85025 COMPLETE CBC W/AUTO DIFF WBC: CPT

## 2017-08-03 PROCEDURE — 97116 GAIT TRAINING THERAPY: CPT

## 2017-08-03 PROCEDURE — 25000003 PHARM REV CODE 250: Performed by: STUDENT IN AN ORGANIZED HEALTH CARE EDUCATION/TRAINING PROGRAM

## 2017-08-03 PROCEDURE — 63600175 PHARM REV CODE 636 W HCPCS: Performed by: INTERNAL MEDICINE

## 2017-08-03 PROCEDURE — 80053 COMPREHEN METABOLIC PANEL: CPT | Mod: 91

## 2017-08-03 PROCEDURE — 84100 ASSAY OF PHOSPHORUS: CPT

## 2017-08-03 PROCEDURE — 36415 COLL VENOUS BLD VENIPUNCTURE: CPT

## 2017-08-03 RX ORDER — POTASSIUM CHLORIDE 7.45 MG/ML
10 INJECTION INTRAVENOUS
Status: COMPLETED | OUTPATIENT
Start: 2017-08-03 | End: 2017-08-03

## 2017-08-03 RX ADMIN — MORPHINE SULFATE 2 MG: 2 INJECTION, SOLUTION INTRAMUSCULAR; INTRAVENOUS at 12:08

## 2017-08-03 RX ADMIN — Medication 3 ML: at 05:08

## 2017-08-03 RX ADMIN — METRONIDAZOLE 500 MG: 500 INJECTION, SOLUTION INTRAVENOUS at 05:08

## 2017-08-03 RX ADMIN — FAMOTIDINE 20 MG: 10 INJECTION, SOLUTION INTRAVENOUS at 08:08

## 2017-08-03 RX ADMIN — INSULIN ASPART 4 UNITS: 100 INJECTION, SOLUTION INTRAVENOUS; SUBCUTANEOUS at 06:08

## 2017-08-03 RX ADMIN — METRONIDAZOLE 500 MG: 500 INJECTION, SOLUTION INTRAVENOUS at 12:08

## 2017-08-03 RX ADMIN — ENOXAPARIN SODIUM 40 MG: 100 INJECTION SUBCUTANEOUS at 04:08

## 2017-08-03 RX ADMIN — FAMOTIDINE 20 MG: 10 INJECTION, SOLUTION INTRAVENOUS at 09:08

## 2017-08-03 RX ADMIN — Medication 3 ML: at 01:08

## 2017-08-03 RX ADMIN — POTASSIUM CHLORIDE 10 MEQ: 10 INJECTION, SOLUTION INTRAVENOUS at 07:08

## 2017-08-03 RX ADMIN — POTASSIUM CHLORIDE 10 MEQ: 10 INJECTION, SOLUTION INTRAVENOUS at 06:08

## 2017-08-03 RX ADMIN — INSULIN ASPART 2 UNITS: 100 INJECTION, SOLUTION INTRAVENOUS; SUBCUTANEOUS at 01:08

## 2017-08-03 RX ADMIN — ONDANSETRON 8 MG: 2 INJECTION INTRAMUSCULAR; INTRAVENOUS at 01:08

## 2017-08-03 RX ADMIN — CEFEPIME HYDROCHLORIDE 1 G: 1 INJECTION, SOLUTION INTRAVENOUS at 08:08

## 2017-08-03 RX ADMIN — METRONIDAZOLE 500 MG: 500 INJECTION, SOLUTION INTRAVENOUS at 09:08

## 2017-08-03 RX ADMIN — INSULIN ASPART 2 UNITS: 100 INJECTION, SOLUTION INTRAVENOUS; SUBCUTANEOUS at 10:08

## 2017-08-03 RX ADMIN — CEFEPIME HYDROCHLORIDE 1 G: 1 INJECTION, SOLUTION INTRAVENOUS at 11:08

## 2017-08-03 RX ADMIN — CEFEPIME HYDROCHLORIDE 1 G: 1 INJECTION, SOLUTION INTRAVENOUS at 04:08

## 2017-08-03 RX ADMIN — ONDANSETRON 8 MG: 2 INJECTION INTRAMUSCULAR; INTRAVENOUS at 05:08

## 2017-08-03 RX ADMIN — Medication 3 ML: at 10:08

## 2017-08-03 RX ADMIN — CEFEPIME HYDROCHLORIDE 1 G: 1 INJECTION, SOLUTION INTRAVENOUS at 12:08

## 2017-08-03 RX ADMIN — INSULIN ASPART 1 UNITS: 100 INJECTION, SOLUTION INTRAVENOUS; SUBCUTANEOUS at 12:08

## 2017-08-03 RX ADMIN — ONDANSETRON 8 MG: 2 INJECTION INTRAMUSCULAR; INTRAVENOUS at 09:08

## 2017-08-03 RX ADMIN — POTASSIUM PHOSPHATE, MONOBASIC AND POTASSIUM PHOSPHATE, DIBASIC 20 MMOL: 224; 236 INJECTION, SOLUTION, CONCENTRATE INTRAVENOUS at 07:08

## 2017-08-03 NOTE — PT/OT/SLP PROGRESS
"Occupational Therapy  Treatment / Discharge Summary    Bay Ibarra   MRN: [id number]   Admitting Diagnosis: Acute pancreatitis with uninfected necrosis    OT Date of Treatment: 08/03/17       Billable Minutes:  Self Care/Home Management 38    General Precautions: Standard, fall  Orthopedic Precautions: N/A  Braces: N/A    Subjective:  Communicated with NSG prior to session.  "I just feel weak from not eating."  Pain/Comfort  Pain Rating 1: 0/10  Pain Rating Post-Intervention 1: 0/10    Objective:  Patient found with: peripheral IV, NG tube     Functional Mobility:  Bed Mobility:  Supine to Sit: Independent  Sit to Supine: Independent    Transfers:   Sit <> Stand Assistance: Independent  Sit <> Stand Assistive Device: No Assistive Device  Toilet Transfer Technique:  (functional mobility)  Toilet Transfer Assistance: Independent  Toilet Transfer Assistive Device: No Assistive Device    Functional Ambulation: (I) w/in room, bathroom and hallway pushing IV pole.    Activities of Daily Living:  UE Dressing Level of Assistance: Independent  LE Dressing Level of Assistance: Independent  Grooming Position: Standing  Grooming Level of Assistance: Independent  Toileting Where Assessed: Toilet  Toileting Level of Assistance: Independent     AM-PAC 6 CLICK ADL   How much help from another person does this patient currently need?   1 = Unable, Total/Dependent Assistance  2 = A lot, Maximum/Moderate Assistance  3 = A little, Minimum/Contact Guard/Supervision  4 = None, Modified [place]/Independent    Putting on and taking off regular lower body clothing? : 4  Bathing (including washing, rinsing, drying)?: 4  Toileting, which includes using toilet, bedpan, or urinal? : 4  Putting on and taking off regular upper body clothing?: 4  Taking care of personal grooming such as brushing teeth?: 4  Eating meals?: 4  Total Score: 24     AM-PAC Raw Score CMS "G-Code Modifier Level of Impairment Assistance   6 % Total / " Unable   7 - 8 CM 80 - 100% Maximal Assist   9-13 CL 60 - 80% Moderate Assist   14 - 19 CK 40 - 60% Moderate Assist   20 - 22 CJ 20 - 40% Minimal Assist   23 CI 1-20% SBA / CGA   24 CH 0% Independent/ Mod I     Patient left supine with all lines intact and call button in reach    ASSESSMENT:  Bay Ibarra is a 37 y.o. male with a medical diagnosis of Acute pancreatitis with uninfected necrosis. Pt is (I) w/ ADL and functional mobilty. No further acute OT services req'd at this time.    Discharge recommendations: Discharge Facility/Level Of Care Needs: home     Barriers to discharge: Barriers to Discharge: None    Equipment recommendations: none     GOALS:    Occupational Therapy Goals     Not on file          Multidisciplinary Problems (Resolved)        Problem: Occupational Therapy Goal    Goal Priority Disciplines Outcome Interventions   Occupational Therapy Goal   (Resolved)     OT, PT/OT Outcome(s) achieved    Description:  Goals to be met by: 8/14/2017    Patient will increase functional independence with ADLs by performing:    LE Dressing with Wise. - Met  Grooming while standing with Wise. - Met  Toileting from toilet with Wise for hygiene and clothing management. - Met  Toilet transfer to toilet with Wise. - Met                   Plan:  D/C acute OT services.  Plan of Care reviewed with: patient  REGINALDO Xiong  08/03/2017

## 2017-08-03 NOTE — PLAN OF CARE
Problem: Occupational Therapy Goal  Goal: Occupational Therapy Goal  Goals to be met by: 8/14/2017    Patient will increase functional independence with ADLs by performing:    LE Dressing with Harper. - Met  Grooming while standing with Harper. - Met  Toileting from toilet with Harper for hygiene and clothing management. - Met  Toilet transfer to toilet with Harper. - Met     Outcome: Outcome(s) achieved Date Met: 08/03/17  OT goals met.

## 2017-08-03 NOTE — PLAN OF CARE
Problem: Patient Care Overview  Goal: Plan of Care Review  Plan of Care reviewed with patient and all questions/concerns addressed. Pt denies nausea and vomiting. Pt has no complaints of pain and has rested comfortably overnight. Pt CBG remained well controled with ordered insulin regimen. Pt's ng tube output continued to trend downward and was tolerating ice and sips of water as ordered by team. Vitals remain stable. Pt resting quietly. Will continue to monitor patient

## 2017-08-03 NOTE — PLAN OF CARE
Problem: Physical Therapy Goal  Goal: Physical Therapy Goal  Goals to be met by:      Patient will increase functional independence with mobility by performin. Sit to stand transfer with Supervision Met 8/3/17  2. Gait  x 200 feet with Supervision using no device.   3. Ascend/Descend 6 inch curb step with Supervision using no device.     Outcome: Ongoing (interventions implemented as appropriate)  Goals remain appropriate

## 2017-08-03 NOTE — ASSESSMENT & PLAN NOTE
- TG >1500 on admit, causing acute pancreatitis. Concern for familial hyperlipidemia possibly  - TGs trended down to 216  - will require outpatient eval by a lipid specialist -- metabolic d/o specialist vs endocrine

## 2017-08-03 NOTE — PLAN OF CARE
PLAN- HOME WITH NO NEEDS       08/03/17 1619   Right Care Assessment   Can the patient answer the patient profile reliably? Yes, cognitively intact   How often would a person be available to care for the patient? Whenever needed   Describe the patient's ability to walk at the present time. No restrictions   How does the patient rate their overall health at the present time? Good   Number of comorbid conditions (as recorded on the chart) Three

## 2017-08-03 NOTE — PLAN OF CARE
Problem: Patient Care Overview  Goal: Plan of Care Review  Outcome: Ongoing (interventions implemented as appropriate)  POC reviewed with pt, verbalized understanding.  Pt is AAOx4. VSS. Pt tolerating a clear liquid diet well.  Pt NGT is clamped. Pt denies pain this shift. Pt up ad ashley in room, remains free of falls/injury.

## 2017-08-03 NOTE — ASSESSMENT & PLAN NOTE
- 2/2 hypertriglyceridemia, labs on admission: Lipase 4000 (trended down to 200 now), triglycerides 1500, lactate 10.4 (all improved since admission)  - CT showed extensive acute pancreatitis. The pancreas is hypoperfused which may be due to an edematous state however cannot exclude vascular compromise  - currently on cefepime and flagyl- day 8/10 for presumed superimposed infection   - repeat US abdomen shows hepatosplenomegaly with hepatic steatosis, edematous pancreas, no e/o gallstones  - replaced NG tube for decompression --> clamped today, if pt tolerates will remove and advance diet  - continuing supportive care, famotidine, lyte replacement, pain and nausea control

## 2017-08-03 NOTE — PT/OT/SLP PROGRESS
Physical Therapy  Treatment    Bay Ibarra   MRN: [id number]   Admitting Diagnosis: Acute pancreatitis with uninfected necrosis    PT Received On: 08/03/17  PT Start Time: 1445     PT Stop Time: 1506    PT Total Time (min): 21 min       Billable Minutes:  Gait [id number]    Treatment Type: Treatment  PT/PTA: PT             General Precautions: Standard, fall  Orthopedic Precautions: N/A   Braces: N/A    Do you have any cultural, spiritual, Adventist conflicts, given your current situation?: Cong[employer]    Subjective:  Communicated with pt and nurse prior to session.  Pt agreeable to therapy stating that he has been sitting up more and walking further.    Pain/Comfort  Pain Rating 1: 0/10    Objective:   Patient found with: peripheral IV, NG tube  Pt found supine in bed with mother present.    Functional Mobility:  Bed Mobility:   Scooting/Bridging: Modified Independent  Supine to Sit: Modified Independent    Transfers:  Sit <> Stand Assistance: Supervision  Sit <> Stand Assistive Device: No Assistive Device    Gait:   Gait Distance: approx 300 feet   Assistance 1: Stand by Assistance  Gait Assistive Device: No device  Gait Pattern: reciprocal  Gait Deviation(s): decreased angelic, increased time in double stance, decreased stride length, increased stride width   Pt able to perform head turns and changing directions without LOB.    Stairs:  Pt ascended/descend 1 step with No Assistive Device with left rail with Contact Guard Assistance x3 trials    Balance:   Static Sit: GOOD: Takes MODERATE challenges from all directions  Dynamic Sit: GOOD: Maintains balance through MODERATE excursions of active trunk movement  Static Stand: FAIR+: Takes MINIMAL challenges from all directions  Dynamic stand: FAIR+: Needs CLOSE SUPERVISION during gait and is able to right self with minor LOB     Therapeutic Activities and Exercises:  PT provided skilled verbal instruction to slow angelic due to slight unsteady gait and for proper  technique to negotiate single step to simulate stepping into/out of home.      AM-PAC 6 CLICK MOBILITY  How much help from another person does this patient currently need?   1 = Unable, Total/Dependent Assistance  2 = A lot, Maximum/Moderate Assistance  3 = A little, Minimum/Contact Guard/Supervision  4 = None, Modified [place]/Independent    Turning over in bed (including adjusting bedclothes, sheets and blankets)?: 4  Sitting down on and standing up from a chair with arms (e.g., wheelchair, bedside commode, etc.): 4  Moving from lying on back to sitting on the side of the bed?: 4  Moving to and from a bed to a chair (including a wheelchair)?: 4  Need to walk in hospital room?: 3  Climbing 3-5 steps with a railing?: 3  Total Score: 22    AM-PAC Raw Score CMS G-Code Modifier Level of Impairment Assistance   6 % Total / Unable   7 - 9 CM 80 - 100% Maximal Assist   10 - 14 CL 60 - 80% Moderate Assist   15 - 19 CK 40 - 60% Moderate Assist   20 - 22 CJ 20 - 40% Minimal Assist   23 CI 1-20% SBA / CGA   24 CH 0% Independent/ Mod I     Patient left up in chair with call button in reach and mother and father present present.    Assessment:  Bay Ibarra is a 37 y.o. male with a medical diagnosis of Acute pancreatitis with uninfected necrosis and presents with slightly impaired balance during standing tasks and ambulation without AD especially during turns and changing directions requiring SBA for safety. Pt will benefit from continued PT treatment to address remaining impairments and improve functional mobility and independence.    Rehab identified problem list/impairments: Rehab identified problem list/impairments: weakness, impaired endurance, impaired functional mobilty, gait instability, impaired balance    Rehab potential is good.    Activity tolerance: Good    Discharge recommendations: Discharge Facility/Level Of Care Needs: home     Barriers to discharge: Barriers to Discharge: None    Equipment  recommendations: Equipment Needed After Discharge: none     GOALS:    Physical Therapy Goals        Problem: Physical Therapy Goal    Goal Priority Disciplines Outcome Goal Variances Interventions   Physical Therapy Goal     PT/OT, PT Ongoing (interventions implemented as appropriate)     Description:  Goals to be met by:      Patient will increase functional independence with mobility by performin. Sit to stand transfer with Supervision Met 8/3/17  2. Gait  x 200 feet with Supervision using no device.   3. Ascend/Descend 6 inch curb step with Supervision using no device.                       PLAN:    Patient to be seen 3 x/week  to address the above listed problems via gait training, therapeutic activities, therapeutic exercises  Plan of Care expires: 17  Plan of Care reviewed with: patient         Radha KARY Hu, PT  2017

## 2017-08-03 NOTE — PROGRESS NOTES
"[facility]  Adult Nutrition  Progress Note    SUMMARY     Recommendations  Recommendation/Intervention:   1. As medically appropriate, ADAT to Low Fat.   2. If unable to advance diet, recommend initiating TPN of Clinimix E 5/20 at 75 mL/hr + 250 mL 20% lipids daily - to provide 2084 kcal/day, 90 g protein/day, and 1800 mL fluid/day (GIR = 2.2 mg/kg/min).   RD to monitor.    Goals: Meet % EEN, EPN  Nutrition Goal Status: goal not met  Communication of RD Recs: reviewed with RN    Reason for Assessment  Reason for Assessment: RD follow-up  Diagnosis:  (acute pancreatitis)  Relevent Medical History: HTN. HLD, type 1 DM   Interdisciplinary Rounds: did not attend  General Information Comments: Patient NPO/CL x 9 days.   Nutrition Discharge Planning: Unable to determine at this time.    Nutrition Prescription Ordered  Current Diet Order: NPO    Evaluation of Received Nutrients/Fluid Intake  Comments: LBM 8/1  % Intake of Estimated Energy Needs: 0 - 25 %  % Meal Intake: NPO     Nutrition Risk Screen   Nutrition Risk Screen: other (see comments) (decreased intake 2/2 medical condition)    Nutrition/Diet History  Patient Reported Diet/Restrictions/Preferences: general  Typical Food/Fluid Intake: Patient with poor PO intake PTA 2/2 abdominal pain and N/V.  Food Preferences: No Yazidism or cultural needs identified at this time.  Factors Affecting Nutritional Intake: NPO, altered gastrointestinal function    Labs/Tests/Procedures/Meds   Pertinent Labs Reviewed: reviewed  Pertinent Labs Comments: K 3.4, Glu 146, POCT Glu 136-208, HgbA1c 11.6, Ca 8.4, Phos 2.4, Alb 1.9, ALT 81  Pertinent Medications Reviewed: reviewed  Pertinent Medications Comments: famotidine, insulin, zofran    Physical Findings  Overall Physical Appearance: overweight  Tubes: nasogastric tube  Oral/Mouth Cavity: WDL  Skin: edema    Anthropometrics  Height: 5' 10" (177.8 cm)  Weight Method: Bed Scale  Weight: 112.1 kg (247 lb 2.2 " oz)  Ideal Body Weight (IBW), Male: 166 lb  % Ideal Body Weight, Male (lb): 148.88 lb  BMI (Calculated): 35.5  BMI Grade: 35 - 39.9 - obesity - grade II    Estimated/Assessed Needs  Weight Used For Calorie Calculations: 106 kg (233 lb 11 oz)   Energy Calorie Requirements (kcal): 2290 kcal/day  Energy Need Method: Dickens-St Jeor (x 1.15)  RMR (Dickens-St. Jeor Equation): 1991.25  Weight Used For Protein Calculations: 106 kg (233 lb 11 oz)  Protein Requirements:  g/day (0.8-1.0 g/kg)  Fluid Requirements (mL): 1 mL/kcal or per MD  Fluid Need Method: RDA Method  RDA Method (mL): 2290    Assessment and Plan  Nutrition Problem  Inadequate energy intake     Related to (etiology):   Decreased ability to consume sufficient energy     Signs and Symptoms (as evidenced by):   NPO with no alternative means of nutrition     Interventions/Recommendations (treatment strategy):  See RD recs above.     Nutrition Diagnosis Status:   Continues    Monitor and Evaluation  Food and Nutrient Intake: energy intake  Food and Nutrient Adminstration: diet order  Physical Activity and Function: nutrition-related ADLs and IADLs  Anthropometric Measurements: weight, weight change  Biochemical Data, Medical Tests and Procedures: electrolyte and renal panel, gastrointestinal profile, glucose/endocrine profile, inflammatory profile  Nutrition-Focused Physical Findings: overall appearance    Nutrition Risk  Level of Risk:  (2x/week)    Nutrition Follow-Up  RD Follow-up?: Yes

## 2017-08-03 NOTE — ASSESSMENT & PLAN NOTE
- detemir to 20 BID for hyperglycemia  - SSI  - POCT glucose checks  - will consider advancing diet today

## 2017-08-03 NOTE — ASSESSMENT & PLAN NOTE
- hospital course complicated by ileus, reportedly improved after receiving enemas  - replaced NG for significant abdominal distention and discomfort  - now improving, will clamp NG today and remove if patient tolerates and advance diet as tolerated

## 2017-08-04 LAB
ALBUMIN SERPL BCP-MCNC: 1.9 G/DL
ALP SERPL-CCNC: 106 U/L
ALT SERPL W/O P-5'-P-CCNC: 58 U/L
ANION GAP SERPL CALC-SCNC: 11 MMOL/L
AST SERPL-CCNC: 36 U/L
BASOPHILS # BLD AUTO: 0.02 K/UL
BASOPHILS NFR BLD: 0.2 %
BILIRUB SERPL-MCNC: 0.7 MG/DL
BUN SERPL-MCNC: 13 MG/DL
CALCIUM SERPL-MCNC: 8.4 MG/DL
CHLORIDE SERPL-SCNC: 97 MMOL/L
CO2 SERPL-SCNC: 30 MMOL/L
CREAT SERPL-MCNC: 0.7 MG/DL
DIFFERENTIAL METHOD: ABNORMAL
EOSINOPHIL # BLD AUTO: 0.1 K/UL
EOSINOPHIL NFR BLD: 1.2 %
ERYTHROCYTE [DISTWIDTH] IN BLOOD BY AUTOMATED COUNT: 14.9 %
EST. GFR  (AFRICAN AMERICAN): >60 ML/MIN/1.73 M^2
EST. GFR  (NON AFRICAN AMERICAN): >60 ML/MIN/1.73 M^2
GLUCOSE SERPL-MCNC: 132 MG/DL
HCT VFR BLD AUTO: 35.9 %
HGB BLD-MCNC: 11.6 G/DL
LYMPHOCYTES # BLD AUTO: 0.5 K/UL
LYMPHOCYTES NFR BLD: 6 %
MAGNESIUM SERPL-MCNC: 1.3 MG/DL
MCH RBC QN AUTO: 28.5 PG
MCHC RBC AUTO-ENTMCNC: 32.3 G/DL
MCV RBC AUTO: 88 FL
MONOCYTES # BLD AUTO: 1 K/UL
MONOCYTES NFR BLD: 11.4 %
NEUTROPHILS # BLD AUTO: 7.2 K/UL
NEUTROPHILS NFR BLD: 80.9 %
PHOSPHATE SERPL-MCNC: 2.4 MG/DL
PLATELET # BLD AUTO: 162 K/UL
PMV BLD AUTO: 10.4 FL
POCT GLUCOSE: 139 MG/DL (ref 70–110)
POCT GLUCOSE: 143 MG/DL (ref 70–110)
POCT GLUCOSE: 178 MG/DL (ref 70–110)
POCT GLUCOSE: 212 MG/DL (ref 70–110)
POCT GLUCOSE: 213 MG/DL (ref 70–110)
POCT GLUCOSE: 230 MG/DL (ref 70–110)
POTASSIUM SERPL-SCNC: 3.3 MMOL/L
PROT SERPL-MCNC: 5.6 G/DL
RBC # BLD AUTO: 4.07 M/UL
SODIUM SERPL-SCNC: 138 MMOL/L
WBC # BLD AUTO: 8.89 K/UL

## 2017-08-04 PROCEDURE — 85025 COMPLETE CBC W/AUTO DIFF WBC: CPT

## 2017-08-04 PROCEDURE — 63600175 PHARM REV CODE 636 W HCPCS: Performed by: INTERNAL MEDICINE

## 2017-08-04 PROCEDURE — S0030 INJECTION, METRONIDAZOLE: HCPCS | Performed by: HOSPITALIST

## 2017-08-04 PROCEDURE — 80053 COMPREHEN METABOLIC PANEL: CPT

## 2017-08-04 PROCEDURE — 99232 SBSQ HOSP IP/OBS MODERATE 35: CPT | Mod: ,,, | Performed by: HOSPITALIST

## 2017-08-04 PROCEDURE — 63600175 PHARM REV CODE 636 W HCPCS: Performed by: HOSPITALIST

## 2017-08-04 PROCEDURE — 25000003 PHARM REV CODE 250: Performed by: STUDENT IN AN ORGANIZED HEALTH CARE EDUCATION/TRAINING PROGRAM

## 2017-08-04 PROCEDURE — 83735 ASSAY OF MAGNESIUM: CPT

## 2017-08-04 PROCEDURE — 84100 ASSAY OF PHOSPHORUS: CPT

## 2017-08-04 PROCEDURE — 97116 GAIT TRAINING THERAPY: CPT

## 2017-08-04 PROCEDURE — 63600175 PHARM REV CODE 636 W HCPCS: Performed by: STUDENT IN AN ORGANIZED HEALTH CARE EDUCATION/TRAINING PROGRAM

## 2017-08-04 PROCEDURE — 36415 COLL VENOUS BLD VENIPUNCTURE: CPT

## 2017-08-04 PROCEDURE — 20600001 HC STEP DOWN PRIVATE ROOM

## 2017-08-04 PROCEDURE — 25000003 PHARM REV CODE 250: Performed by: HOSPITALIST

## 2017-08-04 PROCEDURE — A4216 STERILE WATER/SALINE, 10 ML: HCPCS | Performed by: STUDENT IN AN ORGANIZED HEALTH CARE EDUCATION/TRAINING PROGRAM

## 2017-08-04 RX ORDER — MAGNESIUM SULFATE HEPTAHYDRATE 40 MG/ML
2 INJECTION, SOLUTION INTRAVENOUS ONCE
Status: COMPLETED | OUTPATIENT
Start: 2017-08-04 | End: 2017-08-04

## 2017-08-04 RX ORDER — SODIUM,POTASSIUM PHOSPHATES 280-250MG
2 POWDER IN PACKET (EA) ORAL ONCE
Status: COMPLETED | OUTPATIENT
Start: 2017-08-04 | End: 2017-08-04

## 2017-08-04 RX ORDER — ONDANSETRON 4 MG/1
4 TABLET, ORALLY DISINTEGRATING ORAL EVERY 8 HOURS PRN
Status: DISCONTINUED | OUTPATIENT
Start: 2017-08-04 | End: 2017-08-05 | Stop reason: HOSPADM

## 2017-08-04 RX ORDER — ONDANSETRON 4 MG/1
4 TABLET, FILM COATED ORAL ONCE
Status: DISCONTINUED | OUTPATIENT
Start: 2017-08-04 | End: 2017-08-04

## 2017-08-04 RX ORDER — ROSUVASTATIN CALCIUM 5 MG/1
5 TABLET, COATED ORAL DAILY
Qty: 90 TABLET | Refills: 0 | Status: CANCELLED | OUTPATIENT
Start: 2017-08-04 | End: 2018-08-04

## 2017-08-04 RX ADMIN — Medication 3 ML: at 05:08

## 2017-08-04 RX ADMIN — ONDANSETRON 4 MG: 4 TABLET, ORALLY DISINTEGRATING ORAL at 09:08

## 2017-08-04 RX ADMIN — METRONIDAZOLE 500 MG: 500 INJECTION, SOLUTION INTRAVENOUS at 01:08

## 2017-08-04 RX ADMIN — CEFEPIME HYDROCHLORIDE 1 G: 1 INJECTION, SOLUTION INTRAVENOUS at 08:08

## 2017-08-04 RX ADMIN — INSULIN ASPART 1 UNITS: 100 INJECTION, SOLUTION INTRAVENOUS; SUBCUTANEOUS at 02:08

## 2017-08-04 RX ADMIN — CEFEPIME HYDROCHLORIDE 1 G: 1 INJECTION, SOLUTION INTRAVENOUS at 04:08

## 2017-08-04 RX ADMIN — MAGNESIUM SULFATE IN WATER 2 G: 40 INJECTION, SOLUTION INTRAVENOUS at 06:08

## 2017-08-04 RX ADMIN — Medication 3 ML: at 01:08

## 2017-08-04 RX ADMIN — INSULIN ASPART 4 UNITS: 100 INJECTION, SOLUTION INTRAVENOUS; SUBCUTANEOUS at 01:08

## 2017-08-04 RX ADMIN — ENOXAPARIN SODIUM 40 MG: 100 INJECTION SUBCUTANEOUS at 04:08

## 2017-08-04 RX ADMIN — METRONIDAZOLE 500 MG: 500 INJECTION, SOLUTION INTRAVENOUS at 05:08

## 2017-08-04 RX ADMIN — ONDANSETRON 8 MG: 2 INJECTION INTRAMUSCULAR; INTRAVENOUS at 06:08

## 2017-08-04 RX ADMIN — METRONIDAZOLE 500 MG: 500 INJECTION, SOLUTION INTRAVENOUS at 08:08

## 2017-08-04 RX ADMIN — DIBASIC SODIUM PHOSPHATE, MONOBASIC POTASSIUM PHOSPHATE AND MONOBASIC SODIUM PHOSPHATE 2 PACKET: 852; 155; 130 TABLET ORAL at 07:08

## 2017-08-04 RX ADMIN — INSULIN ASPART 4 UNITS: 100 INJECTION, SOLUTION INTRAVENOUS; SUBCUTANEOUS at 04:08

## 2017-08-04 RX ADMIN — INSULIN ASPART 2 UNITS: 100 INJECTION, SOLUTION INTRAVENOUS; SUBCUTANEOUS at 09:08

## 2017-08-04 NOTE — PLAN OF CARE
Problem: Physical Therapy Goal  Goal: Physical Therapy Goal  Goals to be met by:      Patient will increase functional independence with mobility by performin. Sit to stand transfer with Supervision Met 8/3/17  2. Gait  x 200 feet with Supervision using no device.   3. Ascend/Descend 6 inch curb step with Supervision using no device.      Outcome: Ongoing (interventions implemented as appropriate)  Goals remain appropriate. Cont with plan of care.

## 2017-08-04 NOTE — PLAN OF CARE
Problem: Patient Care Overview  Goal: Plan of Care Review  Outcome: Ongoing (interventions implemented as appropriate)  POC reviewed with patient. VSS. Patient denied pain and nausea. Tolerating diet. Accuchecks completed and covered as needed. Ambulating to the bathroom independently. Patient remained free from falls and trauma. Call light in reach. TM.

## 2017-08-04 NOTE — PLAN OF CARE
Problem: Patient Care Overview  Goal: Plan of Care Review  Outcome: Ongoing (interventions implemented as appropriate)  Patient is alert, awake, and oriented. Complains of intermittent nausea. Scheduled Zofran. VS stable. Accuchecks q4hrs. VS stable. Verbalizes understanding of plan of care. Nurse will continue to monitor.

## 2017-08-04 NOTE — ASSESSMENT & PLAN NOTE
- detemir to 20 BID for hyperglycemia  - SSI  - POCT glucose checks  - advanced to diabetic diet yesterday

## 2017-08-04 NOTE — PT/OT/SLP PROGRESS
"Physical Therapy  Treatment    Bay Ibarra   MRN: [id number]   Admitting Diagnosis: Acute pancreatitis with uninfected necrosis    PT Received On: 08/04/17  PT Start Time: 1240     PT Stop Time: 1303    PT Total Time (min): 23 min       Billable Minutes:  Gait [id number]    Treatment Type: Treatment  PT/PTA: PTA     PTA Visit Number: 1       General Precautions: Standard, fall  Orthopedic Precautions: N/A   Braces: N/A    Do you have any cultural, spiritual, Jew conflicts, given your current situation?: Church    Subjective:  Communicated with Salud de la cruz prior to session.  Pt agreeable to therapy.   "I'm really tired today."    Pain/Comfort  Pain Rating 1:  (Pt reported pain but did not rate. )    Objective:   Patient found with: peripheral IV, NG tube    Functional Mobility:  Bed Mobility:   Supine to Sit: Modified Independent    Transfers:  Sit <> Stand Assistance: Supervision  Sit <> Stand Assistive Device: No Assistive Device    Gait:   Gait Distance: > 300 feet   Pt ascended and descended 2 steps then1 step with L HR. Pt with lateral swaying and dizziness after stairs.   Pt became agitated after conversation with someone in the yi and began to increase his angelic thus becoming unsafe 2/2 being lightheaded.  Vcs to slow angelic. Pt somewhat compliant.     Assistance 1: Contact Guard Assistance, Stand by Assistance, Minimum assistance  Gait Assistive Device: No device, Hand held assist  Gait Pattern: reciprocal  Gait Deviation(s): decreased angelic, increased time in double stance, decreased stride length, increased stride width, lateral lean, decreased weight-shifting ability      Balance:   Static Sit: GOOD: Takes MODERATE challenges from all directions  Dynamic Sit: GOOD: Maintains balance through MODERATE excursions of active trunk movement  Static Stand: FAIR+: Takes MINIMAL challenges from all directions  Dynamic stand: FAIR+: Needs CLOSE SUPERVISION during gait and is able to right " self with minor LOB     Therapeutic Activities and Exercises:  Pt educated about the importance of sitting up in his chair. He verbalized understanding and demonstrated it at the end of the session.   Set up of equipment for transfers and gait.   Donned skid proof socks and gown around back.      AM-PAC 6 CLICK MOBILITY  How much help from another person does this patient currently need?   1 = Unable, Total/Dependent Assistance  2 = A lot, Maximum/Moderate Assistance  3 = A little, Minimum/Contact Guard/Supervision  4 = None, Modified [place]/Independent    Turning over in bed (including adjusting bedclothes, sheets and blankets)?: 4  Sitting down on and standing up from a chair with arms (e.g., wheelchair, bedside commode, etc.): 4  Moving from lying on back to sitting on the side of the bed?: 4  Moving to and from a bed to a chair (including a wheelchair)?: 4  Need to walk in hospital room?: 3  Climbing 3-5 steps with a railing?: 3  Total Score: 22    AM-PAC Raw Score CMS G-Code Modifier Level of Impairment Assistance   6 % Total / Unable   7 - 9 CM 80 - 100% Maximal Assist   10 - 14 CL 60 - 80% Moderate Assist   15 - 19 CK 40 - 60% Moderate Assist   20 - 22 CJ 20 - 40% Minimal Assist   23 CI 1-20% SBA / CGA   24 CH 0% Independent/ Mod I     Patient left up in chair with all lines intact, call button in reach and nsg notified.    Assessment:  Bay Ibarra is a 37 y.o. male with a medical diagnosis of Acute pancreatitis with uninfected necrosis and presents with the rehab identified problem list below. The patient dash treatment well. He was lightheaded for part of his session. Nsg notified. The patient would continue to benefit from skilled PT to address the deficits in his plan of care.     Rehab identified problem list/impairments: Rehab identified problem list/impairments: weakness, impaired endurance, impaired functional mobilty, gait instability, impaired balance    Rehab potential is  good.    Activity tolerance: Good    Discharge recommendations: Discharge Facility/Level Of Care Needs: home     Barriers to discharge: Barriers to Discharge: None    Equipment recommendations: Equipment Needed After Discharge: none     GOALS:    Physical Therapy Goals        Problem: Physical Therapy Goal    Goal Priority Disciplines Outcome Goal Variances Interventions   Physical Therapy Goal     PT/OT, PT Ongoing (interventions implemented as appropriate)     Description:  Goals to be met by:      Patient will increase functional independence with mobility by performin. Sit to stand transfer with Supervision Met 8/3/17  2. Gait  x 200 feet with Supervision using no device.   3. Ascend/Descend 6 inch curb step with Supervision using no device.                       PLAN:    Patient to be seen 3 x/week  to address the above listed problems via gait training, therapeutic activities, therapeutic exercises  Plan of Care expires: 17  Plan of Care reviewed with: patient         Sharath Edouard, PTA  2017

## 2017-08-04 NOTE — ASSESSMENT & PLAN NOTE
- 2/2 hypertriglyceridemia, labs on admission: Lipase 4000 (trended down to 200 now), triglycerides 1500, lactate 10.4 (all improved since admission)  - CT showed extensive acute pancreatitis. The pancreas is hypoperfused which may be due to an edematous state however cannot exclude vascular compromise  - repeat US abdomen shows hepatosplenomegaly with hepatic steatosis, edematous pancreas, no e/o gallstones  - currently on cefepime and flagyl- day 9/10 for presumed superimposed infection   - removed NG 8/3 and advanced diet- no further episodes of vomiting  - continuing supportive care, famotidine, lyte replacement, pain and nausea control  - at discharge, will refer to endocrinology for DM and cardiology for further management of hypertriglyceridemia

## 2017-08-04 NOTE — PROGRESS NOTES
"[facility] Hospital Medicine  Progress Note    Patient Name: Bay Ibarra  MRN: [id number]  Patient Class: IP- Inpatient   Admission Date: 7/25/2017  Length of Stay: 10 days  Attending Physician: Connor Nobles MD  Primary Care Provider: José Luis Singleton MD    [facility] Medicine Team: [facility] 1 Marco Medel MD    Subjective:     Principal Problem:Acute pancreatitis with uninfected necrosis    HPI:  Mr. Ibarra is a 38 y/o gentleman with PMHx HTN, HLD, Hypertriglyceridemia, DM I, gout, and pancreatitis (apirl 2017) who was transferred from [place] with necrotizing pancreatitis and DKA. The patient states he started feeling "ill and weak" 2 days ago, and yesterday morning he woke up with severe epigastric abdominal pain and had multiple episodes of nausea and vomiting with poor PO intake. He endorsed starting gemfibrozil recently for HLD and regular compliance with his insulin glargine BID. He presented to [place] with Lipase 4000, Triglycerides 1500, lactate 10 and elevated anion gap. He received 3 L of ns and IV insulin at [place] before being transported to [facility] for higher level of care. Upon initial assessment in the ED, the patient was lethargic, but arousable, , /63, and SpO2 98% on 2L nasal cannula. He denied having any fevers, chills, CP, cough, diarrhea or dysuria.  He was started on 2L of lactated ringers, IV insulin and admitted to the MICU.     Hospital Course:  7/27: Having pain and nausea without vomiting overnight, controlled with morphine 4 x 4 IV. Patient also developed acute respiratory failure requiring intubation. Elevated bladder pressures overnight improving with treatment of ileus  7/28: NG tube for elevated bladder pressures.   7/29: Extubated. Bladder pressures resolved with NG. NG removed. Patient with continued NPO but dry heaving and nauseous.   7/30: Dry heaving is much improved. Got some NG decompression last night but " now removed. Small BMs overnight but not substantial. Did not sleep last night due to abdominal discomfort.  7/31: Pt continued to have abdominal pain and bloating, so NG tube was replaced. US abd showed hepatosplenomegaly with hepatic steatosis, edematous pancreas, and no evidence of gallstones.    8/1: slowly improving with significant output per NGT. C diff negative  8/2: Continues to improve  8/3: Restarted diet; tolerated with mild discomfort. NG tube removed.    Interval History: NAEON. He tolerated clamping of his NG yesterday and the tube was removed. He was started on clear liquids and advanced to a regular diet. He denies vomiting yesterday but did have some moderate abdominal discomfort that he attributes to eating too much to quickly. Also reports that his bowel movements are becoming formed.     Review of Systems  Objective:     Vital Signs (Most Recent):  Temp: 98.8 °F (37.1 °C) (08/04/17 1157)  Pulse: 95 (08/04/17 1157)  Resp: 20 (08/04/17 1157)  BP: (!) 149/77 (08/04/17 1157)  SpO2: 95 % (08/04/17 1157) Vital Signs (24h Range):  Temp:  [98.6 °F (37 °C)-99.7 °F (37.6 °C)] 98.8 °F (37.1 °C)  Pulse:  [] 95  Resp:  [20] 20  SpO2:  [95 %-96 %] 95 %  BP: (135-158)/(70-83) 149/77     Weight: 112.1 kg (247 lb 2.2 oz)  Body mass index is 35.46 kg/m².    Intake/Output Summary (Last 24 hours) at 08/04/17 1312  Last data filed at 08/04/17 0600   Gross per 24 hour   Intake             1000 ml   Output                0 ml   Net             1000 ml      Physical Exam   Constitutional: He is oriented to person, place, and time. No distress.   Eyes: EOM are normal. Right eye exhibits no discharge. Left eye exhibits no discharge.   Neck: Normal range of motion.   Cardiovascular: Normal rate, regular rhythm, normal heart sounds and intact distal pulses.    No murmur heard.  Pulmonary/Chest: Effort normal. He has no wheezes. He has no rales.   Abdominal: He exhibits no distension. There is tenderness (mild,  diffuse). There is no guarding.   Hypoactive bowel sounds   Musculoskeletal: He exhibits no edema or deformity.   Neurological: He is alert and oriented to person, place, and time. No cranial nerve deficit.   Skin: Skin is warm and dry. He is not diaphoretic.       Significant Labs:   CBC:   Recent Labs  Lab 08/03/17  0400 08/04/17  0346   WBC 10.98 8.89   HGB 12.7* 11.6*   HCT 41.5 35.9*    162     CMP:   Recent Labs  Lab 08/03/17  0400 08/03/17  1604 08/04/17  0345    141 138   K 3.4* 3.9 3.3*    99 97   CO2 36* 30* 30*   * 201* 132*   BUN 16 15 13   CREATININE 0.7 0.7 0.7   CALCIUM 8.4* 8.6* 8.4*   PROT 5.8* 5.8* 5.6*   ALBUMIN 1.9* 1.9* 1.9*   BILITOT 0.5 0.8 0.7   ALKPHOS 100 94 106   AST 32 41* 36   ALT 81* 68* 58*   ANIONGAP 9 12 11   EGFRNONAA >60.0 >60.0 >60.0     POCT Glucose:   Recent Labs  Lab 08/04/17  0201 08/04/17  0611 08/04/17  0932   POCTGLUCOSE 178* 143* 139*     All pertinent labs within the past 24 hours have been reviewed.    Significant Imaging: I have reviewed all pertinent imaging results/findings within the past 24 hours.    Assessment/Plan:      * Acute pancreatitis with uninfected necrosis    - 2/2 hypertriglyceridemia, labs on admission: Lipase 4000 (trended down to 200 now), triglycerides 1500, lactate 10.4 (all improved since admission)  - CT showed extensive acute pancreatitis. The pancreas is hypoperfused which may be due to an edematous state however cannot exclude vascular compromise  - repeat US abdomen shows hepatosplenomegaly with hepatic steatosis, edematous pancreas, no e/o gallstones  - currently on cefepime and flagyl- day 9/10 for presumed superimposed infection   - removed NG 8/3 and advanced diet- no further episodes of vomiting  - continuing supportive care, famotidine, lyte replacement, pain and nausea control  - at discharge, will refer to endocrinology for DM and cardiology for further management of hypertriglyceridemia        Ileus    -  hospital course complicated by ileus, reportedly improved after receiving enemas  - replaced NG for significant abdominal distention and discomfort  - now improving, NG removed 8/3, tolerating a diet        Diabetes mellitus type I    - detemir to 20 BID for hyperglycemia  - SSI  - POCT glucose checks  - advanced to diabetic diet yesterday        Hypertriglyceridemia    - TG >1500 on admit, causing acute pancreatitis. Concern for familial hyperlipidemia possibly  - TGs trended down to 216  - will require outpatient eval by a lipid specialist -- will refer to Dr. Martin with cardiology on discharge          VTE Risk Mitigation         Ordered     enoxaparin injection 40 mg  Daily     Route:  Subcutaneous        07/28/17 0805     Medium Risk of VTE  Once      07/25/17 2244     Place LEI hose  Until discontinued      07/25/17 2244     Place sequential compression device  Until discontinued      07/25/17 2244              Marco Medel MD  Department of Hospital Medicine   [facility]

## 2017-08-04 NOTE — PROGRESS NOTES
Notified primary team about patient's concerns about going home tomorrow and that lab was unable to collect blood specimen. MD to come to bedside to talk to patient. MAYI.

## 2017-08-04 NOTE — ASSESSMENT & PLAN NOTE
- hospital course complicated by ileus, reportedly improved after receiving enemas  - replaced NG for significant abdominal distention and discomfort  - now improving, NG removed 8/3, tolerating a diet

## 2017-08-04 NOTE — ASSESSMENT & PLAN NOTE
- TG >1500 on admit, causing acute pancreatitis. Concern for familial hyperlipidemia possibly  - TGs trended down to 216  - will require outpatient eval by a lipid specialist -- will refer to Dr. Martin with cardiology on discharge

## 2017-08-04 NOTE — SUBJECTIVE & OBJECTIVE
Interval History: NAEON. He tolerated clamping of his NG yesterday and the tube was removed. He was started on clear liquids and advanced to a regular diet. He denies vomiting yesterday but did have some moderate abdominal discomfort that he attributes to eating too much to quickly. Also reports that his bowel movements are becoming formed.     Review of Systems  Objective:     Vital Signs (Most Recent):  Temp: 98.8 °F (37.1 °C) (08/04/17 1157)  Pulse: 95 (08/04/17 1157)  Resp: 20 (08/04/17 1157)  BP: (!) 149/77 (08/04/17 1157)  SpO2: 95 % (08/04/17 1157) Vital Signs (24h Range):  Temp:  [98.6 °F (37 °C)-99.7 °F (37.6 °C)] 98.8 °F (37.1 °C)  Pulse:  [] 95  Resp:  [20] 20  SpO2:  [95 %-96 %] 95 %  BP: (135-158)/(70-83) 149/77     Weight: 112.1 kg (247 lb 2.2 oz)  Body mass index is 35.46 kg/m².    Intake/Output Summary (Last 24 hours) at 08/04/17 1312  Last data filed at 08/04/17 0600   Gross per 24 hour   Intake             1000 ml   Output                0 ml   Net             1000 ml      Physical Exam   Constitutional: He is oriented to person, place, and time. No distress.   Eyes: EOM are normal. Right eye exhibits no discharge. Left eye exhibits no discharge.   Neck: Normal range of motion.   Cardiovascular: Normal rate, regular rhythm, normal heart sounds and intact distal pulses.    No murmur heard.  Pulmonary/Chest: Effort normal. He has no wheezes. He has no rales.   Abdominal: He exhibits no distension. There is tenderness (mild, diffuse). There is no guarding.   Hypoactive bowel sounds   Musculoskeletal: He exhibits no edema or deformity.   Neurological: He is alert and oriented to person, place, and time. No cranial nerve deficit.   Skin: Skin is warm and dry. He is not diaphoretic.       Significant Labs:   CBC:   Recent Labs  Lab 08/03/17  0400 08/04/17  0346   WBC 10.98 8.89   HGB 12.7* 11.6*   HCT 41.5 35.9*    162     CMP:   Recent Labs  Lab 08/03/17  0400 08/03/17  1604 08/04/17  0346     141 138   K 3.4* 3.9 3.3*    99 97   CO2 36* 30* 30*   * 201* 132*   BUN 16 15 13   CREATININE 0.7 0.7 0.7   CALCIUM 8.4* 8.6* 8.4*   PROT 5.8* 5.8* 5.6*   ALBUMIN 1.9* 1.9* 1.9*   BILITOT 0.5 0.8 0.7   ALKPHOS 100 94 106   AST 32 41* 36   ALT 81* 68* 58*   ANIONGAP 9 12 11   EGFRNONAA >60.0 >60.0 >60.0     POCT Glucose:   Recent Labs  Lab 08/04/17  0201 08/04/17  0611 08/04/17  0932   POCTGLUCOSE 178* 143* 139*     All pertinent labs within the past 24 hours have been reviewed.    Significant Imaging: I have reviewed all pertinent imaging results/findings within the past 24 hours.

## 2017-08-05 VITALS
RESPIRATION RATE: 18 BRPM | BODY MASS INDEX: 35.38 KG/M2 | HEIGHT: 70 IN | HEART RATE: 90 BPM | DIASTOLIC BLOOD PRESSURE: 75 MMHG | WEIGHT: 247.13 LBS | TEMPERATURE: 98 F | OXYGEN SATURATION: 96 % | SYSTOLIC BLOOD PRESSURE: 147 MMHG

## 2017-08-05 PROBLEM — K56.7 ILEUS: Status: RESOLVED | Noted: 2017-07-28 | Resolved: 2017-08-05

## 2017-08-05 PROBLEM — K85.91 ACUTE PANCREATITIS WITH UNINFECTED NECROSIS: Status: RESOLVED | Noted: 2017-07-25 | Resolved: 2017-08-05

## 2017-08-05 LAB
ALBUMIN SERPL BCP-MCNC: 1.9 G/DL
ALP SERPL-CCNC: 124 U/L
ALT SERPL W/O P-5'-P-CCNC: 48 U/L
ANION GAP SERPL CALC-SCNC: 11 MMOL/L
AST SERPL-CCNC: 50 U/L
BILIRUB SERPL-MCNC: 1 MG/DL
BUN SERPL-MCNC: 11 MG/DL
CALCIUM SERPL-MCNC: 8.5 MG/DL
CHLORIDE SERPL-SCNC: 97 MMOL/L
CO2 SERPL-SCNC: 29 MMOL/L
CREAT SERPL-MCNC: 0.7 MG/DL
EST. GFR  (AFRICAN AMERICAN): >60 ML/MIN/1.73 M^2
EST. GFR  (NON AFRICAN AMERICAN): >60 ML/MIN/1.73 M^2
GLUCOSE SERPL-MCNC: 159 MG/DL
MAGNESIUM SERPL-MCNC: 1.4 MG/DL
PHOSPHATE SERPL-MCNC: 2.6 MG/DL
POCT GLUCOSE: 145 MG/DL (ref 70–110)
POCT GLUCOSE: 161 MG/DL (ref 70–110)
POCT GLUCOSE: 185 MG/DL (ref 70–110)
POCT GLUCOSE: 197 MG/DL (ref 70–110)
POTASSIUM SERPL-SCNC: 3.3 MMOL/L
PROT SERPL-MCNC: 5.8 G/DL
SODIUM SERPL-SCNC: 137 MMOL/L

## 2017-08-05 PROCEDURE — 63600175 PHARM REV CODE 636 W HCPCS: Performed by: STUDENT IN AN ORGANIZED HEALTH CARE EDUCATION/TRAINING PROGRAM

## 2017-08-05 PROCEDURE — 25000003 PHARM REV CODE 250: Performed by: HOSPITALIST

## 2017-08-05 PROCEDURE — A4216 STERILE WATER/SALINE, 10 ML: HCPCS | Performed by: STUDENT IN AN ORGANIZED HEALTH CARE EDUCATION/TRAINING PROGRAM

## 2017-08-05 PROCEDURE — 25000003 PHARM REV CODE 250: Performed by: STUDENT IN AN ORGANIZED HEALTH CARE EDUCATION/TRAINING PROGRAM

## 2017-08-05 PROCEDURE — 83735 ASSAY OF MAGNESIUM: CPT

## 2017-08-05 PROCEDURE — 84100 ASSAY OF PHOSPHORUS: CPT

## 2017-08-05 PROCEDURE — 99238 HOSP IP/OBS DSCHRG MGMT 30/<: CPT | Mod: ,,, | Performed by: HOSPITALIST

## 2017-08-05 PROCEDURE — 63600175 PHARM REV CODE 636 W HCPCS: Performed by: HOSPITALIST

## 2017-08-05 PROCEDURE — S0030 INJECTION, METRONIDAZOLE: HCPCS | Performed by: HOSPITALIST

## 2017-08-05 PROCEDURE — 80053 COMPREHEN METABOLIC PANEL: CPT

## 2017-08-05 RX ORDER — INSULIN GLARGINE 100 [IU]/ML
INJECTION, SOLUTION SUBCUTANEOUS
Qty: 15 SYRINGE | Refills: 5 | Status: SHIPPED | OUTPATIENT
Start: 2017-08-05 | End: 2017-08-16 | Stop reason: SDUPTHER

## 2017-08-05 RX ORDER — ROSUVASTATIN CALCIUM 5 MG/1
5 TABLET, COATED ORAL DAILY
Qty: 90 TABLET | Refills: 0 | Status: SHIPPED | OUTPATIENT
Start: 2017-08-05 | End: 2017-08-05 | Stop reason: HOSPADM

## 2017-08-05 RX ORDER — MAGNESIUM SULFATE HEPTAHYDRATE 40 MG/ML
2 INJECTION, SOLUTION INTRAVENOUS ONCE
Status: COMPLETED | OUTPATIENT
Start: 2017-08-05 | End: 2017-08-05

## 2017-08-05 RX ADMIN — METRONIDAZOLE 500 MG: 500 INJECTION, SOLUTION INTRAVENOUS at 05:08

## 2017-08-05 RX ADMIN — CEFEPIME HYDROCHLORIDE 1 G: 1 INJECTION, SOLUTION INTRAVENOUS at 10:08

## 2017-08-05 RX ADMIN — MAGNESIUM SULFATE IN WATER 2 G: 40 INJECTION, SOLUTION INTRAVENOUS at 06:08

## 2017-08-05 RX ADMIN — CEFEPIME HYDROCHLORIDE 1 G: 1 INJECTION, SOLUTION INTRAVENOUS at 12:08

## 2017-08-05 RX ADMIN — Medication 3 ML: at 06:08

## 2017-08-05 RX ADMIN — INSULIN ASPART 1 UNITS: 100 INJECTION, SOLUTION INTRAVENOUS; SUBCUTANEOUS at 05:08

## 2017-08-05 RX ADMIN — INSULIN ASPART 1 UNITS: 100 INJECTION, SOLUTION INTRAVENOUS; SUBCUTANEOUS at 12:08

## 2017-08-05 RX ADMIN — INSULIN ASPART 2 UNITS: 100 INJECTION, SOLUTION INTRAVENOUS; SUBCUTANEOUS at 12:08

## 2017-08-05 NOTE — SUBJECTIVE & OBJECTIVE
Interval History: NAEON. Tolerated a full diet yesterday with minimal discomfort. This morning, no pain or nausea.     Review of Systems  Objective:     Vital Signs (Most Recent):  Temp: 99.9 °F (37.7 °C) (08/05/17 0731)  Pulse: 92 (08/05/17 0731)  Resp: 20 (08/05/17 0731)  BP: (!) 142/70 (08/05/17 0731)  SpO2: (!) 93 % (08/05/17 0731) Vital Signs (24h Range):  Temp:  [98.4 °F (36.9 °C)-99.9 °F (37.7 °C)] 99.9 °F (37.7 °C)  Pulse:  [89-95] 92  Resp:  [16-20] 20  SpO2:  [93 %-97 %] 93 %  BP: (139-169)/(70-81) 142/70     Weight: 112.1 kg (247 lb 2.2 oz)  Body mass index is 35.46 kg/m².    Intake/Output Summary (Last 24 hours) at 08/05/17 0958  Last data filed at 08/04/17 2220   Gross per 24 hour   Intake              760 ml   Output                0 ml   Net              760 ml      Physical Exam   Constitutional: He is oriented to person, place, and time. No distress.   Eyes: EOM are normal. Right eye exhibits no discharge. Left eye exhibits no discharge.   Neck: Normal range of motion.   Cardiovascular: Normal rate, regular rhythm, normal heart sounds and intact distal pulses.    No murmur heard.  Pulmonary/Chest: Effort normal. He has no wheezes. He has no rales.   Abdominal: He exhibits no distension. There is no tenderness. There is no guarding.   Hypoactive bowel sounds   Musculoskeletal: He exhibits no edema or deformity.   Neurological: He is alert and oriented to person, place, and time. No cranial nerve deficit.   Skin: Skin is warm and dry. He is not diaphoretic.        Significant Labs:   CMP:   Recent Labs  Lab 08/03/17  1604 08/04/17  0345 08/05/17  0445    138 137   K 3.9 3.3* 3.3*   CL 99 97 97   CO2 30* 30* 29   * 132* 159*   BUN 15 13 11   CREATININE 0.7 0.7 0.7   CALCIUM 8.6* 8.4* 8.5*   PROT 5.8* 5.6* 5.8*   ALBUMIN 1.9* 1.9* 1.9*   BILITOT 0.8 0.7 1.0   ALKPHOS 94 106 124   AST 41* 36 50*   ALT 68* 58* 48*   ANIONGAP 12 11 11   EGFRNONAA >60.0 >60.0 >60.0     Magnesium:   Recent  Labs  Lab 08/04/17  0345 08/05/17  0445   MG 1.3* 1.4*     POCT Glucose:   Recent Labs  Lab 08/05/17  0055 08/05/17  0506 08/05/17  0858   POCTGLUCOSE 185* 161* 145*     All pertinent labs within the past 24 hours have been reviewed.    Significant Imaging: I have reviewed all pertinent imaging results/findings within the past 24 hours.

## 2017-08-05 NOTE — PLAN OF CARE
Problem: Patient Care Overview  Goal: Plan of Care Review  Outcome: Ongoing (interventions implemented as appropriate)  POC reviewed with patient, who verbalized understanding. AAOx4. Remains free of falls and injury. VSS. Bilateral hand edema, elevated on pillows. Tolerating 2000 DM diet. Intermittent nausea controlled with PRN zofran and no pain. Abx infusing. x3 BM, voiding per commode. Up ad ashley. Blood Glucose monitor q4, slide 15.  TEDS SCDS refused. No acute events. No distress noted. Possible DC. Call bell in reach. Will continue to monitor.

## 2017-08-05 NOTE — DISCHARGE SUMMARY
"DISCHARGE SUMMARY  Hospital Medicine    Team: [facility] 1    Patient Name: Bay Ibarra  YOB: 1980    Admit Date: 7/25/2017    Discharge Date: 08/05/2017    Discharge Attending Physician: Connor Nobles MD     Diagnoses:  Active Hospital Problems    Diagnosis  POA    Diabetes mellitus type I [E10.9]  Yes     Priority: 3     Type 1 diabetes mellitus with diabetic neuropathy [E10.40]  Yes    Hypertriglyceridemia [E78.1]  Yes    Sleep apnea [G47.30]  Yes      Resolved Hospital Problems    Diagnosis Date Resolved POA    *Acute pancreatitis with uninfected necrosis [K85.91] 08/05/2017 Yes     Priority: 1 - High    Ileus [K56.7] 08/05/2017 Yes     Priority: 2     Acute respiratory failure with hypoxia [J96.01] 08/02/2017 No     Priority: 4     Diarrhea [R19.7] 08/02/2017 No    TROY (acute kidney injury) [N17.9] 08/02/2017 Yes    Hyperglycemic hyperosmolar nonketotic coma [E11.01] 07/28/2017 Yes       Discharged Condition: admit problems have stabilized      HOSPITAL COURSE:    Initial Presentation:  Mr. Ibarra is a 36 y/o gentleman with PMHx HTN, HLD, Hypertriglyceridemia, DM I, gout, and pancreatitis (apirl 2017) who was transferred from [place] with necrotizing pancreatitis and DKA. The patient states he started feeling "ill and weak" 2 days ago, and yesterday morning he woke up with severe epigastric abdominal pain and had multiple episodes of nausea and vomiting with poor PO intake. He endorsed starting gemfibrozil recently for HLD and regular compliance with his insulin glargine BID. He presented to [place] with Lipase 4000, Triglycerides 1500, lactate 10 and elevated anion gap. He received 3 L of ns and IV insulin at [place] before being transported to [facility] for higher level of care. Upon initial assessment in the ED, the patient was lethargic, but arousable, , /63, and SpO2 98% on 2L nasal cannula. He denied having any fevers, chills, CP, cough, " diarrhea or dysuria.  He was started on 2L of lactated ringers, IV insulin and admitted to the MICU.     Course of Principle Problem for Admission:  7/27: Having pain and nausea without vomiting overnight, controlled with morphine 4 x 4 IV. Patient also developed acute respiratory failure requiring intubation. Elevated bladder pressures overnight improving with treatment of ileus  7/28: NG tube for elevated bladder pressures.   7/29: Extubated. Bladder pressures resolved with NG. NG removed. Patient with continued NPO but dry heaving and nauseous.   7/30: Dry heaving is much improved. Got some NG decompression last night but now removed. Small BMs overnight but not substantial. Did not sleep last night due to abdominal discomfort.  7/31: Pt continued to have abdominal pain and bloating, so NG tube was replaced. US abd showed hepatosplenomegaly with hepatic steatosis, edematous pancreas, and no evidence of gallstones.    8/1: slowly improving with significant output per NGT. C diff negative  8/2: Continues to improve  8/3: Restarted diet; tolerated with mild discomfort. NG tube removed.  8/4: Tolerating regular diet with minimal discomfort    Mr Ibarra presented with severe abdominal pain, nausea and vomiting with elevated lipase, triglycerides and lactate. He was initially admitted to the ICU where he developed acute respiratory failure and was intubated. He also developed acute hepatic injury, but transaminases normalized throughout hospitalization. NG tube was placed for abdominal decompression. He was extubated on 7/29 and had no other respiratory issues during his admission. Attempted to remove NG tube twice but patient subsequently developed bloating and nausea and the tube was replaced. NG was finally discontinued on 8/3 and pt was advanced to a full diet over the next two days. He was able to tolerate a regular diet with minimal discomfort and had resolution of his vomiting and ileus. Also triglycerides  trended down from ~1000 to 216. Imaging with CT and US showed extensive pancreatitis without evidence of gallstones. He was discharged on fenofibrate and omega-3s with instructions to eat small portions of bland food until his symptoms completely resolve. Referrals sent to Dr. Martin with cardiology for further management of his hypertriglyceridemia and endocrinology for diabetic management.    Other Medical Problems Addressed in the Hospital:  Ileus     - hospital course complicated by ileus, reportedly improved after receiving enemas  - replaced NG for significant abdominal distention and discomfort  - now improving, NG removed 8/3, tolerating a diet       Diabetes mellitus type I     - detemir to 20 BID for hyperglycemia  - SSI  - POCT glucose checks  - advanced to diabetic diet yesterday       Hypertriglyceridemia     - TG >1500 on admit, causing acute pancreatitis. Concern for familial hyperlipidemia possibly  - TGs trended down to 216  - will require outpatient eval by a lipid specialist -- will refer to Dr. Martin with cardiology on discharge  - discharging with fenofibrate and omega 3     Acute respiratory failure with hypoxia     - intubated 7/27 for acute respiratory failure with hypoxia, extubated 7/29  - CXR showed pulmonary edema  - currently breathing well on room air       Diarrhea     - in the setting of abx administration  - c diff negative       TROY (acute kidney injury)     - presented with TROY, Cr peaked at 2.5  - now improved to 0.7, baseline, pt maintaining good UOP  - US RP showed mildly elevated RIs, a small L renal cyst and ill-defined perinephric fluid likely related to pancreatitis       CONSULTS: Critical Care Medicine, General Surgery, Nutrition     Other Pertinent Lab Findings:    Triglycerides: 978-->216  Lipase: >4000-->204  Lactate: 10.4-->1.9  Hgb A1C: 11.6    Pertinent/Significant Diagnostic Studies:    CT abd/pel: Extensive acute pancreatitis. The pancreas is hypoperfused which may  be due to an edematous state however cannot exclude vascular compromise.  Fatty liver.    US Abdomen: Hepatosplenomegaly with hepatic steatosis.  Edematous pancreas compatible with pancreatitis as seen on recent CT.   No evidence for cholelithiasis.  Trace ascites. Left pleural effusion.     Disposition:  Home     Future Scheduled Appointments:  No future appointments.    Follow-up Plans from This Hospitalization:  Referrals sent to Dr. Martin with cardiology for further management of his hypertriglyceridemia and endocrinology for diabetic management. Pt instructed to return to the ED should he develop uncontrollable n/v or severe abdominal pain.    Last CBC/BMP/HgbA1c (if applicable):  Recent Results (from the past 336 hour(s))   CBC auto differential    Collection Time: 08/04/17  3:46 AM   Result Value Ref Range    WBC 8.89 3.90 - 12.70 K/uL    Hemoglobin 11.6 (L) 14.0 - 18.0 g/dL    Hematocrit 35.9 (L) 40.0 - 54.0 %    Platelets 162 150 - 350 K/uL   CBC auto differential    Collection Time: 08/03/17  4:00 AM   Result Value Ref Range    WBC 10.98 3.90 - 12.70 K/uL    Hemoglobin 12.7 (L) 14.0 - 18.0 g/dL    Hematocrit 41.5 40.0 - 54.0 %    Platelets 177 150 - 350 K/uL   CBC auto differential    Collection Time: 08/02/17  4:01 AM   Result Value Ref Range    WBC 11.73 3.90 - 12.70 K/uL    Hemoglobin 12.6 (L) 14.0 - 18.0 g/dL    Hematocrit 40.7 40.0 - 54.0 %    Platelets 162 150 - 350 K/uL     Recent Results (from the past 336 hour(s))   Basic metabolic panel    Collection Time: 07/27/17  7:46 PM   Result Value Ref Range    Sodium 135 (L) 136 - 145 mmol/L    Potassium 5.0 3.5 - 5.1 mmol/L    Chloride 102 95 - 110 mmol/L    CO2 20 (L) 23 - 29 mmol/L    BUN, Bld 34 (H) 6 - 20 mg/dL    Creatinine 1.8 (H) 0.5 - 1.4 mg/dL    Calcium 8.5 (L) 8.7 - 10.5 mg/dL    Anion Gap 13 8 - 16 mmol/L   Basic metabolic panel    Collection Time: 07/25/17  8:06 PM   Result Value Ref Range    Sodium 129 (L) 136 - 145 mmol/L    Potassium 5.1  "3.5 - 5.1 mmol/L    Chloride 97 95 - 110 mmol/L    CO2 14 (L) 23 - 29 mmol/L    BUN, Bld 17 2 - 20 mg/dL    Creatinine 1.39 0.50 - 1.40 mg/dL    Calcium 6.9 (LL) 8.7 - 10.5 mg/dL    Anion Gap 18 (H) 8 - 16 mmol/L   Basic metabolic panel    Collection Time: 07/25/17  8:06 PM   Result Value Ref Range    Sodium 129 (L) 136 - 145 mmol/L    Potassium 5.1 3.5 - 5.1 mmol/L    Chloride 97 95 - 110 mmol/L    CO2 14 (L) 23 - 29 mmol/L    BUN, Bld 17 2 - 20 mg/dL    Creatinine 1.39 0.50 - 1.40 mg/dL    Calcium 6.9 (LL) 8.7 - 10.5 mg/dL    Anion Gap 18 (H) 8 - 16 mmol/L     Lab Results   Component Value Date    HGBA1C 11.6 (H) 07/25/2017       Discharge Medication List:     Medication List      CHANGE how you take these medications    insulin glargine 100 unit/mL (3 mL) Inpn pen  Commonly known as:  LANTUS SOLOSTAR  25 units in the morning and 25 units at night.  What changed:  additional instructions        CONTINUE taking these medications    allopurinol 300 MG tablet  Commonly known as:  ZYLOPRIM  TAKE ONE TABLET BY MOUTH ONCE DAILY     amlodipine 10 MG tablet  Commonly known as:  NORVASC  Take 1 tablet (10 mg total) by mouth once daily.     aspirin 81 MG EC tablet  Commonly known as:  ECOTRIN     CONTOUR TEST STRIPS Strp  Generic drug:  blood sugar diagnostic     fenofibrate 160 MG Tab  Take 1 tablet (160 mg total) by mouth once daily.     insulin aspart 100 unit/mL injection  Commonly known as:  NOVOLOG  See sliding scale  TID     insulin syringe-needle U-100 29 gauge x 1/2" Syrg  Inject 1 (once) per day     omega-3 acid ethyl esters 1 gram capsule  Commonly known as:  LOVAZA  Take 2 capsules (2 g total) by mouth 2 (two) times daily.     pregabalin 150 MG capsule  Commonly known as:  LYRICA  Take 1 capsule (150 mg total) by mouth 2 (two) times daily.           Where to Get Your Medications      These medications were sent to [id number] - [place] - [id number]  [id number], [place] [id number]    Phone:  " [phone]   · insulin glargine 100 unit/mL (3 mL) Inpn pen         Patient Instructions:    Discharge Procedure Orders  Ambulatory consult to Cardiology   Referral Priority: Routine Referral Type: Consultation   Referral Reason: Specialty Services Required    Requested Specialty: Cardiology    Number of Visits Requested: 1      Ambulatory Referral to Endocrinology   Referral Priority: Routine Referral Type: Consultation   Requested Specialty: Endocrinology    Number of Visits Requested: 1      Ambulatory Referral to Cardiology   Referral Priority: Routine Referral Type: Consultation   Referral Reason: Specialty Services Required    Requested Specialty: Cardiology    Number of Visits Requested: 1          Signing Physician:  Marco Medel MD

## 2017-08-05 NOTE — ASSESSMENT & PLAN NOTE
- TG >1500 on admit, causing acute pancreatitis. Concern for familial hyperlipidemia possibly  - TGs trended down to 216  - will require outpatient eval by a lipid specialist -- will refer to Dr. Martin with cardiology on discharge  - discharging with fenofibrate and omega 3

## 2017-08-05 NOTE — PROGRESS NOTES
"[facility] Hospital Medicine  Progress Note    Patient Name: Bay Ibarra  MRN: [id number]  Patient Class: IP- Inpatient   Admission Date: 7/25/2017  Length of Stay: 11 days  Attending Physician: Connor Nobles MD  Primary Care Provider: José Luis Singleton MD    [facility] Medicine Team: [facility] HOSP MED 1 Marco Medel MD    Subjective:     Principal Problem:Acute pancreatitis with uninfected necrosis    HPI:  Mr. Ibarra is a 38 y/o gentleman with PMHx HTN, HLD, Hypertriglyceridemia, DM I, gout, and pancreatitis (apirl 2017) who was transferred from [place] with necrotizing pancreatitis and DKA. The patient states he started feeling "ill and weak" 2 days ago, and yesterday morning he woke up with severe epigastric abdominal pain and had multiple episodes of nausea and vomiting with poor PO intake. He endorsed starting gemfibrozil recently for HLD and regular compliance with his insulin glargine BID. He presented to [facility] with Lipase 4000, Triglycerides 1500, lactate 10 and elevated anion gap. He received 3 L of ns and IV insulin at [facility] before being transported to [facility] for higher level of care. Upon initial assessment in the ED, the patient was lethargic, but arousable, , /63, and SpO2 98% on 2L nasal cannula. He denied having any fevers, chills, CP, cough, diarrhea or dysuria.  He was started on 2L of lactated ringers, IV insulin and admitted to the MICU.     Hospital Course:  7/27: Having pain and nausea without vomiting overnight, controlled with morphine 4 x 4 IV. Patient also developed acute respiratory failure requiring intubation. Elevated bladder pressures overnight improving with treatment of ileus  7/28: NG tube for elevated bladder pressures.   7/29: Extubated. Bladder pressures resolved with NG. NG removed. Patient with continued NPO but dry heaving and nauseous.   7/30: Dry heaving is much improved. Got some NG decompression last night but " now removed. Small BMs overnight but not substantial. Did not sleep last night due to abdominal discomfort.  7/31: Pt continued to have abdominal pain and bloating, so NG tube was replaced. US abd showed hepatosplenomegaly with hepatic steatosis, edematous pancreas, and no evidence of gallstones.    8/1: slowly improving with significant output per NGT. C diff negative  8/2: Continues to improve  8/3: Restarted diet; tolerated with mild discomfort. NG tube removed.    Interval History: NAEON. Tolerated a full diet yesterday with minimal discomfort. This morning, no pain or nausea.     Review of Systems  Objective:     Vital Signs (Most Recent):  Temp: 99.9 °F (37.7 °C) (08/05/17 0731)  Pulse: 92 (08/05/17 0731)  Resp: 20 (08/05/17 0731)  BP: (!) 142/70 (08/05/17 0731)  SpO2: (!) 93 % (08/05/17 0731) Vital Signs (24h Range):  Temp:  [98.4 °F (36.9 °C)-99.9 °F (37.7 °C)] 99.9 °F (37.7 °C)  Pulse:  [89-95] 92  Resp:  [16-20] 20  SpO2:  [93 %-97 %] 93 %  BP: (139-169)/(70-81) 142/70     Weight: 112.1 kg (247 lb 2.2 oz)  Body mass index is 35.46 kg/m².    Intake/Output Summary (Last 24 hours) at 08/05/17 0958  Last data filed at 08/04/17 2220   Gross per 24 hour   Intake              760 ml   Output                0 ml   Net              760 ml      Physical Exam   Constitutional: He is oriented to person, place, and time. No distress.   Eyes: EOM are normal. Right eye exhibits no discharge. Left eye exhibits no discharge.   Neck: Normal range of motion.   Cardiovascular: Normal rate, regular rhythm, normal heart sounds and intact distal pulses.    No murmur heard.  Pulmonary/Chest: Effort normal. He has no wheezes. He has no rales.   Abdominal: He exhibits no distension. There is no tenderness. There is no guarding.   Hypoactive bowel sounds   Musculoskeletal: He exhibits no edema or deformity.   Neurological: He is alert and oriented to person, place, and time. No cranial nerve deficit.   Skin: Skin is warm and dry. He  is not diaphoretic.        Significant Labs:   CMP:   Recent Labs  Lab 08/03/17  1604 08/04/17  0345 08/05/17  0445    138 137   K 3.9 3.3* 3.3*   CL 99 97 97   CO2 30* 30* 29   * 132* 159*   BUN 15 13 11   CREATININE 0.7 0.7 0.7   CALCIUM 8.6* 8.4* 8.5*   PROT 5.8* 5.6* 5.8*   ALBUMIN 1.9* 1.9* 1.9*   BILITOT 0.8 0.7 1.0   ALKPHOS 94 106 124   AST 41* 36 50*   ALT 68* 58* 48*   ANIONGAP 12 11 11   EGFRNONAA >60.0 >60.0 >60.0     Magnesium:   Recent Labs  Lab 08/04/17  0345 08/05/17  0445   MG 1.3* 1.4*     POCT Glucose:   Recent Labs  Lab 08/05/17  0055 08/05/17  0506 08/05/17  0858   POCTGLUCOSE 185* 161* 145*     All pertinent labs within the past 24 hours have been reviewed.    Significant Imaging: I have reviewed all pertinent imaging results/findings within the past 24 hours.    Assessment/Plan:      * Acute pancreatitis with uninfected necrosis    - 2/2 hypertriglyceridemia, labs on admission: Lipase 4000 (trended down to 200 now), triglycerides 1500, lactate 10.4 (all improved since admission)  - CT showed extensive acute pancreatitis. The pancreas is hypoperfused which may be due to an edematous state however cannot exclude vascular compromise  - repeat US abdomen shows hepatosplenomegaly with hepatic steatosis, edematous pancreas, no e/o gallstones  - currently on cefepime and flagyl- day 9/10 for presumed superimposed infection   - removed NG 8/3 and advanced diet- no further episodes of vomiting  - continuing supportive care, famotidine, lyte replacement, pain and nausea control  - at discharge, will refer to endocrinology for DM and cardiology for further management of hypertriglyceridemia        Ileus    - hospital course complicated by ileus, reportedly improved after receiving enemas  - replaced NG for significant abdominal distention and discomfort  - now improving, NG removed 8/3, tolerating a diet        Diabetes mellitus type I    - detemir to 20 BID for hyperglycemia  - SSI  - POCT  glucose checks  - advanced to diabetic diet yesterday        Hypertriglyceridemia    - TG >1500 on admit, causing acute pancreatitis. Concern for familial hyperlipidemia possibly  - TGs trended down to 216  - will require outpatient eval by a lipid specialist -- will refer to Dr. Martin with cardiology on discharge  - discharging with fenofibrate and omega 3          VTE Risk Mitigation         Ordered     enoxaparin injection 40 mg  Daily     Route:  Subcutaneous        07/28/17 0805     Medium Risk of VTE  Once      07/25/17 2244     Place LEI hose  Until discontinued      07/25/17 2244     Place sequential compression device  Until discontinued      07/25/17 2244              Marco Medel MD  Department of Hospital Medicine   [facility]

## 2017-08-06 NOTE — PT/OT/SLP DISCHARGE
Physical Therapy Discharge Summary    Bay Ibarra  MRN: [id number]   Acute pancreatitis with uninfected necrosis   Patient Discharged from acute Physical Therapy on 17.  Please refer to prior PT noted date on 17 for functional status.     Assessment:   Patient appropriate for care in another setting.  GOALS:    Physical Therapy Goals        Problem: Physical Therapy Goal    Goal Priority Disciplines Outcome Goal Variances Interventions   Physical Therapy Goal     PT/OT, PT Ongoing (interventions implemented as appropriate)     Description:  Goals to be met by:      Patient will increase functional independence with mobility by performin. Sit to stand transfer with Supervision Met 8/3/17  2. Gait  x 200 feet with Supervision using no device.   3. Ascend/Descend 6 inch curb step with Supervision using no device.                     Reasons for Discontinuation of Therapy Services  Transfer to alternate level of care.      Plan:  Patient Discharged to: Home.    Radha Hu, PT  17

## 2017-08-08 ENCOUNTER — TELEPHONE (OUTPATIENT)
Dept: INTERNAL MEDICINE | Facility: CLINIC | Age: 37
End: 2017-08-08

## 2017-08-08 NOTE — TELEPHONE ENCOUNTER
Called pt home and cell number both are the same number and both are out of service. Called pt father Guero Ibarra, he will give pt our phone to give me a call in the am. Father Guero Ibarra Ph# [phone]. This patient need a hospital follow up ASAP. Vf/ma

## 2017-08-11 ENCOUNTER — OFFICE VISIT (OUTPATIENT)
Dept: INTERNAL MEDICINE | Facility: CLINIC | Age: 37
End: 2017-08-11
Payer: COMMERCIAL

## 2017-08-11 VITALS
HEIGHT: 70 IN | WEIGHT: 210.13 LBS | DIASTOLIC BLOOD PRESSURE: 65 MMHG | TEMPERATURE: 98 F | BODY MASS INDEX: 30.08 KG/M2 | RESPIRATION RATE: 18 BRPM | SYSTOLIC BLOOD PRESSURE: 130 MMHG | HEART RATE: 109 BPM | OXYGEN SATURATION: 97 %

## 2017-08-11 DIAGNOSIS — E10.65 TYPE 1 DIABETES MELLITUS WITH HYPERGLYCEMIA: ICD-10-CM

## 2017-08-11 DIAGNOSIS — K85.81 OTHER ACUTE PANCREATITIS WITH UNINFECTED NECROSIS: Primary | ICD-10-CM

## 2017-08-11 DIAGNOSIS — E78.2 MIXED HYPERLIPIDEMIA: ICD-10-CM

## 2017-08-11 DIAGNOSIS — I10 ESSENTIAL HYPERTENSION: ICD-10-CM

## 2017-08-11 PROCEDURE — 99496 TRANSJ CARE MGMT HIGH F2F 7D: CPT | Mod: S$GLB,,, | Performed by: INTERNAL MEDICINE

## 2017-08-11 RX ORDER — PREGABALIN 75 MG/1
CAPSULE ORAL
COMMUNITY
Start: 2017-06-13 | End: 2017-08-11 | Stop reason: SDUPTHER

## 2017-08-11 NOTE — PROGRESS NOTES
Subjective:      Patient ID: Bay Ibarra is a 37 y.o. male.    Chief Complaint: No chief complaint on file.    HPI:     Review of Systems    Objective:   There were no vitals taken for this visit.    Physical Exam    Assessment:     No diagnosis found.  Plan:     There are no diagnoses linked to this encounter.

## 2017-08-16 ENCOUNTER — OFFICE VISIT (OUTPATIENT)
Dept: ENDOCRINOLOGY | Facility: CLINIC | Age: 37
End: 2017-08-16
Payer: COMMERCIAL

## 2017-08-16 VITALS
BODY MASS INDEX: 30.87 KG/M2 | SYSTOLIC BLOOD PRESSURE: 140 MMHG | HEIGHT: 68 IN | WEIGHT: 203.69 LBS | DIASTOLIC BLOOD PRESSURE: 80 MMHG | HEART RATE: 72 BPM

## 2017-08-16 DIAGNOSIS — E66.9 OBESITY (BMI 35.0-39.9 WITHOUT COMORBIDITY): ICD-10-CM

## 2017-08-16 DIAGNOSIS — E10.40 TYPE 1 DIABETES MELLITUS WITH DIABETIC NEUROPATHY: Primary | ICD-10-CM

## 2017-08-16 PROBLEM — E78.1: Status: RESOLVED | Noted: 2017-05-03 | Resolved: 2017-08-16

## 2017-08-16 LAB
ALBUMIN SERPL-MCNC: 3.8 G/DL (ref 3.6–5.1)
ALBUMIN/CREAT UR: 6 MCG/MG CREAT
ALBUMIN/GLOB SERPL: 1.2 (CALC) (ref 1–2.5)
ALP SERPL-CCNC: 79 U/L (ref 40–115)
ALT SERPL-CCNC: 21 U/L (ref 9–46)
AST SERPL-CCNC: 31 U/L (ref 10–40)
BASOPHILS # BLD AUTO: 80 CELLS/UL (ref 0–200)
BASOPHILS NFR BLD AUTO: 1.9 %
BILIRUB SERPL-MCNC: 0.5 MG/DL (ref 0.2–1.2)
BUN SERPL-MCNC: 13 MG/DL (ref 7–25)
BUN/CREAT SERPL: ABNORMAL (CALC) (ref 6–22)
CALCIUM SERPL-MCNC: 9.7 MG/DL (ref 8.6–10.3)
CHLORIDE SERPL-SCNC: 98 MMOL/L (ref 98–110)
CHOLEST SERPL-MCNC: 176 MG/DL (ref 125–200)
CHOLEST/HDLC SERPL: 6.5 (CALC)
CO2 SERPL-SCNC: 29 MMOL/L (ref 20–31)
CREAT SERPL-MCNC: 0.67 MG/DL (ref 0.6–1.35)
CREAT UR-MCNC: 90 MG/DL (ref 20–370)
EOSINOPHIL # BLD AUTO: 172 CELLS/UL (ref 15–500)
EOSINOPHIL NFR BLD AUTO: 4.1 %
ERYTHROCYTE [DISTWIDTH] IN BLOOD BY AUTOMATED COUNT: 13.2 % (ref 11–15)
GFR SERPL CREATININE-BSD FRML MDRD: 123 ML/MIN/1.73M2
GLOBULIN SER CALC-MCNC: 3.2 G/DL (CALC) (ref 1.9–3.7)
GLUCOSE SERPL-MCNC: 137 MG/DL (ref 65–99)
HBA1C MFR BLD: 9.7 % OF TOTAL HGB
HCT VFR BLD AUTO: 40.1 % (ref 38.5–50)
HDLC SERPL-MCNC: 27 MG/DL
HGB BLD-MCNC: 12.7 G/DL (ref 13.2–17.1)
LDLC SERPL CALC-MCNC: 110 MG/DL (CALC)
LYMPHOCYTES # BLD AUTO: 781 CELLS/UL (ref 850–3900)
LYMPHOCYTES NFR BLD AUTO: 18.6 %
MAGNESIUM SERPL-MCNC: 1.8 MG/DL (ref 1.5–2.5)
MCH RBC QN AUTO: 26.8 PG (ref 27–33)
MCHC RBC AUTO-ENTMCNC: 31.7 G/DL (ref 32–36)
MCV RBC AUTO: 84.6 FL (ref 80–100)
MICROALBUMIN UR-MCNC: 0.5 MG/DL
MONOCYTES # BLD AUTO: 664 CELLS/UL (ref 200–950)
MONOCYTES NFR BLD AUTO: 15.8 %
NEUTROPHILS # BLD AUTO: 2503 CELLS/UL (ref 1500–7800)
NEUTROPHILS NFR BLD AUTO: 59.6 %
NONHDLC SERPL-MCNC: 149 MG/DL (CALC)
PLATELET # BLD AUTO: 356 THOUSAND/UL (ref 140–400)
PMV BLD REES-ECKER: 10.9 FL (ref 7.5–12.5)
POTASSIUM SERPL-SCNC: 4.1 MMOL/L (ref 3.5–5.3)
PROT SERPL-MCNC: 7 G/DL (ref 6.1–8.1)
RBC # BLD AUTO: 4.74 MILLION/UL (ref 4.2–5.8)
SODIUM SERPL-SCNC: 138 MMOL/L (ref 135–146)
TRIGL SERPL-MCNC: 196 MG/DL
WBC # BLD AUTO: 4.2 THOUSAND/UL (ref 3.8–10.8)

## 2017-08-16 PROCEDURE — 3079F DIAST BP 80-89 MM HG: CPT | Mod: S$GLB,,, | Performed by: INTERNAL MEDICINE

## 2017-08-16 PROCEDURE — 99214 OFFICE O/P EST MOD 30 MIN: CPT | Mod: S$GLB,,, | Performed by: INTERNAL MEDICINE

## 2017-08-16 PROCEDURE — 3008F BODY MASS INDEX DOCD: CPT | Mod: S$GLB,,, | Performed by: INTERNAL MEDICINE

## 2017-08-16 PROCEDURE — 99999 PR PBB SHADOW E&M-EST. PATIENT-LVL III: CPT | Mod: PBBFAC,,, | Performed by: INTERNAL MEDICINE

## 2017-08-16 PROCEDURE — 3077F SYST BP >= 140 MM HG: CPT | Mod: S$GLB,,, | Performed by: INTERNAL MEDICINE

## 2017-08-16 PROCEDURE — 3046F HEMOGLOBIN A1C LEVEL >9.0%: CPT | Mod: S$GLB,,, | Performed by: INTERNAL MEDICINE

## 2017-08-16 RX ORDER — INSULIN GLARGINE 100 [IU]/ML
60 INJECTION, SOLUTION SUBCUTANEOUS NIGHTLY
Qty: 15 SYRINGE | Refills: 11 | Status: SHIPPED | OUTPATIENT
Start: 2017-08-16 | End: 2017-08-24 | Stop reason: SDUPTHER

## 2017-08-16 RX ORDER — INSULIN ASPART 100 [IU]/ML
INJECTION, SOLUTION INTRAVENOUS; SUBCUTANEOUS
Qty: 15 ML | Refills: 11 | Status: SHIPPED | OUTPATIENT
Start: 2017-08-16 | End: 2017-10-16 | Stop reason: SDUPTHER

## 2017-08-16 NOTE — PATIENT INSTRUCTIONS
changed insulin dosing  Lantus 60 units at bedtime  Novolog 20 units before meals + sliding scale --> before meals only.   Take additional insulin when sugars are 150 or higher    Please drop off log next week    Prescriptions updated    Diabetes education appointment   F/u with us in two months.

## 2017-08-16 NOTE — PROGRESS NOTES
Subjective:     Patient ID: Bay Ibarra is a 37 y.o. male.    Chief Complaint: Diabetes Mellitus    HPI:   Mr. Ibarra is a 37 y.o. male who is here for a consult visit for evaluation of diabetes complicated by neuropathy; he is unclear if he has type 1 or type 2. He thinks he has type 2 because both mother and father have type 2 diabetes.     Initial diagnosed was made on abnormal blood glucose values done by PCP eight years ago.     Regimen:  Lantus 40 units at night and in the morning. Denies skipping doses. (25 units bid when discharged from the hospital - admitted for pancreatitis)  Novolog sliding scale (depends on readings, > 230 takes 20 units)' before meals    Denies any difficulty with sites of injections or injections. Occasional pain and burning pain with lantus. Moves it around his abdomen.     Denies hypoglycemic episodes in the past year. One year ago had a significant low blood sugar, required help getting a glass of juice.     Numbness, tingling of left  Feet, very faint over right foot.     Denies any symptomatic CAD, strokes, kidney disease.   Eye exam is two years.     Checks blood sugars before meals and bedtime.     August 5th discharged following admission for acute pancreatitis (third episode). Four years ago had first episode, second episode was five months ago.     Review of Systems   Constitutional: Negative for chills and fever.   HENT: Negative for congestion and sinus pressure.    Eyes: Negative for visual disturbance.   Respiratory: Negative for chest tightness and shortness of breath.    Cardiovascular: Negative for chest pain and palpitations.   Gastrointestinal: Negative for abdominal distention, diarrhea, nausea and vomiting.   Genitourinary: Negative for dysuria and flank pain.   Musculoskeletal: Negative for back pain.   Skin: Negative for rash.   Neurological: Negative for weakness.   Hematological: Does not bruise/bleed easily.   Psychiatric/Behavioral: Negative for  "sleep disturbance.        Objective:     Physical Exam   Constitutional: He is oriented to person, place, and time. He appears well-developed and well-nourished. No distress.   HENT:   Head: Normocephalic and atraumatic.   Nose: Nose normal.   Mouth/Throat: Oropharynx is clear and moist. No oropharyngeal exudate.   Eyes: Conjunctivae and EOM are normal. Pupils are equal, round, and reactive to light. No scleral icterus.   Neck: Normal range of motion. Neck supple. No thyromegaly present.   Cardiovascular: Normal rate, regular rhythm, normal heart sounds and intact distal pulses.    Pulmonary/Chest: Effort normal and breath sounds normal.   Abdominal: Soft. Bowel sounds are normal. He exhibits no distension.   Normal sites of insulin administration.   Musculoskeletal: Normal range of motion. He exhibits no edema or tenderness.   Lymphadenopathy:     He has no cervical adenopathy.   Neurological: He is alert and oriented to person, place, and time. He has normal reflexes.   Skin: Skin is warm and dry.   FOOT EXAM:  Visual inspection reveals no abrasions, bruises or calluses.  Vibratory sense is absent over feet b/l   Microfilament test is absent over left, intact right.  Distal pulses present b/l.   Psychiatric: He has a normal mood and affect.       Vitals:    08/16/17 0859   BP: (!) 140/80   BP Location: Left arm   Patient Position: Sitting   BP Method: Medium (Manual)   Pulse: 72   Weight: 92.4 kg (203 lb 11.3 oz)   Height: 5' 8" (1.727 m)     255 in the office     Assessment/Plan:     1. Type 1 diabetes mellitus with diabetic neuropathy  - redistributed insulin as follows:  lantus 60 units at bedtime  Novolog 20 units before meals + sliding scale  To drop off log next week    - C-peptide; Future  - Glucose, random; Future  - Ambulatory Referral to Diabetes Education    2. Obesity (BMI 35.0-39.9 without comorbidity)  - discussed healthy options  - exercise         "

## 2017-08-16 NOTE — LETTER
August 16, 2017      Marco Medel MD  [address]  [facility] [address]           [facility]  [address]  [facility] [address]  Phone: [phone]  Fax: [phone]          Patient: Bay Ibarra   MR Number: [id number]   YOB: 1980   Date of Visit: 8/16/2017       Dear Dr. Marco Medel:    Thank you for referring Bay Ibarra to me for evaluation. Attached you will find relevant portions of my assessment and plan of care.    If you have questions, please do not hesitate to call me. I look forward to following Bay Ibarra along with you.    Sincerely,    eMlissa Thompson MD    Enclosure  CC:  No Recipients    If you would like to receive this communication electronically, please contact [email or web address] or [phone] to request more information on [employer] access.    For providers and/or their staff who would like to refer a patient to [facility], please contact us through our one-stop-shop provider referral line, [facility], at [phone].    If you feel you have received this communication in error or would no longer like to receive these types of communications, please e-mail [email or web address]

## 2017-08-17 ENCOUNTER — CLINICAL SUPPORT (OUTPATIENT)
Dept: DIABETES | Facility: CLINIC | Age: 37
End: 2017-08-17
Payer: COMMERCIAL

## 2017-08-17 DIAGNOSIS — E10.40 TYPE 1 DIABETES MELLITUS WITH DIABETIC NEUROPATHY: ICD-10-CM

## 2017-08-17 PROCEDURE — G0108 DIAB MANAGE TRN  PER INDIV: HCPCS | Mod: S$GLB,,, | Performed by: DIETITIAN, REGISTERED

## 2017-08-17 NOTE — PROGRESS NOTES
"Diabetes Education  Author: Radha Burks RD  Date: 8/17/2017    Diabetes Education Visit  Diabetes Education Record Assessment/Progress: Initial    Diabetes Type  Diabetes Type : Type I (Reports he thinks dx of type I was a mistake years ago at outside hospital. C-Pep and fasting glucose yesterday. C-Peptide was 1.53.)    Diabetes History  Diabetes Diagnosis: 5-10 years    Nutrition  Meal Planning: diet drinks, drinks regular soda, snacks between meal, 3 meals per day  Meal Plan 24 Hour Recall - Breakfast: cheerios (~1.25 cups), 1% milk, OR eggs, with 2 wheat toast, diet cranberry juice  Meal Plan 24 Hour Recall - Lunch: yesterday [employer] sandwich 12 inch with baked potato chips OR home - turkey sandwich, 1 cup grapes, water or powerade zero  Meal Plan 24 Hour Recall - Dinner: baked or boiled chicken, can green beans  Meal Plan 24 Hour Recall - Snack: fruit    Monitoring   Self Monitoring : SMBG 4 times daily AC/HS - brought log today written in notebook and shared log with Dr. Thompson yesterday - am and lunch readings mostly 110s-140s, dinner readings highest sometimes going into 200s  Blood Glucose Logs: Yes    Exercise   Exercise Type: walking    Current Diabetes Treatment   Current Treatment: Insulin (Novolog 20 AC + correction 1:25/150, Lantus 60 units every evening)    Social History  Preferred Learning Method: Face to Face, Reading Materials  Primary Support: Self       Barriers to Change  Barriers to Change: None  Learning Challenges : None    Readiness to Learn   Readiness to Learn : Acceptance    Cultural Influences  Cultural Influences: No    Diabetes Education Assessment/Progress     Acute Complications (preventing, detecting, and treating acute complications): Discussion, Instructed, Competent (verbalizes/demonstrates), Written Materials Provided, Individual Session (Does not keep hypoglycemia treatment with him. Reviewed causes, s/s and appropriate treatment of hypoglycemia with "rule of 15." " Encouraged always keep treatment nearbly. He had 1 hypo event >1 year ago in the middle of the night requiring assistance. )    Diabetes Disease Process (diabetes disease process and treatment options): Discussion, Instructed, Competent (verbalizes/demonstrates), Written Materials Provided, Individual Session    Nutrition (Incorporating nutritional management into one's lifestyle): Discussion, Instructed, Competent (verbalizes/demonstrates), Written Materials Provided, Individual Session (Has made significant changes - avoiding sugary beverages, choosing more whole grains, non-starchy veg, and baked/grilled proteins. Portion sizes sometimes large - especially for lunch. Provided education on basic CHO counting - sources of CHO, serving sizes, label reading, spacing meals at least 4 hours apart, and rec'd eating pattern with 45-60g CHO/meal, 3 meals daily and limiting snacks between to mostly 0-5g CHO. Provided examples of meal planning and portion control. )    Physical Activity (incorporating physical activity into one's lifestyle): Discussion, Instructed, Competent (verbalizes/demonstrates), Written Materials Provided, Individual Session (Discussed benefits and goals. He is planning to start going to gym with his friend beginning next week. )    Medications (states correct name, dose, onset, peak, duration, side effects & timing of meds): Discussion, Instructed, Competent (verbalizes/demonstrates), Written Materials Provided, Individual Session (Reviewed timing, dosage, and action time of Novolog and Lantus. He reports appropriate self-administration of both. Reviewed always take Novolog 5-15 min before meal, encouraged lantus same time every day, and reviewed site selection and rotation. )    Monitoring (monitoring blood glucose/other parameters & using results): Discussion, Instructed, Competent (verbalizes/demonstrates), Written Materials Provided, Individual Session (Reviewed SMBG 4 times daily AC/HS, goal BG  readings, and continue bringing log to appts .)    Goal Setting and Problem Solving (verbalizes behavior change strategies & sets realistic goals): Discussion, Individual Session    Behavior Change (developing personal strategies to health & behavior change): Discussion, Individual Session    Goals  Healthy Eating: Set (Use basic CHO counting to control intake. Avoid CHO snacks. )  Start Date: 08/17/17  Target Date: 11/17/17  Reducing Risks: Set (Keep treatment for hypoglycemia at bedside. )  Start Date: 08/17/17  Target Date: 11/17/17         Diabetes Care Plan/Intervention  Education Plan/Intervention: Individual Follow-Up DSMT    Diabetes Meal Plan  Carbohydrate Per Meal: 45-60g  Carbohydrate Per Snack :  (mostly 0-5g CHO snacks)    Education Units of Time   Time Spent: 60 min      Health Maintenance Topics with due status: Not Due       Topic Last Completion Date    Foot Exam 06/23/2017    Lipid Panel 08/15/2017    Hemoglobin A1c 08/15/2017    Urine Microalbumin 08/15/2017     Health Maintenance Due   Topic Date Due    TETANUS VACCINE  06/26/1998    Pneumococcal PPSV23 (Medium Risk) (1) 06/26/1998    Eye Exam  02/15/2017    Influenza Vaccine  08/01/2017

## 2017-08-18 NOTE — PHYSICIAN QUERY
PT Name: Bay Ibarra  MR #: [id number]    Physician Query Form -Present on Admission (POA) Diagnosis Clarification     CDS: JORDYN Kilpatrick, RN, CCM, CCDS               Contact information: [email or web address]    This form is a permanent document in the medical record.     Query Date: August 18, 2017    By submitting this query, we are merely seeking further clarification of documentation. Please utilize your independent clinical judgment when addressing the question(s) below.       The Medical record contains the following:    Diagnosis      Supporting Clinical Information   Location in Medical Record     Acute respiratory failure with hypoxia         Acute respiratory failure with hypoxia   Date Resolved 08/02/2017 POA No    Patient is still tachycardic, and tachypnic. He is slightly hypoxic on room air 90%.    Patient started on oxygen, connected to monitor.     Respiratory rate 22 > 44      He appears distressed.  Pulmonary/Chest: No respiratory distress. He has no wheezes. He has no rhonchi. He has no rales. He exhibits no tenderness.    PH       7.22  PCO2  31  PO2     91  HCO3  12.70    PH        7.033  PCO2   47.0  PO2      36  HCO3   12.5      from [facility] with necrotizing pancreatitis and DKA.      0640: Pt finished eating and was c/o difficulty breathing. Sats in 80s after position change, facial mask 15L put on and sats still in 80s. MD Rush, CCS called. MD reported to pts. Bedside. ABG ran, need for intubation evident. Pt verbalized consent and was intubated. Pt sating 96% and on sedation with propofol per order     DC summary 08/05/2017    ED note 07/25/2017              ED VS flowsheet 07/25/2017    ED Physical Exam 07/25/2017          ABG's 07/25/2017 1800        ABG's 07/25/2017 2243          H&P 07/26/2017        ICU RN note 07/27/2017           To ensure accurate reporting, Doctor, Please specify Present On Admission (POA) status of _Acute respiratory failure with hypoxia_.    [ x  ] Present on Admission   [  ] Not Present on Admission  [  ] Clinically undetermined

## 2017-08-22 ENCOUNTER — PATIENT MESSAGE (OUTPATIENT)
Dept: DIABETES | Facility: CLINIC | Age: 37
End: 2017-08-22

## 2017-08-22 ENCOUNTER — OFFICE VISIT (OUTPATIENT)
Dept: CARDIOLOGY | Facility: CLINIC | Age: 37
End: 2017-08-22
Payer: COMMERCIAL

## 2017-08-22 VITALS
SYSTOLIC BLOOD PRESSURE: 140 MMHG | BODY MASS INDEX: 31.07 KG/M2 | HEIGHT: 68 IN | WEIGHT: 205 LBS | HEART RATE: 86 BPM | DIASTOLIC BLOOD PRESSURE: 84 MMHG

## 2017-08-22 DIAGNOSIS — Z87.19 HX OF ACUTE PANCREATITIS: ICD-10-CM

## 2017-08-22 DIAGNOSIS — G47.30 SLEEP APNEA, UNSPECIFIED TYPE: ICD-10-CM

## 2017-08-22 DIAGNOSIS — E13.9 DIABETES 1.5, MANAGED AS TYPE 1: ICD-10-CM

## 2017-08-22 DIAGNOSIS — E66.9 OBESITY (BMI 30-39.9): ICD-10-CM

## 2017-08-22 DIAGNOSIS — E78.1 HYPERTRIGLYCERIDEMIA: ICD-10-CM

## 2017-08-22 DIAGNOSIS — I10 ESSENTIAL HYPERTENSION: Primary | ICD-10-CM

## 2017-08-22 PROCEDURE — 99203 OFFICE O/P NEW LOW 30 MIN: CPT | Mod: S$GLB,,, | Performed by: STUDENT IN AN ORGANIZED HEALTH CARE EDUCATION/TRAINING PROGRAM

## 2017-08-22 PROCEDURE — 99999 PR PBB SHADOW E&M-EST. PATIENT-LVL IV: CPT | Mod: PBBFAC,,,

## 2017-08-22 RX ORDER — LOSARTAN POTASSIUM 100 MG/1
50 TABLET ORAL DAILY
Qty: 45 TABLET | Refills: 3 | Status: SHIPPED | OUTPATIENT
Start: 2017-08-22 | End: 2018-08-28 | Stop reason: SDUPTHER

## 2017-08-22 RX ORDER — ALLOPURINOL 300 MG/1
TABLET ORAL
Qty: 30 TABLET | Refills: 5 | Status: SHIPPED | OUTPATIENT
Start: 2017-08-22 | End: 2018-02-22 | Stop reason: SDUPTHER

## 2017-08-22 NOTE — PROGRESS NOTES
I have personally interviewed and examined the patient. I reviewed the notes, assessments, and plan performed by Dr Rich and Dr Nakita Arguello and I CONCUR with their documentation of Bay Ibarra.

## 2017-08-22 NOTE — PROGRESS NOTES
CARDIOLOGY CLINIC  CLINIC NOTE    Patient Name: Bay Ibarra  YOB: 1980    PRESENTING HISTORY       History of Present Illness:  Mr. Bay Ibarra is a 37 y.o. male w/ HTN, HLD, poorly controlled DM on insulin, 3 previous episodes of pancreatitis here for evaluation of hypertrigylceridemia. Last episode of pancreatitis at the end of July, associated with DKA and triglycerides of 1600. First episode about 4 years ago, with another in April of this year. Attributed to hypertriglyceridemia in absence of EtOH and stones in setting of very high triglycerides. Pt also with HTN on amlodipine, sleep apnea, obesity. Father had an MI at age 40 with 4 vessel bypass. No other family history of cardiac disease. No known family hx of dyslipidemia. Since discharge at beginning of the month pt has been seen by endocrinology and his BG has been better controlled since changes were made per review of home logs. Last A1c >11. Never had A1c < high 8s per record review. Met with DM educator and has made dietary changes and has lost weight.     Review of Systems:  Constitutional: no fever or chills  Eyes: no visual changes  ENT: no nasal congestion or sore throat  Respiratory: no cough or shortness of breath  Cardiovascular: no chest pain or palpitations  Gastrointestinal: no nausea or vomiting, no abdominal pain or change in bowel habits  Genitourinary: no hematuria or dysuria  Musculoskeletal: no arthralgias or myalgias  Skin: No rashes  Neurological: no HAs    PAST HISTORY:     Past Medical History:   Diagnosis Date    Acute pancreatitis     Diabetes mellitus type I     Gout     Hx of acute pancreatitis 3/3/2017    Hyperlipidemia     Hypertension     Obesity (BMI 35.0-39.9 without comorbidity) 6/5/2015    Sleep apnea 1/22/2016       History reviewed. No pertinent surgical history.    Family History   Problem Relation Age of Onset    Coronary artery disease Father      4 vessel bypass at 40,  "possible stent at 36    Diabetes type II Father     No Known Problems Sister     No Known Problems Brother     Aneurysm Maternal Grandmother      Possible AAA    Diabetes type II Paternal Grandmother        Social History     Social History    Marital status:      Spouse name: N/A    Number of children: N/A    Years of education: N/A     Social History Main Topics    Smoking status: Never Smoker    Smokeless tobacco: Never Used    Alcohol use No    Drug use: No    Sexual activity: No     Other Topics Concern    Not on file     Social History Narrative    No narrative on file       MEDICATIONS & ALLERGIES:     Current Outpatient Prescriptions on File Prior to Visit   Medication Sig    allopurinol (ZYLOPRIM) 300 MG tablet TAKE ONE TABLET BY MOUTH ONCE DAILY    amlodipine (NORVASC) 10 MG tablet Take 1 tablet (10 mg total) by mouth once daily.    aspirin (ECOTRIN) 81 MG EC tablet Take 81 mg by mouth once daily.    CONTOUR TEST STRIPS Strp     fenofibrate 160 MG Tab Take 1 tablet (160 mg total) by mouth once daily.    insulin aspart (NOVOLOG FLEXPEN) 100 unit/mL InPn pen 20 units before meals + sliding scale    insulin glargine (LANTUS SOLOSTAR) 100 unit/mL (3 mL) InPn pen Inject 60 Units into the skin every evening.    insulin syringe-needle U-100 29 gauge x 1/2" Syrg Inject 1 (once) per day    omega-3 acid ethyl esters (LOVAZA) 1 gram capsule Take 2 capsules (2 g total) by mouth 2 (two) times daily.    pregabalin (LYRICA) 150 MG capsule Take 1 capsule (150 mg total) by mouth 2 (two) times daily.     No current facility-administered medications on file prior to visit.        Review of patient's allergies indicates:  No Known Allergies    OBJECTIVE:   Vital Signs:  Vitals:    08/22/17 1001   BP: (!) 140/84   Pulse: 86   Weight: 93 kg (205 lb 0.4 oz)   Height: 5' 8" (1.727 m)       No results found for this or any previous visit (from the past 24 hour(s)).      Physical Exam:   General:  " Mildly obese male in NAD  HEENT:  Normocephalic, atraumatic, PERRL, EOMI, clear sclera, ears normal, throat clear without erythema or exudates  Neck: Thick and short. No carotid bruits  CVS:  RRR, S1 and S2 normal, no murmurs, rubs, gallops. Mild lower extremity edema  Resp:  Lungs clear to auscultation, no wheezes, rales, rhonchi, cough  GI:  Abdomen soft, minimal tenderness over left quadrants. non-distended, normoactive bowel sounds, no masses  MSK:  No muscle atrophy, cyanosis, peripheral edema, full range of motion.  Skin:  No rashes, ulcers, erythema. No xanthelasma  Neuro:  CNII-XII grossly intact. No motor/sensory deficits  Psych:  Alert and oriented to person, place, and time    Results for AMBAR NEWBY (MRN [id number]) as of 8/22/2017 09:40   Ref. Range 3/1/2017 07:38 4/19/2017 06:38 6/23/2017 08:42 7/25/2017 22:32 7/25/2017 23:06 7/27/2017 00:57 7/29/2017 03:00 8/15/2017 07:51   Triglycerides Latest Ref Range: <150 mg/dL 697 (H) 254 (H) 1,532 (H) 978 (H) 946 (H) 302 (H) 216 (H) 196 (H)     Results for AMBAR NEWBY (MRN [id number]) as of 8/22/2017 09:47   Ref. Range 9/21/2016 07:06 11/30/2016 07:15 3/1/2017 07:38 6/23/2017 08:42 8/15/2017 07:51   A1c Latest Ref Range: <5.7 % of total Hgb 9.7 (H) 10.8 (H) 8.6 (H) 11.2 (H) 9.7 (H)     ASSESSMENT & PLAN:     38 y/o male with multiple episodes of pancreatitis in the past in the setting of hypertriglyceridemia. Also with HTN, obesity, sleep apnea, poorly controlled IDDM (likely type 2 with recent c-peptide) and family history of premature CAD in father at age 40. No known hypertriglyceridemia in family members.     Hypertriglyceridemia  -Seems to correlate with diet and poorly controlled blood glucose  -Most recent was 196, low risk  -Do not believe this is 2/2 familial hypertriglyceridemia because of other comorbidities which could precipitate  -Will recheck lipids in 6 weeks and RTC in 8 weeks.   -Continue fenofibrate    Essential hypertension    -On Norvasc with leg swelling  -Will switch to losartan for renal benefits. Experimentally confers some benefit in pancreatitis  -Will recheck BP at next visit  -May also have a component of sleep apnea contributing, cannot tolerate CPAP.   -     losartan (COZAAR) 100 MG tablet; Take 0.5 tablets (50 mg total) by mouth once daily.    Sleep apnea, unspecified type  -Not tolerating CPAP. Continue with weight loss  -F/u with other interventions in sleep medicine if able    Hx of acute pancreatitis  -Continue fenofibrate for management of hypertriglyceridemia.   -Dietary changes discussed, pt seems motivated.     Obesity (BMI 30-39.9)  -Diet and lifestyle modifications being followed by patient. Seen by diabetes educator    Diabetes  -Type 1 vs Type 2. Following with endocrine  -Better control on review of his log today since seeing endocrine recently. No BGs over 200.   -Continue dietary changes and f/u with endocrine    Discussed and examined with Fellow Dr. Mace and Staff Dr. West. Attestation to follow.     Juan Manuel Rich MD   Internal Medicine PGY-1  [phone]

## 2017-08-22 NOTE — PATIENT INSTRUCTIONS
Stop taking amlodipine and start taking losartan. Get lipids checked in 6 weeks and return to clinic in 8 weeks.

## 2017-08-24 ENCOUNTER — TELEPHONE (OUTPATIENT)
Dept: ENDOCRINOLOGY | Facility: CLINIC | Age: 37
End: 2017-08-24

## 2017-08-24 DIAGNOSIS — E10.40 TYPE 1 DIABETES MELLITUS WITH DIABETIC NEUROPATHY: ICD-10-CM

## 2017-08-24 RX ORDER — INSULIN GLARGINE 100 [IU]/ML
54 INJECTION, SOLUTION SUBCUTANEOUS NIGHTLY
Qty: 15 SYRINGE | Refills: 11
Start: 2017-08-24 | End: 2017-10-16 | Stop reason: SDUPTHER

## 2017-08-24 NOTE — TELEPHONE ENCOUNTER
Lantus 60 units  Novolog 20 units AC    Fastin, 93, 153, 152, 118, 141  Before lunch: x, 82, 88, 177, 80, 184, 156  Before dinner: 96, 101, 135, 78, 119, 204  Bedtime: 115, 112, 155, 143, 117, 150    PLAN:  Please decrease lantus to 54 units at bedtime   Continue novolog 20 units before meal with sliding scale.

## 2017-08-25 ENCOUNTER — PATIENT MESSAGE (OUTPATIENT)
Dept: ENDOCRINOLOGY | Facility: CLINIC | Age: 37
End: 2017-08-25

## 2017-08-29 ENCOUNTER — TELEPHONE (OUTPATIENT)
Dept: GASTROENTEROLOGY | Facility: CLINIC | Age: 37
End: 2017-08-29

## 2017-08-29 NOTE — TELEPHONE ENCOUNTER
Tried to contact patient to schedule appointment. Patient voicemail was full. Was not able to leave message. Attempted x2.  ----- Message from Maritza Wheat MD sent at 8/26/2017  1:58 PM CDT -----  Please call patient. He needs a FU visit regarding the MRI result form June. Monik had contacted him, but I don't know that he made an appointment

## 2017-08-30 ENCOUNTER — PATIENT MESSAGE (OUTPATIENT)
Dept: ENDOCRINOLOGY | Facility: CLINIC | Age: 37
End: 2017-08-30

## 2017-08-30 NOTE — TELEPHONE ENCOUNTER
Please decrease your insulin as follows:  Lantus 48 units at bedtime  Novolog 15 units before meals.

## 2017-08-31 ENCOUNTER — PATIENT MESSAGE (OUTPATIENT)
Dept: GASTROENTEROLOGY | Facility: CLINIC | Age: 37
End: 2017-08-31

## 2017-08-31 ENCOUNTER — TELEPHONE (OUTPATIENT)
Dept: GASTROENTEROLOGY | Facility: CLINIC | Age: 37
End: 2017-08-31

## 2017-08-31 NOTE — TELEPHONE ENCOUNTER
----- Message from Maritza Wheat MD sent at 8/26/2017  1:58 PM CDT -----  Please call patient. He needs a FU visit regarding the MRI result form June. Monik had contacted him, but I don't know that he made an appointment

## 2017-09-11 ENCOUNTER — OFFICE VISIT (OUTPATIENT)
Dept: INTERNAL MEDICINE | Facility: CLINIC | Age: 37
End: 2017-09-11
Payer: COMMERCIAL

## 2017-09-11 VITALS
RESPIRATION RATE: 18 BRPM | BODY MASS INDEX: 30.91 KG/M2 | WEIGHT: 203.94 LBS | HEIGHT: 68 IN | HEART RATE: 82 BPM | SYSTOLIC BLOOD PRESSURE: 118 MMHG | DIASTOLIC BLOOD PRESSURE: 78 MMHG | OXYGEN SATURATION: 98 % | TEMPERATURE: 98 F

## 2017-09-11 DIAGNOSIS — E10.40 TYPE 1 DIABETES MELLITUS WITH DIABETIC NEUROPATHY: Primary | ICD-10-CM

## 2017-09-11 DIAGNOSIS — E66.9 OBESITY (BMI 30-39.9): ICD-10-CM

## 2017-09-11 DIAGNOSIS — I10 ESSENTIAL HYPERTENSION: ICD-10-CM

## 2017-09-11 DIAGNOSIS — Z87.19 HX OF ACUTE PANCREATITIS: ICD-10-CM

## 2017-09-11 DIAGNOSIS — E78.1 HYPERTRIGLYCERIDEMIA: ICD-10-CM

## 2017-09-11 PROCEDURE — 4010F ACE/ARB THERAPY RXD/TAKEN: CPT | Mod: S$GLB,,, | Performed by: INTERNAL MEDICINE

## 2017-09-11 PROCEDURE — 3008F BODY MASS INDEX DOCD: CPT | Mod: S$GLB,,, | Performed by: INTERNAL MEDICINE

## 2017-09-11 PROCEDURE — 99214 OFFICE O/P EST MOD 30 MIN: CPT | Mod: S$GLB,,, | Performed by: INTERNAL MEDICINE

## 2017-09-11 PROCEDURE — 3046F HEMOGLOBIN A1C LEVEL >9.0%: CPT | Mod: S$GLB,,, | Performed by: INTERNAL MEDICINE

## 2017-09-11 PROCEDURE — 3074F SYST BP LT 130 MM HG: CPT | Mod: S$GLB,,, | Performed by: INTERNAL MEDICINE

## 2017-09-11 PROCEDURE — 3078F DIAST BP <80 MM HG: CPT | Mod: S$GLB,,, | Performed by: INTERNAL MEDICINE

## 2017-09-11 RX ORDER — PEN NEEDLE, DIABETIC 31 GX5/16"
NEEDLE, DISPOSABLE MISCELLANEOUS
Status: ON HOLD | COMMUNITY
Start: 2017-08-18 | End: 2019-08-30

## 2017-09-11 NOTE — PROGRESS NOTES
"Subjective:      Patient ID: Bay Ibarra is a 37 y.o. male.    Chief Complaint: Results    HPI: 37 y.o. White male being followed by Endo:  -Lantus 48 units at night  -Novolog 15 units at meals + sliding scale    ( 150-200  +2    200-250  +4     250-300  +6    300-350   +8  > 350 10units  ).  Has more energy, Going to the Gym, watching portions and foods.  BS:     Highest,lowest in last 2 weeks.    His most recent HGBA1C 9.7    Review of Systems   Constitutional: Positive for activity change and appetite change.        Doing more, eating less.   HENT: Negative.    Eyes: Negative.    Respiratory: Negative.  Negative for cough and shortness of breath.    Cardiovascular: Negative.  Negative for chest pain and leg swelling.   Gastrointestinal: Negative.    Endocrine: Negative.    Genitourinary: Negative.    Musculoskeletal: Negative.    Allergic/Immunologic: Negative.    Neurological: Negative.    Hematological: Negative.    Psychiatric/Behavioral: Negative.        Objective:   /78 (BP Location: Right arm, Patient Position: Sitting, BP Method: Large (Manual))   Pulse 82   Temp 97.8 °F (36.6 °C) (Oral)   Resp 18   Ht 5' 8" (1.727 m)   Wt 92.5 kg (203 lb 14.8 oz)   SpO2 98%   BMI 31.01 kg/m²     Physical Exam   Constitutional: He is oriented to person, place, and time. He appears well-developed and well-nourished.   HENT:   Head: Normocephalic and atraumatic.   Left Ear: External ear normal.   Nose: Nose normal.   Mouth/Throat: Oropharynx is clear and moist.   Eyes: Conjunctivae are normal. Pupils are equal, round, and reactive to light.   Neck: Normal range of motion.   Cardiovascular: Normal rate, regular rhythm and normal heart sounds.    Pulmonary/Chest: Effort normal and breath sounds normal.   Abdominal: Soft. Bowel sounds are normal.   Musculoskeletal: Normal range of motion. He exhibits no edema.   Neurological: He is alert and oriented to person, place, and time.   Skin: Skin is warm " and dry.   Psychiatric: He has a normal mood and affect. His behavior is normal.   Nursing note and vitals reviewed.      Assessment:     1. Type 1 diabetes mellitus with diabetic neuropathy    2. Hypertriglyceridemia    3. Obesity (BMI 30-39.9)    4. Hx of acute pancreatitis    5. Essential hypertension   Same therapy so far.     SPlan:     Type 1 diabetes mellitus with diabetic neuropathy    Hypertriglyceridemia    Obesity (BMI 30-39.9)    Hx of acute pancreatitis    Essential hypertension

## 2017-09-21 ENCOUNTER — PATIENT MESSAGE (OUTPATIENT)
Dept: ENDOCRINOLOGY | Facility: CLINIC | Age: 37
End: 2017-09-21

## 2017-09-28 ENCOUNTER — CLINICAL SUPPORT (OUTPATIENT)
Dept: OTHER | Facility: CLINIC | Age: 37
End: 2017-09-28
Payer: COMMERCIAL

## 2017-09-28 VITALS
WEIGHT: 205 LBS | DIASTOLIC BLOOD PRESSURE: 70 MMHG | HEIGHT: 68 IN | BODY MASS INDEX: 31.07 KG/M2 | SYSTOLIC BLOOD PRESSURE: 121 MMHG

## 2017-09-28 DIAGNOSIS — Z00.8 HEALTH EXAMINATION IN POPULATION SURVEYS: Primary | ICD-10-CM

## 2017-09-28 LAB
GLUCOSE SERPL-MCNC: 252 MG/DL (ref 60–140)
HBA1C MFR BLD: 7.7 % (ref 0–5.7)
POC CHOLESTEROL, HDL: 23 MG/DL (ref 40–?)
POC CHOLESTEROL, LDL: 74 MG/DL (ref ?–160)
POC CHOLESTEROL, TOTAL: 133 MG/DL (ref ?–240)
POC GLUCOSE FASTING: ABNORMAL MG/DL (ref 60–110)
POC TOTAL CHOLESTEROL / HDL RATIO: 5.78 (ref ?–6)
POC TRIGLYCERIDES: 177 MG/DL (ref ?–160)

## 2017-09-28 PROCEDURE — 82947 ASSAY GLUCOSE BLOOD QUANT: CPT | Mod: QW,S$GLB,, | Performed by: INTERNAL MEDICINE

## 2017-09-28 PROCEDURE — 99401 PREV MED CNSL INDIV APPRX 15: CPT | Mod: S$GLB,,, | Performed by: INTERNAL MEDICINE

## 2017-09-28 PROCEDURE — 83036 HEMOGLOBIN GLYCOSYLATED A1C: CPT | Mod: QW,S$GLB,, | Performed by: INTERNAL MEDICINE

## 2017-09-28 PROCEDURE — 80061 LIPID PANEL: CPT | Mod: QW,S$GLB,, | Performed by: INTERNAL MEDICINE

## 2017-10-10 ENCOUNTER — LAB VISIT (OUTPATIENT)
Dept: LAB | Facility: HOSPITAL | Age: 37
End: 2017-10-10
Attending: STUDENT IN AN ORGANIZED HEALTH CARE EDUCATION/TRAINING PROGRAM
Payer: COMMERCIAL

## 2017-10-10 DIAGNOSIS — I10 ESSENTIAL HYPERTENSION: ICD-10-CM

## 2017-10-10 LAB
CHOLEST SERPL-MCNC: 150 MG/DL
CHOLEST/HDLC SERPL: 5.6 {RATIO}
HDLC SERPL-MCNC: 27 MG/DL
HDLC SERPL: 18 %
LDLC SERPL CALC-MCNC: 96 MG/DL
NONHDLC SERPL-MCNC: 123 MG/DL
TRIGL SERPL-MCNC: 135 MG/DL

## 2017-10-10 PROCEDURE — 80061 LIPID PANEL: CPT

## 2017-10-10 PROCEDURE — 36415 COLL VENOUS BLD VENIPUNCTURE: CPT

## 2017-10-12 ENCOUNTER — TELEPHONE (OUTPATIENT)
Dept: INTERNAL MEDICINE | Facility: CLINIC | Age: 37
End: 2017-10-12

## 2017-10-12 NOTE — TELEPHONE ENCOUNTER
----- Message from Crista Finley sent at 10/12/2017  9:16 AM CDT -----  Contact: patient   Patient called to let  know that he had done his labs and it was only a lipid panel not a full work up.

## 2017-10-12 NOTE — TELEPHONE ENCOUNTER
Spoke with patient and he states that he did a screening on 9/28/17 and they did an A1C and POCT Lipid. He also had a Lipid drawn on 10/10/17. Patient would like you to review these labs and wants to know if he needs labs done before his appt in 12/2017? Please advise

## 2017-10-16 ENCOUNTER — CLINICAL SUPPORT (OUTPATIENT)
Dept: DIABETES | Facility: CLINIC | Age: 37
End: 2017-10-16
Payer: COMMERCIAL

## 2017-10-16 ENCOUNTER — OFFICE VISIT (OUTPATIENT)
Dept: ENDOCRINOLOGY | Facility: CLINIC | Age: 37
End: 2017-10-16
Payer: COMMERCIAL

## 2017-10-16 VITALS
SYSTOLIC BLOOD PRESSURE: 128 MMHG | DIASTOLIC BLOOD PRESSURE: 80 MMHG | HEIGHT: 67 IN | WEIGHT: 199 LBS | BODY MASS INDEX: 31.23 KG/M2 | HEART RATE: 78 BPM

## 2017-10-16 DIAGNOSIS — E11.42 TYPE 2 DIABETES MELLITUS WITH DIABETIC POLYNEUROPATHY, WITH LONG-TERM CURRENT USE OF INSULIN: ICD-10-CM

## 2017-10-16 DIAGNOSIS — Z79.4 TYPE 2 DIABETES MELLITUS WITH HYPERGLYCEMIA, WITH LONG-TERM CURRENT USE OF INSULIN: Primary | ICD-10-CM

## 2017-10-16 DIAGNOSIS — E10.40 TYPE 1 DIABETES MELLITUS WITH DIABETIC NEUROPATHY: ICD-10-CM

## 2017-10-16 DIAGNOSIS — E78.2 MIXED HYPERLIPIDEMIA: ICD-10-CM

## 2017-10-16 DIAGNOSIS — Z87.19 HX OF ACUTE PANCREATITIS: ICD-10-CM

## 2017-10-16 DIAGNOSIS — I10 ESSENTIAL HYPERTENSION: ICD-10-CM

## 2017-10-16 DIAGNOSIS — Z79.4 TYPE 2 DIABETES MELLITUS WITH DIABETIC POLYNEUROPATHY, WITH LONG-TERM CURRENT USE OF INSULIN: ICD-10-CM

## 2017-10-16 DIAGNOSIS — E11.65 TYPE 2 DIABETES MELLITUS WITH HYPERGLYCEMIA, WITH LONG-TERM CURRENT USE OF INSULIN: Primary | ICD-10-CM

## 2017-10-16 DIAGNOSIS — E66.9 OBESITY (BMI 30-39.9): ICD-10-CM

## 2017-10-16 PROCEDURE — 99214 OFFICE O/P EST MOD 30 MIN: CPT | Mod: S$GLB,,, | Performed by: NURSE PRACTITIONER

## 2017-10-16 PROCEDURE — 99999 PR PBB SHADOW E&M-EST. PATIENT-LVL III: CPT | Mod: PBBFAC,,, | Performed by: NURSE PRACTITIONER

## 2017-10-16 PROCEDURE — G0108 DIAB MANAGE TRN  PER INDIV: HCPCS | Mod: S$GLB,,, | Performed by: DIETITIAN, REGISTERED

## 2017-10-16 RX ORDER — INSULIN ASPART 100 [IU]/ML
15 INJECTION, SOLUTION INTRAVENOUS; SUBCUTANEOUS
Qty: 2 BOX | Refills: 3
Start: 2017-10-16 | End: 2018-01-16 | Stop reason: SDUPTHER

## 2017-10-16 RX ORDER — INSULIN GLARGINE 100 [IU]/ML
48 INJECTION, SOLUTION SUBCUTANEOUS NIGHTLY
Qty: 15 SYRINGE | Refills: 11
Start: 2017-10-16 | End: 2018-01-16 | Stop reason: SDUPTHER

## 2017-10-16 NOTE — PATIENT INSTRUCTIONS
Canagliflozin oral tablets  What is this medicine?  CANAGLIFLOZIN (AIME a gli FLOE zin) helps to treat type 2 diabetes. It helps to control blood sugar. Treatment is combined with diet and exercise.  How should I use this medicine?  Take this medicine by mouth with a glass of water. Follow the directions on the prescription label. Take it before the first meal of the day. Take your dose at the same time each day. Do not take more often than directed. Do not stop taking except on your doctor's advice.  A special MedGuide will be given to you by the pharmacist with each prescription and refill. Be sure to read this information carefully each time.  Talk to your pediatrician regarding the use of this medicine in children. Special care may be needed.  What side effects may I notice from receiving this medicine?  Side effects that you should report to your doctor or health care professional as soon as possible:  · allergic reactions like skin rash, itching or hives, swelling of the face, lips, or tongue  · breathing problems  · chest pain  · dizziness  · fast or irregular heartbeat  · feeling faint or lightheaded, falls  · muscle weakness  · nausea, vomiting, unusual stomach upset or pain  · new pain or tenderness, change in skin color, sores or ulcers, or infection in legs or feet  · signs and symptoms of low blood sugar such as feeling anxious, confusion, dizziness, increased hunger, unusually weak or tired, sweating, shakiness, cold, irritable, headache, blurred vision, fast heartbeat, loss of consciousness  · signs and symptoms of a urinary tract infection, such as fever, chills, a burning feeling when urinating, blood in the urine, back pain  · trouble passing urine or change in the amount of urine, including an urgent need to urinate more often, in larger amounts, or at night  · penile discharge, itching, or pain in men  · unusual tiredness  · vaginal discharge, itching, or odor in women  Side effects that usually  do not require medical attention (Report these to your doctor or health care professional if they continue or are bothersome.):  · constipation  · mild increase in urination  · thirsty  What may interact with this medicine?  Do not take this medicine with any of the following medications:  · gatifloxacinThis medicine may also interact with the following medications:  · alcohol  · certain medicines for blood pressure, heart disease  · digoxin  · diuretics  · insulin  · nateglinide  · phenobarbital  · phenytoin  · repaglinide  · rifampin  · ritonavir  · sulfonylureas like glimepiride, glipizide, glyburide  What if I miss a dose?  If you miss a dose, take it as soon as you can. If it is almost time for your next dose, take only that dose. Do not take double or extra doses.  Where should I keep my medicine?  Keep out of the reach of children.  Store at room temperature between 20 and 25 degrees C (68 and 77 degrees F). Throw away any unused medicine after the expiration date.  What should I tell my health care provider before I take this medicine?  They need to know if you have any of these conditions:  · dehydration  · diabetic ketoacidosis  · diet low in salt  · eating less due to illness, surgery, dieting, or any other reason  · having surgery  · high cholesterol  · high levels of potassium in the blood  · history of pancreatitis or pancreas problems  · history of yeast infection of the penis or vagina  · if you often drink alcohol  · infections in the bladder, kidneys, or urinary tract  · kidney disease  · liver disease  · low blood pressure  · on hemodialysis  · problems urinating  · type 1 diabetes  · uncircumcised male  · an unusual or allergic reaction to canagliflozin, other medicines, foods, dyes, or preservatives  · pregnant or trying to get pregnant  · breast-feeding  What should I watch for while using this medicine?  Visit your doctor or health care professional for regular checks on your progress.  This  medicine can cause a serious condition in which there is too much acid in the blood. If you develop nausea, vomiting, stomach pain, unusual tiredness, or breathing problems, stop taking this medicine and call your doctor right away. If possible, use a ketone dipstick to check for ketones in your urine.  A test called the HbA1C (A1C) will be monitored. This is a simple blood test. It measures your blood sugar control over the last 2 to 3 months. You will receive this test every 3 to 6 months.  Learn how to check your blood sugar. Learn the symptoms of low and high blood sugar and how to manage them.  Always carry a quick-source of sugar with you in case you have symptoms of low blood sugar. Examples include hard sugar candy or glucose tablets. Make sure others know that you can choke if you eat or drink when you develop serious symptoms of low blood sugar, such as seizures or unconsciousness. They must get medical help at once.  Tell your doctor or health care professional if you have high blood sugar. You might need to change the dose of your medicine. If you are sick or exercising more than usual, you might need to change the dose of your medicine.  Do not skip meals. Ask your doctor or health care professional if you should avoid alcohol. Many nonprescription cough and cold products contain sugar or alcohol. These can affect blood sugar.  Wear a medical ID bracelet or chain, and carry a card that describes your disease and details of your medicine and dosage times.  NOTE:This sheet is a summary. It may not cover all possible information. If you have questions about this medicine, talk to your doctor, pharmacist, or health care provider. Copyright© 2017 [employer]        Empagliflozin oral tablets  What is this medicine?  EMPAGLIGLOZIN (EM pa gli FLOE zin) helps to treat type 2 diabetes. It helps to control blood sugar. Treatment is combined with diet and exercise.  How should I use this medicine?  Take this  medicine by mouth with a glass of water. Follow the directions on the prescription label. Take it in the morning, with or without food. Take your dose at the same time each day. Do not take more often than directed. Do not stop taking except on your doctor's advice.  Talk to your pediatrician regarding the use of this medicine in children. Special care may be needed.  What side effects may I notice from receiving this medicine?  Side effects that you should report to your doctor or health care professional as soon as possible:  · allergic reactions like skin rash, itching or hives, swelling of the face, lips, or tongue  · breathing problems  · dizziness  · fast or irregular heartbeat  · feeling faint or lightheaded, falls  · muscle weakness  · nausea, vomiting, unusual stomach upset or pain  · signs and symptoms of low blood sugar such as feeling anxious, confusion, dizziness, increased hunger, unusually weak or tired, sweating, shakiness, cold, irritable, headache, blurred vision, fast heartbeat, loss of consciousness  · signs and symptoms of a urinary tract infection, such as fever, chills, a burning feeling when urinating, blood in the urine, back pain  · trouble passing urine or change in the amount of urine, including an urgent need to urinate more often, in larger amounts, or at night  · penile discharge, itching, or pain in men  · unusual tiredness  · vaginal discharge, itching, or odor in women  Side effects that usually do not require medical attention (Report these to your doctor or health care professional if they continue or are bothersome.):  · joint pain  · mild increase in urination  · thirsty  What may interact with this medicine?  Do not take this medicine with any of the following medications:  · gatifloxacin  This medicine may also interact with the following medications:  · alcohol  · certain medicines for blood pressure, heart disease  · diuretics  What if I miss a dose?  If you miss a dose,  take it as soon as you can. If it is almost time for your next dose, take only that dose. Do not take double or extra doses.  Where should I keep my medicine?  Keep out of the reach of children.  Store at room temperature between 20 and 25 degrees C (68 and 77 degrees F). Throw away any unused medicine after the expiration date.  What should I tell my health care provider before I take this medicine?  They need to know if you have any of these conditions:  · dehydration  · diabetic ketoacidosis  · diet low in salt  · eating less due to illness, surgery, dieting, or any other reason  · having surgery  · high cholesterol  · high levels of potassium in the blood  · history of pancreatitis or pancreas problems  · history of yeast infection of the penis or vagina  · if you often drink alcohol  · infections in the bladder, kidneys, or urinary tract  · kidney disease  · liver disease  · low blood pressure  · on hemodialysis  · problems urinating  · type 1 diabetes  · uncircumcised male  · an unusual or allergic reaction to empagliflozin, other medicines, foods, dyes, or preservatives  · pregnant or trying to get pregnant  · breast-feeding  What should I watch for while using this medicine?  Visit your doctor or health care professional for regular checks on your progress.  This medicine can cause a serious condition in which there is too much acid in the blood. If you develop nausea, vomiting, stomach pain, unusual tiredness, or breathing problems, stop taking this medicine and call your doctor right away. If possible, use a ketone dipstick to check for ketones in your urine.  A test called the HbA1C (A1C) will be monitored. This is a simple blood test. It measures your blood sugar control over the last 2 to 3 months. You will receive this test every 3 to 6 months.  Learn how to check your blood sugar. Learn the symptoms of low and high blood sugar and how to manage them.  Always carry a quick-source of sugar with you in  case you have symptoms of low blood sugar. Examples include hard sugar candy or glucose tablets. Make sure others know that you can choke if you eat or drink when you develop serious symptoms of low blood sugar, such as seizures or unconsciousness. They must get medical help at once.  Tell your doctor or health care professional if you have high blood sugar. You might need to change the dose of your medicine. If you are sick or exercising more than usual, you might need to change the dose of your medicine.  Do not skip meals. Ask your doctor or health care professional if you should avoid alcohol. Many nonprescription cough and cold products contain sugar or alcohol. These can affect blood sugar.  Wear a medical ID bracelet or chain, and carry a card that describes your disease and details of your medicine and dosage times.  NOTE:This sheet is a summary. It may not cover all possible information. If you have questions about this medicine, talk to your doctor, pharmacist, or health care provider. Copyright© 2017 [employer]        Dapagliflozin tablets  What is this medicine?  DAPAGLIFLOZIN (DAP a gli FLOE zin) helps to treat type 2 diabetes. It helps to control blood sugar. Treatment is combined with diet and exercise.  How should I use this medicine?  Take this medicine by mouth with a glass of water. Follow the directions on the prescription label. You can take it with or without food. If it upsets your stomach, take it with food. Take this medicine in the morning. Take your dose at the same time each day. Do not take more often than directed. Do not stop taking except on your doctor's advice.  A special MedGuide will be given to you by the pharmacist with each prescription and refill. Be sure to read this information carefully each time.  Talk to your pediatrician regarding the use of this medicine in children. Special care may be needed.  What side effects may I notice from receiving this medicine?  Side  effects that you should report to your doctor or health care professional as soon as possible:  · allergic reactions like skin rash, itching or hives, swelling of the face, lips, or tongue  · breathing problems  · dizziness  · feeling faint or lightheaded, falls  · muscle weakness  · nausea, vomiting, unusual stomach upset or pain  · signs and symptoms of low blood sugar such as feeling anxious, confusion, dizziness, increased hunger, unusually weak or tired, sweating, shakiness, cold, irritable, headache, blurred vision, fast heartbeat, loss of consciousness  · signs and symptoms of a urinary tract infection, such as fever, chills, a burning feeling when urinating, blood in the urine, back pain  · trouble passing urine or change in the amount of urine, including an urgent need to urinate more often, in larger amounts, or at night  · penile discharge, itching, or pain in men  · unusual tiredness  · vaginal discharge, itching, or odor in women  Side effects that usually do not require medical attention (Report these to your doctor or health care professional if they continue or are bothersome.):  · constipation  · mild increase in urination  · stuffy or runny nose  · sore throat  · thirsty  What may interact with this medicine?  Do not take this medicine with any of the following medications:  · gatifloxacinThis medicine may also interact with the following medications:  · alcohol  · certain medicines for blood pressure, heart disease  · diuretics  · insulin  · nateglinide  · pioglitazone  · quinolone antibiotics like ciprofloxacin, levofloxacin, ofloxacin  · repaglinide  · some herbal dietary supplements  · steroid medicines like prednisone or cortisone  · sulfonylureas like glimepiride, glipizide, glyburide  · thyroid medicine  What if I miss a dose?  If you miss a dose, take it as soon as you can. If it is almost time for your next dose, take only that dose. Do not take double or extra doses.  Where should I keep  my medicine?  Keep out of the reach of children.  Store at room temperature between 15 and 30 degrees C (59 and 86 degrees F). Throw away any unused medicine after the expiration date.  What should I tell my health care provider before I take this medicine?  They need to know if you have any of these conditions:  · bladder cancer  · dehydration  · diabetic ketoacidosis  · diet low in salt  · eating less due to illness, surgery, dieting, or any other reason  · having surgery  · high cholesterol  · history of pancreatitis or pancreas problems  · history of yeast infection of the penis or vagina  · if you often drink alcohol  · infections in the bladder, kidneys, or urinary tract  · kidney disease  · low blood pressure  · on hemodialysis  · problems urinating  · type 1 diabetes  · uncircumcised male  · an unusual or allergic reaction to dapagliflozin, other medicines, foods, dyes, or preservatives  · pregnant or trying to get pregnant  · breast-feeding  What should I watch for while using this medicine?  Visit your doctor or health care professional for regular checks on your progress.  This medicine can cause a serious condition in which there is too much acid in the blood. If you develop nausea, vomiting, stomach pain, unusual tiredness, or breathing problems, stop taking this medicine and call your doctor right away. If possible, use a ketone dipstick to check for ketones in your urine.  A test called the HbA1C (A1C) will be monitored. This is a simple blood test. It measures your blood sugar control over the last 2 to 3 months. You will receive this test every 3 to 6 months.  Learn how to check your blood sugar. Learn the symptoms of low and high blood sugar and how to manage them.  Always carry a quick-source of sugar with you in case you have symptoms of low blood sugar. Examples include hard sugar candy or glucose tablets. Make sure others know that you can choke if you eat or drink when you develop serious  symptoms of low blood sugar, such as seizures or unconsciousness. They must get medical help at once.  Tell your doctor or health care professional if you have high blood sugar. You might need to change the dose of your medicine. If you are sick or exercising more than usual, you might need to change the dose of your medicine.  Do not skip meals. Ask your doctor or health care professional if you should avoid alcohol. Many nonprescription cough and cold products contain sugar or alcohol. These can affect blood sugar.  Wear a medical ID bracelet or chain, and carry a card that describes your disease and details of your medicine and dosage times.  NOTE:This sheet is a summary. It may not cover all possible information. If you have questions about this medicine, talk to your doctor, pharmacist, or health care provider. Copyright© 2017 [employer]

## 2017-10-16 NOTE — PROGRESS NOTES
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  Meal Plan 24 Hour Recall - Dinner: home cooked - chili with beans, 10-15 crackers  Meal Plan 24 Hour Recall - Snack: rarely snacking - if does, has a sugar-free jello or snack pack of nuts    Monitoring   Self Monitoring : SMBG 4 times daily AC/HS - log in notebook - reviewed with Nova Nichole NP in earlier appt with her today  Blood Glucose Logs: Yes    Exercise   Exercise Type: use exercise equipment  Frequency: 3-5 Times per week  Duration: 1 hour    Current Diabetes Treatment   Current Treatment: Diet, Exercise, Insulin (Novolog 15 AC + correction 1:25, target 150, Lantus 48 units every evening)    Social History  Preferred Learning Method: Face to Face, Reading Materials  Primary Support: Self, Friends (goes to gym with friend)       Barriers to Change  Barriers to Change: None  Learning Challenges : None    Readiness to Learn   Readiness to Learn : Acceptance    Cultural Influences  Cultural Influences: No    Diabetes Education Assessment/Progress    Diabetes Disease Process (diabetes disease process and treatment options): Discussion, Individual Session, Comprehends Key Points    Nutrition (Incorporating nutritional management into one's lifestyle): Discussion, Individual Session, Needs Review, Instructed, Comprehends Key Points, Written Materials Provided (Shows good understanding of carb counting and portion control when at home or prepares his own lunch. Occasionally with working in the field, has to get fast food. Discussed strategies to choose better menu  "selections when fast food is his only option. Provided Nutrition in the [employer] reference guide for looking up nutrition info and discussed examples. Encouraged continuing to stick to ~45g CHO/meal. )     Physical Activity (incorporating physical activity into one's lifestyle): Discussion, Individual Session, Demonstrates Understanding/Competency (verbalizes/demonstrates) (Has been going to gym consistently and meeting goal of at least 150 min per week. Applauded efforts and encouraged continuing. )    Medications (states correct name, dose, onset, peak, duration, side effects & timing of meds): Discussion, Individual Session, Instructed, Comprehends Key Points, Needs Review (Verbalized appropriate self-injection technique. Reports he injects mainly in abdomen "close to pancreas." Explained absorption of insulin into fatty tissue and reviewed site selection and rotation. Also, he leaves insulin in car when at the gym for 1 hour - explained heat will break down insulin and will not be as potent. Advised to always keep at room temperature or can keep in insulated bag with cold pack. )    Monitoring (monitoring blood glucose/other parameters & using results): Discussion, Individual Session, Instructed, Demonstrates Understanding/Competency (verbalizes/demonstrates) (Reviewed goal BG readings. SMBG consistently 4 times daily. Encouraged continuing.)    Acute Complications (preventing, detecting, and treating acute complications): Discussion, Individual Session, Demonstrates Understanding/Competency (verbalizes/demonstrates)    Chronic Complications (preventing, detecting, and treating chronic complications): Discussion, Individual Session, Instructed, Demonstrates Understanding/Competency (verbalizes/demonstrates)    Cognitive (knowledge of self-management skills, functional health literacy): Discussion, Individual Session, Demonstrates Understanding/Competency (verbalizes/demonstrates)    Psychosocial (emotional " response to diabetes): Discussion, Individual Session    Behavioral (readiness for change, lifestyle practices, self-care behaviors): Discussion, Individual Session, Demonstrates Understanding/Competency (verbalizes/demonstrates)    Goals  Patient has selected goal during today's session: Yes  Healthy Eating: % Met  Met Percentage : 100%  Reducing Risks: % Met  Met Percentage : 100%         Diabetes Care Plan/Intervention  Education Plan/Intervention: Individual Follow-Up DSMT (3 month follow-up scheduled)    Diabetes Meal Plan  Carbohydrate Per Meal: 45-60g  Carbohydrate Per Snack :  (mostly 0-5g CHO)    Education Units of Time   Time Spent: 30 min      Health Maintenance Topics with due status: Not Due       Topic Last Completion Date    Foot Exam 06/23/2017    Urine Microalbumin 08/15/2017    Hemoglobin A1c 09/28/2017    Lipid Panel 10/10/2017     Health Maintenance Due   Topic Date Due    TETANUS VACCINE  06/26/1998    Pneumococcal PPSV23 (Medium Risk) (1) 06/26/1998    Eye Exam  02/15/2017    Influenza Vaccine  08/01/2017

## 2017-10-17 ENCOUNTER — PATIENT MESSAGE (OUTPATIENT)
Dept: DIABETES | Facility: CLINIC | Age: 37
End: 2017-10-17

## 2017-10-25 ENCOUNTER — OFFICE VISIT (OUTPATIENT)
Dept: CARDIOLOGY | Facility: CLINIC | Age: 37
End: 2017-10-25
Payer: COMMERCIAL

## 2017-10-25 VITALS
HEIGHT: 67 IN | HEART RATE: 80 BPM | BODY MASS INDEX: 31.83 KG/M2 | SYSTOLIC BLOOD PRESSURE: 121 MMHG | DIASTOLIC BLOOD PRESSURE: 69 MMHG | WEIGHT: 202.81 LBS

## 2017-10-25 DIAGNOSIS — E78.1 HYPERTRIGLYCERIDEMIA: Primary | ICD-10-CM

## 2017-10-25 PROCEDURE — 99213 OFFICE O/P EST LOW 20 MIN: CPT | Mod: S$GLB,,, | Performed by: INTERNAL MEDICINE

## 2017-10-25 PROCEDURE — 99999 PR PBB SHADOW E&M-EST. PATIENT-LVL IV: CPT | Mod: PBBFAC,,, | Performed by: INTERNAL MEDICINE

## 2017-10-25 NOTE — PROGRESS NOTES
Subjective:    Patient ID:  Bay Ibarra is a 37 y.o. male who presents for follow-up of hypertriglyceridemia    MrOsmany Ibarra is a 37 y.o. male w/ HTN, HLD, poorly controlled DM on insulin, 3 previous episodes of pancreatitis here for evaluation of hypertrigylceridemia. Last episode of pancreatitis at the end of July, associated with DKA and triglycerides of 1600. First episode about 4 years ago, with another in April of this year. Attributed to hypertriglyceridemia in absence of EtOH and stones in setting of very high triglycerides. Pt also with HTN on amlodipine, sleep apnea, obesity. Father had an MI at age 40 with 4 vessel bypass. No other family history of cardiac disease. No known family hx of dyslipidemia. Last A1c >11.     Interval history: Patient had his lipid level rechecked earlier this month. He has been trying to exercise more and control his blood sugars better. Now it is in 170s( prior visit much higher than 200s) Denies any new complaints.       Past Medical History:   Diagnosis Date    Acute pancreatitis     Diabetes mellitus, type 2     Gout     Hx of acute pancreatitis 3/3/2017    Hyperlipidemia     Hypertension     Obesity (BMI 35.0-39.9 without comorbidity) 6/5/2015    Sleep apnea 1/22/2016     No past surgical history on file.   Current Outpatient Prescriptions on File Prior to Visit   Medication Sig Dispense Refill    allopurinol (ZYLOPRIM) 300 MG tablet TAKE ONE TABLET BY MOUTH ONCE DAILY 30 tablet 5    aspirin (ECOTRIN) 81 MG EC tablet Take 81 mg by mouth once daily.      CONTOUR TEST STRIPS Strp       fenofibrate 160 MG Tab Take 1 tablet (160 mg total) by mouth once daily. 90 tablet 3    insulin aspart (NOVOLOG FLEXPEN) 100 unit/mL InPn pen Inject 15 Units into the skin 3 (three) times daily with meals. + sliding scale- max TDD of 75 units 2 Box 3    insulin glargine (LANTUS SOLOSTAR) 100 unit/mL (3 mL) InPn pen Inject 48 Units into the skin every evening.  "15 Syringe 11    insulin syringe-needle U-100 29 gauge x 1/2" Syrg Inject 1 (once) per day 100 Syringe 3    losartan (COZAAR) 100 MG tablet Take 0.5 tablets (50 mg total) by mouth once daily. 45 tablet 3    omega-3 acid ethyl esters (LOVAZA) 1 gram capsule Take 2 capsules (2 g total) by mouth 2 (two) times daily. 120 capsule 5    pregabalin (LYRICA) 150 MG capsule Take 1 capsule (150 mg total) by mouth 2 (two) times daily. 60 capsule 5    ULTRA THIN LANCETS 31 gauge Misc        No current facility-administered medications on file prior to visit.      Review of patient's allergies indicates:  No Known Allergies  Review of Systems   Constitution: Positive for malaise/fatigue. Negative for decreased appetite.   HENT: Negative.    Eyes: Negative for blurred vision.   Cardiovascular: Negative for chest pain, dyspnea on exertion, leg swelling and palpitations.   Respiratory: Positive for sleep disturbances due to breathing and snoring. Negative for shortness of breath, sputum production and wheezing.    Endocrine: Negative.    Skin: Negative.    Musculoskeletal: Negative.    Gastrointestinal: Negative for abdominal pain and heartburn.   Genitourinary: Negative.    Neurological: Negative for dizziness, focal weakness and light-headedness.   Psychiatric/Behavioral: Negative.      /69 (BP Location: Left arm, Patient Position: Sitting, BP Method: Large (Automatic))   Pulse 80   Ht 5' 7" (1.702 m)   Wt 92 kg (202 lb 13.2 oz)   BMI 31.77 kg/m²      Objective:    Physical Exam   Constitutional: He is oriented to person, place, and time. He appears well-developed and well-nourished.   HENT:   Head: Normocephalic and atraumatic.   Eyes: Conjunctivae are normal. Pupils are equal, round, and reactive to light.   Neck: Normal range of motion. Neck supple. No JVD present.   Cardiovascular: Normal rate and regular rhythm.  Exam reveals no friction rub.    No murmur heard.  Pulmonary/Chest: Effort normal and breath sounds " normal.   Abdominal: Soft. Bowel sounds are normal. He exhibits no distension.   Musculoskeletal: Normal range of motion. He exhibits no edema.   Neurological: He is alert and oriented to person, place, and time.   Skin: Skin is warm and dry.   Psychiatric: He has a normal mood and affect. His behavior is normal.   Vitals reviewed.      Results for AMBAR NEWBY (MRN [id number]) as of 10/25/2017 16:17   Ref. Range 10/10/2017 07:27   Cholesterol Latest Ref Range: 120 - 199 mg/dL 150   HDL Latest Ref Range: 40 - 75 mg/dL 27 (L)   LDL Cholesterol Latest Ref Range: 63.0 - 159.0 mg/dL 96.0   Total Cholesterol/HDL Ratio Latest Ref Range: 2.0 - 5.0  5.6 (H)   Triglycerides Latest Ref Range: 30 - 150 mg/dL 135         Assessment:   Hyperlipidemia  HTN  DM type 1  Obesity  JAZMYN- not tolerating CPAP    Plan:       37 year old male patient with DM type 1, HTN, HLD, JAZMYN- not tolerating CPAP, recurrent pancreatitis with elevated triglycerides.  His blood sugars are getting better and a repeat lipid panel 10/10 as above. normalizing. He is currently not on Statin. With his medical history , per current guidelines he will benefit from moderate intensity statin band it should be considered. Patient reports that he was started on low dose lipitor but taken off it after a brief duration.   Continue aggressive blood sugar management. Follow up with Endocrinology and Primary care.    D/w Dr Lyn.    Guille Enrique MD, MPH, FACP  Cardiovascular Disease Fellow.  Pager: [phone]

## 2017-10-26 NOTE — PROGRESS NOTES
I have personally taken the history and examined this patient and agree with the Fellow's note as stated above.    I agree that he meets criteria for benefit from moderate to high dose statin therapy, in addition to his fibric acid Rx.

## 2017-11-16 ENCOUNTER — PATIENT MESSAGE (OUTPATIENT)
Dept: INTERNAL MEDICINE | Facility: CLINIC | Age: 37
End: 2017-11-16

## 2017-12-20 DIAGNOSIS — E78.2 MIXED HYPERLIPIDEMIA: ICD-10-CM

## 2017-12-21 RX ORDER — FENOFIBRATE 160 MG/1
TABLET ORAL
Qty: 90 TABLET | Refills: 3 | Status: SHIPPED | OUTPATIENT
Start: 2017-12-21 | End: 2019-04-05 | Stop reason: SDUPTHER

## 2018-01-03 ENCOUNTER — OFFICE VISIT (OUTPATIENT)
Dept: INTERNAL MEDICINE | Facility: CLINIC | Age: 38
End: 2018-01-03
Payer: COMMERCIAL

## 2018-01-03 VITALS
WEIGHT: 207.13 LBS | SYSTOLIC BLOOD PRESSURE: 116 MMHG | BODY MASS INDEX: 31.39 KG/M2 | OXYGEN SATURATION: 98 % | DIASTOLIC BLOOD PRESSURE: 74 MMHG | HEART RATE: 88 BPM | HEIGHT: 68 IN

## 2018-01-03 DIAGNOSIS — E11.42 TYPE 2 DIABETES MELLITUS WITH DIABETIC POLYNEUROPATHY, WITH LONG-TERM CURRENT USE OF INSULIN: Primary | ICD-10-CM

## 2018-01-03 DIAGNOSIS — E78.1 HYPERTRIGLYCERIDEMIA: ICD-10-CM

## 2018-01-03 DIAGNOSIS — M10.00 IDIOPATHIC GOUT, UNSPECIFIED CHRONICITY, UNSPECIFIED SITE: ICD-10-CM

## 2018-01-03 DIAGNOSIS — Z87.19 HX OF ACUTE PANCREATITIS: ICD-10-CM

## 2018-01-03 DIAGNOSIS — Z79.4 TYPE 2 DIABETES MELLITUS WITH DIABETIC POLYNEUROPATHY, WITH LONG-TERM CURRENT USE OF INSULIN: Primary | ICD-10-CM

## 2018-01-03 PROBLEM — K85.90 ACUTE PANCREATITIS: Status: RESOLVED | Noted: 2017-04-16 | Resolved: 2018-01-03

## 2018-01-03 PROCEDURE — 99999 PR PBB SHADOW E&M-EST. PATIENT-LVL IV: CPT | Mod: PBBFAC,,, | Performed by: INTERNAL MEDICINE

## 2018-01-03 PROCEDURE — 99214 OFFICE O/P EST MOD 30 MIN: CPT | Mod: S$GLB,,, | Performed by: INTERNAL MEDICINE

## 2018-01-03 NOTE — PROGRESS NOTES
"Subjective:      Patient ID: Bay Ibarra is a 37 y.o. male.    Chief Complaint: Follow-up (3 Month)    HPI: 37 y.o. White male, has been on vacation and not quite adhering to his regimen of diet  And exercise. However, states that this is the best he has ever felt.  Going to [facility] to get his labs for Endo next week.        Review of Systems   Constitutional: Negative for activity change, appetite change and fatigue.   HENT: Negative.    Eyes: Negative for visual disturbance.   Respiratory: Negative for cough, chest tightness, shortness of breath and wheezing.    Cardiovascular: Negative for chest pain and palpitations.   Gastrointestinal: Negative for abdominal pain, constipation, diarrhea, nausea and vomiting.   Endocrine: Negative for polydipsia, polyphagia and polyuria.   Genitourinary: Negative for dysuria, frequency and urgency.   Musculoskeletal: Negative.    Skin: Negative.    Neurological: Negative for weakness.   Hematological: Negative.    All other systems reviewed and are negative.      Objective:   /74   Pulse 88   Ht 5' 8" (1.727 m)   Wt 93.9 kg (207 lb 1.6 oz)   SpO2 98%   BMI 31.49 kg/m²     Physical Exam   Constitutional: He appears well-developed and well-nourished.   HENT:   Head: Normocephalic and atraumatic.   Right Ear: External ear normal.   Left Ear: External ear normal.   Nose: Nose normal.   Mouth/Throat: Oropharynx is clear and moist.   Eyes: Conjunctivae and EOM are normal. Pupils are equal, round, and reactive to light.   Neck: Normal range of motion. Neck supple.   Cardiovascular: Normal rate, regular rhythm and normal heart sounds.    Pulmonary/Chest: Effort normal and breath sounds normal.   Abdominal: Soft. Bowel sounds are normal. There is no hepatosplenomegaly. There is no tenderness.   Musculoskeletal: Normal range of motion.   Neurological: He is alert.   Skin: Skin is warm and dry.   Psychiatric: He has a normal mood and affect. His behavior is normal. "   Nursing note and vitals reviewed.      Assessment:     1. Type 2 diabetes mellitus with diabetic polyneuropathy, with long-term current use of insulin    2. Hypertriglyceridemia    3. Idiopathic gout, unspecified chronicity, unspecified site    4. Hx of acute pancreatitis , this seems to be completely resolved, without recurrence.     Plan:     Type 2 diabetes mellitus with diabetic polyneuropathy, with long-term current use of insulin  -     Cancel: CBC auto differential; Future; Expected date: 01/03/2018  -     Cancel: Microalbumin/creatinine urine ratio; Future; Expected date: 01/03/2018  -     CBC auto differential; Future; Expected date: 01/03/2018  -     Microalbumin/creatinine urine ratio; Future; Expected date: 01/03/2018    Hypertriglyceridemia    Idiopathic gout, unspecified chronicity, unspecified site    Hx of acute pancreatitis  -     Cancel: CBC auto differential; Future; Expected date: 01/03/2018  -     CBC auto differential; Future; Expected date: 01/03/2018

## 2018-01-09 DIAGNOSIS — E10.40 TYPE 1 DIABETES MELLITUS WITH DIABETIC NEUROPATHY: ICD-10-CM

## 2018-01-10 RX ORDER — PREGABALIN 150 MG/1
CAPSULE ORAL
Qty: 60 CAPSULE | Refills: 5 | Status: SHIPPED | OUTPATIENT
Start: 2018-01-10 | End: 2018-07-17 | Stop reason: SDUPTHER

## 2018-01-11 ENCOUNTER — PATIENT MESSAGE (OUTPATIENT)
Dept: ENDOCRINOLOGY | Facility: CLINIC | Age: 38
End: 2018-01-11

## 2018-01-12 LAB
ALBUMIN/CREAT UR: 1 MCG/MG CREAT
BASOPHILS # BLD AUTO: 51 CELLS/UL (ref 0–200)
BASOPHILS NFR BLD AUTO: 1 %
CREAT UR-MCNC: 143 MG/DL (ref 20–370)
EOSINOPHIL # BLD AUTO: 112 CELLS/UL (ref 15–500)
EOSINOPHIL NFR BLD AUTO: 2.2 %
ERYTHROCYTE [DISTWIDTH] IN BLOOD BY AUTOMATED COUNT: 13 % (ref 11–15)
HCT VFR BLD AUTO: 44.8 % (ref 38.5–50)
HGB BLD-MCNC: 14.8 G/DL (ref 13.2–17.1)
LYMPHOCYTES # BLD AUTO: 1102 CELLS/UL (ref 850–3900)
LYMPHOCYTES NFR BLD AUTO: 21.6 %
MCH RBC QN AUTO: 27.6 PG (ref 27–33)
MCHC RBC AUTO-ENTMCNC: 33 G/DL (ref 32–36)
MCV RBC AUTO: 83.4 FL (ref 80–100)
MICROALBUMIN UR-MCNC: 0.2 MG/DL
MONOCYTES # BLD AUTO: 709 CELLS/UL (ref 200–950)
MONOCYTES NFR BLD AUTO: 13.9 %
NEUTROPHILS # BLD AUTO: 3126 CELLS/UL (ref 1500–7800)
NEUTROPHILS NFR BLD AUTO: 61.3 %
PLATELET # BLD AUTO: 223 THOUSAND/UL (ref 140–400)
PMV BLD REES-ECKER: 11.5 FL (ref 7.5–12.5)
RBC # BLD AUTO: 5.37 MILLION/UL (ref 4.2–5.8)
WBC # BLD AUTO: 5.1 THOUSAND/UL (ref 3.8–10.8)

## 2018-01-16 ENCOUNTER — CLINICAL SUPPORT (OUTPATIENT)
Dept: DIABETES | Facility: CLINIC | Age: 38
End: 2018-01-16
Payer: COMMERCIAL

## 2018-01-16 ENCOUNTER — OFFICE VISIT (OUTPATIENT)
Dept: ENDOCRINOLOGY | Facility: CLINIC | Age: 38
End: 2018-01-16
Payer: COMMERCIAL

## 2018-01-16 VITALS
HEIGHT: 68 IN | BODY MASS INDEX: 32.48 KG/M2 | DIASTOLIC BLOOD PRESSURE: 81 MMHG | WEIGHT: 214.31 LBS | SYSTOLIC BLOOD PRESSURE: 118 MMHG

## 2018-01-16 DIAGNOSIS — Z79.4 TYPE 2 DIABETES MELLITUS WITH HYPERGLYCEMIA, WITH LONG-TERM CURRENT USE OF INSULIN: ICD-10-CM

## 2018-01-16 DIAGNOSIS — E11.65 TYPE 2 DIABETES MELLITUS WITH HYPERGLYCEMIA, WITH LONG-TERM CURRENT USE OF INSULIN: ICD-10-CM

## 2018-01-16 DIAGNOSIS — E11.42 TYPE 2 DIABETES MELLITUS WITH DIABETIC POLYNEUROPATHY, WITH LONG-TERM CURRENT USE OF INSULIN: Primary | ICD-10-CM

## 2018-01-16 DIAGNOSIS — Z87.19 HX OF ACUTE PANCREATITIS: ICD-10-CM

## 2018-01-16 DIAGNOSIS — E78.2 MIXED HYPERLIPIDEMIA: ICD-10-CM

## 2018-01-16 DIAGNOSIS — Z79.4 TYPE 2 DIABETES MELLITUS WITH DIABETIC POLYNEUROPATHY, WITH LONG-TERM CURRENT USE OF INSULIN: Primary | ICD-10-CM

## 2018-01-16 DIAGNOSIS — G47.33 OBSTRUCTIVE SLEEP APNEA SYNDROME: ICD-10-CM

## 2018-01-16 DIAGNOSIS — E66.9 OBESITY (BMI 30-39.9): ICD-10-CM

## 2018-01-16 PROCEDURE — G0108 DIAB MANAGE TRN  PER INDIV: HCPCS | Mod: S$GLB,,, | Performed by: DIETITIAN, REGISTERED

## 2018-01-16 PROCEDURE — 99999 PR PBB SHADOW E&M-EST. PATIENT-LVL III: CPT | Mod: PBBFAC,,, | Performed by: NURSE PRACTITIONER

## 2018-01-16 PROCEDURE — 99215 OFFICE O/P EST HI 40 MIN: CPT | Mod: S$GLB,,, | Performed by: NURSE PRACTITIONER

## 2018-01-16 RX ORDER — INSULIN ASPART 100 [IU]/ML
20 INJECTION, SOLUTION INTRAVENOUS; SUBCUTANEOUS
Qty: 2 BOX | Refills: 3
Start: 2018-01-16 | End: 2019-01-25

## 2018-01-16 RX ORDER — INSULIN GLARGINE 100 [IU]/ML
52 INJECTION, SOLUTION SUBCUTANEOUS NIGHTLY
Qty: 18 ML | Refills: 11
Start: 2018-01-16 | End: 2019-09-06 | Stop reason: SDUPTHER

## 2018-01-16 NOTE — LETTER
January 17, 2018      [facility] CIARA Singleton MD  [address]  [address]  [facility] [address]         Patient: Bay Ibarra   MR Number: [id number]   YOB: 1980   Date of Visit: 1/16/2018       Dear Dr. Singleton:    Thank you for referring Bay for diabetes self-management education and support. He has completed all components of our Diabetes Management Program and his Self-Management Support Plan. Below is a summary of his clinical outcomes and goal progress.    Patient Outcomes:    A1c Status:   Lab Results   Component Value Date    HGBA1C 7.7 (A) 09/28/2017    HGBA1C 11.6 (H) 07/25/2017    HGBA1C 9.3 (H) 04/17/2017     Goals  Patient has selected/evaluated goals during today's session: Yes, evaluated    Diabetes Self-Management Support Plan  Exercise/Nutrition: join a gym  Review Status: Patient has selected and agrees to support plan.    Follow up:   · Bay to follow diabetes support plan indicated above  · Bay to attend medical appointments as scheduled  · Bay to update you on his DM education progress as needed      If you have questions, please do not hesitate to call me. I look forward to providing additional education and support as needed.    Sincerely,    Radha Burks RD

## 2018-01-16 NOTE — PATIENT INSTRUCTIONS
Snacks can be an important part of a balanced, healthy meal plan. They allow you to eat more frequently, feeling full and satisfied throughout the day. Also, they allow you to spread carbohydrates evenly, which may stabilize blood sugars.  Plus, snacks are enjoyable!     The amount of carbohydrate needed at snacks varies. Generally, about 15-30 grams of carbohydrate per snack is recommended.  Below you will find some tasty treats.       0-5 gm carb   Crystal Light   Vitamin Water Zero   Herbal tea, unsweetened   2 tsp peanut butter on celery   1./2 cup sugar-free jell-o   1 sugar-free popsicle   ¼ cup blueberries   8oz Blue Misty unsweetened almond milk   5 baby carrots & celery sticks, cucumbers, bell peppers dipped in ¼ cup salsa, 2Tbsp light ranch dressing or 2Tbsp plain Greek yogurt   10 Goldfish crackers   ½ oz low-fat cheese or string cheese   1 closed handful of nuts, unsalted   1 Tbsp of sunflower seeds, unsalted   1 cup Smart Pop popcorn   1 whole grain brown rice cake        15 gm carb   1 small piece of fruit or ½ banana or 1/2 cup lite canned fruit   3 theresa cracker squares   3 cups Smart Pop popcorn, top spray butter, Arias lite salt or cinnamon and Truvia   5 Vanilla Wafers   ½ cup low fat, no added sugar ice cream or frozen yogurt (Blue bell, Blue Bunny, Weight Watchers, Skinny Cow)   ½ turkey, ham, or chicken sandwich   ½ c fruit with ½ c Cottage cheese   4-6 unsalted wheat crackers with 1 oz low fat cheese or 1 tbsp peanut butter    30-45 goldfish crackers (depending on flavor)    7-8 Sabianist mini brown rice cakes (caramel, apple cinnamon, chocolate)    12 Sabianist mini brown rice cakes (cheddar, bbq, ranch)    1/3 cup hummus dip with raw veg   1/2 whole wheat tushar, 1Tbsp hummus   Mini Pizza (1/2 whole wheat English muffin, low-fat  cheese, tomato sauce)   100 calorie snack pack (Oreo, Chips Ahoy, Ritz Mix, Baked Cheetos)   4-6 oz. light or Greek Style yogurt  (Nader, Pauly, [facility], [facility])   ½ cup sugar-free pudding     6 in. wheat tortilla or tushar oven toasted chips (topped with spray butter flavoring, cinnamon, Truvia OR spray butter, garlic powder, chili powder)    18 BBQ Popchips (available at Target, Whole [employer], Fresh Market)

## 2018-01-16 NOTE — PROGRESS NOTES
CC: Bay Ibarra arrives today for management of Type 2 DM and review of chronic medical conditions.      HPI: Mr. Bay Ibarra is a 37 y.o. White male who was diagnosed with Type 2 DM in ~ 2009 on routine lab work. He was initially on oral agents and transitioned to insulin therapy ~ 1 year after diagnosis. Hx of noncompliance but now back on track. Hx pancreatitis - 2013 and 2x in 2017.   + family hx of T2DM in mother and father.     He was seen last by Dr. Thompson in August 2017 for an initial evaluation. He was unsure if he had T1 or T2 DM.  C-peptide WNL with BG in the 200's on 8/2017.     Last seen by Nova Nichole NP in October 2017 and is now being seen by me for the first time.  [facility]waiting [facility] per [facility]: a1c, cpeptide.    Recently seen by Radha Burks RD, CDE - possible interest in vgo or freestyle anish.    Lab Results   Component Value Date    LABA1C 9.7 (H) 08/15/2017    HGBA1C 7.7 (A) 09/28/2017         DIABETES COMPLICATIONS: peripheral neuropathy     HOSPITALIZATIONS FOR DIABETES OR RELATED COMPLICATIONS:  No    CURRENT A1C:    Hemoglobin A1C   Date Value Ref Range Status   09/28/2017 7.7 (A) 0 - 5.7 % Final   07/25/2017 11.6 (H) 4.0 - 5.6 % Final     Comment:     According to ADA guidelines, hemoglobin A1c <7.0% represents  optimal control in non-pregnant diabetic patients. Different  metrics may apply to specific patient populations.   Standards of Medical Care in Diabetes-2016.  For the purpose of screening for the presence of diabetes:  <5.7%     Consistent with the absence of diabetes  5.7-6.4%  Consistent with increasing risk for diabetes   (prediabetes)  >or=6.5%  Consistent with diabetes  Currently, no consensus exists for use of hemoglobin A1c  for diagnosis of diabetes for children.  This Hemoglobin A1c assay has significant interference with fetal   hemoglobin   (HbF). The results are invalid for patients with abnormal amounts of   HbF,   including those with known  Hereditary Persistence   of Fetal Hemoglobin. Heterozygous hemoglobin variants (HbAS, HbAC,   HbAD, HbAE, HbA2) do not significantly interfere with this assay;   however, presence of multiple variants in a sample may impact the %   interference.     04/17/2017 9.3 (H) 4.5 - 6.2 % Final     Comment:     According to ADA guidelines, hemoglobin A1C <7.0% represents  optimal control in non-pregnant diabetic patients.  Different  metrics may apply to specific populations.   Standards of Medical Care in Diabetes - 2016.  For the purpose of screening for the presence of diabetes:  <5.7%     Consistent with the absence of diabetes  5.7-6.4%  Consistent with increasing risk for diabetes   (prediabetes)  >or=6.5%  Consistent with diabetes  Currently no consensus exists for use of hemoglobin A1C  for diagnosis of diabetes for children.     09/29/2016 8.1 (A) 0 - 5.7 % Final       GOAL A1C: < 7%     DM MEDICATIONS USED IN THE PAST: Metformin- was on combo with DDP4- off after pancreatitis episode.   DPP4- off hx of pancreatitis.       CURRENT DIABETIC MEDS: Lantus 48 units nightly and Novolog 15 units + correction scale goal 150, ISF 25.   Uses Vials or Pens: pens.   Type of Glucose Meter: Contour Next       STANDARDS of CARE:  Statin:  No- on Lovaza and fenofibrate.   ACEI or ARB:  Yes -Losartan   ASA:  Yes - 81 mg   Last eye exam few years ago- has appointment scheduled for 10/31/17- outside facility  Last Podiatry Exam: 2012, neuropathy  Microalbumin/Creatinine ratio:  Lab Results   Component Value Date    MICALBCREAT 1 01/11/2018        BG readings are reportedly checked 3x/day.                      Notices readings have been higher lately r/t diet.     Hypoglycemia: No    Skipped/missed glycemic medications: rarely missing doses.     Prandial injections taken with meals: Yes    Physical Activity: treadmill- 1 mile - 3x/week.  Weightlifting at the gym 3x/week-just started back, on break for past 4 weeks    Skipping meals  "and/or snacking: eating 3 meals per day.   BF- wheat waffles or cereal  XIOMARA- sandwich or leftovers   DIN- home cooked- protein, wheat pasta, vegetables.   Snacking- rare- SF pudding or jello. Almonds.   Drinking mostly water throughout the day. Sometime SF beverages.     OCCUPATION: Works for [employer].     MEDICATIONS, ALLERGIES, LABS, VS's, MEDICAL, SURGICAL, SOCIAL, AND FAMILY HISTORY reviewed and updated in the appropriate sections during this encounter    ROS  Constitutional: Negative for weight change #12 gain  Endocrine:  Denies polyuria, polydipsia, nocturia.  HENT: Denies neck swelling, lumps, hoarseness or trouble swallowing. Denies any personal or family history of thyroid cancer.    Eyes: Negative for visual disturbance. Wears glasses.   Respiratory: Negative for cough or shortness of breath.  Cardiovascular: Negative for chest pain or claudication.   Gastrointestinal: Negative for nausea, diarrhea, vomiting, bloating, abd pain.  + personal hx of pancreatitis- r/t hypertriglyceridemia   Genitourinary: Negative for urgency, frequency, frequent urinary tract infections.  Skin: Denies prolonged wound healing.  Neurological: Negative for syncope, + constant numbness/tingling of extremities.  Psychiatric/Behavioral: Negative for depression or anxiety      Vital Signs  /81 (BP Location: Left arm, Patient Position: Sitting)   Ht 5' 8" (1.727 m)   Wt 97.2 kg (214 lb 4.8 oz)   BMI 32.58 kg/m²         Chemistry        Component Value Date/Time     08/15/2017 0751    K 4.1 08/15/2017 0751    CL 98 08/15/2017 0751    CO2 29 08/15/2017 0751    BUN 13 08/15/2017 0751    CREATININE 0.67 08/15/2017 0751     (H) 08/16/2017 1040        Component Value Date/Time    CALCIUM 9.7 08/15/2017 0751    ALKPHOS 79 08/15/2017 0751    AST 31 08/15/2017 0751    ALT 21 08/15/2017 0751    BILITOT 0.5 08/15/2017 0751    ESTGFRAFRICA 142 08/15/2017 0751    EGFRNONAA 123 08/15/2017 0751          Lab Results "   Component Value Date    CHOL 150 10/10/2017    CHOL 133 09/28/2017    CHOL 176 08/15/2017     Lab Results   Component Value Date    HDL 27 (L) 10/10/2017    HDL 27 (L) 08/15/2017    HDL 10 (L) 07/27/2017     Lab Results   Component Value Date    LDLCALC 96.0 10/10/2017    LDLCALC 110 08/15/2017    LDLCALC 34.6 (L) 07/27/2017     Lab Results   Component Value Date    TRIG 135 10/10/2017    TRIG 196 (H) 08/15/2017    TRIG 216 (H) 07/29/2017     Lab Results   Component Value Date    CHOLHDL 18.0 (L) 10/10/2017    CHOLHDL 6.5 (H) 08/15/2017    CHOLHDL 9.5 (L) 07/27/2017       Lab Results   Component Value Date    TSH 2.48 11/30/2016       Lab Results   Component Value Date    MICALBCREAT 1 01/11/2018       No results found for: [id number]      PHYSICAL EXAMINATION  Constitutional: Well developed, well nourished, NAD.   Psych: AAOx3, appropriate mood and affect, pleasant expression, conversant, appears relaxed, well groomed.   Eyes: Conjunctiva and corneas clear.  Neck: Supple, trachea midline  Respiratory: Resp even and unlabored, no wheezes  Cardiac: RRR, S1, S2 heard, no murmurs; radial pulses +2 bilaterally.   Abdomen: deferred   Vascular: Same temperature peripherally to centrally, no edema.   Neuro: Gait steady.   Skin: Normal skin turgor. Skin warm and dry. No acanthosis nigracans observed. Injection sites with no complications. Rotating sites.  Feet:  Footwear appropriate, nails in good condition.     Diabetes Foot Exam:   Defer, done last visit.    Assessment/Plan  1. Type 2 diabetes mellitus with diabetic polyneuropathy, with long-term current use of insulin  Hemoglobin A1c next time w/ [facility] (printed)  Get labs from last week-message support staff    Discussed metformin use- stopped during pancreatitis episode-defer for now    Not candidate for glp1a or dpp4i    Increase basal by 10% to 52 units qhs, increase prandial to 20 units ac w/ mod dose correction scale 150/+2    1 log sheet given w/  directions  Continue use of journal  Info given on support group/contact info given as well    Discussed vgo briefly and freestyle anish    Pt will look into freestyle anish-brochure given. Encouraged use of [facility] in between visits.    Counseling >35 mins     2. Type 2 diabetes mellitus with hyperglycemia, with long-term current use of insulin  Hemoglobin A1c next time per [facility],  Awaiting other results, done on thurs/fri last week.    3. Mixed hyperlipidemia  Lab Results   Component Value Date    LDLCALC 96.0 10/10/2017     At goal    4. Obesity (BMI 30-39.9)  Body mass index is 32.58 kg/m². may increase insulin resistance  Encourage exercise 3-4 times a week for at least 30 mins.  Wt loss 5-10% in the next 3-4 mos.     5. Hx of acute pancreatitis  See above   6. Obstructive sleep apnea syndrome  Uses cpap here and there      FOLLOW UP  Follow-up in about 3 months (around 4/16/2018).  Labs at [facility].     Orders Placed This Encounter   Procedures    Hemoglobin A1c     Standing Status:   Future     Number of Occurrences:   1     Standing Expiration Date:   3/17/2019

## 2018-01-17 LAB
ALBUMIN SERPL-MCNC: 4.2 G/DL (ref 3.6–5.1)
ALBUMIN/GLOB SERPL: 1.9 (CALC) (ref 1–2.5)
ALP SERPL-CCNC: 58 U/L (ref 40–115)
ALT SERPL-CCNC: 22 U/L (ref 9–46)
AST SERPL-CCNC: 19 U/L (ref 10–40)
BILIRUB SERPL-MCNC: 0.6 MG/DL (ref 0.2–1.2)
BUN SERPL-MCNC: 20 MG/DL (ref 7–25)
BUN/CREAT SERPL: NORMAL (CALC) (ref 6–22)
CALCIUM SERPL-MCNC: 9.3 MG/DL (ref 8.6–10.3)
CHLORIDE SERPL-SCNC: 105 MMOL/L (ref 98–110)
CHOLEST SERPL-MCNC: 138 MG/DL
CHOLEST/HDLC SERPL: 5.5 (CALC)
CO2 SERPL-SCNC: 28 MMOL/L (ref 20–31)
CREAT SERPL-MCNC: 1.03 MG/DL (ref 0.6–1.35)
GFR SERPL CREATININE-BSD FRML MDRD: 92 ML/MIN/1.73M2
GLOBULIN SER CALC-MCNC: 2.2 G/DL (CALC) (ref 1.9–3.7)
GLUCOSE SERPL-MCNC: 99 MG/DL (ref 65–99)
HBA1C MFR BLD: 8.8 % OF TOTAL HGB
HDLC SERPL-MCNC: 25 MG/DL
INSULIN AB SER RIA-ACNC: <0.4 U/ML
LDLC SERPL CALC-MCNC: 91 MG/DL (CALC)
NONHDLC SERPL-MCNC: 113 MG/DL (CALC)
POTASSIUM SERPL-SCNC: 3.6 MMOL/L (ref 3.5–5.3)
PROT SERPL-MCNC: 6.4 G/DL (ref 6.1–8.1)
SODIUM SERPL-SCNC: 139 MMOL/L (ref 135–146)
TRIGL SERPL-MCNC: 120 MG/DL
TSH SERPL-ACNC: 1.91 MIU/L (ref 0.4–4.5)

## 2018-01-17 NOTE — PROGRESS NOTES
Diabetes Education  Author: Radha Burks RD  Date: 1/17/2018    Diabetes Education Visit  Diabetes Education Record Assessment/Progress: Post Program/Follow-up    Diabetes Type  Diabetes Type : Type II    Had lab work done at outside facility ([facility]) Thursday of last week - new A1C not yet available.     Nutrition  Meal Planning: 3 meals per day, water, diet drinks  Meal Plan 24 Hour Recall - Breakfast: 2 wheat waffles, breakfast sandwich, diet cranberry juice  Meal Plan 24 Hour Recall - Lunch: leftover dinner  Meal Plan 24 Hour Recall - Dinner: shrimp wrap (wheat tortilla) last night OR normal dinner- chicken, 1 cup wheat noodles, green beans, water with crystal light  Meal Plan 24 Hour Recall - Snack: rarely - maybe once weekly - almonds, every now and then oreos    Monitoring   Self Monitoring : SMBG 3 times daily - log in notebook - improvement overall in BG over past 1 week, however, still having variability 110s-250s, no hypos  Blood Glucose Logs: Yes    Exercise   Exercise Type: use exercise equipment  Frequency: 3-5 Times per week (3 days weekly)  Duration: 30 min    Current Diabetes Treatment   Current Treatment: Insulin    Social History  Preferred Learning Method: Face to Face, Reading Materials  Primary Support: Self            DDS-2 Score  ( > 3 = SIGNIFICANT DISTRESS): 2                   Barriers to Change  Barriers to Change: None  Learning Challenges : None    Readiness to Learn   Readiness to Learn : Acceptance    Cultural Influences  Cultural Influences: No    Diabetes Education Assessment/Progress    Nutrition (Incorporating nutritional management into one's lifestyle): Discussion, Individual Session, Needs Review, Instructed, Comprehends Key Points, Written Materials Provided (Was off work for about a month during the holidays - eating pattern during that time was inconsistent and included much more high CHO/ high fat foods. He reports since getting back to work, diet has been much  better - eating more lean proteins, whole grains, non-starchy vegetables, and consistent pattern with 3 meals daily and limiting snacking between meals. Applauded these efforts and encouraged continuing. He shows very good understanding of appropriate diet choices and meal planning. Based on recall, breakfast is a bit more carb and calorie heavy - encouraged decreasing portions and changing to light or lean breakfast sandwich (whole wheat english muffin, egg white, low fat cheese). )    Physical Activity (incorporating physical activity into one's lifestyle): Discussion, Individual Session, Instructed, Needs Review, Comprehends Key Points (Has started back with exercising. Discussed benefits to reducing insulin resistance. Encouraged 30 min 5 days weekly. He plans to start going to gym.)    Medications (states correct name, dose, onset, peak, duration, side effects & timing of meds): Discussion, Individual Session, Written Materials Provided, Instructed (Proper injection technique, has not missed doses recently, and denies any bulges/knots at sites. Reports holidays were not as consistent as usual but has gotten back to routine this past few weeks. He is seeing Gerson Good today for endocrine follow-up. Pt seems to struggle a little with regimen when his work/life schedule changes - discussed possibility of VGo insulin delivery device and generally explained what it is/how it works. He verbalized feels comfortable with insulin pens at this time.)    Monitoring (monitoring blood glucose/other parameters & using results): Discussion, Instructed, Individual Session, Written Materials Provided (Did not test as much over holidays and has decreased to testing 3 instead of 4 times daily. Discussed potential of personal Freestyle Celia sensor to aid in BG awareness and SMBG without fingersticks. He stated will think about it and let me know. )    Clinical (diabetes and other pertinent medical history): Discussion,  Individual Session, Demonstrates Understanding/Competency (verbalizes/demonstrates)  Diabetes Distress and Support Systems: Discussion, Individual Session    Goals  Patient has selected/evaluated goals during today's session: Yes, evaluated    Diabetes Self-Management Support Plan  Exercise/Nutrition: join a gym  Review Status: Patient has selected and agrees to support plan.    Diabetes Care Plan/Intervention  Education Plan/Intervention:  (annual diabetes education recall letter scheduled)         Education Units of Time   Time Spent: 30 min      Health Maintenance Topics with due status: Not Due       Topic Last Completion Date    Hemoglobin A1c 09/28/2017    Lipid Panel 10/10/2017    Foot Exam 10/16/2017    Eye Exam 10/31/2017    Urine Microalbumin 01/11/2018     Health Maintenance Due   Topic Date Due    TETANUS VACCINE  06/26/1998    Pneumococcal PPSV23 (Medium Risk) (1) 06/26/1998    Influenza Vaccine  08/01/2017

## 2018-01-19 ENCOUNTER — PATIENT MESSAGE (OUTPATIENT)
Dept: INTERNAL MEDICINE | Facility: CLINIC | Age: 38
End: 2018-01-19

## 2018-01-25 NOTE — SUBJECTIVE & OBJECTIVE
SUBJECTIVE:                                                    OPIOID USE DISORDER - BUPRENORPHINE FOLLOW UP:    Yara Edouard is a 38 year old female who presents to clinic today for follow up of  MAT for opioid use disorder.     Date of last visit:  1/16/2018    [place] Board of Pharmacy Data Base Reviewed:    YES;     No Concerns Noted   1/26/2018        HPI:    Patient seen today struggling.  She has had multiple unfortunate events recently.    Had carbon monoxide leak and door was broken down by [place].  Rental company turned gas back on and Energy company again came and shut off.  Had struggle getting [employer] company to fix furnace.   Anniversary of father's death recently which was very difficult for her.   Toilet was then broken (got that fixed) and now is having difficulty with water flow and pipes.  Rental company being not helpful.  Did reach out to owner of building which seems to have helped.        Was having very high craving.  Took more Suboxone than prescriped  (over three days took 3 tab, 4 tab and 5 tab 1/18, 1/19, 1/20 and then none for 1/21 and then took 3 tab on 1/22 for job interview.  Threw rest away as she was scared she had taken extra and decided she wanted to come off and go to [facility]. None yesterday or today.  Rethinking coming off Suboxone now due to high craving but has no supply.      Anxiety very high.   Followed by psychiatry.    Recent taken off remeron and started prozac.  Lamictal stayed same.     Tapering off Gabapentin due to hx of misuse. Has about 10 -600mg capsules left.    Has been going to meetings and did discuss misuse with meeting.       Status since last visit: Since last visit patient has been:struggling    Intensity:     There has been: moderate craving    Suboxone Dose probably not adequate    Progression of Symptoms/Precipitating Factors:     Cues to use and relapse triggers: Anxiety and Outside stressors    Trigger have been:   Interval History:    Improving over night. NGT with 1350 cc over past 24 hours, but patient has been eating some ice so it may increase the reported output. Having BMs. No nausea. Very very hungry. No fevers, VSS. Overall, improving. Asking about d/c. Glucose well controlled    Day 7/10 of cefepime and flagyl     Review of Systems   Constitutional: Negative for chills and fever.   Respiratory: Negative for cough and shortness of breath.    Cardiovascular: Negative for chest pain.   Gastrointestinal: Negative for abdominal distention and abdominal pain.        Abdomen improving  + hunger   Genitourinary: Negative for flank pain.   Neurological: Negative for weakness, light-headedness and numbness.   Psychiatric/Behavioral: Negative for confusion.     Objective:     Vital Signs (Most Recent):  Temp: 98.2 °F (36.8 °C) (08/02/17 1635)  Pulse: 98 (08/02/17 1635)  Resp: 20 (08/02/17 1635)  BP: 134/75 (08/02/17 1635)  SpO2: 95 % (08/02/17 1635) Vital Signs (24h Range):  Temp:  [98.2 °F (36.8 °C)-98.9 °F (37.2 °C)] 98.2 °F (36.8 °C)  Pulse:  [] 98  Resp:  [16-20] 20  SpO2:  [92 %-96 %] 95 %  BP: (134-163)/(74-83) 134/75     Weight: 112.1 kg (247 lb 2.2 oz)  Body mass index is 35.46 kg/m².    Intake/Output Summary (Last 24 hours) at 08/02/17 1636  Last data filed at 08/02/17 1306   Gross per 24 hour   Intake             2240 ml   Output             2100 ml   Net              140 ml      Physical Exam   Constitutional: He is oriented to person, place, and time. No distress.   Eyes: EOM are normal. Right eye exhibits no discharge. Left eye exhibits no discharge.   Neck: Normal range of motion.   Cardiovascular: Normal rate, regular rhythm, normal heart sounds and intact distal pulses.    No murmur heard.  Pulmonary/Chest: Effort normal. He has no wheezes. He has no rales.   Abdominal: He exhibits no distension. There is no tenderness. There is no guarding.   Hypoactive bowel sounds   Musculoskeletal: He exhibits no edema  moderate    Accompanying Signs & Symptoms:    Side Effects: none    Sobriety:     Status: No substance use     Drug Screen: obtained    Alleviating factors/Other Therapies Tried:    Contact with sponsor has been: sporadic    Family and support system has been: helpful    Patient has been going to recovery meetings: regularly    Recovery program has been : active    Patient has been participating in professional counseling /therapy: YES        Social History     Social History Narrative    Living on own with two dogs (Zeny and Oscar).  In school (leave of absence).  [employer] for [employer], [employer]).         Patient Active Problem List    Diagnosis Date Noted     Interstitial cystitis 01/16/2018     Priority: Medium     Muscle pain 01/16/2018     Priority: Medium     Diagnosed with musculoskelatal disorder NOS  (hx of episodes of rhabdomyolysis).         Gastritis 01/16/2018     Priority: Medium     Hx of nausea , abd pain.  Follow up endoscopsy.          PTSD (post-traumatic stress disorder) 10/15/2014     Priority: Medium     Borderline personality disorder 10/15/2014     Priority: Medium     Other bipolar disorder (H) 11/19/2013     Priority: Medium     Diagnosis updated by automated process. Provider to review and confirm.       Chemical dependency (H) 11/03/2013     Priority: Medium     Drug overdose 10/30/2013     Priority: Medium       Problem list and histories reviewed & adjusted, as indicated.  Additional history: as documented        Current Outpatient Prescriptions on File Prior to Visit:  cloNIDine (CATAPRES) 0.1 MG tablet Take 1-2 tablets (0.1-0.2 mg) by mouth 3 times daily as needed (anxiety)   FLUoxetine (PROZAC) 20 MG capsule Take 1 capsule (20 mg) by mouth daily   QUEtiapine (SEROQUEL) 100 MG tablet Take 1-2 tablets (100-200 mg) by mouth nightly as needed   lamoTRIgine (LAMICTAL) 200 MG tablet Take 1 tablet (200 mg) by mouth At Bedtime with 150 mg every morning   lamoTRIgine (LAMICTAL) 150 MG tablet Take 1  or deformity.   Neurological: He is alert and oriented to person, place, and time. No cranial nerve deficit.   Skin: Skin is warm and dry. He is not diaphoretic.       Significant Labs:   CBC:   Recent Labs  Lab 08/01/17  0604 08/02/17  0401   WBC 11.88 11.73   HGB 12.6* 12.6*   HCT 40.0 40.7    162     CMP:   Recent Labs  Lab 08/01/17  0603 08/01/17  1554 08/02/17  0401    143 142   K 3.2* 3.5 3.3*   CL 97 99 99   CO2 29 32* 31*   * 193* 156*   BUN 19 18 17   CREATININE 0.8 0.8 0.7   CALCIUM 8.4* 8.7 8.5*   PROT 5.9* 6.1 5.7*   ALBUMIN 2.0* 2.1* 1.9*   BILITOT 0.6 0.6 0.5   ALKPHOS 100 87 93   AST 50* 40 42*   * 169* 124*   ANIONGAP 15 12 12   EGFRNONAA >60.0 >60.0 >60.0     Lipase:   Recent Labs  Lab 08/02/17  0401   LIPASE 204*        tablet (150 mg) by mouth daily along with 200 mg tab at night   propranolol (INDERAL) 40 MG tablet Take 1 tablet (40 mg) by mouth 4 times daily as needed for tremor   buprenorphine-naloxone (SUBOXONE) 8-2 MG SUBL sublingual tablet One tablet q am and 1/2 tab q pm.   methylfolate 7.5 MG TABS Take 7.5 mg by mouth daily   nicotine polacrilex (NICORETTE) 4 MG gum Place 1-2 each (4-8 mg) inside cheek every hour as needed for smoking cessation or other (nicotine withdrawal symptoms)   omeprazole (PRILOSEC) 20 MG CR capsule Take 1 capsule (20 mg) by mouth every morning   naproxen (NAPROSYN) 500 MG tablet Take 1,000 mg by mouth daily   cholecalciferol (VITAMIN D3) 67940 UNITS capsule Take 10,000 Units by mouth daily   fish oil-omega-3 fatty acids 1000 MG capsule Take 1,000 mg by mouth 4 times daily   multivitamin, therapeutic (THERA-VIT) TABS tablet Take 1 tablet by mouth 2 times daily   Melatonin 10 MG TABS tablet Take 10 mg by mouth At Bedtime   Misc Natural Products (7-KETO LEAN PO) Take 1 tablet by mouth daily   chromium 200 MCG CAPS capsule Take 200 mcg by mouth daily   bisacodyl (DULCOLAX) 5 MG EC tablet Take 10 mg by mouth At Bedtime   ascorbic acid (VITAMIN C) 1000 MG TABS Take 1,000 mg by mouth daily   L-Theanine 100 MG CAPS Take 1 capsule by mouth 2 times daily as needed   Calcium-Magnesium-Zinc 167-83-8 MG TABS Take 1 tablet by mouth every morning   albuterol (PROAIR HFA/PROVENTIL HFA/VENTOLIN HFA) 108 (90 BASE) MCG/ACT Inhaler Inhale 2 puffs into the lungs every 6 hours   sucralfate (CARAFATE) 1 GM tablet Take 1 g by mouth 2 times daily   phenazopyridine (PYRIDIUM) 200 MG tablet Take 1 tablet (200 mg) by mouth 3 times daily as needed for pain   ibuprofen (ADVIL,MOTRIN) 200 MG tablet Take 800 mg by mouth every 6 hours as needed      No current facility-administered medications on file prior to visit.        Allergies   Allergen Reactions     Ceclor [Cefaclor Monohydrate]      Erythromycin      Wellbutrin  [Bupropion Hydrobromide]          REVIEW OF SYSTEMS:  General: No acute withdrawal symptoms.  No recent infections or fever  Resp: No coughing, wheezing or shortness of breath  CV: No chest pains or palpitations  GI: No nausea, vomiting, abdominal pain, diarrhea, constipation  : No urinary frequency or dysuria,     Musculoskeletal: No significant muscle or joint pains, No edema  Neurologic: No numbness, tingling, weakness, problems with balance or coordination  Psychiatric: No acute concerns  Skin: No rashes    OBJECTIVE:    PHYSICAL EXAM:  /64  Pulse 64  Temp 98.1  F (36.7  C) (Oral)  Resp 18  Wt 176 lb (79.8 kg)  SpO2 94%  BMI 28.41 kg/m2    GENERAL APPEARANCE:  alert, tearful appearing  EYES:Eyes grossly normal to inspection  NEURO:  Gait normal.  No tremor. Coordination intact.   MENTAL STATUS EXAM:  Appearance/Behavior: No appearant distress  Speech: Normal  Mood/Affect: depressed affect and anxiety  Insight: Fair      Results for orders placed or performed in visit on 01/16/18   Drug Abuse Screen Panel 13, Urine (Pain Care Package)   Result Value Ref Range    Cannabinoids (31-lvu-3-carboxy-9-THC) Not Detected NDET^Not Detected ng/mL    Phencyclidine (Phencyclidine) Not Detected NDET^Not Detected ng/mL    Cocaine (Benzoylecgonine) Not Detected NDET^Not Detected ng/mL    Methamphetamine (d-Methamphetamine) Not Detected NDET^Not Detected ng/mL    Opiates (Morphine) Not Detected NDET^Not Detected ng/mL    Amphetamine (d-Amphetamine) Not Detected NDET^Not Detected ng/mL    Benzodiazepines (Nordiazepam) Not Detected NDET^Not Detected ng/mL    Tricyclic Antidepressants (Desipramine) Not Detected NDET^Not Detected ng/mL    Methadone (Methadone) Not Detected NDET^Not Detected ng/mL    Barbiturates (Butalbital) Not Detected NDET^Not Detected ng/mL    Oxycodone (Oxycodone) Not Detected NDET^Not Detected ng/mL    Propoxyphene (Norpropoxyphene) Not Detected NDET^Not Detected ng/mL    Buprenorphine  (Buprenorphine) Detected, Abnormal Result (A) NDET^Not Detected ng/mL           ASSESSMENT/PLAN:      (F11.20) Opioid use disorder, severe, dependence (H)  (primary encounter diagnosis)  Comment: would like to continue Suboxone  Plan: Urine Drugs of Abuse Screen Panel 13      Increase to Suboxone  8mg bid #. 14  Reviewed with pharmacy and may not be able to fill for several days even with increase dose.  Discussed with patient.     May use gabapentin supply for limited use for current withdrawal sx.   Due to high anxiety small RX of Clonazepam 1mg tid for severe anxiety to help prevent relapse until Suboxone can be filled was discussed with patient.  No further rx and patient indicates understanding.       Encourage meeting attendance and sponsorship to continue -plans for meeting later today.     Opioid warning reviewed.  Risk of overdose following a period of abstinence due to decrease tolerance was discussed including risk of death.   Risk of overdose if using Suboxone with other substances particuarly benzodiazepines/alcohol was reviewed.           Strongly recommended abstain from alcohol, benzodiazepines, THC, opioids and other drugs of abuse with Suboxone.  Increased risk of relapse for opioids with use of these substances discussed.  Increased risk of overdose/death with use of other substances particularly benzodiazepines/alcohol reviewed.     Refer to higher level of services as needed     Naloxone offered.  Patient has RX.      Patient advised of office protocols for refills/appointments.          (F31.89) Other bipolar disorder (H)  (F60.3) Borderline personality disorder  (F43.10) PTSD (post-traumatic stress disorder)  Comment: Current Prozac and Gabapentin (does have hx of overuse of gabapentin in past.   Plan: will need psychaitry follow up.  Encouraged theraputic counsleing.      (M79.1) Muscle pain  Comment:obtain previous records from [place]  Plan: encourage hydration with hx of  rhabdomyolysis     (K29.70) Gastritis without bleeding, unspecified chronicity, unspecified gastritis type  Comment: stable  Plan: continue Prilosec/Carafate as needed.      (N30.10) Interstitial cystitis  Plan: follow up with PCP.         ENCOUNTER FOR LONG TERM USE OF HIGH RISK MEDICATION   High Risk Drug Monitoring?  YES   Drug being monitored: Buprenorphine   Reason for drug: Opioid Use Disorder   What is being monitored?: Dosage, Cravings, Trigger, side effects, and continued abstinence.      Opioid warning reviewed.  Risk of overdose following a period of abstinence due to decrease tolerance was discussed including risk of death.   Risk of overdose if using Suboxone with other substances particuarly benzodiazepines/alcohol was reviewed.      Maine Fox MD  [facility]  [phone]      Addendum:  1/26/2018 called patient for update.  Doing better this am.  Did use 1mg Klonipin last night.  Plans for meeting later today. Has relapse prevention plan until medication able to be filled.  Call with concerns.

## 2018-02-15 RX ORDER — OMEGA-3-ACID ETHYL ESTERS 1 G/1
CAPSULE, LIQUID FILLED ORAL
Qty: 120 CAPSULE | Refills: 5 | Status: SHIPPED | OUTPATIENT
Start: 2018-02-15 | End: 2018-08-22 | Stop reason: SDUPTHER

## 2018-02-22 RX ORDER — ALLOPURINOL 300 MG/1
TABLET ORAL
Qty: 30 TABLET | Refills: 5 | Status: SHIPPED | OUTPATIENT
Start: 2018-02-22 | End: 2018-08-29 | Stop reason: SDUPTHER

## 2018-03-22 ENCOUNTER — TELEPHONE (OUTPATIENT)
Dept: INTERNAL MEDICINE | Facility: CLINIC | Age: 38
End: 2018-03-22

## 2018-03-22 NOTE — TELEPHONE ENCOUNTER
----- Message from Meggan Cordero sent at 3/21/2018 11:38 AM CDT -----  Contact: Helga from [facility]  Helga is calling to find out if you have received a prescription for diabetes supplies.  She can be reached at [phone]

## 2018-03-22 NOTE — TELEPHONE ENCOUNTER
----- Message from Meggan Cordero sent at 3/21/2018 11:38 AM CDT -----  Contact: Helga from [facility]  Helga is calling to find out if you have received a prescription for diabetes supplies.  She can be reached at [phone]

## 2018-03-22 NOTE — TELEPHONE ENCOUNTER
I left a message on Helga voice mail office is returning her call in reference to patient diabetic supplies. We did not receive request. Vf/ma

## 2018-06-06 ENCOUNTER — OFFICE VISIT (OUTPATIENT)
Dept: CARDIOLOGY | Facility: CLINIC | Age: 38
End: 2018-06-06
Payer: COMMERCIAL

## 2018-06-06 VITALS
HEIGHT: 68 IN | WEIGHT: 215.81 LBS | BODY MASS INDEX: 32.71 KG/M2 | SYSTOLIC BLOOD PRESSURE: 135 MMHG | DIASTOLIC BLOOD PRESSURE: 78 MMHG | HEART RATE: 73 BPM

## 2018-06-06 DIAGNOSIS — Z87.19 HX OF ACUTE PANCREATITIS: ICD-10-CM

## 2018-06-06 DIAGNOSIS — E78.1 HYPERTRIGLYCERIDEMIA: ICD-10-CM

## 2018-06-06 DIAGNOSIS — E78.5 HYPERLIPIDEMIA, UNSPECIFIED HYPERLIPIDEMIA TYPE: Primary | ICD-10-CM

## 2018-06-06 DIAGNOSIS — I10 ESSENTIAL HYPERTENSION: ICD-10-CM

## 2018-06-06 PROCEDURE — 99214 OFFICE O/P EST MOD 30 MIN: CPT | Mod: S$GLB,,, | Performed by: INTERNAL MEDICINE

## 2018-06-06 PROCEDURE — 99999 PR PBB SHADOW E&M-EST. PATIENT-LVL IV: CPT | Mod: PBBFAC,,, | Performed by: INTERNAL MEDICINE

## 2018-06-06 PROCEDURE — 3075F SYST BP GE 130 - 139MM HG: CPT | Mod: CPTII,S$GLB,, | Performed by: INTERNAL MEDICINE

## 2018-06-06 PROCEDURE — 3078F DIAST BP <80 MM HG: CPT | Mod: CPTII,S$GLB,, | Performed by: INTERNAL MEDICINE

## 2018-06-06 PROCEDURE — 3008F BODY MASS INDEX DOCD: CPT | Mod: CPTII,S$GLB,, | Performed by: INTERNAL MEDICINE

## 2018-06-06 RX ORDER — ATORVASTATIN CALCIUM 40 MG/1
40 TABLET, FILM COATED ORAL DAILY
Qty: 90 TABLET | Refills: 3 | Status: SHIPPED | OUTPATIENT
Start: 2018-06-06 | End: 2019-06-27 | Stop reason: SDUPTHER

## 2018-06-06 NOTE — PROGRESS NOTES
Cardiology Clinic Note  Reason for Visit: Follow up    HPI:   Mr. Ibarra is a 36 yo M with a history of HTN, HLD, DM2, hypertriglyceridemia c/b recurrent pancreatitis who presents for follow up.    He has been doing well with no recurrence of pancreatitis.  His last lipid panel in January 2018 showed triglycerides of 120.  He is active and has been running for exercise.  Recently completed two 5Ks.    Reports that he was started on atorvastatin in the past with elevation in his liver enzymes.    He denies any abdominal pain, chest pain, dyspnea on exertion, palpitations or lower extremity swelling.    ROS:    Constitution: Negative for fever or chills. Negative for weight loss or gain.   HENT: Negative for sore throat or headaches. Negative for rhinorrhea.  Eyes: Negative for blurred or double vision.   Cardiovascular: See above  Pulmonary: Negative for SOB. Negative for cough.   Gastrointestinal: Negative for abdominal pain. Negative for nausea/ vomiting. Negative for diarrhea.   : Negative for dysuria.   Neurological: Negative for focal weakness or sensory changes.  PMH:     Past Medical History:   Diagnosis Date    Acute pancreatitis     Diabetes mellitus, type 2     Gout     Hx of acute pancreatitis 3/3/2017    Hyperlipidemia     Hypertension     Obesity (BMI 35.0-39.9 without comorbidity) 6/5/2015    Sleep apnea 1/22/2016     History reviewed. No pertinent surgical history.  Allergies:   Review of patient's allergies indicates:  No Known Allergies  Medications:     Current Outpatient Prescriptions on File Prior to Visit   Medication Sig Dispense Refill    allopurinol (ZYLOPRIM) 300 MG tablet TAKE ONE TABLET BY MOUTH ONCE DAILY 30 tablet 5    aspirin (ECOTRIN) 81 MG EC tablet Take 81 mg by mouth once daily.      CONTOUR TEST STRIPS Strp       fenofibrate 160 MG Tab TAKE ONE TABLET BY MOUTH ONCE DAILY 90 tablet 3    insulin aspart (NOVOLOG FLEXPEN) 100 unit/mL InPn pen Inject 20 Units into  "the skin 3 (three) times daily with meals. + sliding scale- max TDD of 90 units 2 Box 3    insulin glargine (LANTUS SOLOSTAR) 100 unit/mL (3 mL) InPn pen Inject 52 Units into the skin every evening. 18 mL 11    insulin syringe-needle U-100 29 gauge x 1/2" Syrg Inject 1 (once) per day 100 Syringe 3    losartan (COZAAR) 100 MG tablet Take 0.5 tablets (50 mg total) by mouth once daily. 45 tablet 3    LYRICA 150 mg capsule TAKE ONE CAPSULE BY MOUTH TWICE DAILY 60 capsule 5    omega-3 acid ethyl esters (LOVAZA) 1 gram capsule TAKE TWO CAPSULES BY MOUTH TWICE DAILY 120 capsule 5    ULTRA THIN LANCETS 31 gauge Misc        No current facility-administered medications on file prior to visit.      Social History:     Social History   Substance Use Topics    Smoking status: Never Smoker    Smokeless tobacco: Never Used    Alcohol use No     Family History:     Family History   Problem Relation Age of Onset    Coronary artery disease Father         4 vessel bypass at 40, possible stent at 36    Diabetes type II Father     No Known Problems Sister     No Known Problems Brother     Aneurysm Maternal Grandmother         Possible AAA    Diabetes type II Paternal Grandmother      Physical Exam:   /78 (BP Location: Left arm, Patient Position: Sitting, BP Method: Medium (Automatic))   Pulse 73   Ht 5' 8" (1.727 m)   Wt 97.9 kg (215 lb 13.3 oz)   BMI 32.82 kg/m²      Constitutional: No acute distress, conversant  HEENT: Sclera anicteric, Pupils equal, round and reactive to light, extraocular motions intact, Oropharynx clear  Neck: No JVD, no carotid bruits  Cardiovascular: regular rate and rhythm, no murmur, rubs or gallops, normal S1/S2  Pulmonary: Clear to auscultation bilaterally  Abdominal: Abdomen soft, nontender, nondistended, positive bowel sounds  Extremities: No lower extremity edema, warm with palpable pulses  Skin: No ecchymosis, erythema, or ulcers  Psych: Alert and oriented x 3, appropriate " affect  Neuro: CNII-XII intact, no focal deficits    Labs:     Lab Results   Component Value Date     01/12/2018    K 3.6 01/12/2018     01/12/2018    CO2 28 01/12/2018    BUN 20 01/12/2018    CREATININE 1.03 01/12/2018    ANIONGAP 11 08/05/2017     Lab Results   Component Value Date    AST 19 01/12/2018    ALT 22 01/12/2018    ALKPHOS 58 01/12/2018    BILITOT 0.6 01/12/2018    ALBUMIN 4.2 01/12/2018     Lab Results   Component Value Date    CALCIUM 9.3 01/12/2018    MG 1.8 08/15/2017    PHOS 2.6 (L) 08/05/2017      Lab Results   Component Value Date    WBC 5.1 01/11/2018    HGB 14.8 01/11/2018    HCT 44.8 01/11/2018     01/11/2018    GRAN 7.2 08/04/2017    GRAN 80.9 (H) 08/04/2017     Lab Results   Component Value Date    INR 1.1 07/25/2017     Lab Results   Component Value Date    CHOL 138 01/12/2018    CHOL 133 09/28/2017    HDL 25 (L) 01/12/2018    LDLCALC 91 01/12/2018    TRIG 120 01/12/2018     Lab Results   Component Value Date    HGBA1C 7.7 (A) 09/28/2017    HGBA1C 11.6 (H) 07/25/2017     Lab Results   Component Value Date    TSH 1.91 01/12/2018            Assessment:   In summary, Mr. Ibarra is a 38 yo M with a history of HTN, HLD, DM2, hypertriglyceridemia c/b recurrent pancreatitis who presents for follow up.Mr. Ibarra is a 38 yo M with a history of HTN, HLD, DM2, hypertriglyceridemia c/b recurrent pancreatitis who presents for follow up.    Plan:   #HLD: Pt is on fenofibrate for his hypertriglyceridemia. Will start atorvastatin 40 mg daily and plan for CMP in 4-6 weeks to check LFTs.  He is in the statin benefit group given his DM and HTN. Triglycerides controlled on last lipid panel.      #HTN: At goal.  Continue current medications.    Can follow up with PCP unless new issues develop.    Signed:  Maria Horn MD, MPH  Cardiology Fellow, PGY-6  Pager: [phone]  6/6/2018 9:10 AM    Follow-up:   No future appointments.

## 2018-06-06 NOTE — PROGRESS NOTES
I have personally interviewed and examined the patient. I have reviewed the notes, assessments and plans performed by Dr Horn and I CONCUR with her documentation of Bay Ibarra.

## 2018-07-05 ENCOUNTER — TELEPHONE (OUTPATIENT)
Dept: SURGERY | Facility: CLINIC | Age: 38
End: 2018-07-05

## 2018-07-05 NOTE — TELEPHONE ENCOUNTER
----- Message from Kathrin Crane sent at 7/5/2018 11:44 AM CDT -----  Contact: [phone]/self  Patient is requesting orders for blood work and urine. Please advise.

## 2018-07-05 NOTE — TELEPHONE ENCOUNTER
Left message on patient voice to call office and set up an appointment before labs are ordered per Dr. Espinoza

## 2018-07-13 RX ORDER — INSULIN ASPART 100 [IU]/ML
INJECTION, SOLUTION INTRAVENOUS; SUBCUTANEOUS
Qty: 15 ML | Refills: 3 | Status: SHIPPED | OUTPATIENT
Start: 2018-07-13 | End: 2018-07-27 | Stop reason: SDUPTHER

## 2018-07-17 DIAGNOSIS — E10.40 TYPE 1 DIABETES MELLITUS WITH DIABETIC NEUROPATHY: ICD-10-CM

## 2018-07-18 RX ORDER — PREGABALIN 150 MG/1
CAPSULE ORAL
Qty: 60 CAPSULE | Refills: 5 | Status: SHIPPED | OUTPATIENT
Start: 2018-07-18 | End: 2019-01-17 | Stop reason: SDUPTHER

## 2018-07-18 NOTE — TELEPHONE ENCOUNTER
----- Message from Laxmi Poe sent at 7/18/2018  4:17 PM CDT -----  Contact: self / [phone]  Patient is requesting a refill on the below. Please advise      LYRICA 150 mg capsule

## 2018-07-19 ENCOUNTER — TELEPHONE (OUTPATIENT)
Dept: FAMILY MEDICINE | Facility: CLINIC | Age: 38
End: 2018-07-19

## 2018-07-19 NOTE — TELEPHONE ENCOUNTER
----- Message from Gilberto Galvan sent at 7/19/2018  2:31 PM CDT -----  Contact: self  Patient is here in lobby to pickup prescription for lyrica

## 2018-07-27 ENCOUNTER — OFFICE VISIT (OUTPATIENT)
Dept: INTERNAL MEDICINE | Facility: CLINIC | Age: 38
End: 2018-07-27
Payer: COMMERCIAL

## 2018-07-27 VITALS
HEIGHT: 69 IN | DIASTOLIC BLOOD PRESSURE: 72 MMHG | BODY MASS INDEX: 32.29 KG/M2 | TEMPERATURE: 98 F | WEIGHT: 218 LBS | SYSTOLIC BLOOD PRESSURE: 120 MMHG | OXYGEN SATURATION: 98 % | HEART RATE: 77 BPM

## 2018-07-27 DIAGNOSIS — E78.2 MIXED HYPERLIPIDEMIA: ICD-10-CM

## 2018-07-27 DIAGNOSIS — Z79.4 TYPE 2 DIABETES MELLITUS WITH HYPERGLYCEMIA, WITH LONG-TERM CURRENT USE OF INSULIN: ICD-10-CM

## 2018-07-27 DIAGNOSIS — Z79.4 TYPE 2 DIABETES MELLITUS WITH DIABETIC POLYNEUROPATHY, WITH LONG-TERM CURRENT USE OF INSULIN: ICD-10-CM

## 2018-07-27 DIAGNOSIS — E11.42 TYPE 2 DIABETES MELLITUS WITH DIABETIC POLYNEUROPATHY, WITH LONG-TERM CURRENT USE OF INSULIN: ICD-10-CM

## 2018-07-27 DIAGNOSIS — E11.65 TYPE 2 DIABETES MELLITUS WITH HYPERGLYCEMIA, WITH LONG-TERM CURRENT USE OF INSULIN: ICD-10-CM

## 2018-07-27 DIAGNOSIS — Z87.19 HX OF ACUTE PANCREATITIS: ICD-10-CM

## 2018-07-27 DIAGNOSIS — I10 ESSENTIAL HYPERTENSION: Primary | ICD-10-CM

## 2018-07-27 DIAGNOSIS — E78.1 HYPERTRIGLYCERIDEMIA: ICD-10-CM

## 2018-07-27 DIAGNOSIS — M10.00 IDIOPATHIC GOUT, UNSPECIFIED CHRONICITY, UNSPECIFIED SITE: ICD-10-CM

## 2018-07-27 PROCEDURE — 99214 OFFICE O/P EST MOD 30 MIN: CPT | Mod: S$GLB,,, | Performed by: INTERNAL MEDICINE

## 2018-07-27 PROCEDURE — 3078F DIAST BP <80 MM HG: CPT | Mod: CPTII,S$GLB,, | Performed by: INTERNAL MEDICINE

## 2018-07-27 PROCEDURE — 3045F PR MOST RECENT HEMOGLOBIN A1C LEVEL 7.0-9.0%: CPT | Mod: CPTII,S$GLB,, | Performed by: INTERNAL MEDICINE

## 2018-07-27 PROCEDURE — 3074F SYST BP LT 130 MM HG: CPT | Mod: CPTII,S$GLB,, | Performed by: INTERNAL MEDICINE

## 2018-07-27 PROCEDURE — 99999 PR PBB SHADOW E&M-EST. PATIENT-LVL IV: CPT | Mod: PBBFAC,,, | Performed by: INTERNAL MEDICINE

## 2018-07-27 PROCEDURE — 3008F BODY MASS INDEX DOCD: CPT | Mod: CPTII,S$GLB,, | Performed by: INTERNAL MEDICINE

## 2018-07-27 NOTE — PROGRESS NOTES
"Subjective:      Patient ID: Bay Ibarra is a 38 y.o. male.    Chief Complaint: No chief complaint on file.    HPI: 38 y.o. White male, last seen 1/18. States " fallen off the wagon, BS have gone up to 500's".  Aggravated.  On insulin 4 shots a day.  No vision disturbance so far.    Has seen Cardio.  Needs full labs done.  Not exercisin    Review of Systems   Constitutional: Positive for activity change and appetite change. Negative for fatigue.   HENT: Negative.    Eyes: Negative.    Respiratory: Negative for chest tightness, shortness of breath and wheezing.    Cardiovascular: Negative for chest pain, palpitations and leg swelling.   Gastrointestinal: Negative.    Endocrine: Positive for polydipsia, polyphagia and polyuria.   Genitourinary: Negative.    Musculoskeletal: Negative.    Skin: Negative.    Neurological: Negative.    Hematological: Negative.    Psychiatric/Behavioral: Positive for dysphoric mood. The patient is nervous/anxious.        Objective:   /72 (BP Location: Left arm, Patient Position: Sitting, BP Method: Medium (Manual))   Pulse 77   Temp 97.9 °F (36.6 °C) (Oral)   Ht 5' 9" (1.753 m)   Wt 98.9 kg (218 lb)   SpO2 98%   BMI 32.19 kg/m²     Physical Exam   Constitutional: He is oriented to person, place, and time. He appears well-developed and well-nourished.   HENT:   Head: Normocephalic and atraumatic.   Nose: Nose normal.   Mouth/Throat: Oropharynx is clear and moist.   Eyes: EOM are normal.   Neck: Normal range of motion. Neck supple.   Cardiovascular: Normal rate, regular rhythm and normal heart sounds.    Pulmonary/Chest: Effort normal and breath sounds normal.   Abdominal: Soft. Bowel sounds are normal. There is no hepatosplenomegaly. There is no tenderness.   Musculoskeletal: He exhibits no edema.   Neurological: He is alert and oriented to person, place, and time.   Skin: Skin is warm and dry.   Psychiatric: Judgment and thought content normal.   Nursing note and " vitals reviewed.      Assessment:     1. Essential hypertension    2. Hypertriglyceridemia    3. Type 2 diabetes mellitus with diabetic polyneuropathy, with long-term current use of insulin this has been an ongoing issue, with downey to control BS, and chasing them. I believe it is time to place him on an insulin pump. He has had all the instructions for DM, but multiple doses of insulin are interferring with ADL.   4. Type 2 diabetes mellitus with hyperglycemia, with long-term current use of insulin    5. Mixed hyperlipidemia    6. Hx of acute pancreatitis    7. Idiopathic gout, unspecified chronicity, unspecified site      Plan:     Essential hypertension  -     CBC auto differential; Future; Expected date: 07/27/2018  -     Comprehensive metabolic panel; Future; Expected date: 07/27/2018    Hypertriglyceridemia  -     Lipid panel; Future; Expected date: 07/27/2018    Type 2 diabetes mellitus with diabetic polyneuropathy, with long-term current use of insulin  -     Hemoglobin A1c; Future; Expected date: 07/27/2018  -     Microalbumin/creatinine urine ratio; Future; Expected date: 07/27/2018  -     Ambulatory Referral to Diabetes Education    Type 2 diabetes mellitus with hyperglycemia, with long-term current use of insulin  -     Ambulatory Referral to Diabetes Education    Mixed hyperlipidemia    Hx of acute pancreatitis  -     Lipase; Future; Expected date: 07/27/2018  -     Ambulatory Referral to Diabetes Education    Idiopathic gout, unspecified chronicity, unspecified site  -     Uric acid; Future; Expected date: 07/27/2018

## 2018-08-22 DIAGNOSIS — I10 ESSENTIAL HYPERTENSION: ICD-10-CM

## 2018-08-22 RX ORDER — OMEGA-3-ACID ETHYL ESTERS 1 G/1
CAPSULE, LIQUID FILLED ORAL
Qty: 120 CAPSULE | Refills: 5 | Status: SHIPPED | OUTPATIENT
Start: 2018-08-22 | End: 2019-02-17 | Stop reason: SDUPTHER

## 2018-08-22 RX ORDER — LOSARTAN POTASSIUM 100 MG/1
TABLET ORAL
Qty: 45 TABLET | Refills: 3 | OUTPATIENT
Start: 2018-08-22

## 2018-08-23 ENCOUNTER — PATIENT MESSAGE (OUTPATIENT)
Dept: DIABETES | Facility: CLINIC | Age: 38
End: 2018-08-23

## 2018-08-23 ENCOUNTER — CLINICAL SUPPORT (OUTPATIENT)
Dept: DIABETES | Facility: CLINIC | Age: 38
End: 2018-08-23
Payer: COMMERCIAL

## 2018-08-23 VITALS — BODY MASS INDEX: 32.85 KG/M2 | WEIGHT: 221.81 LBS | HEIGHT: 69 IN

## 2018-08-23 DIAGNOSIS — E11.42 TYPE 2 DIABETES MELLITUS WITH DIABETIC POLYNEUROPATHY, WITH LONG-TERM CURRENT USE OF INSULIN: ICD-10-CM

## 2018-08-23 DIAGNOSIS — Z79.4 TYPE 2 DIABETES MELLITUS WITH DIABETIC POLYNEUROPATHY, WITH LONG-TERM CURRENT USE OF INSULIN: ICD-10-CM

## 2018-08-23 DIAGNOSIS — Z79.4 TYPE 2 DIABETES MELLITUS WITH HYPERGLYCEMIA, WITH LONG-TERM CURRENT USE OF INSULIN: ICD-10-CM

## 2018-08-23 DIAGNOSIS — E11.65 TYPE 2 DIABETES MELLITUS WITH HYPERGLYCEMIA, WITH LONG-TERM CURRENT USE OF INSULIN: ICD-10-CM

## 2018-08-23 DIAGNOSIS — Z87.19 HX OF ACUTE PANCREATITIS: ICD-10-CM

## 2018-08-23 PROCEDURE — G0108 DIAB MANAGE TRN  PER INDIV: HCPCS | Mod: S$GLB,,, | Performed by: INTERNAL MEDICINE

## 2018-08-23 PROCEDURE — 99999 PR PBB SHADOW E&M-EST. PATIENT-LVL III: CPT | Mod: PBBFAC,,,

## 2018-08-23 NOTE — PATIENT INSTRUCTIONS
It was very nice meeting you today Mr. Ibarra,    As we discussed today:    · VGo Insulin Delivery System please about it.  Please let me know if you decide that you want to try this device.     Monitoring:  We talked about the Freestyle anish flash system today and the Dexcom G6 Personal Continuous Glucose Monitoring Systems; Please let me know if this is something you are interested in     Also- Check your Blood Sugar First thing in the morning, before each meal, and before bedtime.  Remember with increased activity like exercise; your risk for low blood sugar can occur.  If you are feeling symptoms of low blood sugar after exercise, check your blood sugar and record the number.  I am attaching information regarding hypoglycemia and proper treatment for you to review.      Please call or message me with any questions or concerns.  Kamini

## 2018-08-23 NOTE — PROGRESS NOTES
Diabetes Education  Author: aKmini Alvares RD, CDE  Date: 8/23/2018    Diabetes Education Visit  Diabetes Education Record Assessment/Progress: Initial    Diabetes Type  Diabetes Type : Type II    Diabetes History  Diabetes Diagnosis: 5-10 years    Nutrition  Meal Planning: (Reported trying to get back on track with eating better )  What type of beverages do you drink?: water, diet soda/tea    Monitoring   Monitoring: Other  Self Monitoring : SMBG maybe 1 time per day here and there; stated that since January 2018 when his brother moved in with him he has felt stressed and not really even thinking about checking his BG   Blood Glucose Logs: No  Do you use a personal glucose monitor?: No  In the last month, how often have you had a low blood sugar reaction?: never(Denies having any symptoms of low bg)  Can you tell when your blood sugar is too high?: no    Exercise   Exercise Type: running, walking  Intensity: High  Frequency: Daily(1 mile )  Duration: 30 min    Current Diabetes Treatment   Current Treatment: Injectable(Insulin Lantus 52 units and Novolog 20-24 units with meals )    Social History  Preferred Learning Method: Face to Face, Demonstration, Web Based, Reading Materials  Primary Support: Self  Educational Level: College Graduate  Occupation: works for [employer]; 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           Barriers to Change  Barriers to Change: None  Learning Challenges : None    Readiness to Learn   Readiness to Learn : Acceptance    Cultural Influences  Cultural Influences: None    Diabetes Education Assessment/Progress    · Diabetes Disease Process (diabetes disease process and treatment options): Discussion, Demonstrates Understanding/Competency(verbalizes/demonstrates), Individual Session, Written Materials Provided    · Nutrition (Incorporating nutritional management into one's lifestyle): Discussion, Individual Session, Written Materials Provided, Demonstrates  Understanding/Competency (verbalizes/demonstrates)    · Physical Activity (incorporating physical activity into one's lifestyle): Discussion, Individual Session, Written Materials Provided, Demonstrates Understanding/Competency (verbalizes/demonstrates)    · Medications (states correct name, dose, onset, peak, duration, side effects & timing of meds): Discussion, Written Materials Provided, Instructed, Demonstrates Understanding/Competency(verbalizes/demonstrates)  Reviewed insulin regimen today with patient; he stated that taking the insulin is not a problem for him; it is monitoring his BG.  I showed him the VGo Insulin Delivery System and he agreed to go home and think about it.  I advised that if he wants to use VGo that he follow back up with the Endocrine NP team.  Showed pt a demo on the device and video.     · Monitoring (monitoring blood glucose/other parameters & using results): Discussion, Instructed, Individual Session, Written Materials Provided, Demonstrates Understanding/Competency (verbalizes/demonstrates)  Pt stated that there is not really a good reason why he is not monitoring his BG; just does not think about it... We talked about the Freestyle anish flash system today and the Dexcom G6 as options for SMBG and he agreed to look into both and get back to me. Advised that on this insulin program, he has got to SMBG pre meal and bed at minimum; pt is also exercising more and warned against exercising with insulin and not checking BG.  Rev. BG goals today and pt agreed to keep logs and send in x 10 days.     · Acute Complications (preventing, detecting, and treating acute complications): Individual Session, Discussion, Demonstrates Understanding/Competency (verbalizes/demonstrates)  · Chronic Complications (preventing, detecting, and treating chronic complications): Discussion, Demonstrates Understanding/Competency (verbalizes/demonstrates)  · Clinical (diabetes, other pertinent medical history, and  relevant comorbidities reviewed during visit): Discussion, Demonstrates Understanding/Competency (verbalizes/demonstrates)  · Cognitive (knowledge of self-management skills, functional health literacy): Discussion, Demonstrates Understanding/Competency (verbalizes/demonstrates)  · Psychosocial (emotional response to diabetes): Discussion, Demonstrates Understanding/Competency (verbalizes/demonstrates)  · Diabetes Distress and Support Systems: Discussion, Demonstrates Understanding/Competency (verbalizes/demonstrates)  · Behavioral (readiness for change, lifestyle practices, self-care behaviors): Discussion, Demonstrates Understanding/Competency (verbalizes/demonstrates)    Goals  Patient has selected/evaluated goals during today's session: Yes, selected  Monitoring: Set(Patient will start checking blood sugar 3-4 times per day and keep record)  Start Date: 08/23/18  Target Date: 09/14/18       Diabetes Care Plan/Intervention  Education Plan/Intervention: Individual Follow-Up DSMT, Sensor Start, Endocrine Provider Visit Set Up(Pt to send logs in 10 days via [facility] portal )  Pt is to send me logs via the portal and is to also consider the VGo and/or the CGM devices we talked about today and he is to get back to me.      Diabetes Meal Plan  Carbohydrate Per Meal: 45-60g  Carbohydrate Per Snack : 7-15g    Education Units of Time   Time Spent: 60 min    Health Maintenance was reviewed today with patient. Discussed with patient importance of routine eye exams, foot exams/foot care, blood work (i.e.: A1c, microalbumin, and lipid), dental visits, yearly flu vaccine, and pneumonia vaccine as indicated by PCP. Patient verbalized understanding.     Health Maintenance Topics with due status: Not Due       Topic Last Completion Date    Eye Exam 10/31/2017    Lipid Panel 07/27/2018    Hemoglobin A1c 07/27/2018    Urine Microalbumin 07/27/2018     Health Maintenance Due   Topic Date Due    TETANUS VACCINE  06/26/1998     Pneumococcal PPSV23 (Medium Risk) (1) 06/26/1998    Influenza Vaccine  08/01/2018    Foot Exam  10/16/2018

## 2018-08-25 DIAGNOSIS — E10.40 TYPE 1 DIABETES MELLITUS WITH DIABETIC NEUROPATHY: ICD-10-CM

## 2018-08-25 RX ORDER — INSULIN GLARGINE 100 [IU]/ML
INJECTION, SOLUTION SUBCUTANEOUS
Qty: 15 ML | Refills: 11 | Status: SHIPPED | OUTPATIENT
Start: 2018-08-25 | End: 2018-10-26

## 2018-08-28 DIAGNOSIS — I10 ESSENTIAL HYPERTENSION: ICD-10-CM

## 2018-08-28 RX ORDER — LOSARTAN POTASSIUM 100 MG/1
50 TABLET ORAL DAILY
Qty: 45 TABLET | Refills: 3 | Status: SHIPPED | OUTPATIENT
Start: 2018-08-28 | End: 2019-06-03 | Stop reason: SDUPTHER

## 2018-08-29 RX ORDER — ALLOPURINOL 300 MG/1
TABLET ORAL
Qty: 30 TABLET | Refills: 5 | Status: SHIPPED | OUTPATIENT
Start: 2018-08-29 | End: 2019-02-26 | Stop reason: SDUPTHER

## 2018-10-10 RX ORDER — INSULIN ASPART 100 [IU]/ML
INJECTION, SOLUTION INTRAVENOUS; SUBCUTANEOUS
Qty: 15 ML | Refills: 3 | Status: SHIPPED | OUTPATIENT
Start: 2018-10-10 | End: 2018-10-26 | Stop reason: SDUPTHER

## 2018-10-26 ENCOUNTER — OFFICE VISIT (OUTPATIENT)
Dept: INTERNAL MEDICINE | Facility: CLINIC | Age: 38
End: 2018-10-26
Payer: COMMERCIAL

## 2018-10-26 VITALS
OXYGEN SATURATION: 97 % | BODY MASS INDEX: 31.6 KG/M2 | RESPIRATION RATE: 18 BRPM | WEIGHT: 213.38 LBS | TEMPERATURE: 98 F | DIASTOLIC BLOOD PRESSURE: 80 MMHG | HEART RATE: 84 BPM | HEIGHT: 69 IN | SYSTOLIC BLOOD PRESSURE: 120 MMHG

## 2018-10-26 DIAGNOSIS — E78.2 MIXED HYPERLIPIDEMIA: ICD-10-CM

## 2018-10-26 DIAGNOSIS — E78.1 HYPERTRIGLYCERIDEMIA: ICD-10-CM

## 2018-10-26 DIAGNOSIS — E11.42 TYPE 2 DIABETES MELLITUS WITH DIABETIC POLYNEUROPATHY, WITH LONG-TERM CURRENT USE OF INSULIN: ICD-10-CM

## 2018-10-26 DIAGNOSIS — Z79.4 TYPE 2 DIABETES MELLITUS WITH DIABETIC POLYNEUROPATHY, WITH LONG-TERM CURRENT USE OF INSULIN: ICD-10-CM

## 2018-10-26 DIAGNOSIS — I10 ESSENTIAL HYPERTENSION: ICD-10-CM

## 2018-10-26 DIAGNOSIS — Z79.4 TYPE 2 DIABETES MELLITUS WITH HYPERGLYCEMIA, WITH LONG-TERM CURRENT USE OF INSULIN: ICD-10-CM

## 2018-10-26 DIAGNOSIS — Z87.19 HX OF ACUTE PANCREATITIS: Primary | ICD-10-CM

## 2018-10-26 DIAGNOSIS — E11.65 TYPE 2 DIABETES MELLITUS WITH HYPERGLYCEMIA, WITH LONG-TERM CURRENT USE OF INSULIN: ICD-10-CM

## 2018-10-26 PROCEDURE — 99999 PR PBB SHADOW E&M-EST. PATIENT-LVL IV: CPT | Mod: PBBFAC,,, | Performed by: INTERNAL MEDICINE

## 2018-10-26 PROCEDURE — 3008F BODY MASS INDEX DOCD: CPT | Mod: CPTII,S$GLB,, | Performed by: INTERNAL MEDICINE

## 2018-10-26 PROCEDURE — 3079F DIAST BP 80-89 MM HG: CPT | Mod: CPTII,S$GLB,, | Performed by: INTERNAL MEDICINE

## 2018-10-26 PROCEDURE — 99214 OFFICE O/P EST MOD 30 MIN: CPT | Mod: S$GLB,,, | Performed by: INTERNAL MEDICINE

## 2018-10-26 PROCEDURE — 3074F SYST BP LT 130 MM HG: CPT | Mod: CPTII,S$GLB,, | Performed by: INTERNAL MEDICINE

## 2018-10-26 PROCEDURE — 3046F HEMOGLOBIN A1C LEVEL >9.0%: CPT | Mod: CPTII,S$GLB,, | Performed by: INTERNAL MEDICINE

## 2018-11-07 ENCOUNTER — CLINICAL SUPPORT (OUTPATIENT)
Dept: OTHER | Facility: CLINIC | Age: 38
End: 2018-11-07
Payer: COMMERCIAL

## 2018-11-07 DIAGNOSIS — Z00.8 ENCOUNTER FOR OTHER GENERAL EXAMINATION: ICD-10-CM

## 2018-11-07 PROCEDURE — 82947 ASSAY GLUCOSE BLOOD QUANT: CPT | Mod: QW,S$GLB,, | Performed by: INTERNAL MEDICINE

## 2018-11-07 PROCEDURE — 99401 PREV MED CNSL INDIV APPRX 15: CPT | Mod: S$GLB,,, | Performed by: INTERNAL MEDICINE

## 2018-11-07 PROCEDURE — 80061 LIPID PANEL: CPT | Mod: QW,S$GLB,, | Performed by: INTERNAL MEDICINE

## 2018-11-08 VITALS — HEIGHT: 60 IN | BODY MASS INDEX: 44.6 KG/M2

## 2018-11-08 LAB
HBA1C MFR BLD: 10 %
POC CHOLESTEROL, TOTAL: 234 MG/DL
POC GLUCOSE, FASTING: 362 MG/DL
TRIGL SERPL-MCNC: 651 MG/DL

## 2018-11-28 ENCOUNTER — TELEPHONE (OUTPATIENT)
Dept: OTHER | Facility: CLINIC | Age: 38
End: 2018-11-28

## 2018-12-01 PROBLEM — K85.90 ACUTE PANCREATITIS: Status: ACTIVE | Noted: 2018-12-01

## 2018-12-11 ENCOUNTER — TELEPHONE (OUTPATIENT)
Dept: OTHER | Facility: CLINIC | Age: 38
End: 2018-12-11

## 2018-12-12 ENCOUNTER — OFFICE VISIT (OUTPATIENT)
Dept: INTERNAL MEDICINE | Facility: CLINIC | Age: 38
End: 2018-12-12
Payer: COMMERCIAL

## 2018-12-12 VITALS
BODY MASS INDEX: 31.18 KG/M2 | DIASTOLIC BLOOD PRESSURE: 68 MMHG | SYSTOLIC BLOOD PRESSURE: 118 MMHG | WEIGHT: 210.5 LBS | OXYGEN SATURATION: 97 % | TEMPERATURE: 98 F | HEART RATE: 99 BPM | HEIGHT: 69 IN | RESPIRATION RATE: 18 BRPM

## 2018-12-12 DIAGNOSIS — Z79.4 TYPE 2 DIABETES MELLITUS WITH HYPERGLYCEMIA, WITH LONG-TERM CURRENT USE OF INSULIN: ICD-10-CM

## 2018-12-12 DIAGNOSIS — Z87.19 HX OF ACUTE PANCREATITIS: ICD-10-CM

## 2018-12-12 DIAGNOSIS — E78.1 HYPERTRIGLYCERIDEMIA: Primary | ICD-10-CM

## 2018-12-12 DIAGNOSIS — N52.9 ERECTILE DYSFUNCTION, UNSPECIFIED ERECTILE DYSFUNCTION TYPE: ICD-10-CM

## 2018-12-12 DIAGNOSIS — E11.65 TYPE 2 DIABETES MELLITUS WITH HYPERGLYCEMIA, WITH LONG-TERM CURRENT USE OF INSULIN: ICD-10-CM

## 2018-12-12 DIAGNOSIS — E78.2 MIXED HYPERLIPIDEMIA: ICD-10-CM

## 2018-12-12 PROCEDURE — 3078F DIAST BP <80 MM HG: CPT | Mod: CPTII,S$GLB,, | Performed by: INTERNAL MEDICINE

## 2018-12-12 PROCEDURE — 3074F SYST BP LT 130 MM HG: CPT | Mod: CPTII,S$GLB,, | Performed by: INTERNAL MEDICINE

## 2018-12-12 PROCEDURE — 99214 OFFICE O/P EST MOD 30 MIN: CPT | Mod: S$GLB,,, | Performed by: INTERNAL MEDICINE

## 2018-12-12 PROCEDURE — 99999 PR PBB SHADOW E&M-EST. PATIENT-LVL III: CPT | Mod: PBBFAC,,, | Performed by: INTERNAL MEDICINE

## 2018-12-12 PROCEDURE — 3008F BODY MASS INDEX DOCD: CPT | Mod: CPTII,S$GLB,, | Performed by: INTERNAL MEDICINE

## 2018-12-12 PROCEDURE — 3046F HEMOGLOBIN A1C LEVEL >9.0%: CPT | Mod: CPTII,S$GLB,, | Performed by: INTERNAL MEDICINE

## 2018-12-12 RX ORDER — TADALAFIL 10 MG/1
10 TABLET ORAL DAILY PRN
Qty: 10 TABLET | Refills: 3 | Status: SHIPPED | OUTPATIENT
Start: 2018-12-12 | End: 2019-04-16

## 2018-12-17 NOTE — PROGRESS NOTES
"Subjective:      Patient ID: Bay Ibarra is a 38 y.o. male.    Chief Complaint: Hospital Follow Up ([facility] 12/1/2018) and Results (lab and CT Abdomen and Pelvis)    HPI: 38 y.o. White male, third episode of pancreatitis.  Has uncontrolled DM also.  He has had a hard time with his diet and exercise.  Dietary non compliance major.;        Review of Systems   Constitutional: Positive for activity change. Negative for appetite change.   HENT: Negative.    Eyes: Negative.    Respiratory: Negative.    Cardiovascular: Negative.    Gastrointestinal: Negative.    Endocrine: Negative.    Genitourinary: Negative.         ED   Musculoskeletal: Negative.    Skin: Negative.    Allergic/Immunologic: Negative.    Neurological: Negative.    Psychiatric/Behavioral: Negative.        Objective:   /68 (BP Location: Left arm, Patient Position: Sitting, BP Method: Large (Manual))   Pulse 99   Temp 98 °F (36.7 °C) (Oral)   Resp 18   Ht 5' 9" (1.753 m)   Wt 95.5 kg (210 lb 8 oz)   SpO2 97%   BMI 31.09 kg/m²     Physical Exam   Constitutional: He is oriented to person, place, and time. He appears well-developed and well-nourished.   HENT:   Head: Normocephalic and atraumatic.   Right Ear: External ear normal.   Left Ear: External ear normal.   Nose: Nose normal.   Mouth/Throat: Oropharynx is clear and moist.   Eyes: EOM are normal. Pupils are equal, round, and reactive to light.   Neck: Normal range of motion. Neck supple.   Cardiovascular: Normal rate, regular rhythm and normal heart sounds.   Pulmonary/Chest: Effort normal and breath sounds normal.   Abdominal: Soft. Bowel sounds are normal.   Musculoskeletal: Normal range of motion.   Neurological: He is alert and oriented to person, place, and time.   Skin: Skin is warm and dry.   Psychiatric: He has a normal mood and affect. His behavior is normal. Judgment and thought content normal.   Nursing note and vitals reviewed.      Assessment:     1. Hypertriglyceridemia  "   2. Mixed hyperlipidemia    3. Hx of acute pancreatitis    4. Type 2 diabetes mellitus with hyperglycemia, with long-term current use of insulin    5. Erectile dysfunction, unspecified erectile dysfunction type    Long discussion re: insulin, diet,portion control.  Ideally he needs an insulin pump.  Will see if next year he qualifies for a Cindy.  Plan:     Hypertriglyceridemia  -     Lipid panel; Future; Expected date: 12/12/2018    Mixed hyperlipidemia  -     Lipid panel; Future; Expected date: 12/12/2018    Hx of acute pancreatitis  -     Lipase; Future; Expected date: 12/12/2018    Type 2 diabetes mellitus with hyperglycemia, with long-term current use of insulin  -     Comprehensive metabolic panel; Future; Expected date: 12/12/2018  -     Microalbumin/creatinine urine ratio; Future; Expected date: 12/12/2018    Erectile dysfunction, unspecified erectile dysfunction type  -     tadalafil (CIALIS) 10 MG tablet; Take 1 tablet (10 mg total) by mouth daily as needed for Erectile Dysfunction.  Dispense: 10 tablet; Refill: 3

## 2019-01-04 DIAGNOSIS — E78.2 MIXED HYPERLIPIDEMIA: ICD-10-CM

## 2019-01-04 RX ORDER — FENOFIBRATE 160 MG/1
TABLET ORAL
Qty: 90 TABLET | Refills: 3 | OUTPATIENT
Start: 2019-01-04

## 2019-01-17 DIAGNOSIS — E10.40 TYPE 1 DIABETES MELLITUS WITH DIABETIC NEUROPATHY: ICD-10-CM

## 2019-01-18 RX ORDER — PREGABALIN 150 MG/1
CAPSULE ORAL
Qty: 60 CAPSULE | Refills: 0 | Status: SHIPPED | OUTPATIENT
Start: 2019-01-18 | End: 2019-02-15 | Stop reason: SDUPTHER

## 2019-01-23 RX ORDER — INSULIN ASPART 100 [IU]/ML
INJECTION, SOLUTION INTRAVENOUS; SUBCUTANEOUS
Refills: 3 | OUTPATIENT
Start: 2019-01-23

## 2019-01-24 RX ORDER — INSULIN ASPART 100 [IU]/ML
INJECTION, SOLUTION INTRAVENOUS; SUBCUTANEOUS
Qty: 45 ML | Refills: 1 | Status: SHIPPED | OUTPATIENT
Start: 2019-01-24 | End: 2019-04-16 | Stop reason: SDUPTHER

## 2019-01-25 DIAGNOSIS — Z79.4 TYPE 2 DIABETES MELLITUS WITH HYPERGLYCEMIA, WITH LONG-TERM CURRENT USE OF INSULIN: ICD-10-CM

## 2019-01-25 DIAGNOSIS — E11.65 TYPE 2 DIABETES MELLITUS WITH HYPERGLYCEMIA, WITH LONG-TERM CURRENT USE OF INSULIN: ICD-10-CM

## 2019-01-25 RX ORDER — INSULIN ASPART 100 [IU]/ML
INJECTION, SOLUTION INTRAVENOUS; SUBCUTANEOUS
Qty: 2 BOX | Refills: 3 | Status: SHIPPED | OUTPATIENT
Start: 2019-01-25 | End: 2019-07-15 | Stop reason: SDUPTHER

## 2019-02-01 ENCOUNTER — TELEPHONE (OUTPATIENT)
Dept: ADMINISTRATIVE | Facility: HOSPITAL | Age: 39
End: 2019-02-01

## 2019-02-15 DIAGNOSIS — E10.40 TYPE 1 DIABETES MELLITUS WITH DIABETIC NEUROPATHY: ICD-10-CM

## 2019-02-18 ENCOUNTER — TELEPHONE (OUTPATIENT)
Dept: FAMILY MEDICINE | Facility: CLINIC | Age: 39
End: 2019-02-18

## 2019-02-18 RX ORDER — OMEGA-3-ACID ETHYL ESTERS 1 G/1
CAPSULE, LIQUID FILLED ORAL
Qty: 360 CAPSULE | Refills: 1 | Status: SHIPPED | OUTPATIENT
Start: 2019-02-18 | End: 2019-04-16 | Stop reason: CLARIF

## 2019-02-18 RX ORDER — PREGABALIN 150 MG/1
CAPSULE ORAL
Qty: 60 CAPSULE | Refills: 3 | Status: SHIPPED | OUTPATIENT
Start: 2019-02-18 | End: 2019-06-27 | Stop reason: SDUPTHER

## 2019-02-26 RX ORDER — ALLOPURINOL 300 MG/1
TABLET ORAL
Qty: 30 TABLET | Refills: 5 | Status: SHIPPED | OUTPATIENT
Start: 2019-02-26 | End: 2019-09-08 | Stop reason: SDUPTHER

## 2019-04-05 DIAGNOSIS — E78.2 MIXED HYPERLIPIDEMIA: ICD-10-CM

## 2019-04-07 RX ORDER — FENOFIBRATE 160 MG/1
TABLET ORAL
Qty: 90 TABLET | Refills: 0 | Status: SHIPPED | OUTPATIENT
Start: 2019-04-07 | End: 2019-07-15 | Stop reason: SDUPTHER

## 2019-04-16 ENCOUNTER — OFFICE VISIT (OUTPATIENT)
Dept: INTERNAL MEDICINE | Facility: CLINIC | Age: 39
End: 2019-04-16
Payer: COMMERCIAL

## 2019-04-16 ENCOUNTER — PATIENT MESSAGE (OUTPATIENT)
Dept: INTERNAL MEDICINE | Facility: CLINIC | Age: 39
End: 2019-04-16

## 2019-04-16 VITALS
DIASTOLIC BLOOD PRESSURE: 66 MMHG | WEIGHT: 206.69 LBS | BODY MASS INDEX: 30.61 KG/M2 | SYSTOLIC BLOOD PRESSURE: 110 MMHG | RESPIRATION RATE: 18 BRPM | TEMPERATURE: 98 F | HEIGHT: 69 IN | OXYGEN SATURATION: 98 % | HEART RATE: 80 BPM

## 2019-04-16 DIAGNOSIS — E78.1 HYPERTRIGLYCERIDEMIA: ICD-10-CM

## 2019-04-16 DIAGNOSIS — Z87.19 HX OF ACUTE PANCREATITIS: ICD-10-CM

## 2019-04-16 DIAGNOSIS — I10 ESSENTIAL HYPERTENSION: ICD-10-CM

## 2019-04-16 DIAGNOSIS — Z79.4 TYPE 2 DIABETES MELLITUS WITH HYPERGLYCEMIA, WITH LONG-TERM CURRENT USE OF INSULIN: Primary | ICD-10-CM

## 2019-04-16 DIAGNOSIS — E11.65 TYPE 2 DIABETES MELLITUS WITH HYPERGLYCEMIA, WITH LONG-TERM CURRENT USE OF INSULIN: Primary | ICD-10-CM

## 2019-04-16 DIAGNOSIS — E78.2 MIXED HYPERLIPIDEMIA: ICD-10-CM

## 2019-04-16 PROBLEM — K85.90 ACUTE PANCREATITIS: Status: RESOLVED | Noted: 2018-12-01 | Resolved: 2019-04-16

## 2019-04-16 PROCEDURE — 99999 PR PBB SHADOW E&M-EST. PATIENT-LVL V: CPT | Mod: PBBFAC,,, | Performed by: INTERNAL MEDICINE

## 2019-04-16 PROCEDURE — 99999 PR PBB SHADOW E&M-EST. PATIENT-LVL V: ICD-10-PCS | Mod: PBBFAC,,, | Performed by: INTERNAL MEDICINE

## 2019-04-16 PROCEDURE — 99214 OFFICE O/P EST MOD 30 MIN: CPT | Mod: S$GLB,,, | Performed by: INTERNAL MEDICINE

## 2019-04-16 PROCEDURE — 3046F HEMOGLOBIN A1C LEVEL >9.0%: CPT | Mod: CPTII,S$GLB,, | Performed by: INTERNAL MEDICINE

## 2019-04-16 PROCEDURE — 3074F SYST BP LT 130 MM HG: CPT | Mod: CPTII,S$GLB,, | Performed by: INTERNAL MEDICINE

## 2019-04-16 PROCEDURE — 3008F PR BODY MASS INDEX (BMI) DOCUMENTED: ICD-10-PCS | Mod: CPTII,S$GLB,, | Performed by: INTERNAL MEDICINE

## 2019-04-16 PROCEDURE — 99214 PR OFFICE/OUTPT VISIT, EST, LEVL IV, 30-39 MIN: ICD-10-PCS | Mod: S$GLB,,, | Performed by: INTERNAL MEDICINE

## 2019-04-16 PROCEDURE — 3078F PR MOST RECENT DIASTOLIC BLOOD PRESSURE < 80 MM HG: ICD-10-PCS | Mod: CPTII,S$GLB,, | Performed by: INTERNAL MEDICINE

## 2019-04-16 PROCEDURE — 3008F BODY MASS INDEX DOCD: CPT | Mod: CPTII,S$GLB,, | Performed by: INTERNAL MEDICINE

## 2019-04-16 PROCEDURE — 3074F PR MOST RECENT SYSTOLIC BLOOD PRESSURE < 130 MM HG: ICD-10-PCS | Mod: CPTII,S$GLB,, | Performed by: INTERNAL MEDICINE

## 2019-04-16 PROCEDURE — 3078F DIAST BP <80 MM HG: CPT | Mod: CPTII,S$GLB,, | Performed by: INTERNAL MEDICINE

## 2019-04-16 PROCEDURE — 3046F PR MOST RECENT HEMOGLOBIN A1C LEVEL > 9.0%: ICD-10-PCS | Mod: CPTII,S$GLB,, | Performed by: INTERNAL MEDICINE

## 2019-04-16 RX ORDER — ICOSAPENT ETHYL 1000 MG/1
2 CAPSULE ORAL 2 TIMES DAILY
Qty: 360 CAPSULE | Refills: 3 | Status: SHIPPED | OUTPATIENT
Start: 2019-04-16 | End: 2020-05-29 | Stop reason: SDUPTHER

## 2019-04-16 NOTE — PROGRESS NOTES
"Subjective:      Patient ID: Bay Ibarra is a 38 y.o. male.    Chief Complaint: Establish Care and Medication Refill    HPI: 38 y.o. White male, multiple issues:  -uncontrolled T2DM, on large amounts of insulin  -skips meals  -tries to run 5 mile races several times a month.  -blister on right big toe/callus  -blister between toes big and 2nd.  -recurrent hypertriglycerdidemia, with pancreatitis  -compliance with Endo, questionable.    4/15/19 : FBS   485.  Pt did not have breakfast today, usually doesn't ( awakens too late).  So he did not take his BS.    Trig  748  Lipase >1900    Review of Systems   Constitutional: Positive for activity change. Negative for appetite change and unexpected weight change.   HENT: Negative.    Eyes: Negative.    Respiratory: Negative for chest tightness, shortness of breath and wheezing.    Cardiovascular: Negative for chest pain and palpitations.   Gastrointestinal: Negative.    Endocrine: Negative.    Genitourinary: Negative.    Musculoskeletal: Negative.    Skin: Negative.    Neurological: Negative.    Hematological: Negative.    Psychiatric/Behavioral: Negative.        Objective:   /66 (BP Location: Left arm, Patient Position: Sitting, BP Method: X-Large (Manual))   Pulse 80   Temp 98 °F (36.7 °C) (Oral)   Resp 18   Ht 5' 9" (1.753 m)   Wt 93.8 kg (206 lb 11.2 oz)   SpO2 98%   BMI 30.52 kg/m²     Physical Exam   Constitutional: He is oriented to person, place, and time. He appears well-developed and well-nourished.   HENT:   Head: Normocephalic and atraumatic.   Right Ear: External ear normal.   Left Ear: External ear normal.   Nose: Nose normal.   Mouth/Throat: Oropharynx is clear and moist.   Eyes: Conjunctivae and EOM are normal.   Neck: Normal range of motion.   Cardiovascular: Normal rate, regular rhythm and normal heart sounds.   Pulmonary/Chest: Effort normal and breath sounds normal.   Abdominal: Soft. Bowel sounds are normal.   Feet:   Right Foot: "   Skin Integrity: Positive for blister and callus.   Neurological: He is alert and oriented to person, place, and time.   Skin: Skin is warm and dry.   Psychiatric: He has a normal mood and affect. His behavior is normal.   Nursing note and vitals reviewed.      Assessment:     1. Type 2 diabetes mellitus with hyperglycemia, with long-term current use of insulin    2. Hypertriglyceridemia    3. Mixed hyperlipidemia    4. Essential hypertension    5. Hx of acute pancreatitis    NOT compliant.  Again, explained why I think his sugars are rebounding.  He needs a V Go, freestyle anish.  Needs more reinforcement for diabetes, all aspects.  Plan:     Type 2 diabetes mellitus with hyperglycemia, with long-term current use of insulin  -     Ambulatory referral to Nutrition Services  -     Ambulatory consult to Diabetic Education    Hypertriglyceridemia  -     icosapent ethyl (VASCEPA) 1 gram Cap; Take 2 g by mouth 2 (two) times daily.  Dispense: 360 capsule; Refill: 3  -     Ambulatory referral to Nutrition Services  Stop lovaza.  Mixed hyperlipidemia  -     Ambulatory referral to Nutrition Services    Essential hypertension    Hx of acute pancreatitis  -     Ambulatory referral to Nutrition Services

## 2019-04-17 ENCOUNTER — TELEPHONE (OUTPATIENT)
Dept: FAMILY MEDICINE | Facility: CLINIC | Age: 39
End: 2019-04-17

## 2019-04-17 NOTE — TELEPHONE ENCOUNTER
----- Message from Laura Lin sent at 4/17/2019 11:03 AM CDT -----  A referral was placed for patient to see the Nutrition and Diabetic Education? Does patient need both Please advise.

## 2019-04-17 NOTE — TELEPHONE ENCOUNTER
Just Diabetic education is needed.       Also PA for icosapent ethyl (VASCEPA) 1 gram Cap was processed and approved. [facility] was notified.

## 2019-05-02 ENCOUNTER — CLINICAL SUPPORT (OUTPATIENT)
Dept: DIABETES | Facility: CLINIC | Age: 39
End: 2019-05-02
Payer: COMMERCIAL

## 2019-05-02 VITALS — BODY MASS INDEX: 30.86 KG/M2 | WEIGHT: 209 LBS

## 2019-05-02 DIAGNOSIS — Z79.4 TYPE 2 DIABETES MELLITUS WITH HYPERGLYCEMIA, WITH LONG-TERM CURRENT USE OF INSULIN: Primary | ICD-10-CM

## 2019-05-02 DIAGNOSIS — E11.42 TYPE 2 DIABETES MELLITUS WITH DIABETIC POLYNEUROPATHY, WITH LONG-TERM CURRENT USE OF INSULIN: ICD-10-CM

## 2019-05-02 DIAGNOSIS — Z79.4 TYPE 2 DIABETES MELLITUS WITH DIABETIC POLYNEUROPATHY, WITH LONG-TERM CURRENT USE OF INSULIN: ICD-10-CM

## 2019-05-02 DIAGNOSIS — E11.65 TYPE 2 DIABETES MELLITUS WITH HYPERGLYCEMIA, WITH LONG-TERM CURRENT USE OF INSULIN: Primary | ICD-10-CM

## 2019-05-02 PROCEDURE — G0108 PR DIAB MANAGE TRN  PER INDIV: ICD-10-PCS | Mod: S$GLB,,, | Performed by: DIETITIAN, REGISTERED

## 2019-05-02 PROCEDURE — G0108 DIAB MANAGE TRN  PER INDIV: HCPCS | Mod: S$GLB,,, | Performed by: DIETITIAN, REGISTERED

## 2019-05-02 NOTE — PROGRESS NOTES
Diabetes Education  Author: Sylwia Chatman RD  Date: 5/2/2019    Diabetes Care Management Summary  Pt visit today focused on reviewing diabetes self management education from 2017- 2018. Pt admitted that current elevated BG due to frequent dieatry indiscretions (gummy candies, sweets, large portions). Though he understands carb counting, he admits to not following prior recommendations  Diabetes Education Record Assessment/Progress: Post Program/Follow-up  Current Diabetes Risk Level: High     Last A1c:   Lab Results   Component Value Date    HGBA1C 10.4 (H) 12/01/2018     Last Visit with Diabetes Educator: : 01/16/2018  Last OPCM Referral: : Not Found   Enrolled in OPCM: No  Diabetes Type  Diabetes Type : Type II  Diabetes History  Diabetes Diagnosis: 5-10 years  Current Treatment: Diet, Insulin  Reviewed Problem List with Patient: Yes    Health Maintenance was reviewed today with patient. Discussed with patient importance of routine eye exams, foot exams/foot care, blood work (i.e.: A1c, microalbumin, and lipid), dental visits, yearly flu vaccine, and pneumonia vaccine as indicated by PCP. Patient verbalized understanding.     Health Maintenance Topics with due status: Not Due       Topic Last Completion Date    Foot Exam 10/26/2018    Eye Exam 01/17/2019    Lipid Panel 04/15/2019    Urine Microalbumin 04/15/2019    Influenza Vaccine Not Due     Health Maintenance Due   Topic Date Due    TETANUS VACCINE  06/26/1998    Hemoglobin A1c  03/01/2019       Nutrition  Meal Planning: 3 meals per day, artificial sweeteners, snacks between meal  What type of beverages do you drink?: water, diet soda/tea    Monitoring   Monitoring: Other  Self Monitoring : pt states checks Bg 4x/day  Blood Glucose Logs: No  Do you use a personal continuous glucose monitor?: No  In the last month, how often have you had a low blood sugar reaction?: never  Can you tell when your blood sugar is too high?: no    Exercise   Exercise Type:  running  Intensity: Moderate(combinationb ow walk and run during exercise session)    Current Diabetes Treatment   Current Treatment: Diet, Insulin    Social History  Preferred Learning Method: Face to Face, Group Education  Primary Support: Self  Educational Level: Some College  Smoking Status: Never a Smoker  Alcohol Use: Never  Barriers to Change: None  Learning Challenges : None  Readiness to Learn   Readiness to Learn : Acceptance  Cultural Influences  Cultural Influences: No    Diabetes Education Assessment/Progress  Diabetes Disease Process (diabetes disease process and treatment options): Discussion, Instructed, Comprehends Key Points, Written Materials Provided, Individual Session  Nutrition (Incorporating nutritional management into one's lifestyle): Individual Session, Written Materials Provided, Instructed, Discussion, Comprehends Key Points  -Pt states he understand carb counting and has been consuming more whole grains and vegetables but admits to cheating regularly on conc/simple sugars such as candy and gummy bears and large portions. Meal recall notes average carb intake to be close 100g and pt states he finds it difficult to stay at 45 g level. Discussed staying consistently at 60 g carb range at each meal and avoid swings form lo carb to over 100 carb at meals  -Pt also has issues w gout; handout provided to pt which indicates avoid all HFCS and sugar for the treatment of gout.    Physical Activity (incorporating physical activity into one's lifestyle): Discussion, Individual Session, Instructed, Comprehends Key Points  -pt states he regularly walks/run though has not recently due to toe issue  -discussed importance of Bg checking prior to start of exercise and to be aware of acute complications. Discussed need for carb snack if BG glucose 140 or less.  Rec'd pt not engage in exercise when Bg over 250-300    Medications (states correct name, dose, onset, peak, duration, side effects & timing of  meds): Discussion, Instructed, Comprehends Key Points  -Pt states he takes insulin as instructed and is still thinking of getting the VGO system    Monitoring (monitoring blood glucose/other parameters & using results): Discussion, Instructed, Individual Session, Demonstrates Understanding/Competency (verbalizes/demonstrates)  -Pt states he check BG 4x/day most times.  Discussed and demonstrated both the Free Style anish and Dexcom6 CGM systems. Pt stated he like both systems and would discuss the cost w his insurance and his PCP    Acute Complications (preventing, detecting, and treating acute complications): Discussion, Individual Session, Written Materials Provided, Instructed, Demonstrates Understanding/Competency (verbalizes/demonstrates)  -pt stated he has not experience hypo and correctly answered question re how to treat and S/S    Chronic Complications (preventing, detecting, and treating chronic complications): Discussion, Instructed, Demonstrates Understanding/Competency (verbalizes/demonstrates), Individual Session, Written Materials Provided  Clinical (diabetes, other pertinent medical history, and relevant comorbidities reviewed during visit): Discussion, Instructed, Demonstrates Understanding/Competency (verbalizes/demonstrates), Individual Session, Written Materials Provided  Cognitive (knowledge of self-management skills, functional health literacy): Discussion  Behavioral (readiness for change, lifestyle practices, self-care behaviors): Discussion, Individual Session  -pt urged to change current dietary behaviors back to what he did 1 year ago     Goals  Patient has selected/evaluated goals during today's session: Yes, evaluated  Healthy Eating: In Progress  Physical Activity: In Progress  Monitoring: In Progress    Diabetes Care Plan/Intervention  Education Plan/Intervention: Individual Follow-Up DSMT(contact information provided)    Diabetes Meal Plan  Restrictions: Restricted Carbohydrate,  Other  Carbohydrate Per Meal: 45-60g  Carbohydrate Per Snack : 15-20g, 15-30g    Today's Self-Management Care Plan was developed with the patient's input and is based on barriers identified during today's assessment.    The long and short-term goals in the care plan were written with the patient/caregiver's input. The patient has agreed to work toward these goals to improve his overall diabetes control.      The patient received a copy of today's self-management plan and verbalized understanding of the care plan, goals, and all of today's instructions.      The patient was encouraged to communicate with his physician and care team regarding his condition(s) and treatment.  I provided the patient with my contact information today and encouraged him to contact me via phone or patient portal as needed.     Education Units of Time   Time Spent: 60 min

## 2019-05-26 PROBLEM — K85.10 ACUTE GALLSTONE PANCREATITIS: Status: ACTIVE | Noted: 2019-05-26

## 2019-05-27 ENCOUNTER — ANESTHESIA EVENT (OUTPATIENT)
Dept: ENDOSCOPY | Facility: HOSPITAL | Age: 39
DRG: 438 | End: 2019-05-27
Payer: COMMERCIAL

## 2019-05-27 ENCOUNTER — HOSPITAL ENCOUNTER (INPATIENT)
Facility: HOSPITAL | Age: 39
LOS: 2 days | Discharge: HOME | DRG: 438 | End: 2019-05-29
Attending: HOSPITALIST | Admitting: HOSPITALIST
Payer: COMMERCIAL

## 2019-05-27 ENCOUNTER — ANESTHESIA (OUTPATIENT)
Dept: ENDOSCOPY | Facility: HOSPITAL | Age: 39
DRG: 438 | End: 2019-05-27
Payer: COMMERCIAL

## 2019-05-27 DIAGNOSIS — K85.90 ACUTE PANCREATITIS, UNSPECIFIED COMPLICATION STATUS, UNSPECIFIED PANCREATITIS TYPE: Primary | ICD-10-CM

## 2019-05-27 DIAGNOSIS — Z87.19 HX OF ACUTE PANCREATITIS: ICD-10-CM

## 2019-05-27 DIAGNOSIS — K85.10 ACUTE GALLSTONE PANCREATITIS: ICD-10-CM

## 2019-05-27 LAB
ALBUMIN SERPL BCP-MCNC: 3.1 G/DL (ref 3.5–5.2)
ALP SERPL-CCNC: 65 U/L (ref 55–135)
ALT SERPL W/O P-5'-P-CCNC: 62 U/L (ref 10–44)
ANION GAP SERPL CALC-SCNC: 9 MMOL/L (ref 8–16)
AST SERPL-CCNC: 54 U/L (ref 10–40)
BASOPHILS # BLD AUTO: 0.04 K/UL (ref 0–0.2)
BASOPHILS NFR BLD: 0.5 % (ref 0–1.9)
BILIRUB SERPL-MCNC: 1.9 MG/DL (ref 0.1–1)
BUN SERPL-MCNC: 10 MG/DL (ref 6–20)
CALCIUM SERPL-MCNC: 9 MG/DL (ref 8.7–10.5)
CHLORIDE SERPL-SCNC: 102 MMOL/L (ref 95–110)
CO2 SERPL-SCNC: 25 MMOL/L (ref 23–29)
CREAT SERPL-MCNC: 0.9 MG/DL (ref 0.5–1.4)
DIFFERENTIAL METHOD: ABNORMAL
EOSINOPHIL # BLD AUTO: 0.1 K/UL (ref 0–0.5)
EOSINOPHIL NFR BLD: 0.6 % (ref 0–8)
ERYTHROCYTE [DISTWIDTH] IN BLOOD BY AUTOMATED COUNT: 13.8 % (ref 11.5–14.5)
EST. GFR  (AFRICAN AMERICAN): >60 ML/MIN/1.73 M^2
EST. GFR  (NON AFRICAN AMERICAN): >60 ML/MIN/1.73 M^2
ESTIMATED AVG GLUCOSE: 269 MG/DL (ref 68–131)
GLUCOSE SERPL-MCNC: 215 MG/DL (ref 70–110)
HBA1C MFR BLD HPLC: 11 % (ref 4–5.6)
HCT VFR BLD AUTO: 35.3 % (ref 40–54)
HGB BLD-MCNC: 12.1 G/DL (ref 14–18)
IMM GRANULOCYTES # BLD AUTO: 0.04 K/UL (ref 0–0.04)
IMM GRANULOCYTES NFR BLD AUTO: 0.5 % (ref 0–0.5)
LYMPHOCYTES # BLD AUTO: 0.7 K/UL (ref 1–4.8)
LYMPHOCYTES NFR BLD: 9.1 % (ref 18–48)
MAGNESIUM SERPL-MCNC: 1.3 MG/DL (ref 1.6–2.6)
MCH RBC QN AUTO: 28.3 PG (ref 27–31)
MCHC RBC AUTO-ENTMCNC: 34.3 G/DL (ref 32–36)
MCV RBC AUTO: 83 FL (ref 82–98)
MONOCYTES # BLD AUTO: 0.5 K/UL (ref 0.3–1)
MONOCYTES NFR BLD: 6.2 % (ref 4–15)
NEUTROPHILS # BLD AUTO: 6.7 K/UL (ref 1.8–7.7)
NEUTROPHILS NFR BLD: 83.1 % (ref 38–73)
NRBC BLD-RTO: 0 /100 WBC
PHOSPHATE SERPL-MCNC: 2.1 MG/DL (ref 2.7–4.5)
PLATELET # BLD AUTO: 138 K/UL (ref 150–350)
PMV BLD AUTO: 11.5 FL (ref 9.2–12.9)
POCT GLUCOSE: 188 MG/DL (ref 70–110)
POCT GLUCOSE: 221 MG/DL (ref 70–110)
POCT GLUCOSE: 222 MG/DL (ref 70–110)
POCT GLUCOSE: 228 MG/DL (ref 70–110)
POCT GLUCOSE: 251 MG/DL (ref 70–110)
POTASSIUM SERPL-SCNC: 4.1 MMOL/L (ref 3.5–5.1)
PROT SERPL-MCNC: 5.8 G/DL (ref 6–8.4)
RBC # BLD AUTO: 4.28 M/UL (ref 4.6–6.2)
SODIUM SERPL-SCNC: 136 MMOL/L (ref 136–145)
WBC # BLD AUTO: 8.04 K/UL (ref 3.9–12.7)

## 2019-05-27 PROCEDURE — 63600175 PHARM REV CODE 636 W HCPCS: Performed by: HOSPITALIST

## 2019-05-27 PROCEDURE — 43259 PR ENDOSCOPIC ULTRASOUND EXAM: ICD-10-PCS | Mod: ,,, | Performed by: INTERNAL MEDICINE

## 2019-05-27 PROCEDURE — 82962 GLUCOSE BLOOD TEST: CPT | Performed by: INTERNAL MEDICINE

## 2019-05-27 PROCEDURE — 99223 PR INITIAL HOSPITAL CARE,LEVL III: ICD-10-PCS | Mod: 25,,, | Performed by: INTERNAL MEDICINE

## 2019-05-27 PROCEDURE — D9220A PRA ANESTHESIA: Mod: ANES,,, | Performed by: ANESTHESIOLOGY

## 2019-05-27 PROCEDURE — D9220A PRA ANESTHESIA: ICD-10-PCS | Mod: CRNA,,, | Performed by: NURSE ANESTHETIST, CERTIFIED REGISTERED

## 2019-05-27 PROCEDURE — 84100 ASSAY OF PHOSPHORUS: CPT

## 2019-05-27 PROCEDURE — 43259 EGD US EXAM DUODENUM/JEJUNUM: CPT | Mod: ,,, | Performed by: INTERNAL MEDICINE

## 2019-05-27 PROCEDURE — 25000003 PHARM REV CODE 250: Performed by: HOSPITALIST

## 2019-05-27 PROCEDURE — 37000009 HC ANESTHESIA EA ADD 15 MINS: Performed by: INTERNAL MEDICINE

## 2019-05-27 PROCEDURE — D9220A PRA ANESTHESIA: Mod: CRNA,,, | Performed by: NURSE ANESTHETIST, CERTIFIED REGISTERED

## 2019-05-27 PROCEDURE — 63600175 PHARM REV CODE 636 W HCPCS: Performed by: INTERNAL MEDICINE

## 2019-05-27 PROCEDURE — 80053 COMPREHEN METABOLIC PANEL: CPT

## 2019-05-27 PROCEDURE — 83735 ASSAY OF MAGNESIUM: CPT

## 2019-05-27 PROCEDURE — D9220A PRA ANESTHESIA: ICD-10-PCS | Mod: ANES,,, | Performed by: ANESTHESIOLOGY

## 2019-05-27 PROCEDURE — 63600175 PHARM REV CODE 636 W HCPCS: Performed by: NURSE ANESTHETIST, CERTIFIED REGISTERED

## 2019-05-27 PROCEDURE — 99223 1ST HOSP IP/OBS HIGH 75: CPT | Mod: 25,,, | Performed by: INTERNAL MEDICINE

## 2019-05-27 PROCEDURE — 25000003 PHARM REV CODE 250: Performed by: NURSE ANESTHETIST, CERTIFIED REGISTERED

## 2019-05-27 PROCEDURE — 25000003 PHARM REV CODE 250: Performed by: INTERNAL MEDICINE

## 2019-05-27 PROCEDURE — S5571 INSULIN DISPOS PEN 3 ML: HCPCS | Performed by: HOSPITALIST

## 2019-05-27 PROCEDURE — 83036 HEMOGLOBIN GLYCOSYLATED A1C: CPT

## 2019-05-27 PROCEDURE — 85025 COMPLETE CBC W/AUTO DIFF WBC: CPT

## 2019-05-27 PROCEDURE — 11000001 HC ACUTE MED/SURG PRIVATE ROOM

## 2019-05-27 PROCEDURE — 36415 COLL VENOUS BLD VENIPUNCTURE: CPT

## 2019-05-27 PROCEDURE — 37000008 HC ANESTHESIA 1ST 15 MINUTES: Performed by: INTERNAL MEDICINE

## 2019-05-27 PROCEDURE — 99222 PR INITIAL HOSPITAL CARE,LEVL II: ICD-10-PCS | Mod: ,,, | Performed by: HOSPITALIST

## 2019-05-27 PROCEDURE — 99222 1ST HOSP IP/OBS MODERATE 55: CPT | Mod: ,,, | Performed by: HOSPITALIST

## 2019-05-27 PROCEDURE — 43259 EGD US EXAM DUODENUM/JEJUNUM: CPT | Performed by: INTERNAL MEDICINE

## 2019-05-27 RX ORDER — PROPOFOL 10 MG/ML
VIAL (ML) INTRAVENOUS CONTINUOUS PRN
Status: DISCONTINUED | OUTPATIENT
Start: 2019-05-27 | End: 2019-05-27

## 2019-05-27 RX ORDER — ALLOPURINOL 100 MG/1
300 TABLET ORAL DAILY
Status: DISCONTINUED | OUTPATIENT
Start: 2019-05-27 | End: 2019-05-29 | Stop reason: HOSPADM

## 2019-05-27 RX ORDER — ASPIRIN 81 MG/1
81 TABLET ORAL DAILY
Status: DISCONTINUED | OUTPATIENT
Start: 2019-05-27 | End: 2019-05-29 | Stop reason: HOSPADM

## 2019-05-27 RX ORDER — ATORVASTATIN CALCIUM 20 MG/1
40 TABLET, FILM COATED ORAL DAILY
Status: DISCONTINUED | OUTPATIENT
Start: 2019-05-27 | End: 2019-05-29 | Stop reason: HOSPADM

## 2019-05-27 RX ORDER — MULTIVIT WITH IRON,MINERALS
500 TABLET ORAL NIGHTLY
Status: DISCONTINUED | OUTPATIENT
Start: 2019-05-27 | End: 2019-05-29 | Stop reason: HOSPADM

## 2019-05-27 RX ORDER — SODIUM CHLORIDE 9 MG/ML
INJECTION, SOLUTION INTRAVENOUS CONTINUOUS PRN
Status: DISCONTINUED | OUTPATIENT
Start: 2019-05-27 | End: 2019-05-27

## 2019-05-27 RX ORDER — HYDROMORPHONE HYDROCHLORIDE 1 MG/ML
1 INJECTION, SOLUTION INTRAMUSCULAR; INTRAVENOUS; SUBCUTANEOUS
Status: DISCONTINUED | OUTPATIENT
Start: 2019-05-27 | End: 2019-05-29 | Stop reason: HOSPADM

## 2019-05-27 RX ORDER — LIDOCAINE HCL/PF 100 MG/5ML
SYRINGE (ML) INTRAVENOUS
Status: DISCONTINUED | OUTPATIENT
Start: 2019-05-27 | End: 2019-05-27

## 2019-05-27 RX ORDER — LOSARTAN POTASSIUM 50 MG/1
50 TABLET ORAL DAILY
Status: DISCONTINUED | OUTPATIENT
Start: 2019-05-27 | End: 2019-05-29 | Stop reason: HOSPADM

## 2019-05-27 RX ORDER — SODIUM CHLORIDE 0.9 % (FLUSH) 0.9 %
10 SYRINGE (ML) INJECTION
Status: DISCONTINUED | OUTPATIENT
Start: 2019-05-27 | End: 2019-05-29 | Stop reason: HOSPADM

## 2019-05-27 RX ORDER — PREGABALIN 75 MG/1
150 CAPSULE ORAL 2 TIMES DAILY
Status: DISCONTINUED | OUTPATIENT
Start: 2019-05-27 | End: 2019-05-29 | Stop reason: HOSPADM

## 2019-05-27 RX ORDER — IBUPROFEN 200 MG
16 TABLET ORAL
Status: DISCONTINUED | OUTPATIENT
Start: 2019-05-27 | End: 2019-05-29 | Stop reason: HOSPADM

## 2019-05-27 RX ORDER — SODIUM CHLORIDE, SODIUM LACTATE, POTASSIUM CHLORIDE, CALCIUM CHLORIDE 600; 310; 30; 20 MG/100ML; MG/100ML; MG/100ML; MG/100ML
INJECTION, SOLUTION INTRAVENOUS CONTINUOUS
Status: DISCONTINUED | OUTPATIENT
Start: 2019-05-27 | End: 2019-05-29

## 2019-05-27 RX ORDER — INSULIN ASPART 100 [IU]/ML
1-10 INJECTION, SOLUTION INTRAVENOUS; SUBCUTANEOUS
Status: DISCONTINUED | OUTPATIENT
Start: 2019-05-27 | End: 2019-05-29 | Stop reason: HOSPADM

## 2019-05-27 RX ORDER — FENOFIBRATE 160 MG/1
160 TABLET ORAL DAILY
Status: DISCONTINUED | OUTPATIENT
Start: 2019-05-27 | End: 2019-05-27

## 2019-05-27 RX ORDER — PROPOFOL 10 MG/ML
VIAL (ML) INTRAVENOUS
Status: DISCONTINUED | OUTPATIENT
Start: 2019-05-27 | End: 2019-05-27

## 2019-05-27 RX ORDER — GLUCAGON 1 MG
1 KIT INJECTION
Status: DISCONTINUED | OUTPATIENT
Start: 2019-05-27 | End: 2019-05-29 | Stop reason: HOSPADM

## 2019-05-27 RX ORDER — GLYCOPYRROLATE 0.2 MG/ML
INJECTION INTRAMUSCULAR; INTRAVENOUS
Status: DISCONTINUED | OUTPATIENT
Start: 2019-05-27 | End: 2019-05-27

## 2019-05-27 RX ORDER — SODIUM CHLORIDE 0.9 % (FLUSH) 0.9 %
10 SYRINGE (ML) INJECTION
Status: DISCONTINUED | OUTPATIENT
Start: 2019-05-27 | End: 2019-05-27 | Stop reason: HOSPADM

## 2019-05-27 RX ORDER — IBUPROFEN 200 MG
24 TABLET ORAL
Status: DISCONTINUED | OUTPATIENT
Start: 2019-05-27 | End: 2019-05-29 | Stop reason: HOSPADM

## 2019-05-27 RX ORDER — MAGNESIUM SULFATE HEPTAHYDRATE 40 MG/ML
2 INJECTION, SOLUTION INTRAVENOUS ONCE
Status: COMPLETED | OUTPATIENT
Start: 2019-05-27 | End: 2019-05-27

## 2019-05-27 RX ADMIN — MAGNESIUM SULFATE IN WATER 2 G: 40 INJECTION, SOLUTION INTRAVENOUS at 05:05

## 2019-05-27 RX ADMIN — ALLOPURINOL 300 MG: 100 TABLET ORAL at 09:05

## 2019-05-27 RX ADMIN — PROPOFOL 150 MCG/KG/MIN: 10 INJECTION, EMULSION INTRAVENOUS at 11:05

## 2019-05-27 RX ADMIN — INSULIN ASPART 4 UNITS: 100 INJECTION, SOLUTION INTRAVENOUS; SUBCUTANEOUS at 05:05

## 2019-05-27 RX ADMIN — SODIUM CHLORIDE: 0.9 INJECTION, SOLUTION INTRAVENOUS at 11:05

## 2019-05-27 RX ADMIN — ATORVASTATIN CALCIUM 40 MG: 20 TABLET, FILM COATED ORAL at 09:05

## 2019-05-27 RX ADMIN — INSULIN DETEMIR 20 UNITS: 100 INJECTION, SOLUTION SUBCUTANEOUS at 04:05

## 2019-05-27 RX ADMIN — GLYCOPYRROLATE 0.2 MG: 0.2 INJECTION, SOLUTION INTRAMUSCULAR; INTRAVENOUS at 11:05

## 2019-05-27 RX ADMIN — SODIUM CHLORIDE, SODIUM LACTATE, POTASSIUM CHLORIDE, AND CALCIUM CHLORIDE: .6; .31; .03; .02 INJECTION, SOLUTION INTRAVENOUS at 03:05

## 2019-05-27 RX ADMIN — SODIUM CHLORIDE, SODIUM LACTATE, POTASSIUM CHLORIDE, AND CALCIUM CHLORIDE: .6; .31; .03; .02 INJECTION, SOLUTION INTRAVENOUS at 04:05

## 2019-05-27 RX ADMIN — PROPOFOL 100 MG: 10 INJECTION, EMULSION INTRAVENOUS at 11:05

## 2019-05-27 RX ADMIN — ASPIRIN 81 MG: 81 TABLET, COATED ORAL at 09:05

## 2019-05-27 RX ADMIN — LOSARTAN POTASSIUM 50 MG: 50 TABLET, FILM COATED ORAL at 09:05

## 2019-05-27 RX ADMIN — PREGABALIN 150 MG: 75 CAPSULE ORAL at 09:05

## 2019-05-27 RX ADMIN — Medication 500 MG: at 04:05

## 2019-05-27 RX ADMIN — Medication 500 MG: at 09:05

## 2019-05-27 RX ADMIN — SODIUM CHLORIDE, SODIUM LACTATE, POTASSIUM CHLORIDE, AND CALCIUM CHLORIDE: .6; .31; .03; .02 INJECTION, SOLUTION INTRAVENOUS at 10:05

## 2019-05-27 RX ADMIN — SODIUM CHLORIDE, SODIUM LACTATE, POTASSIUM CHLORIDE, AND CALCIUM CHLORIDE: .6; .31; .03; .02 INJECTION, SOLUTION INTRAVENOUS at 09:05

## 2019-05-27 RX ADMIN — LIDOCAINE HYDROCHLORIDE 50 MG: 20 INJECTION, SOLUTION INTRAVENOUS at 11:05

## 2019-05-27 NOTE — H&P
[facility] Hospital Medicine  History & Physical    Patient Name: Bay Ibarra  MRN: [id number]  Admission Date: 5/27/2019  Attending Physician: Chilo Dorado MD   Primary Care Provider: José Luis Singleton MD    [facility] Medicine Team: Networked reference to record PCT  Chilo Dorado MD     Patient information was obtained from patient, past medical records and ER records.     Subjective:     Principal Problem:Acute gallstone pancreatitis    Chief Complaint: No chief complaint on file.       HPI: 38M presents as transfer from [facility] ED for acute pancreatitis.  He developed mild epigastric pain prior to going to bed Saturday night (night before day of presentation).  The following morning the pain was worse and became more severe throughout the day, prompting him to come to the ED.  He was found to have an elevated lipase and and pancreatitis on CT with ampullary gallstone.  He has history of several prior episodes of pancreatitis attributed to hypertriglyceridemia which has since been controlled with fenofibrate and niacin.  His last admission for pancreatitis was in December 2018.  He has type 2 diabetes on insulin but has been losing weight, becoming more active and even running a few marathons.    Past Medical History:   Diagnosis Date    Acute pancreatitis     Diabetes mellitus, type 2     Gout     Hx of acute pancreatitis 3/3/2017    Hyperlipidemia     Hypertension     Obesity (BMI 35.0-39.9 without comorbidity) 6/5/2015    Sleep apnea 1/22/2016       History reviewed. No pertinent surgical history.    Review of patient's allergies indicates:  No Known Allergies    Current Facility-Administered Medications on File Prior to Encounter   Medication    [COMPLETED] morphine injection 4 mg    [COMPLETED] morphine injection 4 mg    [COMPLETED] ondansetron injection 4 mg    [COMPLETED] sodium chloride 0.9% bolus 1,000 mL     Current Outpatient Medications  "on File Prior to Encounter   Medication Sig    allopurinol (ZYLOPRIM) 300 MG tablet TAKE 1 TABLET BY MOUTH ONCE DAILY    aspirin (ECOTRIN) 81 MG EC tablet Take 81 mg by mouth once daily.    atorvastatin (LIPITOR) 40 MG tablet Take 1 tablet (40 mg total) by mouth once daily.    CONTOUR TEST STRIPS Strp     fenofibrate 160 MG Tab TAKE 1 TABLET BY MOUTH ONCE DAILY    icosapent ethyl (VASCEPA) 1 gram Cap Take 2 g by mouth 2 (two) times daily.    insulin aspart U-100 (NOVOLOG FLEXPEN U-100 INSULIN) 100 unit/mL InPn pen Inject 20 units w/ meals,+ scale 150-200+2, 201-250+4, 251-300+6, 301-350+8, >350 +10. Max 90 units/day.    insulin glargine (LANTUS SOLOSTAR) 100 unit/mL (3 mL) InPn pen Inject 52 Units into the skin every evening.    insulin syringe-needle U-100 29 gauge x 1/2" Syrg Inject 1 (once) per day    losartan (COZAAR) 100 MG tablet Take 0.5 tablets (50 mg total) by mouth once daily.    LYRICA 150 mg capsule TAKE 1 CAPSULE BY MOUTH TWICE DAILY    niacin 100 MG Tab Take 500 mg by mouth every evening.    ULTRA THIN LANCETS 31 gauge Misc      Family History     Problem Relation (Age of Onset)    Aneurysm Maternal Grandmother    Coronary artery disease Father    Diabetes type II Father, Paternal Grandmother    No Known Problems Sister, Brother        Tobacco Use    Smoking status: Never Smoker    Smokeless tobacco: Never Used   Substance and Sexual Activity    Alcohol use: No    Drug use: No    Sexual activity: Yes     Partners: Female     Review of Systems   Constitutional: Negative for activity change, appetite change, chills, diaphoresis and fever.   HENT: Negative for congestion, mouth sores and sore throat.    Eyes: Negative for photophobia and visual disturbance.   Respiratory: Negative for cough and shortness of breath.    Cardiovascular: Negative for chest pain, palpitations and leg swelling.   Gastrointestinal: Positive for abdominal pain and nausea. Negative for vomiting.   Endocrine: " Negative for polydipsia and polyuria.   Genitourinary: Negative for dysuria and hematuria.   Musculoskeletal: Negative for arthralgias and myalgias.   Skin: Negative for color change and rash.   Neurological: Negative for dizziness, syncope and light-headedness.     Objective:     Vital Signs (Most Recent):  Temp: 99.1 °F (37.3 °C) (05/27/19 0230)  Pulse: 94 (05/27/19 0230)  Resp: 18 (05/27/19 0230)  BP: (!) 162/77 (05/27/19 0230)  SpO2: 95 % (05/27/19 0230) Vital Signs (24h Range):  Temp:  [99 °F (37.2 °C)-99.1 °F (37.3 °C)] 99.1 °F (37.3 °C)  Pulse:  [86-94] 94  Resp:  [16-20] 18  SpO2:  [95 %-99 %] 95 %  BP: (153-162)/(69-94) 162/77     Weight: 94.5 kg (208 lb 5.4 oz)  Body mass index is 32.63 kg/m².    Physical Exam   Constitutional: He is oriented to person, place, and time. He appears well-developed and well-nourished.   Appears moderately uncomfortable   HENT:   Head: Normocephalic and atraumatic.   Mouth/Throat: Oropharynx is clear and moist.   Eyes: Pupils are equal, round, and reactive to light. Conjunctivae and EOM are normal. No scleral icterus.   Neck: Normal range of motion. Neck supple. No JVD present.   Cardiovascular: Normal rate, regular rhythm, normal heart sounds and intact distal pulses. Exam reveals no gallop and no friction rub.   No murmur heard.  Pulmonary/Chest: Effort normal and breath sounds normal.   Abdominal: Soft. Normal appearance and bowel sounds are normal. There is tenderness in the epigastric area. There is guarding. There is no rigidity and no rebound.   Musculoskeletal: Normal range of motion. He exhibits no edema or tenderness.   Lymphadenopathy:     He has no cervical adenopathy.   Neurological: He is alert and oriented to person, place, and time. No cranial nerve deficit.   Skin: Skin is warm and dry. No erythema.   Nursing note and vitals reviewed.        CRANIAL NERVES     CN III, IV, VI   Pupils are equal, round, and reactive to light.  Extraocular motions are normal.         Significant Labs:   CBC:   Recent Labs   Lab 05/26/19 2120   WBC 13.40*   HGB 15.5   HCT 43.4        CMP:   Recent Labs   Lab 05/26/19 2120      K 3.9      CO2 26   *   BUN 16   CREATININE 0.77   CALCIUM 9.7   PROT 8.2   ALBUMIN 4.8   BILITOT 1.2*   ALKPHOS 96   AST 77*   ALT 72*   ANIONGAP 15   EGFRNONAA >60.0       Significant Imaging: CT: I have reviewed all pertinent results/findings within the past 24 hours and my personal findings are:  pancreas with peripancreatic stranding, dilation of periampullary duct with 5mm gallstone    Assessment/Plan:     * Acute gallstone pancreatitis  NPO, pain medication, high-rate IVF.  AES consult for ERCP.  Trend LFTs.  Consider consulting General Surgery regarding timing of cholecystectomy.      Type 2 diabetes mellitus with diabetic polyneuropathy, with long-term current use of insulin  While NPO will put on reduced basal dose of 20 units qhs and moderate-dose SSI.  F/U HbA1c.        VTE Risk Mitigation (From admission, onward)        Ordered     Place LEI hose  Until discontinued      05/27/19 0327     Place sequential compression device  Until discontinued      05/27/19 0327     IP VTE HIGH RISK PATIENT  Once      05/27/19 0327             Chilo Dorado MD  Department of Hospital Medicine   [facility]

## 2019-05-27 NOTE — HPI
"This is a 37 yo M with hx of DM, hypertriglyceridemia, and hx of pancreatitis who was transferred from [facility] for acute pancreatitis. He reports that two days ago he began having mild abdominal pain that progressively worsened prompting him to come into the ED. Yesterday reports severe epigastric and RUQ pain with radiation to the right side of the back. He denies any nausea or vomiting. No fevers reported. He does report feeling hungry. He was found to have a TB of 1.2, now 1.9, and mildly elevated liver enzymes in the 70s. He had CT imaging done which showed "5 mm stone in the region of the ampulla with associated dilatation of the CBD and main pancreatic duct, consistent with choledocholithiasis." He denies ever having gallstones before. His last episode of pancreatitis was in Dec 2018 and attributed to his high triglycerides. He has his first episode in 2013, and has been taking niacin, fibrate, atorvastatin, and fish oil - managed by his PMD. He saw a gastroenterologist in 2017 for a a small (2cm) irregular area in the head of the pancreas, concerning for pseudocyst vs mass. An MRCP was recommended which found "small peripherally enhancing lesion at the inferior aspect of the pancreatic head, with overall appearance favors pseudocyst." It appears he was lost to follow-up with GI after that.  "

## 2019-05-27 NOTE — PLAN OF CARE
05/27/19 1030   Discharge Assessment   Assessment Type Discharge Planning Assessment   Confirmed/corrected address and phone number on facesheet? Yes   Assessment information obtained from? Patient   Expected Length of Stay (days) 3   Communicated expected length of stay with patient/caregiver yes   Prior to hospitilization cognitive status: Alert/Oriented   Prior to hospitalization functional status: Independent   Current cognitive status: Alert/Oriented   Current Functional Status: Independent   Lives With alone   Able to Return to Prior Arrangements yes   Is patient able to care for self after discharge? Yes   Patient's perception of discharge disposition home or selfcare   Readmission Within the Last 30 Days no previous admission in last 30 days   Patient currently being followed by outpatient case management? No   Patient currently receives any other outside agency services? No   Equipment Currently Used at Home CPAP;glucometer   Do you have any problems affording any of your prescribed medications? No   Is the patient taking medications as prescribed? yes   Does the patient have transportation home? Yes   Transportation Anticipated family or friend will provide  (girlfriend, Yudelka Guerrero)   Does the patient receive services at the [facility]? No   Discharge Plan A Home   Discharge Plan B Home Health   DME Needed Upon Discharge  none   Patient/Family in Agreement with Plan yes     Dx: acute gallstone pancreatitis  Consult: [facility]  Pharm: [facility]  Hosp f/u appt: Dr. José Luis Singleton (PCP) on 6/4/19 at 0900    Patient scheduled to have an ERCP done today.

## 2019-05-27 NOTE — PROGRESS NOTES
"[facility] Hospital Medicine  Progress Note    Team: [facility] [place] [place] [facility]  Attending MD: Ayaka Mina MD  Admit Date: 5/27/2019  JOE 5/30/2019  Length of Stay:  LOS: 0 days   Code status: Full Code    Principal Problem: Acute gallstone pancreatitis    Interval hx: pain resolved when he awoke this am; no N/V; no gallstones on EUS    ROS:  Constitutional: no fever or chills  Respiratory: no cough or shortness of breath  Cardiovascular: no chest pain or palpitations  Gastrointestinal: no nausea or vomiting, no abdominal pain; last BM: 5/26  Genitourinary: no hematuria or dysuria  Integument/Breast: no rash or pruritis  Musculoskeletal: no arthralgias or myalgias  Neurological: no seizures or tremors  Behavioral/Psych: no auditory or visual hallucinations      Physical Exam  Temp:  [98 °F (36.7 °C)-99.1 °F (37.3 °C)] 98 °F (36.7 °C)  Pulse:  [86-94] 89  Resp:  [16-22] 16  SpO2:  [92 %-99 %] 99 %  BP: (115-162)/(57-94) 115/57      Intake/Output Summary (Last 24 hours) at 5/27/2019 1531  Last data filed at 5/27/2019 1151  Gross per 24 hour   Intake 410 ml   Output 800 ml   Net -390 ml       Wt Readings from Last 1 Encounters:   05/27/19 0230 94.5 kg (208 lb 5.4 oz)        Estimated body mass index is 32.63 kg/m² as calculated from the following:    Height as of this encounter: 5' 7" (1.702 m).    Weight as of this encounter: 94.5 kg (208 lb 5.4 oz).    General: well developed, obese, no distress  Lungs:  clear to auscultation bilaterally and normal respiratory effort.  Cardiovascular: Heart: regular rate and rhythm, S1, S2 normal, no murmur, click, rub or gallop. Chest Wall: no tenderness. Extremities: no cyanosis, no edema, no clubbing.   Abdomen/Rectal: Abdomen: soft, non-tender non-distended; bowel sounds normal; no masses,  no organomegaly.   Skin: Skin color, texture, turgor normal. No rashes or lesions.  Neurologic: Normal strength and tone. No focal numbness or weakness.  Psych/Behavioral:  " Alert and oriented, appropriate affect.      Recent Results (from the past 24 hour(s))   POCT glucose    Collection Time: 05/26/19  9:10 PM   Result Value Ref Range    POCT Glucose 243 (H) 70 - 110 mg/dL   CBC W/ AUTO DIFFERENTIAL    Collection Time: 05/26/19  9:20 PM   Result Value Ref Range    WBC 13.40 (H) 3.90 - 12.70 K/uL    RBC 5.44 4.60 - 6.20 M/uL    Hemoglobin 15.5 14.0 - 18.0 g/dL    Hematocrit 43.4 40.0 - 54.0 %    Mean Corpuscular Volume 80 (L) 82 - 98 fL    Mean Corpuscular Hemoglobin 28.5 27.0 - 31.0 pg    Mean Corpuscular Hemoglobin Conc 35.7 32.0 - 36.0 g/dL    RDW 13.9 11.5 - 14.5 %    Platelets 205 150 - 350 K/uL    MPV 11.1 9.2 - 12.9 fL    Gran # (ANC) 10.7 (H) 1.8 - 7.7 K/uL    Lymph # 1.0 1.0 - 4.8 K/uL    Mono # 1.6 (H) 0.3 - 1.0 K/uL    Eos # 0.1 0.0 - 0.5 K/uL    Baso # 0.08 0.00 - 0.20 K/uL    Gran% 79.8 (H) 38.0 - 73.0 %    Lymph% 7.3 (L) 18.0 - 48.0 %    Mono% 11.6 4.0 - 15.0 %    Eosinophil% 0.7 0.0 - 8.0 %    Basophil% 0.6 0.0 - 1.9 %    Differential Method Automated    Comp. Metabolic Panel    Collection Time: 05/26/19  9:20 PM   Result Value Ref Range    Sodium 141 136 - 145 mmol/L    Potassium 3.9 3.5 - 5.1 mmol/L    Chloride 100 95 - 110 mmol/L    CO2 26 23 - 29 mmol/L    Glucose 258 (H) 70 - 110 mg/dL    BUN, Bld 16 2 - 20 mg/dL    Creatinine 0.77 0.50 - 1.40 mg/dL    Calcium 9.7 8.7 - 10.5 mg/dL    Total Protein 8.2 6.0 - 8.4 g/dL    Albumin 4.8 3.5 - 5.2 g/dL    Total Bilirubin 1.2 (H) 0.1 - 1.0 mg/dL    Alkaline Phosphatase 96 38 - 126 U/L    AST 77 (H) 15 - 46 U/L    ALT 72 (H) 10 - 44 U/L    Anion Gap 15 8 - 16 mmol/L    eGFR if African American >60.0 >60 mL/min/1.73 m^2    eGFR if non African American >60.0 >60 mL/min/1.73 m^2   Lipase    Collection Time: 05/26/19  9:20 PM   Result Value Ref Range    Lipase Result 545 (H) 23 - 300 U/L   Urinalysis, Reflex to Urine Culture Urine, Clean Catch    Collection Time: 05/26/19  9:20 PM   Result Value Ref Range    Specimen UA Urine,  Clean Catch     Color, UA Yellow Yellow, Straw, Doreen    Appearance, UA Clear Clear    pH, UA 6.0 5.0 - 8.0    Specific Gravity, UA >=1.030 (A) 1.005 - 1.030    Protein, UA 1+ (A) Negative    Glucose, UA 3+ (A) Negative    Ketones, UA Trace (A) Negative    Bilirubin (UA) 1+ (A) Negative    Occult Blood UA Negative Negative    Nitrite, UA Negative Negative    Urobilinogen, UA Negative <2.0 EU/dL    Leukocytes, UA Negative Negative   Urinalysis Microscopic    Collection Time: 05/26/19  9:20 PM   Result Value Ref Range    RBC, UA 0 0 - 4 /hpf    WBC, UA 3 0 - 5 /hpf    Bacteria None None-Occ /hpf    Yeast, UA None None    Squam Epithel, UA 25 /hpf    Hyaline Casts, UA 0 0-1/lpf /lpf    Microscopic Comment SEE COMMENT    POCT glucose    Collection Time: 05/27/19  4:50 AM   Result Value Ref Range    POCT Glucose 222 (H) 70 - 110 mg/dL   Hemoglobin A1c if not done in past 3 months    Collection Time: 05/27/19  6:23 AM   Result Value Ref Range    Hemoglobin A1C 11.0 (H) 4.0 - 5.6 %    Estimated Avg Glucose 269 (H) 68 - 131 mg/dL   Comprehensive Metabolic Panel (CMP)    Collection Time: 05/27/19  6:23 AM   Result Value Ref Range    Sodium 136 136 - 145 mmol/L    Potassium 4.1 3.5 - 5.1 mmol/L    Chloride 102 95 - 110 mmol/L    CO2 25 23 - 29 mmol/L    Glucose 215 (H) 70 - 110 mg/dL    BUN, Bld 10 6 - 20 mg/dL    Creatinine 0.9 0.5 - 1.4 mg/dL    Calcium 9.0 8.7 - 10.5 mg/dL    Total Protein 5.8 (L) 6.0 - 8.4 g/dL    Albumin 3.1 (L) 3.5 - 5.2 g/dL    Total Bilirubin 1.9 (H) 0.1 - 1.0 mg/dL    Alkaline Phosphatase 65 55 - 135 U/L    AST 54 (H) 10 - 40 U/L    ALT 62 (H) 10 - 44 U/L    Anion Gap 9 8 - 16 mmol/L    eGFR if African American >60.0 >60 mL/min/1.73 m^2    eGFR if non African American >60.0 >60 mL/min/1.73 m^2   Magnesium    Collection Time: 05/27/19  6:23 AM   Result Value Ref Range    Magnesium 1.3 (L) 1.6 - 2.6 mg/dL   Phosphorus    Collection Time: 05/27/19  6:23 AM   Result Value Ref Range    Phosphorus 2.1 (L)  2.7 - 4.5 mg/dL   CBC with Automated Differential    Collection Time: 05/27/19  6:23 AM   Result Value Ref Range    WBC 8.04 3.90 - 12.70 K/uL    RBC 4.28 (L) 4.60 - 6.20 M/uL    Hemoglobin 12.1 (L) 14.0 - 18.0 g/dL    Hematocrit 35.3 (L) 40.0 - 54.0 %    Mean Corpuscular Volume 83 82 - 98 fL    Mean Corpuscular Hemoglobin 28.3 27.0 - 31.0 pg    Mean Corpuscular Hemoglobin Conc 34.3 32.0 - 36.0 g/dL    RDW 13.8 11.5 - 14.5 %    Platelets 138 (L) 150 - 350 K/uL    MPV 11.5 9.2 - 12.9 fL    Immature Granulocytes 0.5 0.0 - 0.5 %    Gran # (ANC) 6.7 1.8 - 7.7 K/uL    Immature Grans (Abs) 0.04 0.00 - 0.04 K/uL    Lymph # 0.7 (L) 1.0 - 4.8 K/uL    Mono # 0.5 0.3 - 1.0 K/uL    Eos # 0.1 0.0 - 0.5 K/uL    Baso # 0.04 0.00 - 0.20 K/uL    nRBC 0 0 /100 WBC    Gran% 83.1 (H) 38.0 - 73.0 %    Lymph% 9.1 (L) 18.0 - 48.0 %    Mono% 6.2 4.0 - 15.0 %    Eosinophil% 0.6 0.0 - 8.0 %    Basophil% 0.5 0.0 - 1.9 %    Differential Method Automated    POCT glucose    Collection Time: 05/27/19 11:20 AM   Result Value Ref Range    POCT Glucose 228 (H) 70 - 110 mg/dL   POCT glucose    Collection Time: 05/27/19 12:12 PM   Result Value Ref Range    POCT Glucose 251 (H) 70 - 110 mg/dL       Recent Labs   Lab 05/26/19 2120 05/27/19 0623   WBC 13.40* 8.04   HGB 15.5 12.1*   HCT 43.4 35.3*    138*       Recent Labs   Lab 05/26/19 2120 05/27/19 0623    136   K 3.9 4.1    102   CO2 26 25   BUN 16 10   CREATININE 0.77 0.9   * 215*   CALCIUM 9.7 9.0   MG  --  1.3*   PHOS  --  2.1*       Recent Labs   Lab 05/26/19 2120 05/27/19  0623   ALKPHOS 96 65   ALT 72* 62*   AST 77* 54*   ALBUMIN 4.8 3.1*   PROT 8.2 5.8*   BILITOT 1.2* 1.9*       Recent Labs   Lab 05/26/19 2110 05/27/19  0450 05/27/19  1120 05/27/19  1212   POCTGLUCOSE 243* 222* 228* 251*       No results for input(s): CPK, CPKMB, MB, TROPONINI in the last 72 hours.    Hemoglobin A1C   Date Value Ref Range Status   05/27/2019 11.0 (H) 4.0 - 5.6 % Final      Comment:     ADA Screening Guidelines:  5.7-6.4%  Consistent with prediabetes  >or=6.5%  Consistent with diabetes  High levels of fetal hemoglobin interfere with the HbA1C  assay. Heterozygous hemoglobin variants (HbS, HgC, etc)do  not significantly interfere with this assay.   However, presence of multiple variants may affect accuracy.     12/01/2018 10.4 (H) 4.0 - 5.6 % Final     Comment:     ADA Screening Guidelines:  5.7-6.4%  Consistent with prediabetes  >or=6.5%  Consistent with diabetes  High levels of fetal hemoglobin interfere with the HbA1C  assay. Heterozygous hemoglobin variants (HbS, HgC, etc)do  not significantly interfere with this assay.   However, presence of multiple variants may affect accuracy.     07/27/2018 11.5 (H) 4.0 - 5.6 % Final     Comment:     ADA Screening Guidelines:  5.7-6.4%  Consistent with prediabetes  >or=6.5%  Consistent with diabetes  High levels of fetal hemoglobin interfere with the HbA1C  assay. Heterozygous hemoglobin variants (HbS, HgC, etc)do  not significantly interfere with this assay.   However, presence of multiple variants may affect accuracy.         Scheduled Meds:   allopurinol  300 mg Oral Daily    aspirin  81 mg Oral Daily    atorvastatin  40 mg Oral Daily    insulin detemir U-100  20 Units Subcutaneous QHS    losartan  50 mg Oral Daily    niacin  500 mg Oral QHS    pregabalin  150 mg Oral BID       Continuous Infusions:   lactated ringers 200 mL/hr at 05/27/19 0921       As Needed: dextrose 50%, dextrose 50%, glucagon (human recombinant), glucose, glucose, HYDROmorphone, insulin aspart U-100, promethazine (PHENERGAN) IVPB, sodium chloride 0.9%    Active Hospital Problems    Diagnosis  POA    *Acute gallstone pancreatitis [K85.10]  Yes    Type 2 diabetes mellitus with diabetic polyneuropathy, with long-term current use of insulin [E11.42, Z79.4]  Not Applicable      Resolved Hospital Problems   No resolved problems to display.        Overview:  38-year-old male admitted with acute pancreatitis previously due to hypertriglyceridemia.  Suspicion for gallstones on renal stone study.  EUS done with no gallstones found.    Assessment and Plan    Acute pancreatitis  Hypertriglyceridemia  · EUS was negative for gallstones; severe per EUS  · CT abdomen, pancreas protocol ordered as per GI recommendations  · Clears after imaging is remains pain free  · Continue IVF  · lipid panel pending  · Fenofibrate, icosapent and atorvastatin therapy at home  · Has seen dietician and has diet instructions at home although not compliant    Diabetes Mellitus 2 - uncontrolled  · glucose monitoring AC and HS once resumes diet; every 6 hrs otherwise    · Hyperglycemia to be treated with SSNI prn.  · Glucose goal is < 180mg/dl and avoidance of hypoglycemia.  · Will continue to monitor and adjust regimen as necessary to achieve goals.    Hypomagnesemia  · Replete as indicated for goal of 2      Diet: Diet NPO Except for: Sips with Medication   GI PPx:  DVT PPx:   VTE Risk Mitigation (From admission, onward)        Ordered     Place LEI hose  Until discontinued      05/27/19 0327     Place sequential compression device  Until discontinued      05/27/19 0327     IP VTE HIGH RISK PATIENT  Once      05/27/19 0327        L/D/A: PIV    Goals of Care:    Discharge plan: home once tolerating diet      Ayaka Mina MD  Staff Hospitalist

## 2019-05-27 NOTE — HPI
38M presents as transfer from [facility] ED for acute pancreatitis.  He developed mild epigastric pain prior to going to bed Saturday night (night before day of presentation).  The following morning the pain was worse and became more severe throughout the day, prompting him to come to the ED.  He was found to have an elevated lipase and and pancreatitis on CT with ampullary gallstone.  He has history of several prior episodes of pancreatitis attributed to hypertriglyceridemia which has since been controlled with fenofibrate and niacin.  His last admission for pancreatitis was in December 2018.  He has type 2 diabetes on insulin but has been losing weight, becoming more active and even running a few marathons.

## 2019-05-27 NOTE — [employer] PATIENT INSTRUCTIONS
Discharge Summary/Instructions after an Endoscopic Procedure  Patient Name: Bay Ibarra  Patient MRN: [id number]  Patient YOB: 1980  Monday, May 27, 2019  Zafar Velazquez MD  RESTRICTIONS:  During your procedure today, you received medications for sedation.  These   medications may affect your judgment, balance and coordination.  Therefore,   for 24 hours, you have the following restrictions:   - DO NOT drive a car, operate machinery, make legal/financial decisions,   sign important papers or drink alcohol.    ACTIVITY:  Today: no heavy lifting, straining or running due to procedural   sedation/anesthesia.  The following day: return to full activity including work.  DIET:  Eat and drink normally unless instructed otherwise.     TREATMENT FOR COMMON SIDE EFFECTS:  - Mild abdominal pain, nausea, belching, bloating or excessive gas:  rest,   eat lightly and use a heating pad.  - Sore Throat: treat with throat lozenges and/or gargle with warm salt   water.  - Because air was used during the procedure, expelling large amounts of air   from your rectum or belching is normal.  - If a bowel prep was taken, you may not have a bowel movement for 1-3 days.    This is normal.  SYMPTOMS TO WATCH FOR AND REPORT TO YOUR PHYSICIAN:  1. Abdominal pain or bloating, other than gas cramps.  2. Chest pain.  3. Back pain.  4. Signs of infection such as: chills or fever occurring within 24 hours   after the procedure.  5. Rectal bleeding, which would show as bright red, maroon, or black stools.   (A tablespoon of blood from the rectum is not serious, especially if   hemorrhoids are present.)  6. Vomiting.  7. Weakness or dizziness.  GO DIRECTLY TO THE NEAREST EMERGENCY ROOM IF YOU HAVE ANY OF THE FOLLOWING:      Difficulty breathing              Chills and/or fever over 101 F   Persistent vomiting and/or vomiting blood   Severe abdominal pain   Severe chest pain   Black, tarry stools   Bleeding- more than one tablespoon   Any  other symptom or condition that you feel may need urgent attention  Your doctor recommends these additional instructions:  If any biopsies were taken, your doctors clinic will contact you in 1 to 2   weeks with any results.  - Return patient to [facility] for ongoing care.   - Continue conservative management of acute pancreatitis (appears to be a   severe episode based on EUS). Would recommend pancreatic protocol CT during   this hospitalization (will serve as a reference) -> will likely plan for an   interval CT in another 4-6 weeks. Keep NPO till pain-free, and then may   start clear liquids with slow advancement. AES service will continue to   follow.  For questions, problems or results please call your physician - Zafar Velazquez MD at Work:  [phone].  [facility], EMERGENCY ROOM PHONE NUMBER: [phone]  IF A COMPLICATION OR EMERGENCY SITUATION ARISES AND YOU ARE UNABLE TO REACH   YOUR PHYSICIAN - GO DIRECTLY TO THE EMERGENCY ROOM.  Zafar Velazquez MD  5/27/2019 12:03:17 PM  This report has been verified and signed electronically.  [employer]

## 2019-05-27 NOTE — SUBJECTIVE & OBJECTIVE
Review of Systems   Constitutional: Positive for activity change and appetite change. Negative for chills and fever.   HENT: Negative for sore throat and trouble swallowing.    Respiratory: Negative for cough and shortness of breath.    Cardiovascular: Negative for chest pain and leg swelling.   Gastrointestinal: Positive for abdominal pain. Negative for abdominal distention, constipation, diarrhea, nausea and vomiting.   Genitourinary: Negative for decreased urine volume.   Musculoskeletal: Positive for back pain. Negative for arthralgias.   Skin: Negative for color change and pallor.   Neurological: Negative for dizziness and light-headedness.   Psychiatric/Behavioral: Negative for agitation and confusion.       Past Medical History:   Diagnosis Date    Acute pancreatitis     Diabetes mellitus, type 2     Gout     Hx of acute pancreatitis 3/3/2017    Hyperlipidemia     Hypertension     Obesity (BMI 35.0-39.9 without comorbidity) 6/5/2015    Sleep apnea 1/22/2016       History reviewed. No pertinent surgical history.    Family history of liver disease: No    Review of patient's allergies indicates:  No Known Allergies    Tobacco Use    Smoking status: Never Smoker    Smokeless tobacco: Never Used   Substance and Sexual Activity    Alcohol use: No    Drug use: No    Sexual activity: Yes     Partners: Female       Medications Prior to Admission   Medication Sig Dispense Refill Last Dose    allopurinol (ZYLOPRIM) 300 MG tablet TAKE 1 TABLET BY MOUTH ONCE DAILY 30 tablet 5 Taking    aspirin (ECOTRIN) 81 MG EC tablet Take 81 mg by mouth once daily.   Taking    atorvastatin (LIPITOR) 40 MG tablet Take 1 tablet (40 mg total) by mouth once daily. 90 tablet 3 Taking    CONTOUR TEST STRIPS Strp    Taking    fenofibrate 160 MG Tab TAKE 1 TABLET BY MOUTH ONCE DAILY 90 tablet 0 Taking    icosapent ethyl (VASCEPA) 1 gram Cap Take 2 g by mouth 2 (two) times daily. 360 capsule 3     insulin aspart U-100  "(NOVOLOG FLEXPEN U-100 INSULIN) 100 unit/mL InPn pen Inject 20 units w/ meals,+ scale 150-200+2, 201-250+4, 251-300+6, 301-350+8, >350 +10. Max 90 units/day. 2 Box 3 Taking    insulin glargine (LANTUS SOLOSTAR) 100 unit/mL (3 mL) InPn pen Inject 52 Units into the skin every evening. 18 mL 11 Taking    insulin syringe-needle U-100 29 gauge x 1/2" Syrg Inject 1 (once) per day 100 Syringe 3 Taking    losartan (COZAAR) 100 MG tablet Take 0.5 tablets (50 mg total) by mouth once daily. 45 tablet 3 Taking    LYRICA 150 mg capsule TAKE 1 CAPSULE BY MOUTH TWICE DAILY 60 capsule 3 Taking    niacin 100 MG Tab Take 500 mg by mouth every evening.   Taking    ULTRA THIN LANCETS 31 gauge Misc    Taking       Objective:     Vital Signs (Most Recent):  Temp: 98.8 °F (37.1 °C) (05/27/19 0806)  Pulse: 91 (05/27/19 0806)  Resp: (!) 22 (05/27/19 0806)  BP: 126/80 (05/27/19 0806)  SpO2: (!) 92 % (05/27/19 0806) Vital Signs (24h Range):  Temp:  [98.8 °F (37.1 °C)-99.1 °F (37.3 °C)] 98.8 °F (37.1 °C)  Pulse:  [86-94] 91  Resp:  [16-22] 22  SpO2:  [92 %-99 %] 92 %  BP: (126-162)/(69-94) 126/80     Weight: 94.5 kg (208 lb 5.4 oz) (05/27/19 0230)  Body mass index is 32.63 kg/m².    Physical Exam   Constitutional: He is oriented to person, place, and time.   Appears uncomfortable, but nontoxic   HENT:   Mouth/Throat: Oropharynx is clear and moist.   Eyes: No scleral icterus.   Cardiovascular: Normal rate and regular rhythm.   Pulmonary/Chest: Effort normal and breath sounds normal. No respiratory distress.   Abdominal: Soft. Bowel sounds are normal. He exhibits no distension and no mass. There is no guarding.   +mild tenderness to palpation RUQ    Musculoskeletal: He exhibits no edema or deformity.   Neurological: He is alert and oriented to person, place, and time.   Skin: Skin is warm and dry.   Psychiatric: He has a normal mood and affect.   Vitals reviewed.      Computed MELD-Na score unavailable. Necessary lab results were not found " in the last year.  Computed MELD score unavailable. Necessary lab results were not found in the last year.    Significant Labs:  CBC:   Recent Labs   Lab 05/27/19  0623   WBC 8.04   RBC 4.28*   HGB 12.1*   HCT 35.3*   *     CMP:   Recent Labs   Lab 05/27/19  0623   *   CALCIUM 9.0   ALBUMIN 3.1*   PROT 5.8*      K 4.1   CO2 25      BUN 10   CREATININE 0.9   ALKPHOS 65   ALT 62*   AST 54*   BILITOT 1.9*     Coagulation: No results for input(s): PT, INR, APTT in the last 168 hours.

## 2019-05-27 NOTE — ASSESSMENT & PLAN NOTE
NPO, pain medication, high-rate IVF.  AES consult for ERCP.  Trend LFTs.  Consider consulting General Surgery regarding timing of cholecystectomy.

## 2019-05-27 NOTE — PLAN OF CARE
Problem: Adult Inpatient Plan of Care  Goal: Plan of Care Review  Outcome: Ongoing (interventions implemented as appropriate)  Patient is alert and oriented x4. Reports abdominal pain but refused pain medication. Pt shows no s/s of distress or discomfort. Safety measures met. Free from falls and injury. Denies any pain. Able to verbalize all needs. Ambulates independently.Has adequate food and fluid intake. Bed locked and placed in lowest position. Call light within reach.

## 2019-05-27 NOTE — SUBJECTIVE & OBJECTIVE
"Past Medical History:   Diagnosis Date    Acute pancreatitis     Diabetes mellitus, type 2     Gout     Hx of acute pancreatitis 3/3/2017    Hyperlipidemia     Hypertension     Obesity (BMI 35.0-39.9 without comorbidity) 6/5/2015    Sleep apnea 1/22/2016       History reviewed. No pertinent surgical history.    Review of patient's allergies indicates:  No Known Allergies    Current Facility-Administered Medications on File Prior to Encounter   Medication    [COMPLETED] morphine injection 4 mg    [COMPLETED] morphine injection 4 mg    [COMPLETED] ondansetron injection 4 mg    [COMPLETED] sodium chloride 0.9% bolus 1,000 mL     Current Outpatient Medications on File Prior to Encounter   Medication Sig    allopurinol (ZYLOPRIM) 300 MG tablet TAKE 1 TABLET BY MOUTH ONCE DAILY    aspirin (ECOTRIN) 81 MG EC tablet Take 81 mg by mouth once daily.    atorvastatin (LIPITOR) 40 MG tablet Take 1 tablet (40 mg total) by mouth once daily.    CONTOUR TEST STRIPS Strp     fenofibrate 160 MG Tab TAKE 1 TABLET BY MOUTH ONCE DAILY    icosapent ethyl (VASCEPA) 1 gram Cap Take 2 g by mouth 2 (two) times daily.    insulin aspart U-100 (NOVOLOG FLEXPEN U-100 INSULIN) 100 unit/mL InPn pen Inject 20 units w/ meals,+ scale 150-200+2, 201-250+4, 251-300+6, 301-350+8, >350 +10. Max 90 units/day.    insulin glargine (LANTUS SOLOSTAR) 100 unit/mL (3 mL) InPn pen Inject 52 Units into the skin every evening.    insulin syringe-needle U-100 29 gauge x 1/2" Syrg Inject 1 (once) per day    losartan (COZAAR) 100 MG tablet Take 0.5 tablets (50 mg total) by mouth once daily.    LYRICA 150 mg capsule TAKE 1 CAPSULE BY MOUTH TWICE DAILY    niacin 100 MG Tab Take 500 mg by mouth every evening.    ULTRA THIN LANCETS 31 gauge Misc      Family History     Problem Relation (Age of Onset)    Aneurysm Maternal Grandmother    Coronary artery disease Father    Diabetes type II Father, Paternal Grandmother    No Known Problems Sister, " Brother        Tobacco Use    Smoking status: Never Smoker    Smokeless tobacco: Never Used   Substance and Sexual Activity    Alcohol use: No    Drug use: No    Sexual activity: Yes     Partners: Female     Review of Systems   Constitutional: Negative for activity change, appetite change, chills, diaphoresis and fever.   HENT: Negative for congestion, mouth sores and sore throat.    Eyes: Negative for photophobia and visual disturbance.   Respiratory: Negative for cough and shortness of breath.    Cardiovascular: Negative for chest pain, palpitations and leg swelling.   Gastrointestinal: Positive for abdominal pain and nausea. Negative for vomiting.   Endocrine: Negative for polydipsia and polyuria.   Genitourinary: Negative for dysuria and hematuria.   Musculoskeletal: Negative for arthralgias and myalgias.   Skin: Negative for color change and rash.   Neurological: Negative for dizziness, syncope and light-headedness.     Objective:     Vital Signs (Most Recent):  Temp: 99.1 °F (37.3 °C) (05/27/19 0230)  Pulse: 94 (05/27/19 0230)  Resp: 18 (05/27/19 0230)  BP: (!) 162/77 (05/27/19 0230)  SpO2: 95 % (05/27/19 0230) Vital Signs (24h Range):  Temp:  [99 °F (37.2 °C)-99.1 °F (37.3 °C)] 99.1 °F (37.3 °C)  Pulse:  [86-94] 94  Resp:  [16-20] 18  SpO2:  [95 %-99 %] 95 %  BP: (153-162)/(69-94) 162/77     Weight: 94.5 kg (208 lb 5.4 oz)  Body mass index is 32.63 kg/m².    Physical Exam   Constitutional: He is oriented to person, place, and time. He appears well-developed and well-nourished.   Appears moderately uncomfortable   HENT:   Head: Normocephalic and atraumatic.   Mouth/Throat: Oropharynx is clear and moist.   Eyes: Pupils are equal, round, and reactive to light. Conjunctivae and EOM are normal. No scleral icterus.   Neck: Normal range of motion. Neck supple. No JVD present.   Cardiovascular: Normal rate, regular rhythm, normal heart sounds and intact distal pulses. Exam reveals no gallop and no friction rub.    No murmur heard.  Pulmonary/Chest: Effort normal and breath sounds normal.   Abdominal: Soft. Normal appearance and bowel sounds are normal. There is tenderness in the epigastric area. There is guarding. There is no rigidity and no rebound.   Musculoskeletal: Normal range of motion. He exhibits no edema or tenderness.   Lymphadenopathy:     He has no cervical adenopathy.   Neurological: He is alert and oriented to person, place, and time. No cranial nerve deficit.   Skin: Skin is warm and dry. No erythema.   Nursing note and vitals reviewed.        CRANIAL NERVES     CN III, IV, VI   Pupils are equal, round, and reactive to light.  Extraocular motions are normal.        Significant Labs:   CBC:   Recent Labs   Lab 05/26/19 2120   WBC 13.40*   HGB 15.5   HCT 43.4        CMP:   Recent Labs   Lab 05/26/19 2120      K 3.9      CO2 26   *   BUN 16   CREATININE 0.77   CALCIUM 9.7   PROT 8.2   ALBUMIN 4.8   BILITOT 1.2*   ALKPHOS 96   AST 77*   ALT 72*   ANIONGAP 15   EGFRNONAA >60.0       Significant Imaging: CT: I have reviewed all pertinent results/findings within the past 24 hours and my personal findings are:  pancreas with peripancreatic stranding, dilation of periampullary duct with 5mm gallstone

## 2019-05-27 NOTE — TRANSFER OF CARE
"Anesthesia Transfer of Care Note    Patient: Bay Ibarra    Procedure(s) Performed: Procedure(s) (LRB):  ULTRASOUND, UPPER GI TRACT, ENDOSCOPIC (N/A)  ERCP (ENDOSCOPIC RETROGRADE CHOLANGIOPANCREATOGRAPHY) (N/A)    Patient location: PACU    Anesthesia Type: general    Transport from OR: Transported from OR on room air with adequate spontaneous ventilation    Post pain: adequate analgesia    Post assessment: no apparent anesthetic complications and tolerated procedure well    Post vital signs: stable    Level of consciousness: awake and alert    Nausea/Vomiting: no nausea/vomiting    Complications: none          Last vitals:   Visit Vitals  BP (!) 131/59 (BP Location: Left arm)   Pulse 93   Temp 36.7 °C (98 °F) (Oral)   Resp 20   Ht 5' 7" (1.702 m)   Wt 94.5 kg (208 lb 5.4 oz)   SpO2 98%   BMI 32.63 kg/m²     "

## 2019-05-27 NOTE — ANESTHESIA POSTPROCEDURE EVALUATION
Anesthesia Post Evaluation    Patient: Bay Ibarra    Procedure(s) Performed: Procedure(s) (LRB):  ULTRASOUND, UPPER GI TRACT, ENDOSCOPIC (N/A)  ERCP (ENDOSCOPIC RETROGRADE CHOLANGIOPANCREATOGRAPHY) (N/A)    Final Anesthesia Type: general  Patient location during evaluation: PACU  Patient participation: Yes- Able to Participate  Level of consciousness: awake and alert and oriented  Post-procedure vital signs: reviewed and stable  Pain management: adequate  Airway patency: patent  PONV status at discharge: No PONV  Anesthetic complications: no      Cardiovascular status: hemodynamically stable  Respiratory status: unassisted, spontaneous ventilation and room air  Hydration status: euvolemic  Follow-up not needed.          Vitals Value Taken Time   /57 5/27/2019  3:31 PM   Temp 36.9 °C (98.5 °F) 5/27/2019  3:31 PM   Pulse 86 5/27/2019  3:31 PM   Resp 16 5/27/2019  3:31 PM   SpO2 99 % 5/27/2019  3:31 PM         Event Time     Out of Recovery 12:48:08          Pain/Sincere Score: Pain Rating Prior to Med Admin: 9 (5/27/2019 12:50 AM)  Sincere Score: 10 (5/27/2019 12:30 PM)

## 2019-05-27 NOTE — TREATMENT PLAN
AES Treatment Plan    EUS completed with the following findings:      Impression:   - Pancreatic parenchymal abnormalities consisting                         of calcifications, hyperechoic foci, hypoechoic                         foci, lobularity and cysts were noted in the                         pancreatic head. Appearance is very suggestive            of acute pancreatitis superimposed on prior                                     chronic changes (perhaps for prior episodes of                                acute pancreatitis). The 'stone' noted on prior                            non-contrast CT represents a parenchymal                         calcification, and is not a biliary stone. EUS                         appearance is suggestive of severe pancreatitis,                         and suspect this may another episode linked to                         hypertriglyceridemia).                        - There was no sign of significant pathology in the                         common bile duct.                        - No specimens collected.    Recommendation:                               - Return patient to [facility] for [facility] care.                        - Continue conservative management of acute                         pancreatitis (appears to be a severe episode based                         on EUS). Would recommend pancreatic protocol                       CT during this hospitalization (will serve as a                         reference) -> will likely plan for an interval CT                         in another 4-6 weeks. Keep NPO till pain-free, and                         then may start clear liquids with slow advancement.                         AES service will continue to follow.    We will continue to follow. Please call/page if any questions or concerns.    Bharat Muller MD PGY-V  Gastroenterology Fellow  [facility]  P [phone]

## 2019-05-27 NOTE — NURSING
Patient arrived to unit from [facility]. Via [facility]. Patient is aaox4. In no apparent distress. Oriented to room. Assessment completed. Patient placed on lele-monitoring. I-pad given.

## 2019-05-27 NOTE — CONSULTS
"[facility]  Hepatology  Consult Note    Patient Name: Bay Ibarra  MRN: [id number]  Admission Date: 5/27/2019  Hospital Length of Stay: 0 days  Attending Provider: Ayaka Mina MD   Primary Care Physician: José Luis Singleton MD  Principal Problem:Acute gallstone pancreatitis    Inpatient consult to [facility] ([facility])  Consult performed by: Bharat Muller MD  Consult ordered by: Chilo Dorado MD        Subjective:     Transplant status: No    HPI:  This is a 37 yo M with hx of DM, hypertriglyceridemia, and hx of pancreatitis who was transferred from [facility] for acute pancreatitis. He reports that two days ago he began having mild abdominal pain that progressively worsened prompting him to come into the ED. Yesterday reports severe epigastric and RUQ pain with radiation to the right side of the back. He denies any nausea or vomiting. No fevers reported. He does report feeling hungry. He was found to have a TB of 1.2, now 1.9, and mildly elevated liver enzymes in the 70s. He had CT imaging done which showed "5 mm stone in the region of the ampulla with associated dilatation of the CBD and main pancreatic duct, consistent with choledocholithiasis." He denies ever having gallstones before. His last episode of pancreatitis was in Dec 2018 and attributed to his high triglycerides. He has his first episode in 2013, and has been taking niacin, fibrate, atorvastatin, and fish oil - managed by his PMD. He saw a gastroenterologist in 2017 for a a small (2cm) irregular area in the head of the pancreas, concerning for pseudocyst vs mass. An MRCP was recommended which found "small peripherally enhancing lesion at the inferior aspect of the pancreatic head, with overall appearance favors pseudocyst." It appears he was lost to follow-up with GI after that.    Review of Systems   Constitutional: Positive for activity change and appetite change. Negative for chills and " fever.   HENT: Negative for sore throat and trouble swallowing.    Respiratory: Negative for cough and shortness of breath.    Cardiovascular: Negative for chest pain and leg swelling.   Gastrointestinal: Positive for abdominal pain. Negative for abdominal distention, constipation, diarrhea, nausea and vomiting.   Genitourinary: Negative for decreased urine volume.   Musculoskeletal: Positive for back pain. Negative for arthralgias.   Skin: Negative for color change and pallor.   Neurological: Negative for dizziness and light-headedness.   Psychiatric/Behavioral: Negative for agitation and confusion.       Past Medical History:   Diagnosis Date    Acute pancreatitis     Diabetes mellitus, type 2     Gout     Hx of acute pancreatitis 3/3/2017    Hyperlipidemia     Hypertension     Obesity (BMI 35.0-39.9 without comorbidity) 6/5/2015    Sleep apnea 1/22/2016       History reviewed. No pertinent surgical history.    Family history of liver disease: No    Review of patient's allergies indicates:  No Known Allergies    Tobacco Use    Smoking status: Never Smoker    Smokeless tobacco: Never Used   Substance and Sexual Activity    Alcohol use: No    Drug use: No    Sexual activity: Yes     Partners: Female       Medications Prior to Admission   Medication Sig Dispense Refill Last Dose    allopurinol (ZYLOPRIM) 300 MG tablet TAKE 1 TABLET BY MOUTH ONCE DAILY 30 tablet 5 Taking    aspirin (ECOTRIN) 81 MG EC tablet Take 81 mg by mouth once daily.   Taking    atorvastatin (LIPITOR) 40 MG tablet Take 1 tablet (40 mg total) by mouth once daily. 90 tablet 3 Taking    CONTOUR TEST STRIPS Strp    Taking    fenofibrate 160 MG Tab TAKE 1 TABLET BY MOUTH ONCE DAILY 90 tablet 0 Taking    icosapent ethyl (VASCEPA) 1 gram Cap Take 2 g by mouth 2 (two) times daily. 360 capsule 3     insulin aspart U-100 (NOVOLOG FLEXPEN U-100 INSULIN) 100 unit/mL InPn pen Inject 20 units w/ meals,+ scale 150-200+2, 201-250+4,  "251-300+6, 301-350+8, >350 +10. Max 90 units/day. 2 Box 3 Taking    insulin glargine (LANTUS SOLOSTAR) 100 unit/mL (3 mL) InPn pen Inject 52 Units into the skin every evening. 18 mL 11 Taking    insulin syringe-needle U-100 29 gauge x 1/2" Syrg Inject 1 (once) per day 100 Syringe 3 Taking    losartan (COZAAR) 100 MG tablet Take 0.5 tablets (50 mg total) by mouth once daily. 45 tablet 3 Taking    LYRICA 150 mg capsule TAKE 1 CAPSULE BY MOUTH TWICE DAILY 60 capsule 3 Taking    niacin 100 MG Tab Take 500 mg by mouth every evening.   Taking    ULTRA THIN LANCETS 31 gauge Misc    Taking       Objective:     Vital Signs (Most Recent):  Temp: 98.8 °F (37.1 °C) (05/27/19 0806)  Pulse: 91 (05/27/19 0806)  Resp: (!) 22 (05/27/19 0806)  BP: 126/80 (05/27/19 0806)  SpO2: (!) 92 % (05/27/19 0806) Vital Signs (24h Range):  Temp:  [98.8 °F (37.1 °C)-99.1 °F (37.3 °C)] 98.8 °F (37.1 °C)  Pulse:  [86-94] 91  Resp:  [16-22] 22  SpO2:  [92 %-99 %] 92 %  BP: (126-162)/(69-94) 126/80     Weight: 94.5 kg (208 lb 5.4 oz) (05/27/19 0230)  Body mass index is 32.63 kg/m².    Physical Exam   Constitutional: He is oriented to person, place, and time.   Appears uncomfortable, but nontoxic   HENT:   Mouth/Throat: Oropharynx is clear and moist.   Eyes: No scleral icterus.   Cardiovascular: Normal rate and regular rhythm.   Pulmonary/Chest: Effort normal and breath sounds normal. No respiratory distress.   Abdominal: Soft. Bowel sounds are normal. He exhibits no distension and no mass. There is no guarding.   +mild tenderness to palpation RUQ    Musculoskeletal: He exhibits no edema or deformity.   Neurological: He is alert and oriented to person, place, and time.   Skin: Skin is warm and dry.   Psychiatric: He has a normal mood and affect.   Vitals reviewed.      Computed MELD-Na score unavailable. Necessary lab results were not found in the last year.  Computed MELD score unavailable. Necessary lab results were not found in the last " year.    Significant Labs:  CBC:   Recent Labs   Lab 05/27/19  0623   WBC 8.04   RBC 4.28*   HGB 12.1*   HCT 35.3*   *     CMP:   Recent Labs   Lab 05/27/19  0623   *   CALCIUM 9.0   ALBUMIN 3.1*   PROT 5.8*      K 4.1   CO2 25      BUN 10   CREATININE 0.9   ALKPHOS 65   ALT 62*   AST 54*   BILITOT 1.9*     Coagulation: No results for input(s): PT, INR, APTT in the last 168 hours.      Assessment/Plan:     * Acute gallstone pancreatitis  37 yo M with hx of DM, hypertriglyceridemia, and hx of pancreatitis who was transferred from [facility] for findings concerning for gallstone pancreatitis. Patient has had pancreatitis a number of times in the past six years which has been attributed to his high triglycerides. He denies ever having gallstone disease. However, CT scan is remarking on a 5 mm stone in the region of the ampulla with associated dilatation of the CBD and main pancreatic duct, consistent with choledocholithiasis. No signs or symptoms of cholangitis. We will further evaluate with EUS today, +/- ERCP based on findings.    Recommendations:  - Agree with supportive care for acute pancreatitis  - Keep NPO  - Plan for EUS +/- ERCP today  - Continue triglyceride lowering medications  - Further recs to follow        Thank you for your consult. I will follow-up with patient. Please contact us if you have any additional questions.    Bharat Muller MD  Hepatology  [facility]

## 2019-05-27 NOTE — ASSESSMENT & PLAN NOTE
37 yo M with hx of DM, hypertriglyceridemia, and hx of pancreatitis who was transferred from [facility] for findings concerning for gallstone pancreatitis. Patient has had pancreatitis a number of times in the past six years which has been attributed to his high triglycerides. He denies ever having gallstone disease. However, CT scan is remarking on a 5 mm stone in the region of the ampulla with associated dilatation of the CBD and main pancreatic duct, consistent with choledocholithiasis. No signs or symptoms of cholangitis. We will further evaluate with EUS today, +/- ERCP based on findings.    Recommendations:  - Agree with supportive care for acute pancreatitis  - Keep NPO  - Plan for EUS +/- ERCP today  - Continue triglyceride lowering medications  - Further recs to follow

## 2019-05-27 NOTE — ANESTHESIA PREPROCEDURE EVALUATION
05/27/2019  Bay Ibarra is a 38 y.o., male.    Anesthesia Evaluation    I have reviewed the Patient Summary Reports.    I have reviewed the Nursing Notes.   I have reviewed the Medications.     Review of Systems  Anesthesia Hx:  No problems with previous Anesthesia  History of prior surgery of interest to airway management or planning: Previous anesthesia: General Denies Family Hx of Anesthesia complications.   Denies Personal Hx of Anesthesia complications.   Social:  Non-Smoker    Hematology/Oncology:  Hematology Normal   Oncology Normal     EENT/Dental:EENT/Dental Normal   Cardiovascular:   Exercise tolerance: good Hypertension hyperlipidemia    Pulmonary:   Sleep Apnea    Renal/:  Renal/ Normal     Hepatic/GI:   Acute pancreatitis   Musculoskeletal:  Musculoskeletal Normal    Neurological:   Peripheral Neuropathy    Endocrine:   Diabetes, type 2    Dermatological:  Skin Normal    Psych:  Psychiatric Normal           Physical Exam  General:  Well nourished    Airway/Jaw/Neck:  Airway Findings: Mouth Opening: Normal Tongue: Normal  General Airway Assessment: Adult, Average  Mallampati: III  Improves to II with phonation.  TM Distance: Normal, at least 6 cm        Eyes/Ears/Nose:  EYES/EARS/NOSE FINDINGS: Normal   Dental:  Dental Findings: In tact   Chest/Lungs:  Chest/Lungs Findings: Clear to auscultation, Normal Respiratory Rate     Heart/Vascular:  Heart Findings: Rate: Normal  Rhythm: Regular Rhythm  Sounds: Normal  Heart murmur: negative Vascular Findings: Normal    Abdomen:  Abdomen Findings: Normal    Musculoskeletal:  Musculoskeletal Findings: Normal   Skin:  Skin Findings: Normal    Mental Status:  Mental Status Findings:  Cooperative, Alert and Oriented         Anesthesia Plan  Type of Anesthesia, risks & benefits discussed:  Anesthesia Type:  general  Patient's Preference:   Intra-op  Monitoring Plan: standard ASA monitors  Intra-op Monitoring Plan Comments:   Post Op Pain Control Plan:   Post Op Pain Control Plan Comments:   Induction:   IV  Beta Blocker:  Patient is not currently on a Beta-Blocker (No further documentation required).       Informed Consent: Patient understands risks and agrees with Anesthesia plan.  Questions answered. Anesthesia consent signed with patient.  ASA Score: 2     Day of Surgery Review of History & Physical:            Ready For Surgery From Anesthesia Perspective.

## 2019-05-27 NOTE — PLAN OF CARE
Please call extension [phone] (if nobody answers, this will flip to a beeper, so put in your call back number) upon patient arrival to floor for Hospital Medicine admit team assignment and for additional admit orders for the patient.  Do not page the attending, staff physician associate with the patient on arrival (may not be in-house at the time of arrival).  Rather, always call [phone] to reach the triage physician for orders and team assignment.        Outside Transfer Acceptance Note     Patients name:      Transferring Physician or Mid-Level provider/Clinic giving report:   Dr Kahlil Cordero at [facility] ED     Accepting Physician for admission to hospital: Chilo Dorado MD        Date of acceptance:  05/26/2019       Reason for transfer:  AES services     Report from Physician/Mid-Level Provider:    HPI: 38M with several prior admissions for acute pancreatitis attributed to hypertriglyceridemia presented to ED with abdominal pain and found on CT to have a 5mm gallstone near the ampulla and peripancreatic stranding.  Lipase elevated to 500s.  Case discussed with Dr Euceda (AES) who recommended transfer here for ERCP.    VS: WNL    Labs: tbili 1.2, AST 77, ALT 72    Radiographs:     To Do List upon arrival:  AES consult, General Surgery consult for cholecystectomy     Please call extension [phone] (if nobody answers, this will flip to a beeper, so put in your call back number) upon patient arrival to floor for Hospital Medicine admit team assignment and for additional admit orders for the patient.  Do not page the attending, staff physician associate with the patient on arrival (may not be in-house at the time of arrival).  Rather, always call [phone] to reach the triage physician for orders and team assignment.

## 2019-05-28 LAB
ALBUMIN SERPL BCP-MCNC: 3.3 G/DL (ref 3.5–5.2)
ALP SERPL-CCNC: 70 U/L (ref 55–135)
ALT SERPL W/O P-5'-P-CCNC: 88 U/L (ref 10–44)
ANION GAP SERPL CALC-SCNC: 10 MMOL/L (ref 8–16)
AST SERPL-CCNC: 64 U/L (ref 10–40)
BASOPHILS # BLD AUTO: 0.06 K/UL (ref 0–0.2)
BASOPHILS NFR BLD: 0.7 % (ref 0–1.9)
BILIRUB SERPL-MCNC: 1.2 MG/DL (ref 0.1–1)
BUN SERPL-MCNC: 10 MG/DL (ref 6–20)
CALCIUM SERPL-MCNC: 9.6 MG/DL (ref 8.7–10.5)
CHLORIDE SERPL-SCNC: 103 MMOL/L (ref 95–110)
CO2 SERPL-SCNC: 25 MMOL/L (ref 23–29)
CREAT SERPL-MCNC: 0.9 MG/DL (ref 0.5–1.4)
DIFFERENTIAL METHOD: ABNORMAL
EOSINOPHIL # BLD AUTO: 0.1 K/UL (ref 0–0.5)
EOSINOPHIL NFR BLD: 1.3 % (ref 0–8)
ERYTHROCYTE [DISTWIDTH] IN BLOOD BY AUTOMATED COUNT: 13.8 % (ref 11.5–14.5)
EST. GFR  (AFRICAN AMERICAN): >60 ML/MIN/1.73 M^2
EST. GFR  (NON AFRICAN AMERICAN): >60 ML/MIN/1.73 M^2
GLUCOSE SERPL-MCNC: 167 MG/DL (ref 70–110)
HCT VFR BLD AUTO: 39.8 % (ref 40–54)
HGB BLD-MCNC: 13.4 G/DL (ref 14–18)
IMM GRANULOCYTES # BLD AUTO: 0.03 K/UL (ref 0–0.04)
IMM GRANULOCYTES NFR BLD AUTO: 0.4 % (ref 0–0.5)
LYMPHOCYTES # BLD AUTO: 1.1 K/UL (ref 1–4.8)
LYMPHOCYTES NFR BLD: 13.7 % (ref 18–48)
MAGNESIUM SERPL-MCNC: 1.9 MG/DL (ref 1.6–2.6)
MCH RBC QN AUTO: 28.2 PG (ref 27–31)
MCHC RBC AUTO-ENTMCNC: 33.7 G/DL (ref 32–36)
MCV RBC AUTO: 84 FL (ref 82–98)
MONOCYTES # BLD AUTO: 1.3 K/UL (ref 0.3–1)
MONOCYTES NFR BLD: 15.8 % (ref 4–15)
NEUTROPHILS # BLD AUTO: 5.7 K/UL (ref 1.8–7.7)
NEUTROPHILS NFR BLD: 68.1 % (ref 38–73)
NRBC BLD-RTO: 0 /100 WBC
PHOSPHATE SERPL-MCNC: 1.8 MG/DL (ref 2.7–4.5)
PLATELET # BLD AUTO: 167 K/UL (ref 150–350)
PMV BLD AUTO: 11.7 FL (ref 9.2–12.9)
POCT GLUCOSE: 172 MG/DL (ref 70–110)
POCT GLUCOSE: 176 MG/DL (ref 70–110)
POTASSIUM SERPL-SCNC: 3.9 MMOL/L (ref 3.5–5.1)
PROT SERPL-MCNC: 6.5 G/DL (ref 6–8.4)
RBC # BLD AUTO: 4.76 M/UL (ref 4.6–6.2)
SODIUM SERPL-SCNC: 138 MMOL/L (ref 136–145)
WBC # BLD AUTO: 8.35 K/UL (ref 3.9–12.7)

## 2019-05-28 PROCEDURE — 36415 COLL VENOUS BLD VENIPUNCTURE: CPT

## 2019-05-28 PROCEDURE — 25000003 PHARM REV CODE 250: Performed by: INTERNAL MEDICINE

## 2019-05-28 PROCEDURE — 83735 ASSAY OF MAGNESIUM: CPT

## 2019-05-28 PROCEDURE — 25500020 PHARM REV CODE 255: Performed by: INTERNAL MEDICINE

## 2019-05-28 PROCEDURE — 84100 ASSAY OF PHOSPHORUS: CPT

## 2019-05-28 PROCEDURE — 11000001 HC ACUTE MED/SURG PRIVATE ROOM

## 2019-05-28 PROCEDURE — 25000003 PHARM REV CODE 250: Performed by: HOSPITALIST

## 2019-05-28 PROCEDURE — 85025 COMPLETE CBC W/AUTO DIFF WBC: CPT

## 2019-05-28 PROCEDURE — 99231 SBSQ HOSP IP/OBS SF/LOW 25: CPT | Mod: ,,, | Performed by: INTERNAL MEDICINE

## 2019-05-28 PROCEDURE — 80053 COMPREHEN METABOLIC PANEL: CPT

## 2019-05-28 PROCEDURE — 99231 PR SUBSEQUENT HOSPITAL CARE,LEVL I: ICD-10-PCS | Mod: ,,, | Performed by: INTERNAL MEDICINE

## 2019-05-28 PROCEDURE — 99232 PR SUBSEQUENT HOSPITAL CARE,LEVL II: ICD-10-PCS | Mod: ,,, | Performed by: INTERNAL MEDICINE

## 2019-05-28 PROCEDURE — 99232 SBSQ HOSP IP/OBS MODERATE 35: CPT | Mod: ,,, | Performed by: INTERNAL MEDICINE

## 2019-05-28 PROCEDURE — 25500020 PHARM REV CODE 255: Performed by: STUDENT IN AN ORGANIZED HEALTH CARE EDUCATION/TRAINING PROGRAM

## 2019-05-28 RX ORDER — ACETAMINOPHEN 325 MG/1
650 TABLET ORAL EVERY 6 HOURS PRN
Status: DISCONTINUED | OUTPATIENT
Start: 2019-05-28 | End: 2019-05-29 | Stop reason: HOSPADM

## 2019-05-28 RX ADMIN — IOHEXOL 15 ML: 350 INJECTION, SOLUTION INTRAVENOUS at 01:05

## 2019-05-28 RX ADMIN — ALLOPURINOL 300 MG: 100 TABLET ORAL at 08:05

## 2019-05-28 RX ADMIN — LOSARTAN POTASSIUM 50 MG: 50 TABLET, FILM COATED ORAL at 08:05

## 2019-05-28 RX ADMIN — Medication 500 MG: at 08:05

## 2019-05-28 RX ADMIN — ATORVASTATIN CALCIUM 40 MG: 20 TABLET, FILM COATED ORAL at 08:05

## 2019-05-28 RX ADMIN — PREGABALIN 150 MG: 75 CAPSULE ORAL at 08:05

## 2019-05-28 RX ADMIN — IOHEXOL 100 ML: 350 INJECTION, SOLUTION INTRAVENOUS at 02:05

## 2019-05-28 RX ADMIN — ACETAMINOPHEN 650 MG: 325 TABLET ORAL at 08:05

## 2019-05-28 RX ADMIN — ASPIRIN 81 MG: 81 TABLET, COATED ORAL at 08:05

## 2019-05-28 RX ADMIN — SODIUM CHLORIDE, SODIUM LACTATE, POTASSIUM CHLORIDE, AND CALCIUM CHLORIDE: .6; .31; .03; .02 INJECTION, SOLUTION INTRAVENOUS at 05:05

## 2019-05-28 NOTE — PROGRESS NOTES
Food & Nutrition  Education    Diet Education: A1c  Learners: Pt      Nutrition Education provided with handouts: MyPlate, Carbohydrate Counting       Comments: Pt familiar with MyPlate handout and responded back stating that he was aware of portion size and what his plate should look like. Pt was unfamiliar with carb counting. Educated the pt on how to carb count and what a proper portion size of high carb foods. Pt verbalized understanding and at the end of the education he began to recall prior carb counting education.    All questions and concerns answered. Dietitian's contact information provided.       Follow-Up: 6/4/19    Please Re-consult as needed    Thanks!

## 2019-05-28 NOTE — HPI
"39 yo M with hx of DM, hypertriglyceridemia, and hx of pancreatitis who was transferred from [facility] for acute pancreatitis. He reports that two days ago he began having mild abdominal pain that progressively worsened prompting him to come into the ED. Yesterday reports severe epigastric and RUQ pain with radiation to the right side of the back. He denies any nausea or vomiting. No fevers reported. He does report feeling hungry. He was found to have a TB of 1.2, now 1.9, and mildly elevated liver enzymes in the 70s. He had CT imaging done which showed "5 mm stone in the region of the ampulla with associated dilatation of the CBD and main pancreatic duct, consistent with choledocholithiasis." He denies ever having gallstones before. His last episode of pancreatitis was in Dec 2018 and attributed to his high triglycerides. He has his first episode in 2013, and has been taking niacin, fibrate, atorvastatin, and fish oil - managed by his PMD. He saw a gastroenterologist in 2017 for a a small (2cm) irregular area in the head of the pancreas, concerning for pseudocyst vs mass. An MRCP was recommended which found "small peripherally enhancing lesion at the inferior aspect of the pancreatic head, with overall appearance favors pseudocyst." It appears he was lost to follow-up with GI after that.  "

## 2019-05-28 NOTE — ASSESSMENT & PLAN NOTE
37 yo M with hx of DM, hypertriglyceridemia, and hx of pancreatitis who was transferred from [facility] for findings concerning for gallstone pancreatitis. Patient has had pancreatitis a number of times in the past six years which has been attributed to his high triglycerides. He denies ever having gallstone disease. However, CT scan is remarking on a 5 mm stone in the region of the ampulla with associated dilatation of the CBD and main pancreatic duct, consistent with choledocholithiasis. No signs or symptoms of cholangitis. EUS done yesterday did not find any gallstones however the gall bladder itself was not well visualized. His TG resulted today at 219, so it is not clear the exact etiology of his pancreatitis. However, at this point he does seem to be improving. Recommend obtaining CT AP pancreas protocol and starting CLD with advancement if well tolerated.    He will need further imaging and work-up with [facility] clinic follow-up which we will arrange when he is close to discharge.     Recommendations:  - Agree with supportive care for acute pancreatitis  - CT AP pancreas protocol  - CLD afterwards, can advance if well tolerated  - No indication for antibiotics  - Continue triglyceride lowering medications

## 2019-05-28 NOTE — SUBJECTIVE & OBJECTIVE
Subjective:     Interval History: No overnight events. EUS did not find any gallstones. He reports that yesterday evening his pain significantly improved. Today he denies having any abdominal pain. No fevers noted. No pain medications administered. Able to walk around without any discomfort.    Review of Systems   Constitutional: Negative for chills and fever.   HENT: Negative for sore throat and trouble swallowing.    Respiratory: Negative for cough and shortness of breath.    Cardiovascular: Negative for chest pain and leg swelling.   Gastrointestinal: Negative for abdominal distention, abdominal pain, blood in stool, constipation, diarrhea, nausea and vomiting.   Genitourinary: Negative for decreased urine volume and difficulty urinating.   Musculoskeletal: Negative for arthralgias and back pain.   Skin: Negative for color change and pallor.   Neurological: Negative for dizziness and light-headedness.   Psychiatric/Behavioral: Negative for agitation and confusion.     Objective:     Vital Signs (Most Recent):  Temp: 98.1 °F (36.7 °C) (05/28/19 0852)  Pulse: 80 (05/28/19 0852)  Resp: 16 (05/28/19 0852)  BP: 122/63 (05/28/19 0852)  SpO2: 97 % (05/28/19 0852) Vital Signs (24h Range):  Temp:  [97.9 °F (36.6 °C)-99.4 °F (37.4 °C)] 98.1 °F (36.7 °C)  Pulse:  [65-94] 80  Resp:  [16-20] 16  SpO2:  [97 %-99 %] 97 %  BP: (115-135)/(57-69) 122/63     Weight: 94.5 kg (208 lb 5.4 oz) (05/27/19 0230)  Body mass index is 32.63 kg/m².      Intake/Output Summary (Last 24 hours) at 5/28/2019 0858  Last data filed at 5/28/2019 0750  Gross per 24 hour   Intake 2165.84 ml   Output 3625 ml   Net -1459.16 ml       Lines/Drains/Airways     Peripheral Intravenous Line                 Peripheral IV - Single Lumen 05/26/19 2115 18 G Left Wrist 1 day                Physical Exam   Constitutional: He is oriented to person, place, and time. He appears well-developed and well-nourished. No distress.   Eyes: No scleral icterus.    Cardiovascular: Normal rate and regular rhythm.   Pulmonary/Chest: Effort normal and breath sounds normal.   Abdominal: Soft. Bowel sounds are normal. He exhibits no distension. There is no tenderness. There is no guarding.   Musculoskeletal: He exhibits no edema or deformity.   Neurological: He is alert and oriented to person, place, and time.   Skin: Skin is warm and dry.   Psychiatric: He has a normal mood and affect.   Vitals reviewed.      Significant Labs:  CBC:   Recent Labs   Lab 05/26/19  2120 05/27/19  0623 05/28/19  0309   WBC 13.40* 8.04 8.35   HGB 15.5 12.1* 13.4*   HCT 43.4 35.3* 39.8*    138* 167     CMP:   Recent Labs   Lab 05/28/19  0309   *   CALCIUM 9.6   ALBUMIN 3.3*   PROT 6.5      K 3.9   CO2 25      BUN 10   CREATININE 0.9   ALKPHOS 70   ALT 88*   AST 64*   BILITOT 1.2*     Coagulation: No results for input(s): PT, INR, APTT in the last 48 hours.

## 2019-05-28 NOTE — PROGRESS NOTES
[facility]  Gastroenterology  Progress Note    Patient Name: Bay Ibarra  MRN: [id number]  Admission Date: 5/27/2019  Hospital Length of Stay: 1 days  Code Status: Full Code   Attending Provider: Natali Pearson MD  Consulting Provider: Bharat Muller MD  Primary Care Physician: José Luis Singleton MD  Principal Problem: Acute gallstone pancreatitis      Subjective:     Interval History: No overnight events. EUS did not find any gallstones. He reports that yesterday evening his pain significantly improved. Today he denies having any abdominal pain. No fevers noted. No pain medications administered. Able to walk around without any discomfort.    Review of Systems   Constitutional: Negative for chills and fever.   HENT: Negative for sore throat and trouble swallowing.    Respiratory: Negative for cough and shortness of breath.    Cardiovascular: Negative for chest pain and leg swelling.   Gastrointestinal: Negative for abdominal distention, abdominal pain, blood in stool, constipation, diarrhea, nausea and vomiting.   Genitourinary: Negative for decreased urine volume and difficulty urinating.   Musculoskeletal: Negative for arthralgias and back pain.   Skin: Negative for color change and pallor.   Neurological: Negative for dizziness and light-headedness.   Psychiatric/Behavioral: Negative for agitation and confusion.     Objective:     Vital Signs (Most Recent):  Temp: 98.1 °F (36.7 °C) (05/28/19 0852)  Pulse: 80 (05/28/19 0852)  Resp: 16 (05/28/19 0852)  BP: 122/63 (05/28/19 0852)  SpO2: 97 % (05/28/19 0852) Vital Signs (24h Range):  Temp:  [97.9 °F (36.6 °C)-99.4 °F (37.4 °C)] 98.1 °F (36.7 °C)  Pulse:  [65-94] 80  Resp:  [16-20] 16  SpO2:  [97 %-99 %] 97 %  BP: (115-135)/(57-69) 122/63     Weight: 94.5 kg (208 lb 5.4 oz) (05/27/19 0230)  Body mass index is 32.63 kg/m².      Intake/Output Summary (Last 24 hours) at 5/28/2019 0858  Last data filed at 5/28/2019 0750  Gross per 24 hour    Intake 2165.84 ml   Output 3625 ml   Net -1459.16 ml       Lines/Drains/Airways     Peripheral Intravenous Line                 Peripheral IV - Single Lumen 05/26/19 2115 18 G Left Wrist 1 day                Physical Exam   Constitutional: He is oriented to person, place, and time. He appears well-developed and well-nourished. No distress.   Eyes: No scleral icterus.   Cardiovascular: Normal rate and regular rhythm.   Pulmonary/Chest: Effort normal and breath sounds normal.   Abdominal: Soft. Bowel sounds are normal. He exhibits no distension. There is no tenderness. There is no guarding.   Musculoskeletal: He exhibits no edema or deformity.   Neurological: He is alert and oriented to person, place, and time.   Skin: Skin is warm and dry.   Psychiatric: He has a normal mood and affect.   Vitals reviewed.      Significant Labs:  CBC:   Recent Labs   Lab 05/26/19 2120 05/27/19  0623 05/28/19  0309   WBC 13.40* 8.04 8.35   HGB 15.5 12.1* 13.4*   HCT 43.4 35.3* 39.8*    138* 167     CMP:   Recent Labs   Lab 05/28/19  0309   *   CALCIUM 9.6   ALBUMIN 3.3*   PROT 6.5      K 3.9   CO2 25      BUN 10   CREATININE 0.9   ALKPHOS 70   ALT 88*   AST 64*   BILITOT 1.2*     Coagulation: No results for input(s): PT, INR, APTT in the last 48 hours.        Assessment/Plan:     Acute pancreatitis  39 yo M with hx of DM, hypertriglyceridemia, and hx of pancreatitis who was transferred from [facility] for findings concerning for gallstone pancreatitis. Patient has had pancreatitis a number of times in the past six years which has been attributed to his high triglycerides. He denies ever having gallstone disease. However, CT scan is remarking on a 5 mm stone in the region of the ampulla with associated dilatation of the CBD and main pancreatic duct, consistent with choledocholithiasis. No signs or symptoms of cholangitis. EUS done yesterday did not find any gallstones however the gall bladder itself  was not well visualized. His TG resulted today at 219, so it is not clear the exact etiology of his pancreatitis. However, at this point he does seem to be improving. Recommend obtaining CT AP pancreas protocol and starting CLD with advancement if well tolerated.    He will need further imaging and work-up with [facility] clinic follow-up which we will arrange when he is close to discharge.     Recommendations:  - Agree with supportive care for acute pancreatitis  - CT AP pancreas protocol  - CLD afterwards, can advance if well tolerated  - No indication for antibiotics  - Continue triglyceride lowering medications          Thank you for your consult. I will follow-up with patient. Please contact us if you have any additional questions.    Bharat Muller MD  Gastroenterology  [facility]

## 2019-05-28 NOTE — PROGRESS NOTES
"[facility] Hospital Medicine  Progress Note    Team: [facility]  Attending MD: Natali Pearson MD  Admit Date: 5/27/2019  JOE 5/31/2019  Length of Stay:  LOS: 1 day   Code status: Full Code    Principal Problem: Acute gallstone pancreatitis    Interval hx: Patient with no new complaints.    ROS:  Constitutional: no fever or chills  Respiratory: no cough or shortness of breath  Gastrointestinal: no nausea or vomiting, no abdominal pain    Physical Exam  Temp:  [97.9 °F (36.6 °C)-99.4 °F (37.4 °C)] 98.1 °F (36.7 °C)  Pulse:  [65-94] 80  Resp:  [16-20] 16  SpO2:  [97 %-99 %] 97 %  BP: (115-135)/(57-69) 122/63      Intake/Output Summary (Last 24 hours) at 5/28/2019 1000  Last data filed at 5/28/2019 0750  Gross per 24 hour   Intake 2165.84 ml   Output 3625 ml   Net -1459.16 ml       Wt Readings from Last 1 Encounters:   05/27/19 0230 94.5 kg (208 lb 5.4 oz)        Estimated body mass index is 32.63 kg/m² as calculated from the following:    Height as of this encounter: 5' 7" (1.702 m).    Weight as of this encounter: 94.5 kg (208 lb 5.4 oz).    General: well developed, obese, no distress  Lungs:  normal respiratory effort.  Cardiovascular: Heart: regular rate    Extremities: no cyanosis, no edema   Abdomen/Rectal: Abdomen: soft, non-tender non-distended   Psych/Behavioral:  Alert and oriented, appropriate affect.      Recent Labs   Lab 05/26/19 2120 05/27/19  0623 05/28/19  0309   WBC 13.40* 8.04 8.35   HGB 15.5 12.1* 13.4*   HCT 43.4 35.3* 39.8*    138* 167       Recent Labs   Lab 05/26/19 2120 05/27/19  0623 05/28/19  0309    136 138   K 3.9 4.1 3.9    102 103   CO2 26 25 25   BUN 16 10 10   CREATININE 0.77 0.9 0.9   * 215* 167*   CALCIUM 9.7 9.0 9.6   MG  --  1.3* 1.9   PHOS  --  2.1* 1.8*       Recent Labs   Lab 05/26/19 2120 05/27/19 0623 05/28/19  0309   ALKPHOS 96 65 70   ALT 72* 62* 88*   AST 77* 54* 64*   ALBUMIN 4.8 3.1* 3.3*   PROT 8.2 5.8* 6.5 "   BILITOT 1.2* 1.9* 1.2*       Recent Labs   Lab 05/26/19  2110 05/27/19  0450 05/27/19  1120 05/27/19  1212 05/27/19  1719 05/27/19  2133   POCTGLUCOSE 243* 222* 228* 251* 221* 188*       Hemoglobin A1C   Date Value Ref Range Status   05/27/2019 11.0 (H) 4.0 - 5.6 % Final     Scheduled Meds:   allopurinol  300 mg Oral Daily    aspirin  81 mg Oral Daily    atorvastatin  40 mg Oral Daily    insulin detemir U-100  15 Units Subcutaneous BID    losartan  50 mg Oral Daily    niacin  500 mg Oral QHS    pregabalin  150 mg Oral BID       Continuous Infusions:   lactated ringers 175 mL/hr at 05/28/19 0526       As Needed: dextrose 50%, dextrose 50%, glucagon (human recombinant), glucose, glucose, HYDROmorphone, insulin aspart U-100, promethazine (PHENERGAN) IVPB, sodium chloride 0.9%    Active Hospital Problems    Diagnosis  POA    *Acute gallstone pancreatitis [K85.10]  Yes    Acute pancreatitis [K85.90]  Unknown    Type 2 diabetes mellitus with diabetic polyneuropathy, with long-term current use of insulin [E11.42, Z79.4]  Not Applicable      Resolved Hospital Problems   No resolved problems to display.       Overview:  38-year-old male admitted with acute pancreatitis previously due to hypertriglyceridemia.  Suspicion for gallstones on renal stone study.  EUS done with no gallstones found.    Assessment and Plan for problems addressed today:    Acute pancreatitis  Hypertriglyceridemia  · EUS was negative for gallstones; severe per EUS  · CT abdomen, pancreas protocol ordered as per GI recommendations  · Clears after imaging is remains pain free  · Continue IVF  · Fenofibrate, icosapent and atorvastatin therapy at home  · Has seen dietician and has diet instructions at home although not compliant  · Abd CT: Cystic structure within the pancreatic head favored to represent markedly dilated pancreatic duct.  Pseudocyst less likely.  Neoplastic etiology not excluded. Interval development of occlusive thrombosis of  the SMV with extension to the portal vein. Splenomegaly. Awaiting final recommendations from [facility]; advancing diiet.    Diabetes Mellitus 2 - uncontrolled  · glucose monitoring AC and HS once resumes diet; every 6 hrs otherwise    · Hyperglycemia to be treated with SSNI prn.  · Glucose goal is < 180mg/dl and avoidance of hypoglycemia.  · Monitor and adjust regimen as necessary to achieve goals.    Hypomagnesemia  · Replete as indicated for goal of 2      Diet: Diet clear liquid  Diet Adult Regular (IDDSI Level 7) [facility]   GI PPx:  DVT PPx:   VTE Risk Mitigation (From admission, onward)        Ordered     Place LEI hose  Until discontinued      05/27/19 0327     Place sequential compression device  Until discontinued      05/27/19 0327     IP VTE HIGH RISK PATIENT  Once      05/27/19 0327        L/D/A: PIV    Goals of Care:    Discharge plan: home once tolerating diet      Natali Pearson MD  Staff Hospitalist

## 2019-05-28 NOTE — PLAN OF CARE
Problem: Pain (Pancreatitis)  Goal: Acceptable Pain Control  Outcome: Ongoing (interventions implemented as appropriate)  Patient pain assessment has been 0. Will continue to momitor. Awaiting call from ct. Patient remains npo.

## 2019-05-29 VITALS
BODY MASS INDEX: 32.7 KG/M2 | DIASTOLIC BLOOD PRESSURE: 63 MMHG | TEMPERATURE: 98 F | WEIGHT: 208.31 LBS | HEIGHT: 67 IN | RESPIRATION RATE: 16 BRPM | HEART RATE: 76 BPM | OXYGEN SATURATION: 98 % | SYSTOLIC BLOOD PRESSURE: 135 MMHG

## 2019-05-29 PROBLEM — K85.90 ACUTE PANCREATITIS: Status: RESOLVED | Noted: 2018-12-01 | Resolved: 2019-05-29

## 2019-05-29 LAB
ALBUMIN SERPL BCP-MCNC: 3.3 G/DL (ref 3.5–5.2)
ALP SERPL-CCNC: 68 U/L (ref 55–135)
ALT SERPL W/O P-5'-P-CCNC: 69 U/L (ref 10–44)
ANION GAP SERPL CALC-SCNC: 7 MMOL/L (ref 8–16)
AST SERPL-CCNC: 35 U/L (ref 10–40)
BASOPHILS # BLD AUTO: 0.05 K/UL (ref 0–0.2)
BASOPHILS NFR BLD: 0.8 % (ref 0–1.9)
BILIRUB SERPL-MCNC: 0.6 MG/DL (ref 0.1–1)
BUN SERPL-MCNC: 8 MG/DL (ref 6–20)
CALCIUM SERPL-MCNC: 9.8 MG/DL (ref 8.7–10.5)
CHLORIDE SERPL-SCNC: 102 MMOL/L (ref 95–110)
CO2 SERPL-SCNC: 29 MMOL/L (ref 23–29)
CREAT SERPL-MCNC: 0.9 MG/DL (ref 0.5–1.4)
DIFFERENTIAL METHOD: ABNORMAL
EOSINOPHIL # BLD AUTO: 0.1 K/UL (ref 0–0.5)
EOSINOPHIL NFR BLD: 2.1 % (ref 0–8)
ERYTHROCYTE [DISTWIDTH] IN BLOOD BY AUTOMATED COUNT: 13.5 % (ref 11.5–14.5)
EST. GFR  (AFRICAN AMERICAN): >60 ML/MIN/1.73 M^2
EST. GFR  (NON AFRICAN AMERICAN): >60 ML/MIN/1.73 M^2
GLUCOSE SERPL-MCNC: 139 MG/DL (ref 70–110)
HCT VFR BLD AUTO: 39.3 % (ref 40–54)
HGB BLD-MCNC: 13.2 G/DL (ref 14–18)
IMM GRANULOCYTES # BLD AUTO: 0.02 K/UL (ref 0–0.04)
IMM GRANULOCYTES NFR BLD AUTO: 0.3 % (ref 0–0.5)
LYMPHOCYTES # BLD AUTO: 0.8 K/UL (ref 1–4.8)
LYMPHOCYTES NFR BLD: 12.5 % (ref 18–48)
MAGNESIUM SERPL-MCNC: 1.7 MG/DL (ref 1.6–2.6)
MCH RBC QN AUTO: 28 PG (ref 27–31)
MCHC RBC AUTO-ENTMCNC: 33.6 G/DL (ref 32–36)
MCV RBC AUTO: 83 FL (ref 82–98)
MONOCYTES # BLD AUTO: 0.9 K/UL (ref 0.3–1)
MONOCYTES NFR BLD: 13.9 % (ref 4–15)
NEUTROPHILS # BLD AUTO: 4.3 K/UL (ref 1.8–7.7)
NEUTROPHILS NFR BLD: 70.4 % (ref 38–73)
NRBC BLD-RTO: 0 /100 WBC
PHOSPHATE SERPL-MCNC: 3.1 MG/DL (ref 2.7–4.5)
PLATELET # BLD AUTO: 178 K/UL (ref 150–350)
PMV BLD AUTO: 11.2 FL (ref 9.2–12.9)
POCT GLUCOSE: 157 MG/DL (ref 70–110)
POCT GLUCOSE: 178 MG/DL (ref 70–110)
POCT GLUCOSE: 230 MG/DL (ref 70–110)
POTASSIUM SERPL-SCNC: 3.7 MMOL/L (ref 3.5–5.1)
PROT SERPL-MCNC: 6.6 G/DL (ref 6–8.4)
RBC # BLD AUTO: 4.71 M/UL (ref 4.6–6.2)
SODIUM SERPL-SCNC: 138 MMOL/L (ref 136–145)
WBC # BLD AUTO: 6.17 K/UL (ref 3.9–12.7)

## 2019-05-29 PROCEDURE — 84100 ASSAY OF PHOSPHORUS: CPT

## 2019-05-29 PROCEDURE — 25000003 PHARM REV CODE 250: Performed by: INTERNAL MEDICINE

## 2019-05-29 PROCEDURE — 85025 COMPLETE CBC W/AUTO DIFF WBC: CPT

## 2019-05-29 PROCEDURE — 83735 ASSAY OF MAGNESIUM: CPT

## 2019-05-29 PROCEDURE — 25000003 PHARM REV CODE 250: Performed by: HOSPITALIST

## 2019-05-29 PROCEDURE — 80053 COMPREHEN METABOLIC PANEL: CPT

## 2019-05-29 PROCEDURE — 99239 PR HOSPITAL DISCHARGE DAY,>30 MIN: ICD-10-PCS | Mod: ,,, | Performed by: INTERNAL MEDICINE

## 2019-05-29 PROCEDURE — 99239 HOSP IP/OBS DSCHRG MGMT >30: CPT | Mod: ,,, | Performed by: INTERNAL MEDICINE

## 2019-05-29 PROCEDURE — 36415 COLL VENOUS BLD VENIPUNCTURE: CPT

## 2019-05-29 PROCEDURE — 63600175 PHARM REV CODE 636 W HCPCS: Performed by: HOSPITALIST

## 2019-05-29 RX ADMIN — SODIUM CHLORIDE, SODIUM LACTATE, POTASSIUM CHLORIDE, AND CALCIUM CHLORIDE: .6; .31; .03; .02 INJECTION, SOLUTION INTRAVENOUS at 05:05

## 2019-05-29 RX ADMIN — ATORVASTATIN CALCIUM 40 MG: 20 TABLET, FILM COATED ORAL at 09:05

## 2019-05-29 RX ADMIN — SODIUM CHLORIDE, SODIUM LACTATE, POTASSIUM CHLORIDE, AND CALCIUM CHLORIDE: .6; .31; .03; .02 INJECTION, SOLUTION INTRAVENOUS at 12:05

## 2019-05-29 RX ADMIN — ASPIRIN 81 MG: 81 TABLET, COATED ORAL at 09:05

## 2019-05-29 RX ADMIN — PREGABALIN 150 MG: 75 CAPSULE ORAL at 09:05

## 2019-05-29 RX ADMIN — LOSARTAN POTASSIUM 50 MG: 50 TABLET, FILM COATED ORAL at 09:05

## 2019-05-29 RX ADMIN — ACETAMINOPHEN 650 MG: 325 TABLET ORAL at 09:05

## 2019-05-29 RX ADMIN — ALLOPURINOL 300 MG: 100 TABLET ORAL at 09:05

## 2019-05-29 RX ADMIN — INSULIN ASPART 2 UNITS: 100 INJECTION, SOLUTION INTRAVENOUS; SUBCUTANEOUS at 09:05

## 2019-05-29 RX ADMIN — INSULIN ASPART 4 UNITS: 100 INJECTION, SOLUTION INTRAVENOUS; SUBCUTANEOUS at 12:05

## 2019-05-29 NOTE — PROGRESS NOTES
Patient given discharge instructions. Patient understand discharge instructions and follow up appointments. All questions answered. IV removed. Patient decline wheelchair patient will walk out once ready.

## 2019-05-29 NOTE — DISCHARGE SUMMARY
"Discharge Summary  Hospital Medicine    Patient Name: Bay Ibarra  MRN: [id number]  Attending Provider on Discharge: Natali Pearson MD  Hospital Medicine Team: [facility] HOS[facility] MED D  Date of Admission:  5/27/2019     Date of Discharge:  5/29/2019  6:28 PM  Code status: Full Code    Active Hospital Problems    Diagnosis  POA    Type 2 diabetes mellitus with diabetic polyneuropathy, with long-term current use of insulin [E11.42, Z79.4]  Not Applicable    Hypertriglyceridemia [E78.1]  Yes    Hx of acute pancreatitis [Z87.19]  Not Applicable    Hyperlipidemia [E78.5]  Yes    Gout [M10.9]  Yes    Essential hypertension [I10]  Yes      Resolved Hospital Problems    Diagnosis Date Resolved POA    *Acute pancreatitis [K85.90] 05/29/2019 Yes        HPI:  "38M presents as transfer from [facility] ED for acute pancreatitis.  He developed mild epigastric pain prior to going to bed Saturday night (night before day of presentation).  The following morning the pain was worse and became more severe throughout the day, prompting him to come to the ED.  He was found to have an elevated lipase and and pancreatitis on CT with ampullary gallstone.  He has history of several prior episodes of pancreatitis attributed to hypertriglyceridemia which has since been controlled with fenofibrate and niacin.  His last admission for pancreatitis was in December 2018.  He has type 2 diabetes on insulin but has been losing weight, becoming more active and even running a few marathons."    Hospital Course:  Patient admitted with acute pancreatitis previously due to hypertriglyceridemia.  Suspicion for gallstones on renal stone study.  EUS done with no gallstones found.  Tolerating low-fat diet at discharge.     Acute pancreatitis  Hypertriglyceridemia  · EUS was negative for gallstones; severe per EUS  · CT abdomen, pancreas protocol ordered as per GI recommendations  · Clears after imaging is remains pain free  · Continue " IVF  · Fenofibrate, icosapent and atorvastatin therapy at home  · Has seen dietician and has diet instructions at home although not compliant  · Abd CT: Cystic structure within the pancreatic head favored to represent markedly dilated pancreatic duct.  Pseudocyst less likely.  Neoplastic etiology not excluded. 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 Splenomegaly. Awaiting final recommendations from [facility]; advancing diiet.  · [facility] did not recommend anticoagulation for thrombus seen on CT as this is inflammatory from pancreatitis. Plan for follow-up with repeat scan in 4-6 weeks and GI clinic follow-up.     Diabetes Mellitus 2 - uncontrolled  · glucose monitoring AC and HS once resumes diet; every 6 hrs otherwise    · Hyperglycemia to be treated with SSNI prn.  · Glucose goal is < 180mg/dl and avoidance of hypoglycemia.  · Monitor and adjust regimen as necessary to achieve goals.     Hypomagnesemia  Replete as indicated for goal of 2      Recent Labs   Lab 05/27/19 0623 05/28/19  0309 05/29/19  0520   WBC 8.04 8.35 6.17   HGB 12.1* 13.4* 13.2*   HCT 35.3* 39.8* 39.3*   * 167 178     Recent Labs   Lab 05/27/19  0623 05/28/19  0309 05/29/19  0520    138 138   K 4.1 3.9 3.7    103 102   CO2 25 25 29   BUN 10 10 8   CREATININE 0.9 0.9 0.9   * 167* 139*   CALCIUM 9.0 9.6 9.8   MG 1.3* 1.9 1.7   PHOS 2.1* 1.8* 3.1     Recent Labs   Lab 05/27/19  0623 05/28/19  0309 05/29/19  0520   ALKPHOS 65 70 68   ALT 62* 88* 69*   AST 54* 64* 35   ALBUMIN 3.1* 3.3* 3.3*   PROT 5.8* 6.5 6.6   BILITOT 1.9* 1.2* 0.6      Recent Labs   Lab 05/27/19  2133 05/28/19  0617 05/28/19  1842 05/28/19 2007 05/29/19  0750 05/29/19  1214   POCTGLUCOSE 188* 178* 172* 176* 157* 230*        Procedures: EUS    Consultants:   Consults (From admission, onward)        Status Ordering Provider     Inpatient consult to [facility]  Once     Provider:  (Not yet  "assigned)    Completed ROBSON GATES          Medications:    Current Discharge Medication List      CONTINUE these medications which have NOT CHANGED    Details   allopurinol (ZYLOPRIM) 300 MG tablet TAKE 1 TABLET BY MOUTH ONCE DAILY  Qty: 30 tablet, Refills: 5    Comments: Please consider 90 day supplies to promote better adherence      aspirin (ECOTRIN) 81 MG EC tablet Take 81 mg by mouth once daily.      atorvastatin (LIPITOR) 40 MG tablet Take 1 tablet (40 mg total) by mouth once daily.  Qty: 90 tablet, Refills: 3      CONTOUR TEST STRIPS Strp       fenofibrate 160 MG Tab TAKE 1 TABLET BY MOUTH ONCE DAILY  Qty: 90 tablet, Refills: 0    Associated Diagnoses: Mixed hyperlipidemia      icosapent ethyl (VASCEPA) 1 gram Cap Take 2 g by mouth 2 (two) times daily.  Qty: 360 capsule, Refills: 3    Associated Diagnoses: Hypertriglyceridemia      insulin aspart U-100 (NOVOLOG FLEXPEN U-100 INSULIN) 100 unit/mL InPn pen Inject 20 units w/ meals,+ scale 150-200+2, 201-250+4, 251-300+6, 301-350+8, >350 +10. Max 90 units/day.  Qty: 2 Box, Refills: 3    Associated Diagnoses: Type 2 diabetes mellitus with hyperglycemia, with long-term current use of insulin      insulin glargine (LANTUS SOLOSTAR) 100 unit/mL (3 mL) InPn pen Inject 52 Units into the skin every evening.  Qty: 18 mL, Refills: 11    Associated Diagnoses: Type 2 diabetes mellitus with hyperglycemia, with long-term current use of insulin      insulin syringe-needle U-100 29 gauge x 1/2" Syrg Inject 1 (once) per day  Qty: 100 Syringe, Refills: 3      losartan (COZAAR) 100 MG tablet Take 0.5 tablets (50 mg total) by mouth once daily.  Qty: 45 tablet, Refills: 3    Associated Diagnoses: Essential hypertension      LYRICA 150 mg capsule TAKE 1 CAPSULE BY MOUTH TWICE DAILY  Qty: 60 capsule, Refills: 3    Associated Diagnoses: Type 1 diabetes mellitus with diabetic neuropathy      niacin 100 MG Tab Take 500 mg by mouth every evening.      ULTRA THIN LANCETS 31 " gauge Misc              Discharge Instructions:  Discharge Procedure Orders   Ambulatory Referral to Gastroenterology   Referral Priority: Routine Referral Type: Consultation   Referral Reason: Specialty Services Required   Requested Specialty: Gastroenterology   Number of Visits Requested: 1     Diet Adult Regular     Order Specific Question Answer Comments   Fat restriction, if any: 60g Fat    Additional restrictions: Low Chol/Sat Fat    Additional restrictions: Diabetic 2200      Notify your health care provider if you experience any of the following:  temperature >100.4     Notify your health care provider if you experience any of the following:  persistent nausea and vomiting or diarrhea     Notify your health care provider if you experience any of the following:  persistent dizziness, light-headedness, or visual disturbances     Notify your health care provider if you experience any of the following:  difficulty breathing or increased cough     Activity as tolerated       Discharge Condition: good    Disposition: Home or Self Care    Indwelling Lines/Drains at time of discharge: none    Tests pending at the time of discharge: none      Time spent on the discharge of the patient including review of hospital course with the patient, reviewing discharge medications and arranging follow-up care: 35 minutes.    Discharge examination Patient was seen and examined on 5/29/2019 and determined to be suitable for discharge.    Discharge plan and follow up:  Follow-up Information     José Luis Singleton MD On 6/4/2019.    Specialties:  Internal Medicine, Infectious Diseases  Why:  at 9:00 AM  Contact information:  [address]ANIEL[address] [facility]  [address] [place] [address] [phone]         [facility] GASTROENTEROLOGY. Schedule an appointment as soon as possible for a visit in 5 weeks.    Specialty:  Gastroenterology  Why:  As previously planned for repeat CT scan and hospital follow up  Contact information:  [address]  [facility] [address]  [phone]             Future Appointments   Date Time Provider Department Center   6/4/2019  9:00 AM José Luis Singleton MD [facility]       Provider  Natali Pearson MD  Department of Hospital Medicine  [facility]

## 2019-05-29 NOTE — TREATMENT PLAN
[facility] Treatment Plan    Patient tolerated clear liquids yesterday without any pain or nausea. Today he is trying regular food. If he tolerates and remains pain-free, he can be discharged from our standpoint.    Reviewed his CT imaging, do not recommend anticoagulation for thrombus seen as this is inflammatory from pancreatitis.    We will arrange for his follow-up with repeat scan in 4-6 weeks and clinic follow-up.    Bharat Muller MD PGY-V  Gastroenterology Fellow  [facility]  [phone]

## 2019-05-29 NOTE — PLAN OF CARE
Problem: Fluid Imbalance (Pancreatitis)  Goal: Fluid Balance  Outcome: Ongoing (interventions implemented as appropriate)  Patient tolerated liquid diet well. No nausea or vomiting.

## 2019-05-30 NOTE — PLAN OF CARE
05/30/19 0643   Final Note   Assessment Type Final Discharge Note     Patient discharged home with no needs on 5/29/19. Discharge summary faxed to [facility].

## 2019-05-31 ENCOUNTER — PATIENT OUTREACH (OUTPATIENT)
Dept: ADMINISTRATIVE | Facility: CLINIC | Age: 39
End: 2019-05-31

## 2019-05-31 NOTE — PATIENT INSTRUCTIONS
Discharge Instructions for Acute Pancreatitis  You have been diagnosed with acute pancreatitis. Your pancreas is inflamed or swollen. The pancreas is an organ that makes digestive juices and hormones. Gallstones are a common cause of pancreatitis. These hard stones form in the gallbladder. The gallbladder shares a tube with the pancreas into the small intestine. If gallstones block this tube, fluid cant leave the pancreas. The fluid backs up and causes redness and swelling (inflammation). There are other causes of pancreatitis. Make sure you understand the cause of your pancreatitis. Then you can try to stop it from happening again.  Immediate home care  · Find someone to drive you to appointments. Acute pancreatitis is a serious condition, and you should never drive if you are experiencing symptoms.  · Stop drinking if your illness was caused by alcohol.  ¨ Ask your healthcare provider about alcohol abuse programs and support groups such as Alcoholics Anonymous.  ¨ Ask your provider about prescription medicines that can help you stop drinking.  ¨ Tell your provider about the alcohol withdrawal symptoms you have when you stop drinking. This is very important. You may need close medical supervision and special medicines when you stop drinking. This will depend on your alcohol withdrawal history.   · Take your medicines exactly as directed. Dont skip doses.  · Eat a low-fat diet. Ask your provider for menus and other diet information.  · Learn to take your own pulse. Keep a record of your results. Ask your provider which readings mean that you need medical attention.  Ongoing care  · Tell your provider about any medicines you are taking. Some medicines can cause this condition.  · Before starting any new medicine, ask your provider if it will harm your pancreas. This includes any new over-the-counter medicines, vitamins, or herbal supplements.    · Tell your provider if you lose weight without dieting.  · Be aware  of symptoms that may mean your pancreatitis has come back. These symptoms include belly pain, nausea and vomiting, and fever.  · Keep all follow-up appointments with your provider. Problems can often show up later.  Follow-up  Follow up with your healthcare provider, or as advised.  When to call your provider  Call your healthcare provider right away if you have any of the following:  · Fever of 100.4°F (38.0°C) or higher, or as advised by your provider  · Severe pain from your upper belly to your back  · Nausea and vomiting  · Feely dizzy or lightheaded  · Yellowing of your skin or eyes (jaundice)  · Bruises on your belly or back  · Belly swelling and tenderness  · Rapid pulse  · Shallow, fast breathing   Date Last Reviewed: 8/1/2016  © 3384-0779 [employer]. [address] [place] [address], [place] [address]. All rights reserved. This information is not intended as a substitute for professional medical care. Always follow your healthcare professional's instructions.

## 2019-06-03 DIAGNOSIS — I10 ESSENTIAL HYPERTENSION: ICD-10-CM

## 2019-06-03 NOTE — PROGRESS NOTES
Subjective:      Patient ID: Bay Ibarra is a 38 y.o. male.    Chief Complaint: No chief complaint on file.    HPI: 38 y.o. White male, just recently hospitalized for acute pancreatitis.  He is still in the process of a GI work up.       Review of Systems    Objective:   There were no vitals taken for this visit.    Physical Exam    Assessment:     1. Type 2 diabetes mellitus with hyperglycemia, with long-term current use of insulin    2. Type 2 diabetes mellitus with diabetic polyneuropathy, with long-term current use of insulin    3. Hypertriglyceridemia    4. Mixed hyperlipidemia    5. Hx of acute pancreatitis      Plan:     Type 2 diabetes mellitus with hyperglycemia, with long-term current use of insulin    Type 2 diabetes mellitus with diabetic polyneuropathy, with long-term current use of insulin    Hypertriglyceridemia    Mixed hyperlipidemia    Hx of acute pancreatitis

## 2019-06-03 NOTE — PROGRESS NOTES
INTERNAL MEDICINE    Patient Active Problem List   Diagnosis    Essential hypertension    Hyperlipidemia    Obesity (BMI 30-39.9)    Gout    Obstructive sleep apnea syndrome    Sleep apnea    Hx of acute pancreatitis    Hypertriglyceridemia    Type 2 diabetes mellitus with hyperglycemia, with long-term current use of insulin    Type 2 diabetes mellitus with diabetic polyneuropathy, with long-term current use of insulin       CC:   Chief Complaint   Patient presents with    Hospital Follow Up     5/27/2019 Admit       SUBJECTIVE:  Bay Ibarra   is a 38 y.o. male  HPI   39 y/o WM, currently on Lantus 52 units nightly, and novolog 20 units+ sliding scale per meal.  BS have been highest 280, lowest 120.  Exercises daily.  Watching diet.  Drinks when he is thirsty. Does not drink water prior to exercising.  No further abdominal pain.  No N,V,D,C.  No arthralgias, myalgias.    4 days ago ran 4 miles in 1hr 7 min.  Now has a blister on right foot,lateral 2nd toe.      Recently hospitalized for pancreatitis. Thought initially to have a gallstone, and was [place]   [facility] [place] he underwent an EUS. No stone found.  Triglycerides slowly ebbed.    The plan is to repeat CT abdomen at the end of this month.    ROS: Review of Systems   Constitutional: Negative.  Negative for fatigue and fever.   HENT: Negative.    Eyes: Negative.    Respiratory: Negative.    Cardiovascular: Negative.    Gastrointestinal: Negative.  Negative for abdominal distention, abdominal pain, anal bleeding, blood in stool, constipation, diarrhea, nausea, rectal pain and vomiting.   Endocrine: Negative.    Genitourinary: Negative.    Musculoskeletal: Negative.    Skin: Positive for wound.   Allergic/Immunologic: Negative.    Neurological: Negative.    Hematological: Negative.    Psychiatric/Behavioral: Negative.        Past Medical History:   Diagnosis Date    Acute pancreatitis     Diabetes mellitus, type 2     Gout     Hx of  acute pancreatitis 3/3/2017    Hyperlipidemia     Hypertension     Obesity (BMI 35.0-39.9 without comorbidity) 6/5/2015    Sleep apnea 1/22/2016       Past Surgical History:   Procedure Laterality Date    ERCP (ENDOSCOPIC RETROGRADE CHOLANGIOPANCREATOGRAPHY) N/A 5/27/2019    Performed by Zafar Velazquez MD at [facility] END[facility] (2ND FLR)    ULTRASOUND, UPPER GI TRACT, ENDOSCOPIC N/A 5/27/2019    Performed by Zafar Velazquez MD at [facility] ENDO (2ND FLR)       Family History   Problem Relation Age of Onset    Coronary artery disease Father         4 vessel bypass at 40, possible stent at 36    Diabetes type II Father     No Known Problems Sister     No Known Problems Brother     Aneurysm Maternal Grandmother         Possible AAA    Diabetes type II Paternal Grandmother        Social History     Tobacco Use    Smoking status: Never Smoker    Smokeless tobacco: Never Used   Substance Use Topics    Alcohol use: No    Drug use: No       Social History     Social History Narrative    Not on file       ALLERGIES: Review of patient's allergies indicates:  No Known Allergies    MEDS:   Current Outpatient Medications:     allopurinol (ZYLOPRIM) 300 MG tablet, TAKE 1 TABLET BY MOUTH ONCE DAILY, Disp: 30 tablet, Rfl: 5    aspirin (ECOTRIN) 81 MG EC tablet, Take 81 mg by mouth once daily., Disp: , Rfl:     atorvastatin (LIPITOR) 40 MG tablet, Take 1 tablet (40 mg total) by mouth once daily., Disp: 90 tablet, Rfl: 3    CONTOUR TEST STRIPS Strp, , Disp: , Rfl:     fenofibrate 160 MG Tab, TAKE 1 TABLET BY MOUTH ONCE DAILY, Disp: 90 tablet, Rfl: 0    icosapent ethyl (VASCEPA) 1 gram Cap, Take 2 g by mouth 2 (two) times daily., Disp: 360 capsule, Rfl: 3    insulin aspart U-100 (NOVOLOG FLEXPEN U-100 INSULIN) 100 unit/mL InPn pen, Inject 20 units w/ meals,+ scale 150-200+2, 201-250+4, 251-300+6, 301-350+8, >350 +10. Max 90 units/day., Disp: 2 Box, Rfl: 3    insulin glargine (LANTUS SOLOSTAR) 100 unit/mL (3 mL) InPn pen,  "Inject 52 Units into the skin every evening., Disp: 18 mL, Rfl: 11    insulin syringe-needle U-100 29 gauge x 1/2" Syrg, Inject 1 (once) per day, Disp: 100 Syringe, Rfl: 3    losartan (COZAAR) 100 MG tablet, Take 0.5 tablets (50 mg total) by mouth once daily., Disp: 45 tablet, Rfl: 3    LYRICA 150 mg capsule, TAKE 1 CAPSULE BY MOUTH TWICE DAILY, Disp: 60 capsule, Rfl: 3    niacin 100 MG Tab, Take 500 mg by mouth every evening., Disp: , Rfl:     ULTRA THIN LANCETS 31 gauge Misc, , Disp: , Rfl:     OBJECTIVE:   Vitals:    06/04/19 0931   BP: 118/60   BP Location: Left arm   Patient Position: Sitting   BP Method: X-Large (Manual)   Pulse: 83   Resp: 18   Temp: 97.8 °F (36.6 °C)   TempSrc: Oral   SpO2: 96%   Weight: 91.9 kg (202 lb 8 oz)   Height: 5' 7" (1.702 m)     Body mass index is 31.72 kg/m².    Physical Exam   Constitutional: He is oriented to person, place, and time. Vital signs are normal. He appears well-developed and well-nourished. He is sleeping, active and cooperative.  Non-toxic appearance. He does not have a sickly appearance. He does not appear ill.   HENT:   Head: Normocephalic and atraumatic. Not microcephalic. Head is without raccoon's eyes, without Crawford's sign, without abrasion, without contusion, without laceration, without right periorbital erythema and without left periorbital erythema. Hair is normal.   Right Ear: Hearing, tympanic membrane, external ear and ear canal normal.   Left Ear: Hearing, tympanic membrane, external ear and ear canal normal.   Nose: Nose normal.   Mouth/Throat: Uvula is midline and oropharynx is clear and moist.   Eyes: Pupils are equal, round, and reactive to light. Conjunctivae, EOM and lids are normal.   Neck: Trachea normal, normal range of motion, full passive range of motion without pain and phonation normal. Neck supple. Normal carotid pulses, no hepatojugular reflux and no JVD present. Carotid bruit is not present. No Brudzinski's sign and no Kernig's sign " noted. No thyroid mass present.   Cardiovascular: Normal rate, regular rhythm, S1 normal, S2 normal and normal heart sounds.  No extrasystoles are present. PMI is not displaced.   Pulmonary/Chest: Effort normal and breath sounds normal.   Abdominal: Soft. Normal appearance and bowel sounds are normal. There is no hepatosplenomegaly.   Genitourinary: Testes normal. Cremasteric reflex is present.   Musculoskeletal: Normal range of motion.   Lymphadenopathy:        Head (right side): No submental, no submandibular, no tonsillar, no preauricular, no posterior auricular and no occipital adenopathy present.        Head (left side): No submental, no submandibular, no tonsillar, no preauricular, no posterior auricular and no occipital adenopathy present.     He has no cervical adenopathy.     He has no axillary adenopathy.        Right: No inguinal, no supraclavicular and no epitrochlear adenopathy present.        Left: No inguinal, no supraclavicular and no epitrochlear adenopathy present.   Neurological: He is alert and oriented to person, place, and time. He has normal strength and normal reflexes. No sensory deficit. He displays a negative Romberg sign.   Reflex Scores:       Tricep reflexes are 2+ on the right side and 2+ on the left side.       Bicep reflexes are 2+ on the right side and 2+ on the left side.       Brachioradialis reflexes are 2+ on the right side and 2+ on the left side.       Patellar reflexes are 2+ on the right side and 2+ on the left side.       Achilles reflexes are 2+ on the right side and 2+ on the left side.  Skin: Skin is warm, dry and intact. No abrasion, no bruising, no burn, no ecchymosis, no laceration, no lesion, no petechiae and no purpura noted. Rash is not macular, not papular, not maculopapular, not nodular, not pustular, not vesicular and not urticarial. No cyanosis. Nails show no clubbing.   0.8cm blister, clean base lateral right 2nd toe.  No cellulitis.   Psychiatric: He has a  normal mood and affect. His speech is normal and behavior is normal. Judgment and thought content normal. He is not actively hallucinating. Cognition and memory are normal.   Nursing note and vitals reviewed.        PERTINENT RESULTS:   CBC:  Recent Labs   Lab Result Units 05/27/19  0623 05/28/19  0309 05/29/19  0520   WBC K/uL 8.04 8.35 6.17   RBC M/uL 4.28* 4.76 4.71   Hemoglobin g/dL 12.1* 13.4* 13.2*   Hematocrit % 35.3* 39.8* 39.3*   Platelets K/uL 138* 167 178   Mean Corpuscular Volume fL 83 84 83   Mean Corpuscular Hemoglobin pg 28.3 28.2 28.0   Mean Corpuscular Hemoglobin Conc g/dL 34.3 33.7 33.6     CMP:  Recent Labs   Lab Result Units 05/27/19  0623 05/28/19  0309 05/29/19  0520   Glucose mg/dL 215* 167* 139*   Calcium mg/dL 9.0 9.6 9.8   Albumin g/dL 3.1* 3.3* 3.3*   Total Protein g/dL 5.8* 6.5 6.6   Sodium mmol/L 136 138 138   Potassium mmol/L 4.1 3.9 3.7   CO2 mmol/L 25 25 29   Chloride mmol/L 102 103 102   BUN, Bld mg/dL 10 10 8   Alkaline Phosphatase U/L 65 70 68   ALT U/L 62* 88* 69*   AST U/L 54* 64* 35   Total Bilirubin mg/dL 1.9* 1.2* 0.6     URINALYSIS:  Recent Labs   Lab Result Units 05/26/19  2120   Color, UA  Yellow   Specific Gravity, UA  >=1.030*   pH, UA  6.0   Protein, UA  1+*   Bacteria /hpf None   Nitrite, UA  Negative   Leukocytes, UA  Negative   Urobilinogen, UA EU/dL Negative   Hyaline Casts, UA /lpf 0      LIPIDS:  Recent Labs   Lab Result Units 04/15/19  0822 05/27/19  0032   HDL mg/dL 21* 28*   Cholesterol mg/dL 143 118*   Triglycerides mg/dL 748* 216*   LDL Cholesterol mg/dL Invalid, Trig>400.0 46.8*   Hdl/Cholesterol Ratio % 14.7* 23.7   Non-HDL Cholesterol mg/dL 122 90   Total Cholesterol/HDL Ratio  6.8* 4.2     CT of abdomen:  Cystic structure within the pancreas head measuring up to approximately 1.9 cm2 and favored to represent dilated pancreatic duct.  Less likely pseudocyst.  There is associated mild peripancreatic inflammatory changes.  Splenomegaly.  All other structures  were essentially normal.     HGBA1C  11    ASSESSMENT:  Problem List Items Addressed This Visit        Cardiac/Vascular    Hyperlipidemia    Hypertriglyceridemia       Endocrine    Type 2 diabetes mellitus with hyperglycemia, with long-term current use of insulin    Type 2 diabetes mellitus with diabetic polyneuropathy, with long-term current use of insulin       GI    Hx of acute pancreatitis - Primary          PLAN:    Dress foot with Neosporin/bandage.  Do not run until healed.  See GI, re: repeat EUS vs CT.  Call diabetic support. Question is treatment of triglycerides...  No orders of the defined types were placed in this encounter.      Follow-up with me in 3 months..   Dr. José Luis Singleton  Internal Medicine

## 2019-06-03 NOTE — PHYSICIAN QUERY
PT Name: Bay Ibarra  MR #: [id number]    Physician Query Form -Present on Admission (POA) Diagnosis Clarification     CDS Joselyn Chaney RN, BSN        Contact Information:  [phone]    [email or web address]           This form is a permanent document in the medical record.     Query Date: Haydee 3, 2019    By submitting this query, we are merely seeking further clarification of documentation. Please utilize your independent clinical judgment when addressing the question(s) below.       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         Acute pancreatitis      Impression:             - Pancreatic parenchymal abnormalities consisting      of calcifications, hyperechoic foci, hypoechoic      foci, lobularity and cysts were noted in the      pancreatic head. Appearance is very suggestive of      acute pancreatitis superimposed on prior chronic      changes (perhaps for prior episodes of acute      pancreatitis). The 'stone' noted on prior      non-contrast CT represents a parenchymal      calcification, and is not a biliary stone. EUS      appearance is suggestive of severe pancreatitis,      and suspect this may another episode linked to      hypertriglyceridemia).  - There was no sign of significant pathology in the      common bile duct.   - No specimens collected.      Impression      1. Cystic structure within the pancreatic head favored to represent markedly dilated pancreatic duct.  Pseudocyst less likely.  Neoplastic etiology not excluded.  Recommend ERCP for further evaluation.  2. 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  3. Splenomegaly    · [facility] did not recommend anticoagulation for thrombus seen on CT as this is inflammatory from pancreatitis. Plan for follow-up with repeat scan in 4-6 weeks and GI  clinic follow-up.   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      [ X ] Present on Admission    [  ] Not Present on Admission   [  ] Clinically Undetermined

## 2019-06-04 ENCOUNTER — OFFICE VISIT (OUTPATIENT)
Dept: INTERNAL MEDICINE | Facility: CLINIC | Age: 39
End: 2019-06-04
Payer: COMMERCIAL

## 2019-06-04 ENCOUNTER — TELEPHONE (OUTPATIENT)
Dept: ENDOSCOPY | Facility: HOSPITAL | Age: 39
End: 2019-06-04

## 2019-06-04 VITALS
WEIGHT: 202.5 LBS | HEART RATE: 83 BPM | BODY MASS INDEX: 31.78 KG/M2 | HEIGHT: 67 IN | RESPIRATION RATE: 18 BRPM | DIASTOLIC BLOOD PRESSURE: 60 MMHG | SYSTOLIC BLOOD PRESSURE: 118 MMHG | TEMPERATURE: 98 F | OXYGEN SATURATION: 96 %

## 2019-06-04 DIAGNOSIS — E11.65 TYPE 2 DIABETES MELLITUS WITH HYPERGLYCEMIA, WITH LONG-TERM CURRENT USE OF INSULIN: ICD-10-CM

## 2019-06-04 DIAGNOSIS — Z79.4 TYPE 2 DIABETES MELLITUS WITH DIABETIC POLYNEUROPATHY, WITH LONG-TERM CURRENT USE OF INSULIN: ICD-10-CM

## 2019-06-04 DIAGNOSIS — E11.42 TYPE 2 DIABETES MELLITUS WITH DIABETIC POLYNEUROPATHY, WITH LONG-TERM CURRENT USE OF INSULIN: ICD-10-CM

## 2019-06-04 DIAGNOSIS — E78.1 HYPERTRIGLYCERIDEMIA: ICD-10-CM

## 2019-06-04 DIAGNOSIS — Z87.19 HX OF ACUTE PANCREATITIS: Primary | ICD-10-CM

## 2019-06-04 DIAGNOSIS — K85.90 ACUTE PANCREATITIS, UNSPECIFIED COMPLICATION STATUS, UNSPECIFIED PANCREATITIS TYPE: Primary | ICD-10-CM

## 2019-06-04 DIAGNOSIS — Z23 IMMUNIZATION DUE: ICD-10-CM

## 2019-06-04 DIAGNOSIS — Z79.4 TYPE 2 DIABETES MELLITUS WITH HYPERGLYCEMIA, WITH LONG-TERM CURRENT USE OF INSULIN: ICD-10-CM

## 2019-06-04 DIAGNOSIS — E78.2 MIXED HYPERLIPIDEMIA: ICD-10-CM

## 2019-06-04 PROCEDURE — 3008F PR BODY MASS INDEX (BMI) DOCUMENTED: ICD-10-PCS | Mod: CPTII,S$GLB,, | Performed by: INTERNAL MEDICINE

## 2019-06-04 PROCEDURE — 90471 TDAP VACCINE GREATER THAN OR EQUAL TO 7YO IM: ICD-10-PCS | Mod: S$GLB,,, | Performed by: INTERNAL MEDICINE

## 2019-06-04 PROCEDURE — 3078F DIAST BP <80 MM HG: CPT | Mod: CPTII,S$GLB,, | Performed by: INTERNAL MEDICINE

## 2019-06-04 PROCEDURE — 3078F PR MOST RECENT DIASTOLIC BLOOD PRESSURE < 80 MM HG: ICD-10-PCS | Mod: CPTII,S$GLB,, | Performed by: INTERNAL MEDICINE

## 2019-06-04 PROCEDURE — 99999 PR PBB SHADOW E&M-EST. PATIENT-LVL IV: CPT | Mod: PBBFAC,,, | Performed by: INTERNAL MEDICINE

## 2019-06-04 PROCEDURE — 90715 TDAP VACCINE 7 YRS/> IM: CPT | Mod: S$GLB,,, | Performed by: INTERNAL MEDICINE

## 2019-06-04 PROCEDURE — 90471 IMMUNIZATION ADMIN: CPT | Mod: S$GLB,,, | Performed by: INTERNAL MEDICINE

## 2019-06-04 PROCEDURE — 99999 PR PBB SHADOW E&M-EST. PATIENT-LVL IV: ICD-10-PCS | Mod: PBBFAC,,, | Performed by: INTERNAL MEDICINE

## 2019-06-04 PROCEDURE — 3008F BODY MASS INDEX DOCD: CPT | Mod: CPTII,S$GLB,, | Performed by: INTERNAL MEDICINE

## 2019-06-04 PROCEDURE — 3074F PR MOST RECENT SYSTOLIC BLOOD PRESSURE < 130 MM HG: ICD-10-PCS | Mod: CPTII,S$GLB,, | Performed by: INTERNAL MEDICINE

## 2019-06-04 PROCEDURE — 3074F SYST BP LT 130 MM HG: CPT | Mod: CPTII,S$GLB,, | Performed by: INTERNAL MEDICINE

## 2019-06-04 PROCEDURE — 3046F HEMOGLOBIN A1C LEVEL >9.0%: CPT | Mod: CPTII,S$GLB,, | Performed by: INTERNAL MEDICINE

## 2019-06-04 PROCEDURE — 90715 TDAP VACCINE GREATER THAN OR EQUAL TO 7YO IM: ICD-10-PCS | Mod: S$GLB,,, | Performed by: INTERNAL MEDICINE

## 2019-06-04 PROCEDURE — 99214 OFFICE O/P EST MOD 30 MIN: CPT | Mod: 25,S$GLB,, | Performed by: INTERNAL MEDICINE

## 2019-06-04 PROCEDURE — 99214 PR OFFICE/OUTPT VISIT, EST, LEVL IV, 30-39 MIN: ICD-10-PCS | Mod: 25,S$GLB,, | Performed by: INTERNAL MEDICINE

## 2019-06-04 PROCEDURE — 3046F PR MOST RECENT HEMOGLOBIN A1C LEVEL > 9.0%: ICD-10-PCS | Mod: CPTII,S$GLB,, | Performed by: INTERNAL MEDICINE

## 2019-06-04 RX ORDER — LOSARTAN POTASSIUM 100 MG/1
TABLET ORAL
Qty: 45 TABLET | Refills: 3 | Status: SHIPPED | OUTPATIENT
Start: 2019-06-04 | End: 2020-09-10 | Stop reason: SDUPTHER

## 2019-06-27 DIAGNOSIS — E10.40 TYPE 1 DIABETES MELLITUS WITH DIABETIC NEUROPATHY: ICD-10-CM

## 2019-06-28 DIAGNOSIS — E10.40 TYPE 1 DIABETES MELLITUS WITH DIABETIC NEUROPATHY: ICD-10-CM

## 2019-06-28 RX ORDER — PREGABALIN 150 MG/1
150 CAPSULE ORAL 2 TIMES DAILY
Qty: 60 CAPSULE | Refills: 0 | Status: SHIPPED | OUTPATIENT
Start: 2019-06-28 | End: 2019-08-05 | Stop reason: SDUPTHER

## 2019-06-28 RX ORDER — ATORVASTATIN CALCIUM 40 MG/1
40 TABLET, FILM COATED ORAL DAILY
Qty: 90 TABLET | Refills: 0 | Status: SHIPPED | OUTPATIENT
Start: 2019-06-28 | End: 2019-10-06 | Stop reason: SDUPTHER

## 2019-06-28 RX ORDER — PREGABALIN 150 MG/1
CAPSULE ORAL
Qty: 60 CAPSULE | Refills: 3 | OUTPATIENT
Start: 2019-06-28

## 2019-07-02 ENCOUNTER — TELEPHONE (OUTPATIENT)
Dept: ENDOSCOPY | Facility: HOSPITAL | Age: 39
End: 2019-07-02

## 2019-07-02 NOTE — TELEPHONE ENCOUNTER
----- Message from Earlene Montero sent at 7/2/2019  2:19 PM CDT -----  Contact: Self- [phone]  Marcus- pt called to schedule an ep appt- this would be for Acute pancreatitis, unspecified complication status, unspecified pancreatitis type [K85.90]- please contact pt at [phone]

## 2019-07-03 NOTE — TELEPHONE ENCOUNTER
Message   Received: Today   Message Contents   MD Amina Rhodes MA   Cc: Landon Amaya MD   Caller: Unspecified (Yesterday,  4:28 PM)             Thanks Mayank Oconnell- Please let pt know that he will be hearing from the liver clinic- they will discuss the aspect of whether or not the clot in his portal vein (going to the liver) needs any treatment.     Please also refer him to hematology. Let him know that they will evaluate whether any evaluation of clotting disorder needs to be done. I suspect the clot is from his pancreatitis, but it is worthwhile to get input from the hematology specialists.     I should also see him in clinic to see how he is doing from the pancreatitis standpoint. Thanks.     No answer

## 2019-07-03 NOTE — TELEPHONE ENCOUNTER
Called patient to schedule his appt per Dr. Higgins. Patient can see EDUARDO.  Left a message waiting on a call back.

## 2019-07-15 DIAGNOSIS — E78.2 MIXED HYPERLIPIDEMIA: ICD-10-CM

## 2019-07-15 DIAGNOSIS — E11.65 TYPE 2 DIABETES MELLITUS WITH HYPERGLYCEMIA, WITH LONG-TERM CURRENT USE OF INSULIN: ICD-10-CM

## 2019-07-15 DIAGNOSIS — Z79.4 TYPE 2 DIABETES MELLITUS WITH HYPERGLYCEMIA, WITH LONG-TERM CURRENT USE OF INSULIN: ICD-10-CM

## 2019-07-15 RX ORDER — FENOFIBRATE 160 MG/1
TABLET ORAL
Qty: 90 TABLET | Refills: 0 | Status: SHIPPED | OUTPATIENT
Start: 2019-07-15 | End: 2019-11-06 | Stop reason: SDUPTHER

## 2019-07-15 RX ORDER — INSULIN ASPART 100 [IU]/ML
INJECTION, SOLUTION INTRAVENOUS; SUBCUTANEOUS
Qty: 30 SYRINGE | Refills: 4 | Status: SHIPPED | OUTPATIENT
Start: 2019-07-15 | End: 2020-09-15 | Stop reason: SDUPTHER

## 2019-08-02 DIAGNOSIS — E10.40 TYPE 1 DIABETES MELLITUS WITH DIABETIC NEUROPATHY: ICD-10-CM

## 2019-08-02 RX ORDER — PREGABALIN 150 MG/1
CAPSULE ORAL
Qty: 60 CAPSULE | Refills: 0 | OUTPATIENT
Start: 2019-08-02

## 2019-08-05 ENCOUNTER — PATIENT MESSAGE (OUTPATIENT)
Dept: INTERNAL MEDICINE | Facility: CLINIC | Age: 39
End: 2019-08-05

## 2019-08-05 DIAGNOSIS — E10.40 TYPE 1 DIABETES MELLITUS WITH DIABETIC NEUROPATHY: ICD-10-CM

## 2019-08-05 RX ORDER — PREGABALIN 150 MG/1
150 CAPSULE ORAL 2 TIMES DAILY
Qty: 60 CAPSULE | Refills: 5 | Status: SHIPPED | OUTPATIENT
Start: 2019-08-05 | End: 2019-08-08 | Stop reason: SDUPTHER

## 2019-08-08 DIAGNOSIS — E10.40 TYPE 1 DIABETES MELLITUS WITH DIABETIC NEUROPATHY: ICD-10-CM

## 2019-08-09 RX ORDER — PREGABALIN 150 MG/1
CAPSULE ORAL
Qty: 60 CAPSULE | Refills: 0 | Status: SHIPPED | OUTPATIENT
Start: 2019-08-09 | End: 2020-02-17

## 2019-08-27 ENCOUNTER — PATIENT MESSAGE (OUTPATIENT)
Dept: INTERNAL MEDICINE | Facility: CLINIC | Age: 39
End: 2019-08-27

## 2019-08-29 PROBLEM — K86.3 PANCREATIC PSEUDOCYST: Status: ACTIVE | Noted: 2019-08-29

## 2019-08-29 PROBLEM — K85.90 ACUTE ON CHRONIC PANCREATITIS: Status: ACTIVE | Noted: 2019-08-29

## 2019-08-29 PROBLEM — Z87.19 HX OF ACUTE PANCREATITIS: Status: RESOLVED | Noted: 2017-03-03 | Resolved: 2019-08-29

## 2019-08-29 PROBLEM — K86.1 ACUTE ON CHRONIC PANCREATITIS: Status: ACTIVE | Noted: 2019-08-29

## 2019-08-29 PROBLEM — I81 PORTAL VEIN THROMBOSIS: Status: ACTIVE | Noted: 2019-08-29

## 2019-08-29 NOTE — PLAN OF CARE
[facility] admissions ONLY: Please call extension [phone] upon patient arrival to floor for Hospital Medicine admit team assignment and for additional admit orders for the patient. Do not page the attending, staff physician or Advanced Practice Provider with the patient on arrival (may not be in-house at the time of arrival). Call back or wait to leave beeper number when prompted.     (Physician in Lead of Transfers) /Regional Referral Center Note    Patients name/MRN: Bay Ibarra/MRN [id number]     Referring Physician or Mid-Level provider giving report: Dr. Raquel Brand (Emergency Medicine)  Contact number: [phone]    Referral Facility: [facility] ER in [place], [place]    Date/Time of Acceptance: 8/29/2019 11:00 AM    : Farzana Carpenter MD     Accepting facility: [facility]    Consulting Physicians from [facility] involved in case:  Dr. 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 Pain like previous episodes of acute pancreatitis. Lipase was elevated at 469. LFTs unremarkable. Patient afebrile with normal WBC. CT scan of abdomen done in ED today shows enlarging pancreatic pseudocysts and sever inflammatory changes in pancreas consistent with severe acute pancreatitis. Request made for [facility] evaluation for management of pancreatitis and pseudocysts.   "Skyler from [facility] contacted and [facility] for transfer for [facility] evaluation. Patient given a total of 2 mg IV Dilaudid in ED at [place] and current pain level is 3/10 and patient comfortable according to referring physician.     VS: /75  Pulse 88   Temp 98.4 °F (36.9 °C) (Oral)   Resp 16   Ht 5' 7" (1.702 m)   Wt 95.3 kg (210 lb)   SpO2 96%   BMI 32.89 kg/m²    Past Medical/Surgical History: See EPIC    Meds: See EPIC    Labs: See EPIC    Imaging:   CT scan of abdomen: Extensive abnormal inflammatory fatty stranding surrounding the pancreas and adjacent structures consistent with extensive acute pancreatitis.  There are pancreatic calcifications consistent with chronic pancreatitis as well.  There is a lobular fluid collection within the pancreatic head 4.7 x 3.8 cm consistent with pseudocyst cannot exclude infection. Rim enhancing fluid collection abutting the caudate lobe of the liver that is 4.4 x 3.5 cm cannot exclude abscess. Portal vein thrombosis with cavernous transformation.    To Do List upon arrival:     NPO, IVFs and pain control to treat acute pancreatitis   Consult Advanced Endoscopy Service for evaluation of pancreatic pseudocysts    Farzana Carpenter MD  Senior Staff Physician  Department of Hospital Medicine  Patient Flow Center/   [phone]  "

## 2019-08-30 ENCOUNTER — HOSPITAL ENCOUNTER (INPATIENT)
Facility: HOSPITAL | Age: 39
LOS: 2 days | Discharge: HOME | DRG: 438 | End: 2019-09-01
Attending: INTERNAL MEDICINE | Admitting: INTERNAL MEDICINE
Payer: COMMERCIAL

## 2019-08-30 ENCOUNTER — TELEPHONE (OUTPATIENT)
Dept: ENDOSCOPY | Facility: HOSPITAL | Age: 39
End: 2019-08-30

## 2019-08-30 DIAGNOSIS — K85.90 ACUTE ON CHRONIC PANCREATITIS: ICD-10-CM

## 2019-08-30 DIAGNOSIS — Z79.4 TYPE 2 DIABETES MELLITUS WITH DIABETIC POLYNEUROPATHY, WITH LONG-TERM CURRENT USE OF INSULIN: ICD-10-CM

## 2019-08-30 DIAGNOSIS — E11.10 DIABETIC KETOACIDOSIS WITHOUT COMA ASSOCIATED WITH TYPE 2 DIABETES MELLITUS: ICD-10-CM

## 2019-08-30 DIAGNOSIS — K86.1 ACUTE ON CHRONIC PANCREATITIS: ICD-10-CM

## 2019-08-30 DIAGNOSIS — E11.42 TYPE 2 DIABETES MELLITUS WITH DIABETIC POLYNEUROPATHY, WITH LONG-TERM CURRENT USE OF INSULIN: ICD-10-CM

## 2019-08-30 DIAGNOSIS — K86.3 PANCREATIC PSEUDOCYST: Primary | ICD-10-CM

## 2019-08-30 DIAGNOSIS — K85.90 ACUTE PANCREATITIS: ICD-10-CM

## 2019-08-30 PROBLEM — E78.2 MIXED HYPERLIPIDEMIA: Status: ACTIVE | Noted: 2017-08-02

## 2019-08-30 PROBLEM — E11.9 TYPE 2 DIABETES MELLITUS: Status: ACTIVE | Noted: 2019-08-30

## 2019-08-30 PROBLEM — E11.65 TYPE 2 DIABETES MELLITUS WITH HYPERGLYCEMIA, WITH LONG-TERM CURRENT USE OF INSULIN: Status: ACTIVE | Noted: 2019-08-30

## 2019-08-30 LAB
ALBUMIN SERPL BCP-MCNC: 3.2 G/DL (ref 3.5–5.2)
ALLENS TEST: ABNORMAL
ALP SERPL-CCNC: 93 U/L (ref 55–135)
ALT SERPL W/O P-5'-P-CCNC: 25 U/L (ref 10–44)
ANION GAP SERPL CALC-SCNC: 13 MMOL/L (ref 8–16)
ANION GAP SERPL CALC-SCNC: 15 MMOL/L (ref 8–16)
ANION GAP SERPL CALC-SCNC: 19 MMOL/L (ref 8–16)
AST SERPL-CCNC: 22 U/L (ref 10–40)
B-OH-BUTYR BLD STRIP-SCNC: 5.6 MMOL/L (ref 0–0.5)
BASOPHILS # BLD AUTO: 0.06 K/UL (ref 0–0.2)
BASOPHILS NFR BLD: 0.8 % (ref 0–1.9)
BILIRUB SERPL-MCNC: 0.7 MG/DL (ref 0.1–1)
BUN SERPL-MCNC: 10 MG/DL (ref 6–20)
BUN SERPL-MCNC: 10 MG/DL (ref 6–20)
BUN SERPL-MCNC: 8 MG/DL (ref 6–20)
CALCIUM SERPL-MCNC: 8.8 MG/DL (ref 8.7–10.5)
CALCIUM SERPL-MCNC: 8.8 MG/DL (ref 8.7–10.5)
CALCIUM SERPL-MCNC: 9.2 MG/DL (ref 8.7–10.5)
CHLORIDE SERPL-SCNC: 104 MMOL/L (ref 95–110)
CHLORIDE SERPL-SCNC: 104 MMOL/L (ref 95–110)
CHLORIDE SERPL-SCNC: 105 MMOL/L (ref 95–110)
CHOLEST SERPL-MCNC: 108 MG/DL (ref 120–199)
CHOLEST/HDLC SERPL: 4.3 {RATIO} (ref 2–5)
CO2 SERPL-SCNC: 19 MMOL/L (ref 23–29)
CO2 SERPL-SCNC: 19 MMOL/L (ref 23–29)
CO2 SERPL-SCNC: 24 MMOL/L (ref 23–29)
CREAT SERPL-MCNC: 0.9 MG/DL (ref 0.5–1.4)
CREAT SERPL-MCNC: 1 MG/DL (ref 0.5–1.4)
CREAT SERPL-MCNC: 1.1 MG/DL (ref 0.5–1.4)
DELSYS: ABNORMAL
DIFFERENTIAL METHOD: ABNORMAL
EOSINOPHIL # BLD AUTO: 0.2 K/UL (ref 0–0.5)
EOSINOPHIL NFR BLD: 2.1 % (ref 0–8)
ERYTHROCYTE [DISTWIDTH] IN BLOOD BY AUTOMATED COUNT: 14.5 % (ref 11.5–14.5)
EST. GFR  (AFRICAN AMERICAN): >60 ML/MIN/1.73 M^2
EST. GFR  (NON AFRICAN AMERICAN): >60 ML/MIN/1.73 M^2
ESTIMATED AVG GLUCOSE: 324 MG/DL (ref 68–131)
FIO2: 21
GLUCOSE SERPL-MCNC: 241 MG/DL (ref 70–110)
GLUCOSE SERPL-MCNC: 261 MG/DL (ref 70–110)
GLUCOSE SERPL-MCNC: 272 MG/DL (ref 70–110)
HBA1C MFR BLD HPLC: 12.9 % (ref 4–5.6)
HCO3 UR-SCNC: 16.6 MMOL/L (ref 24–28)
HCT VFR BLD AUTO: 43.5 % (ref 40–54)
HDLC SERPL-MCNC: 25 MG/DL (ref 40–75)
HDLC SERPL: 23.1 % (ref 20–50)
HGB BLD-MCNC: 13.6 G/DL (ref 14–18)
IMM GRANULOCYTES # BLD AUTO: 0.03 K/UL (ref 0–0.04)
IMM GRANULOCYTES NFR BLD AUTO: 0.4 % (ref 0–0.5)
LACTATE SERPL-SCNC: 1 MMOL/L (ref 0.5–2.2)
LDLC SERPL CALC-MCNC: 62.6 MG/DL (ref 63–159)
LYMPHOCYTES # BLD AUTO: 0.6 K/UL (ref 1–4.8)
LYMPHOCYTES NFR BLD: 7.4 % (ref 18–48)
MCH RBC QN AUTO: 26.9 PG (ref 27–31)
MCHC RBC AUTO-ENTMCNC: 31.3 G/DL (ref 32–36)
MCV RBC AUTO: 86 FL (ref 82–98)
MODE: ABNORMAL
MONOCYTES # BLD AUTO: 0.9 K/UL (ref 0.3–1)
MONOCYTES NFR BLD: 11.9 % (ref 4–15)
NEUTROPHILS # BLD AUTO: 5.8 K/UL (ref 1.8–7.7)
NEUTROPHILS NFR BLD: 77.4 % (ref 38–73)
NONHDLC SERPL-MCNC: 83 MG/DL
NRBC BLD-RTO: 0 /100 WBC
PCO2 BLDA: 32.7 MMHG (ref 35–45)
PH SMN: 7.31 [PH] (ref 7.35–7.45)
PLATELET # BLD AUTO: 215 K/UL (ref 150–350)
PMV BLD AUTO: 10.8 FL (ref 9.2–12.9)
PO2 BLDA: 92 MMHG (ref 80–100)
POC BE: -10 MMOL/L
POC SATURATED O2: 96 % (ref 95–100)
POC TCO2: 18 MMOL/L (ref 23–27)
POCT GLUCOSE: 114 MG/DL (ref 70–110)
POCT GLUCOSE: 203 MG/DL (ref 70–110)
POCT GLUCOSE: 208 MG/DL (ref 70–110)
POCT GLUCOSE: 208 MG/DL (ref 70–110)
POCT GLUCOSE: 219 MG/DL (ref 70–110)
POCT GLUCOSE: 226 MG/DL (ref 70–110)
POCT GLUCOSE: 229 MG/DL (ref 70–110)
POCT GLUCOSE: 235 MG/DL (ref 70–110)
POCT GLUCOSE: 253 MG/DL (ref 70–110)
POCT GLUCOSE: 255 MG/DL (ref 70–110)
POCT GLUCOSE: 258 MG/DL (ref 70–110)
POCT GLUCOSE: 275 MG/DL (ref 70–110)
POCT GLUCOSE: 275 MG/DL (ref 70–110)
POCT GLUCOSE: 277 MG/DL (ref 70–110)
POCT GLUCOSE: 286 MG/DL (ref 70–110)
POTASSIUM SERPL-SCNC: 3.6 MMOL/L (ref 3.5–5.1)
POTASSIUM SERPL-SCNC: 3.8 MMOL/L (ref 3.5–5.1)
POTASSIUM SERPL-SCNC: 4.2 MMOL/L (ref 3.5–5.1)
PROT SERPL-MCNC: 7.2 G/DL (ref 6–8.4)
RBC # BLD AUTO: 5.05 M/UL (ref 4.6–6.2)
SAMPLE: ABNORMAL
SITE: ABNORMAL
SODIUM SERPL-SCNC: 139 MMOL/L (ref 136–145)
SODIUM SERPL-SCNC: 141 MMOL/L (ref 136–145)
SODIUM SERPL-SCNC: 142 MMOL/L (ref 136–145)
SP02: 99
TRIGL SERPL-MCNC: 102 MG/DL (ref 30–150)
WBC # BLD AUTO: 7.48 K/UL (ref 3.9–12.7)

## 2019-08-30 PROCEDURE — 82803 BLOOD GASES ANY COMBINATION: CPT

## 2019-08-30 PROCEDURE — 20600001 HC STEP DOWN PRIVATE ROOM

## 2019-08-30 PROCEDURE — 80048 BASIC METABOLIC PNL TOTAL CA: CPT

## 2019-08-30 PROCEDURE — 36600 WITHDRAWAL OF ARTERIAL BLOOD: CPT

## 2019-08-30 PROCEDURE — 63700000 PHARM REV CODE 250 ALT 637 W/O HCPCS: Performed by: STUDENT IN AN ORGANIZED HEALTH CARE EDUCATION/TRAINING PROGRAM

## 2019-08-30 PROCEDURE — 87040 BLOOD CULTURE FOR BACTERIA: CPT | Mod: 59

## 2019-08-30 PROCEDURE — 80061 LIPID PANEL: CPT

## 2019-08-30 PROCEDURE — 25000003 PHARM REV CODE 250: Performed by: STUDENT IN AN ORGANIZED HEALTH CARE EDUCATION/TRAINING PROGRAM

## 2019-08-30 PROCEDURE — 85025 COMPLETE CBC W/AUTO DIFF WBC: CPT

## 2019-08-30 PROCEDURE — 83036 HEMOGLOBIN GLYCOSYLATED A1C: CPT

## 2019-08-30 PROCEDURE — 63600175 PHARM REV CODE 636 W HCPCS: Performed by: STUDENT IN AN ORGANIZED HEALTH CARE EDUCATION/TRAINING PROGRAM

## 2019-08-30 PROCEDURE — 99223 1ST HOSP IP/OBS HIGH 75: CPT | Mod: ,,, | Performed by: INTERNAL MEDICINE

## 2019-08-30 PROCEDURE — 80321 ALCOHOLS BIOMARKERS 1OR 2: CPT

## 2019-08-30 PROCEDURE — 99900035 HC TECH TIME PER 15 MIN (STAT)

## 2019-08-30 PROCEDURE — 83605 ASSAY OF LACTIC ACID: CPT

## 2019-08-30 PROCEDURE — 82010 KETONE BODYS QUAN: CPT

## 2019-08-30 PROCEDURE — 80053 COMPREHEN METABOLIC PANEL: CPT

## 2019-08-30 PROCEDURE — 36415 COLL VENOUS BLD VENIPUNCTURE: CPT

## 2019-08-30 PROCEDURE — 99223 PR INITIAL HOSPITAL CARE,LEVL III: ICD-10-PCS | Mod: ,,, | Performed by: INTERNAL MEDICINE

## 2019-08-30 RX ORDER — IBUPROFEN 200 MG
16 TABLET ORAL
Status: DISCONTINUED | OUTPATIENT
Start: 2019-08-30 | End: 2019-09-01 | Stop reason: HOSPADM

## 2019-08-30 RX ORDER — INSULIN ASPART 100 [IU]/ML
16 INJECTION, SOLUTION INTRAVENOUS; SUBCUTANEOUS
Status: DISCONTINUED | OUTPATIENT
Start: 2019-08-30 | End: 2019-08-30

## 2019-08-30 RX ORDER — SODIUM CHLORIDE 0.9 % (FLUSH) 0.9 %
10 SYRINGE (ML) INJECTION
Status: DISCONTINUED | OUTPATIENT
Start: 2019-08-30 | End: 2019-09-01 | Stop reason: HOSPADM

## 2019-08-30 RX ORDER — ALLOPURINOL 100 MG/1
300 TABLET ORAL DAILY
Status: DISCONTINUED | OUTPATIENT
Start: 2019-08-30 | End: 2019-09-01 | Stop reason: HOSPADM

## 2019-08-30 RX ORDER — ATORVASTATIN CALCIUM 20 MG/1
40 TABLET, FILM COATED ORAL DAILY
Status: DISCONTINUED | OUTPATIENT
Start: 2019-08-30 | End: 2019-09-01 | Stop reason: HOSPADM

## 2019-08-30 RX ORDER — SODIUM CHLORIDE, SODIUM LACTATE, POTASSIUM CHLORIDE, CALCIUM CHLORIDE 600; 310; 30; 20 MG/100ML; MG/100ML; MG/100ML; MG/100ML
INJECTION, SOLUTION INTRAVENOUS CONTINUOUS
Status: CANCELLED | OUTPATIENT
Start: 2019-08-30

## 2019-08-30 RX ORDER — HYDROCODONE BITARTRATE AND ACETAMINOPHEN 5; 325 MG/1; MG/1
1 TABLET ORAL EVERY 4 HOURS PRN
Status: DISCONTINUED | OUTPATIENT
Start: 2019-08-30 | End: 2019-08-30

## 2019-08-30 RX ORDER — ENOXAPARIN SODIUM 100 MG/ML
40 INJECTION SUBCUTANEOUS EVERY 24 HOURS
Status: DISCONTINUED | OUTPATIENT
Start: 2019-08-30 | End: 2019-09-01 | Stop reason: HOSPADM

## 2019-08-30 RX ORDER — HYDROMORPHONE HYDROCHLORIDE 1 MG/ML
1 INJECTION, SOLUTION INTRAMUSCULAR; INTRAVENOUS; SUBCUTANEOUS EVERY 4 HOURS PRN
Status: DISCONTINUED | OUTPATIENT
Start: 2019-08-30 | End: 2019-09-01 | Stop reason: HOSPADM

## 2019-08-30 RX ORDER — METOCLOPRAMIDE 5 MG/1
10 TABLET ORAL EVERY 6 HOURS PRN
Status: DISCONTINUED | OUTPATIENT
Start: 2019-08-30 | End: 2019-08-30

## 2019-08-30 RX ORDER — OMEGA-3-ACID ETHYL ESTERS 1 G/1
2 CAPSULE, LIQUID FILLED ORAL 2 TIMES DAILY
Status: DISCONTINUED | OUTPATIENT
Start: 2019-08-30 | End: 2019-09-01 | Stop reason: HOSPADM

## 2019-08-30 RX ORDER — DEXTROSE MONOHYDRATE 100 MG/ML
1000 INJECTION, SOLUTION INTRAVENOUS
Status: DISCONTINUED | OUTPATIENT
Start: 2019-08-30 | End: 2019-09-01 | Stop reason: HOSPADM

## 2019-08-30 RX ORDER — GLUCAGON 1 MG
1 KIT INJECTION
Status: DISCONTINUED | OUTPATIENT
Start: 2019-08-30 | End: 2019-09-01 | Stop reason: HOSPADM

## 2019-08-30 RX ORDER — HYDROCODONE BITARTRATE AND ACETAMINOPHEN 5; 325 MG/1; MG/1
1 TABLET ORAL EVERY 8 HOURS PRN
Status: DISCONTINUED | OUTPATIENT
Start: 2019-08-30 | End: 2019-08-30

## 2019-08-30 RX ORDER — LOSARTAN POTASSIUM 50 MG/1
50 TABLET ORAL DAILY
Status: DISCONTINUED | OUTPATIENT
Start: 2019-08-30 | End: 2019-09-01 | Stop reason: HOSPADM

## 2019-08-30 RX ORDER — HYDROMORPHONE HYDROCHLORIDE 1 MG/ML
0.5 INJECTION, SOLUTION INTRAMUSCULAR; INTRAVENOUS; SUBCUTANEOUS EVERY 4 HOURS PRN
Status: DISCONTINUED | OUTPATIENT
Start: 2019-08-30 | End: 2019-08-30

## 2019-08-30 RX ORDER — HYDROCODONE BITARTRATE AND ACETAMINOPHEN 5; 325 MG/1; MG/1
1 TABLET ORAL EVERY 6 HOURS PRN
Status: DISCONTINUED | OUTPATIENT
Start: 2019-08-30 | End: 2019-08-30

## 2019-08-30 RX ORDER — IBUPROFEN 200 MG
24 TABLET ORAL
Status: DISCONTINUED | OUTPATIENT
Start: 2019-08-30 | End: 2019-09-01 | Stop reason: HOSPADM

## 2019-08-30 RX ORDER — ACETAMINOPHEN 325 MG/1
650 TABLET ORAL EVERY 6 HOURS PRN
Status: DISCONTINUED | OUTPATIENT
Start: 2019-08-30 | End: 2019-09-01 | Stop reason: HOSPADM

## 2019-08-30 RX ORDER — INSULIN ASPART 100 [IU]/ML
0-5 INJECTION, SOLUTION INTRAVENOUS; SUBCUTANEOUS
Status: DISCONTINUED | OUTPATIENT
Start: 2019-08-30 | End: 2019-08-30

## 2019-08-30 RX ORDER — FENOFIBRATE 160 MG/1
160 TABLET ORAL DAILY
Status: DISCONTINUED | OUTPATIENT
Start: 2019-08-30 | End: 2019-09-01 | Stop reason: HOSPADM

## 2019-08-30 RX ORDER — POTASSIUM CHLORIDE 7.45 MG/ML
20 INJECTION INTRAVENOUS
Status: CANCELLED | OUTPATIENT
Start: 2019-08-30

## 2019-08-30 RX ORDER — HEPARIN SODIUM 5000 [USP'U]/ML
5000 INJECTION, SOLUTION INTRAVENOUS; SUBCUTANEOUS EVERY 8 HOURS
Status: DISCONTINUED | OUTPATIENT
Start: 2019-08-30 | End: 2019-08-30

## 2019-08-30 RX ORDER — MULTIVIT WITH IRON,MINERALS
500 TABLET ORAL NIGHTLY
Status: DISCONTINUED | OUTPATIENT
Start: 2019-08-30 | End: 2019-08-31

## 2019-08-30 RX ORDER — PREGABALIN 150 MG/1
150 CAPSULE ORAL 2 TIMES DAILY
Status: DISCONTINUED | OUTPATIENT
Start: 2019-08-30 | End: 2019-09-01 | Stop reason: HOSPADM

## 2019-08-30 RX ORDER — HYDROCODONE BITARTRATE AND ACETAMINOPHEN 5; 325 MG/1; MG/1
1 TABLET ORAL EVERY 4 HOURS PRN
Status: DISCONTINUED | OUTPATIENT
Start: 2019-08-30 | End: 2019-09-01 | Stop reason: HOSPADM

## 2019-08-30 RX ORDER — ONDANSETRON 4 MG/1
4 TABLET, ORALLY DISINTEGRATING ORAL EVERY 6 HOURS PRN
Status: DISCONTINUED | OUTPATIENT
Start: 2019-08-30 | End: 2019-09-01 | Stop reason: HOSPADM

## 2019-08-30 RX ADMIN — PREGABALIN 150 MG: 150 CAPSULE ORAL at 09:08

## 2019-08-30 RX ADMIN — FENOFIBRATE 160 MG: 160 TABLET ORAL at 09:08

## 2019-08-30 RX ADMIN — OMEGA-3-ACID ETHYL ESTERS 2 G: 1 CAPSULE, LIQUID FILLED ORAL at 09:08

## 2019-08-30 RX ADMIN — HYDROCODONE BITARTRATE AND ACETAMINOPHEN 1 TABLET: 5; 325 TABLET ORAL at 06:08

## 2019-08-30 RX ADMIN — POTASSIUM CHLORIDE: 2 INJECTION, SOLUTION, CONCENTRATE INTRAVENOUS at 09:08

## 2019-08-30 RX ADMIN — HYDROCODONE BITARTRATE AND ACETAMINOPHEN 1 TABLET: 5; 325 TABLET ORAL at 09:08

## 2019-08-30 RX ADMIN — ATORVASTATIN CALCIUM 40 MG: 20 TABLET, FILM COATED ORAL at 09:08

## 2019-08-30 RX ADMIN — HYDROCODONE BITARTRATE AND ACETAMINOPHEN 1 TABLET: 5; 325 TABLET ORAL at 11:08

## 2019-08-30 RX ADMIN — Medication 500 MG: at 09:08

## 2019-08-30 RX ADMIN — LOSARTAN POTASSIUM 50 MG: 50 TABLET, FILM COATED ORAL at 09:08

## 2019-08-30 RX ADMIN — SODIUM CHLORIDE 1000 ML: 0.9 INJECTION, SOLUTION INTRAVENOUS at 09:08

## 2019-08-30 RX ADMIN — ENOXAPARIN SODIUM 40 MG: 100 INJECTION SUBCUTANEOUS at 05:08

## 2019-08-30 RX ADMIN — SODIUM CHLORIDE 2.3 UNITS/HR: 9 INJECTION, SOLUTION INTRAVENOUS at 05:08

## 2019-08-30 RX ADMIN — SODIUM CHLORIDE 2 UNITS/HR: 9 INJECTION, SOLUTION INTRAVENOUS at 12:08

## 2019-08-30 RX ADMIN — ALLOPURINOL 300 MG: 300 TABLET ORAL at 09:08

## 2019-08-30 NOTE — ASSESSMENT & PLAN NOTE
- Continued home fenofibrate 160 mg qd, niacin 500 mg qd, atorvastatin 40 mg qd, and omega-3 acid 2 g BID  - Lipid panel on 8/30 shows total cholesterol 108 and , which is the most controlled the patient has been. During previous pancreatitis episodes patient has had TG up to 3000

## 2019-08-30 NOTE — ASSESSMENT & PLAN NOTE
On 8/30 labs showed corrected anion gap 21 with blood glucose 241. K 3.9. Lactate 1.0.  - ABG ordered  - Insulin gtt starting at 2 units/hour and D5-1/2NS with 20mEq KCl at 250 mL/hour ordered considering patient's blood glucose was close to 200  - Blood glucose checks q1 hour and BMP checks q4 hours to monitor for improvement in anion gap  - Will transition to SC insulin and PO intake as appropriate but for now will keep NPO

## 2019-08-30 NOTE — ASSESSMENT & PLAN NOTE
40 yo M with chronic pancreatitis secondary to hypertriglyceridemia who was transferred from [facility] for acute pancreatitis. Pseudocysts noted on imaging but no need to intervene on them now. Recommend supportive care and conservative management for acute pancreatitis. We will follow-up with him in clinic to address pseudocysts. No evidence of infection as he is afebrile and has normal WBC with pain improving.    Recommendations:  - /cchr x 1 day  - Pain management  - Clear liquid diet and advance if tolerated

## 2019-08-30 NOTE — HPI
"This is a 38 yo M with chronic pancreatitis secondary to hypertriglyceridemia who was transferred from [place] ED for pancreatitis. Patient reports that four days ago he began having abdominal pain. At first he thought it was gas pain, but pain persisted and worsened. He was able to tolerate regular food and denied any nausea or vomiting. Hist last episode of pancreatitis was in May 2019. At that time there was concern for a possible stone but this ended up being a calcification that was seen on imaging. He did have an EUS at that time that showed findings consistent with pancreatitis and no stones. He began having pancreatitis episodes in 2013. He currently takes niacin and fenofibrate. At OSH ED he was found to have lipase of 469 with normal liver tests. CT AP obtained was notable for "extensive abnormal inflammatory fatty stranding surrounding the pancreas and adjacent structures consistent with extensive acute pancreatitis. There are pancreatic calcifications consistent with chronic pancreatitis as well. There is a lobular fluid collection within the pancreatic head consistent with pseudocyst cannot exclude infection." Patient reports his pain has overall improved and is at about a 4 now. Pain is mostly RUQ. He has been afebrile and does not have a leukocytosis.    "

## 2019-08-30 NOTE — CONSULTS
"[facility]  Gastroenterology  Consult Note    Patient Name: Bay Ibarra  MRN: [id number]  Admission Date: 8/30/2019  Hospital Length of Stay: 0 days  Code Status: Full Code   Attending Provider: Danielle Ni MD   Consulting Provider: Bharat Muller MD  Primary Care Physician: José Luis Singleton MD  Principal Problem:Acute on chronic pancreatitis    Inpatient consult to [facility]  Consult performed by: Bharat Muller MD  Consult ordered by: Eleazar Collins DO        Subjective:     HPI:  This is a 40 yo M with chronic pancreatitis secondary to hypertriglyceridemia who was transferred from [place] ED for pancreatitis. Patient reports that four days ago he began having abdominal pain. At first he thought it was gas pain, but pain persisted and worsened. He was able to tolerate regular food and denied any nausea or vomiting. Hist last episode of pancreatitis was in May 2019. At that time there was concern for a possible stone but this ended up being a calcification that was seen on imaging. He did have an EUS at that time that showed findings consistent with pancreatitis and no stones. He began having pancreatitis episodes in 2013. He currently takes niacin and fenofibrate. At OSH ED he was found to have lipase of 469 with normal liver tests. CT AP obtained was notable for "extensive abnormal inflammatory fatty stranding surrounding the pancreas and adjacent structures consistent with extensive acute pancreatitis. There are pancreatic calcifications consistent with chronic pancreatitis as well. There is a lobular fluid collection within the pancreatic head consistent with pseudocyst cannot exclude infection." Patient reports his pain has overall improved and is at about a 4 now. Pain is mostly RUQ. He has been afebrile and does not have a leukocytosis.      Past Medical History:   Diagnosis Date    Acute pancreatitis     Essential hypertension     Gout     " Hx of acute pancreatitis 3/3/2017    Hypertriglyceridemia 8/2/2017    Obesity (BMI 30-39.9) 6/5/2015    Obesity (BMI 35.0-39.9 without comorbidity) 6/5/2015    Obstructive sleep apnea syndrome 12/18/2015    Type 2 diabetes mellitus with diabetic polyneuropathy, with long-term current use of insulin 10/16/2017    Type 2 diabetes mellitus with hyperglycemia, with long-term current use of insulin 8/3/2017       Past Surgical History:   Procedure Laterality Date    ERCP (ENDOSCOPIC RETROGRADE CHOLANGIOPANCREATOGRAPHY) N/A 5/27/2019    Performed by Zafar Velazquez MD at [facility] ENDO (2ND FLR)    ULTRASOUND, UPPER GI TRACT, ENDOSCOPIC N/A 5/27/2019    Performed by Zafar Velazquez MD at [facility] ENDO (2ND FLR)       Review of patient's allergies indicates:  No Known Allergies  Family History     Problem Relation (Age of Onset)    Aneurysm Maternal Grandmother    Coronary artery disease Father    Diabetes type II Father, Paternal Grandmother    No Known Problems Sister, Brother        Tobacco Use    Smoking status: Never Smoker    Smokeless tobacco: Never Used   Substance and Sexual Activity    Alcohol use: No    Drug use: No    Sexual activity: Yes     Partners: Female     Review of Systems   Constitutional: Positive for activity change. Negative for chills and fever.   HENT: Negative for sore throat and trouble swallowing.    Respiratory: Negative for cough and shortness of breath.    Cardiovascular: Negative for chest pain and leg swelling.   Gastrointestinal: Positive for abdominal pain. Negative for abdominal distention, blood in stool, constipation, diarrhea, nausea and vomiting.   Genitourinary: Negative for decreased urine volume and difficulty urinating.   Musculoskeletal: Negative for arthralgias and back pain.   Skin: Negative for color change and pallor.   Neurological: Negative for dizziness and light-headedness.   Psychiatric/Behavioral: Negative for agitation and confusion.     Objective:     Vital Signs  (Most Recent):  Temp: 97.9 °F (36.6 °C) (08/30/19 0904)  Pulse: 90 (08/30/19 0904)  Resp: 18 (08/30/19 0904)  BP: (!) 117/57 (08/30/19 0904)  SpO2: 97 % (08/30/19 0904) Vital Signs (24h Range):  Temp:  [97.9 °F (36.6 °C)-98.2 °F (36.8 °C)] 97.9 °F (36.6 °C)  Pulse:  [80-96] 90  Resp:  [14-18] 18  SpO2:  [96 %-100 %] 97 %  BP: (117-139)/(57-87) 117/57        There is no height or weight on file to calculate BMI.    No intake or output data in the 24 hours ending 08/30/19 1012    Lines/Drains/Airways     Peripheral Intravenous Line                 Peripheral IV - Single Lumen 08/29/19 0628 20 G Right Forearm 1 day                Physical Exam   Constitutional: He is oriented to person, place, and time. He appears well-developed and well-nourished.   HENT:   Mouth/Throat: Oropharynx is clear and moist.   Eyes: No scleral icterus.   Cardiovascular: Normal rate and regular rhythm.   Pulmonary/Chest: Effort normal and breath sounds normal.   Abdominal: Soft. He exhibits no distension and no mass. There is no guarding.   +tenderness in RUQ   Musculoskeletal: He exhibits no edema or deformity.   Neurological: He is alert and oriented to person, place, and time.   Skin: Skin is warm and dry.   Psychiatric: He has a normal mood and affect. His behavior is normal.   Vitals reviewed.      Significant Labs:  CBC:   Recent Labs   Lab 08/29/19  0621 08/30/19  0754   WBC 11.77 7.48   HGB 15.0 13.6*   HCT 44.8 43.5    215     CMP:   Recent Labs   Lab 08/30/19  0753   *   CALCIUM 9.2   ALBUMIN 3.2*   PROT 7.2      K 4.2   CO2 19*      BUN 10   CREATININE 0.9   ALKPHOS 93   ALT 25   AST 22   BILITOT 0.7     Coagulation: No results for input(s): PT, INR, APTT in the last 48 hours.        Assessment/Plan:     * Acute on chronic pancreatitis  40 yo M with chronic pancreatitis secondary to hypertriglyceridemia who was transferred from [place] ED for acute pancreatitis. Pseudocysts noted on imaging but no need  to intervene on them now. Recommend supportive care and conservative management for acute pancreatitis. We will follow-up with him in clinic to address pseudocysts. No evidence of infection as he is afebrile and has normal WBC with pain improving.    Recommendations:  - /cchr x 1 day  - Pain management  - Clear liquid diet and advance if tolerated        Thank you for your consult. I will follow-up with patient. Please contact us if you have any additional questions.    Bharat Muller MD  Gastroenterology  [facility]

## 2019-08-30 NOTE — SUBJECTIVE & OBJECTIVE
Past Medical History:   Diagnosis Date    Acute pancreatitis     Essential hypertension     Gout     Hx of acute pancreatitis 3/3/2017    Hypertriglyceridemia 8/2/2017    Obesity (BMI 30-39.9) 6/5/2015    Obesity (BMI 35.0-39.9 without comorbidity) 6/5/2015    Obstructive sleep apnea syndrome 12/18/2015    Type 2 diabetes mellitus with diabetic polyneuropathy, with long-term current use of insulin 10/16/2017    Type 2 diabetes mellitus with hyperglycemia, with long-term current use of insulin 8/3/2017       Past Surgical History:   Procedure Laterality Date    ERCP (ENDOSCOPIC RETROGRADE CHOLANGIOPANCREATOGRAPHY) N/A 5/27/2019    Performed by Zafar Velazquez MD at [facility] ENDO (2ND FLR)    ULTRASOUND, UPPER GI TRACT, ENDOSCOPIC N/A 5/27/2019    Performed by [facility]quez MD at [facility] (2ND FLR)       Review of patient's allergies indicates:  No Known Allergies    Current Facility-Administered Medications on File Prior to Encounter   Medication    [COMPLETED] hydromorphone (PF) injection 1 mg    [COMPLETED] hydromorphone (PF) injection 1 mg    [COMPLETED] lactated ringers infusion    [COMPLETED] morphine injection 4 mg    [COMPLETED] piperacillin-tazobactam 4.5 g in dextrose 5 % 100 mL IVPB (ready to mix system)    [COMPLETED] pregabalin capsule 150 mg     Current Outpatient Medications on File Prior to Encounter   Medication Sig    allopurinol (ZYLOPRIM) 300 MG tablet TAKE 1 TABLET BY MOUTH ONCE DAILY    aspirin (ECOTRIN) 81 MG EC tablet Take 81 mg by mouth once daily.    atorvastatin (LIPITOR) 40 MG tablet Take 1 tablet (40 mg total) by mouth once daily.    fenofibrate 160 MG Tab TAKE 1 TABLET BY MOUTH ONCE DAILY    icosapent ethyl (VASCEPA) 1 gram Cap Take 2 g by mouth 2 (two) times daily.    insulin aspart U-100 (NOVOLOG FLEXPEN U-100 INSULIN) 100 unit/mL (3 mL) InPn pen INJECT 20 UNITS SUBCUTANEOUSLY WITH MEALS PLUS SLIDING SCALE 150-200+2, 201-250 +4, 251-300 +6, 301-350 +8,>350 +10. MAX  "OF 90 UNITS PER DA    insulin glargine (LANTUS SOLOSTAR) 100 unit/mL (3 mL) InPn pen Inject 52 Units into the skin every evening.    insulin syringe-needle U-100 29 gauge x 1/2" Syrg Inject 1 (once) per day    losartan (COZAAR) 100 MG tablet TAKE 1/2 (ONE-HALF) TABLET BY MOUTH ONCE DAILY    niacin 100 MG Tab Take 200 mg by mouth every evening.     pregabalin (LYRICA) 150 MG capsule TAKE 1 CAPSULE BY MOUTH TWICE DAILY    HYDROcodone-acetaminophen (NORCO) 5-325 mg per tablet Take 1 tablet by mouth every 6 (six) hours as needed for Pain.    metoclopramide HCl (REGLAN) 10 MG tablet Take 1 tablet (10 mg total) by mouth every 6 (six) hours as needed (nausea).    [DISCONTINUED] CONTOUR TEST STRIPS Strp     [DISCONTINUED] ULTRA THIN LANCETS 31 gauge Misc      Family History     Problem Relation (Age of Onset)    Aneurysm Maternal Grandmother    Coronary artery disease Father    Diabetes type II Father, Paternal Grandmother    No Known Problems Sister, Brother        Tobacco Use    Smoking status: Never Smoker    Smokeless tobacco: Never Used   Substance and Sexual Activity    Alcohol use: No    Drug use: No    Sexual activity: Yes     Partners: Female     Review of Systems   Constitutional: Negative for chills, fatigue and fever.   HENT: Negative for congestion and sore throat.    Eyes: Negative for visual disturbance.   Respiratory: Negative for cough and shortness of breath.    Cardiovascular: Positive for chest pain. Negative for leg swelling.   Gastrointestinal: Positive for abdominal pain. Negative for constipation, diarrhea, nausea and vomiting.   Genitourinary: Negative for difficulty urinating and dysuria.   Musculoskeletal: Negative for back pain and neck stiffness.   Skin: Negative for rash.   Neurological: Negative for dizziness, syncope and headaches.   Psychiatric/Behavioral: Negative for confusion and decreased concentration.     Objective:     Vital Signs (Most Recent):  Temp: 97.6 °F (36.4 °C) " (08/30/19 1138)  Pulse: 93 (08/30/19 1138)  Resp: 18 (08/30/19 1138)  BP: 136/76 (08/30/19 1138)  SpO2: 97 % (08/30/19 1138) Vital Signs (24h Range):  Temp:  [97.6 °F (36.4 °C)-98.2 °F (36.8 °C)] 97.6 °F (36.4 °C)  Pulse:  [80-93] 93  Resp:  [14-18] 18  SpO2:  [96 %-100 %] 97 %  BP: (117-139)/(57-83) 136/76        There is no height or weight on file to calculate BMI.    Physical Exam   Constitutional: He is oriented to person, place, and time. He appears well-developed and well-nourished. No distress.   HENT:   Head: Normocephalic and atraumatic.   Eyes: EOM are normal. No scleral icterus.   Neck: Normal range of motion. No JVD present. No tracheal deviation present.   Cardiovascular: Normal rate, regular rhythm and normal heart sounds.   No murmur heard.  Pulmonary/Chest: Effort normal. No respiratory distress. He has no wheezes. He has no rales.   Decreased breath sounds at RLL   Abdominal: Soft. Bowel sounds are normal. He exhibits no distension and no mass. There is tenderness (epigastric > RUQ). There is no rebound and no guarding.   Musculoskeletal: He exhibits no edema.   Neurological: He is alert and oriented to person, place, and time. No cranial nerve deficit or sensory deficit. He exhibits normal muscle tone.   Skin: Skin is warm and dry. Capillary refill takes less than 2 seconds. No rash noted. He is not diaphoretic. No erythema.   Psychiatric: He has a normal mood and affect. His behavior is normal.   Vitals reviewed.        CRANIAL NERVES     CN III, IV, VI   Extraocular motions are normal.        Significant Labs: All pertinent labs within the past 24 hours have been reviewed.  Recent Results (from the past 24 hour(s))   Comprehensive metabolic panel    Collection Time: 08/30/19  7:53 AM   Result Value Ref Range    Sodium 142 136 - 145 mmol/L    Potassium 4.2 3.5 - 5.1 mmol/L    Chloride 104 95 - 110 mmol/L    CO2 19 (L) 23 - 29 mmol/L    Glucose 241 (H) 70 - 110 mg/dL    BUN, Bld 10 6 - 20 mg/dL     Creatinine 0.9 0.5 - 1.4 mg/dL    Calcium 9.2 8.7 - 10.5 mg/dL    Total Protein 7.2 6.0 - 8.4 g/dL    Albumin 3.2 (L) 3.5 - 5.2 g/dL    Total Bilirubin 0.7 0.1 - 1.0 mg/dL    Alkaline Phosphatase 93 55 - 135 U/L    AST 22 10 - 40 U/L    ALT 25 10 - 44 U/L    Anion Gap 19 (H) 8 - 16 mmol/L    eGFR if African American >60.0 >60 mL/min/1.73 m^2    eGFR if non African American >60.0 >60 mL/min/1.73 m^2   Lipid panel    Collection Time: 08/30/19  7:53 AM   Result Value Ref Range    Cholesterol 108 (L) 120 - 199 mg/dL    Triglycerides 102 30 - 150 mg/dL    HDL 25 (L) 40 - 75 mg/dL    LDL Cholesterol 62.6 (L) 63.0 - 159.0 mg/dL    Hdl/Cholesterol Ratio 23.1 20.0 - 50.0 %    Total Cholesterol/HDL Ratio 4.3 2.0 - 5.0    Non-HDL Cholesterol 83 mg/dL   Hemoglobin A1c    Collection Time: 08/30/19  7:54 AM   Result Value Ref Range    Hemoglobin A1C 12.9 (H) 4.0 - 5.6 %    Estimated Avg Glucose 324 (H) 68 - 131 mg/dL   Lactic acid, plasma    Collection Time: 08/30/19  7:54 AM   Result Value Ref Range    Lactate (Lactic Acid) 1.0 0.5 - 2.2 mmol/L   Beta - Hydroxybutyrate, Serum    Collection Time: 08/30/19  7:54 AM   Result Value Ref Range    Beta-Hydroxybutyrate 5.6 (H) 0.0 - 0.5 mmol/L   CBC auto differential    Collection Time: 08/30/19  7:54 AM   Result Value Ref Range    WBC 7.48 3.90 - 12.70 K/uL    RBC 5.05 4.60 - 6.20 M/uL    Hemoglobin 13.6 (L) 14.0 - 18.0 g/dL    Hematocrit 43.5 40.0 - 54.0 %    Mean Corpuscular Volume 86 82 - 98 fL    Mean Corpuscular Hemoglobin 26.9 (L) 27.0 - 31.0 pg    Mean Corpuscular Hemoglobin Conc 31.3 (L) 32.0 - 36.0 g/dL    RDW 14.5 11.5 - 14.5 %    Platelets 215 150 - 350 K/uL    MPV 10.8 9.2 - 12.9 fL    Immature Granulocytes 0.4 0.0 - 0.5 %    Gran # (ANC) 5.8 1.8 - 7.7 K/uL    Immature Grans (Abs) 0.03 0.00 - 0.04 K/uL    Lymph # 0.6 (L) 1.0 - 4.8 K/uL    Mono # 0.9 0.3 - 1.0 K/uL    Eos # 0.2 0.0 - 0.5 K/uL    Baso # 0.06 0.00 - 0.20 K/uL    nRBC 0 0 /100 WBC    Gran% 77.4 (H) 38.0 - 73.0  %    Lymph% 7.4 (L) 18.0 - 48.0 %    Mono% 11.9 4.0 - 15.0 %    Eosinophil% 2.1 0.0 - 8.0 %    Basophil% 0.8 0.0 - 1.9 %    Differential Method Automated    POCT glucose    Collection Time: 08/30/19  8:57 AM   Result Value Ref Range    POCT Glucose 226 (H) 70 - 110 mg/dL   POCT glucose    Collection Time: 08/30/19 12:00 PM   Result Value Ref Range    POCT Glucose 255 (H) 70 - 110 mg/dL       Significant Imaging: I have reviewed all pertinent imaging results/findings within the past 24 hours.

## 2019-08-30 NOTE — ASSESSMENT & PLAN NOTE
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 EUS during that admission did not comment on whether he had varices. He was not anticoagulated at that time. CT on 8/29 notes portal vein thrombosis with cavernous transformation, but does not comment on SMV thrombosis.

## 2019-08-30 NOTE — PLAN OF CARE
08/30/19 1058   Discharge Assessment   Assessment Type Discharge Planning Assessment   Confirmed/corrected address and phone number on facesheet? Yes   Assessment information obtained from? Patient   Expected Length of Stay (days) 5   Communicated expected length of stay with patient/caregiver yes   Prior to hospitilization cognitive status: Alert/Oriented   Prior to hospitalization functional status: Independent   Current cognitive status: Alert/Oriented   Current Functional Status: Independent   Facility Arrived From: [facility]    Lives With alone   Able to Return to Prior Arrangements yes   Is patient able to care for self after discharge? Yes   Who are your caregiver(s) and their phone number(s)? Guero [phone]   Patient's perception of discharge disposition home or selfcare   Readmission Within the Last 30 Days no previous admission in last 30 days   Patient currently being followed by outpatient case management? No   Patient currently receives any other outside agency services? No   Equipment Currently Used at Home glucometer   Do you have any problems affording any of your prescribed medications? No   Is the patient taking medications as prescribed? yes   Does the patient have transportation home? Yes   Transportation Anticipated family or friend will provide   Does the patient receive services at the Coumadin Clinic? No   Discharge Plan A Home with family   Discharge Plan B Home with family   DME Needed Upon Discharge  none   Patient/Family in Agreement with Plan yes

## 2019-08-30 NOTE — PHARMACY MED REC
"Admission Medication Reconciliation - Pharmacy Consult Note    The home medication history was taken by Gabriella Houston, Pharmacy Technician.  Based on information gathered and subsequent review by the clinical pharmacist, the items below may need attention.     You may go to "Admission" then "Reconcile Home Medications" tabs to review and/or act upon these items.     Potentially problematic discrepancies with current MAR    o Patient IS NOT taking the following which was ordered upon admit  o Metoclopramide 10mg tablet Q6h PRN nausea     o Patient is taking a drug DIFFERENTLY than how ordered upon admit  o Patient reports taking Niacin 200mg every evening. Niacin 500mg nightly has been ordered.     Potential issues to be addressed PRIOR TO DISCHARGE  o The patient reports taking aspirin 81mg QD. Unclear indication for aspirin and has not been restarted inpatient. Upon discharge, evaluate need to continue aspirin 81mg.     Please address this information as you see fit.  Feel free to contact us if you have any questions or require assistance.    Pily Martinez  [phone]            .    .            "

## 2019-08-30 NOTE — NURSING
Report given to nurse all questions answered pt transfer to room 702 pt left off unit via wheelchair

## 2019-08-30 NOTE — H&P
[facility] Medicine  History & Physical    Patient Name: Bay Ibarra  MRN: [id number]  Admission Date: 8/30/2019  Attending Physician: Danielle Ni MD   Primary Care Provider: José Luis Singleton MD    [facility] Medicine Team: [facility] 2 Eleazar Collins DO     Patient information was obtained from patient, past medical records and ER records.     Subjective:     Principal Problem:Acute on chronic pancreatitis    Chief Complaint:   Chief Complaint   Patient presents with    Abdominal Pain        HPI: Bay Ibarra is a 38 yo male with chronic pancreatitis 2/2 hypertriglyceridemia with chronic pancreatic cysts and multiple portal venous system thromboses, IDDM2 a/w neuropathy, HTN, HLD, NAFLD, obesity, JAZMYN, and gout who was [place] [facility] [place] of a growing pseudocyst and questionable pancreatic head and hepatic abscesses. History was obtained from the patient and chart review.    Patient [facility] ED [facility] for right-sided chest pain that began on 8/25. 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 He describes that the pain was worse on exertion and movement and that it improved with rest. Chest X-ray, EKG, BNP and troponin at that time were negative and he was diagnosed with musculoskeletal chest pain. At that time his glucose was 337, corrected anion gap 12.8, T. Bili 1.3, Alk phos 134 and lipase 898. He continued to have the chest pain which began migrating more to his epigastrium and RUQ. He denied any fevers, chills, night sweats, congestion, cough, nausea, vomiting or diarrhea. He returned to the ED on 8/29 and a CT abd/pelvis w/ contrast showed extensive fat stranding surrounding the pancreas and pancreatic calcifications consistent with acute on chronic pancreatitis, fluid collections at pancreatic head and caudate lobe of liver, and cavernous transformation of pre-existing  portal vein thromboses. Labs showed lipase decreased to 469, consistently elevated blood glucose, corrected anion gap 16.3, T bili and Alk phos, and UA positive for 4+ glucose, 2+ ketones, 2+ bilirubin, trace blood. He was given 2 mg IV dilaudid and 1 dose of zosyn, no fluids, and was transferred to [facility]. Patient denies any recent illness, sick contacts, recent travel, post-prandial RUQ pain, new medications, or alcohol use.    His first episode of pancreatitis was in 2014 and he has had recurrent episodes since then, most recently in May 2019. All of his episodes have been associated with hypertriglyceridemia, no gallstones have been found. He takes niacin, fenofibrate, atorvastatin and vascepa. Patient denies alcohol use as an exacerbating factor. He has needed ICU admission and has had DKA associated with prior acute pancreatitis episodes. During his most recent episode, he had a CT abd/pelvis on 5/27/19 which showed complete occlusion of the SMV with nonocclusive thrombosis of the main portal vein with extension to the right and proximal left branches. He was not anticoagulated at that time.     Past Medical History:   Diagnosis Date    Acute pancreatitis     Essential hypertension     Gout     Hx of acute pancreatitis 3/3/2017    Hypertriglyceridemia 8/2/2017    Obesity (BMI 30-39.9) 6/5/2015    Obesity (BMI 35.0-39.9 without comorbidity) 6/5/2015    Obstructive sleep apnea syndrome 12/18/2015    Type 2 diabetes mellitus with diabetic polyneuropathy, with long-term current use of insulin 10/16/2017    Type 2 diabetes mellitus with hyperglycemia, with long-term current use of insulin 8/3/2017       Past Surgical History:   Procedure Laterality Date    ERCP (ENDOSCOPIC RETROGRADE CHOLANGIOPANCREATOGRAPHY) N/A 5/27/2019    Performed by Zafar Velazquez MD at [facility] ENDO (2ND FLR)    ULTRASOUND, UPPER GI TRACT, ENDOSCOPIC N/A 5/27/2019    Performed by Zafar Velazquez MD at [facility] ENDO (2ND FLR)       Review of  "patient's allergies indicates:  No Known Allergies    Current Facility-Administered Medications on File Prior to Encounter   Medication    [COMPLETED] hydromorphone (PF) injection 1 mg    [COMPLETED] hydromorphone (PF) injection 1 mg    [COMPLETED] lactated ringers infusion    [COMPLETED] morphine injection 4 mg    [COMPLETED] piperacillin-tazobactam 4.5 g in dextrose 5 % 100 mL IVPB (ready to mix system)    [COMPLETED] pregabalin capsule 150 mg     Current Outpatient Medications on File Prior to Encounter   Medication Sig    allopurinol (ZYLOPRIM) 300 MG tablet TAKE 1 TABLET BY MOUTH ONCE DAILY    aspirin (ECOTRIN) 81 MG EC tablet Take 81 mg by mouth once daily.    atorvastatin (LIPITOR) 40 MG tablet Take 1 tablet (40 mg total) by mouth once daily.    fenofibrate 160 MG Tab TAKE 1 TABLET BY MOUTH ONCE DAILY    icosapent ethyl (VASCEPA) 1 gram Cap Take 2 g by mouth 2 (two) times daily.    insulin aspart U-100 (NOVOLOG FLEXPEN U-100 INSULIN) 100 unit/mL (3 mL) InPn pen INJECT 20 UNITS SUBCUTANEOUSLY WITH MEALS PLUS SLIDING SCALE 150-200+2, 201-250 +4, 251-300 +6, 301-350 +8,>350 +10. MAX OF 90 UNITS PER DA    insulin glargine (LANTUS SOLOSTAR) 100 unit/mL (3 mL) InPn pen Inject 52 Units into the skin every evening.    insulin syringe-needle U-100 29 gauge x 1/2" Syrg Inject 1 (once) per day    losartan (COZAAR) 100 MG tablet TAKE 1/2 (ONE-HALF) TABLET BY MOUTH ONCE DAILY    niacin 100 MG Tab Take 200 mg by mouth every evening.     pregabalin (LYRICA) 150 MG capsule TAKE 1 CAPSULE BY MOUTH TWICE DAILY    HYDROcodone-acetaminophen (NORCO) 5-325 mg per tablet Take 1 tablet by mouth every 6 (six) hours as needed for Pain.    metoclopramide HCl (REGLAN) 10 MG tablet Take 1 tablet (10 mg total) by mouth every 6 (six) hours as needed (nausea).    [DISCONTINUED] CONTOUR TEST STRIPS Strp     [DISCONTINUED] ULTRA THIN LANCETS 31 gauge Misc      Family History     Problem Relation (Age of Onset)    Aneurysm " Maternal Grandmother    Coronary artery disease Father    Diabetes type II Father, Paternal Grandmother    No Known Problems Sister, Brother        Tobacco Use    Smoking status: Never Smoker    Smokeless tobacco: Never Used   Substance and Sexual Activity    Alcohol use: No    Drug use: No    Sexual activity: Yes     Partners: Female     Review of Systems   Constitutional: Negative for chills, fatigue and fever.   HENT: Negative for congestion and sore throat.    Eyes: Negative for visual disturbance.   Respiratory: Negative for cough and shortness of breath.    Cardiovascular: Positive for chest pain. Negative for leg swelling.   Gastrointestinal: Positive for abdominal pain. Negative for constipation, diarrhea, nausea and vomiting.   Genitourinary: Negative for difficulty urinating and dysuria.   Musculoskeletal: Negative for back pain and neck stiffness.   Skin: Negative for rash.   Neurological: Negative for dizziness, syncope and headaches.   Psychiatric/Behavioral: Negative for confusion and decreased concentration.     Objective:     Vital Signs (Most Recent):  Temp: 97.6 °F (36.4 °C) (08/30/19 1138)  Pulse: 93 (08/30/19 1138)  Resp: 18 (08/30/19 1138)  BP: 136/76 (08/30/19 1138)  SpO2: 97 % (08/30/19 1138) Vital Signs (24h Range):  Temp:  [97.6 °F (36.4 °C)-98.2 °F (36.8 °C)] 97.6 °F (36.4 °C)  Pulse:  [80-93] 93  Resp:  [14-18] 18  SpO2:  [96 %-100 %] 97 %  BP: (117-139)/(57-83) 136/76        There is no height or weight on file to calculate BMI.    Physical Exam   Constitutional: He is oriented to person, place, and time. He appears well-developed and well-nourished. No distress.   HENT:   Head: Normocephalic and atraumatic.   Eyes: EOM are normal. No scleral icterus.   Neck: Normal range of motion. No JVD present. No tracheal deviation present.   Cardiovascular: Normal rate, regular rhythm and normal heart sounds.   No murmur heard.  Pulmonary/Chest: Effort normal. No respiratory distress. He has no  wheezes. He has no rales.   Decreased breath sounds at RLL   Abdominal: Soft. Bowel sounds are normal. He exhibits no distension and no mass. There is tenderness (epigastric > RUQ). There is no rebound and no guarding.   Musculoskeletal: He exhibits no edema.   Neurological: He is alert and oriented to person, place, and time. No cranial nerve deficit or sensory deficit. He exhibits normal muscle tone.   Skin: Skin is warm and dry. Capillary refill takes less than 2 seconds. No rash noted. He is not diaphoretic. No erythema.   Psychiatric: He has a normal mood and affect. His behavior is normal.   Vitals reviewed.        CRANIAL NERVES     CN III, IV, VI   Extraocular motions are normal.        Significant Labs: All pertinent labs within the past 24 hours have been reviewed.  Recent Results (from the past 24 hour(s))   Comprehensive metabolic panel    Collection Time: 08/30/19  7:53 AM   Result Value Ref Range    Sodium 142 136 - 145 mmol/L    Potassium 4.2 3.5 - 5.1 mmol/L    Chloride 104 95 - 110 mmol/L    CO2 19 (L) 23 - 29 mmol/L    Glucose 241 (H) 70 - 110 mg/dL    BUN, Bld 10 6 - 20 mg/dL    Creatinine 0.9 0.5 - 1.4 mg/dL    Calcium 9.2 8.7 - 10.5 mg/dL    Total Protein 7.2 6.0 - 8.4 g/dL    Albumin 3.2 (L) 3.5 - 5.2 g/dL    Total Bilirubin 0.7 0.1 - 1.0 mg/dL    Alkaline Phosphatase 93 55 - 135 U/L    AST 22 10 - 40 U/L    ALT 25 10 - 44 U/L    Anion Gap 19 (H) 8 - 16 mmol/L    eGFR if African American >60.0 >60 mL/min/1.73 m^2    eGFR if non African American >60.0 >60 mL/min/1.73 m^2   Lipid panel    Collection Time: 08/30/19  7:53 AM   Result Value Ref Range    Cholesterol 108 (L) 120 - 199 mg/dL    Triglycerides 102 30 - 150 mg/dL    HDL 25 (L) 40 - 75 mg/dL    LDL Cholesterol 62.6 (L) 63.0 - 159.0 mg/dL    Hdl/Cholesterol Ratio 23.1 20.0 - 50.0 %    Total Cholesterol/HDL Ratio 4.3 2.0 - 5.0    Non-HDL Cholesterol 83 mg/dL   Hemoglobin A1c    Collection Time: 08/30/19  7:54 AM   Result Value Ref Range     Hemoglobin A1C 12.9 (H) 4.0 - 5.6 %    Estimated Avg Glucose 324 (H) 68 - 131 mg/dL   Lactic acid, plasma    Collection Time: 08/30/19  7:54 AM   Result Value Ref Range    Lactate (Lactic Acid) 1.0 0.5 - 2.2 mmol/L   Beta - Hydroxybutyrate, Serum    Collection Time: 08/30/19  7:54 AM   Result Value Ref Range    Beta-Hydroxybutyrate 5.6 (H) 0.0 - 0.5 mmol/L   CBC auto differential    Collection Time: 08/30/19  7:54 AM   Result Value Ref Range    WBC 7.48 3.90 - 12.70 K/uL    RBC 5.05 4.60 - 6.20 M/uL    Hemoglobin 13.6 (L) 14.0 - 18.0 g/dL    Hematocrit 43.5 40.0 - 54.0 %    Mean Corpuscular Volume 86 82 - 98 fL    Mean Corpuscular Hemoglobin 26.9 (L) 27.0 - 31.0 pg    Mean Corpuscular Hemoglobin Conc 31.3 (L) 32.0 - 36.0 g/dL    RDW 14.5 11.5 - 14.5 %    Platelets 215 150 - 350 K/uL    MPV 10.8 9.2 - 12.9 fL    Immature Granulocytes 0.4 0.0 - 0.5 %    Gran # (ANC) 5.8 1.8 - 7.7 K/uL    Immature Grans (Abs) 0.03 0.00 - 0.04 K/uL    Lymph # 0.6 (L) 1.0 - 4.8 K/uL    Mono # 0.9 0.3 - 1.0 K/uL    Eos # 0.2 0.0 - 0.5 K/uL    Baso # 0.06 0.00 - 0.20 K/uL    nRBC 0 0 /100 WBC    Gran% 77.4 (H) 38.0 - 73.0 %    Lymph% 7.4 (L) 18.0 - 48.0 %    Mono% 11.9 4.0 - 15.0 %    Eosinophil% 2.1 0.0 - 8.0 %    Basophil% 0.8 0.0 - 1.9 %    Differential Method Automated    POCT glucose    Collection Time: 08/30/19  8:57 AM   Result Value Ref Range    POCT Glucose 226 (H) 70 - 110 mg/dL   POCT glucose    Collection Time: 08/30/19 12:00 PM   Result Value Ref Range    POCT Glucose 255 (H) 70 - 110 mg/dL       Significant Imaging: I have reviewed all pertinent imaging results/findings within the past 24 hours.    Assessment/Plan:     * Acute on chronic pancreatitis  Patient has history of recurrent acute pancreatitis episodes 2/2 hypertriglyceridemia since 2014. Most recent episode thought initially to be from ampullary gallstone but was found to be a pancreatic calcification. CT abd/pelvis w/ contrast on 8/29 showed extensive fat stranding  surrounding the pancreas and pancreatic calcifications consistent with acute on chronic pancreatitis, fluid collections at pancreatic head and caudate lobe of liver, and cavernous transformation of pre-existing portal vein thromboses.  - Given 1L NS bolus initially, then received D5-1/2NS with 20mEq KCl with insulin gtt for DKA treatment  - PRN q4 hours pain control with tylenol, norco 5 and dilaudid 1 mg  - Lipid panel here is unremarkable with TGs 102 and total cholesterol 108, so acute pancreatitis 2/2 hypertriglyceridemia is less likely  - Abdominal U/S to evaluate for a gallstone that has passed, as his T bili and Alk Phos were elevated on 8/27 when patient was having his right-sided chest pain and have been decreasing since  - PETH to evaluate for chronic alcohol use    Diabetic ketoacidosis without coma associated with type 2 diabetes mellitus  On 8/30 labs showed corrected anion gap 21 with blood glucose 241. K 3.9. Lactate 1.0.  - ABG ordered  - Insulin gtt starting at 2 units/hour and D5-1/2NS with 20mEq KCl at 250 mL/hour ordered considering patient's blood glucose was close to 200  - Blood glucose checks q1 hour and BMP checks q4 hours to monitor for improvement in anion gap  - Will transition to SC insulin and PO intake as appropriate but for now will keep NPO      Pancreatic pseudocyst  CT abd/pelvis on 8/29 shows lobulated 4.7 x 3.8 cm fluid collection within the pancreatic head and a 4.4 x 3.5 cm rim enhancing fluid collection abutting the caudate lobe of the liver.  - [facility] consulted, they believe these are pseudocysts and do not believe they are abscesses which would require drainage. They recommended following up in clinic regarding monitoring the psuedocysts    Portal vein thrombosis  CT on 5/27/19 shows complete occlusion of the SMV with nonocclusive thrombosis of the main portal vein with extension to the right and proximal left branches, with multiple prominent collateral vessels noted. EUS  during that admission did not comment on whether he had varices. He was not anticoagulated at that time. CT on 8/29 notes portal vein thrombosis with cavernous transformation, but does not comment on SMV thrombosis.     Type 2 diabetes mellitus with diabetic polyneuropathy, with long-term current use of insulin  At home takes lantus 52 U qHS and novolog 20 U qAC  - Continued home pregabalin 150 mg BID  - A1c here 12.9  - Discontinued home insulin while patient is on insulin drip for DKA treatment  - Recent right lateral 2nd toe blister noted by PCP. Does not appear infected, very superficial, but will monitor and consult wound care if anything changes    Mixed hyperlipidemia  - Continued home fenofibrate 160 mg qd, niacin 500 mg qd, atorvastatin 40 mg qd, and omega-3 acid 2 g BID  - Lipid panel on 8/30 shows total cholesterol 108 and , which is the most controlled the patient has been. During previous pancreatitis episodes patient has had TG up to 3000    Obstructive sleep apnea syndrome  - Continued home CPAP      Gout  - Continued home allopurinol 300 mg qd      Obesity (BMI 30-39.9)  - PT/OT consulted  - Incentive spirometry ordered    Essential hypertension  - Continue home losartan 50 mg qd        VTE Risk Mitigation (From admission, onward)        Ordered     enoxaparin injection 40 mg  Daily      08/30/19 1112     IP VTE HIGH RISK PATIENT  Once      08/30/19 1112             Eleazar Collins DO  Department of Hospital Medicine   [facility]

## 2019-08-30 NOTE — TELEPHONE ENCOUNTER
----- Message from Bharat Muller MD sent at 8/30/2019  3:06 PM CDT -----  Francisco Oconnell,    Can we get Mr. Ibarra a clinic appt with Dr. Les Elena in about 3 weeks?    thanks

## 2019-08-30 NOTE — HPI
Bay Ibarra is a 38 yo male with chronic pancreatitis 2/2 hypertriglyceridemia with chronic pancreatic cysts and multiple portal venous system thromboses, IDDM2 a/w neuropathy, HTN, HLD, NAFLD, obesity, JAZMYN, and gout who was transferred to [facility] for evaluation of a growing pseudocyst and questionable pancreatic head and hepatic abscesses. History was obtained from the patient and chart review.    Patient initially presented to the [facility] ED on 8/27 for right-sided chest pain that began on 8/25. 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 He describes that the pain was worse on exertion and movement and that it improved with rest. Chest X-ray, EKG, BNP and troponin at that time were negative and he was diagnosed with musculoskeletal chest pain. At that time his glucose was 337, corrected anion gap 12.8, T. Bili 1.3, Alk phos 134 and lipase 898. He continued to have the chest pain which began migrating more to his epigastrium and RUQ. He denied any fevers, chills, night sweats, congestion, cough, nausea, vomiting or diarrhea. He returned to the ED on 8/29 and a CT abd/pelvis w/ contrast showed extensive fat stranding surrounding the pancreas and pancreatic calcifications consistent with acute on chronic pancreatitis, fluid collections at pancreatic head and caudate lobe of liver, and cavernous transformation of pre-existing portal vein thromboses. Labs showed lipase decreased to 469, consistently elevated blood glucose, corrected anion gap 16.3, T bili and Alk phos, and UA positive for 4+ glucose, 2+ ketones, 2+ bilirubin, trace blood. He was given 2 mg IV dilaudid and 1 dose of zosyn, no fluids, and was transferred to [facility]. Patient denies any recent illness, sick contacts, recent travel, post-prandial RUQ pain, new medications, or alcohol use.    His first episode of pancreatitis was in 2014 and he has had recurrent episodes since then, most recently in May 2019. All  of his episodes have been associated with hypertriglyceridemia, no gallstones have been found. He takes niacin, fenofibrate, atorvastatin and vascepa. Patient denies alcohol use as an exacerbating factor. He has needed ICU admission and has had DKA associated with prior acute pancreatitis episodes. During his most recent episode, he had a CT abd/pelvis on 5/27/19 which showed complete occlusion of the SMV with nonocclusive thrombosis of the main portal vein with extension to the right and proximal left branches. He was not anticoagulated at that time.

## 2019-08-30 NOTE — ASSESSMENT & PLAN NOTE
Patient has history of recurrent acute pancreatitis episodes 2/2 hypertriglyceridemia since 2014. Most recent episode thought initially to be from ampullary gallstone but was found to be a pancreatic calcification. CT abd/pelvis w/ contrast on 8/29 showed extensive fat stranding surrounding the pancreas and pancreatic calcifications consistent with acute on chronic pancreatitis, fluid collections at pancreatic head and caudate lobe of liver, and cavernous transformation of pre-existing portal vein thromboses.  - Given 1L NS bolus initially, then received D5-1/2NS with 20mEq KCl with insulin gtt for DKA treatment  - PRN q4 hours pain control with tylenol, norco 5 and dilaudid 1 mg  - Lipid panel here is unremarkable with TGs 102 and total cholesterol 108, so acute pancreatitis 2/2 hypertriglyceridemia is less likely  - Abdominal U/S to evaluate for a gallstone that has passed, as his T bili and Alk Phos were elevated on 8/27 when patient was having his right-sided chest pain and have been decreasing since  - PETH to evaluate for chronic alcohol use

## 2019-08-30 NOTE — ASSESSMENT & PLAN NOTE
At home takes lantus 52 U qHS and novolog 20 U qAC  - Continued home pregabalin 150 mg BID  - A1c here 12.9  - Discontinued home insulin while patient is on insulin drip for DKA treatment  - Recent right lateral 2nd toe blister noted by PCP. Does not appear infected, very superficial, but will monitor and consult wound care if anything changes

## 2019-08-30 NOTE — ASSESSMENT & PLAN NOTE
CT abd/pelvis on 8/29 shows lobulated 4.7 x 3.8 cm fluid collection within the pancreatic head and a 4.4 x 3.5 cm rim enhancing fluid collection abutting the caudate lobe of the liver.  - [facility] consulted, they believe these are pseudocysts and do not believe they are abscesses which would require drainage. They recommended following up in clinic regarding monitoring the psuedocysts

## 2019-08-30 NOTE — SUBJECTIVE & OBJECTIVE
Past Medical History:   Diagnosis Date    Acute pancreatitis     Essential hypertension     Gout     Hx of acute pancreatitis 3/3/2017    Hypertriglyceridemia 8/2/2017    Obesity (BMI 30-39.9) 6/5/2015    Obesity (BMI 35.0-39.9 without comorbidity) 6/5/2015    Obstructive sleep apnea syndrome 12/18/2015    Type 2 diabetes mellitus with diabetic polyneuropathy, with long-term current use of insulin 10/16/2017    Type 2 diabetes mellitus with hyperglycemia, with long-term current use of insulin 8/3/2017       Past Surgical History:   Procedure Laterality Date    ERCP (ENDOSCOPIC RETROGRADE CHOLANGIOPANCREATOGRAPHY) N/A 5/27/2019    Performed by Zafar Velazquez MD at [facility] ENDO (2ND FLR)    ULTRASOUND, UPPER GI TRACT, ENDOSCOPIC N/A 5/27/2019    Performed by Zafar Velazquez MD at [facility] ENDO (2ND FLR)       Review of patient's allergies indicates:  No Known Allergies  Family History     Problem Relation (Age of Onset)    Aneurysm Maternal Grandmother    Coronary artery disease Father    Diabetes type II Father, Paternal Grandmother    No Known Problems Sister, Brother        Tobacco Use    Smoking status: Never Smoker    Smokeless tobacco: Never Used   Substance and Sexual Activity    Alcohol use: No    Drug use: No    Sexual activity: Yes     Partners: Female     Review of Systems   Constitutional: Positive for activity change. Negative for chills and fever.   HENT: Negative for sore throat and trouble swallowing.    Respiratory: Negative for cough and shortness of breath.    Cardiovascular: Negative for chest pain and leg swelling.   Gastrointestinal: Positive for abdominal pain. Negative for abdominal distention, blood in stool, constipation, diarrhea, nausea and vomiting.   Genitourinary: Negative for decreased urine volume and difficulty urinating.   Musculoskeletal: Negative for arthralgias and back pain.   Skin: Negative for color change and pallor.   Neurological: Negative for dizziness and  light-headedness.   Psychiatric/Behavioral: Negative for agitation and confusion.     Objective:     Vital Signs (Most Recent):  Temp: 97.9 °F (36.6 °C) (08/30/19 0904)  Pulse: 90 (08/30/19 0904)  Resp: 18 (08/30/19 0904)  BP: (!) 117/57 (08/30/19 0904)  SpO2: 97 % (08/30/19 0904) Vital Signs (24h Range):  Temp:  [97.9 °F (36.6 °C)-98.2 °F (36.8 °C)] 97.9 °F (36.6 °C)  Pulse:  [80-96] 90  Resp:  [14-18] 18  SpO2:  [96 %-100 %] 97 %  BP: (117-139)/(57-87) 117/57        There is no height or weight on file to calculate BMI.    No intake or output data in the 24 hours ending 08/30/19 1012    Lines/Drains/Airways     Peripheral Intravenous Line                 Peripheral IV - Single Lumen 08/29/19 0628 20 G Right Forearm 1 day                Physical Exam   Constitutional: He is oriented to person, place, and time. He appears well-developed and well-nourished.   HENT:   Mouth/Throat: Oropharynx is clear and moist.   Eyes: No scleral icterus.   Cardiovascular: Normal rate and regular rhythm.   Pulmonary/Chest: Effort normal and breath sounds normal.   Abdominal: Soft. He exhibits no distension and no mass. There is no guarding.   +tenderness in RUQ   Musculoskeletal: He exhibits no edema or deformity.   Neurological: He is alert and oriented to person, place, and time.   Skin: Skin is warm and dry.   Psychiatric: He has a normal mood and affect. His behavior is normal.   Vitals reviewed.      Significant Labs:  CBC:   Recent Labs   Lab 08/29/19  0621 08/30/19  0754   WBC 11.77 7.48   HGB 15.0 13.6*   HCT 44.8 43.5    215     CMP:   Recent Labs   Lab 08/30/19  0753   *   CALCIUM 9.2   ALBUMIN 3.2*   PROT 7.2      K 4.2   CO2 19*      BUN 10   CREATININE 0.9   ALKPHOS 93   ALT 25   AST 22   BILITOT 0.7     Coagulation: No results for input(s): PT, INR, APTT in the last 48 hours.

## 2019-08-31 LAB
ANION GAP SERPL CALC-SCNC: 13 MMOL/L (ref 8–16)
ANION GAP SERPL CALC-SCNC: 8 MMOL/L (ref 8–16)
BASOPHILS # BLD AUTO: 0.04 K/UL (ref 0–0.2)
BASOPHILS NFR BLD: 0.8 % (ref 0–1.9)
BUN SERPL-MCNC: 7 MG/DL (ref 6–20)
BUN SERPL-MCNC: 8 MG/DL (ref 6–20)
CALCIUM SERPL-MCNC: 8.5 MG/DL (ref 8.7–10.5)
CALCIUM SERPL-MCNC: 8.6 MG/DL (ref 8.7–10.5)
CHLORIDE SERPL-SCNC: 102 MMOL/L (ref 95–110)
CHLORIDE SERPL-SCNC: 104 MMOL/L (ref 95–110)
CO2 SERPL-SCNC: 21 MMOL/L (ref 23–29)
CO2 SERPL-SCNC: 26 MMOL/L (ref 23–29)
CREAT SERPL-MCNC: 0.9 MG/DL (ref 0.5–1.4)
CREAT SERPL-MCNC: 0.9 MG/DL (ref 0.5–1.4)
DIFFERENTIAL METHOD: ABNORMAL
EOSINOPHIL # BLD AUTO: 0.1 K/UL (ref 0–0.5)
EOSINOPHIL NFR BLD: 2.6 % (ref 0–8)
ERYTHROCYTE [DISTWIDTH] IN BLOOD BY AUTOMATED COUNT: 14.4 % (ref 11.5–14.5)
EST. GFR  (AFRICAN AMERICAN): >60 ML/MIN/1.73 M^2
EST. GFR  (AFRICAN AMERICAN): >60 ML/MIN/1.73 M^2
EST. GFR  (NON AFRICAN AMERICAN): >60 ML/MIN/1.73 M^2
EST. GFR  (NON AFRICAN AMERICAN): >60 ML/MIN/1.73 M^2
GLUCOSE SERPL-MCNC: 165 MG/DL (ref 70–110)
GLUCOSE SERPL-MCNC: 219 MG/DL (ref 70–110)
HCT VFR BLD AUTO: 41.5 % (ref 40–54)
HGB BLD-MCNC: 12.7 G/DL (ref 14–18)
IMM GRANULOCYTES # BLD AUTO: 0.02 K/UL (ref 0–0.04)
IMM GRANULOCYTES NFR BLD AUTO: 0.4 % (ref 0–0.5)
LYMPHOCYTES # BLD AUTO: 0.6 K/UL (ref 1–4.8)
LYMPHOCYTES NFR BLD: 10.5 % (ref 18–48)
MCH RBC QN AUTO: 26.1 PG (ref 27–31)
MCHC RBC AUTO-ENTMCNC: 30.6 G/DL (ref 32–36)
MCV RBC AUTO: 85 FL (ref 82–98)
MONOCYTES # BLD AUTO: 0.8 K/UL (ref 0.3–1)
MONOCYTES NFR BLD: 15.4 % (ref 4–15)
NEUTROPHILS # BLD AUTO: 3.7 K/UL (ref 1.8–7.7)
NEUTROPHILS NFR BLD: 70.3 % (ref 38–73)
NRBC BLD-RTO: 0 /100 WBC
PLATELET # BLD AUTO: 198 K/UL (ref 150–350)
PMV BLD AUTO: 11.2 FL (ref 9.2–12.9)
POCT GLUCOSE: 146 MG/DL (ref 70–110)
POCT GLUCOSE: 158 MG/DL (ref 70–110)
POCT GLUCOSE: 167 MG/DL (ref 70–110)
POCT GLUCOSE: 169 MG/DL (ref 70–110)
POCT GLUCOSE: 173 MG/DL (ref 70–110)
POCT GLUCOSE: 198 MG/DL (ref 70–110)
POCT GLUCOSE: 199 MG/DL (ref 70–110)
POCT GLUCOSE: 202 MG/DL (ref 70–110)
POCT GLUCOSE: 222 MG/DL (ref 70–110)
POCT GLUCOSE: 239 MG/DL (ref 70–110)
POCT GLUCOSE: 247 MG/DL (ref 70–110)
POTASSIUM SERPL-SCNC: 3.7 MMOL/L (ref 3.5–5.1)
POTASSIUM SERPL-SCNC: 3.9 MMOL/L (ref 3.5–5.1)
RBC # BLD AUTO: 4.86 M/UL (ref 4.6–6.2)
SODIUM SERPL-SCNC: 136 MMOL/L (ref 136–145)
SODIUM SERPL-SCNC: 138 MMOL/L (ref 136–145)
WBC # BLD AUTO: 5.31 K/UL (ref 3.9–12.7)

## 2019-08-31 PROCEDURE — 25000003 PHARM REV CODE 250: Performed by: STUDENT IN AN ORGANIZED HEALTH CARE EDUCATION/TRAINING PROGRAM

## 2019-08-31 PROCEDURE — 99900035 HC TECH TIME PER 15 MIN (STAT)

## 2019-08-31 PROCEDURE — 99232 PR SUBSEQUENT HOSPITAL CARE,LEVL II: ICD-10-PCS | Mod: ,,, | Performed by: INTERNAL MEDICINE

## 2019-08-31 PROCEDURE — 80048 BASIC METABOLIC PNL TOTAL CA: CPT

## 2019-08-31 PROCEDURE — S5571 INSULIN DISPOS PEN 3 ML: HCPCS | Performed by: STUDENT IN AN ORGANIZED HEALTH CARE EDUCATION/TRAINING PROGRAM

## 2019-08-31 PROCEDURE — 63600175 PHARM REV CODE 636 W HCPCS: Performed by: STUDENT IN AN ORGANIZED HEALTH CARE EDUCATION/TRAINING PROGRAM

## 2019-08-31 PROCEDURE — 85025 COMPLETE CBC W/AUTO DIFF WBC: CPT

## 2019-08-31 PROCEDURE — 36415 COLL VENOUS BLD VENIPUNCTURE: CPT

## 2019-08-31 PROCEDURE — 94660 CPAP INITIATION&MGMT: CPT

## 2019-08-31 PROCEDURE — 63700000 PHARM REV CODE 250 ALT 637 W/O HCPCS: Performed by: STUDENT IN AN ORGANIZED HEALTH CARE EDUCATION/TRAINING PROGRAM

## 2019-08-31 PROCEDURE — 94761 N-INVAS EAR/PLS OXIMETRY MLT: CPT

## 2019-08-31 PROCEDURE — S5010 5% DEXTROSE AND 0.45% SALINE: HCPCS | Performed by: STUDENT IN AN ORGANIZED HEALTH CARE EDUCATION/TRAINING PROGRAM

## 2019-08-31 PROCEDURE — 99232 SBSQ HOSP IP/OBS MODERATE 35: CPT | Mod: ,,, | Performed by: INTERNAL MEDICINE

## 2019-08-31 PROCEDURE — 20600001 HC STEP DOWN PRIVATE ROOM

## 2019-08-31 RX ORDER — ASPIRIN 81 MG/1
81 TABLET ORAL DAILY
Status: DISCONTINUED | OUTPATIENT
Start: 2019-09-01 | End: 2019-09-01 | Stop reason: HOSPADM

## 2019-08-31 RX ORDER — INSULIN ASPART 100 [IU]/ML
8 INJECTION, SOLUTION INTRAVENOUS; SUBCUTANEOUS
Status: DISCONTINUED | OUTPATIENT
Start: 2019-08-31 | End: 2019-08-31

## 2019-08-31 RX ORDER — INSULIN ASPART 100 [IU]/ML
1-10 INJECTION, SOLUTION INTRAVENOUS; SUBCUTANEOUS
Status: DISCONTINUED | OUTPATIENT
Start: 2019-08-31 | End: 2019-09-01 | Stop reason: HOSPADM

## 2019-08-31 RX ORDER — SODIUM CHLORIDE, SODIUM LACTATE, POTASSIUM CHLORIDE, CALCIUM CHLORIDE 600; 310; 30; 20 MG/100ML; MG/100ML; MG/100ML; MG/100ML
INJECTION, SOLUTION INTRAVENOUS CONTINUOUS
Status: DISCONTINUED | OUTPATIENT
Start: 2019-08-31 | End: 2019-09-01

## 2019-08-31 RX ORDER — MULTIVIT WITH IRON,MINERALS
200 TABLET ORAL NIGHTLY
Status: DISCONTINUED | OUTPATIENT
Start: 2019-08-31 | End: 2019-09-01 | Stop reason: HOSPADM

## 2019-08-31 RX ORDER — INSULIN ASPART 100 [IU]/ML
10 INJECTION, SOLUTION INTRAVENOUS; SUBCUTANEOUS
Status: DISCONTINUED | OUTPATIENT
Start: 2019-08-31 | End: 2019-09-01 | Stop reason: HOSPADM

## 2019-08-31 RX ADMIN — ATORVASTATIN CALCIUM 40 MG: 20 TABLET, FILM COATED ORAL at 08:08

## 2019-08-31 RX ADMIN — POTASSIUM CHLORIDE: 2 INJECTION, SOLUTION, CONCENTRATE INTRAVENOUS at 02:08

## 2019-08-31 RX ADMIN — HYDROMORPHONE HYDROCHLORIDE 1 MG: 1 INJECTION, SOLUTION INTRAMUSCULAR; INTRAVENOUS; SUBCUTANEOUS at 12:08

## 2019-08-31 RX ADMIN — Medication 200 MG: at 09:08

## 2019-08-31 RX ADMIN — INSULIN DETEMIR 20 UNITS: 100 INJECTION, SOLUTION SUBCUTANEOUS at 09:08

## 2019-08-31 RX ADMIN — POTASSIUM CHLORIDE: 149 INJECTION, SOLUTION, CONCENTRATE INTRAVENOUS at 06:08

## 2019-08-31 RX ADMIN — INSULIN ASPART 8 UNITS: 100 INJECTION, SOLUTION INTRAVENOUS; SUBCUTANEOUS at 12:08

## 2019-08-31 RX ADMIN — ALLOPURINOL 300 MG: 300 TABLET ORAL at 08:08

## 2019-08-31 RX ADMIN — ENOXAPARIN SODIUM 40 MG: 100 INJECTION SUBCUTANEOUS at 04:08

## 2019-08-31 RX ADMIN — INSULIN ASPART 10 UNITS: 100 INJECTION, SOLUTION INTRAVENOUS; SUBCUTANEOUS at 04:08

## 2019-08-31 RX ADMIN — OMEGA-3-ACID ETHYL ESTERS 2 G: 1 CAPSULE, LIQUID FILLED ORAL at 08:08

## 2019-08-31 RX ADMIN — LOSARTAN POTASSIUM 50 MG: 50 TABLET, FILM COATED ORAL at 08:08

## 2019-08-31 RX ADMIN — PREGABALIN 150 MG: 150 CAPSULE ORAL at 09:08

## 2019-08-31 RX ADMIN — SODIUM CHLORIDE, SODIUM LACTATE, POTASSIUM CHLORIDE, AND CALCIUM CHLORIDE: .6; .31; .03; .02 INJECTION, SOLUTION INTRAVENOUS at 12:08

## 2019-08-31 RX ADMIN — HYDROMORPHONE HYDROCHLORIDE 1 MG: 1 INJECTION, SOLUTION INTRAMUSCULAR; INTRAVENOUS; SUBCUTANEOUS at 11:08

## 2019-08-31 RX ADMIN — OMEGA-3-ACID ETHYL ESTERS 2 G: 1 CAPSULE, LIQUID FILLED ORAL at 09:08

## 2019-08-31 RX ADMIN — INSULIN DETEMIR 20 UNITS: 100 INJECTION, SOLUTION SUBCUTANEOUS at 11:08

## 2019-08-31 RX ADMIN — PREGABALIN 150 MG: 150 CAPSULE ORAL at 08:08

## 2019-08-31 RX ADMIN — SODIUM CHLORIDE, SODIUM LACTATE, POTASSIUM CHLORIDE, AND CALCIUM CHLORIDE: .6; .31; .03; .02 INJECTION, SOLUTION INTRAVENOUS at 11:08

## 2019-08-31 RX ADMIN — ACETAMINOPHEN 650 MG: 325 TABLET ORAL at 08:08

## 2019-08-31 RX ADMIN — FENOFIBRATE 160 MG: 160 TABLET ORAL at 08:08

## 2019-08-31 RX ADMIN — INSULIN ASPART 4 UNITS: 100 INJECTION, SOLUTION INTRAVENOUS; SUBCUTANEOUS at 12:08

## 2019-08-31 NOTE — ASSESSMENT & PLAN NOTE
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 EUS during that admission did not comment on whether he had varices. He was not anticoagulated at that time. CT on 8/29 notes portal vein thrombosis with cavernous transformation, but does not comment on SMV thrombosis.   - Can follow up outpatient with GI regarding whether he would need anticoagulation considering the chronic nature

## 2019-08-31 NOTE — PROGRESS NOTES
[facility] Hospital Medicine  Progress Note    Patient Name: Bay Ibarra  MRN: [id number]  Patient Class: IP- Inpatient   Admission Date: 8/30/2019  Length of Stay: 1 days  Attending Physician: Danielle Ni MD  Primary Care Provider: José Luis Singleton MD    [facility] Team: [facility] HOSP MED 2 Eleazar Collins DO    Subjective:     Principal Problem:Acute on chronic pancreatitis        HPI:  Bay Ibarra is a 40 yo male with chronic pancreatitis 2/2 hypertriglyceridemia with chronic pancreatic cysts and multiple portal venous system thromboses, IDDM2 a/w neuropathy, HTN, HLD, NAFLD, obesity, JAZMYN, and gout who was transferred to [facility] for evaluation of a growing pseudocyst and questionable pancreatic head and hepatic abscesses. History was obtained from the patient and chart review.    Patient initially presented to the [facility] ED on 8/27 for right-sided chest pain that began on 8/25. 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 He describes that the pain was worse on exertion and movement and that it improved with rest. Chest X-ray, EKG, BNP and troponin at that time were negative and he was diagnosed with musculoskeletal chest pain. At that time his glucose was 337, corrected anion gap 12.8, T. Bili 1.3, Alk phos 134 and lipase 898. He continued to have the chest pain which began migrating more to his epigastrium and RUQ. He denied any fevers, chills, night sweats, congestion, cough, nausea, vomiting or diarrhea. He returned to the ED on 8/29 and a CT abd/pelvis w/ contrast showed extensive fat stranding surrounding the pancreas and pancreatic calcifications consistent with acute on chronic pancreatitis, fluid collections at pancreatic head and caudate lobe of liver, and cavernous transformation of pre-existing portal vein thromboses. Labs showed lipase decreased to 469, consistently elevated blood glucose, corrected anion gap 16.3, T  bili and Alk phos, and UA positive for 4+ glucose, 2+ ketones, 2+ bilirubin, trace blood. He was given 2 mg IV dilaudid and 1 dose of zosyn, no fluids, and was transferred to [facility]. Patient denies any recent illness, sick contacts, recent travel, post-prandial RUQ pain, new medications, or alcohol use.    His first episode of pancreatitis was in 2014 and he has had recurrent episodes since then, most recently in May 2019. All of his episodes have been associated with hypertriglyceridemia, no gallstones have been found. He takes niacin, fenofibrate, atorvastatin and vascepa. Patient denies alcohol use as an exacerbating factor. He has needed ICU admission and has had DKA associated with prior acute pancreatitis episodes. During his most recent episode, he had a CT abd/pelvis on 5/27/19 which showed complete occlusion of the SMV with nonocclusive thrombosis of the main portal vein with extension to the right and proximal left branches. He was not anticoagulated at that time.     Overview/Hospital Course:  Labs on 8/30 consistent with DKA, gave IVF and insulin gtt. Abdominal U/S negative for gallstones. [facility] consulted, they recommended to treat the acute pancreatitis and to follow up the pseudocysts and thromboses outpatient, and they were not concerned for infection. Patient weaned off insulin gtt 8/31 and diet advanced, IVF continued for acute pancreatitis.     Interval History: Patient appears well today. He states his pain is much improved and states he is hungry. No nausea or vomiting.    Review of Systems   Constitutional: Negative for chills, fatigue and fever.   HENT: Negative for congestion and sore throat.    Eyes: Negative for visual disturbance.   Respiratory: Negative for cough and shortness of breath.    Cardiovascular: Negative for chest pain and leg swelling.   Gastrointestinal: Positive for abdominal pain (improved). Negative for constipation, diarrhea, nausea and vomiting.   Genitourinary: Negative for  difficulty urinating and dysuria.   Musculoskeletal: Negative for back pain and neck stiffness.   Skin: Negative for rash.   Neurological: Negative for dizziness, syncope and headaches.   Psychiatric/Behavioral: Negative for confusion and decreased concentration.     Objective:     Vital Signs (Most Recent):  Temp: 97.5 °F (36.4 °C) (08/31/19 1725)  Pulse: 83 (08/31/19 1725)  Resp: 18 (08/31/19 1725)  BP: 134/68 (08/31/19 1725)  SpO2: 99 % (08/31/19 1725) Vital Signs (24h Range):  Temp:  [96.7 °F (35.9 °C)-98.4 °F (36.9 °C)] 97.5 °F (36.4 °C)  Pulse:  [76-86] 83  Resp:  [18] 18  SpO2:  [95 %-99 %] 99 %  BP: (126-149)/(62-74) 134/68     Weight: 95.3 kg (210 lb 1.6 oz)  Body mass index is 32.98 kg/m².    Intake/Output Summary (Last 24 hours) at 8/31/2019 1747  Last data filed at 8/31/2019 0400  Gross per 24 hour   Intake 1581.92 ml   Output 1000 ml   Net 581.92 ml      Physical Exam   Constitutional: He is oriented to person, place, and time. He appears well-developed and well-nourished. No distress.   HENT:   Head: Normocephalic and atraumatic.   Eyes: EOM are normal. No scleral icterus.   Neck: Normal range of motion. No JVD present. No tracheal deviation present.   Cardiovascular: Normal rate, regular rhythm and normal heart sounds.   No murmur heard.  Pulmonary/Chest: Effort normal. No respiratory distress. He has no wheezes. He has no rales.   Decreased breath sounds at RLL   Abdominal: Soft. Bowel sounds are normal. He exhibits no distension and no mass. There is tenderness (epigastric > RUQ). There is no rebound and no guarding.   Musculoskeletal: He exhibits no edema.   Neurological: He is alert and oriented to person, place, and time. No cranial nerve deficit or sensory deficit. He exhibits normal muscle tone.   Skin: Skin is warm and dry. Capillary refill takes less than 2 seconds. No rash noted. He is not diaphoretic. No erythema.   Psychiatric: He has a normal mood and affect. His behavior is normal.    Vitals reviewed.      Significant Labs: All pertinent labs within the past 24 hours have been reviewed.  Recent Results (from the past 24 hour(s))   POCT glucose    Collection Time: 08/30/19  6:27 PM   Result Value Ref Range    POCT Glucose 275 (H) 70 - 110 mg/dL   POCT glucose    Collection Time: 08/30/19  7:41 PM   Result Value Ref Range    POCT Glucose 286 (H) 70 - 110 mg/dL   Basic metabolic panel    Collection Time: 08/30/19  7:57 PM   Result Value Ref Range    Sodium 141 136 - 145 mmol/L    Potassium 3.6 3.5 - 5.1 mmol/L    Chloride 104 95 - 110 mmol/L    CO2 24 23 - 29 mmol/L    Glucose 272 (H) 70 - 110 mg/dL    BUN, Bld 8 6 - 20 mg/dL    Creatinine 1.1 0.5 - 1.4 mg/dL    Calcium 8.8 8.7 - 10.5 mg/dL    Anion Gap 13 8 - 16 mmol/L    eGFR if African American >60.0 >60 mL/min/1.73 m^2    eGFR if non African American >60.0 >60 mL/min/1.73 m^2   POCT glucose    Collection Time: 08/30/19  9:34 PM   Result Value Ref Range    POCT Glucose 258 (H) 70 - 110 mg/dL   POCT glucose    Collection Time: 08/30/19 10:30 PM   Result Value Ref Range    POCT Glucose 235 (H) 70 - 110 mg/dL   POCT glucose    Collection Time: 08/30/19 11:27 PM   Result Value Ref Range    POCT Glucose 208 (H) 70 - 110 mg/dL   POCT glucose    Collection Time: 08/31/19 12:23 AM   Result Value Ref Range    POCT Glucose 198 (H) 70 - 110 mg/dL   POCT glucose    Collection Time: 08/31/19  2:01 AM   Result Value Ref Range    POCT Glucose 158 (H) 70 - 110 mg/dL   Basic metabolic panel    Collection Time: 08/31/19  3:40 AM   Result Value Ref Range    Sodium 138 136 - 145 mmol/L    Potassium 3.7 3.5 - 5.1 mmol/L    Chloride 104 95 - 110 mmol/L    CO2 21 (L) 23 - 29 mmol/L    Glucose 165 (H) 70 - 110 mg/dL    BUN, Bld 8 6 - 20 mg/dL    Creatinine 0.9 0.5 - 1.4 mg/dL    Calcium 8.6 (L) 8.7 - 10.5 mg/dL    Anion Gap 13 8 - 16 mmol/L    eGFR if African American >60.0 >60 mL/min/1.73 m^2    eGFR if non African American >60.0 >60 mL/min/1.73 m^2   CBC auto  differential    Collection Time: 08/31/19  3:40 AM   Result Value Ref Range    WBC 5.31 3.90 - 12.70 K/uL    RBC 4.86 4.60 - 6.20 M/uL    Hemoglobin 12.7 (L) 14.0 - 18.0 g/dL    Hematocrit 41.5 40.0 - 54.0 %    Mean Corpuscular Volume 85 82 - 98 fL    Mean Corpuscular Hemoglobin 26.1 (L) 27.0 - 31.0 pg    Mean Corpuscular Hemoglobin Conc 30.6 (L) 32.0 - 36.0 g/dL    RDW 14.4 11.5 - 14.5 %    Platelets 198 150 - 350 K/uL    MPV 11.2 9.2 - 12.9 fL    Immature Granulocytes 0.4 0.0 - 0.5 %    Gran # (ANC) 3.7 1.8 - 7.7 K/uL    Immature Grans (Abs) 0.02 0.00 - 0.04 K/uL    Lymph # 0.6 (L) 1.0 - 4.8 K/uL    Mono # 0.8 0.3 - 1.0 K/uL    Eos # 0.1 0.0 - 0.5 K/uL    Baso # 0.04 0.00 - 0.20 K/uL    nRBC 0 0 /100 WBC    Gran% 70.3 38.0 - 73.0 %    Lymph% 10.5 (L) 18.0 - 48.0 %    Mono% 15.4 (H) 4.0 - 15.0 %    Eosinophil% 2.6 0.0 - 8.0 %    Basophil% 0.8 0.0 - 1.9 %    Differential Method Automated    POCT glucose    Collection Time: 08/31/19  4:41 AM   Result Value Ref Range    POCT Glucose 169 (H) 70 - 110 mg/dL   POCT glucose    Collection Time: 08/31/19  6:02 AM   Result Value Ref Range    POCT Glucose 173 (H) 70 - 110 mg/dL   POCT glucose    Collection Time: 08/31/19  7:05 AM   Result Value Ref Range    POCT Glucose 199 (H) 70 - 110 mg/dL   POCT glucose    Collection Time: 08/31/19  8:14 AM   Result Value Ref Range    POCT Glucose 202 (H) 70 - 110 mg/dL   Basic metabolic panel    Collection Time: 08/31/19  8:22 AM   Result Value Ref Range    Sodium 136 136 - 145 mmol/L    Potassium 3.9 3.5 - 5.1 mmol/L    Chloride 102 95 - 110 mmol/L    CO2 26 23 - 29 mmol/L    Glucose 219 (H) 70 - 110 mg/dL    BUN, Bld 7 6 - 20 mg/dL    Creatinine 0.9 0.5 - 1.4 mg/dL    Calcium 8.5 (L) 8.7 - 10.5 mg/dL    Anion Gap 8 8 - 16 mmol/L    eGFR if African American >60.0 >60 mL/min/1.73 m^2    eGFR if non African American >60.0 >60 mL/min/1.73 m^2   POCT glucose    Collection Time: 08/31/19  9:24 AM   Result Value Ref Range    POCT Glucose 222  (H) 70 - 110 mg/dL   POCT glucose    Collection Time: 08/31/19 10:29 AM   Result Value Ref Range    POCT Glucose 239 (H) 70 - 110 mg/dL   POCT glucose    Collection Time: 08/31/19 11:44 AM   Result Value Ref Range    POCT Glucose 247 (H) 70 - 110 mg/dL   POCT glucose    Collection Time: 08/31/19  4:42 PM   Result Value Ref Range    POCT Glucose 146 (H) 70 - 110 mg/dL         Significant Imaging: I have reviewed all pertinent imaging results/findings within the past 24 hours.      Assessment/Plan:      * Acute on chronic pancreatitis  Patient has history of recurrent acute pancreatitis episodes 2/2 hypertriglyceridemia since 2014. Most recent episode thought initially to be from ampullary gallstone but was found to be a pancreatic calcification. CT abd/pelvis w/ contrast on 8/29 showed extensive fat stranding surrounding the pancreas and pancreatic calcifications consistent with acute on chronic pancreatitis, fluid collections at pancreatic head and caudate lobe of liver, and cavernous transformation of pre-existing portal vein thromboses.  - Given 1L NS bolus initially, then received D5-1/2NS with 20mEq KCl with insulin gtt for DKA treatment, then received LR  - PRN q4 hours pain control with tylenol, norco 5 and dilaudid 1 mg  - Lipid panel here is unremarkable with TGs 102 and total cholesterol 108, so acute pancreatitis 2/2 hypertriglyceridemia is less likely  - Abdominal U/S ordered to evaluate for a gallstone that has passed, as his T bili and Alk Phos were elevated on 8/27 when patient was having his right-sided chest pain and have been decreasing since. It was negative for cholelithiasis, choledocholithiasis or biliary duct dilation  - PET pending to evaluate for chronic alcohol use    Diabetic ketoacidosis without coma associated with type 2 diabetes mellitus  On 8/30 labs showed corrected anion gap 21 with blood glucose 241. K 3.9. Lactate 1.0.  - ABG ordered, consistent with metabolic acidosis  - Insulin  gtt started on 8/30 with D5-1/2NS with 20mEq KCl considering patient's blood glucose was close to 200 and his anion gap was very elevated. Started SC insulin and weaned off insulin gtt 8/31 and tolerated liquid diet  Resolved    Pancreatic pseudocyst  CT abd/pelvis on 8/29 shows lobulated 4.7 x 3.8 cm fluid collection within the pancreatic head and a 4.4 x 3.5 cm rim enhancing fluid collection abutting the caudate lobe of the liver.  - [facility] consulted, they believe these are pseudocysts and do not believe they are abscesses which would require drainage. They recommended following up in clinic regarding monitoring the psuedocysts    Portal vein thrombosis  CT on 5/27/19 shows complete occlusion of the SMV with nonocclusive thrombosis of the main portal vein with extension to the right and proximal left branches, with multiple prominent collateral vessels noted. EUS during that admission did not comment on whether he had varices. He was not anticoagulated at that time. CT on 8/29 notes portal vein thrombosis with cavernous transformation, but does not comment on SMV thrombosis.   - Can follow up outpatient with GI regarding whether he would need anticoagulation considering the chronic nature    Type 2 diabetes mellitus with diabetic polyneuropathy, with long-term current use of insulin  At home takes lantus 52 U qHS and novolog 20-30 U qAC depending on glucose levels before meals. A1c here 12.9. Patient likely has chronic pancreatic insufficiency affecting his beta cells, explaining why his insulin requirement is so high for his weight for a type 2 diabetic.  - Continued home pregabalin 150 mg BID  - Calculated starting insulin requirement from insulin gtt, started on levemir 20 U BID and novolog 10 U qAC  - Glucose checks qid before meals and at night time  - Recent right lateral 2nd toe blister noted by PCP. Does not appear infected, very superficial, but will monitor and consult wound care if anything changes  - May  need outpatient endocrine management of his diabetes for better control    Mixed hyperlipidemia  - Continued home fenofibrate 160 mg qd, niacin 500 mg qd, atorvastatin 40 mg qd, and omega-3 acid 2 g BID  - Lipid panel on 8/30 shows total cholesterol 108 and , which is the most controlled the patient has been. During previous pancreatitis episodes patient has had TG up to 3000    Obstructive sleep apnea syndrome  - Continued home CPAP      Gout  - Continued home allopurinol 300 mg qd      Obesity (BMI 30-39.9)  - PT/OT consulted  - Incentive spirometry ordered    Essential hypertension  - Continue home losartan 50 mg qd        VTE Risk Mitigation (From admission, onward)        Ordered     enoxaparin injection 40 mg  Daily      08/30/19 1112     IP VTE HIGH RISK PATIENT  Once      08/30/19 1112                Eleazar Collins DO  Department of Hospital Medicine   [facility]

## 2019-08-31 NOTE — SUBJECTIVE & OBJECTIVE
Interval History: Patient appears well today. He states his pain is much improved and states he is hungry. No nausea or vomiting.    Review of Systems   Constitutional: Negative for chills, fatigue and fever.   HENT: Negative for congestion and sore throat.    Eyes: Negative for visual disturbance.   Respiratory: Negative for cough and shortness of breath.    Cardiovascular: Negative for chest pain and leg swelling.   Gastrointestinal: Positive for abdominal pain (improved). Negative for constipation, diarrhea, nausea and vomiting.   Genitourinary: Negative for difficulty urinating and dysuria.   Musculoskeletal: Negative for back pain and neck stiffness.   Skin: Negative for rash.   Neurological: Negative for dizziness, syncope and headaches.   Psychiatric/Behavioral: Negative for confusion and decreased concentration.     Objective:     Vital Signs (Most Recent):  Temp: 97.5 °F (36.4 °C) (08/31/19 1725)  Pulse: 83 (08/31/19 1725)  Resp: 18 (08/31/19 1725)  BP: 134/68 (08/31/19 1725)  SpO2: 99 % (08/31/19 1725) Vital Signs (24h Range):  Temp:  [96.7 °F (35.9 °C)-98.4 °F (36.9 °C)] 97.5 °F (36.4 °C)  Pulse:  [76-86] 83  Resp:  [18] 18  SpO2:  [95 %-99 %] 99 %  BP: (126-149)/(62-74) 134/68     Weight: 95.3 kg (210 lb 1.6 oz)  Body mass index is 32.98 kg/m².    Intake/Output Summary (Last 24 hours) at 8/31/2019 1747  Last data filed at 8/31/2019 0400  Gross per 24 hour   Intake 1581.92 ml   Output 1000 ml   Net 581.92 ml      Physical Exam   Constitutional: He is oriented to person, place, and time. He appears well-developed and well-nourished. No distress.   HENT:   Head: Normocephalic and atraumatic.   Eyes: EOM are normal. No scleral icterus.   Neck: Normal range of motion. No JVD present. No tracheal deviation present.   Cardiovascular: Normal rate, regular rhythm and normal heart sounds.   No murmur heard.  Pulmonary/Chest: Effort normal. No respiratory distress. He has no wheezes. He has no rales.   Decreased  breath sounds at RLL   Abdominal: Soft. Bowel sounds are normal. He exhibits no distension and no mass. There is tenderness (epigastric > RUQ). There is no rebound and no guarding.   Musculoskeletal: He exhibits no edema.   Neurological: He is alert and oriented to person, place, and time. No cranial nerve deficit or sensory deficit. He exhibits normal muscle tone.   Skin: Skin is warm and dry. Capillary refill takes less than 2 seconds. No rash noted. He is not diaphoretic. No erythema.   Psychiatric: He has a normal mood and affect. His behavior is normal.   Vitals reviewed.      Significant Labs: All pertinent labs within the past 24 hours have been reviewed.  Recent Results (from the past 24 hour(s))   POCT glucose    Collection Time: 08/30/19  6:27 PM   Result Value Ref Range    POCT Glucose 275 (H) 70 - 110 mg/dL   POCT glucose    Collection Time: 08/30/19  7:41 PM   Result Value Ref Range    POCT Glucose 286 (H) 70 - 110 mg/dL   Basic metabolic panel    Collection Time: 08/30/19  7:57 PM   Result Value Ref Range    Sodium 141 136 - 145 mmol/L    Potassium 3.6 3.5 - 5.1 mmol/L    Chloride 104 95 - 110 mmol/L    CO2 24 23 - 29 mmol/L    Glucose 272 (H) 70 - 110 mg/dL    BUN, Bld 8 6 - 20 mg/dL    Creatinine 1.1 0.5 - 1.4 mg/dL    Calcium 8.8 8.7 - 10.5 mg/dL    Anion Gap 13 8 - 16 mmol/L    eGFR if African American >60.0 >60 mL/min/1.73 m^2    eGFR if non African American >60.0 >60 mL/min/1.73 m^2   POCT glucose    Collection Time: 08/30/19  9:34 PM   Result Value Ref Range    POCT Glucose 258 (H) 70 - 110 mg/dL   POCT glucose    Collection Time: 08/30/19 10:30 PM   Result Value Ref Range    POCT Glucose 235 (H) 70 - 110 mg/dL   POCT glucose    Collection Time: 08/30/19 11:27 PM   Result Value Ref Range    POCT Glucose 208 (H) 70 - 110 mg/dL   POCT glucose    Collection Time: 08/31/19 12:23 AM   Result Value Ref Range    POCT Glucose 198 (H) 70 - 110 mg/dL   POCT glucose    Collection Time: 08/31/19  2:01 AM    Result Value Ref Range    POCT Glucose 158 (H) 70 - 110 mg/dL   Basic metabolic panel    Collection Time: 08/31/19  3:40 AM   Result Value Ref Range    Sodium 138 136 - 145 mmol/L    Potassium 3.7 3.5 - 5.1 mmol/L    Chloride 104 95 - 110 mmol/L    CO2 21 (L) 23 - 29 mmol/L    Glucose 165 (H) 70 - 110 mg/dL    BUN, Bld 8 6 - 20 mg/dL    Creatinine 0.9 0.5 - 1.4 mg/dL    Calcium 8.6 (L) 8.7 - 10.5 mg/dL    Anion Gap 13 8 - 16 mmol/L    eGFR if African American >60.0 >60 mL/min/1.73 m^2    eGFR if non African American >60.0 >60 mL/min/1.73 m^2   CBC auto differential    Collection Time: 08/31/19  3:40 AM   Result Value Ref Range    WBC 5.31 3.90 - 12.70 K/uL    RBC 4.86 4.60 - 6.20 M/uL    Hemoglobin 12.7 (L) 14.0 - 18.0 g/dL    Hematocrit 41.5 40.0 - 54.0 %    Mean Corpuscular Volume 85 82 - 98 fL    Mean Corpuscular Hemoglobin 26.1 (L) 27.0 - 31.0 pg    Mean Corpuscular Hemoglobin Conc 30.6 (L) 32.0 - 36.0 g/dL    RDW 14.4 11.5 - 14.5 %    Platelets 198 150 - 350 K/uL    MPV 11.2 9.2 - 12.9 fL    Immature Granulocytes 0.4 0.0 - 0.5 %    Gran # (ANC) 3.7 1.8 - 7.7 K/uL    Immature Grans (Abs) 0.02 0.00 - 0.04 K/uL    Lymph # 0.6 (L) 1.0 - 4.8 K/uL    Mono # 0.8 0.3 - 1.0 K/uL    Eos # 0.1 0.0 - 0.5 K/uL    Baso # 0.04 0.00 - 0.20 K/uL    nRBC 0 0 /100 WBC    Gran% 70.3 38.0 - 73.0 %    Lymph% 10.5 (L) 18.0 - 48.0 %    Mono% 15.4 (H) 4.0 - 15.0 %    Eosinophil% 2.6 0.0 - 8.0 %    Basophil% 0.8 0.0 - 1.9 %    Differential Method Automated    POCT glucose    Collection Time: 08/31/19  4:41 AM   Result Value Ref Range    POCT Glucose 169 (H) 70 - 110 mg/dL   POCT glucose    Collection Time: 08/31/19  6:02 AM   Result Value Ref Range    POCT Glucose 173 (H) 70 - 110 mg/dL   POCT glucose    Collection Time: 08/31/19  7:05 AM   Result Value Ref Range    POCT Glucose 199 (H) 70 - 110 mg/dL   POCT glucose    Collection Time: 08/31/19  8:14 AM   Result Value Ref Range    POCT Glucose 202 (H) 70 - 110 mg/dL   Basic metabolic  panel    Collection Time: 08/31/19  8:22 AM   Result Value Ref Range    Sodium 136 136 - 145 mmol/L    Potassium 3.9 3.5 - 5.1 mmol/L    Chloride 102 95 - 110 mmol/L    CO2 26 23 - 29 mmol/L    Glucose 219 (H) 70 - 110 mg/dL    BUN, Bld 7 6 - 20 mg/dL    Creatinine 0.9 0.5 - 1.4 mg/dL    Calcium 8.5 (L) 8.7 - 10.5 mg/dL    Anion Gap 8 8 - 16 mmol/L    eGFR if African American >60.0 >60 mL/min/1.73 m^2    eGFR if non African American >60.0 >60 mL/min/1.73 m^2   POCT glucose    Collection Time: 08/31/19  9:24 AM   Result Value Ref Range    POCT Glucose 222 (H) 70 - 110 mg/dL   POCT glucose    Collection Time: 08/31/19 10:29 AM   Result Value Ref Range    POCT Glucose 239 (H) 70 - 110 mg/dL   POCT glucose    Collection Time: 08/31/19 11:44 AM   Result Value Ref Range    POCT Glucose 247 (H) 70 - 110 mg/dL   POCT glucose    Collection Time: 08/31/19  4:42 PM   Result Value Ref Range    POCT Glucose 146 (H) 70 - 110 mg/dL         Significant Imaging: I have reviewed all pertinent imaging results/findings within the past 24 hours.

## 2019-08-31 NOTE — ASSESSMENT & PLAN NOTE
Patient has history of recurrent acute pancreatitis episodes 2/2 hypertriglyceridemia since 2014. Most recent episode thought initially to be from ampullary gallstone but was found to be a pancreatic calcification. CT abd/pelvis w/ contrast on 8/29 showed extensive fat stranding surrounding the pancreas and pancreatic calcifications consistent with acute on chronic pancreatitis, fluid collections at pancreatic head and caudate lobe of liver, and cavernous transformation of pre-existing portal vein thromboses.  - Given 1L NS bolus initially, then received D5-1/2NS with 20mEq KCl with insulin gtt for DKA treatment, then received LR  - PRN q4 hours pain control with tylenol, norco 5 and dilaudid 1 mg  - Lipid panel here is unremarkable with TGs 102 and total cholesterol 108, so acute pancreatitis 2/2 hypertriglyceridemia is less likely  - Abdominal U/S ordered to evaluate for a gallstone that has passed, as his T bili and Alk Phos were elevated on 8/27 when patient was having his right-sided chest pain and have been decreasing since. It was negative for cholelithiasis, choledocholithiasis or biliary duct dilation  - PET pending to evaluate for chronic alcohol use

## 2019-08-31 NOTE — ASSESSMENT & PLAN NOTE
At home takes lantus 52 U qHS and novolog 20-30 U qAC depending on glucose levels before meals. A1c here 12.9. Patient likely has chronic pancreatic insufficiency affecting his beta cells, explaining why his insulin requirement is so high for his weight for a type 2 diabetic.  - Continued home pregabalin 150 mg BID  - Calculated starting insulin requirement from insulin gtt, started on levemir 20 U BID and novolog 10 U qAC  - Glucose checks qid before meals and at night time  - Recent right lateral 2nd toe blister noted by PCP. Does not appear infected, very superficial, but will monitor and consult wound care if anything changes  - May need outpatient endocrine management of his diabetes for better control

## 2019-08-31 NOTE — ASSESSMENT & PLAN NOTE
On 8/30 labs showed corrected anion gap 21 with blood glucose 241. K 3.9. Lactate 1.0.  - ABG ordered, consistent with metabolic acidosis  - Insulin gtt started on 8/30 with D5-1/2NS with 20mEq KCl considering patient's blood glucose was close to 200 and his anion gap was very elevated. Started SC insulin and weaned off insulin gtt 8/31 and tolerated liquid diet  Resolved

## 2019-09-01 VITALS
TEMPERATURE: 97 F | HEIGHT: 68 IN | RESPIRATION RATE: 18 BRPM | BODY MASS INDEX: 31.83 KG/M2 | SYSTOLIC BLOOD PRESSURE: 140 MMHG | OXYGEN SATURATION: 100 % | WEIGHT: 210 LBS | DIASTOLIC BLOOD PRESSURE: 80 MMHG | HEART RATE: 73 BPM

## 2019-09-01 PROBLEM — E87.6 HYPOKALEMIA: Status: ACTIVE | Noted: 2019-09-01

## 2019-09-01 PROBLEM — K85.90 ACUTE PANCREATITIS: Status: ACTIVE | Noted: 2019-09-01

## 2019-09-01 LAB
ANION GAP SERPL CALC-SCNC: 11 MMOL/L (ref 8–16)
BASOPHILS # BLD AUTO: 0.03 K/UL (ref 0–0.2)
BASOPHILS NFR BLD: 0.9 % (ref 0–1.9)
BUN SERPL-MCNC: 5 MG/DL (ref 6–20)
CALCIUM SERPL-MCNC: 8.8 MG/DL (ref 8.7–10.5)
CHLORIDE SERPL-SCNC: 101 MMOL/L (ref 95–110)
CO2 SERPL-SCNC: 26 MMOL/L (ref 23–29)
CREAT SERPL-MCNC: 0.7 MG/DL (ref 0.5–1.4)
DIFFERENTIAL METHOD: ABNORMAL
EOSINOPHIL # BLD AUTO: 0.1 K/UL (ref 0–0.5)
EOSINOPHIL NFR BLD: 2.3 % (ref 0–8)
ERYTHROCYTE [DISTWIDTH] IN BLOOD BY AUTOMATED COUNT: 13.9 % (ref 11.5–14.5)
EST. GFR  (AFRICAN AMERICAN): >60 ML/MIN/1.73 M^2
EST. GFR  (NON AFRICAN AMERICAN): >60 ML/MIN/1.73 M^2
GLUCOSE SERPL-MCNC: 124 MG/DL (ref 70–110)
HCT VFR BLD AUTO: 38.2 % (ref 40–54)
HGB BLD-MCNC: 12.2 G/DL (ref 14–18)
IMM GRANULOCYTES # BLD AUTO: 0.01 K/UL (ref 0–0.04)
IMM GRANULOCYTES NFR BLD AUTO: 0.3 % (ref 0–0.5)
LYMPHOCYTES # BLD AUTO: 0.6 K/UL (ref 1–4.8)
LYMPHOCYTES NFR BLD: 18.8 % (ref 18–48)
MCH RBC QN AUTO: 25.9 PG (ref 27–31)
MCHC RBC AUTO-ENTMCNC: 31.9 G/DL (ref 32–36)
MCV RBC AUTO: 81 FL (ref 82–98)
MONOCYTES # BLD AUTO: 0.6 K/UL (ref 0.3–1)
MONOCYTES NFR BLD: 16.1 % (ref 4–15)
NEUTROPHILS # BLD AUTO: 2.1 K/UL (ref 1.8–7.7)
NEUTROPHILS NFR BLD: 61.6 % (ref 38–73)
NRBC BLD-RTO: 0 /100 WBC
PLATELET # BLD AUTO: 165 K/UL (ref 150–350)
PMV BLD AUTO: 10.9 FL (ref 9.2–12.9)
POCT GLUCOSE: 113 MG/DL (ref 70–110)
POCT GLUCOSE: 186 MG/DL (ref 70–110)
POTASSIUM SERPL-SCNC: 3.3 MMOL/L (ref 3.5–5.1)
RBC # BLD AUTO: 4.71 M/UL (ref 4.6–6.2)
SODIUM SERPL-SCNC: 138 MMOL/L (ref 136–145)
WBC # BLD AUTO: 3.41 K/UL (ref 3.9–12.7)

## 2019-09-01 PROCEDURE — 36415 COLL VENOUS BLD VENIPUNCTURE: CPT

## 2019-09-01 PROCEDURE — 99239 PR HOSPITAL DISCHARGE DAY,>30 MIN: ICD-10-PCS | Mod: ,,, | Performed by: INTERNAL MEDICINE

## 2019-09-01 PROCEDURE — 99900035 HC TECH TIME PER 15 MIN (STAT)

## 2019-09-01 PROCEDURE — 63700000 PHARM REV CODE 250 ALT 637 W/O HCPCS: Performed by: STUDENT IN AN ORGANIZED HEALTH CARE EDUCATION/TRAINING PROGRAM

## 2019-09-01 PROCEDURE — 80048 BASIC METABOLIC PNL TOTAL CA: CPT

## 2019-09-01 PROCEDURE — 97165 OT EVAL LOW COMPLEX 30 MIN: CPT

## 2019-09-01 PROCEDURE — 63600175 PHARM REV CODE 636 W HCPCS: Performed by: STUDENT IN AN ORGANIZED HEALTH CARE EDUCATION/TRAINING PROGRAM

## 2019-09-01 PROCEDURE — 85025 COMPLETE CBC W/AUTO DIFF WBC: CPT

## 2019-09-01 PROCEDURE — 25000003 PHARM REV CODE 250: Performed by: STUDENT IN AN ORGANIZED HEALTH CARE EDUCATION/TRAINING PROGRAM

## 2019-09-01 PROCEDURE — 99239 HOSP IP/OBS DSCHRG MGMT >30: CPT | Mod: ,,, | Performed by: INTERNAL MEDICINE

## 2019-09-01 PROCEDURE — 94660 CPAP INITIATION&MGMT: CPT

## 2019-09-01 RX ORDER — DICLOFENAC SODIUM 10 MG/G
2 GEL TOPICAL 4 TIMES DAILY
Qty: 100 G | Refills: 0 | Status: SHIPPED | OUTPATIENT
Start: 2019-09-01 | End: 2021-04-14

## 2019-09-01 RX ORDER — POTASSIUM CHLORIDE 20 MEQ/1
40 TABLET, EXTENDED RELEASE ORAL ONCE
Status: DISCONTINUED | OUTPATIENT
Start: 2019-09-01 | End: 2019-09-01 | Stop reason: HOSPADM

## 2019-09-01 RX ADMIN — OMEGA-3-ACID ETHYL ESTERS 2 G: 1 CAPSULE, LIQUID FILLED ORAL at 08:09

## 2019-09-01 RX ADMIN — INSULIN ASPART 2 UNITS: 100 INJECTION, SOLUTION INTRAVENOUS; SUBCUTANEOUS at 11:09

## 2019-09-01 RX ADMIN — ASPIRIN 81 MG: 81 TABLET, COATED ORAL at 08:09

## 2019-09-01 RX ADMIN — INSULIN DETEMIR 20 UNITS: 100 INJECTION, SOLUTION SUBCUTANEOUS at 08:09

## 2019-09-01 RX ADMIN — SODIUM CHLORIDE, SODIUM LACTATE, POTASSIUM CHLORIDE, AND CALCIUM CHLORIDE: .6; .31; .03; .02 INJECTION, SOLUTION INTRAVENOUS at 06:09

## 2019-09-01 RX ADMIN — LOSARTAN POTASSIUM 50 MG: 50 TABLET, FILM COATED ORAL at 08:09

## 2019-09-01 RX ADMIN — INSULIN ASPART 10 UNITS: 100 INJECTION, SOLUTION INTRAVENOUS; SUBCUTANEOUS at 11:09

## 2019-09-01 RX ADMIN — ATORVASTATIN CALCIUM 40 MG: 20 TABLET, FILM COATED ORAL at 08:09

## 2019-09-01 RX ADMIN — ALLOPURINOL 300 MG: 300 TABLET ORAL at 08:09

## 2019-09-01 RX ADMIN — FENOFIBRATE 160 MG: 160 TABLET ORAL at 08:09

## 2019-09-01 RX ADMIN — PREGABALIN 150 MG: 150 CAPSULE ORAL at 08:09

## 2019-09-01 RX ADMIN — INSULIN ASPART 10 UNITS: 100 INJECTION, SOLUTION INTRAVENOUS; SUBCUTANEOUS at 08:09

## 2019-09-01 NOTE — ASSESSMENT & PLAN NOTE
CT abd/pelvis on 8/29 shows lobulated 4.7 x 3.8 cm fluid collection within the pancreatic head and a 4.4 x 3.5 cm rim enhancing fluid collection abutting the caudate lobe of the liver.  - [facility] consulted, they believe these are pseudocysts and do not believe they are abscesses which would require drainage. They recommended following up in clinic in 2-3 weeks regarding monitoring the pseudocysts, referral ordered on discharge

## 2019-09-01 NOTE — DISCHARGE SUMMARY
[facility] Hospital Medicine  Discharge Summary      Patient Name: Bay Ibarra  MRN: [id number]  Admission Date: 8/30/2019  Hospital Length of Stay: 2 days  Discharge Date and Time:  09/01/2019 1:04 PM  Attending Physician: Danielle Ni MD   Discharging Provider: Eleazar Collins DO  Primary Care Provider: José Luis Singleton MD  Hospital Medicine Team: [facility] 2 Eleazar Collins DO    HPI:   Bay Ibarra is a 40 yo male with chronic pancreatitis 2/2 hypertriglyceridemia with chronic pancreatic cysts and multiple portal venous system thromboses, IDDM2 a/w neuropathy, HTN, HLD, NAFLD, obesity, JAZMYN, and gout who was transferred [place] [facility] [place] evaluation of a growing pseudocyst and questionable pancreatic head and hepatic abscesses. History was obtained from the patient and chart review.    Patient initially presented to the [facility] ED on 8/27 for right-sided chest pain that began on 8/25. 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 He describes that the pain was worse on exertion and movement and that it improved with rest. Chest X-ray, EKG, BNP and troponin at that time were negative and he was diagnosed with musculoskeletal chest pain. At that time his glucose was 337, corrected anion gap 12.8, T. Bili 1.3, Alk phos 134 and lipase 898. He continued to have the chest pain which began migrating more to his epigastrium and RUQ. He denied any fevers, chills, night sweats, congestion, cough, nausea, vomiting or diarrhea. He returned to the ED on 8/29 and a CT abd/pelvis w/ contrast showed extensive fat stranding surrounding the pancreas and pancreatic calcifications consistent with acute on chronic pancreatitis, fluid collections at pancreatic head and caudate lobe of liver, and cavernous transformation of pre-existing portal vein thromboses. Labs showed lipase decreased to 469, consistently elevated blood glucose, corrected anion gap 16.3, T  bili and Alk phos, and UA positive for 4+ glucose, 2+ ketones, 2+ bilirubin, trace blood. He was given 2 mg IV dilaudid and 1 dose of zosyn, no fluids, and was transferred to [facility]. Patient denies any recent illness, sick contacts, recent travel, post-prandial RUQ pain, new medications, or alcohol use.    His first episode of pancreatitis was in 2014 and he has had recurrent episodes since then, most recently in May 2019. All of his episodes have been associated with hypertriglyceridemia, no gallstones have been found. He takes niacin, fenofibrate, atorvastatin and vascepa. Patient denies alcohol use as an exacerbating factor. He has needed ICU admission and has had DKA associated with prior acute pancreatitis episodes. During his most recent episode, he had a CT abd/pelvis on 5/27/19 which showed complete occlusion of the SMV with nonocclusive thrombosis of the main portal vein with extension to the right and proximal left branches. He was not anticoagulated at that time.     * No surgery found *      Hospital Course:   Labs on 8/30 consistent with DKA, gave IVF and insulin gtt. Abdominal U/S negative for gallstones. [facility] consulted, they recommended to treat the acute pancreatitis and to follow up the pseudocysts and thromboses outpatient, and they were not concerned for infection. Patient weaned off insulin gtt 8/31 and diet advanced, IVF discontinued. Discharged on 9/1 with no nausea, vomiting or abdominal pain on a regular diet.     Consults:   Consults (From admission, onward)        Status Ordering Provider     Inpatient consult to [facility]  Once     Provider:  (Not yet assigned)    ANSHUL Gonzalez          * Acute on chronic pancreatitis  Patient has history of recurrent acute pancreatitis episodes 2/2 hypertriglyceridemia since 2014. Most recent episode thought initially to be from ampullary gallstone but was found to be a pancreatic calcification. CT abd/pelvis w/ contrast on  8/29 showed extensive fat stranding surrounding the pancreas and pancreatic calcifications consistent with acute on chronic pancreatitis, fluid collections at pancreatic head and caudate lobe of liver, and cavernous transformation of pre-existing portal vein thromboses.  - Given 1L NS bolus initially, then received D5-1/2NS with 20mEq KCl with insulin gtt for DKA treatment, then received LR  - PRN q4 hours pain control with tylenol, norco 5 and dilaudid 1 mg  - Lipid panel here is unremarkable with TGs 102 and total cholesterol 108, so acute pancreatitis 2/2 hypertriglyceridemia is less likely  - Abdominal U/S ordered to evaluate for a gallstone that has passed, as his T bili and Alk Phos were elevated on 8/27 when patient was having his right-sided chest pain and have been decreasing since. It was negative for cholelithiasis, choledocholithiasis or biliary duct dilation  - PETH pending to evaluate for chronic alcohol use    Diabetic ketoacidosis without coma associated with type 2 diabetes mellitus  On 8/30 labs showed corrected anion gap 21 with blood glucose 241. K 3.9. Lactate 1.0.  - ABG ordered, consistent with metabolic acidosis  - Insulin gtt started on 8/30 with D5-1/2NS with 20mEq KCl considering patient's blood glucose was close to 200 and his anion gap was very elevated. Started SC insulin and weaned off insulin gtt 8/31 and tolerated liquid diet  Resolved    Pancreatic pseudocyst  CT abd/pelvis on 8/29 shows lobulated 4.7 x 3.8 cm fluid collection within the pancreatic head and a 4.4 x 3.5 cm rim enhancing fluid collection abutting the caudate lobe of the liver.  - [facility] consulted, they believe these are pseudocysts and do not believe they are abscesses which would require drainage. They recommended following up in clinic in 2-3 weeks regarding monitoring the pseudocysts, referral ordered on discharge    Portal vein thrombosis  CT on 5/27/19 shows complete occlusion of the SMV with nonocclusive thrombosis  of the main portal vein with extension to the right and proximal left branches, with multiple prominent collateral vessels noted. EUS during that admission did not comment on whether he had varices. He was not anticoagulated at that time. CT on 8/29 notes portal vein thrombosis with cavernous transformation, but does not comment on SMV thrombosis.   - Can follow up outpatient with GI regarding whether he would need anticoagulation considering the chronic nature    Type 2 diabetes mellitus with diabetic polyneuropathy, with long-term current use of insulin  At home takes lantus 52 U qHS and novolog 20-30 U qAC depending on glucose levels before meals. A1c here 12.9. Patient likely has chronic pancreatic insufficiency affecting his beta cells, explaining why his insulin requirement is so high for his weight for a type 2 diabetic.  - Continued home pregabalin 150 mg BID  - Calculated starting insulin requirement from insulin gtt, started on levemir 20 U BID and novolog 10 U qAC. When patient started his regular diet he was told to return to his home insulin regimen (with sliding scale) and will be monitored by PCP, appointment on 9/5  - Glucose checks qid before meals and at night time  - Recent right lateral 2nd toe blister noted by PCP. Does not appear infected, very superficial, but will monitor and consult wound care if anything changes  - Referral for outpatient endocrine for management of his diabetes for better control    Mixed hyperlipidemia  - Continued home fenofibrate 160 mg qd, niacin 500 mg qd, atorvastatin 40 mg qd, and omega-3 acid 2 g BID  - Lipid panel on 8/30 shows total cholesterol 108 and , which is the most controlled the patient has been. During previous pancreatitis episodes patient has had TG up to 3000    Hypokalemia  Given KCl tablet. Likely from decreased PO intake and insulin administration      Obstructive sleep apnea syndrome  - Continued home CPAP      Gout  - Continued home  allopurinol 300 mg qd      Obesity (BMI 30-39.9)  - PT/OT consulted  - Incentive spirometry ordered    Essential hypertension  - Continue home losartan 50 mg qd        Final Active Diagnoses:    Diagnosis Date Noted POA    PRINCIPAL PROBLEM:  Acute on chronic pancreatitis [K85.90, K86.1] 08/29/2019 Yes    Diabetic ketoacidosis without coma associated with type 2 diabetes mellitus [E11.10] 08/30/2019 Yes    Pancreatic pseudocyst [K86.3] 08/29/2019 Yes    Portal vein thrombosis [I81] 08/29/2019 Yes    Type 2 diabetes mellitus with diabetic polyneuropathy, with long-term current use of insulin [E11.42, Z79.4] 10/16/2017 Not Applicable    Mixed hyperlipidemia [E78.2] 08/02/2017 Yes    Acute pancreatitis [K85.90] 09/01/2019 Yes    Hypokalemia [E87.6] 09/01/2019 No    Obstructive sleep apnea syndrome [G47.33] 12/18/2015 Yes    Obesity (BMI 30-39.9) [E66.9] 06/05/2015 Yes    Gout [M10.9] 06/05/2015 Yes    Essential hypertension [I10]  Yes      Problems Resolved During this Admission:       Discharged Condition: good    Disposition: Home or Self Care    Follow Up:    Patient Instructions:      Ambulatory Referral to Gastroenterology   Referral Priority: Urgent Referral Type: Consultation   Referral Reason: Specialty Services Required   Requested Specialty: Gastroenterology   Number of Visits Requested: 1     Ambulatory Referral to Endocrinology   Referral Priority: Routine Referral Type: Consultation   Requested Specialty: Endocrinology   Number of Visits Requested: 1       Significant Diagnostic Studies: Labs:   CMP   Recent Labs   Lab 08/31/19  0340 08/31/19 0822 09/01/19  0355    136 138   K 3.7 3.9 3.3*    102 101   CO2 21* 26 26   * 219* 124*   BUN 8 7 5*   CREATININE 0.9 0.9 0.7   CALCIUM 8.6* 8.5* 8.8   ANIONGAP 13 8 11   ESTGFRAFRICA >60.0 >60.0 >60.0   EGFRNONAA >60.0 >60.0 >60.0    and CBC   Recent Labs   Lab 08/31/19  0340 09/01/19  0355   WBC 5.31 3.41*   HGB 12.7* 12.2*   HCT  "41.5 38.2*    165       Pending Diagnostic Studies:     None         Medications:  Reconciled Home Medications:      Medication List      START taking these medications    diclofenac sodium 1 % Gel  Commonly known as:  VOLTAREN  Apply 2 g topically 4 (four) times daily. Apply to chest in the location of pain up to 4 times a day as needed for pain.        CONTINUE taking these medications    allopurinol 300 MG tablet  Commonly known as:  ZYLOPRIM  TAKE 1 TABLET BY MOUTH ONCE DAILY     aspirin 81 MG EC tablet  Commonly known as:  ECOTRIN  Take 81 mg by mouth once daily.     atorvastatin 40 MG tablet  Commonly known as:  LIPITOR  Take 1 tablet (40 mg total) by mouth once daily.     fenofibrate 160 MG Tab  TAKE 1 TABLET BY MOUTH ONCE DAILY     icosapent ethyl 1 gram Cap  Commonly known as:  VASCEPA  Take 2 g by mouth 2 (two) times daily.     insulin aspart U-100 100 unit/mL (3 mL) Inpn pen  Commonly known as:  NovoLOG Flexpen U-100 Insulin  INJECT 20 UNITS SUBCUTANEOUSLY WITH MEALS PLUS SLIDING SCALE 150-200+2, 201-250 +4, 251-300 +6, 301-350 +8,>350 +10. MAX OF 90 UNITS PER DA     insulin glargine 100 units/mL (3mL) SubQ pen  Commonly known as:  LANTUS SOLOSTAR U-100 INSULIN  Inject 52 Units into the skin every evening.     insulin syringe-needle U-100 29 gauge x 1/2" Syrg  Inject 1 (once) per day     losartan 100 MG tablet  Commonly known as:  COZAAR  TAKE 1/2 (ONE-HALF) TABLET BY MOUTH ONCE DAILY     niacin 100 MG Tab  Take 200 mg by mouth every evening.     pregabalin 150 MG capsule  Commonly known as:  LYRICA  TAKE 1 CAPSULE BY MOUTH TWICE DAILY            Indwelling Lines/Drains at time of discharge:   Lines/Drains/Airways          None          Time spent on the discharge of patient: 40 minutes  Patient was seen and examined on the date of discharge and determined to be suitable for discharge.         Eleazar Collins DO  Department of Hospital Medicine  [facility]  "

## 2019-09-01 NOTE — PT/OT/SLP EVAL
"Occupational Therapy   Evaluation and Discharge Note    Name: Bay Ibarra  MRN: [id number]  Admitting Diagnosis:  Acute on chronic pancreatitis      Recommendations:     Discharge Recommendations: home  Discharge Equipment Recommendations:  none  Barriers to discharge:  None    Assessment:     Bay Ibarra is a 39 y.o. male with a medical diagnosis of Acute on chronic pancreatitis. At this time, patient is functioning at their prior level of function and does not require further acute OT services.     Plan:     During this hospitalization, patient does not require further acute OT services.  Please re-consult if situation changes.    · Plan of Care Reviewed with: patient    Subjective     Chief Complaint: None stated   Patient/Family Comments/goals: "I've been walking around the hallways just yesterday because I'm going crazy in this room."    Occupational Profile:  Living Environment: Pt lives alone in [facility] with 1 threshold to enter. Pt with shower/tub combination for bathing.   Previous level of function: (I) with ADLs and fx' mobility.  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  Equipment Used at home:  glucometer  Assistance upon Discharge: None     Pain/Comfort:  · Pain Rating 1: 0/10  · Pain Rating Post-Intervention 1: 0/10    Patients cultural, spiritual, Orthodoxy conflicts given the current situation: no    Objective:     Communicated with: RN prior to session.  Patient found HOB elevated with (all lines intact) upon OT entry to room.    General Precautions: Standard, fall   Orthopedic Precautions:N/A   Braces: N/A     Occupational Performance:    Bed Mobility:    · Patient completed Rolling/Turning to Right with independence  · Patient completed Scooting/Bridging with independence  · Patient completed Supine to Sit with independence  · Patient completed Sit to Supine with independence    Functional Mobility/Transfers:  · Patient completed Sit <> " Stand Transfer with independence  with  no assistive device   · Functional Mobility: Pt mobilized with OT around several hallways in hospital with no AD and (I).    Activities of Daily Living:  · Upper Body Dressing: independence in personal alma shirt   · Lower Body Dressing: independence wearing personal tennis shoes     Cognitive/Visual Perceptual:  Cognitive/Psychosocial Skills:     -       Oriented to: Person, Place, Time and Situation   -       Follows Commands/attention:Follows multistep  commands  -       Communication: clear/fluent  -       Memory: No Deficits noted  -       Safety awareness/insight to disability: intact   -       Mood/Affect/Coping skills/emotional control: Appropriate to situation    Physical Exam:  Balance:    -       Demo good sitting and dynamic standing balance   Upper Extremity Range of Motion:     -       Right Upper Extremity: WNL  -       Left Upper Extremity: WNL  Upper Extremity Strength:    -       Right Upper Extremity: WNL  -       Left Upper Extremity: WNL   Strength:    -       Right Upper Extremity: WNL  -       Left Upper Extremity: WNL    AMPAC 6 Click ADL:  AMPAC Total Score: 24    Treatment & Education:  Pt educated on role of occupational therapy, POC, and safety during ADLs and functional mobility. Pt and OT discussed importance of safe, continued mobility to optimize daily living skills. Pt verbalized understanding. White board updated during session. Pt given instruction to call for medical staff/nurse for assistance.     Education:    Patient left HOB elevated with all lines intact and call button in reach    GOALS:   Multidisciplinary Problems     Occupational Therapy Goals     Not on file          Multidisciplinary Problems (Resolved)        Problem: Occupational Therapy Goal    Goal Priority Disciplines Outcome Interventions   Occupational Therapy Goal   (Resolved)     OT, PT/OT Outcome(s) achieved                    History:     Past Medical History:    Diagnosis Date    Acute pancreatitis     Essential hypertension     Gout     Hx of acute pancreatitis 3/3/2017    Hypertriglyceridemia 8/2/2017    Obesity (BMI 30-39.9) 6/5/2015    Obesity (BMI 35.0-39.9 without comorbidity) 6/5/2015    Obstructive sleep apnea syndrome 12/18/2015    Type 2 diabetes mellitus with diabetic polyneuropathy, with long-term current use of insulin 10/16/2017    Type 2 diabetes mellitus with hyperglycemia, with long-term current use of insulin 8/3/2017       Past Surgical History:   Procedure Laterality Date    ERCP (ENDOSCOPIC RETROGRADE CHOLANGIOPANCREATOGRAPHY) N/A 5/27/2019    Performed by Zafar Velazquez MD at [facility] ENDO (2ND FLR)    ULTRASOUND, UPPER GI TRACT, ENDOSCOPIC N/A 5/27/2019    Performed by Zafar Velazquez MD at [facility] ENDO (2ND FLR)       Time Tracking:     OT Date of Treatment: 09/01/19  OT Start Time: 1021  OT Stop Time: 1029  OT Total Time (min): 8 min    Billable Minutes:Evaluation 8 min    Marlen Polk OT  9/1/2019

## 2019-09-01 NOTE — NURSING
Patient educated on discharge instructions, medication, and instructions.  Patient verbalized understanding. Patient stable and denies pain wit no distress noted.  IVs dc'd.  Patient is at bedside waiting for transport.  WCTM

## 2019-09-01 NOTE — PT/OT/SLP PROGRESS
"Physical Therapy   Update/Discharge    Bay Ibarra   MRN: [id number]     Patient resting in bed comfortable this morning upon PT attempt at assessment. He was very pleasant and open with conversation. States he lives alone in a 1 SH with a 2" threshold to enter; independent at baseline with activity, has a ride home planned.    He has been ambulating independently within room and hallway without difficulty, no problems with pain/fatigue/unsteadiness with activity.    No reason to proceed with formal evaluation, encouraged patient to continue mobilizing and please alert RN or MD if he notes any changes in regards to his mobility. Pt in agreement with plan, appreciative of therapist checking in.    Will now d/c from acute PT services.    Recs: Home with no needs    Kavon Miranda, PT  9/1/2019  "
0

## 2019-09-01 NOTE — ASSESSMENT & PLAN NOTE
At home takes lantus 52 U qHS and novolog 20-30 U qAC depending on glucose levels before meals. A1c here 12.9. Patient likely has chronic pancreatic insufficiency affecting his beta cells, explaining why his insulin requirement is so high for his weight for a type 2 diabetic.  - Continued home pregabalin 150 mg BID  - Calculated starting insulin requirement from insulin gtt, started on levemir 20 U BID and novolog 10 U qAC. When patient started his regular diet he was told to return to his home insulin regimen (with sliding scale) and will be monitored by PCP, appointment on 9/5  - Glucose checks qid before meals and at night time  - Recent right lateral 2nd toe blister noted by PCP. Does not appear infected, very superficial, but will monitor and consult wound care if anything changes  - Referral for outpatient endocrine for management of his diabetes for better control

## 2019-09-01 NOTE — PLAN OF CARE
Problem: Adult Inpatient Plan of Care  Goal: Plan of Care Review  Outcome: Ongoing (interventions implemented as appropriate)  POC reviewed with patient, verbalized understanding. Patient lying comfortably in bed with no complaints or acute distress overnight. CBG stable at 167, detemir 20units given. Patient educated on adhering to diabetic diet, verbalized understanding. Fall precautions maintained, call light and personal belongings within reach; will continue to monitor.

## 2019-09-03 NOTE — PROGRESS NOTES
Transitional Care Note  Subjective:       Patient ID: Bay Ibarra is a 39 y.o. male.  Chief Complaint: Diabetes (3 month follow up); Hypertension; Hyperlipidemia; Medication Refill; Diabetic Foot Exam; Pneumonia Vaccine; and Flu Vaccine    Family and/or Caretaker present at visit?  Yes.  Diagnostic tests reviewed/disposition: No diagnosic tests pending after this hospitalization.  Disease/illness education: YESHome health/community services discussion/referrals: Patient does not have home health established from hospital visit.  They do not need home health.  If needed, we will set up home health for the patient.   Establishment or re-establishment of referral orders for community resources: No other necessary community resources.   Discussion with other health care providers: No discussion with other health care providers necessary.   HPI     39y/oWM, with recurrent pancreatitis, pancreatic pseudocyst that occasionally flares.  He has been watching his diet, still works and is conscientious about his insulin.  Recently was hospitalized for this.      Review of Systems   Constitutional: Negative.    HENT: Negative.    Eyes: Positive for visual disturbance.   Respiratory: Negative.    Cardiovascular: Negative.    Gastrointestinal: Negative for abdominal distention, abdominal pain, constipation, diarrhea, nausea and vomiting.   Genitourinary: Negative.    Musculoskeletal: Negative.    Skin: Negative.    Neurological: Negative.    Hematological: Negative.        Objective:       Vital signs reviewed.  HEENT- NCAT,PERRLA,EOMs intact, throat,nose clear.  Neck-supple,no JVD, no lymphadenopathy.  Lungs-clear  Cor- S1s2 no m,g,rubs.  Abd- soft,+BS,soft, no rebound or tenderness. No HSmegay.  Ext-no C,C,E.  Neuro-intacSkin-inta      Assessment:       1. Type 2 diabetes mellitus with diabetic polyneuropathy, with long-term current use of insulin    2. Pancreatic pseudocyst    3. Essential hypertension    4. Mixed  hyperlipidemia    5. Idiopathic gout, unspecified chronicity, unspecified site    6. Hypokalemia        Plan:    *  Continue same plan.  I will try to speak with Endo and try to get the Abbot freestyle Celia monitor, since he has to check his BS at least 4-5 times daily.  He also may benefit from u500 insulin.

## 2019-09-04 ENCOUNTER — TELEPHONE (OUTPATIENT)
Dept: ENDOSCOPY | Facility: HOSPITAL | Age: 39
End: 2019-09-04

## 2019-09-04 LAB
BACTERIA BLD CULT: NORMAL
BACTERIA BLD CULT: NORMAL

## 2019-09-05 ENCOUNTER — OFFICE VISIT (OUTPATIENT)
Dept: INTERNAL MEDICINE | Facility: CLINIC | Age: 39
End: 2019-09-05
Payer: COMMERCIAL

## 2019-09-05 VITALS
WEIGHT: 187.31 LBS | TEMPERATURE: 98 F | DIASTOLIC BLOOD PRESSURE: 70 MMHG | BODY MASS INDEX: 28.39 KG/M2 | SYSTOLIC BLOOD PRESSURE: 120 MMHG | HEIGHT: 68 IN | HEART RATE: 86 BPM | OXYGEN SATURATION: 96 % | RESPIRATION RATE: 18 BRPM

## 2019-09-05 DIAGNOSIS — M10.00 IDIOPATHIC GOUT, UNSPECIFIED CHRONICITY, UNSPECIFIED SITE: ICD-10-CM

## 2019-09-05 DIAGNOSIS — E11.42 TYPE 2 DIABETES MELLITUS WITH DIABETIC POLYNEUROPATHY, WITH LONG-TERM CURRENT USE OF INSULIN: Primary | ICD-10-CM

## 2019-09-05 DIAGNOSIS — I10 ESSENTIAL HYPERTENSION: ICD-10-CM

## 2019-09-05 DIAGNOSIS — E10.40 TYPE 1 DIABETES MELLITUS WITH DIABETIC NEUROPATHY: ICD-10-CM

## 2019-09-05 DIAGNOSIS — E87.6 HYPOKALEMIA: ICD-10-CM

## 2019-09-05 DIAGNOSIS — E78.2 MIXED HYPERLIPIDEMIA: ICD-10-CM

## 2019-09-05 DIAGNOSIS — Z79.4 TYPE 2 DIABETES MELLITUS WITH DIABETIC POLYNEUROPATHY, WITH LONG-TERM CURRENT USE OF INSULIN: Primary | ICD-10-CM

## 2019-09-05 DIAGNOSIS — K86.3 PANCREATIC PSEUDOCYST: ICD-10-CM

## 2019-09-05 PROCEDURE — 99214 PR OFFICE/OUTPT VISIT, EST, LEVL IV, 30-39 MIN: ICD-10-PCS | Mod: S$GLB,,, | Performed by: INTERNAL MEDICINE

## 2019-09-05 PROCEDURE — 99999 PR PBB SHADOW E&M-EST. PATIENT-LVL III: ICD-10-PCS | Mod: PBBFAC,,, | Performed by: INTERNAL MEDICINE

## 2019-09-05 PROCEDURE — 99214 OFFICE O/P EST MOD 30 MIN: CPT | Mod: S$GLB,,, | Performed by: INTERNAL MEDICINE

## 2019-09-05 PROCEDURE — 99999 PR PBB SHADOW E&M-EST. PATIENT-LVL III: CPT | Mod: PBBFAC,,, | Performed by: INTERNAL MEDICINE

## 2019-09-06 DIAGNOSIS — Z79.4 TYPE 2 DIABETES MELLITUS WITH HYPERGLYCEMIA, WITH LONG-TERM CURRENT USE OF INSULIN: ICD-10-CM

## 2019-09-06 DIAGNOSIS — E11.65 TYPE 2 DIABETES MELLITUS WITH HYPERGLYCEMIA, WITH LONG-TERM CURRENT USE OF INSULIN: ICD-10-CM

## 2019-09-06 RX ORDER — INSULIN GLARGINE 100 [IU]/ML
52 INJECTION, SOLUTION SUBCUTANEOUS NIGHTLY
Qty: 18 ML | Refills: 6 | Status: SHIPPED | OUTPATIENT
Start: 2019-09-06 | End: 2020-03-12 | Stop reason: SDUPTHER

## 2019-09-07 RX ORDER — INSULIN GLARGINE 100 [IU]/ML
INJECTION, SOLUTION SUBCUTANEOUS
Refills: 11 | OUTPATIENT
Start: 2019-09-07

## 2019-09-09 RX ORDER — ALLOPURINOL 300 MG/1
TABLET ORAL
Qty: 30 TABLET | Refills: 5 | Status: SHIPPED | OUTPATIENT
Start: 2019-09-09 | End: 2020-03-26 | Stop reason: SDUPTHER

## 2019-09-12 LAB — PHOSPHATIDYLETHANOL (PETH): NEGATIVE NG/ML

## 2019-09-13 ENCOUNTER — TELEPHONE (OUTPATIENT)
Dept: ENDOSCOPY | Facility: HOSPITAL | Age: 39
End: 2019-09-13

## 2019-09-17 ENCOUNTER — CLINICAL SUPPORT (OUTPATIENT)
Dept: OTHER | Facility: CLINIC | Age: 39
End: 2019-09-17
Payer: COMMERCIAL

## 2019-09-17 DIAGNOSIS — Z00.8 ENCOUNTER FOR OTHER GENERAL EXAMINATION: ICD-10-CM

## 2019-09-17 PROCEDURE — 82947 ASSAY GLUCOSE BLOOD QUANT: CPT | Mod: QW,S$GLB,, | Performed by: INTERNAL MEDICINE

## 2019-09-17 PROCEDURE — 99401 PR PREVENT COUNSEL,INDIV,15 MIN: ICD-10-PCS | Mod: S$GLB,,, | Performed by: INTERNAL MEDICINE

## 2019-09-17 PROCEDURE — 80061 LIPID PANEL: CPT | Mod: QW,S$GLB,, | Performed by: INTERNAL MEDICINE

## 2019-09-17 PROCEDURE — 82947 PR  ASSAY QUANTITATIVE,BLOOD GLUCOSE: ICD-10-PCS | Mod: QW,S$GLB,, | Performed by: INTERNAL MEDICINE

## 2019-09-17 PROCEDURE — 99401 PREV MED CNSL INDIV APPRX 15: CPT | Mod: S$GLB,,, | Performed by: INTERNAL MEDICINE

## 2019-09-17 PROCEDURE — 80061 PR  LIPID PANEL: ICD-10-PCS | Mod: QW,S$GLB,, | Performed by: INTERNAL MEDICINE

## 2019-09-18 VITALS — BODY MASS INDEX: 29.34 KG/M2 | HEIGHT: 67 IN

## 2019-09-18 LAB
HBA1C MFR BLD: 12.3 %
HDLC SERPL-MCNC: 19 MG/DL
POC CHOLESTEROL, TOTAL: 99 MG/DL
POC GLUCOSE, FASTING: 217 MG/DL (ref 60–110)
TRIGL SERPL-MCNC: 155 MG/DL

## 2019-09-26 ENCOUNTER — TELEPHONE (OUTPATIENT)
Dept: FAMILY MEDICINE | Facility: CLINIC | Age: 39
End: 2019-09-26

## 2019-09-26 NOTE — TELEPHONE ENCOUNTER
Informed Dr. Singleton is out of the office until 10/3/19 but will be placed for her to see once she returns

## 2019-09-26 NOTE — TELEPHONE ENCOUNTER
----- Message from Laxmi Gómez sent at 9/26/2019 10:40 AM CDT -----  Contact: Bay  Patient dropped off some paperwork for Dr. Singleton to fill out regarding his recent hospitalization. He is trying to get a secondary supplemental insurance to help cover his medical fees. I have put the paperwork including his notes in her mailbox.

## 2019-10-07 RX ORDER — ATORVASTATIN CALCIUM 40 MG/1
TABLET, FILM COATED ORAL
Qty: 90 TABLET | Refills: 0 | Status: SHIPPED | OUTPATIENT
Start: 2019-10-07 | End: 2020-02-03 | Stop reason: SDUPTHER

## 2019-10-08 ENCOUNTER — OFFICE VISIT (OUTPATIENT)
Dept: INTERNAL MEDICINE | Facility: CLINIC | Age: 39
End: 2019-10-08
Payer: COMMERCIAL

## 2019-10-08 VITALS
RESPIRATION RATE: 18 BRPM | OXYGEN SATURATION: 98 % | HEART RATE: 88 BPM | DIASTOLIC BLOOD PRESSURE: 74 MMHG | HEIGHT: 67 IN | WEIGHT: 191.31 LBS | BODY MASS INDEX: 30.03 KG/M2 | SYSTOLIC BLOOD PRESSURE: 107 MMHG | TEMPERATURE: 99 F

## 2019-10-08 DIAGNOSIS — E66.9 OBESITY (BMI 30-39.9): ICD-10-CM

## 2019-10-08 DIAGNOSIS — K86.3 PANCREATIC PSEUDOCYST: ICD-10-CM

## 2019-10-08 DIAGNOSIS — Z79.4 TYPE 2 DIABETES MELLITUS WITH DIABETIC POLYNEUROPATHY, WITH LONG-TERM CURRENT USE OF INSULIN: ICD-10-CM

## 2019-10-08 DIAGNOSIS — K86.1 ACUTE ON CHRONIC PANCREATITIS: Primary | ICD-10-CM

## 2019-10-08 DIAGNOSIS — I81 PORTAL VEIN THROMBOSIS: ICD-10-CM

## 2019-10-08 DIAGNOSIS — E11.42 TYPE 2 DIABETES MELLITUS WITH DIABETIC POLYNEUROPATHY, WITH LONG-TERM CURRENT USE OF INSULIN: ICD-10-CM

## 2019-10-08 DIAGNOSIS — K85.90 ACUTE ON CHRONIC PANCREATITIS: Primary | ICD-10-CM

## 2019-10-08 PROCEDURE — 99213 OFFICE O/P EST LOW 20 MIN: CPT | Mod: S$GLB,,, | Performed by: INTERNAL MEDICINE

## 2019-10-08 PROCEDURE — 3046F PR MOST RECENT HEMOGLOBIN A1C LEVEL > 9.0%: ICD-10-PCS | Mod: CPTII,S$GLB,, | Performed by: INTERNAL MEDICINE

## 2019-10-08 PROCEDURE — 99213 PR OFFICE/OUTPT VISIT, EST, LEVL III, 20-29 MIN: ICD-10-PCS | Mod: S$GLB,,, | Performed by: INTERNAL MEDICINE

## 2019-10-08 PROCEDURE — 3078F PR MOST RECENT DIASTOLIC BLOOD PRESSURE < 80 MM HG: ICD-10-PCS | Mod: CPTII,S$GLB,, | Performed by: INTERNAL MEDICINE

## 2019-10-08 PROCEDURE — 99999 PR PBB SHADOW E&M-EST. PATIENT-LVL III: ICD-10-PCS | Mod: PBBFAC,,, | Performed by: INTERNAL MEDICINE

## 2019-10-08 PROCEDURE — 99999 PR PBB SHADOW E&M-EST. PATIENT-LVL III: CPT | Mod: PBBFAC,,, | Performed by: INTERNAL MEDICINE

## 2019-10-08 PROCEDURE — 3078F DIAST BP <80 MM HG: CPT | Mod: CPTII,S$GLB,, | Performed by: INTERNAL MEDICINE

## 2019-10-08 PROCEDURE — 3046F HEMOGLOBIN A1C LEVEL >9.0%: CPT | Mod: CPTII,S$GLB,, | Performed by: INTERNAL MEDICINE

## 2019-10-08 PROCEDURE — 3074F PR MOST RECENT SYSTOLIC BLOOD PRESSURE < 130 MM HG: ICD-10-PCS | Mod: CPTII,S$GLB,, | Performed by: INTERNAL MEDICINE

## 2019-10-08 PROCEDURE — 3008F PR BODY MASS INDEX (BMI) DOCUMENTED: ICD-10-PCS | Mod: CPTII,S$GLB,, | Performed by: INTERNAL MEDICINE

## 2019-10-08 PROCEDURE — 3008F BODY MASS INDEX DOCD: CPT | Mod: CPTII,S$GLB,, | Performed by: INTERNAL MEDICINE

## 2019-10-08 PROCEDURE — 3074F SYST BP LT 130 MM HG: CPT | Mod: CPTII,S$GLB,, | Performed by: INTERNAL MEDICINE

## 2019-10-08 NOTE — PROGRESS NOTES
INTERNAL MEDICINE    Patient Active Problem List   Diagnosis    Essential hypertension    Obesity (BMI 30-39.9)    Gout    Obstructive sleep apnea syndrome    Mixed hyperlipidemia    Type 2 diabetes mellitus with diabetic polyneuropathy, with long-term current use of insulin    Acute on chronic pancreatitis    Portal vein thrombosis    Pancreatic pseudocyst    Diabetic ketoacidosis without coma associated with type 2 diabetes mellitus    Acute pancreatitis    Hypokalemia       CC:   Chief Complaint   Patient presents with    Paperwork       SUBJECTIVE:  Bay Ibarra   is a 39 y.o. male  HPI   Here for insurance purposes.  Highest   L.owest 137    No hypoglycemia.  ROS: Review of Systems   Constitutional: Negative for activity change and appetite change.   HENT: Negative.    Eyes: Negative.    Respiratory: Negative.  Negative for chest tightness and shortness of breath.    Cardiovascular: Negative.  Negative for chest pain.   Gastrointestinal: Negative.  Negative for abdominal pain and diarrhea.   Genitourinary: Negative.    Musculoskeletal: Negative.    Skin: Negative.    Neurological: Negative.    Hematological: Negative.    Psychiatric/Behavioral: Negative.        Past Medical History:   Diagnosis Date    Acute pancreatitis     Essential hypertension     Gout     Hx of acute pancreatitis 3/3/2017    Hypertriglyceridemia 8/2/2017    Obesity (BMI 30-39.9) 6/5/2015    Obesity (BMI 35.0-39.9 without comorbidity) 6/5/2015    Obstructive sleep apnea syndrome 12/18/2015    Type 2 diabetes mellitus with diabetic polyneuropathy, with long-term current use of insulin 10/16/2017    Type 2 diabetes mellitus with hyperglycemia, with long-term current use of insulin 8/3/2017       Past Surgical History:   Procedure Laterality Date    ENDOSCOPIC ULTRASOUND OF UPPER GASTROINTESTINAL TRACT N/A 5/27/2019    Procedure: ULTRASOUND, UPPER GI TRACT, ENDOSCOPIC;  Surgeon: Zafar Velazquez MD;   [facility]  Service: Endoscopy;  Laterality: N/A;    ERCP N/A 5/27/2019    Procedure: ERCP (ENDOSCOPIC RETROGRADE CHOLANGIOPANCREATOGRAPHY);  Surgeon: Zafar Velazquez MD;  [facility]  Service: Endoscopy;  Laterality: N/A;       Family History   Problem Relation Age of Onset    Coronary artery disease Father         4 vessel bypass at 40, possible stent at 36    Diabetes type II Father     No Known Problems Sister     No Known Problems Brother     Aneurysm Maternal Grandmother         Possible AAA    Diabetes type II Paternal Grandmother        Social History     Tobacco Use    Smoking status: Never Smoker    Smokeless tobacco: Never Used   Substance Use Topics    Alcohol use: No    Drug use: No       Social History     Social History Narrative    Not on file       ALLERGIES: Review of patient's allergies indicates:  No Known Allergies    MEDS:   Current Outpatient Medications:     allopurinol (ZYLOPRIM) 300 MG tablet, TAKE 1 TABLET BY MOUTH ONCE DAILY, Disp: 30 tablet, Rfl: 5    aspirin (ECOTRIN) 81 MG EC tablet, Take 81 mg by mouth once daily., Disp: , Rfl:     atorvastatin (LIPITOR) 40 MG tablet, TAKE 1 TABLET BY MOUTH ONCE DAILY, Disp: 90 tablet, Rfl: 0    diclofenac sodium (VOLTAREN) 1 % Gel, Apply 2 g topically 4 (four) times daily. Apply to chest in the location of pain up to 4 times a day as needed for pain., Disp: 100 g, Rfl: 0    empagliflozin (JARDIANCE) 10 mg Tab, Take 1 tablet (10 mg) by mouth once daily., Disp: 90 tablet, Rfl: 1    fenofibrate 160 MG Tab, TAKE 1 TABLET BY MOUTH ONCE DAILY, Disp: 90 tablet, Rfl: 0    flash glucose sensor (FREESTYLE DEBBIE 14 DAY SENSOR) Kit, use as directed every 14 days, Disp: 2 kit, Rfl: 0    icosapent ethyl (VASCEPA) 1 gram Cap, Take 2 g by mouth 2 (two) times daily., Disp: 360 capsule, Rfl: 3    insulin (LANTUS SOLOSTAR U-100 INSULIN) glargine 100 units/mL (3mL) SubQ pen, Inject 52 Units into the skin every  "evening., Disp: 18 mL, Rfl: 6    insulin aspart U-100 (NOVOLOG FLEXPEN U-100 INSULIN) 100 unit/mL (3 mL) InPn pen, INJECT 20 UNITS SUBCUTANEOUSLY WITH MEALS PLUS SLIDING SCALE 150-200+2, 201-250 +4, 251-300 +6, 301-350 +8,>350 +10. MAX OF 90 UNITS PER DA, Disp: 30 Syringe, Rfl: 4    insulin syringe-needle U-100 29 gauge x 1/2" Syrg, Inject 1 (once) per day, Disp: 100 Syringe, Rfl: 3    liraglutide 0.6 mg/0.1 mL, 18 mg/3 mL, subq PNIJ (VICTOZA 2-LORI) 0.6 mg/0.1 mL (18 mg/3 mL) PnIj, Inject 0.6 mg into the skin once daily., Disp: 3 mL, Rfl: 3    losartan (COZAAR) 100 MG tablet, TAKE 1/2 (ONE-HALF) TABLET BY MOUTH ONCE DAILY, Disp: 45 tablet, Rfl: 3    niacin 100 MG Tab, Take 200 mg by mouth every evening. , Disp: , Rfl:     pregabalin (LYRICA) 150 MG capsule, TAKE 1 CAPSULE BY MOUTH TWICE DAILY, Disp: 60 capsule, Rfl: 0    OBJECTIVE:   Vitals:    10/08/19 1600   BP: 107/74   BP Location: Left arm   Patient Position: Sitting   BP Method: X-Large (Manual)   Pulse: 88   Resp: 18   Temp: 98.5 °F (36.9 °C)   TempSrc: Oral   SpO2: 98%   Weight: 86.8 kg (191 lb 4.8 oz)   Height: 5' 7" (1.702 m)     Body mass index is 29.96 kg/m².    Physical Exam   Constitutional: He is oriented to person, place, and time. He appears well-developed and well-nourished.   HENT:   Head: Normocephalic and atraumatic.   Right Ear: External ear normal.   Left Ear: External ear normal.   Nose: Nose normal.   Mouth/Throat: Oropharynx is clear and moist.   Eyes: Conjunctivae and EOM are normal.   Neck: Neck supple. No JVD present.   Cardiovascular: Normal rate, regular rhythm and normal heart sounds.   Pulmonary/Chest: Effort normal and breath sounds normal.   Abdominal: Soft. Bowel sounds are normal.   Musculoskeletal: He exhibits no edema.   Neurological: He is alert and oriented to person, place, and time.   Skin: Skin is warm and dry.   Psychiatric: He has a normal mood and affect. His behavior is normal.   Nursing note and vitals " reviewed.        PERTINENT RESULTS:   CBC:  Recent Labs   Lab Result Units 08/30/19  0754 08/31/19  0340 09/01/19  0355   WBC K/uL 7.48 5.31 3.41*   RBC M/uL 5.05 4.86 4.71   Hemoglobin g/dL 13.6* 12.7* 12.2*   Hematocrit % 43.5 41.5 38.2*   Platelets K/uL 215 198 165   Mean Corpuscular Volume fL 86 85 81*   Mean Corpuscular Hemoglobin pg 26.9* 26.1* 25.9*   Mean Corpuscular Hemoglobin Conc g/dL 31.3* 30.6* 31.9*     CMP:  Recent Labs   Lab Result Units 08/27/19  1001 08/29/19  0621 08/30/19  0753  09/01/19  0355   Glucose mg/dL 337* 332* 241*   < > 124*   Calcium mg/dL 10.0 9.7 9.2   < > 8.8   Albumin g/dL 4.5 4.3 3.2*  --   --    Total Protein g/dL 7.9 7.6 7.2  --   --    Sodium mmol/L 138 137 142   < > 138   Potassium mmol/L 4.3 3.9 4.2   < > 3.3*   CO2 mmol/L 26 22* 19*   < > 26   Chloride mmol/L 98 98 104   < > 101   BUN, Bld mg/dL 17 14 10   < > 5*   Alkaline Phosphatase U/L 134* 127* 93  --   --    ALT U/L 29 32 25  --   --    AST U/L 28 32 22  --   --    Total Bilirubin mg/dL 1.3* 1.3* 0.7  --   --     < > = values in this interval not displayed.     URINALYSIS:  Recent Labs   Lab Result Units 08/29/19  0621   Color, UA  Straw   Specific Gravity, UA  >=1.030*   pH, UA  5.0   Protein, UA  Negative   Bacteria /hpf Rare   Nitrite, UA  Negative   Leukocytes, UA  Negative   Urobilinogen, UA EU/dL Negative      LIPIDS:  Recent Labs   Lab Result Units 08/30/19  0753   HDL mg/dL 25*   Cholesterol mg/dL 108*   Triglycerides mg/dL 102   LDL Cholesterol mg/dL 62.6*   Hdl/Cholesterol Ratio % 23.1   Non-HDL Cholesterol mg/dL 83   Total Cholesterol/HDL Ratio  4.3             ASSESSMENT:  Problem List Items Addressed This Visit        Hematology    Portal vein thrombosis       Endocrine    Obesity (BMI 30-39.9)    Type 2 diabetes mellitus with diabetic polyneuropathy, with long-term current use of insulin       GI    Acute on chronic pancreatitis - Primary    Pancreatic pseudocyst          PLAN:      No orders of the  defined types were placed in this encounter.  Fill out all his insurance forms, re-assure and be supportive.  Same medications.    Follow-up with me in 3 months.  Dr. José Luis Singleton  Internal Medicine

## 2019-10-17 ENCOUNTER — OFFICE VISIT (OUTPATIENT)
Dept: GASTROENTEROLOGY | Facility: CLINIC | Age: 39
End: 2019-10-17
Payer: COMMERCIAL

## 2019-10-17 VITALS
SYSTOLIC BLOOD PRESSURE: 138 MMHG | DIASTOLIC BLOOD PRESSURE: 71 MMHG | WEIGHT: 185.44 LBS | BODY MASS INDEX: 29.04 KG/M2 | HEART RATE: 91 BPM

## 2019-10-17 DIAGNOSIS — K86.1 CHRONIC PANCREATITIS, UNSPECIFIED PANCREATITIS TYPE: Primary | ICD-10-CM

## 2019-10-17 PROCEDURE — 3008F PR BODY MASS INDEX (BMI) DOCUMENTED: ICD-10-PCS | Mod: CPTII,S$GLB,, | Performed by: INTERNAL MEDICINE

## 2019-10-17 PROCEDURE — 3075F PR MOST RECENT SYSTOLIC BLOOD PRESS GE 130-139MM HG: ICD-10-PCS | Mod: CPTII,S$GLB,, | Performed by: INTERNAL MEDICINE

## 2019-10-17 PROCEDURE — 99213 OFFICE O/P EST LOW 20 MIN: CPT | Mod: S$GLB,,, | Performed by: INTERNAL MEDICINE

## 2019-10-17 PROCEDURE — 3008F BODY MASS INDEX DOCD: CPT | Mod: CPTII,S$GLB,, | Performed by: INTERNAL MEDICINE

## 2019-10-17 PROCEDURE — 99999 PR PBB SHADOW E&M-EST. PATIENT-LVL III: ICD-10-PCS | Mod: PBBFAC,,,

## 2019-10-17 PROCEDURE — 3078F DIAST BP <80 MM HG: CPT | Mod: CPTII,S$GLB,, | Performed by: INTERNAL MEDICINE

## 2019-10-17 PROCEDURE — 3078F PR MOST RECENT DIASTOLIC BLOOD PRESSURE < 80 MM HG: ICD-10-PCS | Mod: CPTII,S$GLB,, | Performed by: INTERNAL MEDICINE

## 2019-10-17 PROCEDURE — 99213 PR OFFICE/OUTPT VISIT, EST, LEVL III, 20-29 MIN: ICD-10-PCS | Mod: S$GLB,,, | Performed by: INTERNAL MEDICINE

## 2019-10-17 PROCEDURE — 3075F SYST BP GE 130 - 139MM HG: CPT | Mod: CPTII,S$GLB,, | Performed by: INTERNAL MEDICINE

## 2019-10-17 PROCEDURE — 99999 PR PBB SHADOW E&M-EST. PATIENT-LVL III: CPT | Mod: PBBFAC,,,

## 2019-10-17 NOTE — PROGRESS NOTES
"Subjective:      Patient ID: Bay Ibarra is a 39 y.o. male.    Chief Complaint: Abdominal Pain and Follow-up      HPI:  Bay Ibarra is a 39 y.o. male with chronic pancreatitis secondary to hypertriglyceridemia who was hospitalized back in end of august for an acute flare with peripancreatic fluid collections. Pt improved during his hospitalization and was d/c home. He has been doing much better with very mild intermittent infrequent pain. He has lost weight from the pancreatitis. He is eating well and appetite is good. No change in BMs.     Review of patient's allergies indicates:  No Known Allergies    Current Outpatient Medications   Medication Sig Dispense Refill    allopurinol (ZYLOPRIM) 300 MG tablet TAKE 1 TABLET BY MOUTH ONCE DAILY 30 tablet 5    aspirin (ECOTRIN) 81 MG EC tablet Take 81 mg by mouth once daily.      atorvastatin (LIPITOR) 40 MG tablet TAKE 1 TABLET BY MOUTH ONCE DAILY 90 tablet 0    diclofenac sodium (VOLTAREN) 1 % Gel Apply 2 g topically 4 (four) times daily. Apply to chest in the location of pain up to 4 times a day as needed for pain. 100 g 0    empagliflozin (JARDIANCE) 10 mg Tab Take 1 tablet (10 mg) by mouth once daily. 90 tablet 1    fenofibrate 160 MG Tab TAKE 1 TABLET BY MOUTH ONCE DAILY 90 tablet 0    flash glucose sensor (FREESTYLE DEBBIE 14 DAY SENSOR) Kit use as directed every 14 days 2 kit 0    icosapent ethyl (VASCEPA) 1 gram Cap Take 2 g by mouth 2 (two) times daily. 360 capsule 3    insulin (LANTUS SOLOSTAR U-100 INSULIN) glargine 100 units/mL (3mL) SubQ pen Inject 52 Units into the skin every evening. 18 mL 6    insulin aspart U-100 (NOVOLOG FLEXPEN U-100 INSULIN) 100 unit/mL (3 mL) InPn pen INJECT 20 UNITS SUBCUTANEOUSLY WITH MEALS PLUS SLIDING SCALE 150-200+2, 201-250 +4, 251-300 +6, 301-350 +8,>350 +10. MAX OF 90 UNITS PER DA 30 Syringe 4    insulin syringe-needle U-100 29 gauge x 1/2" Syrg Inject 1 (once) per day 100 Syringe 3    liraglutide " 0.6 mg/0.1 mL, 18 mg/3 mL, subq PNIJ (VICTOZA 2-LORI) 0.6 mg/0.1 mL (18 mg/3 mL) PnIj Inject 0.6 mg into the skin once daily. 3 mL 3    losartan (COZAAR) 100 MG tablet TAKE 1/2 (ONE-HALF) TABLET BY MOUTH ONCE DAILY 45 tablet 3    niacin 100 MG Tab Take 200 mg by mouth every evening.       pregabalin (LYRICA) 150 MG capsule TAKE 1 CAPSULE BY MOUTH TWICE DAILY 60 capsule 0     No current facility-administered medications for this visit.             Objective:     Physical Exam   Abdominal: Soft. Bowel sounds are normal. He exhibits no distension.   Epigastric tenderness       Assessment/plan:      Chronic pancreatitis     Patient doing very well with no complaints.   -Discussed low fat diet and hydration  -Follow with PCP for TG.  -No need to repeat CT at this time as there is no need for any intervention.   - follow up as needed

## 2019-10-22 ENCOUNTER — PATIENT MESSAGE (OUTPATIENT)
Dept: INTERNAL MEDICINE | Facility: CLINIC | Age: 39
End: 2019-10-22

## 2019-11-06 DIAGNOSIS — E78.2 MIXED HYPERLIPIDEMIA: ICD-10-CM

## 2019-11-06 RX ORDER — FENOFIBRATE 160 MG/1
TABLET ORAL
Qty: 90 TABLET | Refills: 0 | Status: SHIPPED | OUTPATIENT
Start: 2019-11-06 | End: 2020-02-13

## 2019-11-14 ENCOUNTER — HOSPITAL ENCOUNTER (INPATIENT)
Facility: HOSPITAL | Age: 39
LOS: 2 days | Discharge: HOME | DRG: 438 | End: 2019-11-16
Attending: SURGERY | Admitting: SURGERY
Payer: COMMERCIAL

## 2019-11-14 DIAGNOSIS — K86.1 CHRONIC PANCREATITIS, UNSPECIFIED PANCREATITIS TYPE: ICD-10-CM

## 2019-11-14 DIAGNOSIS — K86.3 PANCREATIC PSEUDOCYST: ICD-10-CM

## 2019-11-14 LAB
POCT GLUCOSE: 77 MG/DL (ref 70–110)
POCT GLUCOSE: 79 MG/DL (ref 70–110)

## 2019-11-14 PROCEDURE — 63600175 PHARM REV CODE 636 W HCPCS: Performed by: STUDENT IN AN ORGANIZED HEALTH CARE EDUCATION/TRAINING PROGRAM

## 2019-11-14 PROCEDURE — 11000001 HC ACUTE MED/SURG PRIVATE ROOM

## 2019-11-14 PROCEDURE — 94761 N-INVAS EAR/PLS OXIMETRY MLT: CPT

## 2019-11-14 PROCEDURE — 25000003 PHARM REV CODE 250: Performed by: STUDENT IN AN ORGANIZED HEALTH CARE EDUCATION/TRAINING PROGRAM

## 2019-11-14 RX ORDER — ASPIRIN 81 MG/1
81 TABLET ORAL DAILY
Status: DISCONTINUED | OUTPATIENT
Start: 2019-11-15 | End: 2019-11-16 | Stop reason: HOSPADM

## 2019-11-14 RX ORDER — INSULIN ASPART 100 [IU]/ML
0-5 INJECTION, SOLUTION INTRAVENOUS; SUBCUTANEOUS EVERY 6 HOURS PRN
Status: DISCONTINUED | OUTPATIENT
Start: 2019-11-14 | End: 2019-11-16 | Stop reason: HOSPADM

## 2019-11-14 RX ORDER — SODIUM CHLORIDE 9 MG/ML
INJECTION, SOLUTION INTRAVENOUS CONTINUOUS
Status: DISCONTINUED | OUTPATIENT
Start: 2019-11-14 | End: 2019-11-16 | Stop reason: HOSPADM

## 2019-11-14 RX ORDER — SODIUM CHLORIDE 0.9 % (FLUSH) 0.9 %
10 SYRINGE (ML) INJECTION
Status: DISCONTINUED | OUTPATIENT
Start: 2019-11-14 | End: 2019-11-16 | Stop reason: HOSPADM

## 2019-11-14 RX ORDER — TALC
6 POWDER (GRAM) TOPICAL NIGHTLY PRN
Status: DISCONTINUED | OUTPATIENT
Start: 2019-11-14 | End: 2019-11-16 | Stop reason: HOSPADM

## 2019-11-14 RX ORDER — TRAMADOL HYDROCHLORIDE 50 MG/1
50 TABLET ORAL EVERY 6 HOURS PRN
Status: DISCONTINUED | OUTPATIENT
Start: 2019-11-14 | End: 2019-11-16 | Stop reason: HOSPADM

## 2019-11-14 RX ORDER — LIDOCAINE HYDROCHLORIDE 10 MG/ML
1 INJECTION INFILTRATION; PERINEURAL ONCE
Status: DISCONTINUED | OUTPATIENT
Start: 2019-11-14 | End: 2019-11-16 | Stop reason: HOSPADM

## 2019-11-14 RX ORDER — ACETAMINOPHEN 325 MG/1
650 TABLET ORAL EVERY 8 HOURS PRN
Status: DISCONTINUED | OUTPATIENT
Start: 2019-11-14 | End: 2019-11-16 | Stop reason: HOSPADM

## 2019-11-14 RX ORDER — MORPHINE SULFATE 2 MG/ML
2 INJECTION, SOLUTION INTRAMUSCULAR; INTRAVENOUS EVERY 4 HOURS PRN
Status: DISCONTINUED | OUTPATIENT
Start: 2019-11-14 | End: 2019-11-15

## 2019-11-14 RX ORDER — ENOXAPARIN SODIUM 100 MG/ML
40 INJECTION SUBCUTANEOUS EVERY 24 HOURS
Status: DISCONTINUED | OUTPATIENT
Start: 2019-11-14 | End: 2019-11-16 | Stop reason: HOSPADM

## 2019-11-14 RX ORDER — ALLOPURINOL 100 MG/1
300 TABLET ORAL DAILY
Status: DISCONTINUED | OUTPATIENT
Start: 2019-11-15 | End: 2019-11-16 | Stop reason: HOSPADM

## 2019-11-14 RX ORDER — POTASSIUM CHLORIDE 20 MEQ/15ML
40 SOLUTION ORAL ONCE
Status: COMPLETED | OUTPATIENT
Start: 2019-11-14 | End: 2019-11-15

## 2019-11-14 RX ORDER — PREGABALIN 75 MG/1
150 CAPSULE ORAL 2 TIMES DAILY
Status: DISCONTINUED | OUTPATIENT
Start: 2019-11-14 | End: 2019-11-16 | Stop reason: HOSPADM

## 2019-11-14 RX ORDER — ATORVASTATIN CALCIUM 40 MG/1
40 TABLET, FILM COATED ORAL DAILY
Status: DISCONTINUED | OUTPATIENT
Start: 2019-11-15 | End: 2019-11-16 | Stop reason: HOSPADM

## 2019-11-14 RX ORDER — GLUCAGON 1 MG
1 KIT INJECTION
Status: DISCONTINUED | OUTPATIENT
Start: 2019-11-14 | End: 2019-11-16 | Stop reason: HOSPADM

## 2019-11-14 RX ORDER — LOSARTAN POTASSIUM 50 MG/1
100 TABLET ORAL DAILY
Status: DISCONTINUED | OUTPATIENT
Start: 2019-11-15 | End: 2019-11-16 | Stop reason: HOSPADM

## 2019-11-14 RX ORDER — ACETAMINOPHEN 325 MG/1
650 TABLET ORAL EVERY 4 HOURS PRN
Status: DISCONTINUED | OUTPATIENT
Start: 2019-11-14 | End: 2019-11-16 | Stop reason: HOSPADM

## 2019-11-14 RX ORDER — ONDANSETRON 2 MG/ML
4 INJECTION INTRAMUSCULAR; INTRAVENOUS EVERY 6 HOURS PRN
Status: DISCONTINUED | OUTPATIENT
Start: 2019-11-14 | End: 2019-11-16 | Stop reason: HOSPADM

## 2019-11-14 RX ORDER — ONDANSETRON 8 MG/1
8 TABLET, ORALLY DISINTEGRATING ORAL EVERY 8 HOURS PRN
Status: DISCONTINUED | OUTPATIENT
Start: 2019-11-14 | End: 2019-11-16 | Stop reason: HOSPADM

## 2019-11-14 RX ADMIN — PREGABALIN 150 MG: 75 CAPSULE ORAL at 10:11

## 2019-11-14 RX ADMIN — SODIUM CHLORIDE: 0.9 INJECTION, SOLUTION INTRAVENOUS at 10:11

## 2019-11-14 RX ADMIN — ENOXAPARIN SODIUM 40 MG: 100 INJECTION SUBCUTANEOUS at 10:11

## 2019-11-15 LAB
ALBUMIN SERPL BCP-MCNC: 3.1 G/DL (ref 3.5–5.2)
ALP SERPL-CCNC: 113 U/L (ref 55–135)
ALT SERPL W/O P-5'-P-CCNC: 36 U/L (ref 10–44)
ANION GAP SERPL CALC-SCNC: 12 MMOL/L (ref 8–16)
AST SERPL-CCNC: 25 U/L (ref 10–40)
BASOPHILS # BLD AUTO: 0.02 K/UL (ref 0–0.2)
BASOPHILS NFR BLD: 0.4 % (ref 0–1.9)
BILIRUB SERPL-MCNC: 0.5 MG/DL (ref 0.1–1)
BUN SERPL-MCNC: 12 MG/DL (ref 6–20)
CALCIUM SERPL-MCNC: 9 MG/DL (ref 8.7–10.5)
CHLORIDE SERPL-SCNC: 110 MMOL/L (ref 95–110)
CHOLEST SERPL-MCNC: 98 MG/DL (ref 120–199)
CHOLEST/HDLC SERPL: 4.9 {RATIO} (ref 2–5)
CO2 SERPL-SCNC: 25 MMOL/L (ref 23–29)
CREAT SERPL-MCNC: 0.7 MG/DL (ref 0.5–1.4)
DIFFERENTIAL METHOD: ABNORMAL
EOSINOPHIL # BLD AUTO: 0.1 K/UL (ref 0–0.5)
EOSINOPHIL NFR BLD: 2.4 % (ref 0–8)
ERYTHROCYTE [DISTWIDTH] IN BLOOD BY AUTOMATED COUNT: 14.7 % (ref 11.5–14.5)
EST. GFR  (AFRICAN AMERICAN): >60 ML/MIN/1.73 M^2
EST. GFR  (NON AFRICAN AMERICAN): >60 ML/MIN/1.73 M^2
GLUCOSE SERPL-MCNC: 86 MG/DL (ref 70–110)
HCT VFR BLD AUTO: 39.9 % (ref 40–54)
HDLC SERPL-MCNC: 20 MG/DL (ref 40–75)
HDLC SERPL: 20.4 % (ref 20–50)
HGB BLD-MCNC: 12.2 G/DL (ref 14–18)
LDLC SERPL CALC-MCNC: 58 MG/DL (ref 63–159)
LYMPHOCYTES # BLD AUTO: 0.5 K/UL (ref 1–4.8)
LYMPHOCYTES NFR BLD: 11.7 % (ref 18–48)
MAGNESIUM SERPL-MCNC: 2.1 MG/DL (ref 1.6–2.6)
MCH RBC QN AUTO: 25 PG (ref 27–31)
MCHC RBC AUTO-ENTMCNC: 30.6 G/DL (ref 32–36)
MCV RBC AUTO: 82 FL (ref 82–98)
MONOCYTES # BLD AUTO: 0.4 K/UL (ref 0.3–1)
MONOCYTES NFR BLD: 9.7 % (ref 4–15)
NEUTROPHILS # BLD AUTO: 3.4 K/UL (ref 1.8–7.7)
NEUTROPHILS NFR BLD: 75.8 % (ref 38–73)
NONHDLC SERPL-MCNC: 78 MG/DL
PHOSPHATE SERPL-MCNC: 2.7 MG/DL (ref 2.7–4.5)
PLATELET # BLD AUTO: 168 K/UL (ref 150–350)
PMV BLD AUTO: 10.8 FL (ref 9.2–12.9)
POCT GLUCOSE: 115 MG/DL (ref 70–110)
POCT GLUCOSE: 59 MG/DL (ref 70–110)
POCT GLUCOSE: 68 MG/DL (ref 70–110)
POCT GLUCOSE: 78 MG/DL (ref 70–110)
POCT GLUCOSE: 83 MG/DL (ref 70–110)
POCT GLUCOSE: 83 MG/DL (ref 70–110)
POTASSIUM SERPL-SCNC: 3.8 MMOL/L (ref 3.5–5.1)
PROT SERPL-MCNC: 6.5 G/DL (ref 6–8.4)
RBC # BLD AUTO: 4.88 M/UL (ref 4.6–6.2)
SODIUM SERPL-SCNC: 147 MMOL/L (ref 136–145)
TRIGL SERPL-MCNC: 100 MG/DL (ref 30–150)
WBC # BLD AUTO: 4.54 K/UL (ref 3.9–12.7)

## 2019-11-15 PROCEDURE — 25000003 PHARM REV CODE 250: Performed by: STUDENT IN AN ORGANIZED HEALTH CARE EDUCATION/TRAINING PROGRAM

## 2019-11-15 PROCEDURE — 63600175 PHARM REV CODE 636 W HCPCS: Performed by: STUDENT IN AN ORGANIZED HEALTH CARE EDUCATION/TRAINING PROGRAM

## 2019-11-15 PROCEDURE — 80061 LIPID PANEL: CPT

## 2019-11-15 PROCEDURE — 25000003 PHARM REV CODE 250: Performed by: SURGERY

## 2019-11-15 PROCEDURE — 83735 ASSAY OF MAGNESIUM: CPT

## 2019-11-15 PROCEDURE — 36415 COLL VENOUS BLD VENIPUNCTURE: CPT

## 2019-11-15 PROCEDURE — 94761 N-INVAS EAR/PLS OXIMETRY MLT: CPT

## 2019-11-15 PROCEDURE — 80053 COMPREHEN METABOLIC PANEL: CPT

## 2019-11-15 PROCEDURE — 11000001 HC ACUTE MED/SURG PRIVATE ROOM

## 2019-11-15 PROCEDURE — 84100 ASSAY OF PHOSPHORUS: CPT

## 2019-11-15 PROCEDURE — 85025 COMPLETE CBC W/AUTO DIFF WBC: CPT

## 2019-11-15 RX ORDER — HYDROMORPHONE HYDROCHLORIDE 2 MG/ML
2 INJECTION, SOLUTION INTRAMUSCULAR; INTRAVENOUS; SUBCUTANEOUS EVERY 6 HOURS PRN
Status: DISCONTINUED | OUTPATIENT
Start: 2019-11-15 | End: 2019-11-16 | Stop reason: HOSPADM

## 2019-11-15 RX ADMIN — PREGABALIN 150 MG: 75 CAPSULE ORAL at 08:11

## 2019-11-15 RX ADMIN — ENOXAPARIN SODIUM 40 MG: 100 INJECTION SUBCUTANEOUS at 04:11

## 2019-11-15 RX ADMIN — ASPIRIN 81 MG: 81 TABLET, COATED ORAL at 08:11

## 2019-11-15 RX ADMIN — DEXTROSE 125 ML: 10 SOLUTION INTRAVENOUS at 04:11

## 2019-11-15 RX ADMIN — MORPHINE SULFATE 2 MG: 2 INJECTION, SOLUTION INTRAMUSCULAR; INTRAVENOUS at 08:11

## 2019-11-15 RX ADMIN — MORPHINE SULFATE 2 MG: 2 INJECTION, SOLUTION INTRAMUSCULAR; INTRAVENOUS at 04:11

## 2019-11-15 RX ADMIN — SODIUM CHLORIDE: 0.9 INJECTION, SOLUTION INTRAVENOUS at 08:11

## 2019-11-15 RX ADMIN — POTASSIUM CHLORIDE 40 MEQ: 20 SOLUTION ORAL at 12:11

## 2019-11-15 RX ADMIN — HYDROMORPHONE HYDROCHLORIDE 2 MG: 2 INJECTION, SOLUTION INTRAMUSCULAR; INTRAVENOUS; SUBCUTANEOUS at 07:11

## 2019-11-15 RX ADMIN — HYDROMORPHONE HYDROCHLORIDE 2 MG: 2 INJECTION, SOLUTION INTRAMUSCULAR; INTRAVENOUS; SUBCUTANEOUS at 12:11

## 2019-11-15 RX ADMIN — LOSARTAN POTASSIUM 100 MG: 50 TABLET ORAL at 08:11

## 2019-11-15 RX ADMIN — MORPHINE SULFATE 2 MG: 2 INJECTION, SOLUTION INTRAMUSCULAR; INTRAVENOUS at 12:11

## 2019-11-15 RX ADMIN — ATORVASTATIN CALCIUM 40 MG: 40 TABLET, FILM COATED ORAL at 08:11

## 2019-11-15 RX ADMIN — ALLOPURINOL 300 MG: 100 TABLET ORAL at 08:11

## 2019-11-15 NOTE — NURSING
MD contacted per request from primary RN Rodrigo. Patient hypoglycemic this AM at 59. D10 administered per orders.  notified. Re checked blood sugar is now 115. Will continue to Monitor.

## 2019-11-15 NOTE — H&P
Surgery H&P    CC:  Pancreatitis with pseudocyst    HPI:  The patient is a 39-year-old gentleman who presented today with acute onset abdominal pain.  Since arrival to the emergency department he remained hemodynamically normal.  Labs were overall unremarkable except for an elevated lipase consistent with acute pancreatitis.  The patient has had history of multiple bouts of pancreatitis dating back to 2013.  This has been noted to be secondary to hypertriglyceridemia.  He has never had previous abdominal surgery.  He has a known history of pancreatic pseudocyst as well as a portal venous thrombosis.  All of this does appear stable on the CT scan from today.    Past Medical History:   Diagnosis Date    Acute pancreatitis     Essential hypertension     Gout     Hx of acute pancreatitis 3/3/2017    Hypertriglyceridemia 8/2/2017    Obesity (BMI 30-39.9) 6/5/2015    Obesity (BMI 35.0-39.9 without comorbidity) 6/5/2015    Obstructive sleep apnea syndrome 12/18/2015    Type 2 diabetes mellitus with diabetic polyneuropathy, with long-term current use of insulin 10/16/2017    Type 2 diabetes mellitus with hyperglycemia, with long-term current use of insulin 8/3/2017       Past Surgical History:   Procedure Laterality Date    ENDOSCOPIC ULTRASOUND OF UPPER GASTROINTESTINAL TRACT N/A 5/27/2019    Procedure: ULTRASOUND, UPPER GI TRACT, ENDOSCOPIC;  Surgeon: Zafar Velazquez MD;  Location: [address]);  Service: Endoscopy;  Laterality: N/A;    ERCP N/A 5/27/2019    Procedure: ERCP (ENDOSCOPIC RETROGRADE CHOLANGIOPANCREATOGRAPHY);  Surgeon: Zafar Velazquez MD;  Location: [address]);  Service: Endoscopy;  Laterality: N/A;       Review of patient's allergies indicates:  No Known Allergies    Social History     Socioeconomic History    Marital status:      Spouse name: Not on file    Number of children: Not on file    Years of education: Not on file    Highest education level: Not on file    Occupational History    Not on file   Social Needs    Financial resource strain: Not on file    Food insecurity:     Worry: Not on file     Inability: Not on file    Transportation needs:     Medical: Not on file     Non-medical: Not on file   Tobacco Use    Smoking status: Never Smoker    Smokeless tobacco: Never Used   Substance and Sexual Activity    Alcohol use: No    Drug use: No    Sexual activity: Yes     Partners: Female   Lifestyle    Physical activity:     Days per week: Not on file     Minutes per session: Not on file    Stress: Not on file   Relationships    Social connections:     Talks on phone: Not on file     Gets together: Not on file     Attends Sabianist service: Not on file     Active member of club or organization: Not on file     Attends meetings of clubs or organizations: Not on file     Relationship status: Not on file   Other Topics Concern    Not on file   Social History Narrative    Not on file       Family History   Problem Relation Age of Onset    Coronary artery disease Father         4 vessel bypass at 40, possible stent at 36    Diabetes type II Father     No Known Problems Sister     No Known Problems Brother     Aneurysm Maternal Grandmother         Possible AAA    Diabetes type II Paternal Grandmother        Physical Exam:  Gen: NAD, AAOx3  HEENT: EOMI, no JVD  CV: RRR  Resp: no shortness of breath  Abd: soft, non-tender, mildly distended  Ext: no cyanosis or edema  MSK: normal range of motion, normal strength  Neuro: no focal deficits, normal sensation    Labs:  WBC 8.3  H&H 14/44  Platelet 258  Potassium 3  Creatinine 0.8  AST/ALT 44/60  Lipase 3579    Imaging:  CT with findings as above    A/P:  39-year-old gentleman with bout of acute pancreatitis and known history of pancreatic pseudocyst as well as portal venous thrombosis with extensive collateralization    1.  Admit  2.  Supportive care with NPO and IV fluids  3.  Replace electrolytes  4.  No acute  surgical intervention at this time, we will continue to monitor the patient.      Roshan Stacy MD   102

## 2019-11-15 NOTE — PLAN OF CARE
Slept well, medicated for pain as needed. No s/s of hypoglycemia noted. Held NPO since arrived to floor. Comfort and safety maintained.

## 2019-11-15 NOTE — NURSING
VN cued into room to complete admit assessment, VIP model introduced, VN working alongside bedside treatment team.  Plan of care reviewed with patient. Patient verbalized understanding. Patient informed of fall risk and fall precautions, call light within reach, 2x bed rails. Patient notified to ask staff for assistance and pt verbalized complete understanding. Time allowed for questions. Will continue to monitor and intervene as needed.

## 2019-11-15 NOTE — PLAN OF CARE
AAOx3. Bed remains low, locked and call bell within reach. Patient verbalized understanding to call for any needs or assistance. Continuous IV fluids maintained. Blood sugars monitored. IV morphine  changed to dilaudid. PRN pain medication administered per Md orders. Will continue to monitor patient.

## 2019-11-15 NOTE — NURSING
Accu chec was done with a result of 59. Bolus of D10 administered, rechecked blood sugar with a result of 115. Notified VN to notify MD and to change his IV fluids. Pt is NPO and a diabetic.Charge Nurse notified.

## 2019-11-15 NOTE — PROGRESS NOTES
[facility]  Progress Note    Admit Date: 11/14/2019  Hospital Day: 1  Procedure:   none    Subjective:  NAEO. AFVSS  NPO  Still with abdominal pain   No n/v  No  f/c, cp/sob  Passing flatus no BM      Physical Exam:  Gen: no acute distress. Alert and oriented x3.  HEENT: normocephalic and atraumatic. EOMI.   Resp: unlabored respirations  CV: regular rate, distal pulses intact  Abd: soft, epigastric tender, non-distended,   Ext: warm and well perfused  MSK: strength grossly intact  Neuro: no focal deficits, normal sensation    Labs reviewed below-   WBC 4.54  H/H 12.2/39.9    I/O (last 24 hours):  UOP: 400 cc       Assessment/Plan: 39 y.o. male admitted 11/14/2019 with bout of acute pancreatitis and known history of pancreatic pseudocyst as well as portal venous thrombosis with extensive collateralization    - Pain not well controlled will discontinue Morphine since studies have shown that it contracts the sphincter of oddi   - Start Dilaudid 2 mg q6 hours   - Consult advance GI for celiac plexus block   - MIVF 150 cc/hr   - Advance diet as tolerated     Diet-  NPO except meds  Antibiotics- none  Prophylaxis- lovenox    Dispo: Pending clinical improvement     C/o pain agree with above asseessmnet and plan    Will consult GI for EUS and celic plexus block      D'Amico C Johnson, MD   [facility] General [facility], PGY2  11/15/2019       Inpatient Data     Vitals:   Temp:  [96.3 °F (35.7 °C)-97.9 °F (36.6 °C)] 96.3 °F (35.7 °C)  Pulse:  [] 89  Resp:  [16-18] 18  SpO2:  [96 %-100 %] 98 %  BP: (115-160)/() 160/79 Intake/Output:  11/14 0701 - 11/15 0700  In: 897.9 [I.V.:772.9]  Out: 400 [Urine:400]       Recent Labs     11/14/19  1241 11/15/19  0803   WBC 8.29 4.54   HGB 14.1 12.2*   HCT 43.9 39.9*    168     --    K 3.0*  --    CL 99  --    CO2 31*  --    BUN 15  --    CREATININE 0.78  --    BILITOT 0.7  --    AST 44  --    ALT 60*  --    ALKPHOS 191*  --    CALCIUM 9.5  --     ALBUMIN 4.1  --    PROT 7.8  --         Scheduled Meds:   allopurinol  300 mg Oral Daily    aspirin  81 mg Oral Daily    atorvastatin  40 mg Oral Daily    enoxaparin  40 mg Subcutaneous Daily    lidocaine HCL 10 mg/ml (1%)  1 mL Other Once    losartan  100 mg Oral Daily    pregabalin  150 mg Oral BID     Continuous Infusions:   sodium chloride 0.9% 125 mL/hr at 11/15/19 0848     PRN Meds:acetaminophen, acetaminophen, Dextrose 10% Bolus, Dextrose 10% Bolus, glucagon (human recombinant), HYDROmorphone, insulin aspart U-100, melatonin, ondansetron, ondansetron, sodium chloride 0.9%, traMADol

## 2019-11-15 NOTE — PLAN OF CARE
TN met patient for discharge planning. Pt lives alone. Pt states his brother lives near by and could help him at home if needed. Pt independent with ADLs and able to drive self. Pt has a CPAP at home. Patient has no preference of appt time. Patient's GI doctor is . GI consult pending. Pt's father to provide transportation on discharge.    Discharge brochure and blue discharge folder given to pt. TN updated contact information on whiteboard.       11/15/19 1215   Discharge Assessment   Assessment Type Discharge Planning Assessment   Confirmed/corrected address and phone number on facesheet? Yes   Assessment information obtained from? Patient   Expected Length of Stay (days) 2   Communicated expected length of stay with patient/caregiver yes   Prior to hospitilization cognitive status: Alert/Oriented   Prior to hospitalization functional status: Independent   Current cognitive status: Alert/Oriented   Current Functional Status: Independent   Lives With alone   Able to Return to Prior Arrangements yes   Is patient able to care for self after discharge? Yes   Who are your caregiver(s) and their phone number(s)? Guero Ibarra (Father) [phone]   Patient's perception of discharge disposition home or selfcare   Readmission Within the Last 30 Days no previous admission in last 30 days   Patient currently being followed by outpatient case management? No   Patient currently receives any other outside agency services? No   Equipment Currently Used at Home CPAP   Do you have any problems affording any of your prescribed medications? No   Is the patient taking medications as prescribed? yes   Does the patient have transportation home? Yes   Transportation Anticipated family or friend will provide   Does the patient receive services at the [facility]? No   Discharge Plan A Home;Home with family   Discharge Plan B Home;Home with family   DME Needed Upon Discharge  none   Patient/Family in Agreement with Plan  yes   Abhijit Shipley RN-BC  Transitional Navigator  [phone]

## 2019-11-16 VITALS
RESPIRATION RATE: 20 BRPM | BODY MASS INDEX: 27.67 KG/M2 | HEIGHT: 68 IN | TEMPERATURE: 97 F | DIASTOLIC BLOOD PRESSURE: 66 MMHG | HEART RATE: 72 BPM | OXYGEN SATURATION: 96 % | WEIGHT: 182.56 LBS | SYSTOLIC BLOOD PRESSURE: 121 MMHG

## 2019-11-16 LAB
MAGNESIUM SERPL-MCNC: 1.8 MG/DL (ref 1.6–2.6)
PHOSPHATE SERPL-MCNC: 2.5 MG/DL (ref 2.7–4.5)
POCT GLUCOSE: 65 MG/DL (ref 70–110)
POCT GLUCOSE: 89 MG/DL (ref 70–110)
POCT GLUCOSE: 89 MG/DL (ref 70–110)

## 2019-11-16 PROCEDURE — 63600175 PHARM REV CODE 636 W HCPCS: Performed by: STUDENT IN AN ORGANIZED HEALTH CARE EDUCATION/TRAINING PROGRAM

## 2019-11-16 PROCEDURE — 25000003 PHARM REV CODE 250: Performed by: STUDENT IN AN ORGANIZED HEALTH CARE EDUCATION/TRAINING PROGRAM

## 2019-11-16 PROCEDURE — 83735 ASSAY OF MAGNESIUM: CPT

## 2019-11-16 PROCEDURE — 84100 ASSAY OF PHOSPHORUS: CPT

## 2019-11-16 PROCEDURE — 36415 COLL VENOUS BLD VENIPUNCTURE: CPT

## 2019-11-16 RX ORDER — PANTOPRAZOLE SODIUM 20 MG/1
20 TABLET, DELAYED RELEASE ORAL DAILY
Qty: 30 TABLET | Refills: 0 | Status: SHIPPED | OUTPATIENT
Start: 2019-11-16 | End: 2020-02-11

## 2019-11-16 RX ORDER — TRAMADOL HYDROCHLORIDE 50 MG/1
50 TABLET ORAL EVERY 6 HOURS PRN
Qty: 12 TABLET | Refills: 0 | Status: SHIPPED | OUTPATIENT
Start: 2019-11-16 | End: 2019-11-19

## 2019-11-16 RX ADMIN — ALLOPURINOL 300 MG: 100 TABLET ORAL at 09:11

## 2019-11-16 RX ADMIN — PREGABALIN 150 MG: 75 CAPSULE ORAL at 09:11

## 2019-11-16 RX ADMIN — ATORVASTATIN CALCIUM 40 MG: 40 TABLET, FILM COATED ORAL at 09:11

## 2019-11-16 RX ADMIN — ASPIRIN 81 MG: 81 TABLET, COATED ORAL at 09:11

## 2019-11-16 RX ADMIN — LOSARTAN POTASSIUM 100 MG: 50 TABLET ORAL at 09:11

## 2019-11-16 RX ADMIN — TRAMADOL HYDROCHLORIDE 50 MG: 50 TABLET, FILM COATED ORAL at 03:11

## 2019-11-16 RX ADMIN — HYDROMORPHONE HYDROCHLORIDE 2 MG: 2 INJECTION, SOLUTION INTRAMUSCULAR; INTRAVENOUS; SUBCUTANEOUS at 09:11

## 2019-11-16 RX ADMIN — SODIUM CHLORIDE: 0.9 INJECTION, SOLUTION INTRAVENOUS at 09:11

## 2019-11-16 NOTE — PROGRESS NOTES
[facility]  Progress Note    Admit Date: 11/14/2019  Hospital Day: 2  Procedure:   None     Subjective:  NAEO. AFVSS  Pain well controlled   Has an appetite   Ready to try food   Passing flatus   Abd pain has resolved   Ambulating   Voiding spontaneously       Physical Exam:  Gen: no acute distress. Alert and oriented x3.  HEENT: normocephalic and atraumatic. EOMI.   Resp: unlabored respirations  CV: regular rate, distal pulses intact  Abd: soft, non-tender, non-distended,  Ext: warm and well perfused  MSK: strength grossly intact  Neuro: no focal deficits, normal sensation    Labs reviewed below-   Mag 1.8      Assessment/Plan: 39 y.o. male admitted 11/14/2019 with bout of acute pancreatitis and known history of pancreatic pseudocyst as well as portal venous thrombosis with extensive collateralization    Pain better controlled  Full liquid diet and advance as tolerated   Feels like eating today   Ok to discharge after lunch if tolerating diet  Follow up in clinic in 2 weeks     Diet-  Advance as tolerated   Antibiotics- none  Prophylaxis- lovenox    Dispo: today     D'Amico C Johnson, MD   [facility] General [facility], PGY2  11/16/2019     Pain lot better, no more pain    Will start diet, wants to go home    Will dc home   Will complete w/u as outpt  Labs reviewed  meds reviewed  F/u in my office    Inpatient Data     Vitals:   Temp:  [96.7 °F (35.9 °C)-98 °F (36.7 °C)] 96.7 °F (35.9 °C)  Pulse:  [74-88] 81  Resp:  [17-20] 20  SpO2:  [94 %-97 %] 96 %  BP: (117-170)/(59-79) 130/64 Intake/Output:  11/15 0701 - 11/16 0700  In: 3772.5 [I.V.:3647.5]  Out: -        Recent Labs     11/14/19  1241 11/15/19  0803 11/16/19  0420   WBC 8.29 4.54  --    HGB 14.1 12.2*  --    HCT 43.9 39.9*  --     168  --     147*  --    K 3.0* 3.8  --    CL 99 110  --    CO2 31* 25  --    BUN 15 12  --    CREATININE 0.78 0.7  --    BILITOT 0.7 0.5  --    AST 44 25  --    ALT 60* 36  --    ALKPHOS 191* 113   --    CALCIUM 9.5 9.0  --    ALBUMIN 4.1 3.1*  --    PROT 7.8 6.5  --    MG  --  2.1 1.8   PHOS  --  2.7 2.5*        Scheduled Meds:   allopurinol  300 mg Oral Daily    aspirin  81 mg Oral Daily    atorvastatin  40 mg Oral Daily    enoxaparin  40 mg Subcutaneous Daily    lidocaine HCL 10 mg/ml (1%)  1 mL Other Once    losartan  100 mg Oral Daily    pregabalin  150 mg Oral BID     Continuous Infusions:   sodium chloride 0.9% 150 mL/hr at 11/16/19 0926     PRN Meds:acetaminophen, acetaminophen, Dextrose 10% Bolus, Dextrose 10% Bolus, glucagon (human recombinant), HYDROmorphone, insulin aspart U-100, melatonin, ondansetron, ondansetron, sodium chloride 0.9%, traMADol

## 2019-11-16 NOTE — DISCHARGE SUMMARY
[facility]  Discharge Summary     Patient ID:  Bay Ibarra  [id number]  39 y.o.  1980    Admit date: 11/14/2019    Discharge Date and Time:  11/16/2019 11:24 AM    Admitting Physician: Morenita Mora MD     Discharge Provider: D'Amico C Johnson    Reason for Admission: Pancreatic pseudocyst [K86.3]    Admission Condition: good    Procedures Performed: * No surgery found *    HPI:  The patient is a 39-year-old gentleman who presented today with acute onset abdominal pain.  Since arrival to the emergency department he remained hemodynamically normal.  Labs were overall unremarkable except for an elevated lipase consistent with acute pancreatitis.  The patient has had history of multiple bouts of pancreatitis dating back to 2013.  This has been noted to be secondary to hypertriglyceridemia.  He has never had previous abdominal surgery.  He has a known history of pancreatic pseudocyst as well as a portal venous thrombosis.  All of this does appear stable on the CT scan from today    Hospital Course: The patient remained NPO and started on fluid rehydration and pain control. The patient did not have adequate pain control with morphine. The morphine was discontinued for dilaudid. The patient achieved adequate pain control and started to feel better. He requested food. Was able to tolerate a diet. The discharge plan was discussed. Medications sent to the pharmacy. The patient will need to follow up in 1-2 weeks. All questions answered.      Consults: none    Significant Diagnostic Studies: labs:   Recent Labs   Lab 11/14/19  1241 11/15/19  0803   WBC 8.29 4.54   HGB 14.1 12.2*   HCT 43.9 39.9*    168     Recent Labs   Lab 11/14/19  1241 11/15/19  0803    147*   K 3.0* 3.8   CL 99 110   CO2 31* 25   BUN 15 12   CREATININE 0.78 0.7   CALCIUM 9.5 9.0   PROT 7.8 6.5   BILITOT 0.7 0.5   ALKPHOS 191* 113   ALT 60* 36   AST 44 25     Recent Labs   Lab 11/15/19  2025  11/16/19  0325 11/16/19  0606   POCTGLUCOSE 78 65* 89     Lipase Result 23 - 300 U/L [id number]         Final Diagnoses:    Principal Problem: Chronic pancreatitis   Secondary Diagnoses:   Active Hospital Problems    Diagnosis  POA    *Chronic pancreatitis [K86.1]  Yes    Pancreatic pseudocyst [K86.3]  Yes      Resolved Hospital Problems   No resolved problems to display.       Discharged Condition: good        Disposition: Discharged to Home     Follow Up/Patient Instructions:     Medications:  Reconciled Home Medications:      Medication List      START taking these medications    pantoprazole 20 MG tablet  Commonly known as:  PROTONIX  Take 1 tablet (20 mg total) by mouth once daily.     traMADol 50 mg tablet  Commonly known as:  ULTRAM  Take 1 tablet (50 mg total) by mouth every 6 (six) hours as needed for Pain.        CONTINUE taking these medications    allopurinol 300 MG tablet  Commonly known as:  ZYLOPRIM  TAKE 1 TABLET BY MOUTH ONCE DAILY     aspirin 81 MG EC tablet  Commonly known as:  ECOTRIN  Take 81 mg by mouth once daily.     atorvastatin 40 MG tablet  Commonly known as:  LIPITOR  TAKE 1 TABLET BY MOUTH ONCE DAILY     diclofenac sodium 1 % Gel  Commonly known as:  VOLTAREN  Apply 2 g topically 4 (four) times daily. Apply to chest in the location of pain up to 4 times a day as needed for pain.     fenofibrate 160 MG Tab  TAKE 1 TABLET BY MOUTH ONCE DAILY     flash glucose sensor Kit  Commonly known as:  FreeStyle Celia 14 Day Sensor  use as directed every 14 days     icosapent ethyl 1 gram Cap  Commonly known as:  VASCEPA  Take 2 g by mouth 2 (two) times daily.     insulin aspart U-100 100 unit/mL (3 mL) Inpn pen  Commonly known as:  NovoLOG Flexpen U-100 Insulin  INJECT 20 UNITS SUBCUTANEOUSLY WITH MEALS PLUS SLIDING SCALE 150-200+2, 201-250 +4, 251-300 +6, 301-350 +8,>350 +10. MAX OF 90 UNITS PER DA     insulin glargine 100 units/mL (3mL) SubQ pen  Commonly known as:  Lantus Solostar U-100  "Insulin  Inject 52 Units into the skin every evening.     insulin syringe-needle U-100 29 gauge x 1/2" Syrg  Inject 1 (once) per day     Jardiance 10 mg Tab  Generic drug:  empagliflozin  Take 1 tablet (10 mg) by mouth once daily.     losartan 100 MG tablet  Commonly known as:  COZAAR  TAKE 1/2 (ONE-HALF) TABLET BY MOUTH ONCE DAILY     niacin 100 MG Tab  Take 200 mg by mouth every evening.     pregabalin 150 MG capsule  Commonly known as:  LYRICA  TAKE 1 CAPSULE BY MOUTH TWICE DAILY     Victoza 2-Avel 0.6 mg/0.1 mL (18 mg/3 mL) Pnij  Generic drug:  liraglutide 0.6 mg/0.1 mL (18 mg/3 mL) subq PNIJ  Inject 0.6 mg into the skin once daily.          Discharge Procedure Orders   Diet diabetic     Notify your health care provider if you experience any of the following:  temperature >100.4     Notify your health care provider if you experience any of the following:  persistent nausea and vomiting or diarrhea     Notify your health care provider if you experience any of the following:  severe uncontrolled pain     Activity as tolerated     Follow-up Information     José Luis Singleton MD On 11/21/2019.    Specialties:  Internal Medicine, Infectious Diseases  Why:  time: 10:00am Hospital follow up  Contact information:  [address]  [address]   [place] [address]  [phone]             Morenita Mora MD. Schedule an appointment as soon as possible for a visit in 2 weeks.    Specialty:  General Surgery  Why:  Offices closed for Weekend. Patient to schedule own follow up appointment.   Contact information:  [address]  [address]  [facility] [address] [phone]                 Activity: activity as tolerated  Diet: regular diet  Wound Care: none needed    Follow-up with Dr. Xavier in 2 weeks.    Signed:  D'Amico C Johnson  11/16/2019  11:24 AM  "

## 2019-11-16 NOTE — NURSING
Slept well during the night. States he is hungry, would like to try some food today. Medicated for abdominal pain. Comfort and safety maintained.

## 2019-11-16 NOTE — PLAN OF CARE
Discharge orders noted, no HH or HME ordered.    Future Appointments   Date Time Provider Department Center   11/21/2019 10:00 AM José Luis Singleton MD [facility]   12/2/2019  2:30 PM [facility]       Pt's nurse will go over medications/signs and symptoms prior to discharge       11/16/19 1023   Final Note   Assessment Type Final Discharge Note   Anticipated Discharge Disposition Home   What phone number can be called within the next 1-3 days to see how you are doing after discharge? [phone]   Hospital Follow Up  Appt(s) scheduled? No  (Offices closed for Weekend. Patient to schedule own follow up appointment. )   Right Care Referral Info   Post Acute Recommendation No Care     Manisha Clark, RN Transitional Navigator  [phone]

## 2019-11-16 NOTE — PLAN OF CARE
Problem: Adult Inpatient Plan of Care  Goal: Plan of Care Review  Outcome: Ongoing, Progressing     Problem: Fall Injury Risk  Goal: Absence of Fall and Fall-Related Injury  Outcome: Ongoing, Progressing     Labs, notes, and orders reviewed.

## 2019-11-16 NOTE — NURSING
Patient discharged per MD orders. VN to go over discharge instructions. PIV discontinued per MD orders. Catheter tip intact and pressure dressing applied.

## 2019-11-18 ENCOUNTER — PATIENT OUTREACH (OUTPATIENT)
Dept: ADMINISTRATIVE | Facility: CLINIC | Age: 39
End: 2019-11-18

## 2019-11-18 NOTE — PATIENT INSTRUCTIONS
Discharge Instructions for Chronic Pancreatitis  You have been diagnosed with long-term (chronic) pancreatitis. This is caused by repeated cases of inflammation of your pancreas. It results in permanent scarring of the pancreatic tissue. The pancreas is an organ that makes chemicals and hormones that help you digest food and use sugar for energy. Some causes of chronic pancreatitis are the continued use of alcohol and tobacco, genetic disorders, and structural problems in the pancreas. Here's what you can do at home to help with your condition.  Home care  Suggestions for home care include the following:   · Ask someone to drive you to appointments until you know how the illness has affected you.  · Tell your healthcare provider about any medicines you are taking.  · Take your medicines exactly as directed. Dont skip doses.  · Ask your healthcare provider about over-the-counter pain medicines, if needed.  · Learn to monitor your blood sugar. Keep a record of your readings. Work with your healthcare provider to control blood sugar levels.  · Learn to take your own pulse. Keep a record of your results. Ask your healthcare provider which readings mean that you need medical attention.  · Watch for symptoms that your pancreatitis is getting worse. These symptoms include abdominal pain, nausea and vomiting, diarrhea or oil in your stool, weight loss, and fever.  Diet changes  Suggestions for dietary changes include the following:  · Eat a low-fat diet. Ask your healthcare provider for menus and other diet information.  · Take vitamins A, D, and E, and add calcium to your diet.  · Your healthcare provider may recommend digestive enzymes to take with each meal and snack.   · Stop drinking, especially if your illness was caused by alcohol.  ¨ Ask your healthcare provider about alcohol abuse programs and support groups such as Alcoholics Anonymous.  ¨ Ask your healthcare provider about prescription medicines that can help  you stop drinking.  Follow-up care  Make a follow-up appointment as directed by our staff.  When to call your healthcare provider  Call your healthcare provider right away if you have any of the following:  · Fever above 100°F (37.7°C)  · Severe pain in your upper abdomen to your back  · Nausea and vomiting  · Abdominal swelling and tenderness  · Loss of weight without dieting   Date Last Reviewed: 7/1/2016  © 1652-5606 [employer]. [address] [place] [address], [place] [id number]. All rights reserved. This information is not intended as a substitute for professional medical care. Always follow your healthcare professional's instructions.

## 2019-11-18 NOTE — TELEPHONE ENCOUNTER
C3 nurse attempted to contact patient. No answer. The following message was left for the patient to return the call:  Good morning  I am a nurse calling on behalf of [facility] from the [facility].  This is a Transitional Care Call for Bay Ibarra. When you have a moment please contact us at [phone] or [phone] Monday through Friday, between the hours of 8 am to 4 pm. We look forward to speaking with you. On behalf of [facility] have a nice day.    The patient has a scheduled HOSFU appointment with José Luis Singleton MD on 11/21/2019 at  10:00am

## 2019-11-19 ENCOUNTER — TELEPHONE (OUTPATIENT)
Dept: NEUROLOGY | Facility: HOSPITAL | Age: 39
End: 2019-11-19

## 2019-11-19 NOTE — TELEPHONE ENCOUNTER
----- Message from Lorena Peterson sent at 11/18/2019 12:47 PM CST -----  Contact: [phone]/self   JAMIE  Patient is requesting to speak with nurse to schedule an appt. . Please advise.

## 2019-11-21 ENCOUNTER — PATIENT MESSAGE (OUTPATIENT)
Dept: INTERNAL MEDICINE | Facility: CLINIC | Age: 39
End: 2019-11-21

## 2019-11-21 ENCOUNTER — OFFICE VISIT (OUTPATIENT)
Dept: INTERNAL MEDICINE | Facility: CLINIC | Age: 39
End: 2019-11-21
Payer: COMMERCIAL

## 2019-11-21 ENCOUNTER — TELEPHONE (OUTPATIENT)
Dept: INTERNAL MEDICINE | Facility: CLINIC | Age: 39
End: 2019-11-21

## 2019-11-21 VITALS
DIASTOLIC BLOOD PRESSURE: 60 MMHG | WEIGHT: 182.81 LBS | BODY MASS INDEX: 27.71 KG/M2 | OXYGEN SATURATION: 99 % | TEMPERATURE: 98 F | HEART RATE: 100 BPM | SYSTOLIC BLOOD PRESSURE: 110 MMHG | HEIGHT: 68 IN | RESPIRATION RATE: 18 BRPM

## 2019-11-21 DIAGNOSIS — I81 PORTAL VEIN THROMBOSIS: ICD-10-CM

## 2019-11-21 DIAGNOSIS — I10 ESSENTIAL HYPERTENSION: ICD-10-CM

## 2019-11-21 DIAGNOSIS — K86.1 OTHER CHRONIC PANCREATITIS: Primary | ICD-10-CM

## 2019-11-21 DIAGNOSIS — Z79.4 TYPE 2 DIABETES MELLITUS WITH DIABETIC POLYNEUROPATHY, WITH LONG-TERM CURRENT USE OF INSULIN: ICD-10-CM

## 2019-11-21 DIAGNOSIS — E78.2 MIXED HYPERLIPIDEMIA: ICD-10-CM

## 2019-11-21 DIAGNOSIS — K86.3 PANCREATIC PSEUDOCYST: ICD-10-CM

## 2019-11-21 DIAGNOSIS — E11.42 TYPE 2 DIABETES MELLITUS WITH DIABETIC POLYNEUROPATHY, WITH LONG-TERM CURRENT USE OF INSULIN: ICD-10-CM

## 2019-11-21 DIAGNOSIS — M10.00 IDIOPATHIC GOUT, UNSPECIFIED CHRONICITY, UNSPECIFIED SITE: ICD-10-CM

## 2019-11-21 PROCEDURE — 3046F HEMOGLOBIN A1C LEVEL >9.0%: CPT | Mod: CPTII,S$GLB,, | Performed by: INTERNAL MEDICINE

## 2019-11-21 PROCEDURE — 99214 PR OFFICE/OUTPT VISIT, EST, LEVL IV, 30-39 MIN: ICD-10-PCS | Mod: S$GLB,,, | Performed by: INTERNAL MEDICINE

## 2019-11-21 PROCEDURE — 3074F SYST BP LT 130 MM HG: CPT | Mod: CPTII,S$GLB,, | Performed by: INTERNAL MEDICINE

## 2019-11-21 PROCEDURE — 99214 OFFICE O/P EST MOD 30 MIN: CPT | Mod: S$GLB,,, | Performed by: INTERNAL MEDICINE

## 2019-11-21 PROCEDURE — 3046F PR MOST RECENT HEMOGLOBIN A1C LEVEL > 9.0%: ICD-10-PCS | Mod: CPTII,S$GLB,, | Performed by: INTERNAL MEDICINE

## 2019-11-21 PROCEDURE — 3008F BODY MASS INDEX DOCD: CPT | Mod: CPTII,S$GLB,, | Performed by: INTERNAL MEDICINE

## 2019-11-21 PROCEDURE — 3078F DIAST BP <80 MM HG: CPT | Mod: CPTII,S$GLB,, | Performed by: INTERNAL MEDICINE

## 2019-11-21 PROCEDURE — 99999 PR PBB SHADOW E&M-EST. PATIENT-LVL III: ICD-10-PCS | Mod: PBBFAC,,, | Performed by: INTERNAL MEDICINE

## 2019-11-21 PROCEDURE — 3078F PR MOST RECENT DIASTOLIC BLOOD PRESSURE < 80 MM HG: ICD-10-PCS | Mod: CPTII,S$GLB,, | Performed by: INTERNAL MEDICINE

## 2019-11-21 PROCEDURE — 3074F PR MOST RECENT SYSTOLIC BLOOD PRESSURE < 130 MM HG: ICD-10-PCS | Mod: CPTII,S$GLB,, | Performed by: INTERNAL MEDICINE

## 2019-11-21 PROCEDURE — 3008F PR BODY MASS INDEX (BMI) DOCUMENTED: ICD-10-PCS | Mod: CPTII,S$GLB,, | Performed by: INTERNAL MEDICINE

## 2019-11-21 PROCEDURE — 99999 PR PBB SHADOW E&M-EST. PATIENT-LVL III: CPT | Mod: PBBFAC,,, | Performed by: INTERNAL MEDICINE

## 2019-11-21 NOTE — PROGRESS NOTES
INTERNAL MEDICINE    Patient Active Problem List   Diagnosis    Essential hypertension    Obesity (BMI 30-39.9)    Gout    Obstructive sleep apnea syndrome    Mixed hyperlipidemia    Type 2 diabetes mellitus with diabetic polyneuropathy, with long-term current use of insulin    Acute on chronic pancreatitis    Portal vein thrombosis    Pancreatic pseudocyst    Diabetic ketoacidosis without coma associated with type 2 diabetes mellitus    Acute pancreatitis    Hypokalemia    Chronic pancreatitis       CC:   Chief Complaint   Patient presents with    Follow-up     Hospital Follow Up    Abdominal Pain     right side lower abdomen       SUBJECTIVE:  Bay Ibarra   is a 39 y.o. male  HPI   39y/oWM recently hospitalized 11/14/19 with acute pancreatitis on chronic pancreatitis/pseudocyst. Thought to be from hypertriglyceridemia.  Per pt, surgery is contemplating removing his gallbladder.  Currently, he states the tramadol minimally eases the pain, but it is still very present.  No nausea currently, but very weak.        Discharge summary below:    The patient is a 39-year-old gentleman who presented today with acute onset abdominal pain.  Since arrival to the emergency department he remained hemodynamically normal.  Labs were overall unremarkable except for an elevated lipase consistent with acute pancreatitis.  The patient has had history of multiple bouts of pancreatitis dating back to 2013.  This has been noted to be secondary to hypertriglyceridemia.  He has never had previous abdominal surgery.  He has a known history of pancreatic pseudocyst as well as a portal venous thrombosis.  All of this does appear stable on the CT scan from today     ROS: Review of Systems   Constitutional: Positive for activity change, appetite change and fatigue. Negative for fever and unexpected weight change.   HENT: Negative.    Eyes: Negative.    Respiratory: Negative for cough, choking, shortness of breath and  wheezing.    Cardiovascular: Negative for chest pain and palpitations.   Gastrointestinal: Positive for abdominal distention and abdominal pain. Negative for blood in stool, constipation, diarrhea, nausea and vomiting.   Endocrine: Negative.    Genitourinary: Negative.    Musculoskeletal: Negative for back pain.   Skin: Positive for pallor.   Allergic/Immunologic: Negative.    Neurological: Positive for weakness.   Psychiatric/Behavioral: Negative.        Past Medical History:   Diagnosis Date    Acute pancreatitis     Essential hypertension     Gout     Hx of acute pancreatitis 3/3/2017    Hypertriglyceridemia 8/2/2017    Obesity (BMI 30-39.9) 6/5/2015    Obesity (BMI 35.0-39.9 without comorbidity) 6/5/2015    Obstructive sleep apnea syndrome 12/18/2015    Type 2 diabetes mellitus with diabetic polyneuropathy, with long-term current use of insulin 10/16/2017    Type 2 diabetes mellitus with hyperglycemia, with long-term current use of insulin 8/3/2017       Past Surgical History:   Procedure Laterality Date    ENDOSCOPIC ULTRASOUND OF UPPER GASTROINTESTINAL TRACT N/A 5/27/2019    Procedure: ULTRASOUND, UPPER GI TRACT, ENDOSCOPIC;  Surgeon: Zafar Velazquez MD;  Location: [address]);  Service: Endoscopy;  Laterality: N/A;    ERCP N/A 5/27/2019    Procedure: ERCP (ENDOSCOPIC RETROGRADE CHOLANGIOPANCREATOGRAPHY);  Surgeon: Zafar Velazquez MD;  Location: [address]);  Service: Endoscopy;  Laterality: N/A;       Family History   Problem Relation Age of Onset    Coronary artery disease Father         4 vessel bypass at 40, possible stent at 36    Diabetes type II Father     No Known Problems Sister     No Known Problems Brother     Aneurysm Maternal Grandmother         Possible AAA    Diabetes type II Paternal Grandmother        Social History     Tobacco Use    Smoking status: Never Smoker    Smokeless tobacco: Never Used   Substance Use Topics    Alcohol use: No    Drug use: No  "      Social History     Social History Narrative    Not on file       ALLERGIES: Review of patient's allergies indicates:  No Known Allergies    MEDS:   Current Outpatient Medications:     allopurinol (ZYLOPRIM) 300 MG tablet, TAKE 1 TABLET BY MOUTH ONCE DAILY, Disp: 30 tablet, Rfl: 5    aspirin (ECOTRIN) 81 MG EC tablet, Take 81 mg by mouth once daily., Disp: , Rfl:     atorvastatin (LIPITOR) 40 MG tablet, TAKE 1 TABLET BY MOUTH ONCE DAILY, Disp: 90 tablet, Rfl: 0    diclofenac sodium (VOLTAREN) 1 % Gel, Apply 2 g topically 4 (four) times daily. Apply to chest in the location of pain up to 4 times a day as needed for pain., Disp: 100 g, Rfl: 0    empagliflozin (JARDIANCE) 10 mg Tab, Take 1 tablet (10 mg) by mouth once daily., Disp: 90 tablet, Rfl: 1    fenofibrate 160 MG Tab, TAKE 1 TABLET BY MOUTH ONCE DAILY, Disp: 90 tablet, Rfl: 0    flash glucose sensor (FREESTYLE DEBBIE 14 DAY SENSOR) Kit, use as directed every 14 days, Disp: 2 kit, Rfl: 0    icosapent ethyl (VASCEPA) 1 gram Cap, Take 2 g by mouth 2 (two) times daily., Disp: 360 capsule, Rfl: 3    insulin (LANTUS SOLOSTAR U-100 INSULIN) glargine 100 units/mL (3mL) SubQ pen, Inject 52 Units into the skin every evening., Disp: 18 mL, Rfl: 6    insulin aspart U-100 (NOVOLOG FLEXPEN U-100 INSULIN) 100 unit/mL (3 mL) InPn pen, INJECT 20 UNITS SUBCUTANEOUSLY WITH MEALS PLUS SLIDING SCALE 150-200+2, 201-250 +4, 251-300 +6, 301-350 +8,>350 +10. MAX OF 90 UNITS PER DA, Disp: 30 Syringe, Rfl: 4    insulin syringe-needle U-100 29 gauge x 1/2" Syrg, Inject 1 (once) per day, Disp: 100 Syringe, Rfl: 3    liraglutide 0.6 mg/0.1 mL, 18 mg/3 mL, subq PNIJ (VICTOZA 2-LORI) 0.6 mg/0.1 mL (18 mg/3 mL) PnIj, Inject 0.6 mg into the skin once daily., Disp: 3 mL, Rfl: 3    losartan (COZAAR) 100 MG tablet, TAKE 1/2 (ONE-HALF) TABLET BY MOUTH ONCE DAILY, Disp: 45 tablet, Rfl: 3    niacin 100 MG Tab, Take 200 mg by mouth every evening. , Disp: , Rfl:     pantoprazole " "(PROTONIX) 20 MG tablet, Take 1 tablet (20 mg total) by mouth once daily., Disp: 30 tablet, Rfl: 0    pregabalin (LYRICA) 150 MG capsule, TAKE 1 CAPSULE BY MOUTH TWICE DAILY, Disp: 60 capsule, Rfl: 0    traMADol (ULTRAM) 50 mg tablet, Take 1 tablet (50 mg total) by mouth every 6 (six) hours., Disp: 28 tablet, Rfl: 0    OBJECTIVE:   Vitals:    11/21/19 1015   BP: 110/60   BP Location: Left arm   Patient Position: Sitting   BP Method: X-Large (Manual)   Pulse: 100   Resp: 18   Temp: 97.7 °F (36.5 °C)   TempSrc: Oral   SpO2: 99%   Weight: 82.9 kg (182 lb 12.8 oz)   Height: 5' 8" (1.727 m)     Body mass index is 27.79 kg/m².    Physical Exam   Constitutional: He is oriented to person, place, and time. He appears well-developed and well-nourished. He appears distressed.   HENT:   Head: Normocephalic and atraumatic.   Right Ear: External ear normal.   Left Ear: External ear normal.   Nose: Nose normal.   Mouth/Throat: Oropharynx is clear and moist.   Eyes: Conjunctivae and EOM are normal.   Neck: Normal range of motion. Neck supple. No JVD present.   Cardiovascular: Normal rate, regular rhythm and normal heart sounds.   Pulmonary/Chest: Effort normal and breath sounds normal.   Abdominal: Soft. Bowel sounds are normal. He exhibits no distension. There is tenderness.   Musculoskeletal: Normal range of motion.   Neurological: He is alert and oriented to person, place, and time.   Skin: Skin is warm and dry.   Psychiatric: He has a normal mood and affect. His behavior is normal. Judgment and thought content normal.   Nursing note and vitals reviewed.        PERTINENT RESULTS:   CBC:  Recent Labs   Lab Result Units 09/01/19  0355 11/14/19  1241 11/15/19  0803   WBC K/uL 3.41* 8.29 4.54   RBC M/uL 4.71 5.64 4.88   Hemoglobin g/dL 12.2* 14.1 12.2*   Hematocrit % 38.2* 43.9 39.9*   Platelets K/uL 165 258 168   Mean Corpuscular Volume fL 81* 78* 82   Mean Corpuscular Hemoglobin pg 25.9* 25.0* 25.0*   Mean Corpuscular Hemoglobin " Conc g/dL 31.9* 32.1 30.6*     CMP:  Recent Labs   Lab Result Units 08/30/19  0753  11/14/19  1241 11/15/19  0803   Glucose mg/dL 241*   < > 267* 86   Calcium mg/dL 9.2   < > 9.5 9.0   Albumin g/dL 3.2*  --  4.1 3.1*   Total Protein g/dL 7.2  --  7.8 6.5   Sodium mmol/L 142   < > 145 147*   Potassium mmol/L 4.2   < > 3.0* 3.8   CO2 mmol/L 19*   < > 31* 25   Chloride mmol/L 104   < > 99 110   BUN, Bld mg/dL 10   < > 15 12   Alkaline Phosphatase U/L 93  --  191* 113   ALT U/L 25  --  60* 36   AST U/L 22  --  44 25   Total Bilirubin mg/dL 0.7  --  0.7 0.5    < > = values in this interval not displayed.     URINALYSIS:  Recent Labs   Lab Result Units 11/14/19  1404   Color, UA  Yellow   Specific Gravity, UA  <=1.005*   pH, UA  5.0   Protein, UA  Negative   Bacteria /hpf Rare   Nitrite, UA  Negative   Leukocytes, UA  Negative   Urobilinogen, UA EU/dL Negative      LIPIDS:  Recent Labs   Lab Result Units 08/30/19  0753 11/15/19  0803   HDL mg/dL 25* 20*   Cholesterol mg/dL 108* 98*   Triglycerides mg/dL 102 100   LDL Cholesterol mg/dL 62.6* 58.0*   Hdl/Cholesterol Ratio % 23.1 20.4   Non-HDL Cholesterol mg/dL 83 78   Total Cholesterol/HDL Ratio  4.3 4.9             ASSESSMENT:  Problem List Items Addressed This Visit        Cardiac/Vascular    Essential hypertension    Relevant Orders    CBC auto differential    Comprehensive metabolic panel    Mixed hyperlipidemia    Relevant Orders    Lipid panel       Hematology    Portal vein thrombosis       Endocrine    Type 2 diabetes mellitus with diabetic polyneuropathy, with long-term current use of insulin    Relevant Orders    Hemoglobin A1c       GI    Pancreatic pseudocyst    Chronic pancreatitis - Primary       Orthopedic    Gout    Relevant Orders    Uric acid          PLAN:   Orders Placed This Encounter    CBC auto differential    Comprehensive metabolic panel    Hemoglobin A1c    Lipid panel    Uric acid     Orders Placed This Encounter   Procedures    CBC auto  differential     Standing Status:   Future     Standing Expiration Date:   11/20/2020    Comprehensive metabolic panel     Standing Status:   Future     Standing Expiration Date:   11/20/2020    Hemoglobin A1c     Standing Status:   Future     Standing Expiration Date:   11/20/2020    Lipid panel     Standing Status:   Future     Standing Expiration Date:   11/20/2020    Uric acid     Standing Status:   Future     Standing Expiration Date:   11/20/2020   He will follow up with GI/surgery.    Follow-up with  Dr Espinoza in  6 weeks.   Dr. José Luis Singleton  Internal Medicine

## 2019-11-22 RX ORDER — TRAMADOL HYDROCHLORIDE 50 MG/1
50 TABLET ORAL EVERY 6 HOURS
Qty: 28 TABLET | Refills: 0 | Status: SHIPPED | OUTPATIENT
Start: 2019-11-22 | End: 2020-02-11

## 2019-11-29 NOTE — PROGRESS NOTES
NOLANETS:  [facility]  A collaboration between [facility] and [facility]      PATIENT: Bay Ibarra  MRN: [id number]  DATE: 11/29/2019       Chief Complaint: 2 week hospital follow up acute pancreatitis     Vitals: There were no vitals taken for this visit. --    ECOG Score: 0 - Asymptomatic    Diagnosis: No diagnosis found.     Interval History: 39 y.o. male with chronic pancreatitis secondary to hypertriglyceridemia who was hospitalized back in end of august for an acute flare with peripancreatic fluid collections. Pt improved during his hospitalization and was d/c home. He has been doing much better with very mild intermittent infrequent pain. He has lost weight from the pancreatitis. He is eating well and appetite is good. No change in BMs.     Doing well now no pain    Past Medical History:  Past Medical History:   Diagnosis Date    Acute pancreatitis     Essential hypertension     Gout     Hx of acute pancreatitis 3/3/2017    Hypertriglyceridemia 8/2/2017    Obesity (BMI 30-39.9) 6/5/2015    Obesity (BMI 35.0-39.9 without comorbidity) 6/5/2015    Obstructive sleep apnea syndrome 12/18/2015    Type 2 diabetes mellitus with diabetic polyneuropathy, with long-term current use of insulin 10/16/2017    Type 2 diabetes mellitus with hyperglycemia, with long-term current use of insulin 8/3/2017       Past Surgical History:  Past Surgical History:   Procedure Laterality Date    ENDOSCOPIC ULTRASOUND OF UPPER GASTROINTESTINAL TRACT N/A 5/27/2019    Procedure: ULTRASOUND, UPPER GI TRACT, ENDOSCOPIC;  Surgeon: Zafar Velazquez MD;  Location: [address]);  Service: Endoscopy;  Laterality: N/A;    ERCP N/A 5/27/2019    Procedure: ERCP (ENDOSCOPIC RETROGRADE CHOLANGIOPANCREATOGRAPHY);  Surgeon: Zafar Velazquez MD;  Location: [address]);  Service: Endoscopy;  Laterality: N/A;       Family  "History:  Family History   Problem Relation Age of Onset    Coronary artery disease Father         4 vessel bypass at 40, possible stent at 36    Diabetes type II Father     No Known Problems Sister     No Known Problems Brother     Aneurysm Maternal Grandmother         Possible AAA    Diabetes type II Paternal Grandmother        Allergies:  Patient has no known allergies.    Medications:   Current Outpatient Medications   Medication Sig    allopurinol (ZYLOPRIM) 300 MG tablet TAKE 1 TABLET BY MOUTH ONCE DAILY    aspirin (ECOTRIN) 81 MG EC tablet Take 81 mg by mouth once daily.    atorvastatin (LIPITOR) 40 MG tablet TAKE 1 TABLET BY MOUTH ONCE DAILY    diclofenac sodium (VOLTAREN) 1 % Gel Apply 2 g topically 4 (four) times daily. Apply to chest in the location of pain up to 4 times a day as needed for pain.    empagliflozin (JARDIANCE) 10 mg Tab Take 1 tablet (10 mg) by mouth once daily.    fenofibrate 160 MG Tab TAKE 1 TABLET BY MOUTH ONCE DAILY    flash glucose sensor (FREESTYLE DEBBIE 14 DAY SENSOR) Kit use as directed every 14 days    icosapent ethyl (VASCEPA) 1 gram Cap Take 2 g by mouth 2 (two) times daily.    insulin (LANTUS SOLOSTAR U-100 INSULIN) glargine 100 units/mL (3mL) SubQ pen Inject 52 Units into the skin every evening.    insulin aspart U-100 (NOVOLOG FLEXPEN U-100 INSULIN) 100 unit/mL (3 mL) InPn pen INJECT 20 UNITS SUBCUTANEOUSLY WITH MEALS PLUS SLIDING SCALE 150-200+2, 201-250 +4, 251-300 +6, 301-350 +8,>350 +10. MAX OF 90 UNITS PER DA    insulin syringe-needle U-100 29 gauge x 1/2" Syrg Inject 1 (once) per day    liraglutide 0.6 mg/0.1 mL, 18 mg/3 mL, subq PNIJ (VICTOZA 2-LORI) 0.6 mg/0.1 mL (18 mg/3 mL) PnIj Inject 0.6 mg into the skin once daily.    losartan (COZAAR) 100 MG tablet TAKE 1/2 (ONE-HALF) TABLET BY MOUTH ONCE DAILY    niacin 100 MG Tab Take 200 mg by mouth every evening.     pantoprazole (PROTONIX) 20 MG tablet Take 1 tablet (20 mg total) by mouth once daily.    " pregabalin (LYRICA) 150 MG capsule TAKE 1 CAPSULE BY MOUTH TWICE DAILY    traMADol (ULTRAM) 50 mg tablet Take 1 tablet (50 mg total) by mouth every 6 (six) hours.     No current facility-administered medications for this visit.         Review of Systems   Constitutional: Negative for activity change, appetite change, chills, diaphoresis, fatigue, fever and unexpected weight change.   HENT: Negative.  Negative for dental problem, ear discharge, ear pain, facial swelling, hearing loss, nosebleeds, rhinorrhea, sinus pressure, sneezing and sore throat.    Eyes: Negative for pain, discharge, redness, itching and visual disturbance.   Respiratory: Negative for apnea, choking, chest tightness, wheezing and stridor.    Cardiovascular: Negative for chest pain, palpitations and leg swelling.   Gastrointestinal: Negative for abdominal distention, abdominal pain, anal bleeding, blood in stool, diarrhea, nausea, rectal pain and vomiting.   Endocrine: Negative.    Genitourinary: Negative.    Musculoskeletal: Negative for back pain, gait problem, myalgias, neck pain and neck stiffness.   Skin: Negative for pallor and wound.   Allergic/Immunologic: Negative.    Neurological: Negative for seizures, syncope, facial asymmetry, speech difficulty, numbness and headaches.   Hematological: Negative.    Psychiatric/Behavioral: Negative.       Objective:      Physical Exam   Constitutional: He appears well-developed and well-nourished.   HENT:   Head: Normocephalic.   Right Ear: External ear normal.   Left Ear: External ear normal.   Mouth/Throat: Oropharynx is clear and moist.   Eyes: Pupils are equal, round, and reactive to light. Conjunctivae are normal.   Neck: Normal range of motion.      Assessment:       No diagnosis found.    Laboratory Data:     11/16/2019   phosphorus   Ref Range & Units 2wk ago   Phosphorus 2.7 - 4.5 mg/dL 2.5Low                                                                                             Scans:  11/14/2019  CT Abdomen Pelvis With Contrast  Narrative: EXAMINATION:  CT ABDOMEN PELVIS WITH CONTRAST    CLINICAL HISTORY:  Infection, abdomen-pelvis;    TECHNIQUE:  Low dose axial images, sagittal and coronal reformations were obtained from the lung bases to the pubic symphysis following the IV administration of 100 mL of Omnipaque 350 .  Oral contrast was not given.    COMPARISON:  08/29/2019.    FINDINGS:  There are mild dependent atelectatic changes within the lung bases.  The bones are intact.  There is no evidence for acute fracture or bone destruction.    The liver does not appear enlarged.  The gallbladder is present and appears grossly unremarkable.  There is no evidence for intrahepatic or extrahepatic biliary dilatation.  There are findings consistent with portal vein thrombosis with cavernous transformation of the portal vein with extensive collateral vessels noted throughout the upper abdomen.  The pancreas appears atrophic and there is a mixed density fluid collection in the region of the head of the pancreas measuring approximately 4.0 x 5.1 cm in size and this has increased when compared to the prior examination.  The patient has developed a fluid collection along the left lateral margin of the stomach measuring approximately 5.0 x 3.6 cm in size (image 56, series 2).  There are edematous changes tracking along the distal stomach and along the inferomedial aspect of the liver and this has increased when compared to the prior examination.  The spleen is enlarged with no focal splenic lesions identified.  The adrenal glands are not enlarged.  The kidneys are normal in overall size and are noted to concentrate contrast symmetrically.  There are left renal hypodensities present consistent with renal cysts.  There is no evidence for hydronephrosis.  The abdominal aorta tapers normally without aneurysmal dilatation.  There are multiple mildly enlarged para-aortic lymph nodes measuring up to approximately  1.3 cm in short axis.  This is similar to the prior examination.  The appendix is visualized and appears grossly unremarkable.  No dilated loops of bowel are evident.  The urinary bladder appears unremarkable.  There is no evidence for pelvic or inguinal lymphadenopathy.  Impression: Portal vein thrombosis with cavernous transformation of the portal vein.  Extensive collateral vessels noted throughout the upper abdomen.  These findings are stable compared to 08/29/2019.    Enlarging fluid collection in the region of the head of the pancreas now measuring approximately 4.0 x 5.1 cm in size.  This likely represents a pancreatic pseudocyst and there is increased density within the suspected pseudocyst when compared to the prior examination which could be related to hemorrhage or possibly infection.    Development of fluid collection along the left lateral margin of the stomach also likely representing a pseudocyst.    Fluid and edematous changes tracking along the distal stomach and along the inferomedial aspect of the liver, increased when compared to the prior examination.    Retroperitoneal adenopathy, similar to the prior examination.    Electronically signed by: Frank Mosher MD  Date:    11/14/2019  Time:    15:28       Impression:   39-year-old gentleman who was admitted to the hospital with pancreatitis pancreatic pseudocyst about 3 weeks back.  He is doing well with no complaints of pain.  He is not tender overall he has resorted back to his eating and day-to-day activities.    His evaluation with them he was found to have no stones in the gallbladder, denies any alcohol history and was found to have a normal triglycerides.    At this point I emphasized the importance proper proper diet with a balanced sugar and fatty intake and also physical activities.    I informed him we need to wait for at least 6 weeks from the time of his last attack for fluid collections to mature.  He will have a re-evaluation done in  about 4 weeks time I will see him in about a month's time and reassess his condition overall    Plan:       MRI in about 3 weeks time  Follow-up in about 4 weeks time and then reassess the condition if he still has some pseudocyst are any anatomical abnormality will intervene.  If he develops any pain similar to the last episode he needs to come to the emergency room for for assessment of the pancreatitis          KASHIF Xavier MD, FACS   Associate Professor of Surgery, [facility]   Neuroendocrine Surgery, Hepatic/Pancreatic & General Surgery   [address]   [place] [address]   ph. [phone]; [phone]   fax. [phone]

## 2019-12-05 ENCOUNTER — OFFICE VISIT (OUTPATIENT)
Dept: NEUROLOGY | Facility: HOSPITAL | Age: 39
End: 2019-12-05
Attending: SURGERY
Payer: COMMERCIAL

## 2019-12-05 VITALS
HEIGHT: 68 IN | TEMPERATURE: 98 F | SYSTOLIC BLOOD PRESSURE: 116 MMHG | DIASTOLIC BLOOD PRESSURE: 57 MMHG | HEART RATE: 96 BPM | BODY MASS INDEX: 27.68 KG/M2 | WEIGHT: 182.63 LBS

## 2019-12-05 DIAGNOSIS — R79.89 LIVER FUNCTION TEST ABNORMALITY: ICD-10-CM

## 2019-12-05 PROCEDURE — 99213 OFFICE O/P EST LOW 20 MIN: CPT | Performed by: SURGERY

## 2019-12-05 NOTE — PATIENT INSTRUCTIONS
Your MRI is scheduled for 12/23/19 at 900 am      Your next appt with Dr. Stevens is 1/6/19 at 900 am

## 2019-12-12 ENCOUNTER — TELEPHONE (OUTPATIENT)
Dept: NEUROLOGY | Facility: HOSPITAL | Age: 39
End: 2019-12-12

## 2019-12-12 NOTE — TELEPHONE ENCOUNTER
[place] this is Irene nurse for Dr. Stevens. Your MRI, the location, had to be changed the [facility] does not do this type MRI. It was changed to [place] 12/23/19 at 830 am. If you have questions call [phone]

## 2019-12-12 NOTE — TELEPHONE ENCOUNTER
----- Message from Shalonda Baugh, RT sent at 12/11/2019  1:15 PM CST -----  Hi!  This is Shalonda in MRI at [facility].  The above patient is scheduled for an MRI Abdomen w/wo on December 23rd.  We don't do MRI Abdomen's here in [place] so the patient will need to be rescheduled to another facility that can accommodate.      Thank you,  Shalonda

## 2019-12-13 ENCOUNTER — TELEPHONE (OUTPATIENT)
Dept: NEUROLOGY | Facility: HOSPITAL | Age: 39
End: 2019-12-13

## 2019-12-13 NOTE — TELEPHONE ENCOUNTER
Called pt lvm to verify that pt got the message that his Mri is here at [place] for 12/23/19 at 830am

## 2019-12-23 ENCOUNTER — HOSPITAL ENCOUNTER (OUTPATIENT)
Dept: RADIOLOGY | Facility: HOSPITAL | Age: 39
Discharge: HOME | End: 2019-12-23
Attending: SURGERY
Payer: COMMERCIAL

## 2019-12-23 DIAGNOSIS — R79.89 LIVER FUNCTION TEST ABNORMALITY: ICD-10-CM

## 2019-12-23 PROCEDURE — 74183 MRI ABD W/O CNTR FLWD CNTR: CPT | Mod: 26,,, | Performed by: RADIOLOGY

## 2019-12-23 PROCEDURE — 76376 3D RENDER W/INTRP POSTPROCES: CPT | Mod: 26,,, | Performed by: RADIOLOGY

## 2019-12-23 PROCEDURE — 76376 MRI ABDOMEN WITH AND WO_INC MRCP: ICD-10-PCS | Mod: 26,,, | Performed by: RADIOLOGY

## 2019-12-23 PROCEDURE — 25500020 PHARM REV CODE 255: Performed by: SURGERY

## 2019-12-23 PROCEDURE — A9585 GADOBUTROL INJECTION: HCPCS | Performed by: SURGERY

## 2019-12-23 PROCEDURE — 74183 MRI ABDOMEN WITH AND WO_INC MRCP: ICD-10-PCS | Mod: 26,,, | Performed by: RADIOLOGY

## 2019-12-23 PROCEDURE — 76376 3D RENDER W/INTRP POSTPROCES: CPT | Mod: TC

## 2019-12-23 RX ORDER — GADOBUTROL 604.72 MG/ML
10 INJECTION INTRAVENOUS
Status: COMPLETED | OUTPATIENT
Start: 2019-12-23 | End: 2019-12-23

## 2019-12-23 RX ADMIN — GADOBUTROL 10 ML: 604.72 INJECTION INTRAVENOUS at 09:12

## 2019-12-26 ENCOUNTER — HOSPITAL ENCOUNTER (EMERGENCY)
Facility: HOSPITAL | Age: 39
Discharge: HOME | End: 2019-12-27
Attending: EMERGENCY MEDICINE
Payer: COMMERCIAL

## 2019-12-26 DIAGNOSIS — R11.2 NON-INTRACTABLE VOMITING WITH NAUSEA, UNSPECIFIED VOMITING TYPE: ICD-10-CM

## 2019-12-26 DIAGNOSIS — R10.9 ABDOMINAL PAIN, UNSPECIFIED ABDOMINAL LOCATION: Primary | ICD-10-CM

## 2019-12-26 LAB
BASOPHILS # BLD AUTO: 0.14 K/UL (ref 0–0.2)
BASOPHILS NFR BLD: 0.8 % (ref 0–1.9)
DIFFERENTIAL METHOD: ABNORMAL
EOSINOPHIL # BLD AUTO: 0.4 K/UL (ref 0–0.5)
EOSINOPHIL NFR BLD: 2 % (ref 0–8)
ERYTHROCYTE [DISTWIDTH] IN BLOOD BY AUTOMATED COUNT: 17.4 % (ref 11.5–14.5)
HCT VFR BLD AUTO: 52.5 % (ref 40–54)
HGB BLD-MCNC: 16.1 G/DL (ref 14–18)
IMM GRANULOCYTES # BLD AUTO: 0.06 K/UL (ref 0–0.04)
IMM GRANULOCYTES NFR BLD AUTO: 0.3 % (ref 0–0.5)
LACTATE SERPL-SCNC: 2.9 MMOL/L (ref 0.5–2.2)
LYMPHOCYTES # BLD AUTO: 1.6 K/UL (ref 1–4.8)
LYMPHOCYTES NFR BLD: 9.2 % (ref 18–48)
MCH RBC QN AUTO: 24.9 PG (ref 27–31)
MCHC RBC AUTO-ENTMCNC: 30.7 G/DL (ref 32–36)
MCV RBC AUTO: 81 FL (ref 82–98)
MONOCYTES # BLD AUTO: 2.4 K/UL (ref 0.3–1)
MONOCYTES NFR BLD: 13.9 % (ref 4–15)
NEUTROPHILS # BLD AUTO: 12.9 K/UL (ref 1.8–7.7)
NEUTROPHILS NFR BLD: 73.8 % (ref 38–73)
NRBC BLD-RTO: 0 /100 WBC
PLATELET # BLD AUTO: 321 K/UL (ref 150–350)
PLATELET BLD QL SMEAR: ABNORMAL
PMV BLD AUTO: 10.8 FL (ref 9.2–12.9)
RBC # BLD AUTO: 6.46 M/UL (ref 4.6–6.2)
WBC # BLD AUTO: 17.44 K/UL (ref 3.9–12.7)

## 2019-12-26 PROCEDURE — 83605 ASSAY OF LACTIC ACID: CPT

## 2019-12-26 PROCEDURE — 25000003 PHARM REV CODE 250: Performed by: EMERGENCY MEDICINE

## 2019-12-26 PROCEDURE — S0028 INJECTION, FAMOTIDINE, 20 MG: HCPCS | Performed by: EMERGENCY MEDICINE

## 2019-12-26 PROCEDURE — 83690 ASSAY OF LIPASE: CPT

## 2019-12-26 PROCEDURE — 96375 TX/PRO/DX INJ NEW DRUG ADDON: CPT

## 2019-12-26 PROCEDURE — 99284 EMERGENCY DEPT VISIT MOD MDM: CPT | Mod: ,,, | Performed by: EMERGENCY MEDICINE

## 2019-12-26 PROCEDURE — 85025 COMPLETE CBC W/AUTO DIFF WBC: CPT

## 2019-12-26 PROCEDURE — 96361 HYDRATE IV INFUSION ADD-ON: CPT

## 2019-12-26 PROCEDURE — 99284 PR EMERGENCY DEPT VISIT,LEVEL IV: ICD-10-PCS | Mod: ,,, | Performed by: EMERGENCY MEDICINE

## 2019-12-26 PROCEDURE — 99285 EMERGENCY DEPT VISIT HI MDM: CPT | Mod: 25

## 2019-12-26 PROCEDURE — 63600175 PHARM REV CODE 636 W HCPCS: Performed by: EMERGENCY MEDICINE

## 2019-12-26 PROCEDURE — 96374 THER/PROPH/DIAG INJ IV PUSH: CPT

## 2019-12-26 PROCEDURE — 80053 COMPREHEN METABOLIC PANEL: CPT

## 2019-12-26 RX ORDER — ONDANSETRON 2 MG/ML
4 INJECTION INTRAMUSCULAR; INTRAVENOUS
Status: COMPLETED | OUTPATIENT
Start: 2019-12-26 | End: 2019-12-26

## 2019-12-26 RX ORDER — FAMOTIDINE 10 MG/ML
20 INJECTION INTRAVENOUS
Status: COMPLETED | OUTPATIENT
Start: 2019-12-26 | End: 2019-12-26

## 2019-12-26 RX ADMIN — SODIUM CHLORIDE 1000 ML: 0.9 INJECTION, SOLUTION INTRAVENOUS at 10:12

## 2019-12-26 RX ADMIN — FAMOTIDINE 20 MG: 10 INJECTION, SOLUTION INTRAVENOUS at 10:12

## 2019-12-26 RX ADMIN — ONDANSETRON 4 MG: 2 INJECTION INTRAMUSCULAR; INTRAVENOUS at 10:12

## 2019-12-26 NOTE — PROGRESS NOTES
NOLANETS:  [facility]  A collaboration between [facility] and [facility]      PATIENT: Bay Ibarra  MRN: [id number]  DATE: 12/26/2019    Subjective:      Chief Complaint: Follow up after MRI  Went to ER on 12/27 for bleeding when vomiting. Had CT scan.  Feeling okay now.  Lost about 4 lb    Vitals: There were no vitals taken for this visit. -  ECOG Score: 1    Diagnosis: No diagnosis found.     Interval History:    39 y.o. male with chronic pancreatitis secondary to hypertriglyceridemia who was hospitalized back in end of august for an acute flare with peripancreatic fluid collections. Pt improved during his hospitalization and was d/c home. He has been doing much better with very mild intermittent infrequent pain. He has lost weight from the pancreatitis. He is eating well and appetite is good. No change in BMs.     Oncologic History:   Oncologic History    Oncologic Treatment    Pathology      Past Medical History:  Past Medical History:   Diagnosis Date    Acute pancreatitis     Essential hypertension     Gout     Hx of acute pancreatitis 3/3/2017    Hypertriglyceridemia 8/2/2017    Obesity (BMI 30-39.9) 6/5/2015    Obesity (BMI 35.0-39.9 without comorbidity) 6/5/2015    Obstructive sleep apnea syndrome 12/18/2015    Type 2 diabetes mellitus with diabetic polyneuropathy, with long-term current use of insulin 10/16/2017    Type 2 diabetes mellitus with hyperglycemia, with long-term current use of insulin 8/3/2017       Past Surgical History:  Past Surgical History:   Procedure Laterality Date    ENDOSCOPIC ULTRASOUND OF UPPER GASTROINTESTINAL TRACT N/A 5/27/2019    Procedure: ULTRASOUND, UPPER GI TRACT, ENDOSCOPIC;  Surgeon: Zafar Velazquez MD;  Location: [address]);  Service: Endoscopy;  Laterality: N/A;    ERCP N/A 5/27/2019    Procedure: ERCP (ENDOSCOPIC RETROGRADE CHOLANGIOPANCREATOGRAPHY);   "Surgeon: Zafar Velazquez MD;  Location: [facility] ENDO ([address] [place] [address]);  Service: Endoscopy;  Laterality: N/A;       Family History:  Family History   Problem Relation Age of Onset    Coronary artery disease Father         4 vessel bypass at 40, possible stent at 36    Diabetes type II Father     No Known Problems Sister     No Known Problems Brother     Aneurysm Maternal Grandmother         Possible AAA    Diabetes type II Paternal Grandmother        Allergies:  Patient has no known allergies.    Medications:   Current Outpatient Medications   Medication Sig    allopurinol (ZYLOPRIM) 300 MG tablet TAKE 1 TABLET BY MOUTH ONCE DAILY    aspirin (ECOTRIN) 81 MG EC tablet Take 81 mg by mouth once daily.    atorvastatin (LIPITOR) 40 MG tablet TAKE 1 TABLET BY MOUTH ONCE DAILY    diclofenac sodium (VOLTAREN) 1 % Gel Apply 2 g topically 4 (four) times daily. Apply to chest in the location of pain up to 4 times a day as needed for pain.    empagliflozin (JARDIANCE) 10 mg Tab Take 1 tablet (10 mg) by mouth once daily.    fenofibrate 160 MG Tab TAKE 1 TABLET BY MOUTH ONCE DAILY    flash glucose sensor ([employer] DirectSTYLE DEBBIE 14 DAY SENSOR) Kit use as directed every 14 days (Patient not taking: Reported on 12/5/2019)    icosapent ethyl (VASCEPA) 1 gram Cap Take 2 g by mouth 2 (two) times daily.    insulin (LANTUS SOLOSTAR U-100 INSULIN) glargine 100 units/mL (3mL) SubQ pen Inject 52 Units into the skin every evening.    insulin aspart U-100 (NOVOLOG FLEXPEN U-100 INSULIN) 100 unit/mL (3 mL) InPn pen INJECT 20 UNITS SUBCUTANEOUSLY WITH MEALS PLUS SLIDING SCALE 150-200+2, 201-250 +4, 251-300 +6, 301-350 +8,>350 +10. MAX OF 90 UNITS PER DA    insulin syringe-needle U-100 29 gauge x 1/2" Syrg Inject 1 (once) per day    liraglutide 0.6 mg/0.1 mL, 18 mg/3 mL, subq PNIJ (VICTOZA 2-LORI) 0.6 mg/0.1 mL (18 mg/3 mL) PnIj Inject 0.6 mg into the skin once daily.    losartan (COZAAR) 100 MG tablet TAKE 1/2 (ONE-HALF) TABLET BY MOUTH ONCE " DAILY    niacin 100 MG Tab Take 200 mg by mouth every evening.     pantoprazole (PROTONIX) 20 MG tablet Take 1 tablet (20 mg total) by mouth once daily.    pregabalin (LYRICA) 150 MG capsule TAKE 1 CAPSULE BY MOUTH TWICE DAILY    traMADol (ULTRAM) 50 mg tablet Take 1 tablet (50 mg total) by mouth every 6 (six) hours.     No current facility-administered medications for this visit.         Review of Systems   Constitutional: Positive for fatigue and unexpected weight change. Negative for activity change, appetite change, chills, diaphoresis and fever.   HENT: Negative for congestion, dental problem, ear discharge, mouth sores, postnasal drip, rhinorrhea, sinus pain, sneezing and sore throat.    Eyes: Negative for pain, redness and visual disturbance.   Respiratory: Negative for cough, choking, shortness of breath, wheezing and stridor.    Cardiovascular: Negative for chest pain, palpitations and leg swelling.   Gastrointestinal: Negative for abdominal pain, anal bleeding, blood in stool, constipation, diarrhea, nausea and vomiting.   Endocrine: Negative.    Genitourinary: Negative.    Musculoskeletal: Negative.  Negative for back pain, gait problem, myalgias, neck pain and neck stiffness.   Skin: Negative for color change, pallor, rash and wound.   Allergic/Immunologic: Negative.    Neurological: Negative for dizziness, seizures, syncope, facial asymmetry, speech difficulty, weakness, light-headedness, numbness and headaches.   Hematological: Negative for adenopathy. Does not bruise/bleed easily.   Psychiatric/Behavioral: Negative for behavioral problems, confusion, decreased concentration, dysphoric mood and hallucinations. The patient is not hyperactive.       Objective:      Physical Exam   Constitutional: He is oriented to person, place, and time. No distress.   HENT:   Head: Normocephalic and atraumatic.   Right Ear: External ear normal.   Left Ear: External ear normal.   Eyes: Pupils are equal, round, and  reactive to light. Conjunctivae and EOM are normal.   Neck: Normal range of motion.   Cardiovascular: Normal rate and regular rhythm.   Pulmonary/Chest: Effort normal and breath sounds normal. He has no wheezes. He has no rales. He exhibits no tenderness.   Abdominal: Soft. Bowel sounds are normal. He exhibits no mass. There is no tenderness. There is no rebound and no guarding. No hernia.   Musculoskeletal: Normal range of motion.   Neurological: He is alert and oriented to person, place, and time.   Skin: Skin is warm and dry. He is not diaphoretic.   Psychiatric: He has a normal mood and affect. His behavior is normal.      Assessment:       No diagnosis found.    Laboratory Data:    Neuroendocrine Labs Latest Ref Rng & Units 11/15/2019 11/14/2019   GLUCOSE 70 - 110 mg/dL 86 267 (H)   BUN 6 - 20 mg/dL 12 15   CREATININE 0.5 - 1.4 mg/dL 0.7 0.78    - 145 mmol/L 147 (H) 145   K 3.5 - 5.1 mmol/L 3.8 3.0 (L)   CHLORIDE 95 - 110 mmol/L 110 99   CO2 23 - 29 mmol/L 25 31 (H)   CALCIUM 8.7 - 10.5 mg/dL 9.0 9.5   PROTEIN, TOTAL 6.0 - 8.4 g/dL 6.5 7.8   PHOSPHORUS 2.7 - 4.5 mg/dL 2.7    ALBUMIN 3.5 - 5.2 g/dL 3.1 (L) 4.1   URIC ACID 3.4 - 7.0 mg/dL     TOTAL BILIRUBIN 0.1 - 1.0 mg/dL 0.5 0.7   ALK PHOSPHATASE 55 - 135 U/L 113 191 (H)   SGOT (AST) 10 - 40 U/L 25 44   SGPT (ALT) 10 - 44 U/L 36 60 (H)   LIPASE 7 - 60 U/L      - 260 U/L     TRIGLYCERIDES 30 - 150 mg/dL 100    CHOLERSTEROL 120 - 199 mg/dL 98 (L)    HDL 40 - 75 mg/dL 20 (L)    LDL 63.0 - 159.0 mg/dL 58.0 (L)    MG 1.6 - 2.6 mg/dL 2.1      Neuroendocrine Labs Latest Ref Rng & Units 11/14/2019   GLUCOSE 70 - 110 mg/dL    BUN 6 - 20 mg/dL    CREATININE 0.5 - 1.4 mg/dL     - 145 mmol/L    K 3.5 - 5.1 mmol/L    CHLORIDE 95 - 110 mmol/L    CO2 23 - 29 mmol/L    CALCIUM 8.7 - 10.5 mg/dL    PROTEIN, TOTAL 6.0 - 8.4 g/dL    PHOSPHORUS 2.7 - 4.5 mg/dL    ALBUMIN 3.5 - 5.2 g/dL    URIC ACID 3.4 - 7.0 mg/dL    TOTAL BILIRUBIN 0.1 - 1.0 mg/dL    ALK  PHOSPHATASE 55 - 135 U/L    SGOT (AST) 10 - 40 U/L    SGPT (ALT) 10 - 44 U/L    LIPASE 7 - 60 U/L     - 260 U/L    TRIGLYCERIDES 30 - 150 mg/dL    CHOLERSTEROL 120 - 199 mg/dL    HDL 40 - 75 mg/dL    LDL 63.0 - 159.0 mg/dL    MG 1.6 - 2.6 mg/dL          Scans:   MRI Abdomen W WO Contrast_INC MRCP  Narrative: EXAMINATION:  MRI ABDOMEN WITH AND WO_INC MRCP    CLINICAL HISTORY:  Abnormal liver function tests (LFTs);chronic opancreatitis ecvaluate pancreas;  Abnormal results of liver function studies    TECHNIQUE:  Pre and post-contrast multiplanar multisequence imaging of the abdomen was performed before and after the intravenous administration of  10 mL of Gadavist.  MRCP images include 3D MIP imaging of the biliary tract performed at the MR console.    COMPARISON:  CT 11/14/2019    FINDINGS:  Thrombosed main portal vein with cavernous transformation noted, similar to prior exam..  There is a Lois pancreatic fluid collection adjacent to the pancreatic head measuring 7.0 cm in CC dimensions and 4.3 x 3.6 cm in transverse dimensions.  It communicates with the main pancreatic duct, suspicious for pancreatic pseudocyst.  No evidence of pancreatic necrosis.  No biliary ductal dilatation.  There is atrophy of the pancreatic body and tail.  There is mild periportal lymphadenopathy, similar to the prior exam.    No liver masses.    Splenomegaly noted measuring 17.7 cm.    Adrenal glands are unremarkable.  No renal masses or hydronephrosis.    Gallbladder wall thickening noted with cavernous transformation.  No discrete gallstones.  Resolution of perigastric fluid collection.  Trace amount of free fluid.  Visualized portion of the spine unremarkable.  Impression: Moderate-sized (7.0 x 4.3 x 3.6 cm) peripancreatic fluid collection, suspicious for pancreatic pseudocyst, similar to slightly improved in size from the 11/14/2019 CT exam.  Resolution of previously described perigastric fluid collection.  No new fluid  collections.    Portal vein thrombus with cavernous transformation, similar to prior exam.    Moderate splenomegaly (17.7 cm).    Electronically signed by: Adolph Palma MD  Date:    12/23/2019  Time:    12:08       Impression:  39-year-old gentleman with chronic pancreatitis from hypertriglyceridemia came to the hospital last month with acute on chronic pancreatitis and was found to have a pancreatic pseudocyst.  He recently went to the ER for nausea vomiting and was found have a blood.  He was found have a stable Labs and was discharged.    He has  1.  Pancreatic pseudocyst with fluid collection between the stomach and the pancreas about 8 x 5 cm with pancreatic duct leak  2.  Portal vein thrombosis with portal hypertension      He continues to have recurrent attacks of pancreatitis although the last attack was not that significant most likely his nausea and vomiting episode following [place] is from a recurrent attack of pancreatitis.    I explained to him on the picture about what his conditions are his pancreatic leak causing a pancreatic fluid collection and pancreatic pseudocyst.  I also talked about the portal hypertension.  In future if he has any problems such as of vomiting blood or black tarry stools he needs to go to the emergency room soon.    I will refer him to gastroenterologist.  Following that he may be a candidate for prophylactic beta-blocker.  The the GI will decide about ERCP/EUS/stent cystogastrostomy    Plan:       Follow-up with me in about 4 weeks time  Follow up with gastroenterologist.  Referral given to the gastroenterologist.  He should be able to see them are be scheduled  For the procedure directly.  Discussed with him what the procedures would be the need for endoscopy/ERCP    I went over with all his medications.  He has diabetes and has significant neuropathy for which he takes pregabalin.  Is going back to work tomorrow          KASHIF Xavier MD, FACS    of  Surgery, [facility]   Neuroendocrine Surgery, Hepatic/Pancreatic & General Surgery   [address]   [place] [address]   ph. [phone]; [phone]   fax. [phone]

## 2019-12-27 VITALS
BODY MASS INDEX: 27.58 KG/M2 | HEART RATE: 68 BPM | SYSTOLIC BLOOD PRESSURE: 128 MMHG | WEIGHT: 182 LBS | RESPIRATION RATE: 18 BRPM | HEIGHT: 68 IN | DIASTOLIC BLOOD PRESSURE: 64 MMHG | TEMPERATURE: 97 F | OXYGEN SATURATION: 98 %

## 2019-12-27 LAB
ALBUMIN SERPL BCP-MCNC: 3.9 G/DL (ref 3.5–5.2)
ALP SERPL-CCNC: 87 U/L (ref 55–135)
ALT SERPL W/O P-5'-P-CCNC: 23 U/L (ref 10–44)
ANION GAP SERPL CALC-SCNC: 17 MMOL/L (ref 8–16)
AST SERPL-CCNC: 22 U/L (ref 10–40)
BILIRUB SERPL-MCNC: 1.3 MG/DL (ref 0.1–1)
BUN SERPL-MCNC: 22 MG/DL (ref 6–20)
CALCIUM SERPL-MCNC: 9.5 MG/DL (ref 8.7–10.5)
CHLORIDE SERPL-SCNC: 96 MMOL/L (ref 95–110)
CO2 SERPL-SCNC: 26 MMOL/L (ref 23–29)
CREAT SERPL-MCNC: 1.3 MG/DL (ref 0.5–1.4)
EST. GFR  (AFRICAN AMERICAN): >60 ML/MIN/1.73 M^2
EST. GFR  (NON AFRICAN AMERICAN): >60 ML/MIN/1.73 M^2
GLUCOSE SERPL-MCNC: 283 MG/DL (ref 70–110)
LACTATE SERPL-SCNC: 1.9 MMOL/L (ref 0.5–2.2)
LIPASE SERPL-CCNC: 112 U/L (ref 4–60)
POTASSIUM SERPL-SCNC: 3.9 MMOL/L (ref 3.5–5.1)
PROT SERPL-MCNC: 7.2 G/DL (ref 6–8.4)
SODIUM SERPL-SCNC: 139 MMOL/L (ref 136–145)

## 2019-12-27 PROCEDURE — 96361 HYDRATE IV INFUSION ADD-ON: CPT

## 2019-12-27 PROCEDURE — 25500020 PHARM REV CODE 255: Performed by: EMERGENCY MEDICINE

## 2019-12-27 PROCEDURE — 63600175 PHARM REV CODE 636 W HCPCS: Performed by: EMERGENCY MEDICINE

## 2019-12-27 PROCEDURE — 83605 ASSAY OF LACTIC ACID: CPT

## 2019-12-27 RX ORDER — MORPHINE SULFATE 4 MG/ML
4 INJECTION, SOLUTION INTRAMUSCULAR; INTRAVENOUS
Status: COMPLETED | OUTPATIENT
Start: 2019-12-27 | End: 2019-12-27

## 2019-12-27 RX ORDER — ONDANSETRON 4 MG/1
4 TABLET, FILM COATED ORAL EVERY 6 HOURS PRN
Qty: 12 TABLET | Refills: 0 | Status: SHIPPED | OUTPATIENT
Start: 2019-12-27 | End: 2020-02-11

## 2019-12-27 RX ORDER — FAMOTIDINE 20 MG/1
20 TABLET, FILM COATED ORAL 2 TIMES DAILY
Qty: 60 TABLET | Refills: 0 | Status: SHIPPED | OUTPATIENT
Start: 2019-12-27 | End: 2020-02-11

## 2019-12-27 RX ADMIN — MORPHINE SULFATE 4 MG: 4 INJECTION, SOLUTION INTRAMUSCULAR; INTRAVENOUS at 12:12

## 2019-12-27 RX ADMIN — IOHEXOL 100 ML: 350 INJECTION, SOLUTION INTRAVENOUS at 12:12

## 2019-12-27 RX ADMIN — SODIUM CHLORIDE 1000 ML: 0.9 INJECTION, SOLUTION INTRAVENOUS at 01:12

## 2019-12-27 NOTE — ED PROVIDER NOTES
Encounter Date: 12/26/2019       History     Chief Complaint   Patient presents with    Abdominal Pain     tenderness all over and cramping in lower quadrants with N/V x2 days, noticed blood mixed in vomit yesterday     HPI   39-year-old male with past medical history of hypertension, diabetes, hyperlipidemia, pancreatitis, with known pancreatic pseudocyst, history of portal vein thrombosis, coming in with abdominal pain since yesterday.  He also had vomiting yesterday and today.  Today vomiting was mildly blood-tinged.  No diarrhea.  Passing normal stools.  Passing gas.  Pain is described as crampy moderate intensity 7/10.  No  pain, no penile pain or testicular pain. No blood in urine.  No dysuria.    Review of patient's allergies indicates:  No Known Allergies  Past Medical History:   Diagnosis Date    Acute pancreatitis     Essential hypertension     Gout     Hx of acute pancreatitis 3/3/2017    Hypertriglyceridemia 8/2/2017    Obesity (BMI 30-39.9) 6/5/2015    Obesity (BMI 35.0-39.9 without comorbidity) 6/5/2015    Obstructive sleep apnea syndrome 12/18/2015    Type 2 diabetes mellitus with diabetic polyneuropathy, with long-term current use of insulin 10/16/2017    Type 2 diabetes mellitus with hyperglycemia, with long-term current use of insulin 8/3/2017     Past Surgical History:   Procedure Laterality Date    ENDOSCOPIC ULTRASOUND OF UPPER GASTROINTESTINAL TRACT N/A 5/27/2019    Procedure: ULTRASOUND, UPPER GI TRACT, ENDOSCOPIC;  Surgeon: Zafar Velazquez MD;  Location: [address]);  Service: Endoscopy;  Laterality: N/A;    ERCP N/A 5/27/2019    Procedure: ERCP (ENDOSCOPIC RETROGRADE CHOLANGIOPANCREATOGRAPHY);  Surgeon: Zafar Velazquez MD;  Location: [address]);  Service: Endoscopy;  Laterality: N/A;     Family History   Problem Relation Age of Onset    Coronary artery disease Father         4 vessel bypass at 40, possible stent at 36    Diabetes type II Father     No Known  Problems Sister     No Known Problems Brother     Aneurysm Maternal Grandmother         Possible AAA    Diabetes type II Paternal Grandmother      Social History     Tobacco Use    Smoking status: Never Smoker    Smokeless tobacco: Never Used   Substance Use Topics    Alcohol use: No    Drug use: No     Review of Systems   Constitutional:  No Fever  Eyes: No Vision Changes  ENT/Mouth: No sore throat  Cardiovascular:  No Chest Pain, No Palpitations  Respiratory:  No Cough, No SOB  Gastrointestinal:  Positive nausea and Vomiting, No Diarrhea, positive abdo pain.  Genitourinary:  No  pain, No dysuria   Musculoskeletal:   No Back Pain, No Neck Pain, No recent trauma.  Skin:  No skin Lesions  Neuro:  Positive generalized Weakness, No Numbness, No Dizziness, No Headache        Physical Exam     Initial Vitals [12/26/19 2157]   BP Pulse Resp Temp SpO2   (!) 140/67 (!) 118 18 98.4 °F (36.9 °C) 97 %      MAP       --         Physical Exam   Physical Exam:  CONSTITUTIONAL: Well developed, well nourished, in no acute distress.  HENT: Normocephalic, atraumatic    EYES: Sclerae anicteric   NECK: Supple, no thyroid enlargement  CARDIOVASCULAR: Regular rate and rhythm without any murmurs, gallops, rubs.  No lower extremity swelling or tenderness to palpation  RESPIRATORY: Speaking in full sentences. Breathing comfortably. Auscultation of the lungs revealed normal breath sounds b/l, no wheezing, no rales, no rhonchi.  ABDOMEN: Soft with tenderness in the epigastrium and right upper quadrant, no masses, no rebound or guarding   NEUROLOGIC: Alert, interacting normally. No facial droop.   MSK: Moving all four extremities.  Skin: Warm and dry. No visible rash on exposed areas of skin.    Psych: Mood and affect normal.       ED Course   Procedures  Labs Reviewed   CBC W/ AUTO DIFFERENTIAL - Abnormal; Notable for the following components:       Result Value    WBC 17.44 (*)     RBC 6.46 (*)     Mean Corpuscular Volume 81 (*)      Mean Corpuscular Hemoglobin 24.9 (*)     Mean Corpuscular Hemoglobin Conc 30.7 (*)     RDW 17.4 (*)     Gran # (ANC) 12.9 (*)     Immature Grans (Abs) 0.06 (*)     Mono # 2.4 (*)     Gran% 73.8 (*)     Lymph% 9.2 (*)     All other components within normal limits   LACTIC ACID, PLASMA - Abnormal; Notable for the following components:    Lactate (Lactic Acid) 2.9 (*)     All other components within normal limits   COMPREHENSIVE METABOLIC PANEL - Abnormal; Notable for the following components:    Glucose 283 (*)     BUN, Bld 22 (*)     Total Bilirubin 1.3 (*)     Anion Gap 17 (*)     All other components within normal limits   LIPASE - Abnormal; Notable for the following components:    Lipase 112 (*)     All other components within normal limits   LACTIC ACID, PLASMA    Narrative:     Repeat after second liter          Imaging Results          CT Abdomen Pelvis With Contrast (Final result)  Result time 12/27/19 01:58:18    Final result by Landon Foy MD (12/27/19 01:58:18)                 Impression:      Stable appearance of the pancreatic pseudocyst and peripancreatic inflammatory change, which tracks along the distal stomach and inferior aspect of the liver, similar to slightly improved from prior exam.  Persistent reactive lymphadenopathy and trace non organized free fluid scattered throughout the abdomen.  No new fluid collections identified.    Inflammatory changes and wall enhancement involving the distal stomach, possibly reactive or secondary to gastritis.    Stable portal vein thrombosis with cavernous transformation and numerous collateral vessels within the upper abdomen, as well as gastric varices.    Simple left renal cyst.    Extensive diverticulosis coli without evidence diverticulitis.    Additional stable findings, as above.    Electronically signed by resident: Venus Murphy  Date:    12/27/2019  Time:    00:43    Electronically signed by: Landon Foy  MD  Date:    12/27/2019  Time:    01:58             Narrative:    EXAMINATION:  CT ABDOMEN PELVIS WITH CONTRAST    CLINICAL HISTORY:  Nausea, vomiting, diarrhea    TECHNIQUE:  Low dose axial images, sagittal and coronal reformations were obtained from the lung bases to the pubic symphysis following the IV administration of 100 mL of Omnipaque 350 .  Oral contrast was not given.    COMPARISON:  MRI abdomen 12/23/2019, CT abdomen pelvis 11/14/2019    FINDINGS:  Heart: Normal in size.  No pericardial effusion.    Lung bases: Symmetrically expanded and clear without consolidation, pleural effusion, mass, or pneumothorax.    Liver: Normal in size and attenuation without focal hepatic abnormality.  Redemonstration of portal vein thrombosis with cavernous transformation and numerous collateral vessels within the upper abdomen and gastric varices, similar to prior exams.    Gallbladder: Normal in appearance without evidence for cholecystitis.    Bile Ducts: No intra or extrahepatic biliary ductal dilation.    Pancreas: Redemonstration of the well-defined fluid collection centered within the pancreatic head/body, previously characterized as a probable pseudocyst on recent MRI 12/23/2019, measuring 4.5 x 3.1 cm in maximal axial diameter and extending approximately 8.0 cm cranio caudally, similar to prior MR. There is continued mild peripancreatic edema and trace non organized free-fluid with inflammatory change again seen tracking along the lesser curvature and distal aspect of the stomach with extension along the underside of the anterior aspect of the liver, similar to slightly improved from prior CT 11/14/2019.  No new fluid collections identified.    Spleen: Enlarged, similar to prior study.    Adrenals: Unremarkable.    Kidneys/ Ureters: Normal in size and location.  Kidneys enhance normally.  1.2 cm left simple renal cyst.  Otherwise, no nephrolithiasis or hydroureteronephrosis.    Bladder: Smooth contours without  bladder wall thickening.    Reproductive organs: Unremarkable.    GI Tract/Mesentery: Stomach is distended with fluid and there are prominent gastroesophageal varices.  There is gastric wall thickening and wall hyperenhancement particularly involving the distal stomach.  Findings are similar but more pronounced when compared with prior studies.  Visualized loops of small and large bowel are normal in caliber without evidence for obstruction or inflammation. Numerous colonic diverticula without evidence of diverticulitis.  The appendix is visualized and demonstrates no wall thickening or dilatation.    Peritoneal Space: No intraperitoneal free air.  Numerous prominent to minimally enlarged mesenteric and peripancreatic lymph nodes, likely reactive.  Trace free fluid layering within the pelvis and scattered throughout the mesentery.    Retroperitoneum: Numerous prominent retroperitoneal lymph nodes, likely reactive.    Abdominal wall/extraperitoneal soft tissues: Unremarkable.    Vasculature: Abdominal aorta is normal in caliber, contour, and course with minimal calcific atherosclerosis.  Cavernous transformation of the portal vein as above.  There are gastroesophageal and mesenteric collateral vessels.    Bones: Mild degenerative changes without acute fracture or bone destructive process.                                 Medical Decision Making:   Initial Assessment:   39-year-old male with past medical history as noted coming in with abdominal pain and vomiting since 2 days.   On exam there is right upper quadrant epigastric tenderness. Abdomen is nonsurgical this time.    Will undertake workup with full set of labs including LFTs and lipase.    Will control symptoms with IV fluids and Zofran.  Pepcid for some possible GI involvement.    Given history of pancreatic pseudocyst and portal vein thrombosis will obtain CT abdomen pelvis with contrast to assess for any evolution of those old findings.    Dispo per ED  workup and patient's symptomatology.  ED Management:  Update:  Labs with mildly elevated bili, elevated WBCs, mildly elevated lactate.  Lipase is up but lower than patient's previous baseline.  CT scan repeated moderately improved from previous.  No new findings  Hgb is at baseline, no dark stools, vomiting was only blood tinged, not consistent with sig GIB.   Patient feeling significantly improved after Zofran and IV fluids.  Tolerated crackers and oral fluid  Repeat abdominal exam is benign.  Lactate down after IVF.   Vitals normalized.  Patient is following up outpatient for the results of his MRI and outpatient management of his pancreatic pseudocyst.  At this time no signs of dangerous or new biliary, gastirc, pancreatic pathology.  Recommend clear liquid diet for 24 hours.   Discharge home with close outpatient follow-up and return precautions.    Findings of ED work up explained to patient. Patient agrees with discharge plan and verbalizes understanding of return precautions.       MDM Complexity Points:   Problem Points:  1.New problem, with no additional ED work-up planned (maximum of 1) - abdominal pain, vomiting.     Data Points:  Review or order clinical lab tests, Review or order radiology test, Decision to obtain old records (in the EHR) and Review and summarization of old records                                     Clinical Impression:       ICD-10-CM ICD-9-CM   1. Abdominal pain, unspecified abdominal location R10.9 789.00   2. Non-intractable vomiting with nausea, unspecified vomiting type R11.2 787.01                             Garret Mckinley MD  12/27/19 1826       Garret Mckinley MD  12/27/19 1826

## 2020-01-06 ENCOUNTER — TELEPHONE (OUTPATIENT)
Dept: GASTROENTEROLOGY | Facility: CLINIC | Age: 40
End: 2020-01-06

## 2020-01-06 ENCOUNTER — OFFICE VISIT (OUTPATIENT)
Dept: NEUROLOGY | Facility: HOSPITAL | Age: 40
End: 2020-01-06
Attending: SURGERY
Payer: COMMERCIAL

## 2020-01-06 VITALS
HEART RATE: 65 BPM | WEIGHT: 173.94 LBS | HEIGHT: 68 IN | SYSTOLIC BLOOD PRESSURE: 131 MMHG | BODY MASS INDEX: 26.36 KG/M2 | DIASTOLIC BLOOD PRESSURE: 75 MMHG

## 2020-01-06 DIAGNOSIS — I81 PORTAL VEIN THROMBOSIS: Primary | ICD-10-CM

## 2020-01-06 DIAGNOSIS — Z87.19 HISTORY OF CHRONIC PANCREATITIS: ICD-10-CM

## 2020-01-06 DIAGNOSIS — K86.3 PANCREATIC PSEUDOCYST: ICD-10-CM

## 2020-01-06 DIAGNOSIS — K76.6 PORTAL HYPERTENSION: ICD-10-CM

## 2020-01-06 PROCEDURE — 99214 OFFICE O/P EST MOD 30 MIN: CPT | Performed by: SURGERY

## 2020-01-06 NOTE — TELEPHONE ENCOUNTER
----- Message from Milly Lam RN sent at 1/6/2020 10:09 AM CST -----  Hello,     This pt needs another consult as per Dr. Xavier's last clinic note. Please advise.    Thanks -Milly

## 2020-01-07 ENCOUNTER — OFFICE VISIT (OUTPATIENT)
Dept: FAMILY MEDICINE | Facility: CLINIC | Age: 40
End: 2020-01-07
Payer: COMMERCIAL

## 2020-01-07 VITALS
OXYGEN SATURATION: 99 % | WEIGHT: 174.31 LBS | HEART RATE: 70 BPM | TEMPERATURE: 98 F | DIASTOLIC BLOOD PRESSURE: 70 MMHG | SYSTOLIC BLOOD PRESSURE: 118 MMHG | HEIGHT: 68 IN | BODY MASS INDEX: 26.42 KG/M2

## 2020-01-07 DIAGNOSIS — E11.42 TYPE 2 DIABETES MELLITUS WITH DIABETIC POLYNEUROPATHY, WITH LONG-TERM CURRENT USE OF INSULIN: Primary | ICD-10-CM

## 2020-01-07 DIAGNOSIS — E78.2 MIXED HYPERLIPIDEMIA: ICD-10-CM

## 2020-01-07 DIAGNOSIS — K86.1 OTHER CHRONIC PANCREATITIS: ICD-10-CM

## 2020-01-07 DIAGNOSIS — D72.829 LEUKOCYTOSIS, UNSPECIFIED TYPE: ICD-10-CM

## 2020-01-07 DIAGNOSIS — I10 ESSENTIAL HYPERTENSION: ICD-10-CM

## 2020-01-07 DIAGNOSIS — K86.3 PANCREATIC PSEUDOCYST: ICD-10-CM

## 2020-01-07 DIAGNOSIS — I81 PORTAL VEIN THROMBOSIS: ICD-10-CM

## 2020-01-07 DIAGNOSIS — R63.4 WEIGHT LOSS: ICD-10-CM

## 2020-01-07 DIAGNOSIS — Z79.4 TYPE 2 DIABETES MELLITUS WITH DIABETIC POLYNEUROPATHY, WITH LONG-TERM CURRENT USE OF INSULIN: Primary | ICD-10-CM

## 2020-01-07 PROBLEM — K85.90 ACUTE PANCREATITIS: Status: RESOLVED | Noted: 2019-09-01 | Resolved: 2020-01-07

## 2020-01-07 PROCEDURE — 99999 PR PBB SHADOW E&M-EST. PATIENT-LVL IV: CPT | Mod: PBBFAC,,, | Performed by: INTERNAL MEDICINE

## 2020-01-07 PROCEDURE — 3008F BODY MASS INDEX DOCD: CPT | Mod: CPTII,S$GLB,, | Performed by: INTERNAL MEDICINE

## 2020-01-07 PROCEDURE — 99214 OFFICE O/P EST MOD 30 MIN: CPT | Mod: S$GLB,,, | Performed by: INTERNAL MEDICINE

## 2020-01-07 PROCEDURE — 3046F HEMOGLOBIN A1C LEVEL >9.0%: CPT | Mod: CPTII,S$GLB,, | Performed by: INTERNAL MEDICINE

## 2020-01-07 PROCEDURE — 3074F PR MOST RECENT SYSTOLIC BLOOD PRESSURE < 130 MM HG: ICD-10-PCS | Mod: CPTII,S$GLB,, | Performed by: INTERNAL MEDICINE

## 2020-01-07 PROCEDURE — 99214 PR OFFICE/OUTPT VISIT, EST, LEVL IV, 30-39 MIN: ICD-10-PCS | Mod: S$GLB,,, | Performed by: INTERNAL MEDICINE

## 2020-01-07 PROCEDURE — 3078F DIAST BP <80 MM HG: CPT | Mod: CPTII,S$GLB,, | Performed by: INTERNAL MEDICINE

## 2020-01-07 PROCEDURE — 3074F SYST BP LT 130 MM HG: CPT | Mod: CPTII,S$GLB,, | Performed by: INTERNAL MEDICINE

## 2020-01-07 PROCEDURE — 99999 PR PBB SHADOW E&M-EST. PATIENT-LVL IV: ICD-10-PCS | Mod: PBBFAC,,, | Performed by: INTERNAL MEDICINE

## 2020-01-07 PROCEDURE — 3008F PR BODY MASS INDEX (BMI) DOCUMENTED: ICD-10-PCS | Mod: CPTII,S$GLB,, | Performed by: INTERNAL MEDICINE

## 2020-01-07 PROCEDURE — 3046F PR MOST RECENT HEMOGLOBIN A1C LEVEL > 9.0%: ICD-10-PCS | Mod: CPTII,S$GLB,, | Performed by: INTERNAL MEDICINE

## 2020-01-07 PROCEDURE — 3078F PR MOST RECENT DIASTOLIC BLOOD PRESSURE < 80 MM HG: ICD-10-PCS | Mod: CPTII,S$GLB,, | Performed by: INTERNAL MEDICINE

## 2020-01-07 NOTE — PATIENT INSTRUCTIONS
We have reviewed your prescription medications.   You did not want a flu shot.   We will order labs to follow-up on diabetes and elevated wbc count.   We will also check tsh because of weight loss.  Please schedule appointments with GI and the eye doctor as soon as possible.   Continue current medicines.   Follow-up in about 3 months.

## 2020-01-08 ENCOUNTER — TELEPHONE (OUTPATIENT)
Dept: INTERNAL MEDICINE | Facility: CLINIC | Age: 40
End: 2020-01-08

## 2020-01-08 NOTE — TELEPHONE ENCOUNTER
----- Message from Komal Pierce MD sent at 1/8/2020  8:15 AM CST -----  TG's were so high that ldl could not be calculated. Uric acid level was good.Blood counts were Ok. Thyroid test was normal. HgbA1c was 10.0, which is slightly better than previous. Please increase lantus to 60 units a day and be sure that you are taking all of your cholesterol medicines. Return in 1 month for a recheck on sugars and cholesterol.

## 2020-01-12 NOTE — PROGRESS NOTES
Subjective:       Patient ID: Bay Ibarra is a 39 y.o. male.    Chief Complaint: Establish Care (pt is concerned with the amount of weight he has lost without any effort to lose weight ) and Weight Loss     I have reviewed the PMH,  and  for this patient    He  has a past medical history of Acute pancreatitis, Essential hypertension, Gout, acute pancreatitis (3/3/2017), Hypertriglyceridemia (8/2/2017), Obesity (BMI 30-39.9) (6/5/2015), Obesity (BMI 35.0-39.9 without comorbidity) (6/5/2015), Obstructive sleep apnea syndrome (12/18/2015), Type 2 diabetes mellitus with diabetic polyneuropathy, with long-term current use of insulin (10/16/2017), and Type 2 diabetes mellitus with hyperglycemia, with long-term current use of insulin (8/3/2017).     The patient presents today for follow-up of chronic conditions. He is also establishing care with me. He used to see Dr Singleton. He has had chronic pancreatitis. HE also has portal vein thrombosis. He had an MRI and follow-up appointment with GI yesterday. He has lost 9-11 pounds in the last 2 weeks. He has had uncontrolled diabetes. His last HgbA1c was 12.9. He does not want a flu shot. He saw the eye doctor, Dr Tamayo,  last January, so he is due for an appointment. He had blood counts and blood chemistries in December, which were OK, except for high sugars. He has not been checking sugars at home.     The patient denies chest pain, shortness of breath, nausea, or dizziness.     Active Ambulatory Problems     Diagnosis Date Noted    Essential hypertension     Obesity (BMI 30-39.9) 06/05/2015    Gout 06/05/2015    Obstructive sleep apnea syndrome 12/18/2015    Mixed hyperlipidemia 08/02/2017    Type 2 diabetes mellitus with diabetic polyneuropathy, with long-term current use of insulin 10/16/2017    Acute on chronic pancreatitis 08/29/2019    Portal vein thrombosis 08/29/2019    Pancreatic pseudocyst 08/29/2019    Diabetic ketoacidosis without coma  associated with type 2 diabetes mellitus 08/30/2019    Hypokalemia 09/01/2019    Chronic pancreatitis      Resolved Ambulatory Problems     Diagnosis Date Noted    Diabetes mellitus type I     Hyperlipidemia 06/05/2015    Noncompliance 09/04/2015    Acute upper respiratory infection 12/18/2015    Sleep apnea 01/22/2016    Hx of acute pancreatitis 03/03/2017    Acute pancreatitis 04/16/2017    Leukocytosis 04/18/2017    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    Adult BMI 32.0-32.9 kg/sq m 05/03/2017    Acute pancreatitis with uninfected necrosis 07/25/2017    Hyperglycemic hyperosmolar nonketotic coma 07/25/2017    Acute respiratory failure with hypoxia 07/28/2017    TROY (acute kidney injury) 07/28/2017    Ileus 07/28/2017    Diarrhea 08/01/2017    Acute pancreatitis 12/01/2018    Acute pancreatitis 09/01/2019     Past Medical History:   Diagnosis Date    Hypertriglyceridemia 8/2/2017    Obesity (BMI 35.0-39.9 without comorbidity) 6/5/2015    Type 2 diabetes mellitus with hyperglycemia, with long-term current use of insulin 8/3/2017         MEDICATIONS:  Current Outpatient Medications:     allopurinol (ZYLOPRIM) 300 MG tablet, TAKE 1 TABLET BY MOUTH ONCE DAILY, Disp: 30 tablet, Rfl: 5    aspirin (ECOTRIN) 81 MG EC tablet, Take 81 mg by mouth once daily., Disp: , Rfl:     atorvastatin (LIPITOR) 40 MG tablet, TAKE 1 TABLET BY MOUTH ONCE DAILY, Disp: 90 tablet, Rfl: 0    diclofenac sodium (VOLTAREN) 1 % Gel, Apply 2 g topically 4 (four) times daily. Apply to chest in the location of pain up to 4 times a day as needed for pain., Disp: 100 g, Rfl: 0    empagliflozin (JARDIANCE) 10 mg Tab, Take 1 tablet (10 mg) by mouth once daily., Disp: 90 tablet, Rfl: 1    famotidine (PEPCID) 20 MG tablet, Take 1 tablet (20 mg total) by mouth 2 (two) times daily., Disp: 60 tablet, Rfl: 0    fenofibrate 160 MG Tab, TAKE 1 TABLET BY MOUTH ONCE DAILY, Disp: 90 tablet, Rfl: 0    flash glucose sensor  "(FREESTYLE DEBBIE 14 DAY SENSOR) Kit, use as directed every 14 days, Disp: 2 kit, Rfl: 0    icosapent ethyl (VASCEPA) 1 gram Cap, Take 2 g by mouth 2 (two) times daily., Disp: 360 capsule, Rfl: 3    insulin (LANTUS SOLOSTAR U-100 INSULIN) glargine 100 units/mL (3mL) SubQ pen, Inject 52 Units into the skin every evening., Disp: 18 mL, Rfl: 6    insulin aspart U-100 (NOVOLOG FLEXPEN U-100 INSULIN) 100 unit/mL (3 mL) InPn pen, INJECT 20 UNITS SUBCUTANEOUSLY WITH MEALS PLUS SLIDING SCALE 150-200+2, 201-250 +4, 251-300 +6, 301-350 +8,>350 +10. MAX OF 90 UNITS PER DA, Disp: 30 Syringe, Rfl: 4    insulin syringe-needle U-100 29 gauge x 1/2" Syrg, Inject 1 (once) per day, Disp: 100 Syringe, Rfl: 3    liraglutide 0.6 mg/0.1 mL, 18 mg/3 mL, subq PNIJ (VICTOZA 2-LORI) 0.6 mg/0.1 mL (18 mg/3 mL) PnIj, Inject 0.6 mg into the skin once daily., Disp: 3 mL, Rfl: 3    losartan (COZAAR) 100 MG tablet, TAKE 1/2 (ONE-HALF) TABLET BY MOUTH ONCE DAILY, Disp: 45 tablet, Rfl: 3    niacin 100 MG Tab, Take 200 mg by mouth every evening. , Disp: , Rfl:     ondansetron (ZOFRAN) 4 MG tablet, Take 1 tablet (4 mg total) by mouth every 6 (six) hours as needed for Nausea., Disp: 12 tablet, Rfl: 0    pregabalin (LYRICA) 150 MG capsule, TAKE 1 CAPSULE BY MOUTH TWICE DAILY, Disp: 60 capsule, Rfl: 0    traMADol (ULTRAM) 50 mg tablet, Take 1 tablet (50 mg total) by mouth every 6 (six) hours., Disp: 28 tablet, Rfl: 0    pantoprazole (PROTONIX) 20 MG tablet, Take 1 tablet (20 mg total) by mouth once daily., Disp: 30 tablet, Rfl: 0      HEALTH MAINTENANCE:   Health Maintenance   Topic Date Due    Eye Exam  01/17/2020    Pneumococcal Vaccine (Medium Risk) (1 of 1 - PPSV23) 04/16/2020 (Originally 6/26/1999)    Hemoglobin A1c  04/07/2020    Urine Microalbumin  04/15/2020    Foot Exam  09/05/2020    Lipid Panel  01/07/2021    TETANUS VACCINE  06/04/2029       Review of Systems   Constitutional: Negative for chills, fatigue and fever.   HENT: " Negative for congestion, ear discharge, ear pain, rhinorrhea and sore throat.    Eyes: Negative for discharge, redness and itching.   Respiratory: Negative for cough, chest tightness, shortness of breath and wheezing.    Cardiovascular: Negative for chest pain, palpitations and leg swelling.   Gastrointestinal: Negative for abdominal pain, constipation, diarrhea, nausea and vomiting.   Endocrine: Negative for cold intolerance and heat intolerance.   Genitourinary: Negative for dysuria, flank pain, frequency and hematuria.   Musculoskeletal: Negative for arthralgias, back pain, joint swelling and myalgias.   Skin: Negative for color change and rash.   Neurological: Negative for dizziness, tremors, numbness and headaches.   Psychiatric/Behavioral: Negative for dysphoric mood and sleep disturbance. The patient is not nervous/anxious.        Objective:          A1C:  Recent Labs   Lab 04/17/17  0553 07/25/17  2006 09/28/17  1639 07/27/18  0932 12/01/18  1539 05/27/19  0623 08/30/19  0754 01/07/20  0952   Hemoglobin A1C 9.3 H 11.6 H 7.7 A 11.5 H 10.4 H 11.0 H 12.9 H 10.0 H     CBC:  Recent Labs   Lab 05/28/19  0309 05/29/19  0520 07/01/19  1210 08/27/19  1001 08/29/19  0621 08/30/19  0754 08/31/19  0340 09/01/19  0355 11/14/19  1241 11/15/19  0803 12/26/19  2232 01/07/20  0952   WBC 8.35 6.17 11.31 8.01 11.77 7.48 5.31 3.41 L 8.29 4.54 17.44 H 5.59   RBC 4.76 4.71 5.82 5.87 5.62 5.05 4.86 4.71 5.64 4.88 6.46 H 5.98   Hemoglobin 13.4 L 13.2 L 16.1 15.4 15.0 13.6 L 12.7 L 12.2 L 14.1 12.2 L 16.1 14.9   Hematocrit 39.8 L 39.3 L 46.7 46.8 44.8 43.5 41.5 38.2 L 43.9 39.9 L 52.5 46.4   Platelets 167 178 164 221 278 215 198 165 258 168 321 188   Mean Corpuscular Volume 84 83 80 L 80 L 80 L 86 85 81 L 78 L 82 81 L 78 L   Mean Corpuscular Hemoglobin 28.2 28.0 27.7 26.2 L 26.7 L 26.9 L 26.1 L 25.9 L 25.0 L 25.0 L 24.9 L 24.9 L   Mean Corpuscular Hemoglobin Conc 33.7 33.6 34.5 32.9 33.5 31.3 L 30.6 L 31.9 L 32.1 30.6 L 30.7 L 32.1      CMP:  Recent Labs   Lab 05/26/19  2120 05/27/19  0623 05/28/19  0309 05/29/19  0520 07/01/19  1210 08/27/19  1001 08/29/19  0621 08/30/19  0753  11/14/19  1241 11/15/19  0803 12/26/19  2326 01/07/20  0952   Glucose 258 H 215 H 167 H 139 H 384 H 337 H 332 H 241 H   < > 267 H 86 283 H 305 H   Calcium 9.7 9.0 9.6 9.8 10.6 H 10.0 9.7 9.2   < > 9.5 9.0 9.5 9.5   Albumin 4.8 3.1 L 3.3 L 3.3 L 4.7 4.5 4.3 3.2 L  --  4.1 3.1 L 3.9 4.5   Total Protein 8.2 5.8 L 6.5 6.6 8.3 7.9 7.6 7.2  --  7.8 6.5 7.2 8.3   Sodium 141 136 138 138 141 138 137 142   < > 145 147 H 139 145   Potassium 3.9 4.1 3.9 3.7 4.1 4.3 3.9 4.2   < > 3.0 L 3.8 3.9 4.4   CO2 26 25 25 29 25 26 22 L 19 L   < > 31 H 25 26 26   Chloride 100 102 103 102 97 98 98 104   < > 99 110 96 102   BUN, Bld 16 10 10 8 23 H 17 14 10   < > 15 12 22 H 20   Creatinine 0.77 0.9 0.9 0.9 0.98 0.65 0.63 0.9   < > 0.78 0.7 1.3 0.68   Alkaline Phosphatase 96 65 70 68 127 H 134 H 127 H 93  --  191 H 113 87 179 H   ALT 72 H 62 H 88 H 69 H 65 H 29 32 25  --  60 H 36 23 55 H   AST 77 H 54 H 64 H 35 63 H 28 32 22  --  44 25 22 36   Total Bilirubin 1.2 H 1.9 H 1.2 H 0.6 1.1 H 1.3 H 1.3 H 0.7  --  0.7 0.5 1.3 H 0.6    < > = values in this interval not displayed.     LIPIDS:  Recent Labs   Lab 04/19/17  0638  07/25/17  2232 07/25/17  2306 07/27/17  0057 07/29/17  0300 08/15/17  0751 09/28/17  1639 10/10/17  0727 01/12/18  0734 07/27/18  0932 12/02/18  0526 04/15/19  0822 05/27/19  0032 08/30/19  0753 11/15/19  0803 01/07/20  0952   TSH  --   --   --   --   --   --   --   --   --  1.91  --   --   --   --   --   --  2.250   HDL 26 L   < > 12 L 10 L 10 L  --  27 L  --  27 L 25 L 21 L 12 L 21 L 28 L 25 L 20 L 24 L   POC Cholesterol, Total  --   --   --   --   --   --   --  133  --   --   --   --   --   --   --   --   --    Cholesterol 137   < > 211 H 203 H 105 L  --  176  --  150 138 182 246 H 143 118 L 108 L 98 L 160   Triglycerides 254 H   < > 978 H 946 H 302 H 216 H 196 H  --  135 120  1,169 H 1,797 H 748 H 216 H 102 100 882 H   LDL Cholesterol 60.2 L  --  Invalid, Trig>400.0 Invalid, Trig>400.0 34.6 L  --  110  --  96.0 91 Invalid, Trig>400.0 Invalid, Trig>400.0 Invalid, Trig>400.0 46.8 L 62.6 L 58.0 L Invalid, Trig>400.0   Hdl/Cholesterol Ratio 19.0 L   < > 5.7 L 4.9 L 9.5 L  --  6.5 H  --  18.0 L 5.5 H 11.5 L 4.9 L 14.7 L 23.7 23.1 20.4 15.0 L   Non-HDL Cholesterol 111  --  199 193 95  --   --   --  123  --  161 234 122 90 83 78 136   Non HDL Chol. (LDL+VLDL)  --    < >  --   --   --   --  149  --   --  113  --   --   --   --   --   --   --    Total Cholesterol/HDL Ratio 5.3 H  --  17.6 H 20.3 H 10.5 H  --   --   --  5.6 H  --  8.7 H 20.5 H 6.8 H 4.2 4.3 4.9 6.7 H    < > = values in this interval not displayed.     TSH:  Recent Labs   Lab 01/12/18  0734 01/07/20  0952   TSH 1.91 2.250           Physical Exam   Constitutional: He appears well-developed and well-nourished. He does not have a sickly appearance.   HENT:   Head: Normocephalic and atraumatic.   Right Ear: External ear normal. Tympanic membrane is not erythematous. No middle ear effusion.   Left Ear: External ear normal. Tympanic membrane is not erythematous.  No middle ear effusion.   Nose: No rhinorrhea. Right sinus exhibits no maxillary sinus tenderness and no frontal sinus tenderness. Left sinus exhibits no maxillary sinus tenderness and no frontal sinus tenderness.   Mouth/Throat: No posterior oropharyngeal edema or posterior oropharyngeal erythema. No tonsillar exudate.   Eyes: Pupils are equal, round, and reactive to light. Conjunctivae and EOM are normal. Right eye exhibits no discharge. Left eye exhibits no discharge. Right conjunctiva is not injected. Left conjunctiva is not injected.   Neck: No thyromegaly present.   Cardiovascular: Normal rate, regular rhythm, normal heart sounds and intact distal pulses.   No murmur heard.  Pulmonary/Chest: Effort normal and breath sounds normal. He has no wheezes. He has no rhonchi. He  has no rales.   Abdominal: Bowel sounds are normal. He exhibits no distension. There is no tenderness.   Musculoskeletal: He exhibits no edema or tenderness.   Lymphadenopathy:     He has no cervical adenopathy.   Neurological: He displays normal reflexes. No cranial nerve deficit.   Skin: Skin is warm and dry. No lesion and no rash noted.   Psychiatric: He has a normal mood and affect. His behavior is normal. His mood appears not anxious. His speech is not rapid and/or pressured. He is not agitated and not aggressive. He does not exhibit a depressed mood.             Assessment and Plan:     Problem List Items Addressed This Visit        Cardiac/Vascular    Essential hypertension - BP looks good. Continue current medicines.       Mixed hyperlipidemia - on lipitor. Check labs.        Hematology    Portal vein thrombosis - follow-up with GI.        Endocrine    Type 2 diabetes mellitus with diabetic polyneuropathy, with long-term current use of insulin - Primary - schedule eye exam. Continue victoza and insulin. We may need to stop victoza because of pancreatitis.     Relevant Orders    Hemoglobin A1c (Completed)       GI    Pancreatic pseudocyst - chronic.  - follow-up with GI.       Chronic pancreatitis - we may need to stop victoza.       Other Visit Diagnoses     Leukocytosis, unspecified type     - check labs again.     Relevant Orders    CBC auto differential (Completed)    Weight loss     - check TSH -- probably due to pancreatitis.     Relevant Orders    TSH (Completed)          Orders Placed This Encounter    Hemoglobin A1c    CBC auto differential    TSH         Follow-up with me in 3 months.       Josefina Pierce MD,  FACP  Internal Medicine

## 2020-01-16 ENCOUNTER — OFFICE VISIT (OUTPATIENT)
Dept: GASTROENTEROLOGY | Facility: CLINIC | Age: 40
End: 2020-01-16
Payer: COMMERCIAL

## 2020-01-16 VITALS — BODY MASS INDEX: 26.46 KG/M2 | WEIGHT: 174 LBS

## 2020-01-16 DIAGNOSIS — I86.4 GASTRIC VARICES WITHOUT BLEEDING: ICD-10-CM

## 2020-01-16 DIAGNOSIS — K86.3 PANCREATIC PSEUDOCYST: Primary | ICD-10-CM

## 2020-01-16 PROCEDURE — 99214 PR OFFICE/OUTPT VISIT, EST, LEVL IV, 30-39 MIN: ICD-10-PCS | Mod: S$GLB,,, | Performed by: INTERNAL MEDICINE

## 2020-01-16 PROCEDURE — 99999 PR PBB SHADOW E&M-EST. PATIENT-LVL III: CPT | Mod: PBBFAC,,,

## 2020-01-16 PROCEDURE — 99214 OFFICE O/P EST MOD 30 MIN: CPT | Mod: S$GLB,,, | Performed by: INTERNAL MEDICINE

## 2020-01-16 PROCEDURE — 3008F PR BODY MASS INDEX (BMI) DOCUMENTED: ICD-10-PCS | Mod: CPTII,S$GLB,, | Performed by: INTERNAL MEDICINE

## 2020-01-16 PROCEDURE — 99999 PR PBB SHADOW E&M-EST. PATIENT-LVL III: ICD-10-PCS | Mod: PBBFAC,,,

## 2020-01-16 PROCEDURE — 3008F BODY MASS INDEX DOCD: CPT | Mod: CPTII,S$GLB,, | Performed by: INTERNAL MEDICINE

## 2020-01-16 NOTE — PROGRESS NOTES
Subjective:       Patient ID: Bay Ibarra is a 39 y.o. male.    Chief Complaint: Pancreatitis    He had persistent vomiting back near the holidays.  He felt it was related mainly to an upper GI bug that was happening.  He was started on a PPI and hasn't had any issues since.    He had a CT scan and MRI that showed prominent gastric varices and the pseudocyst showing slight improvement.    He has loose stool, but no diarrhea bristol calibre 5    Review of Systems    Objective:      Physical Exam   Abdominal: Soft. Bowel sounds are normal. He exhibits distension. He exhibits no mass. There is no tenderness. There is no rebound and no guarding. No hernia.       Assessment:       1. Pancreatic pseudocyst    2. Gastric varices without bleeding        Plan:       Slightly but slowly improving pseudocyst and pancreatic inflammation related to high triglycerides.  Gastric varices seen on imaging but not confirmed by EGD.  I don't see a role for EUS but we should look at the varices via EGD to determine whether endoscopic or medical  management is necessary.    Plan for EGD and continued observation    I spent 25 minutes with the patient., over 50% of it in coordinating care and counseling the patient

## 2020-01-16 NOTE — PATIENT INSTRUCTIONS
EGD Prep Instructions    [facility] [address] Clinic Office [phone]  Endoscopy Lab [phone]    You are scheduled for an EGD with Dr. Zafar Velazquez  on 01/31/2020 at 800am at [facility].  You will check in at Admit on the first floor of the hospital. Please contact the office two days before procedure date to reschedule if needed.    You may not have anything to eat after 7pm and nothing to drink after midnight.  You can drink clear liquids between 7pm and midnight.    You MAY take your blood pressure, heart, and seizure medication on the morning of the procedure, with a SIP of water.  Hold ALL other medications until after the procedure.    If you are on blood thinners THAT YOU HAVE BEEN INSTRUCTED TO HOLD BY YOUR DOCTOR FOR THIS PROCEDURE, then do NOT take this the morning of your EGD.  Do NOT stop these medications on your own, they must be approved to be held by your doctor.  Your EGD can NOT be done if you are on these medications.  Examples of blood thinners include: Coumadin, Aggrenox, Plavix, Pradaxa, Reapro, Pletal, Xarelto, Ticagrelor, Brilinta, Eliquis, and high dose aspirin (325 mg).  You do not have to stop baby aspirin 81 mg.

## 2020-01-29 ENCOUNTER — TELEPHONE (OUTPATIENT)
Dept: ENDOSCOPY | Facility: HOSPITAL | Age: 40
End: 2020-01-29

## 2020-01-29 NOTE — TELEPHONE ENCOUNTER
Spoke with patient about arrival time @ 7:30 *.     NPO status reviewed: Patient must have nothing to eat after midnight.  Pt may have CLEAR liquids ONLY until completely NPO 3 hrs @ *    Medications: Do not take Insulin or oral diabetic medications the day of the procedure.  Take as prescribed: heart, seizure and blood pressure medication in the morning with a sip of water (less than an ounce).  Take any breathing medications and bring inhalers to hospital with you Leave all valuables and jewelry at home.     Wear comfortable clothes to procedure to change into hospital gown You cannot drive for 24 hours after your procedure because you will receive sedation for your procedure to make you comfortable.  A ride must be provided at discharge.

## 2020-01-31 ENCOUNTER — ANESTHESIA EVENT (OUTPATIENT)
Dept: ENDOSCOPY | Facility: HOSPITAL | Age: 40
End: 2020-01-31
Payer: COMMERCIAL

## 2020-01-31 ENCOUNTER — ANESTHESIA (OUTPATIENT)
Dept: ENDOSCOPY | Facility: HOSPITAL | Age: 40
End: 2020-01-31
Payer: COMMERCIAL

## 2020-01-31 ENCOUNTER — HOSPITAL ENCOUNTER (OUTPATIENT)
Facility: HOSPITAL | Age: 40
Discharge: HOME | End: 2020-01-31
Attending: INTERNAL MEDICINE | Admitting: INTERNAL MEDICINE
Payer: COMMERCIAL

## 2020-01-31 VITALS
WEIGHT: 174 LBS | RESPIRATION RATE: 16 BRPM | OXYGEN SATURATION: 100 % | HEIGHT: 67 IN | HEART RATE: 68 BPM | SYSTOLIC BLOOD PRESSURE: 102 MMHG | TEMPERATURE: 98 F | DIASTOLIC BLOOD PRESSURE: 64 MMHG | BODY MASS INDEX: 27.31 KG/M2

## 2020-01-31 DIAGNOSIS — I81 PORTAL VEIN THROMBOSIS: Primary | ICD-10-CM

## 2020-01-31 DIAGNOSIS — R93.5 ABNORMAL CT OF THE ABDOMEN: ICD-10-CM

## 2020-01-31 DIAGNOSIS — I86.4 GASTRIC VARICES WITHOUT BLEEDING: ICD-10-CM

## 2020-01-31 LAB — POCT GLUCOSE: 91 MG/DL (ref 70–110)

## 2020-01-31 PROCEDURE — 43235 EGD DIAGNOSTIC BRUSH WASH: CPT | Performed by: INTERNAL MEDICINE

## 2020-01-31 PROCEDURE — 43235 PR EGD, FLEX, DIAGNOSTIC: ICD-10-PCS | Mod: ,,, | Performed by: INTERNAL MEDICINE

## 2020-01-31 PROCEDURE — 63600175 PHARM REV CODE 636 W HCPCS: Performed by: INTERNAL MEDICINE

## 2020-01-31 PROCEDURE — 37000008 HC ANESTHESIA 1ST 15 MINUTES: Performed by: INTERNAL MEDICINE

## 2020-01-31 PROCEDURE — 82962 GLUCOSE BLOOD TEST: CPT | Performed by: INTERNAL MEDICINE

## 2020-01-31 PROCEDURE — 63600175 PHARM REV CODE 636 W HCPCS: Performed by: NURSE ANESTHETIST, CERTIFIED REGISTERED

## 2020-01-31 PROCEDURE — 43235 EGD DIAGNOSTIC BRUSH WASH: CPT | Mod: ,,, | Performed by: INTERNAL MEDICINE

## 2020-01-31 PROCEDURE — 37000009 HC ANESTHESIA EA ADD 15 MINS: Performed by: INTERNAL MEDICINE

## 2020-01-31 RX ORDER — SODIUM CHLORIDE 0.9 % (FLUSH) 0.9 %
10 SYRINGE (ML) INJECTION
Status: DISCONTINUED | OUTPATIENT
Start: 2020-01-31 | End: 2020-01-31 | Stop reason: HOSPADM

## 2020-01-31 RX ORDER — LIDOCAINE HCL/PF 100 MG/5ML
SYRINGE (ML) INTRAVENOUS
Status: DISCONTINUED | OUTPATIENT
Start: 2020-01-31 | End: 2020-01-31

## 2020-01-31 RX ORDER — PROPOFOL 10 MG/ML
VIAL (ML) INTRAVENOUS
Status: DISCONTINUED | OUTPATIENT
Start: 2020-01-31 | End: 2020-01-31

## 2020-01-31 RX ORDER — PROPOFOL 10 MG/ML
VIAL (ML) INTRAVENOUS CONTINUOUS PRN
Status: DISCONTINUED | OUTPATIENT
Start: 2020-01-31 | End: 2020-01-31

## 2020-01-31 RX ORDER — SODIUM CHLORIDE 9 MG/ML
INJECTION, SOLUTION INTRAVENOUS CONTINUOUS
Status: DISCONTINUED | OUTPATIENT
Start: 2020-01-31 | End: 2020-01-31 | Stop reason: HOSPADM

## 2020-01-31 RX ADMIN — PROPOFOL 130 MG: 10 INJECTION, EMULSION INTRAVENOUS at 08:01

## 2020-01-31 RX ADMIN — SODIUM CHLORIDE: 0.9 INJECTION, SOLUTION INTRAVENOUS at 08:01

## 2020-01-31 RX ADMIN — PROPOFOL 150 MCG/KG/MIN: 10 INJECTION, EMULSION INTRAVENOUS at 08:01

## 2020-01-31 RX ADMIN — Medication 75 MG: at 08:01

## 2020-01-31 RX ADMIN — SODIUM CHLORIDE: 0.9 INJECTION, SOLUTION INTRAVENOUS at 07:01

## 2020-01-31 NOTE — ANESTHESIA POSTPROCEDURE EVALUATION
Anesthesia Post Evaluation    Patient: Bay Ibarra    Procedure(s) Performed: Procedure(s) (LRB):  EGD (ESOPHAGOGASTRODUODENOSCOPY) (N/A)    Final Anesthesia Type: MAC    Patient location during evaluation: GI PACU  Patient participation: Yes- Able to Participate  Level of consciousness: awake and alert  Post-procedure vital signs: reviewed and stable  Pain management: adequate  Airway patency: patent    PONV status at discharge: No PONV  Anesthetic complications: no      Cardiovascular status: blood pressure returned to baseline and hemodynamically stable  Respiratory status: unassisted, spontaneous ventilation and room air  Hydration status: euvolemic  Follow-up not needed.          Vitals Value Taken Time   /66 1/31/2020  7:39 AM   Temp 36.7 °C (98.1 °F) 1/31/2020  8:55 AM   Pulse 76 1/31/2020  8:55 AM   Resp 12 1/31/2020  8:55 AM   SpO2 99 % 1/31/2020  8:55 AM         No case tracking events are documented in the log.      Pain/Sincere Score: Sincere Score: 8 (1/31/2020  8:55 AM)

## 2020-01-31 NOTE — PLAN OF CARE
Admission process complete. Patient ready for procedure. Plan of care reviewed with patient and his father. Preoperative fasting appropriate for procedure and sedation. Call light in reach and bed in lowest position.

## 2020-01-31 NOTE — ANESTHESIA PREPROCEDURE EVALUATION
01/31/2020  Bay Ibarra is a 39 y.o., male for EGD under MAC    Past Medical History:   Diagnosis Date    Acute pancreatitis     Essential hypertension     Gout     Hx of acute pancreatitis 3/3/2017    Hypertriglyceridemia 8/2/2017    Obesity (BMI 30-39.9) 6/5/2015    Obesity (BMI 35.0-39.9 without comorbidity) 6/5/2015    Obstructive sleep apnea syndrome 12/18/2015    Type 2 diabetes mellitus with diabetic polyneuropathy, with long-term current use of insulin 10/16/2017    Type 2 diabetes mellitus with hyperglycemia, with long-term current use of insulin 8/3/2017     Past Surgical History:   Procedure Laterality Date    ENDOSCOPIC ULTRASOUND OF UPPER GASTROINTESTINAL TRACT N/A 5/27/2019    Procedure: ULTRASOUND, UPPER GI TRACT, ENDOSCOPIC;  Surgeon: Zafar Velazquez MD;  Location: [facility] ([address] [place] [address]);  Service: Endoscopy;  Laterality: N/A;    ERCP N/A 5/27/2019    Procedure: ERCP (ENDOSCOPIC RETROGRADE CHOLANGIOPANCREATOGRAPHY);  Surgeon: Zafar Velazquez MD;  Location: [address]);  Service: Endoscopy;  Laterality: N/A;         Pre-op Assessment    I have reviewed the Patient Summary Reports.     I have reviewed the Nursing Notes.   I have reviewed the Medications.     Review of Systems  Anesthesia Hx:  No problems with previous Anesthesia  History of prior surgery of interest to airway management or planning: Previous anesthesia: General Denies Family Hx of Anesthesia complications.   Denies Personal Hx of Anesthesia complications.   Social:  Non-Smoker    Cardiovascular:   Exercise tolerance: good Hypertension hyperlipidemia    Pulmonary:   Sleep Apnea    Renal/:  Renal/ Normal     Hepatic/GI:   Liver Disease, Pancreatic pseudocyst   Neurological:   Peripheral Neuropathy    Endocrine:   Diabetes, type 2        Physical Exam  General:  Well nourished    Airway/Jaw/Neck:  Airway  Findings: Mouth Opening: Normal Tongue: Normal  General Airway Assessment: Adult  Mallampati: III  Improves to II with phonation.  TM Distance: Normal, at least 6 cm      Dental:  Dental Findings: In tact   Chest/Lungs:  Chest/Lungs Findings: Clear to auscultation, Normal Respiratory Rate     Heart/Vascular:  Heart Findings: Rate: Normal  Rhythm: Regular Rhythm  Sounds: Normal        Mental Status:  Mental Status Findings:  Cooperative, Alert and Oriented         Anesthesia Plan  Type of Anesthesia, risks & benefits discussed:  Anesthesia Type:  MAC  Patient's Preference:   Intra-op Monitoring Plan: standard ASA monitors  Intra-op Monitoring Plan Comments:   Post Op Pain Control Plan:   Post Op Pain Control Plan Comments:   Induction:   IV  Beta Blocker:  Patient is not currently on a Beta-Blocker (No further documentation required).       Informed Consent: Patient understands risks and agrees with Anesthesia plan.  Questions answered. Anesthesia consent signed with patient.  ASA Score: 2     Day of Surgery Review of History & Physical:            Ready For Surgery From Anesthesia Perspective.

## 2020-01-31 NOTE — [employer] PATIENT INSTRUCTIONS
Discharge Summary/Instructions after an Endoscopic Procedure  Patient Name: Bay Ibarra  Patient MRN: [id number]  Patient YOB: 1980 Friday, January 31, 2020  Zafar Velazquez MD  RESTRICTIONS:  During your procedure today, you received medications for sedation.  These   medications may affect your judgment, balance and coordination.  Therefore,   for 24 hours, you have the following restrictions:   - DO NOT drive a car, operate machinery, make legal/financial decisions,   sign important papers or drink alcohol.    ACTIVITY:  Today: no heavy lifting, straining or running due to procedural   sedation/anesthesia.  The following day: return to full activity including work.  DIET:  Eat and drink normally unless instructed otherwise.     TREATMENT FOR COMMON SIDE EFFECTS:  - Mild abdominal pain, nausea, belching, bloating or excessive gas:  rest,   eat lightly and use a heating pad.  - Sore Throat: treat with throat lozenges and/or gargle with warm salt   water.  - Because air was used during the procedure, expelling large amounts of air   from your rectum or belching is normal.  - If a bowel prep was taken, you may not have a bowel movement for 1-3 days.    This is normal.  SYMPTOMS TO WATCH FOR AND REPORT TO YOUR PHYSICIAN:  1. Abdominal pain or bloating, other than gas cramps.  2. Chest pain.  3. Back pain.  4. Signs of infection such as: chills or fever occurring within 24 hours   after the procedure.  5. Rectal bleeding, which would show as bright red, maroon, or black stools.   (A tablespoon of blood from the rectum is not serious, especially if   hemorrhoids are present.)  6. Vomiting.  7. Weakness or dizziness.  GO DIRECTLY TO THE NEAREST EMERGENCY ROOM IF YOU HAVE ANY OF THE FOLLOWING:      Difficulty breathing              Chills and/or fever over 101 F   Persistent vomiting and/or vomiting blood   Severe abdominal pain   Severe chest pain   Black, tarry stools   Bleeding- more than one  tablespoon   Any other symptom or condition that you feel may need urgent attention  Your doctor recommends these additional instructions:  If any biopsies were taken, your doctors clinic will contact you in 1 to 2   weeks with any results.  - Discharge patient to home (ambulatory).   - Patient has a contact number available for emergencies.  The signs and   symptoms of potential delayed complications were discussed with the   patient.  Return to normal activities tomorrow.  Written discharge   instructions were provided to the patient.   - Return to referring physician as previously scheduled.   - Repeat upper endoscopy in 6 months for surveillance of gastric varices.  - Retained food material after overnight fast suggests a component of   delayed gastric emptying. Would recommend smaller volume meals (more   frequently throughout the day), and would consider a mechanical soft diet.     - Return to primary care physician as previously scheduled.  For questions, problems or results please call your physician - Zafar Velazquez MD at Work:  [phone].  EMERGENCY PHONE NUMBER: [phone],  LAB RESULTS: [phone]  IF A COMPLICATION OR EMERGENCY SITUATION ARISES AND YOU ARE UNABLE TO REACH   YOUR PHYSICIAN - GO DIRECTLY TO THE EMERGENCY ROOM.  Zafar Velazquez MD  1/31/2020 9:23:49 AM  This report has been verified and signed electronically.  [employer]

## 2020-01-31 NOTE — DISCHARGE INSTRUCTIONS

## 2020-01-31 NOTE — TRANSFER OF CARE
"Anesthesia Transfer of Care Note    Patient: Bay Ibarra    Procedure(s) Performed: Procedure(s) (LRB):  EGD (ESOPHAGOGASTRODUODENOSCOPY) (N/A)    Patient location: GI    Anesthesia Type: MAC    Transport from OR: Transported from OR on room air with adequate spontaneous ventilation    Post pain: adequate analgesia    Post assessment: no apparent anesthetic complications    Post vital signs: stable    Level of consciousness: awake    Nausea/Vomiting: no nausea/vomiting    Complications: none    Transfer of care protocol was followed      Last vitals:   Visit Vitals  /66 (BP Location: Left arm, Patient Position: Lying)   Pulse 76   Temp 36.7 °C (98.1 °F)   Resp 12   Ht 5' 7" (1.702 m)   Wt 78.9 kg (174 lb)   SpO2 99%   BMI 27.25 kg/m²     "

## 2020-01-31 NOTE — INTERVAL H&P NOTE
The patient has been examined and the H&P has been reviewed:    I concur with the findings and no changes have occurred since H&P was written.    Anesthesia/Surgery risks, benefits and alternative options discussed and understood by patient/family.          Active Hospital Problems    Diagnosis  POA    Abnormal CT of the abdomen [R93.5]  Yes      Resolved Hospital Problems   No resolved problems to display.

## 2020-02-03 ENCOUNTER — PATIENT MESSAGE (OUTPATIENT)
Dept: FAMILY MEDICINE | Facility: CLINIC | Age: 40
End: 2020-02-03

## 2020-02-03 RX ORDER — ATORVASTATIN CALCIUM 40 MG/1
40 TABLET, FILM COATED ORAL DAILY
Qty: 90 TABLET | Refills: 1 | Status: SHIPPED | OUTPATIENT
Start: 2020-02-03 | End: 2020-08-20

## 2020-02-11 ENCOUNTER — OFFICE VISIT (OUTPATIENT)
Dept: FAMILY MEDICINE | Facility: CLINIC | Age: 40
End: 2020-02-11
Payer: COMMERCIAL

## 2020-02-11 VITALS
BODY MASS INDEX: 29.46 KG/M2 | HEART RATE: 81 BPM | WEIGHT: 187.69 LBS | DIASTOLIC BLOOD PRESSURE: 80 MMHG | TEMPERATURE: 98 F | RESPIRATION RATE: 18 BRPM | HEIGHT: 67 IN | SYSTOLIC BLOOD PRESSURE: 118 MMHG | OXYGEN SATURATION: 99 %

## 2020-02-11 DIAGNOSIS — E11.65 TYPE 2 DIABETES MELLITUS WITH HYPERGLYCEMIA, WITH LONG-TERM CURRENT USE OF INSULIN: ICD-10-CM

## 2020-02-11 DIAGNOSIS — R10.84 GENERALIZED ABDOMINAL PAIN: Primary | ICD-10-CM

## 2020-02-11 DIAGNOSIS — K29.00 ACUTE SUPERFICIAL GASTRITIS WITHOUT HEMORRHAGE: ICD-10-CM

## 2020-02-11 DIAGNOSIS — I10 ESSENTIAL HYPERTENSION: ICD-10-CM

## 2020-02-11 DIAGNOSIS — Z79.4 TYPE 2 DIABETES MELLITUS WITH HYPERGLYCEMIA, WITH LONG-TERM CURRENT USE OF INSULIN: ICD-10-CM

## 2020-02-11 PROBLEM — K85.90 ACUTE ON CHRONIC PANCREATITIS: Status: RESOLVED | Noted: 2019-08-29 | Resolved: 2020-02-11

## 2020-02-11 PROBLEM — K86.1 ACUTE ON CHRONIC PANCREATITIS: Status: RESOLVED | Noted: 2019-08-29 | Resolved: 2020-02-11

## 2020-02-11 PROBLEM — I86.4 GASTRIC VARICES WITHOUT BLEEDING: Status: RESOLVED | Noted: 2020-01-16 | Resolved: 2020-02-11

## 2020-02-11 PROBLEM — E11.10 DIABETIC KETOACIDOSIS WITHOUT COMA ASSOCIATED WITH TYPE 2 DIABETES MELLITUS: Status: RESOLVED | Noted: 2019-08-30 | Resolved: 2020-02-11

## 2020-02-11 PROBLEM — R93.5 ABNORMAL CT OF THE ABDOMEN: Status: RESOLVED | Noted: 2020-01-31 | Resolved: 2020-02-11

## 2020-02-11 PROBLEM — E87.6 HYPOKALEMIA: Status: RESOLVED | Noted: 2019-09-01 | Resolved: 2020-02-11

## 2020-02-11 LAB — GLUCOSE SERPL-MCNC: 173 MG/DL (ref 70–110)

## 2020-02-11 PROCEDURE — 99214 OFFICE O/P EST MOD 30 MIN: CPT | Mod: S$GLB,,, | Performed by: FAMILY MEDICINE

## 2020-02-11 PROCEDURE — 3079F PR MOST RECENT DIASTOLIC BLOOD PRESSURE 80-89 MM HG: ICD-10-PCS | Mod: CPTII,S$GLB,, | Performed by: FAMILY MEDICINE

## 2020-02-11 PROCEDURE — 3046F PR MOST RECENT HEMOGLOBIN A1C LEVEL > 9.0%: ICD-10-PCS | Mod: CPTII,S$GLB,, | Performed by: FAMILY MEDICINE

## 2020-02-11 PROCEDURE — 99999 PR PBB SHADOW E&M-EST. PATIENT-LVL V: ICD-10-PCS | Mod: PBBFAC,,, | Performed by: FAMILY MEDICINE

## 2020-02-11 PROCEDURE — 3079F DIAST BP 80-89 MM HG: CPT | Mod: CPTII,S$GLB,, | Performed by: FAMILY MEDICINE

## 2020-02-11 PROCEDURE — 3074F SYST BP LT 130 MM HG: CPT | Mod: CPTII,S$GLB,, | Performed by: FAMILY MEDICINE

## 2020-02-11 PROCEDURE — 82962 GLUCOSE BLOOD TEST: CPT | Mod: S$GLB,,, | Performed by: FAMILY MEDICINE

## 2020-02-11 PROCEDURE — 3046F HEMOGLOBIN A1C LEVEL >9.0%: CPT | Mod: CPTII,S$GLB,, | Performed by: FAMILY MEDICINE

## 2020-02-11 PROCEDURE — 3074F PR MOST RECENT SYSTOLIC BLOOD PRESSURE < 130 MM HG: ICD-10-PCS | Mod: CPTII,S$GLB,, | Performed by: FAMILY MEDICINE

## 2020-02-11 PROCEDURE — 3008F BODY MASS INDEX DOCD: CPT | Mod: CPTII,S$GLB,, | Performed by: FAMILY MEDICINE

## 2020-02-11 PROCEDURE — 99214 PR OFFICE/OUTPT VISIT, EST, LEVL IV, 30-39 MIN: ICD-10-PCS | Mod: S$GLB,,, | Performed by: FAMILY MEDICINE

## 2020-02-11 PROCEDURE — 99999 PR PBB SHADOW E&M-EST. PATIENT-LVL V: CPT | Mod: PBBFAC,,, | Performed by: FAMILY MEDICINE

## 2020-02-11 PROCEDURE — 82962 POCT GLUCOSE, HAND-HELD DEVICE: ICD-10-PCS | Mod: S$GLB,,, | Performed by: FAMILY MEDICINE

## 2020-02-11 PROCEDURE — 3008F PR BODY MASS INDEX (BMI) DOCUMENTED: ICD-10-PCS | Mod: CPTII,S$GLB,, | Performed by: FAMILY MEDICINE

## 2020-02-11 RX ORDER — FAMOTIDINE 20 MG/1
20 TABLET, FILM COATED ORAL 2 TIMES DAILY
Qty: 180 TABLET | Refills: 1 | Status: CANCELLED | OUTPATIENT
Start: 2020-02-11 | End: 2021-02-10

## 2020-02-11 RX ORDER — PANTOPRAZOLE SODIUM 40 MG/1
40 TABLET, DELAYED RELEASE ORAL DAILY
Qty: 90 TABLET | Refills: 1 | Status: SHIPPED | OUTPATIENT
Start: 2020-02-11 | End: 2020-03-12 | Stop reason: SDUPTHER

## 2020-02-11 RX ORDER — ONDANSETRON 4 MG/1
4 TABLET, FILM COATED ORAL EVERY 6 HOURS PRN
Qty: 30 TABLET | Refills: 1 | Status: SHIPPED | OUTPATIENT
Start: 2020-02-11 | End: 2021-12-20 | Stop reason: SDUPTHER

## 2020-02-11 NOTE — ASSESSMENT & PLAN NOTE
Lab Results   Component Value Date    LABA1C 8.8 (H) 01/12/2018    HGBA1C 10.0 (H) 01/07/2020     Office Visit on 02/11/2020   Component Date Value Ref Range Status    POC Glucose 02/11/2020 173* 70 - 110 MG/DL Final     - due to recurrent pancreatitis recommend stop GLP-1 due to risk of pancreatitis with medication  - monitor gluclose  - no premeal if fasting and PO in take poor  - will try to get freestyle anish approved since pt needs to monitor more frequently due to poor control and when acutely ill

## 2020-02-11 NOTE — PROGRESS NOTES
FAMILY MEDICINE    Patient Active Problem List   Diagnosis    Essential hypertension    Obesity (BMI 30-39.9)    History of gout    Obstructive sleep apnea syndrome    Mixed hyperlipidemia    Type 2 diabetes mellitus with diabetic polyneuropathy, with long-term current use of insulin    Portal vein thrombosis    Pancreatic pseudocyst    Chronic pancreatitis    Type 2 diabetes mellitus with hyperglycemia, with long-term current use of insulin    Generalized abdominal pain    Acute superficial gastritis without hemorrhage       CC:   Chief Complaint   Patient presents with    Abdominal Pain     all started sat     Emesis    Stool Color Change     dark       SUBJECTIVE:  Bay Ibarra   is a 39 y.o. male  - with obesity, hypertension, hyperlipidemia, history of gout, history of pancreatitis with pseudocyst, portal vein thrombosis, and JAZMYN presents with abdominal pain. PCP Dr. Pierce    Last hospitalization 12/26/19 for acute pancreatitis    1. Abdominal pain  - feels different than his typical pancreatitis pain    Onset: 2/6/2020 and worsened the following day  - okay 2/8/2020 but then 2/9/2020 AM clear emesis (though was indigestion)  - was eating but vomiting was only clear liquid that was bitter tasting and this AM burning  Location: diffuse  Duration: intermittent  Character: achy and worst after eating with bloating  Aggravating factors: eating  Relieving factors: nothing but will resolve spontaneously  Timing of day: anytime  Associated symptoms: constipation with hard dark stool, nausea, vomiting with blood emesis, fatigued, decrease appetite  Negative symptoms: denies fevers, chills, chest pain, shortness of breath, dyspnea on exertion    2. Diabetes Type 2  - has been uable to get Freestyle Celia approved    Age diagnosed: 2006    Current treatment regimen:   empagliflozin (JARDIANCE) 10 mg Tab, Take 1 tablet (10 mg) by mouth once daily., Disp: 90 tablet, Rfl: 1  insulin (LANTUS SOLOSTAR  U-100 INSULIN) glargine 100 units/mL (3mL) SubQ pen, Inject 52 Units into the skin every evening., Disp: 18 mL, Rfl: 6  insulin aspart U-100 (NOVOLOG FLEXPEN U-100 INSULIN) 100 unit/mL (3 mL) InPn pen, INJECT 20 UNITS SUBCUTANEOUSLY WITH MEALS PLUS SLIDING SCALE 150-200+2, 201-250 +4, 251-300 +6, 301-350 +8,>350 +10. MAX OF 90 UNITS PER DA, Disp: 30 Syringe, Rfl: 4  liraglutide 0.6 mg/0.1 mL, 18 mg/3 mL, subq PNIJ (VICTOZA 2-LORI) 0.6 mg/0.1 mL (18 mg/3 mL) PnIj, Inject 0.6 mg into the skin once daily., Disp: 3 mL, Rfl: 3    Side effects from treatment: hyperglycemia  Complications of diabetes: neuropathy    Glucometer: yes (unsure)   Glucose monitoring: TID to QID and difficulty with pain for multiple monitor resulting in poor compliance. Has not checked since ill    A. Fasting: NA  7day: NA 14 day: NA 30 day: NA  B. Pre-Lunch:NA  C. Pre-Dinner: NA  D. Bedtime: NA    Postprandial monitoring: NA  A. 2 hours s/p breakfast: NA  B. 2 hours s/p lunch: NA  C. 2 hours s/p dinner: NA    Office Visit on 02/11/2020  POC Glucose                                   Date: 02/11/2020  Value: 173*        Ref range: 70 - 110 MG/DL     Status: Final  ------------    Last A1C:   Lab Results       Component                Value               Date                       LABA1C                   8.8 (H)             01/12/2018                 HGBA1C                   10.0 (H)            01/07/2020              Low dose statin: yes Atorvastatin  Last eye exam: over due   Last foot exam: 9/5/19    Vaccines:   Influenza: 2/3/20  Pneumovax: 23 overdue and defer until illness resolved  Prevnar 13: NA      ROS: Review of Systems   Constitutional: Negative.    HENT: Negative.    Eyes: Negative.    Respiratory: Negative.    Cardiovascular: Negative.    Gastrointestinal: Positive for abdominal distention, abdominal pain, constipation, nausea and vomiting. Negative for blood in stool.        Otherwise negative   Endocrine: Negative.    Genitourinary:  Negative.    Musculoskeletal: Negative.    Skin: Negative.    Allergic/Immunologic: Negative.    Neurological: Negative.    Hematological: Negative.    Psychiatric/Behavioral: Negative for sleep disturbance.       Past Medical History:   Diagnosis Date    Acute pancreatitis     Essential hypertension     Gastric varices without bleeding 1/16/2020    Gout     Hx of acute pancreatitis 3/3/2017    Hypertriglyceridemia 8/2/2017    Obesity (BMI 30-39.9) 6/5/2015    Obesity (BMI 35.0-39.9 without comorbidity) 6/5/2015    Obstructive sleep apnea syndrome 12/18/2015    Type 2 diabetes mellitus with diabetic polyneuropathy, with long-term current use of insulin 10/16/2017    Type 2 diabetes mellitus with hyperglycemia, with long-term current use of insulin 8/3/2017       Past Surgical History:   Procedure Laterality Date    ENDOSCOPIC ULTRASOUND OF UPPER GASTROINTESTINAL TRACT N/A 5/27/2019    Procedure: ULTRASOUND, UPPER GI TRACT, ENDOSCOPIC;  Surgeon: Zafar Velazquez MD;  Location: [address]);  Service: Endoscopy;  Laterality: N/A;    ERCP N/A 5/27/2019    Procedure: ERCP (ENDOSCOPIC RETROGRADE CHOLANGIOPANCREATOGRAPHY);  Surgeon: Zafar Velazquez MD;  Location: [address]);  Service: Endoscopy;  Laterality: N/A;    ESOPHAGOGASTRODUODENOSCOPY      ESOPHAGOGASTRODUODENOSCOPY N/A 1/31/2020    Procedure: EGD (ESOPHAGOGASTRODUODENOSCOPY);  Surgeon: Zafar Velazquez MD;  Location: [facility];  Service: Endoscopy;  Laterality: N/A;       Family History   Problem Relation Age of Onset    Aneurysm Mother         AAA    Coronary artery disease Father         4 vessel bypass at 40, possible stent at 36    Diabetes type II Father     No Known Problems Sister     No Known Problems Brother     Aneurysm Maternal Grandmother         Possible AAA    Diabetes type II Paternal Grandmother        Social History     Tobacco Use    Smoking status: Never Smoker    Smokeless tobacco: Never Used   Substance Use  "Topics    Alcohol use: No    Drug use: Never       Social History     Social History Narrative    Not on file       ALLERGIES: Review of patient's allergies indicates:  No Known Allergies    MEDS:   Current Outpatient Medications:     allopurinol (ZYLOPRIM) 300 MG tablet, TAKE 1 TABLET BY MOUTH ONCE DAILY, Disp: 30 tablet, Rfl: 5    aspirin (ECOTRIN) 81 MG EC tablet, Take 81 mg by mouth once daily., Disp: , Rfl:     atorvastatin (LIPITOR) 40 MG tablet, Take 1 tablet (40 mg total) by mouth once daily., Disp: 90 tablet, Rfl: 1    diclofenac sodium (VOLTAREN) 1 % Gel, Apply 2 g topically 4 (four) times daily. Apply to chest in the location of pain up to 4 times a day as needed for pain., Disp: 100 g, Rfl: 0    empagliflozin (JARDIANCE) 10 mg Tab, Take 1 tablet (10 mg) by mouth once daily., Disp: 90 tablet, Rfl: 1    fenofibrate 160 MG Tab, TAKE 1 TABLET BY MOUTH ONCE DAILY, Disp: 90 tablet, Rfl: 0    icosapent ethyl (VASCEPA) 1 gram Cap, Take 2 g by mouth 2 (two) times daily., Disp: 360 capsule, Rfl: 3    insulin (LANTUS SOLOSTAR U-100 INSULIN) glargine 100 units/mL (3mL) SubQ pen, Inject 52 Units into the skin every evening., Disp: 18 mL, Rfl: 6    insulin aspart U-100 (NOVOLOG FLEXPEN U-100 INSULIN) 100 unit/mL (3 mL) InPn pen, INJECT 20 UNITS SUBCUTANEOUSLY WITH MEALS PLUS SLIDING SCALE 150-200+2, 201-250 +4, 251-300 +6, 301-350 +8,>350 +10. MAX OF 90 UNITS PER DA, Disp: 30 Syringe, Rfl: 4    insulin syringe-needle U-100 29 gauge x 1/2" Syrg, Inject 1 (once) per day, Disp: 100 Syringe, Rfl: 3    losartan (COZAAR) 100 MG tablet, TAKE 1/2 (ONE-HALF) TABLET BY MOUTH ONCE DAILY, Disp: 45 tablet, Rfl: 3    niacin 100 MG Tab, Take 200 mg by mouth every evening. , Disp: , Rfl:     pregabalin (LYRICA) 150 MG capsule, TAKE 1 CAPSULE BY MOUTH TWICE DAILY, Disp: 60 capsule, Rfl: 0    flash glucose scanning reader ([employer] DEBBIE 14 DAY READER) Misc, 1 kit by Misc.(Non-Drug; Combo Route) route 3 (three) times " "daily as needed., Disp: 1 each, Rfl: 0    flash glucose sensor (FREESTYLE DEBBIE 14 DAY SENSOR) Kit, 1 each by Misc.(Non-Drug; Combo Route) route every 14 (fourteen) days., Disp: 2 kit, Rfl: 11    ondansetron (ZOFRAN) 4 MG tablet, Take 1 tablet (4 mg total) by mouth every 6 (six) hours as needed for Nausea., Disp: 30 tablet, Rfl: 1    pantoprazole (PROTONIX) 40 MG tablet, Take 1 tablet (40 mg total) by mouth once daily., Disp: 90 tablet, Rfl: 1    OBJECTIVE:   Vitals:    02/11/20 1122   BP: 118/80   BP Location: Left arm   Patient Position: Sitting   BP Method: X-Large (Manual)   Pulse: 81   Resp: 18   Temp: 97.8 °F (36.6 °C)   TempSrc: Oral   SpO2: 99%   Weight: 85.1 kg (187 lb 11.2 oz)   Height: 5' 7" (1.702 m)     Body mass index is 29.4 kg/m².    Physical Exam   Constitutional: No distress.   Neck: Neck supple.   Cardiovascular: Normal rate, regular rhythm, normal heart sounds and intact distal pulses. Exam reveals no gallop and no friction rub.   No murmur heard.  Pulmonary/Chest: Effort normal and breath sounds normal. He has no decreased breath sounds. He has no wheezes. He has no rhonchi. He has no rales.   Abdominal: Soft. Normal appearance and bowel sounds are normal. There is no hepatomegaly. There is tenderness in the epigastric area. There is no rigidity, no rebound and no guarding.   Musculoskeletal: He exhibits no edema.   Neurological: He is alert.   Skin: Skin is warm.         PERTINENT RESULTS:   BMP  Lab Results   Component Value Date     01/07/2020    K 4.4 01/07/2020     01/07/2020    CO2 26 01/07/2020    BUN 20 01/07/2020    CREATININE 0.68 01/07/2020    CALCIUM 9.5 01/07/2020    ANIONGAP 17 (H) 01/07/2020    ESTGFRAFRICA >60.0 01/07/2020    EGFRNONAA >60.0 01/07/2020     Lab Results   Component Value Date    ALT 55 (H) 01/07/2020    AST 36 01/07/2020    ALKPHOS 179 (H) 01/07/2020    BILITOT 0.6 01/07/2020     Lab Results   Component Value Date    LIPASE 112 (H) 12/26/2019     Lab " Results   Component Value Date    LABA1C 8.8 (H) 01/12/2018    HGBA1C 10.0 (H) 01/07/2020     CT Abdomen Pelvis With Contrast  Narrative: EXAMINATION:  CT ABDOMEN PELVIS WITH CONTRAST    CLINICAL HISTORY:  Nausea, vomiting, diarrhea    TECHNIQUE:  Low dose axial images, sagittal and coronal reformations were obtained from the lung bases to the pubic symphysis following the IV administration of 100 mL of Omnipaque 350 .  Oral contrast was not given.    COMPARISON:  MRI abdomen 12/23/2019, CT abdomen pelvis 11/14/2019    FINDINGS:  Heart: Normal in size.  No pericardial effusion.    Lung bases: Symmetrically expanded and clear without consolidation, pleural effusion, mass, or pneumothorax.    Liver: Normal in size and attenuation without focal hepatic abnormality.  Redemonstration of portal vein thrombosis with cavernous transformation and numerous collateral vessels within the upper abdomen and gastric varices, similar to prior exams.    Gallbladder: Normal in appearance without evidence for cholecystitis.    Bile Ducts: No intra or extrahepatic biliary ductal dilation.    Pancreas: Redemonstration of the well-defined fluid collection centered within the pancreatic head/body, previously characterized as a probable pseudocyst on recent MRI 12/23/2019, measuring 4.5 x 3.1 cm in maximal axial diameter and extending approximately 8.0 cm cranio caudally, similar to prior MR. There is continued mild peripancreatic edema and trace non organized free-fluid with inflammatory change again seen tracking along the lesser curvature and distal aspect of the stomach with extension along the underside of the anterior aspect of the liver, similar to slightly improved from prior CT 11/14/2019.  No new fluid collections identified.    Spleen: Enlarged, similar to prior study.    Adrenals: Unremarkable.    Kidneys/ Ureters: Normal in size and location.  Kidneys enhance normally.  1.2 cm left simple renal cyst.  Otherwise, no  nephrolithiasis or hydroureteronephrosis.    Bladder: Smooth contours without bladder wall thickening.    Reproductive organs: Unremarkable.    GI Tract/Mesentery: Stomach is distended with fluid and there are prominent gastroesophageal varices.  There is gastric wall thickening and wall hyperenhancement particularly involving the distal stomach.  Findings are similar but more pronounced when compared with prior studies.  Visualized loops of small and large bowel are normal in caliber without evidence for obstruction or inflammation. Numerous colonic diverticula without evidence of diverticulitis.  The appendix is visualized and demonstrates no wall thickening or dilatation.    Peritoneal Space: No intraperitoneal free air.  Numerous prominent to minimally enlarged mesenteric and peripancreatic lymph nodes, likely reactive.  Trace free fluid layering within the pelvis and scattered throughout the mesentery.    Retroperitoneum: Numerous prominent retroperitoneal lymph nodes, likely reactive.    Abdominal wall/extraperitoneal soft tissues: Unremarkable.    Vasculature: Abdominal aorta is normal in caliber, contour, and course with minimal calcific atherosclerosis.  Cavernous transformation of the portal vein as above.  There are gastroesophageal and mesenteric collateral vessels.    Bones: Mild degenerative changes without acute fracture or bone destructive process.  Impression: Stable appearance of the pancreatic pseudocyst and peripancreatic inflammatory change, which tracks along the distal stomach and inferior aspect of the liver, similar to slightly improved from prior exam.  Persistent reactive lymphadenopathy and trace non organized free fluid scattered throughout the abdomen.  No new fluid collections identified.    Inflammatory changes and wall enhancement involving the distal stomach, possibly reactive or secondary to gastritis.    Stable portal vein thrombosis with cavernous transformation and numerous  collateral vessels within the upper abdomen, as well as gastric varices.    Simple left renal cyst.    Extensive diverticulosis coli without evidence diverticulitis.    Additional stable findings, as above.    Electronically signed by resident: Venus Murphy  Date:    12/27/2019  Time:    00:43    Electronically signed by: Landon Foy MD  Date:    12/27/2019  Time:    01:58      ASSESSMENT/PLAN:  Problem List Items Addressed This Visit        Cardiac/Vascular    Essential hypertension    Current Assessment & Plan     - well controlled  - continue current medications            Endocrine    Type 2 diabetes mellitus with hyperglycemia, with long-term current use of insulin    Current Assessment & Plan     Lab Results   Component Value Date    LABA1C 8.8 (H) 01/12/2018    HGBA1C 10.0 (H) 01/07/2020     Office Visit on 02/11/2020   Component Date Value Ref Range Status    POC Glucose 02/11/2020 173* 70 - 110 MG/DL Final     - due to recurrent pancreatitis recommend stop GLP-1 due to risk of pancreatitis with medication  - monitor gluclose  - no premeal if fasting and PO in take poor  - will try to get freestyle debbie approved since pt needs to monitor more frequently due to poor control and when acutely ill         Relevant Medications    flash glucose sensor (FREESTYLE DEBBIE 14 DAY SENSOR) Kit    flash glucose scanning reader (FREESTYLE DEBBIE 14 DAY READER) Misc    Other Relevant Orders    POCT Glucose, Hand-Held Device (Completed)       GI    Generalized abdominal pain - Primary    Current Assessment & Plan     - tenderness of exam epigastric  - check Xray  - reviewed prior CT scans  - start PPI and zofran  - recommend to ER if any worsening         Relevant Medications    ondansetron (ZOFRAN) 4 MG tablet    Other Relevant Orders    X-Ray Abdomen Flat And Erect    Acute superficial gastritis without hemorrhage    Current Assessment & Plan     - hemodynamically stable  - start Protonix 40 mg daily  - Zofran  prn nausea  - any bloody emesis or worsening symptoms recommend to ER         Relevant Medications    pantoprazole (PROTONIX) 40 MG tablet    ondansetron (ZOFRAN) 4 MG tablet          ORDERS:   Orders Placed This Encounter    X-Ray Abdomen Flat And Erect    POCT Glucose, Hand-Held Device    pantoprazole (PROTONIX) 40 MG tablet    ondansetron (ZOFRAN) 4 MG tablet    flash glucose sensor (FREESTYLE DEBBIE 14 DAY SENSOR) Kit    flash glucose scanning reader (FREESTYLE DEBBIE 14 DAY READER) Misc     Discussed recommend to ER if any worsening    Vaccines recommended: PPSV 23    Follow-up in 1-2 weeks.     Dr. Peggy Espinoza D.O.   Family Medicine

## 2020-02-11 NOTE — ASSESSMENT & PLAN NOTE
- tenderness of exam epigastric  - check Xray  - reviewed prior CT scans  - start PPI and zofran  - recommend to ER if any worsening

## 2020-02-11 NOTE — ASSESSMENT & PLAN NOTE
- hemodynamically stable  - start Protonix 40 mg daily  - Zofran prn nausea  - any bloody emesis or worsening symptoms recommend to ER

## 2020-02-11 NOTE — TELEPHONE ENCOUNTER
Spoke with pt he informed me that he saw Dr. Espinoza this morning and she prescribed pantoprazole, so he doesn't needs pepcid.

## 2020-02-12 ENCOUNTER — PATIENT OUTREACH (OUTPATIENT)
Dept: ADMINISTRATIVE | Facility: HOSPITAL | Age: 40
End: 2020-02-12

## 2020-02-12 DIAGNOSIS — E78.2 MIXED HYPERLIPIDEMIA: ICD-10-CM

## 2020-02-13 DIAGNOSIS — E10.40 TYPE 1 DIABETES MELLITUS WITH DIABETIC NEUROPATHY: ICD-10-CM

## 2020-02-13 RX ORDER — FENOFIBRATE 160 MG/1
160 TABLET ORAL DAILY
Qty: 90 TABLET | Refills: 0 | Status: SHIPPED | OUTPATIENT
Start: 2020-02-13 | End: 2020-05-20

## 2020-02-14 DIAGNOSIS — E11.9 TYPE 2 DIABETES MELLITUS WITHOUT COMPLICATION, UNSPECIFIED WHETHER LONG TERM INSULIN USE: ICD-10-CM

## 2020-02-14 NOTE — TELEPHONE ENCOUNTER
Spoke to pt on phone. Informed him that I checked for coupons and was unsuccessful. Pt states he was just checking if there was a coupon or not.

## 2020-02-14 NOTE — TELEPHONE ENCOUNTER
----- Message from Shannan Joseph sent at 2/14/2020 10:31 AM CST -----  Contact: [phone]/qise  Patient requesting to speak with you regarding a coupon for freestyle anish. Please advise.

## 2020-02-17 RX ORDER — PREGABALIN 150 MG/1
150 CAPSULE ORAL 2 TIMES DAILY
Qty: 60 CAPSULE | Refills: 0 | Status: SHIPPED | OUTPATIENT
Start: 2020-02-17 | End: 2020-03-12 | Stop reason: SDUPTHER

## 2020-02-18 ENCOUNTER — PATIENT MESSAGE (OUTPATIENT)
Dept: FAMILY MEDICINE | Facility: CLINIC | Age: 40
End: 2020-02-18

## 2020-02-18 NOTE — TELEPHONE ENCOUNTER
Called and spoke with patient. No fevers. Not able to able to keep down PO. No abdominal pain. Glucose 120's. Recommend hold Jardiance. CLD. Recommend to ER since unable to keep down liquids and history of pancreatitis  Dr. Peggy Espinoza D.O.   Family Medicine

## 2020-02-19 DIAGNOSIS — E11.65 TYPE 2 DIABETES MELLITUS WITH HYPERGLYCEMIA, WITH LONG-TERM CURRENT USE OF INSULIN: Primary | ICD-10-CM

## 2020-02-19 DIAGNOSIS — Z79.4 TYPE 2 DIABETES MELLITUS WITH HYPERGLYCEMIA, WITH LONG-TERM CURRENT USE OF INSULIN: Primary | ICD-10-CM

## 2020-02-20 ENCOUNTER — TELEPHONE (OUTPATIENT)
Dept: FAMILY MEDICINE | Facility: CLINIC | Age: 40
End: 2020-02-20

## 2020-02-20 NOTE — TELEPHONE ENCOUNTER
Called and spoke with pt. Glucose low and reports that still ill. Did not eat well yesterday. Has not taken any insulin but did take his Jardiance and glucose last PM 45, this AM 50 and was able to get to 120 mg/dL. Today seems to be able to keep liquids down. Was seen in ER and diagnosed with viral gastritis. Lipase high but not as high as he typically is with pancreatitis. Offered appt but pt declined. He is scheduled to see me in 5 days.   Dr. Peggy Espinoza D.O.   [facility]

## 2020-02-20 NOTE — TELEPHONE ENCOUNTER
----- Message from Shannan Joseph sent at 2/20/2020  8:16 AM CST -----  Contact: [phone]/self  Type:  Needs Medical Advice    Who Called: patient  Symptoms (please be specific): elevated blood sugar   How long has patient had these symptoms: Few days  Pharmacy name and phone #: [facility]  Would the patient rather a call back or a response via [facility]? call  Best Call Back Number: [phone]  Additional Information: n/a

## 2020-02-21 PROBLEM — R10.84 GENERALIZED ABDOMINAL PAIN: Status: RESOLVED | Noted: 2020-02-11 | Resolved: 2020-02-21

## 2020-02-23 ENCOUNTER — HOSPITAL ENCOUNTER (INPATIENT)
Facility: HOSPITAL | Age: 40
LOS: 4 days | Discharge: HOME | DRG: 637 | End: 2020-02-27
Attending: EMERGENCY MEDICINE | Admitting: HOSPITALIST
Payer: COMMERCIAL

## 2020-02-23 DIAGNOSIS — E11.65 TYPE 2 DIABETES MELLITUS WITH HYPERGLYCEMIA, WITH LONG-TERM CURRENT USE OF INSULIN: ICD-10-CM

## 2020-02-23 DIAGNOSIS — E83.39 HYPOPHOSPHATEMIA: Primary | ICD-10-CM

## 2020-02-23 DIAGNOSIS — I50.30 (HFPEF) HEART FAILURE WITH PRESERVED EJECTION FRACTION: ICD-10-CM

## 2020-02-23 DIAGNOSIS — Z79.4 TYPE 2 DIABETES MELLITUS WITH HYPERGLYCEMIA, WITH LONG-TERM CURRENT USE OF INSULIN: ICD-10-CM

## 2020-02-23 DIAGNOSIS — E11.10 DKA (DIABETIC KETOACIDOSES): ICD-10-CM

## 2020-02-23 DIAGNOSIS — R10.11 RUQ ABDOMINAL PAIN: ICD-10-CM

## 2020-02-23 DIAGNOSIS — R11.10 VOMITING: ICD-10-CM

## 2020-02-23 DIAGNOSIS — R11.2 INTRACTABLE VOMITING WITH NAUSEA: ICD-10-CM

## 2020-02-23 DIAGNOSIS — R94.31 QT PROLONGATION: ICD-10-CM

## 2020-02-23 LAB
ALBUMIN SERPL BCP-MCNC: 4.9 G/DL (ref 3.5–5.2)
ALP SERPL-CCNC: 114 U/L (ref 55–135)
ALT SERPL W/O P-5'-P-CCNC: 22 U/L (ref 10–44)
ANION GAP SERPL CALC-SCNC: 22 MMOL/L (ref 8–16)
ANION GAP SERPL CALC-SCNC: ABNORMAL MMOL/L (ref 8–16)
AST SERPL-CCNC: 30 U/L (ref 10–40)
B-OH-BUTYR BLD STRIP-SCNC: 2.9 MMOL/L (ref 0–0.5)
BACTERIA #/AREA URNS HPF: ABNORMAL /HPF
BASOPHILS # BLD AUTO: 0.05 K/UL (ref 0–0.2)
BASOPHILS NFR BLD: 0.3 % (ref 0–1.9)
BILIRUB SERPL-MCNC: 1.8 MG/DL (ref 0.1–1)
BILIRUB UR QL STRIP: ABNORMAL
BUN SERPL-MCNC: 32 MG/DL (ref 6–20)
BUN SERPL-MCNC: 33 MG/DL (ref 6–20)
CALCIUM SERPL-MCNC: 11.7 MG/DL (ref 8.7–10.5)
CALCIUM SERPL-MCNC: 9.3 MG/DL (ref 8.7–10.5)
CHLORIDE SERPL-SCNC: 72 MMOL/L (ref 95–110)
CHLORIDE SERPL-SCNC: <70 MMOL/L (ref 95–110)
CLARITY UR: CLEAR
CO2 SERPL-SCNC: 43 MMOL/L (ref 23–29)
CO2 SERPL-SCNC: 45 MMOL/L (ref 23–29)
COLOR UR: YELLOW
CREAT SERPL-MCNC: 1.5 MG/DL (ref 0.5–1.4)
CREAT SERPL-MCNC: 1.8 MG/DL (ref 0.5–1.4)
DIFFERENTIAL METHOD: ABNORMAL
EOSINOPHIL # BLD AUTO: 0.1 K/UL (ref 0–0.5)
EOSINOPHIL NFR BLD: 0.4 % (ref 0–8)
ERYTHROCYTE [DISTWIDTH] IN BLOOD BY AUTOMATED COUNT: 14.4 % (ref 11.5–14.5)
EST. GFR  (AFRICAN AMERICAN): 54 ML/MIN/1.73 M^2
EST. GFR  (AFRICAN AMERICAN): >60 ML/MIN/1.73 M^2
EST. GFR  (NON AFRICAN AMERICAN): 46 ML/MIN/1.73 M^2
EST. GFR  (NON AFRICAN AMERICAN): 58 ML/MIN/1.73 M^2
GLUCOSE SERPL-MCNC: 184 MG/DL (ref 70–110)
GLUCOSE SERPL-MCNC: 231 MG/DL (ref 70–110)
GLUCOSE UR QL STRIP: ABNORMAL
HCT VFR BLD AUTO: 52.1 % (ref 40–54)
HGB BLD-MCNC: 16.8 G/DL (ref 14–18)
HGB UR QL STRIP: NEGATIVE
HYALINE CASTS #/AREA URNS LPF: 6 /LPF
IMM GRANULOCYTES # BLD AUTO: 0.06 K/UL (ref 0–0.04)
IMM GRANULOCYTES NFR BLD AUTO: 0.4 % (ref 0–0.5)
INFLUENZA A, MOLECULAR: NEGATIVE
INFLUENZA B, MOLECULAR: NEGATIVE
KETONES UR QL STRIP: ABNORMAL
LACTATE SERPL-SCNC: 1.4 MMOL/L (ref 0.5–2.2)
LEUKOCYTE ESTERASE UR QL STRIP: NEGATIVE
LIPASE SERPL-CCNC: 173 U/L (ref 4–60)
LYMPHOCYTES # BLD AUTO: 0.7 K/UL (ref 1–4.8)
LYMPHOCYTES NFR BLD: 4.4 % (ref 18–48)
MAGNESIUM SERPL-MCNC: 1.9 MG/DL (ref 1.6–2.6)
MCH RBC QN AUTO: 24.8 PG (ref 27–31)
MCHC RBC AUTO-ENTMCNC: 32.2 G/DL (ref 32–36)
MCV RBC AUTO: 77 FL (ref 82–98)
MICROSCOPIC COMMENT: ABNORMAL
MONOCYTES # BLD AUTO: 2.1 K/UL (ref 0.3–1)
MONOCYTES NFR BLD: 13.4 % (ref 4–15)
NEUTROPHILS # BLD AUTO: 12.5 K/UL (ref 1.8–7.7)
NEUTROPHILS NFR BLD: 81.1 % (ref 38–73)
NITRITE UR QL STRIP: NEGATIVE
NRBC BLD-RTO: 0 /100 WBC
PH UR STRIP: 8 [PH] (ref 5–8)
PLATELET # BLD AUTO: 395 K/UL (ref 150–350)
PLATELET BLD QL SMEAR: ABNORMAL
PMV BLD AUTO: 11.5 FL (ref 9.2–12.9)
POCT GLUCOSE: 234 MG/DL (ref 70–110)
POTASSIUM SERPL-SCNC: 2.2 MMOL/L (ref 3.5–5.1)
POTASSIUM SERPL-SCNC: 2.2 MMOL/L (ref 3.5–5.1)
PROT SERPL-MCNC: 8.6 G/DL (ref 6–8.4)
PROT UR QL STRIP: ABNORMAL
RBC # BLD AUTO: 6.77 M/UL (ref 4.6–6.2)
RBC #/AREA URNS HPF: 2 /HPF (ref 0–4)
SODIUM SERPL-SCNC: 135 MMOL/L (ref 136–145)
SODIUM SERPL-SCNC: 137 MMOL/L (ref 136–145)
SP GR UR STRIP: 1.01 (ref 1–1.03)
SPECIMEN SOURCE: NORMAL
TROPONIN I SERPL DL<=0.01 NG/ML-MCNC: <0.006 NG/ML (ref 0–0.03)
URN SPEC COLLECT METH UR: ABNORMAL
UROBILINOGEN UR STRIP-ACNC: ABNORMAL EU/DL
WBC # BLD AUTO: 15.44 K/UL (ref 3.9–12.7)
WBC #/AREA URNS HPF: 4 /HPF (ref 0–5)
YEAST URNS QL MICRO: ABNORMAL

## 2020-02-23 PROCEDURE — 83605 ASSAY OF LACTIC ACID: CPT

## 2020-02-23 PROCEDURE — 83690 ASSAY OF LIPASE: CPT

## 2020-02-23 PROCEDURE — 63600175 PHARM REV CODE 636 W HCPCS: Performed by: NURSE PRACTITIONER

## 2020-02-23 PROCEDURE — 63600175 PHARM REV CODE 636 W HCPCS: Performed by: INTERNAL MEDICINE

## 2020-02-23 PROCEDURE — 87502 INFLUENZA DNA AMP PROBE: CPT

## 2020-02-23 PROCEDURE — 82803 BLOOD GASES ANY COMBINATION: CPT

## 2020-02-23 PROCEDURE — 99900035 HC TECH TIME PER 15 MIN (STAT)

## 2020-02-23 PROCEDURE — 96365 THER/PROPH/DIAG IV INF INIT: CPT

## 2020-02-23 PROCEDURE — 63600175 PHARM REV CODE 636 W HCPCS: Performed by: EMERGENCY MEDICINE

## 2020-02-23 PROCEDURE — 96375 TX/PRO/DX INJ NEW DRUG ADDON: CPT

## 2020-02-23 PROCEDURE — 96361 HYDRATE IV INFUSION ADD-ON: CPT

## 2020-02-23 PROCEDURE — 82010 KETONE BODYS QUAN: CPT

## 2020-02-23 PROCEDURE — 83735 ASSAY OF MAGNESIUM: CPT

## 2020-02-23 PROCEDURE — 96372 THER/PROPH/DIAG INJ SC/IM: CPT | Mod: 59

## 2020-02-23 PROCEDURE — 20000000 HC ICU ROOM

## 2020-02-23 PROCEDURE — 96366 THER/PROPH/DIAG IV INF ADDON: CPT

## 2020-02-23 PROCEDURE — 93005 ELECTROCARDIOGRAM TRACING: CPT

## 2020-02-23 PROCEDURE — 27000221 HC OXYGEN, UP TO 24 HOURS

## 2020-02-23 PROCEDURE — 84484 ASSAY OF TROPONIN QUANT: CPT

## 2020-02-23 PROCEDURE — 80048 BASIC METABOLIC PNL TOTAL CA: CPT | Mod: 91

## 2020-02-23 PROCEDURE — 99291 CRITICAL CARE FIRST HOUR: CPT | Mod: 25

## 2020-02-23 PROCEDURE — 36600 WITHDRAWAL OF ARTERIAL BLOOD: CPT

## 2020-02-23 PROCEDURE — 81000 URINALYSIS NONAUTO W/SCOPE: CPT

## 2020-02-23 PROCEDURE — 80053 COMPREHEN METABOLIC PANEL: CPT

## 2020-02-23 PROCEDURE — 80048 BASIC METABOLIC PNL TOTAL CA: CPT

## 2020-02-23 PROCEDURE — 36415 COLL VENOUS BLD VENIPUNCTURE: CPT

## 2020-02-23 PROCEDURE — 25000003 PHARM REV CODE 250: Performed by: INTERNAL MEDICINE

## 2020-02-23 PROCEDURE — 85025 COMPLETE CBC W/AUTO DIFF WBC: CPT

## 2020-02-23 PROCEDURE — 82962 GLUCOSE BLOOD TEST: CPT | Mod: 59

## 2020-02-23 RX ORDER — SODIUM CHLORIDE AND POTASSIUM CHLORIDE 150; 900 MG/100ML; MG/100ML
INJECTION, SOLUTION INTRAVENOUS CONTINUOUS
Status: DISCONTINUED | OUTPATIENT
Start: 2020-02-23 | End: 2020-02-24

## 2020-02-23 RX ORDER — SODIUM CHLORIDE 0.9 % (FLUSH) 0.9 %
10 SYRINGE (ML) INJECTION
Status: DISCONTINUED | OUTPATIENT
Start: 2020-02-23 | End: 2020-02-27 | Stop reason: HOSPADM

## 2020-02-23 RX ORDER — DICYCLOMINE HYDROCHLORIDE 10 MG/ML
20 INJECTION INTRAMUSCULAR
Status: COMPLETED | OUTPATIENT
Start: 2020-02-23 | End: 2020-02-23

## 2020-02-23 RX ORDER — ENOXAPARIN SODIUM 100 MG/ML
30 INJECTION SUBCUTANEOUS EVERY 24 HOURS
Status: DISCONTINUED | OUTPATIENT
Start: 2020-02-23 | End: 2020-02-24

## 2020-02-23 RX ORDER — ONDANSETRON 2 MG/ML
4 INJECTION INTRAMUSCULAR; INTRAVENOUS
Status: COMPLETED | OUTPATIENT
Start: 2020-02-23 | End: 2020-02-23

## 2020-02-23 RX ORDER — DEXTROSE MONOHYDRATE 100 MG/ML
1000 INJECTION, SOLUTION INTRAVENOUS
Status: DISCONTINUED | OUTPATIENT
Start: 2020-02-23 | End: 2020-02-25 | Stop reason: SDUPTHER

## 2020-02-23 RX ORDER — POTASSIUM CHLORIDE 7.45 MG/ML
10 INJECTION INTRAVENOUS
Status: COMPLETED | OUTPATIENT
Start: 2020-02-23 | End: 2020-02-23

## 2020-02-23 RX ORDER — POTASSIUM CHLORIDE 20 MEQ/15ML
60 SOLUTION ORAL ONCE
Status: COMPLETED | OUTPATIENT
Start: 2020-02-23 | End: 2020-02-23

## 2020-02-23 RX ORDER — ONDANSETRON 2 MG/ML
4 INJECTION INTRAMUSCULAR; INTRAVENOUS EVERY 8 HOURS PRN
Status: DISCONTINUED | OUTPATIENT
Start: 2020-02-23 | End: 2020-02-27 | Stop reason: HOSPADM

## 2020-02-23 RX ORDER — POTASSIUM CHLORIDE 20 MEQ/15ML
40 SOLUTION ORAL ONCE
Status: COMPLETED | OUTPATIENT
Start: 2020-02-23 | End: 2020-02-23

## 2020-02-23 RX ORDER — POTASSIUM CHLORIDE 29.8 MG/ML
40 INJECTION INTRAVENOUS
Status: DISCONTINUED | OUTPATIENT
Start: 2020-02-23 | End: 2020-02-23

## 2020-02-23 RX ORDER — ACETAMINOPHEN 325 MG/1
650 TABLET ORAL EVERY 4 HOURS PRN
Status: DISCONTINUED | OUTPATIENT
Start: 2020-02-23 | End: 2020-02-27 | Stop reason: HOSPADM

## 2020-02-23 RX ADMIN — ENOXAPARIN SODIUM 30 MG: 100 INJECTION SUBCUTANEOUS at 10:02

## 2020-02-23 RX ADMIN — SODIUM CHLORIDE 1000 ML: 0.9 INJECTION, SOLUTION INTRAVENOUS at 06:02

## 2020-02-23 RX ADMIN — POTASSIUM CHLORIDE 60 MEQ: 20 SOLUTION ORAL at 07:02

## 2020-02-23 RX ADMIN — DICYCLOMINE HYDROCHLORIDE 20 MG: 20 INJECTION, SOLUTION INTRAMUSCULAR at 07:02

## 2020-02-23 RX ADMIN — SODIUM CHLORIDE 1000 ML: 0.9 INJECTION, SOLUTION INTRAVENOUS at 05:02

## 2020-02-23 RX ADMIN — POTASSIUM CHLORIDE 10 MEQ: 7.46 INJECTION, SOLUTION INTRAVENOUS at 10:02

## 2020-02-23 RX ADMIN — POTASSIUM CHLORIDE 10 MEQ: 7.46 INJECTION, SOLUTION INTRAVENOUS at 06:02

## 2020-02-23 RX ADMIN — POTASSIUM CHLORIDE 10 MEQ: 7.46 INJECTION, SOLUTION INTRAVENOUS at 07:02

## 2020-02-23 RX ADMIN — ONDANSETRON 4 MG: 2 INJECTION INTRAMUSCULAR; INTRAVENOUS at 05:02

## 2020-02-23 RX ADMIN — POTASSIUM CHLORIDE 40 MEQ: 20 SOLUTION ORAL at 08:02

## 2020-02-23 RX ADMIN — SODIUM CHLORIDE AND POTASSIUM CHLORIDE: .9; .15 SOLUTION INTRAVENOUS at 07:02

## 2020-02-23 RX ADMIN — POTASSIUM CHLORIDE 10 MEQ: 7.46 INJECTION, SOLUTION INTRAVENOUS at 08:02

## 2020-02-23 NOTE — ED TRIAGE NOTES
Pt seen in the ED with complaints of nausea, vomiting and abd pain for the past two weeks. Pt reports being seen at [facility] and given Reglan for home with no improvement. Pt reports that his sugar has been fluctuating too much, sometimes high and sometimes below the 50s and he has been taken off his lantus. Pt takes novolog in sliding scale also. Pt denies polyuria and also reports intermittent episodes of blurred vision. Pt is lethargic and complaining of feeling weak during time of assessment. Pt is in no acute distress during time of assessment, will continue to monitor.

## 2020-02-23 NOTE — ED PROVIDER NOTES
Encounter Date: 2/23/2020    SCRIBE #1 NOTE: I, Maria De Jesus Scott, am scribing for, and in the presence of,  Isabelle Singletary MD. I have scribed the entire note.       History     Chief Complaint   Patient presents with    Vomiting     39 year old male presents to ed cc of emesis x 2 weeks. states abdominal pain.      Time seen by provider: 5:12 PM    This is a 40 y/o male with PMHx of acute pancreatitis, essential HTN, hypertriglyceridemia, JAZMYN, DM2, and gastric varices who presents with complaint of vomiting x 2 weeks. His wife reports that he went to [place] a few days for these same symptoms.  His associated symptoms include weakness, unintentional weight loss (20 lbs in 2 weeks), chills, and RUQ abdominal pain. The patient became lightheaded while in ED. He denies fever or any other concerning symptoms. His CBG had been 66 last week and was 240 just PTA. The patient has been compliant with his insulin and other medications. He has had diabetic ketoacidosis in the past.     The history is provided by the patient and the spouse.     Review of patient's allergies indicates:  No Known Allergies  Past Medical History:   Diagnosis Date    Acute pancreatitis     Essential hypertension     Gastric varices without bleeding 1/16/2020    Gout     Hx of acute pancreatitis 3/3/2017    Hypertriglyceridemia 8/2/2017    Obesity (BMI 30-39.9) 6/5/2015    Obesity (BMI 35.0-39.9 without comorbidity) 6/5/2015    Obstructive sleep apnea syndrome 12/18/2015    Type 2 diabetes mellitus with diabetic polyneuropathy, with long-term current use of insulin 10/16/2017    Type 2 diabetes mellitus with hyperglycemia, with long-term current use of insulin 8/3/2017     Past Surgical History:   Procedure Laterality Date    ENDOSCOPIC ULTRASOUND OF UPPER GASTROINTESTINAL TRACT N/A 5/27/2019    Procedure: ULTRASOUND, UPPER GI TRACT, ENDOSCOPIC;  Surgeon: Zafar Velazquez MD;  Location: [facility] ([address] [place] [address]);  Service: Endoscopy;   Laterality: N/A;    ERCP N/A 5/27/2019    Procedure: ERCP (ENDOSCOPIC RETROGRADE CHOLANGIOPANCREATOGRAPHY);  Surgeon: Zafar Velazquez MD;  Location: [facility] ENDO ([address] [place] [address]);  Service: Endoscopy;  Laterality: N/A;    ESOPHAGOGASTRODUODENOSCOPY      ESOPHAGOGASTRODUODENOSCOPY N/A 1/31/2020    Procedure: EGD (ESOPHAGOGASTRODUODENOSCOPY);  Surgeon: Zafar Velazquez MD;  Location: [facility];  Service: Endoscopy;  Laterality: N/A;     Family History   Problem Relation Age of Onset    Aneurysm Mother         AAA    Coronary artery disease Father         4 vessel bypass at 40, possible stent at 36    Diabetes type II Father     No Known Problems Sister     No Known Problems Brother     Aneurysm Maternal Grandmother         Possible AAA    Diabetes type II Paternal Grandmother      Social History     Tobacco Use    Smoking status: Never Smoker    Smokeless tobacco: Never Used   Substance Use Topics    Alcohol use: No    Drug use: Never     Review of Systems   Constitutional: Positive for chills and unexpected weight change (20 lbs in 2 weeks). Negative for fever.   HENT: Negative for sore throat.    Eyes: Negative for visual disturbance.   Respiratory: Negative for shortness of breath.    Cardiovascular: Negative for chest pain.   Gastrointestinal: Positive for abdominal pain, nausea and vomiting.   Genitourinary: Negative for dysuria and hematuria.   Musculoskeletal: Negative for back pain, neck pain and neck stiffness.   Skin: Negative for rash and wound.   Neurological: Positive for weakness. Negative for syncope and headaches.   Psychiatric/Behavioral: Negative for confusion.       Physical Exam     Initial Vitals [02/23/20 1614]   BP Pulse Resp Temp SpO2   117/63 (!) 113 20 98 °F (36.7 °C) (!) 94 %      MAP       --         Physical Exam    Nursing note and vitals reviewed.  Constitutional: He appears well-developed and well-nourished. He is not diaphoretic. No distress.   HENT:   Head: Normocephalic and  atraumatic.   Right Ear: External ear normal.   Left Ear: External ear normal.   Nose: Nose normal.   Mouth/Throat: Mucous membranes are dry.   Eyes: EOM are normal.   Neck: Normal range of motion. Neck supple.   Cardiovascular: Regular rhythm and normal heart sounds. Tachycardia present.  Exam reveals no gallop and no friction rub.    No murmur heard.  Pulmonary/Chest: Breath sounds normal. He has no wheezes. He has no rhonchi. He has no rales.   Abdominal: Soft. There is tenderness in the right upper quadrant and epigastric area. There is no rebound and no guarding.   Musculoskeletal: Normal range of motion. He exhibits no edema or tenderness.   Neurological: He is alert and oriented to person, place, and time. He has normal strength. GCS score is 15. GCS eye subscore is 4. GCS verbal subscore is 5. GCS motor subscore is 6.   Skin: Skin is warm and dry. Capillary refill takes less than 2 seconds. No rash noted.   Psychiatric: He has a normal mood and affect.         ED Course   Critical Care  Date/Time: 2/23/2020 7:31 PM  Performed by: Isabelle Singletary MD  Authorized by: Isabelle Singletary MD   Total critical care time (exclusive of procedural time) : 35 minutes  Critical care was necessary to treat or prevent imminent or life-threatening deterioration of the following conditions: endocrine crisis and dehydration.  Critical care was time spent personally by me on the following activities: blood draw for specimens, discussions with consultants, evaluation of patient's response to treatment, obtaining history from patient or surrogate, ordering and review of laboratory studies, pulse oximetry, review of old charts, development of treatment plan with patient or surrogate, examination of patient, ordering and performing treatments and interventions, ordering and review of radiographic studies and re-evaluation of patient's condition.        Labs Reviewed   CBC W/ AUTO DIFFERENTIAL - Abnormal; Notable for the  following components:       Result Value    WBC 15.44 (*)     RBC 6.77 (*)     Mean Corpuscular Volume 77 (*)     Mean Corpuscular Hemoglobin 24.8 (*)     Platelets 395 (*)     Gran # (ANC) 12.5 (*)     Immature Grans (Abs) 0.06 (*)     Lymph # 0.7 (*)     Mono # 2.1 (*)     Gran% 81.1 (*)     Lymph% 4.4 (*)     Platelet Estimate Increased (*)     All other components within normal limits   COMPREHENSIVE METABOLIC PANEL - Abnormal; Notable for the following components:    Sodium 135 (*)     Potassium 2.2 (*)     Chloride <70 (*)     CO2 45 (*)     Glucose 231 (*)     BUN, Bld 33 (*)     Creatinine 1.8 (*)     Calcium 11.7 (*)     Total Protein 8.6 (*)     Total Bilirubin 1.8 (*)     eGFR if  54 (*)     eGFR if non  46 (*)     All other components within normal limits    Narrative:       Potassium, Chloride, and CO2 critical result(s) called and verbal   readback obtained from Hakan Mckay RN. by CLAUDIA 02/23/2020 18:04   URINALYSIS, REFLEX TO URINE CULTURE - Abnormal; Notable for the following components:    Protein, UA 1+ (*)     Glucose, UA 3+ (*)     Ketones, UA 1+ (*)     Bilirubin (UA) 1+ (*)     Urobilinogen, UA 2.0-3.0 (*)     All other components within normal limits    Narrative:     Preferred Collection Type->Urine, Clean Catch   BETA - HYDROXYBUTYRATE, SERUM - Abnormal; Notable for the following components:    Beta-Hydroxybutyrate 2.9 (*)     All other components within normal limits   LIPASE - Abnormal; Notable for the following components:    Lipase 173 (*)     All other components within normal limits   BASIC METABOLIC PANEL - Abnormal; Notable for the following components:    Potassium 2.2 (*)     Chloride 72 (*)     CO2 43 (*)     Glucose 184 (*)     BUN, Bld 32 (*)     Creatinine 1.5 (*)     Anion Gap 22 (*)     eGFR if non  58 (*)     All other components within normal limits    Narrative:       K, CL, CO2 critical result(s) called and verbal readback  obtained   from Margo Cueva RN by BASILIA 02/23/2020 20:23   URINALYSIS MICROSCOPIC - Abnormal; Notable for the following components:    Hyaline Casts, UA 6 (*)     All other components within normal limits    Narrative:     Preferred Collection Type->Urine, Clean Catch   POCT GLUCOSE - Abnormal; Notable for the following components:    POCT Glucose 234 (*)     All other components within normal limits   INFLUENZA A & B BY MOLECULAR   INFLUENZA A & B BY MOLECULAR   LACTIC ACID, PLASMA   MAGNESIUM   TROPONIN I   POCT GLUCOSE MONITORING CONTINUOUS   POCT GLUCOSE MONITORING CONTINUOUS            X-Rays:   Independently Interpreted Readings:   Other Readings:  Reviewed by myself, read by radiology.     Imaging Results          CT Head Without Contrast (Final result)  Result time 02/23/20 19:18:19    Final result by Felicia Raymundo MD (02/23/20 19:18:19)                 Impression:      No acute intracranial abnormality detected.    Left maxillary sinus mucous retention cyst or polyp.      Electronically signed by: Felicia Raymundo  Date:    02/23/2020  Time:    19:18             Narrative:    EXAMINATION:  CT OF THE HEAD WITHOUT    CLINICAL HISTORY:  Presents to the ED complaining of emesis x2 weeks.  States abdominal pain.    TECHNIQUE:  5 mm unenhanced axial images were obtained from the skull base to the vertex.    COMPARISON:  None.    FINDINGS:  The ventricles, basal cisterns, and cortical sulci are within normal limits for patient's stated age. There is no acute intracranial hemorrhage, territorial infarct or mass effect, or midline shift. In the visualized paranasal sinuses or mastoid air cells, there is a left maxillary sinus mucous retention cyst or polyp.                               US Abdomen Limited (Gallbladder) (Final result)  Result time 02/23/20 19:15:53    Final result by Bay Brady MD (02/23/20 19:15:53)                 Impression:      Stable cystic structure at the pancreatic head  correlating with collection on CT 12/27/2019 suggesting pseudo cyst.  The pancreatic duct is not appear dilated however the pancreas is for the most part obscured by overlying soft tissue structures.    No findings to suggest acute cholecystitis.    Cavernous transformation of the portal vein.    Splenomegaly.      Electronically signed by: Bay Brady MD  Date:    02/23/2020  Time:    19:15             Narrative:    EXAMINATION:  US ABDOMEN LIMITED    CLINICAL HISTORY:  Right upper quadrant pain    TECHNIQUE:  Limited ultrasound of the right upper quadrant of the abdomen (including pancreas, liver, gallbladder, common bile duct, and spleen) was performed.    COMPARISON:  Ultrasound 08/30/2019, CT 12/27/2019    FINDINGS:  The pancreas is obscured by overlying soft tissue structures.  There is a cystic structure at the pancreatic head measuring approximately 4.9 x 4.3 x 4.8 cm correlating with cystic structure on CT 12/27/2019.  The pancreatic duct is not appear dilated.  The gallbladder is nondistended.  No gallbladder wall thickening.  No pericholecystic fluid.  Sonographic Lai sign is negative.  The common duct is not dilated measuring 0.3 cm.  The liver is echogenic without enlargement.  The visualized aorta and IVC are grossly unremarkable.  There is cavernous transformation of the portal vein, better evaluated on prior CT.  The spleen is enlarged, similar to the previous exam.  No ascites.                               X-Ray Abdomen Flat And Erect (Final result)  Result time 02/23/20 18:17:59    Final result by Bay Brady MD (02/23/20 18:17:59)                 Impression:      1. Nonobstructive bowel gas pattern.      Electronically signed by: Bay Brady MD  Date:    02/23/2020  Time:    18:17             Narrative:    EXAMINATION:  XR ABDOMEN FLAT AND ERECT    CLINICAL HISTORY:  Vomiting, unspecified    TECHNIQUE:  Flat and erect AP views of the abdomen were  performed.    COMPARISON:  02/11/2020, CT 02/27/2019    FINDINGS:  One upright view, 2 supine views.    No significant air-fluid levels on upright view.  Air and stool is seen within the large bowel and projected over the rectum.  No focally dilated small bowel loops.  Calcifications are noted within the mid aspect of the abdomen, may reflect that of pancreatic calcification.  No large volume free air or pneumatosis.  The lower lung zones are grossly clear.  No acute osseous abnormality.                               X-Ray Chest AP Portable (Final result)  Result time 02/23/20 18:18:36    Final result by Bay Brady MD (02/23/20 18:18:36)                 Impression:      1. No acute cardiopulmonary process.      Electronically signed by: Bay Brady MD  Date:    02/23/2020  Time:    18:18             Narrative:    EXAMINATION:  XR CHEST AP PORTABLE    CLINICAL HISTORY:  vomiting;    TECHNIQUE:  Single frontal view of the chest was performed.    COMPARISON:  08/27/2019    FINDINGS:  The cardiomediastinal silhouette is not enlarged.  There is no pleural effusion.  The trachea is midline.  The lungs are symmetrically expanded bilaterally without evidence of acute parenchymal process. No large focal consolidation seen.  There is no pneumothorax.  The osseous structures are unremarkable.                              Medical Decision Making:   History:   Old Medical Records: I decided to obtain old medical records.  Initial Assessment:   This is a 40 y/o male with PMHx of acute pancreatitis, essential HTN, hypertriglyceridemia, JAZMYN, DM, and gastric varices who presents with complaint of vomiting x2 weeks.  Differential Diagnosis:    gastritis, gastroenteritis, pancreatitis, cholecystitis, ileus, small bowel obstruction, appendicitis, DKA    Independently Interpreted Test(s):   I have ordered and independently interpreted EKG Reading(s) - see prior notes  Clinical Tests:   Lab Tests: Reviewed and  Ordered  Radiological Study: Reviewed and Ordered  Medical Tests: Reviewed and Ordered  ED Management:  Suspect DKA, though patient is alkalotic on ABG.  Possible this is from GI losses?  BG is elevated but only 231.  Discussed with pulmonary/critical care who states that with newer oral diabetic medications DKA with lower BG levels is possible.  Recommended treating for DKA despite ABG results.  Given IVFs and replacing potassium.  Holding off on insulin until potassium normalizes.  Patient to be admitted to ICU                     ED Course as of Feb 23 2356   Sun Feb 23, 2020   1737 EKG with sinus tachycardia, rate of 109 bpm.  Qtc 560 ms.  No STEMI    [LD]   1805 Anion Gap: Unable to calculate [LD]   1810 Beta-Hydroxybutyrate(!): 2.9 [LD]   1810 Potassium(!!): 2.2 [LD]   1810 Chloride(!!): <70 [LD]   1810 CO2(!!): 45 [LD]   1811 Creatinine(!): 1.8 [LD]   1822 Consulted critical care    [LD]   1848 Dr Johnson to see patient in ED.  Suspects DKA, agrees with potassium replacement and aggressive IVF rehydration.     [LD]   1934 Discussed with [facility] hospitalist who requests repeat BMP prior to admission    [LD]   2015 Improved     Pulse: 87 [LD]      ED Course User Index  [LD] Isabelle Singletary MD                Clinical Impression:     1. Nausea and vomiting  2. DKA  3. Dehydration  4. Hypokalemia  5. TROY  6. Prolonged QT interval  7. Leukocytosis    Disposition:   Disposition: Admitted  Condition: Critical          I, Isabelle Singletary,  personally performed the services described in this documentation. All medical record entries made by the scribe were at my direction and in my presence.  I have reviewed the chart and agree that the record reflects my personal performance and is accurate and complete. Isabelle Singletary M.D. 11:55 PM02/23/2020             Isabelle Singletary MD  02/23/20 4831

## 2020-02-24 PROBLEM — R11.2 INTRACTABLE VOMITING WITH NAUSEA: Status: ACTIVE | Noted: 2020-02-23

## 2020-02-24 PROBLEM — E78.2 MIXED HYPERLIPIDEMIA: Chronic | Status: ACTIVE | Noted: 2017-08-02

## 2020-02-24 PROBLEM — Z79.4 TYPE 2 DIABETES MELLITUS WITH DIABETIC POLYNEUROPATHY, WITH LONG-TERM CURRENT USE OF INSULIN: Chronic | Status: ACTIVE | Noted: 2017-10-16

## 2020-02-24 PROBLEM — E87.3 CHLORIDE-RESPONSIVE METABOLIC ALKALOSIS: Status: ACTIVE | Noted: 2020-02-23

## 2020-02-24 PROBLEM — E11.42 TYPE 2 DIABETES MELLITUS WITH DIABETIC POLYNEUROPATHY, WITH LONG-TERM CURRENT USE OF INSULIN: Chronic | Status: ACTIVE | Noted: 2017-10-16

## 2020-02-24 PROBLEM — I81 PORTAL VEIN THROMBOSIS: Chronic | Status: ACTIVE | Noted: 2019-08-29

## 2020-02-24 LAB
ANION GAP SERPL CALC-SCNC: 11 MMOL/L (ref 8–16)
ANION GAP SERPL CALC-SCNC: 13 MMOL/L (ref 8–16)
ANION GAP SERPL CALC-SCNC: 19 MMOL/L (ref 8–16)
ANION GAP SERPL CALC-SCNC: 20 MMOL/L (ref 8–16)
ANION GAP SERPL CALC-SCNC: 9 MMOL/L (ref 8–16)
ANION GAP SERPL CALC-SCNC: 9 MMOL/L (ref 8–16)
BASOPHILS # BLD AUTO: 0.03 K/UL (ref 0–0.2)
BASOPHILS NFR BLD: 0.3 % (ref 0–1.9)
BUN SERPL-MCNC: 22 MG/DL (ref 6–20)
BUN SERPL-MCNC: 25 MG/DL (ref 6–20)
BUN SERPL-MCNC: 27 MG/DL (ref 6–20)
BUN SERPL-MCNC: 27 MG/DL (ref 6–20)
BUN SERPL-MCNC: 29 MG/DL (ref 6–20)
BUN SERPL-MCNC: 31 MG/DL (ref 6–20)
CALCIUM SERPL-MCNC: 8.5 MG/DL (ref 8.7–10.5)
CALCIUM SERPL-MCNC: 8.6 MG/DL (ref 8.7–10.5)
CALCIUM SERPL-MCNC: 8.7 MG/DL (ref 8.7–10.5)
CALCIUM SERPL-MCNC: 8.7 MG/DL (ref 8.7–10.5)
CALCIUM SERPL-MCNC: 8.8 MG/DL (ref 8.7–10.5)
CALCIUM SERPL-MCNC: 9 MG/DL (ref 8.7–10.5)
CHLORIDE SERPL-SCNC: 73 MMOL/L (ref 95–110)
CHLORIDE SERPL-SCNC: 76 MMOL/L (ref 95–110)
CHLORIDE SERPL-SCNC: 80 MMOL/L (ref 95–110)
CHLORIDE SERPL-SCNC: 84 MMOL/L (ref 95–110)
CHLORIDE SERPL-SCNC: 87 MMOL/L (ref 95–110)
CHLORIDE SERPL-SCNC: 92 MMOL/L (ref 95–110)
CO2 SERPL-SCNC: 40 MMOL/L (ref 23–29)
CO2 SERPL-SCNC: 42 MMOL/L (ref 23–29)
CO2 SERPL-SCNC: 46 MMOL/L (ref 23–29)
CO2 SERPL-SCNC: 46 MMOL/L (ref 23–29)
CO2 SERPL-SCNC: 47 MMOL/L (ref 23–29)
CO2 SERPL-SCNC: 48 MMOL/L (ref 23–29)
CREAT SERPL-MCNC: 1 MG/DL (ref 0.5–1.4)
CREAT SERPL-MCNC: 1 MG/DL (ref 0.5–1.4)
CREAT SERPL-MCNC: 1.1 MG/DL (ref 0.5–1.4)
CREAT SERPL-MCNC: 1.1 MG/DL (ref 0.5–1.4)
CREAT SERPL-MCNC: 1.3 MG/DL (ref 0.5–1.4)
CREAT SERPL-MCNC: 1.4 MG/DL (ref 0.5–1.4)
DIFFERENTIAL METHOD: ABNORMAL
EOSINOPHIL # BLD AUTO: 0 K/UL (ref 0–0.5)
EOSINOPHIL NFR BLD: 0.4 % (ref 0–8)
ERYTHROCYTE [DISTWIDTH] IN BLOOD BY AUTOMATED COUNT: 14.2 % (ref 11.5–14.5)
EST. GFR  (AFRICAN AMERICAN): >60 ML/MIN/1.73 M^2
EST. GFR  (NON AFRICAN AMERICAN): >60 ML/MIN/1.73 M^2
GLUCOSE SERPL-MCNC: 171 MG/DL (ref 70–110)
GLUCOSE SERPL-MCNC: 175 MG/DL (ref 70–110)
GLUCOSE SERPL-MCNC: 176 MG/DL (ref 70–110)
GLUCOSE SERPL-MCNC: 177 MG/DL (ref 70–110)
GLUCOSE SERPL-MCNC: 180 MG/DL (ref 70–110)
GLUCOSE SERPL-MCNC: 190 MG/DL (ref 70–110)
HCT VFR BLD AUTO: 44 % (ref 40–54)
HGB BLD-MCNC: 13.5 G/DL (ref 14–18)
IMM GRANULOCYTES # BLD AUTO: 0.03 K/UL (ref 0–0.04)
IMM GRANULOCYTES NFR BLD AUTO: 0.3 % (ref 0–0.5)
LYMPHOCYTES # BLD AUTO: 0.7 K/UL (ref 1–4.8)
LYMPHOCYTES NFR BLD: 6.9 % (ref 18–48)
MAGNESIUM SERPL-MCNC: 2 MG/DL (ref 1.6–2.6)
MCH RBC QN AUTO: 24.4 PG (ref 27–31)
MCHC RBC AUTO-ENTMCNC: 30.7 G/DL (ref 32–36)
MCV RBC AUTO: 80 FL (ref 82–98)
MONOCYTES # BLD AUTO: 1.5 K/UL (ref 0.3–1)
MONOCYTES NFR BLD: 15.6 % (ref 4–15)
NEUTROPHILS # BLD AUTO: 7.2 K/UL (ref 1.8–7.7)
NEUTROPHILS NFR BLD: 76.5 % (ref 38–73)
NRBC BLD-RTO: 0 /100 WBC
PLATELET # BLD AUTO: 210 K/UL (ref 150–350)
PMV BLD AUTO: 11.8 FL (ref 9.2–12.9)
POCT GLUCOSE: 176 MG/DL (ref 70–110)
POCT GLUCOSE: 185 MG/DL (ref 70–110)
POTASSIUM SERPL-SCNC: 2.5 MMOL/L (ref 3.5–5.1)
POTASSIUM SERPL-SCNC: 2.6 MMOL/L (ref 3.5–5.1)
POTASSIUM SERPL-SCNC: 2.6 MMOL/L (ref 3.5–5.1)
POTASSIUM SERPL-SCNC: 2.9 MMOL/L (ref 3.5–5.1)
POTASSIUM SERPL-SCNC: 3.2 MMOL/L (ref 3.5–5.1)
POTASSIUM SERPL-SCNC: 3.6 MMOL/L (ref 3.5–5.1)
RBC # BLD AUTO: 5.53 M/UL (ref 4.6–6.2)
SODIUM SERPL-SCNC: 138 MMOL/L (ref 136–145)
SODIUM SERPL-SCNC: 138 MMOL/L (ref 136–145)
SODIUM SERPL-SCNC: 140 MMOL/L (ref 136–145)
SODIUM SERPL-SCNC: 141 MMOL/L (ref 136–145)
SODIUM SERPL-SCNC: 142 MMOL/L (ref 136–145)
SODIUM SERPL-SCNC: 143 MMOL/L (ref 136–145)
WBC # BLD AUTO: 9.44 K/UL (ref 3.9–12.7)

## 2020-02-24 PROCEDURE — 25000003 PHARM REV CODE 250: Performed by: HOSPITALIST

## 2020-02-24 PROCEDURE — 63600175 PHARM REV CODE 636 W HCPCS: Performed by: NURSE PRACTITIONER

## 2020-02-24 PROCEDURE — 80048 BASIC METABOLIC PNL TOTAL CA: CPT | Mod: 91

## 2020-02-24 PROCEDURE — 93010 ELECTROCARDIOGRAM REPORT: CPT | Mod: ,,, | Performed by: INTERNAL MEDICINE

## 2020-02-24 PROCEDURE — 25000003 PHARM REV CODE 250: Performed by: NURSE PRACTITIONER

## 2020-02-24 PROCEDURE — 93010 EKG 12-LEAD: ICD-10-PCS | Mod: ,,, | Performed by: INTERNAL MEDICINE

## 2020-02-24 PROCEDURE — 36415 COLL VENOUS BLD VENIPUNCTURE: CPT

## 2020-02-24 PROCEDURE — 20000000 HC ICU ROOM

## 2020-02-24 PROCEDURE — 63600175 PHARM REV CODE 636 W HCPCS: Performed by: INTERNAL MEDICINE

## 2020-02-24 PROCEDURE — 99900035 HC TECH TIME PER 15 MIN (STAT)

## 2020-02-24 PROCEDURE — 93005 ELECTROCARDIOGRAM TRACING: CPT

## 2020-02-24 PROCEDURE — 25000003 PHARM REV CODE 250: Performed by: INTERNAL MEDICINE

## 2020-02-24 PROCEDURE — 83735 ASSAY OF MAGNESIUM: CPT

## 2020-02-24 PROCEDURE — 82803 BLOOD GASES ANY COMBINATION: CPT

## 2020-02-24 PROCEDURE — 63600175 PHARM REV CODE 636 W HCPCS: Performed by: HOSPITALIST

## 2020-02-24 PROCEDURE — 85025 COMPLETE CBC W/AUTO DIFF WBC: CPT

## 2020-02-24 RX ORDER — MAGNESIUM SULFATE 1 G/100ML
1 INJECTION INTRAVENOUS ONCE
Status: COMPLETED | OUTPATIENT
Start: 2020-02-24 | End: 2020-02-24

## 2020-02-24 RX ORDER — SODIUM CHLORIDE AND POTASSIUM CHLORIDE 300; 900 MG/100ML; MG/100ML
INJECTION, SOLUTION INTRAVENOUS CONTINUOUS
Status: DISCONTINUED | OUTPATIENT
Start: 2020-02-24 | End: 2020-02-25

## 2020-02-24 RX ORDER — POTASSIUM CHLORIDE 7.45 MG/ML
10 INJECTION INTRAVENOUS
Status: DISCONTINUED | OUTPATIENT
Start: 2020-02-24 | End: 2020-02-24

## 2020-02-24 RX ORDER — POTASSIUM CHLORIDE 20 MEQ/15ML
60 SOLUTION ORAL ONCE
Status: COMPLETED | OUTPATIENT
Start: 2020-02-24 | End: 2020-02-24

## 2020-02-24 RX ORDER — POTASSIUM CHLORIDE 20 MEQ/15ML
60 SOLUTION ORAL
Status: DISCONTINUED | OUTPATIENT
Start: 2020-02-24 | End: 2020-02-24

## 2020-02-24 RX ORDER — ENOXAPARIN SODIUM 100 MG/ML
40 INJECTION SUBCUTANEOUS EVERY 24 HOURS
Status: DISCONTINUED | OUTPATIENT
Start: 2020-02-24 | End: 2020-02-27 | Stop reason: HOSPADM

## 2020-02-24 RX ORDER — POTASSIUM CHLORIDE 20 MEQ/1
40 TABLET, EXTENDED RELEASE ORAL
Status: DISCONTINUED | OUTPATIENT
Start: 2020-02-24 | End: 2020-02-24

## 2020-02-24 RX ORDER — POTASSIUM CHLORIDE 7.45 MG/ML
10 INJECTION INTRAVENOUS
Status: COMPLETED | OUTPATIENT
Start: 2020-02-24 | End: 2020-02-24

## 2020-02-24 RX ORDER — POTASSIUM CHLORIDE 20 MEQ/1
40 TABLET, EXTENDED RELEASE ORAL ONCE
Status: COMPLETED | OUTPATIENT
Start: 2020-02-24 | End: 2020-02-24

## 2020-02-24 RX ADMIN — SODIUM CHLORIDE AND POTASSIUM CHLORIDE 250 ML/HR: 9; 2.98 INJECTION, SOLUTION INTRAVENOUS at 05:02

## 2020-02-24 RX ADMIN — POTASSIUM CHLORIDE 10 MEQ: 7.46 INJECTION, SOLUTION INTRAVENOUS at 02:02

## 2020-02-24 RX ADMIN — POTASSIUM CHLORIDE 10 MEQ: 7.46 INJECTION, SOLUTION INTRAVENOUS at 08:02

## 2020-02-24 RX ADMIN — MAGNESIUM SULFATE 1 G: 1 INJECTION INTRAVENOUS at 06:02

## 2020-02-24 RX ADMIN — ENOXAPARIN SODIUM 40 MG: 100 INJECTION SUBCUTANEOUS at 04:02

## 2020-02-24 RX ADMIN — POTASSIUM CHLORIDE 10 MEQ: 7.46 INJECTION, SOLUTION INTRAVENOUS at 07:02

## 2020-02-24 RX ADMIN — PROMETHAZINE HYDROCHLORIDE 6.25 MG: 25 INJECTION INTRAMUSCULAR; INTRAVENOUS at 01:02

## 2020-02-24 RX ADMIN — POTASSIUM CHLORIDE 60 MEQ: 20 SOLUTION ORAL at 08:02

## 2020-02-24 RX ADMIN — MAGNESIUM SULFATE 1 G: 1 INJECTION INTRAVENOUS at 01:02

## 2020-02-24 RX ADMIN — POTASSIUM CHLORIDE 10 MEQ: 7.46 INJECTION, SOLUTION INTRAVENOUS at 04:02

## 2020-02-24 RX ADMIN — SODIUM CHLORIDE AND POTASSIUM CHLORIDE 250 ML/HR: 9; 2.98 INJECTION, SOLUTION INTRAVENOUS at 01:02

## 2020-02-24 RX ADMIN — POTASSIUM CHLORIDE 40 MEQ: 1500 TABLET, EXTENDED RELEASE ORAL at 07:02

## 2020-02-24 RX ADMIN — SODIUM CHLORIDE AND POTASSIUM CHLORIDE 250 ML/HR: 9; 2.98 INJECTION, SOLUTION INTRAVENOUS at 09:02

## 2020-02-24 RX ADMIN — SODIUM CHLORIDE AND POTASSIUM CHLORIDE: .9; .15 SOLUTION INTRAVENOUS at 01:02

## 2020-02-24 RX ADMIN — SODIUM CHLORIDE AND POTASSIUM CHLORIDE: .9; .15 SOLUTION INTRAVENOUS at 05:02

## 2020-02-24 RX ADMIN — PROMETHAZINE HYDROCHLORIDE 6.25 MG: 25 INJECTION INTRAMUSCULAR; INTRAVENOUS at 09:02

## 2020-02-24 RX ADMIN — SODIUM CHLORIDE AND POTASSIUM CHLORIDE 250 ML/HR: 9; 2.98 INJECTION, SOLUTION INTRAVENOUS at 08:02

## 2020-02-24 RX ADMIN — POTASSIUM CHLORIDE 60 MEQ: 20 SOLUTION ORAL at 02:02

## 2020-02-24 RX ADMIN — POTASSIUM CHLORIDE 10 MEQ: 7.46 INJECTION, SOLUTION INTRAVENOUS at 09:02

## 2020-02-24 RX ADMIN — POTASSIUM CHLORIDE 40 MEQ: 1500 TABLET, EXTENDED RELEASE ORAL at 01:02

## 2020-02-24 RX ADMIN — POTASSIUM CHLORIDE 10 MEQ: 7.46 INJECTION, SOLUTION INTRAVENOUS at 03:02

## 2020-02-24 RX ADMIN — POTASSIUM CHLORIDE 10 MEQ: 7.46 INJECTION, SOLUTION INTRAVENOUS at 06:02

## 2020-02-24 RX ADMIN — SODIUM CHLORIDE AND POTASSIUM CHLORIDE: .9; .15 SOLUTION INTRAVENOUS at 07:02

## 2020-02-24 NOTE — PROGRESS NOTES
Pharmacist Renal Dose Adjustment Note    Bay Ibarra is a 39 y.o. male being treated with the medication enoxaparin    Patient Data:    Vital Signs (Most Recent):  Temp: 98.7 °F (37.1 °C) (02/24/20 0705)  Pulse: 72 (02/24/20 0705)  Resp: 12 (02/24/20 0705)  BP: 126/60 (02/24/20 0705)  SpO2: 100 % (02/24/20 0705) Vital Signs (72h Range):  Temp:  [97.5 °F (36.4 °C)-98.7 °F (37.1 °C)]   Pulse:  []   Resp:  [11-42]   BP: (117-166)/(55-82)   SpO2:  [91 %-100 %]      Recent Labs   Lab 02/23/20 1952 02/23/20 2350 02/24/20  0404   CREATININE 1.5* 1.4 1.3     Serum creatinine: 1.3 mg/dL 02/24/20 0404  Estimated creatinine clearance: 71.3 mL/min    Medication:enoxaparin dose: 30mg frequency daily will be changed to medication:enoxaparin dose:40mg frequency:daily    Pharmacist's Name: Denver Pacheco  Pharmacist's Extension: [phone]

## 2020-02-24 NOTE — ASSESSMENT & PLAN NOTE
Type 2 diabetes mellitus with diabetic polyneuropathy, with long-term current use of insulin    A1C 10.0 1/7/20     Patient reports compliance with home regimen- lantus 52 units every evening and insulin aspart 20 units with meals + SSI and Jardiance 10 mg daily   Hold home insulin, discontinue Jardiance   Continue aggressive fluid resuscitation. Already received 3 L in the ED. Will start on NS + 20 KCL  Once potassium becomes >3.5, needs to be started on insulin gtt. At that point once insulin gtt is started, will change his IVF to D5 + NS + 20KCl  Will continue insulin gtt until anion gap closed  BMP q 4h, poct glucose q4h for now, change to q1h when insulin gtt started   Avoid  reglan 2/2 prolonged QT interval

## 2020-02-24 NOTE — PROGRESS NOTES
[facility] Medicine  Progress Note    Patient Name: Bay Ibarra  MRN: [id number]  Patient Class: IP- Inpatient   Admission Date: 2/23/2020  Length of Stay: 1 days  Attending Physician: Ricci Castle MD  Primary Care Provider: Komal Pierce MD        Subjective:     Principal Problem:Diabetic ketoacidosis without coma associated with type 2 diabetes mellitus        HPI:  Bay Ibarra is a 39 year old white man with diabetes mellitus type 2 with polyneuropathy (treated with insulin), hypertension, hyperlipidemia, history of gout, obstructive sleep apnea, chronic pancreatitis, colon diverticulosis, fatty liver, gastric varices, history of choledocholithiasis on 5/26/19, chronic portal vein thrombosis since 5/27/19, history of pancreatic pseudocyst. He lives in [place], [place]. His primary care physician is Dr. Komal Pierce.    He was seen at [facility] clinic on 2/11/20 for abdominal pain, nausea, and vomiting. He was advised to stop liraglutide due to risk of pancreatitis.   He was seen at [facility] Emergency Department on 2/18/20 for persistent symptoms. Labs showed hypernatremia (sodium 147 mmol/L), hyponatremia (potassium 3.3 mmol/L), metabolic alkalosis (bicarbonate 35 mmol/L), elevated lipase (551 U/L). He was told that his symptoms were due to his diabetes.   He presented to [facility] Department on 2/23/20 with persistent symptoms along with 20 pound weight loss in 2 weeks. Abdominal ultrasound showed nothing new. Labs showed worsened hypokalemia (potassium 2.2), hypochloremic metabolic acidosis (chloride <70 mmol/L, bicarbonate 45), elevated anion gap, elevated BUN and creatinine, elevated calcium, elevated beta-hydroxybutyrate (2.9 mmol/L). He was given 3 liters of normal saline, potassium chloride, dicyclomine, and ondansetron. Pulmonology-Critical Care was consulted and felt that his  empagliflozin, a SGLT2-inhibitor, could be causing euglycemic diabetic ketoacidosis. He was prescribed empaglozin on 9/15/19 and had elevated anion gap on 11/14/19, 12/26/19, 1/7/20, and 2/18/20. Vomiting explained his hypochloremic metabolic alkalosis. EKG showed prolonged QTc of 560 ms. He was admitted to [facility].      Overview/Hospital Course:  No notes on file    Interval History: No nausea at this time.    Review of Systems   Constitutional: Negative for chills and fever.   Gastrointestinal: Negative for abdominal pain, nausea and vomiting.     Objective:     Vital Signs (Most Recent):  Temp: 98.6 °F (37 °C) (02/24/20 1105)  Pulse: 78 (02/24/20 1105)  Resp: 15 (02/24/20 1105)  BP: 129/62 (02/24/20 1105)  SpO2: (!) 91 % (02/24/20 1105) Vital Signs (24h Range):  Temp:  [97.5 °F (36.4 °C)-98.7 °F (37.1 °C)] 98.6 °F (37 °C)  Pulse:  [] 78  Resp:  [11-42] 15  SpO2:  [91 %-100 %] 91 %  BP: (117-166)/(55-82) 129/62     Weight: 78.7 kg (173 lb 8 oz)  Body mass index is 27.17 kg/m².    Intake/Output Summary (Last 24 hours) at 2/24/2020 1201  Last data filed at 2/24/2020 1000  Gross per 24 hour   Intake 7058.33 ml   Output 3075 ml   Net 3983.33 ml      Physical Exam   Constitutional: He is oriented to person, place, and time. He appears well-developed. No distress.   Pulmonary/Chest: Effort normal. No respiratory distress.   Neurological: He is alert and oriented to person, place, and time.   Psychiatric: He has a normal mood and affect.   Nursing note and vitals reviewed.      Significant Labs: All pertinent labs within the past 24 hours have been reviewed.    Significant Imaging: I have reviewed all pertinent imaging results/findings within the past 24 hours.   CT Head Without Contrast 2/23/20: FINDINGS:  The ventricles, basal cisterns, and cortical sulci are within normal limits for patient's stated age. There is no acute intracranial hemorrhage, territorial infarct or mass effect, or midline  shift. In the visualized paranasal sinuses or mastoid air cells, there is a left maxillary sinus mucous retention cyst or polyp.  Impression: No acute intracranial abnormality detected.  Left maxillary sinus mucous retention cyst or polyp.  US Abdomen Limited (Gallbladder) 2/23/20:   FINDINGS: The pancreas is obscured by overlying soft tissue structures.  There is a cystic structure at the pancreatic head measuring approximately 4.9 x 4.3 x 4.8 cm correlating with cystic structure on CT 12/27/2019.  The pancreatic duct is not appear dilated.  The gallbladder is nondistended.  No gallbladder wall thickening.  No pericholecystic fluid.  Sonographic Lai sign is negative.  The common duct is not dilated measuring 0.3 cm.  The liver is echogenic without enlargement.  The visualized aorta and IVC are grossly unremarkable.  There is cavernous transformation of the portal vein, better evaluated on prior CT.  The spleen is enlarged, similar to the previous exam.  No ascites.  Impression: Stable cystic structure at the pancreatic head correlating with collection on CT 12/27/2019 suggesting pseudo cyst.  The pancreatic duct is not appear dilated however the pancreas is for the most part obscured by overlying soft tissue structures.  No findings to suggest acute cholecystitis.  Cavernous transformation of the portal vein.  Splenomegaly.  X-Ray Chest AP Portable 2/23/20: FINDINGS:  The cardiomediastinal silhouette is not enlarged.  There is no pleural effusion.  The trachea is midline.  The lungs are symmetrically expanded bilaterally without evidence of acute parenchymal process. No large focal consolidation seen.  There is no pneumothorax.  The osseous structures are unremarkable.        Assessment/Plan:      * Diabetic ketoacidosis without coma associated with type 2 diabetes mellitus  Chloride-responsive metabolic alkalosis  Hypokalemia due to excessive gastrointestinal loss of potassium  Type 2 diabetes mellitus with  diabetic polyneuropathy, with long-term current use of insulin  Hemoglobin A1C 10.0% on 1/7/20. Takes insulin glargine 52 units every evening, insulin aspart 20 units with meals with sliding scale, empagliflozin 10 mg daily. Holding home insulin. Discontinue empagliflozin. Giving fluids. No insulin until potassium level higher. Giving potassium chloride.    Fatty liver  Stable.    Diverticulosis of colon  Stable.    Chronic pancreatitis  Stable.    Portal vein thrombosis  Gastric varices  Stable. Takes pantoprazole.    Mixed hyperlipidemia  Takes atorvastatin, icsapent, niacin, fenofibrate.      History of gout  Takes allopurinol.    Essential hypertension  Takes losartan.      VTE Risk Mitigation (From admission, onward)         Ordered     enoxaparin injection 40 mg  Daily      02/24/20 0819     IP VTE HIGH RISK PATIENT  Once      02/23/20 2148     Reason for no Mechanical VTE Prophylaxis  Once     Question:  Reasons:  Answer:  Physician Provided (leave comment)    02/23/20 2028                Critical care time spent on the evaluation and treatment of severe organ dysfunction, review of pertinent labs and imaging studies, discussions with consulting providers and discussions with patient/family: 33 minutes.      Ricci Castle MD  Department of Hospital Medicine   [facility]

## 2020-02-24 NOTE — PROGRESS NOTES
e-ICU admit note      Reason for ICU admission:     DKA  Hypokalemia      HPI:    40 yo male c/o vomiting x 2 weeks as well as weakness, unintentional weight loss (20 lbs in 2 weeks), chills, and RUQ abdominal pain.    He has had diabetic ketoacidosis in the past.              PHx:    DM2   HTN   HLD   JAZMYN  Gastric varicies   pancreatitis       Home Meds:    ASA  Atorvastatin  Empaglifozin  Fenofibrate  Lantus 53 U pm  [id number] u meals  Losartan  reglan  pantoprazole        Significant labs:    K 2.2  WBC 85198  BUN 32  Creat 1.5  b-hydroxy 2.9  Glucose 234  AG 22          Significant images:    CT abd:     Nonobstructive bowel gas pattern.    CXR:     No acute cardiopulmonary process.        ECG:    Sinus tachycardia, LAFB, QTc 560        I viewed the pt via camera at  11:15 pm    147/67, 70 sinus, 100%, RR 18    NAD    IV fluids at 200ml/hr         Assessment/Plan    DKA  Follow protocol for fluids, insulin,lytes  Hold insulin until K is corrected first    Hypokalemia  Replace    metabolic alkalosis  Vomiting, dehydration, hypokalemia    Prolonged QT  Avoid Reglan    DVT ppx-enoxaparin

## 2020-02-24 NOTE — PLAN OF CARE
Pt VSS for shift. Pt had one episode of vomiting. phenergan given with full relief obtained. Insulin gtt still on hold due to pt critical low potassium. NP aware. Pt is NPO due to not being able to hold anything down. Anion gap seems to be closing with the potassium being given. Safety maintained for shift. Fluids infusing. Will continue to monitor.

## 2020-02-24 NOTE — SUBJECTIVE & OBJECTIVE
Past Medical History:   Diagnosis Date    Acute pancreatitis     Essential hypertension     Gastric varices without bleeding 1/16/2020    Gout     Hx of acute pancreatitis 3/3/2017    Hypertriglyceridemia 8/2/2017    Obesity (BMI 30-39.9) 6/5/2015    Obesity (BMI 35.0-39.9 without comorbidity) 6/5/2015    Obstructive sleep apnea syndrome 12/18/2015    Type 2 diabetes mellitus with diabetic polyneuropathy, with long-term current use of insulin 10/16/2017    Type 2 diabetes mellitus with hyperglycemia, with long-term current use of insulin 8/3/2017       Past Surgical History:   Procedure Laterality Date    ENDOSCOPIC ULTRASOUND OF UPPER GASTROINTESTINAL TRACT N/A 5/27/2019    Procedure: ULTRASOUND, UPPER GI TRACT, ENDOSCOPIC;  Surgeon: Zafar Velazquez MD;  Location: [address]);  Service: Endoscopy;  Laterality: N/A;    ERCP N/A 5/27/2019    Procedure: ERCP (ENDOSCOPIC RETROGRADE CHOLANGIOPANCREATOGRAPHY);  Surgeon: Zafar Velazquez MD;  Location: [address]);  Service: Endoscopy;  Laterality: N/A;    ESOPHAGOGASTRODUODENOSCOPY      ESOPHAGOGASTRODUODENOSCOPY N/A 1/31/2020    Procedure: EGD (ESOPHAGOGASTRODUODENOSCOPY);  Surgeon: Zafar Velazquez MD;  Location: [facility];  Service: Endoscopy;  Laterality: N/A;       Review of patient's allergies indicates:  No Known Allergies    No current facility-administered medications on file prior to encounter.      Current Outpatient Medications on File Prior to Encounter   Medication Sig    allopurinol (ZYLOPRIM) 300 MG tablet TAKE 1 TABLET BY MOUTH ONCE DAILY    aspirin (ECOTRIN) 81 MG EC tablet Take 81 mg by mouth once daily.    atorvastatin (LIPITOR) 40 MG tablet Take 1 tablet (40 mg total) by mouth once daily.    diclofenac sodium (VOLTAREN) 1 % Gel Apply 2 g topically 4 (four) times daily. Apply to chest in the location of pain up to 4 times a day as needed for pain.    empagliflozin (JARDIANCE) 10 mg Tab Take 1 tablet (10 mg) by mouth once  "daily.    fenofibrate 160 MG Tab Take 1 tablet (160 mg total) by mouth once daily.    flash glucose scanning reader (FREESTYLE DEBBIE 14 DAY READER) Misc 1 kit by Misc.(Non-Drug; Combo Route) route 3 (three) times daily as needed.    flash glucose sensor (FREESTYLE DEBBIE 14 DAY SENSOR) Kit 1 each by Misc.(Non-Drug; Combo Route) route every 14 (fourteen) days.    icosapent ethyl (VASCEPA) 1 gram Cap Take 2 g by mouth 2 (two) times daily.    insulin (LANTUS SOLOSTAR U-100 INSULIN) glargine 100 units/mL (3mL) SubQ pen Inject 52 Units into the skin every evening.    insulin aspart U-100 (NOVOLOG FLEXPEN U-100 INSULIN) 100 unit/mL (3 mL) InPn pen INJECT 20 UNITS SUBCUTANEOUSLY WITH MEALS PLUS SLIDING SCALE 150-200+2, 201-250 +4, 251-300 +6, 301-350 +8,>350 +10. MAX OF 90 UNITS PER DA    insulin syringe-needle U-100 29 gauge x 1/2" Syrg Inject 1 (once) per day    losartan (COZAAR) 100 MG tablet TAKE 1/2 (ONE-HALF) TABLET BY MOUTH ONCE DAILY    metoclopramide HCl (REGLAN) 10 MG tablet Take 1 tablet (10 mg total) by mouth every 6 (six) hours as needed (nausea).    niacin 100 MG Tab Take 200 mg by mouth every evening.     ondansetron (ZOFRAN) 4 MG tablet Take 1 tablet (4 mg total) by mouth every 6 (six) hours as needed for Nausea.    pantoprazole (PROTONIX) 40 MG tablet Take 1 tablet (40 mg total) by mouth once daily.    pregabalin (LYRICA) 150 MG capsule Take 1 capsule (150 mg total) by mouth 2 (two) times daily.     Family History     Problem Relation (Age of Onset)    Aneurysm Mother, Maternal Grandmother    Coronary artery disease Father    Diabetes type II Father, Paternal Grandmother    No Known Problems Sister, Brother        Tobacco Use    Smoking status: Never Smoker    Smokeless tobacco: Never Used   Substance and Sexual Activity    Alcohol use: No    Drug use: Never    Sexual activity: Yes     Partners: Female     Review of Systems   Constitutional: Positive for appetite change, fatigue and " unexpected weight change. Negative for chills and fever.   Eyes: Negative for photophobia.   Respiratory: Negative for cough, chest tightness, shortness of breath and wheezing.    Cardiovascular: Negative for chest pain, palpitations and leg swelling.   Gastrointestinal: Positive for abdominal pain, nausea and vomiting. Negative for diarrhea.   Genitourinary: Negative for dysuria, flank pain and hematuria.   Musculoskeletal: Negative for back pain, myalgias and neck pain.   Skin: Negative for rash and wound.   Neurological: Positive for weakness. Negative for dizziness, syncope and headaches.   Psychiatric/Behavioral: Negative for agitation.     Objective:     Vital Signs (Most Recent):  Temp: 98.2 °F (36.8 °C) (02/24/20 0315)  Pulse: 64 (02/24/20 0615)  Resp: 12 (02/24/20 0615)  BP: (!) 150/70 (02/24/20 0615)  SpO2: 100 % (02/24/20 0615) Vital Signs (24h Range):  Temp:  [97.5 °F (36.4 °C)-98.2 °F (36.8 °C)] 98.2 °F (36.8 °C)  Pulse:  [] 64  Resp:  [11-42] 12  SpO2:  [91 %-100 %] 100 %  BP: (117-166)/(55-82) 150/70     Weight: 78.7 kg (173 lb 8 oz)  Body mass index is 27.17 kg/m².    Physical Exam   Constitutional: He is oriented to person, place, and time. He appears well-developed. No distress.   Appears ill    HENT:   Head: Normocephalic and atraumatic.   Eyes: Pupils are equal, round, and reactive to light. Conjunctivae are normal.   Neck: Normal range of motion. Neck supple. No JVD present.   Cardiovascular: Normal rate, regular rhythm, normal heart sounds and intact distal pulses.   Pulmonary/Chest: Effort normal and breath sounds normal. No respiratory distress. He has no wheezes.   Abdominal: Soft. Bowel sounds are normal. He exhibits no distension. There is no tenderness. There is no guarding.   Musculoskeletal: Normal range of motion. He exhibits no edema or tenderness.   Neurological: He is alert and oriented to person, place, and time.   Skin: Skin is warm and dry. Capillary refill takes less than  2 seconds. No erythema.   Psychiatric: He has a normal mood and affect. His behavior is normal.         CRANIAL NERVES     CN III, IV, VI   Pupils are equal, round, and reactive to light.       Significant Labs:   BMP:   Recent Labs   Lab 02/23/20  1724  02/24/20  0404   *   < > 190*   *   < > 138   K 2.2*   < > 2.6*   CL <70*   < > 76*   CO2 45*   < > 42*   BUN 33*   < > 29*   CREATININE 1.8*   < > 1.3   CALCIUM 11.7*   < > 8.7   MG 1.9  --   --     < > = values in this interval not displayed.     CBC:   Recent Labs   Lab 02/23/20  1724 02/24/20  0404   WBC 15.44* 9.44   HGB 16.8 13.5*   HCT 52.1 44.0   * 210     Urine Studies:   Recent Labs   Lab 02/23/20 2028   COLORU Yellow   APPEARANCEUA Clear   PHUR 8.0   SPECGRAV 1.015   PROTEINUA 1+*   GLUCUA 3+*   KETONESU 1+*   BILIRUBINUA 1+*   OCCULTUA Negative   NITRITE Negative   UROBILINOGEN 2.0-3.0*   LEUKOCYTESUR Negative   RBCUA 2   WBCUA 4   BACTERIA None   HYALINECASTS 6*       Significant Imaging: I have reviewed all pertinent imaging results/findings within the past 24 hours.

## 2020-02-24 NOTE — H&P
[facility] Medicine  History & Physical    Patient Name: Bay Ibarra  MRN: [id number]  Admission Date: 2/23/2020  Attending Physician: Ricci Castle MD   Primary Care Provider: Komal Pierce MD         Patient information was obtained from patient, past medical records and ER records.     Subjective:     Principal Problem:DKA (diabetic ketoacidoses)    Chief Complaint:   Chief Complaint   Patient presents with    Vomiting     39 year old male presents to ed cc of emesis x 2 weeks. states abdominal pain.         HPI: Bay Ibarra is a 38 y/o M with PMH of DM2, HTN, HLD, JAZMYN, h/o pancreatitis who presented to the ED c/o nausea/vomiting for 2 weeks. He went to [facility] 2/18/20 and was told his GI problems were due to his DM2. He states that he has been having weakness, loss 20 lbs in 2 weeks due to inability to eat and persistent vomiting, as well as abdominal pain. He saw his PCP on 2/11 for similar complaints. In the ED, pt has significant electrolyte abnormalities including hypokalemia (K 2.2), elevated bicarb 45, Anion gap >20, TROY, and elevated betahydroxybutyrate. He received 3 liters normal saline, 40 potassium chloride  IV and 40 potassium chloride po,  Dicyclomine and zofran. Insulin held until K corrected. Patient admitted to [facility].       Past Medical History:   Diagnosis Date    Acute pancreatitis     Essential hypertension     Gastric varices without bleeding 1/16/2020    Gout     Hx of acute pancreatitis 3/3/2017    Hypertriglyceridemia 8/2/2017    Obesity (BMI 30-39.9) 6/5/2015    Obesity (BMI 35.0-39.9 without comorbidity) 6/5/2015    Obstructive sleep apnea syndrome 12/18/2015    Type 2 diabetes mellitus with diabetic polyneuropathy, with long-term current use of insulin 10/16/2017    Type 2 diabetes mellitus with hyperglycemia, with long-term current use of insulin 8/3/2017       Past Surgical History:    Procedure Laterality Date    ENDOSCOPIC ULTRASOUND OF UPPER GASTROINTESTINAL TRACT N/A 5/27/2019    Procedure: ULTRASOUND, UPPER GI TRACT, ENDOSCOPIC;  Surgeon: Zafar Velazquez MD;  Location: [facility] (2ND FLR);  Service: Endoscopy;  Laterality: N/A;    ERCP N/A 5/27/2019    Procedure: ERCP (ENDOSCOPIC RETROGRADE CHOLANGIOPANCREATOGRAPHY);  Surgeon: Zafar Velazquez MD;  Location: [facility] ENDO (2ND FLR);  Service: Endoscopy;  Laterality: N/A;    ESOPHAGOGASTRODUODENOSCOPY      ESOPHAGOGASTRODUODENOSCOPY N/A 1/31/2020    Procedure: EGD (ESOPHAGOGASTRODUODENOSCOPY);  Surgeon: Zafar Velazquez MD;  Location: [facility] ENDO;  Service: Endoscopy;  Laterality: N/A;       Review of patient's allergies indicates:  No Known Allergies    No current facility-administered medications on file prior to encounter.      Current Outpatient Medications on File Prior to Encounter   Medication Sig    allopurinol (ZYLOPRIM) 300 MG tablet TAKE 1 TABLET BY MOUTH ONCE DAILY    aspirin (ECOTRIN) 81 MG EC tablet Take 81 mg by mouth once daily.    atorvastatin (LIPITOR) 40 MG tablet Take 1 tablet (40 mg total) by mouth once daily.    diclofenac sodium (VOLTAREN) 1 % Gel Apply 2 g topically 4 (four) times daily. Apply to chest in the location of pain up to 4 times a day as needed for pain.    empagliflozin (JARDIANCE) 10 mg Tab Take 1 tablet (10 mg) by mouth once daily.    fenofibrate 160 MG Tab Take 1 tablet (160 mg total) by mouth once daily.    flash glucose scanning reader (FREESTYLE DEBBIE 14 DAY READER) Misc 1 kit by Misc.(Non-Drug; Combo Route) route 3 (three) times daily as needed.    flash glucose sensor (FREESTYLE DEBBIE 14 DAY SENSOR) Kit 1 each by Misc.(Non-Drug; Combo Route) route every 14 (fourteen) days.    icosapent ethyl (VASCEPA) 1 gram Cap Take 2 g by mouth 2 (two) times daily.    insulin (LANTUS SOLOSTAR U-100 INSULIN) glargine 100 units/mL (3mL) SubQ pen Inject 52 Units into the skin every evening.    insulin aspart  "U-100 (NOVOLOG FLEXPEN U-100 INSULIN) 100 unit/mL (3 mL) InPn pen INJECT 20 UNITS SUBCUTANEOUSLY WITH MEALS PLUS SLIDING SCALE 150-200+2, 201-250 +4, 251-300 +6, 301-350 +8,>350 +10. MAX OF 90 UNITS PER DA    insulin syringe-needle U-100 29 gauge x 1/2" Syrg Inject 1 (once) per day    losartan (COZAAR) 100 MG tablet TAKE 1/2 (ONE-HALF) TABLET BY MOUTH ONCE DAILY    metoclopramide HCl (REGLAN) 10 MG tablet Take 1 tablet (10 mg total) by mouth every 6 (six) hours as needed (nausea).    niacin 100 MG Tab Take 200 mg by mouth every evening.     ondansetron (ZOFRAN) 4 MG tablet Take 1 tablet (4 mg total) by mouth every 6 (six) hours as needed for Nausea.    pantoprazole (PROTONIX) 40 MG tablet Take 1 tablet (40 mg total) by mouth once daily.    pregabalin (LYRICA) 150 MG capsule Take 1 capsule (150 mg total) by mouth 2 (two) times daily.     Family History     Problem Relation (Age of Onset)    Aneurysm Mother, Maternal Grandmother    Coronary artery disease Father    Diabetes type II Father, Paternal Grandmother    No Known Problems Sister, Brother        Tobacco Use    Smoking status: Never Smoker    Smokeless tobacco: Never Used   Substance and Sexual Activity    Alcohol use: No    Drug use: Never    Sexual activity: Yes     Partners: Female     Review of Systems   Constitutional: Positive for appetite change, fatigue and unexpected weight change. Negative for chills and fever.   Eyes: Negative for photophobia.   Respiratory: Negative for cough, chest tightness, shortness of breath and wheezing.    Cardiovascular: Negative for chest pain, palpitations and leg swelling.   Gastrointestinal: Positive for abdominal pain, nausea and vomiting. Negative for diarrhea.   Genitourinary: Negative for dysuria, flank pain and hematuria.   Musculoskeletal: Negative for back pain, myalgias and neck pain.   Skin: Negative for rash and wound.   Neurological: Positive for weakness. Negative for dizziness, syncope and " headaches.   Psychiatric/Behavioral: Negative for agitation.     Objective:     Vital Signs (Most Recent):  Temp: 98.2 °F (36.8 °C) (02/24/20 0315)  Pulse: 64 (02/24/20 0615)  Resp: 12 (02/24/20 0615)  BP: (!) 150/70 (02/24/20 0615)  SpO2: 100 % (02/24/20 0615) Vital Signs (24h Range):  Temp:  [97.5 °F (36.4 °C)-98.2 °F (36.8 °C)] 98.2 °F (36.8 °C)  Pulse:  [] 64  Resp:  [11-42] 12  SpO2:  [91 %-100 %] 100 %  BP: (117-166)/(55-82) 150/70     Weight: 78.7 kg (173 lb 8 oz)  Body mass index is 27.17 kg/m².    Physical Exam   Constitutional: He is oriented to person, place, and time. He appears well-developed. No distress.   Appears ill    HENT:   Head: Normocephalic and atraumatic.   Eyes: Pupils are equal, round, and reactive to light. Conjunctivae are normal.   Neck: Normal range of motion. Neck supple. No JVD present.   Cardiovascular: Normal rate, regular rhythm, normal heart sounds and intact distal pulses.   Pulmonary/Chest: Effort normal and breath sounds normal. No respiratory distress. He has no wheezes.   Abdominal: Soft. Bowel sounds are normal. He exhibits no distension. There is no tenderness. There is no guarding.   Musculoskeletal: Normal range of motion. He exhibits no edema or tenderness.   Neurological: He is alert and oriented to person, place, and time.   Skin: Skin is warm and dry. Capillary refill takes less than 2 seconds. No erythema.   Psychiatric: He has a normal mood and affect. His behavior is normal.         CRANIAL NERVES     CN III, IV, VI   Pupils are equal, round, and reactive to light.       Significant Labs:   BMP:   Recent Labs   Lab 02/23/20  1724  02/24/20  0404   *   < > 190*   *   < > 138   K 2.2*   < > 2.6*   CL <70*   < > 76*   CO2 45*   < > 42*   BUN 33*   < > 29*   CREATININE 1.8*   < > 1.3   CALCIUM 11.7*   < > 8.7   MG 1.9  --   --     < > = values in this interval not displayed.     CBC:   Recent Labs   Lab 02/23/20  1724 02/24/20  0404   WBC 15.44* 9.44    HGB 16.8 13.5*   HCT 52.1 44.0   * 210     Urine Studies:   Recent Labs   Lab 02/23/20 2028   COLORU Yellow   APPEARANCEUA Clear   PHUR 8.0   SPECGRAV 1.015   PROTEINUA 1+*   GLUCUA 3+*   KETONESU 1+*   BILIRUBINUA 1+*   OCCULTUA Negative   NITRITE Negative   UROBILINOGEN 2.0-3.0*   LEUKOCYTESUR Negative   RBCUA 2   WBCUA 4   BACTERIA None   HYALINECASTS 6*       Significant Imaging: I have reviewed all pertinent imaging results/findings within the past 24 hours.    Assessment/Plan:     * DKA (diabetic ketoacidoses)  Type 2 diabetes mellitus with diabetic polyneuropathy, with long-term current use of insulin    A1C 10.0 1/7/20     Patient reports compliance with home regimen- lantus 52 units every evening and insulin aspart 20 units with meals + SSI and Jardiance 10 mg daily   Hold home insulin, discontinue Jardiance   Continue aggressive fluid resuscitation. Already received 3 L in the ED. Will start on NS + 20 KCL  Once potassium becomes >3.5, needs to be started on insulin gtt. At that point once insulin gtt is started, will change his IVF to D5 + NS + 20KCl  Will continue insulin gtt until anion gap closed  BMP q 4h, poct glucose q4h for now, change to q1h when insulin gtt started   Avoid  reglan 2/2 prolonged QT interval       History of gout  Continue allopurinol       Essential hypertension  Hyperlipidemia     -166   Continue aspirin, statin, vascepa and losartan       VTE Risk Mitigation (From admission, onward)         Ordered     enoxaparin injection 30 mg  Daily      02/23/20 2148     IP VTE HIGH RISK PATIENT  Once      02/23/20 2148     Reason for no Mechanical VTE Prophylaxis  Once     Question:  Reasons:  Answer:  Physician Provided (leave comment)    02/23/20 2028              Critical care time spent on the evaluation and treatment of severe organ dysfunction, review of pertinent labs and imaging studies, discussions with consulting providers and discussions with patient/family: 60  minutes.     Radha Fernandez NP  Department of Hospital Medicine   [facility]

## 2020-02-24 NOTE — CONSULTS
Consult received. Full note to follow. Briefly, this is a 40 y/o M with PMH of DM2, HTN, HLD, JAZMYN, h/o pancreatitis who presented to the ED c/o nausea/vomiting for 2 weeks. He went to [facility] and was told his GI problems were due to his DM2. He states that he has been having weakness, loss 20 lbs in 2 weeks due to inability to eat and persistent vomiting, as well as abdominal pain. He saw his PCP on 2/11 for similar complaints. In the ED, pt has significant electrolyte abnormalities including hypokalemia (K 2.2), elevated bicarb 45, Anion gap >20, TROY, and elevated betahydroxybutyrate. While looking at his medications, he is on empagliflozin (SGLT2-I) which can actually cause euglycemic DKA. I suspect that he developed euglycemic DKA that was mis-diagnosed for 2 weeks (he had an Anion gap on labs from 2/18), which lead to respiratory alkalosis as compensation. Additionally, persistent vomiting for 2 weeks (from his DKA) led to metabolic alkalosis, hence his elevated pH.    Assessment/Plan:  Pt has mixed acid base pattern of AGMA (from DKA), respiratory alkalosis as compensation, and metabolic alkalosis from vomiting for 2 weeks.    # Euglycemic DKA: Is a common complication of SGLT2-I. Pt's elevated beta-hydroxybutyrate, elevated anion gap are consistent with this.  - pt needs aggressive fluid resuscitation. Already received 3 L in the ED. Will start on NS + 20 KCL @250 cc/hr.  - once potassium becomes >3.5, needs to be started on insulin gtt. At that point once insulin gtt is started, should change his IVF to D5 + NS + 20KCl  - insulin gtt and fluids need to stay on until anion gap <12  - need to monitor labs q4h  - holding off on anti-emetics as his QTc was prolonged, however would be worth rechecking an EKG later tonight    Agree with pt coming to the ICU for closer monitoring and ongoing treatment.    Luís Johnson MD  [employer] Pulmonary/critical care Fellow PGY-5    Pt seen and examined with Pulmonary/Critical  Care team and this note reviewed and validated with the following additional comments:    Improving with IV NaCl and K+. Holding off on insulin until we can get his K+ up a little. Nausea better. No anti-emetics now.    Martinez Stoll MD  Phone [phone]

## 2020-02-24 NOTE — HPI
Bay Ibarra is a 39 year old white man with diabetes mellitus type 2 with polyneuropathy (treated with insulin), hypertension, hyperlipidemia, history of gout, obstructive sleep apnea, chronic pancreatitis, colon diverticulosis, fatty liver, gastric varices, history of choledocholithiasis on 5/26/19, chronic portal vein thrombosis since 5/27/19, history of pancreatic pseudocyst. He lives in [place], [place]. His primary care physician is Dr. Komal Pierce.    He was seen at [facility] clinic on 2/11/20 for abdominal pain, nausea, and vomiting. He was advised to stop liraglutide due to risk of pancreatitis.   He was seen at [facility] Emergency Department on 2/18/20 for persistent symptoms. Labs showed hypernatremia (sodium 147 mmol/L), hyponatremia (potassium 3.3 mmol/L), metabolic alkalosis (bicarbonate 35 mmol/L), elevated lipase (551 U/L). He was told that his symptoms were due to his diabetes.   He presented to [facility] on 2/23/20 with persistent symptoms along with 20 pound weight loss in 2 weeks. Abdominal ultrasound showed nothing new. Labs showed worsened hypokalemia (potassium 2.2), hypochloremic metabolic acidosis (chloride <70 mmol/L, bicarbonate 45), elevated anion gap, elevated BUN and creatinine, elevated calcium, elevated beta-hydroxybutyrate (2.9 mmol/L). He was given 3 liters of normal saline, potassium chloride, dicyclomine, and ondansetron. Pulmonology-Critical Care was consulted and felt that his empagliflozin, a SGLT2-inhibitor, could be causing euglycemic diabetic ketoacidosis. He was prescribed empaglozin on 9/15/19 and had elevated anion gap on 11/14/19, 12/26/19, 1/7/20, and 2/18/20. Vomiting explained his hypochloremic metabolic alkalosis. EKG showed prolonged QTc of 560 ms. He was admitted to [facility].

## 2020-02-24 NOTE — CARE UPDATE
ABG RESULTS ON NC 2LPM, 2258 2/23/2020    PH         7.69  CO2      52.7  PO2      160  BE         >30  HCO3     64  TCO2    >50  SAO2    100%    Results reported to Jim. NP [id number]    Waiting for results to result into EPIC

## 2020-02-24 NOTE — CONSULTS
Pulmonary & Critical Care Medicine   Consultation Note    Reason for Consultation: ICU admission for severe electrolyte abnormalities    HPI: Bay Ibarra is a 38 y/o male w/ PMH of DM2, HTN, HLD, h/o gout, obstructive sleep apnea, chronic pancreatitis, colon diverticulosis, fatty liver, gastric varices, history of choledocholithiasis on 5/26/19, chronic portal vein thrombosis since 5/27/19 and history of pancreatic pseudocyst who presents for persistent nausea/vomiting for 2 weeks with significant weight loss. He was seen at [facility] clinic on 2/11/20 for abdominal pain, nausea, and vomiting. He was advised to stop liraglutide due to risk of pancreatitis. He then went to [facility] Emergency Department on 2/18/20 for persistent symptoms. Labs showed hypernatremia (sodium 147 mmol/L), hyponatremia (potassium 3.3 mmol/L), metabolic alkalosis (bicarbonate 35 mmol/L), elevated lipase (551 U/L) and an anion gap (AG 15). He was told that his symptoms were due to his diabetes. He presented to [facility] on 2/23/20 with persistent symptoms along with 25 pound weight loss in 2 weeks. Abdominal ultrasound showed nothing new. Labs showed worsened hypokalemia (potassium 2.2), hypochloremic metabolic acidosis (chloride <70 mmol/L, bicarbonate 45), elevated anion gap metabolic acidosis, TROY, elevated beta-hydroxybutyrate (2.9 mmol/L). He was given 3 liters of normal saline, potassium chloride, dicyclomine, and ondansetron and admitted to the ICU.      Past Medical History:   Diagnosis Date    Acute pancreatitis     Diverticulosis of colon     Essential hypertension     Fatty liver     Gastric varices without bleeding 1/16/2020    Gout     Hx of acute pancreatitis 3/3/2017    Hypertriglyceridemia 8/2/2017    Obesity (BMI 30-39.9) 6/5/2015    Obesity (BMI 35.0-39.9 without comorbidity) 6/5/2015    Obstructive sleep apnea syndrome  12/18/2015    Type 2 diabetes mellitus with diabetic polyneuropathy, with long-term current use of insulin 10/16/2017    Type 2 diabetes mellitus with hyperglycemia, with long-term current use of insulin 8/3/2017     Past Surgical History:   Procedure Laterality Date    ENDOSCOPIC ULTRASOUND OF UPPER GASTROINTESTINAL TRACT N/A 5/27/2019    Procedure: ULTRASOUND, UPPER GI TRACT, ENDOSCOPIC;  Surgeon: Zafar Velazquez MD;  Location: [address]);  Service: Endoscopy;  Laterality: N/A;    ERCP N/A 5/27/2019    Procedure: ERCP (ENDOSCOPIC RETROGRADE CHOLANGIOPANCREATOGRAPHY);  Surgeon: Zafar Velazquez MD;  Location: [address]);  Service: Endoscopy;  Laterality: N/A;    ESOPHAGOGASTRODUODENOSCOPY      ESOPHAGOGASTRODUODENOSCOPY N/A 1/31/2020    Procedure: EGD (ESOPHAGOGASTRODUODENOSCOPY);  Surgeon: Zafar Velazquez MD;  Location: [facility];  Service: Endoscopy;  Laterality: N/A;     Social History:   Social History     Socioeconomic History    Marital status:      Spouse name: Not on file    Number of children: Not on file    Years of education: Not on file    Highest education level: Not on file   Occupational History    Not on file   Social Needs    Financial resource strain: Not on file    Food insecurity:     Worry: Not on file     Inability: Not on file    Transportation needs:     Medical: Not on file     Non-medical: Not on file   Tobacco Use    Smoking status: Never Smoker    Smokeless tobacco: Never Used   Substance and Sexual Activity    Alcohol use: No    Drug use: Never    Sexual activity: Yes     Partners: Female   Lifestyle    Physical activity:     Days per week: Not on file     Minutes per session: Not on file    Stress: Not on file   Relationships    Social connections:     Talks on phone: Not on file     Gets together: Not on file     Attends [employer] service: Not on file     Active member of club or organization: Not on file     Attends meetings of clubs or  organizations: Not on file     Relationship status: Not on file   Other Topics Concern    Not on file   Social History Narrative    Not on file     Family History   Problem Relation Age of Onset    Aneurysm Mother         AAA    Coronary artery disease Father         4 vessel bypass at 40, possible stent at 36    Diabetes type II Father     No Known Problems Sister     No Known Problems Brother     Aneurysm Maternal Grandmother         Possible AAA    Diabetes type II Paternal Grandmother      Drug Allergies:   Review of patient's allergies indicates:  No Known Allergies  Current Infusions:   0.9% NACL & POTASSIUM CHLORIDE 40 MEQ/L 250 mL/hr (02/24/20 1332)     Scheduled Medications:     enoxaparin  40 mg Subcutaneous Daily    magnesium sulfate IVPB  1 g Intravenous Once    potassium chloride in water  10 mEq Intravenous Q1H     PRN Medications:   acetaminophen, dextrose 10 % in water (D10W), dextrose 10 % in water (D10W), ondansetron, pneumoc 13-luanne conj-dip cr(PF), promethazine (PHENERGAN) IVPB, sodium chloride 0.9%, sodium chloride 0.9%    Review of Systems:   A comprehensive 12-point review of systems was performed, and is negative except for those items mentioned above in the HPI section of this note.     Vital Signs:    Vitals:    02/24/20 1505   BP: (!) 146/67   Pulse: 85   Resp: 17   Temp: 98.1 °F (36.7 °C)       Fluid Balance:     Intake/Output Summary (Last 24 hours) at 2/24/2020 1701  Last data filed at 2/24/2020 1600  Gross per 24 hour   Intake 8758.33 ml   Output 4600 ml   Net 4158.33 ml       Physical Exam:   General: NAD, cooperative & interactive.  HEENT: AT/NC, PERRL, EOMI, nasal and oral mucosa moist. Normal dentition. Oropharynx without exudate.   Neck: Supple without JVD. Trachea midline. Thyroid feels normal.   Cardiac: RRR with no MRG with brisk cap refill and symmetric pulses in distal extremities.  Respiratory: Normal inspection. Symmetric chest rise. Normal palpation and  percussion. Auscultation clear bilaterally. No increased work of breathing noted.   Abdomen: Soft, NT/ND. +BS. No palpable masses. No hepatosplenomegaly.   Lymphatic: No palpable supraclavicular or cervical LAD.   Extremities: Normal strength and tone (grossly). No visible atrophy. No clubbing or cyanosis on nail exam.   Skin: Warm and dry without visible rash.   Neuro: Grossly intact to brief exam. Oriented with appropriate mood & affect.     Personal Review and Summary of Prior Diagnostics    Laboratory Studies:   No results for input(s): PH, PCO2, PO2, HCO3, POCSATURATED, BE in the last 24 hours.  Recent Labs   Lab 02/24/20  0404   WBC 9.44   RBC 5.53   HGB 13.5*   HCT 44.0      MCV 80*   MCH 24.4*   MCHC 30.7*     Recent Labs   Lab 02/24/20  0751  02/24/20  1551      < > 142   K 2.5*   < > 3.2*   CL 80*   < > 87*   CO2 47*   < > 46*   BUN 27*   < > 25*   CREATININE 1.1   < > 1.0   MG 2.0  --   --     < > = values in this interval not displayed.       Microbiology Data:   Microbiology Results (last 7 days)     Procedure Component Value Units Date/Time    Influenza A & B by Molecular [[id number]] Collected:  02/23/20 1839    Order Status:  Sent Specimen:  Nasopharyngeal Swab Updated:  02/23/20 1840    Influenza A & B by Molecular [[id number]] Collected:  02/23/20 1725    Order Status:  Completed Specimen:  Nasopharyngeal Swab Updated:  02/23/20 1757     Influenza A, Molecular Negative     Influenza B, Molecular Negative     Flu A & B Source Nasal swab        Summary of Chest Imaging Personally Reviewed: CXR unremarkable    Impression & Recommendations    # Euglycemic DKA: Is a common complication of SGLT2-I. Pt's elevated beta-hydroxybutyrate, elevated anion gap are consistent with this. Suspect the reason his pH didn't show acidosis is because he has respiratory compensation AND a metabolic alkalosis from vomiting for 2 weeks.  - continue with NS + 40 KCl @ 250 cc/hr  - pt's electrolytes improved  drastically after agreesive IVF  - His anion gap actually closed, so no need for insulin gtt  - would stop his SGLT2-I upon discharge    # metabolic alkalosis: suspect his DKA triggered his vomiting and now his primary metabolic derangement is a metabolic alkalosis from severe volume depletion. His Chloride was <70 (undetectable). Additionally, the amount of potassium he's requiring indicated that his whole body potassium storage is severely depleted  - pt's elevated bicarb should likely improve as his chloride and potassium start normalizing    # prolonged QTc: Pt's QTc this morning was 600.   - unfortunately need to hold off on any anti-emetic drugs  - keep treating his hypokalemia with goal of K>4.0 and Mg >2.0  - would be worth rechecking an EKG later tonight and if QTc normalizes, then can consider giving anti-emetics    # TROY: Pt has normal kidneys at baseline. Presented with TROY likely pre-renal due to volume depletion. Cr has improved with IVF.  - will continue to monitor renal function daily    Luís Johnson MD PGY-V  [facility]/Critical [facility] Fellow  Pager [phone]    Pt seen and examined with Pulmonary/Critical Care team and this note reviewed and validated with the following additional comments:    Much improved.  Hopefully step down in AM.    Martinez Stoll MD  Phone [phone]

## 2020-02-24 NOTE — PLAN OF CARE
Plan of care addressed with patient and spouse. All questions answered. VSS throughout shift. Vomitted 1,000 cc x1. Replaces electrolytes throughout shift. Unable to tolerate PO as of now. MDs aware. Will continue to monitor.

## 2020-02-24 NOTE — ASSESSMENT & PLAN NOTE
Chloride-responsive metabolic alkalosis  Hypokalemia due to excessive gastrointestinal loss of potassium  Type 2 diabetes mellitus with diabetic polyneuropathy, with long-term current use of insulin  Hemoglobin A1C 10.0% on 1/7/20. Takes insulin glargine 52 units every evening, insulin aspart 20 units with meals with sliding scale, empagliflozin 10 mg daily. Holding home insulin. Discontinue empagliflozin. Giving fluids. No insulin until potassium level higher. Giving potassium chloride.

## 2020-02-24 NOTE — SUBJECTIVE & OBJECTIVE
Interval History: No nausea at this time.    Review of Systems   Constitutional: Negative for chills and fever.   Gastrointestinal: Negative for abdominal pain, nausea and vomiting.     Objective:     Vital Signs (Most Recent):  Temp: 98.6 °F (37 °C) (02/24/20 1105)  Pulse: 78 (02/24/20 1105)  Resp: 15 (02/24/20 1105)  BP: 129/62 (02/24/20 1105)  SpO2: (!) 91 % (02/24/20 1105) Vital Signs (24h Range):  Temp:  [97.5 °F (36.4 °C)-98.7 °F (37.1 °C)] 98.6 °F (37 °C)  Pulse:  [] 78  Resp:  [11-42] 15  SpO2:  [91 %-100 %] 91 %  BP: (117-166)/(55-82) 129/62     Weight: 78.7 kg (173 lb 8 oz)  Body mass index is 27.17 kg/m².    Intake/Output Summary (Last 24 hours) at 2/24/2020 1201  Last data filed at 2/24/2020 1000  Gross per 24 hour   Intake 7058.33 ml   Output 3075 ml   Net 3983.33 ml      Physical Exam   Constitutional: He is oriented to person, place, and time. He appears well-developed. No distress.   Pulmonary/Chest: Effort normal. No respiratory distress.   Neurological: He is alert and oriented to person, place, and time.   Psychiatric: He has a normal mood and affect.   Nursing note and vitals reviewed.      Significant Labs: All pertinent labs within the past 24 hours have been reviewed.    Significant Imaging: I have reviewed all pertinent imaging results/findings within the past 24 hours.   CT Head Without Contrast 2/23/20: FINDINGS:  The ventricles, basal cisterns, and cortical sulci are within normal limits for patient's stated age. There is no acute intracranial hemorrhage, territorial infarct or mass effect, or midline shift. In the visualized paranasal sinuses or mastoid air cells, there is a left maxillary sinus mucous retention cyst or polyp.  Impression: No acute intracranial abnormality detected.  Left maxillary sinus mucous retention cyst or polyp.  US Abdomen Limited (Gallbladder) 2/23/20:   FINDINGS: The pancreas is obscured by overlying soft tissue structures.  There is a cystic structure at  the pancreatic head measuring approximately 4.9 x 4.3 x 4.8 cm correlating with cystic structure on CT 12/27/2019.  The pancreatic duct is not appear dilated.  The gallbladder is nondistended.  No gallbladder wall thickening.  No pericholecystic fluid.  Sonographic Lai sign is negative.  The common duct is not dilated measuring 0.3 cm.  The liver is echogenic without enlargement.  The visualized aorta and IVC are grossly unremarkable.  There is cavernous transformation of the portal vein, better evaluated on prior CT.  The spleen is enlarged, similar to the previous exam.  No ascites.  Impression: Stable cystic structure at the pancreatic head correlating with collection on CT 12/27/2019 suggesting pseudo cyst.  The pancreatic duct is not appear dilated however the pancreas is for the most part obscured by overlying soft tissue structures.  No findings to suggest acute cholecystitis.  Cavernous transformation of the portal vein.  Splenomegaly.  X-Ray Chest AP Portable 2/23/20: FINDINGS:  The cardiomediastinal silhouette is not enlarged.  There is no pleural effusion.  The trachea is midline.  The lungs are symmetrically expanded bilaterally without evidence of acute parenchymal process. No large focal consolidation seen.  There is no pneumothorax.  The osseous structures are unremarkable.

## 2020-02-25 PROBLEM — E83.39 HYPOPHOSPHATEMIA: Status: ACTIVE | Noted: 2020-02-25

## 2020-02-25 LAB
ANION GAP SERPL CALC-SCNC: 12 MMOL/L (ref 8–16)
ANION GAP SERPL CALC-SCNC: 13 MMOL/L (ref 8–16)
ANION GAP SERPL CALC-SCNC: 14 MMOL/L (ref 8–16)
ANION GAP SERPL CALC-SCNC: 15 MMOL/L (ref 8–16)
ANION GAP SERPL CALC-SCNC: 18 MMOL/L (ref 8–16)
BASOPHILS # BLD AUTO: 0.04 K/UL (ref 0–0.2)
BASOPHILS NFR BLD: 0.4 % (ref 0–1.9)
BUN SERPL-MCNC: 16 MG/DL (ref 6–20)
BUN SERPL-MCNC: 18 MG/DL (ref 6–20)
BUN SERPL-MCNC: 19 MG/DL (ref 6–20)
BUN SERPL-MCNC: 19 MG/DL (ref 6–20)
BUN SERPL-MCNC: 22 MG/DL (ref 6–20)
CALCIUM SERPL-MCNC: 7.5 MG/DL (ref 8.7–10.5)
CALCIUM SERPL-MCNC: 8 MG/DL (ref 8.7–10.5)
CALCIUM SERPL-MCNC: 8.1 MG/DL (ref 8.7–10.5)
CALCIUM SERPL-MCNC: 8.5 MG/DL (ref 8.7–10.5)
CALCIUM SERPL-MCNC: 8.8 MG/DL (ref 8.7–10.5)
CHLORIDE SERPL-SCNC: 100 MMOL/L (ref 95–110)
CHLORIDE SERPL-SCNC: 101 MMOL/L (ref 95–110)
CHLORIDE SERPL-SCNC: 105 MMOL/L (ref 95–110)
CHLORIDE SERPL-SCNC: 93 MMOL/L (ref 95–110)
CHLORIDE SERPL-SCNC: 98 MMOL/L (ref 95–110)
CO2 SERPL-SCNC: 23 MMOL/L (ref 23–29)
CO2 SERPL-SCNC: 27 MMOL/L (ref 23–29)
CO2 SERPL-SCNC: 31 MMOL/L (ref 23–29)
CO2 SERPL-SCNC: 31 MMOL/L (ref 23–29)
CO2 SERPL-SCNC: 34 MMOL/L (ref 23–29)
CREAT SERPL-MCNC: 0.8 MG/DL (ref 0.5–1.4)
CREAT SERPL-MCNC: 1 MG/DL (ref 0.5–1.4)
DELSYS: ABNORMAL
DIFFERENTIAL METHOD: ABNORMAL
EOSINOPHIL # BLD AUTO: 0.1 K/UL (ref 0–0.5)
EOSINOPHIL NFR BLD: 0.8 % (ref 0–8)
ERYTHROCYTE [DISTWIDTH] IN BLOOD BY AUTOMATED COUNT: 14.3 % (ref 11.5–14.5)
EST. GFR  (AFRICAN AMERICAN): >60 ML/MIN/1.73 M^2
EST. GFR  (NON AFRICAN AMERICAN): >60 ML/MIN/1.73 M^2
GLUCOSE SERPL-MCNC: 184 MG/DL (ref 70–110)
GLUCOSE SERPL-MCNC: 187 MG/DL (ref 70–110)
GLUCOSE SERPL-MCNC: 197 MG/DL (ref 70–110)
GLUCOSE SERPL-MCNC: 216 MG/DL (ref 70–110)
GLUCOSE SERPL-MCNC: 357 MG/DL (ref 70–110)
HCO3 UR-SCNC: 30.2 MMOL/L (ref 24–28)
HCT VFR BLD AUTO: 41.4 % (ref 40–54)
HGB BLD-MCNC: 12.7 G/DL (ref 14–18)
IMM GRANULOCYTES # BLD AUTO: 0.02 K/UL (ref 0–0.04)
IMM GRANULOCYTES NFR BLD AUTO: 0.2 % (ref 0–0.5)
LYMPHOCYTES # BLD AUTO: 0.5 K/UL (ref 1–4.8)
LYMPHOCYTES NFR BLD: 4.9 % (ref 18–48)
MAGNESIUM SERPL-MCNC: 2.1 MG/DL (ref 1.6–2.6)
MCH RBC QN AUTO: 25.1 PG (ref 27–31)
MCHC RBC AUTO-ENTMCNC: 30.7 G/DL (ref 32–36)
MCV RBC AUTO: 82 FL (ref 82–98)
MONOCYTES # BLD AUTO: 1.1 K/UL (ref 0.3–1)
MONOCYTES NFR BLD: 11.7 % (ref 4–15)
NEUTROPHILS # BLD AUTO: 7.9 K/UL (ref 1.8–7.7)
NEUTROPHILS NFR BLD: 82 % (ref 38–73)
NRBC BLD-RTO: 0 /100 WBC
PCO2 BLDA: 46.4 MMHG (ref 35–45)
PH SMN: 7.42 [PH] (ref 7.35–7.45)
PHOSPHATE SERPL-MCNC: 1 MG/DL (ref 2.7–4.5)
PHOSPHATE SERPL-MCNC: 1.2 MG/DL (ref 2.7–4.5)
PLATELET # BLD AUTO: 163 K/UL (ref 150–350)
PMV BLD AUTO: 10.9 FL (ref 9.2–12.9)
PO2 BLDA: 37 MMHG (ref 40–60)
POC BE: 6 MMOL/L
POC SATURATED O2: 72 % (ref 95–100)
POC TCO2: 32 MMOL/L (ref 24–29)
POCT GLUCOSE: 175 MG/DL (ref 70–110)
POCT GLUCOSE: 185 MG/DL (ref 70–110)
POCT GLUCOSE: 207 MG/DL (ref 70–110)
POCT GLUCOSE: 208 MG/DL (ref 70–110)
POCT GLUCOSE: 210 MG/DL (ref 70–110)
POCT GLUCOSE: 210 MG/DL (ref 70–110)
POCT GLUCOSE: 218 MG/DL (ref 70–110)
POCT GLUCOSE: 223 MG/DL (ref 70–110)
POCT GLUCOSE: 226 MG/DL (ref 70–110)
POCT GLUCOSE: 232 MG/DL (ref 70–110)
POCT GLUCOSE: 237 MG/DL (ref 70–110)
POTASSIUM SERPL-SCNC: 3.3 MMOL/L (ref 3.5–5.1)
POTASSIUM SERPL-SCNC: 3.8 MMOL/L (ref 3.5–5.1)
POTASSIUM SERPL-SCNC: 3.9 MMOL/L (ref 3.5–5.1)
POTASSIUM SERPL-SCNC: 4 MMOL/L (ref 3.5–5.1)
POTASSIUM SERPL-SCNC: 4.8 MMOL/L (ref 3.5–5.1)
RBC # BLD AUTO: 5.05 M/UL (ref 4.6–6.2)
SAMPLE: ABNORMAL
SODIUM SERPL-SCNC: 137 MMOL/L (ref 136–145)
SODIUM SERPL-SCNC: 141 MMOL/L (ref 136–145)
SODIUM SERPL-SCNC: 145 MMOL/L (ref 136–145)
SODIUM SERPL-SCNC: 146 MMOL/L (ref 136–145)
SODIUM SERPL-SCNC: 146 MMOL/L (ref 136–145)
WBC # BLD AUTO: 9.63 K/UL (ref 3.9–12.7)

## 2020-02-25 PROCEDURE — 85025 COMPLETE CBC W/AUTO DIFF WBC: CPT

## 2020-02-25 PROCEDURE — 36415 COLL VENOUS BLD VENIPUNCTURE: CPT

## 2020-02-25 PROCEDURE — 63600175 PHARM REV CODE 636 W HCPCS: Performed by: HOSPITALIST

## 2020-02-25 PROCEDURE — 80048 BASIC METABOLIC PNL TOTAL CA: CPT | Mod: 91

## 2020-02-25 PROCEDURE — 80048 BASIC METABOLIC PNL TOTAL CA: CPT

## 2020-02-25 PROCEDURE — 93010 ELECTROCARDIOGRAM REPORT: CPT | Mod: ,,, | Performed by: STUDENT IN AN ORGANIZED HEALTH CARE EDUCATION/TRAINING PROGRAM

## 2020-02-25 PROCEDURE — 25000003 PHARM REV CODE 250: Performed by: INTERNAL MEDICINE

## 2020-02-25 PROCEDURE — 63600175 PHARM REV CODE 636 W HCPCS: Performed by: FAMILY MEDICINE

## 2020-02-25 PROCEDURE — 63600175 PHARM REV CODE 636 W HCPCS: Performed by: INTERNAL MEDICINE

## 2020-02-25 PROCEDURE — 25000003 PHARM REV CODE 250: Performed by: FAMILY MEDICINE

## 2020-02-25 PROCEDURE — 94761 N-INVAS EAR/PLS OXIMETRY MLT: CPT

## 2020-02-25 PROCEDURE — 84100 ASSAY OF PHOSPHORUS: CPT | Mod: 91

## 2020-02-25 PROCEDURE — 25000003 PHARM REV CODE 250: Performed by: CLINIC/CENTER

## 2020-02-25 PROCEDURE — 93010 EKG 12-LEAD: ICD-10-PCS | Mod: ,,, | Performed by: STUDENT IN AN ORGANIZED HEALTH CARE EDUCATION/TRAINING PROGRAM

## 2020-02-25 PROCEDURE — 82803 BLOOD GASES ANY COMBINATION: CPT

## 2020-02-25 PROCEDURE — 93005 ELECTROCARDIOGRAM TRACING: CPT

## 2020-02-25 PROCEDURE — 99900035 HC TECH TIME PER 15 MIN (STAT)

## 2020-02-25 PROCEDURE — 63600175 PHARM REV CODE 636 W HCPCS: Performed by: CLINIC/CENTER

## 2020-02-25 PROCEDURE — 83735 ASSAY OF MAGNESIUM: CPT

## 2020-02-25 PROCEDURE — C9113 INJ PANTOPRAZOLE SODIUM, VIA: HCPCS | Performed by: FAMILY MEDICINE

## 2020-02-25 PROCEDURE — S5012 5% DEXTROSE WITH POTASSIUM: HCPCS | Performed by: INTERNAL MEDICINE

## 2020-02-25 PROCEDURE — 20000000 HC ICU ROOM

## 2020-02-25 RX ORDER — PANTOPRAZOLE SODIUM 40 MG/10ML
40 INJECTION, POWDER, LYOPHILIZED, FOR SOLUTION INTRAVENOUS 2 TIMES DAILY
Status: DISCONTINUED | OUTPATIENT
Start: 2020-02-25 | End: 2020-02-27 | Stop reason: HOSPADM

## 2020-02-25 RX ORDER — MAG HYDROX/ALUMINUM HYD/SIMETH 200-200-20
30 SUSPENSION, ORAL (FINAL DOSE FORM) ORAL EVERY 6 HOURS PRN
Status: DISCONTINUED | OUTPATIENT
Start: 2020-02-25 | End: 2020-02-27 | Stop reason: HOSPADM

## 2020-02-25 RX ORDER — DEXTROSE 50 % IN WATER (D50W) INTRAVENOUS SYRINGE
12.5
Status: DISCONTINUED | OUTPATIENT
Start: 2020-02-25 | End: 2020-02-27 | Stop reason: HOSPADM

## 2020-02-25 RX ORDER — DEXTROSE MONOHYDRATE 100 MG/ML
1000 INJECTION, SOLUTION INTRAVENOUS
Status: DISCONTINUED | OUTPATIENT
Start: 2020-02-25 | End: 2020-02-27 | Stop reason: HOSPADM

## 2020-02-25 RX ORDER — NYSTATIN 100000 [USP'U]/ML
500000 SUSPENSION ORAL 4 TIMES DAILY
Status: DISCONTINUED | OUTPATIENT
Start: 2020-02-25 | End: 2020-02-27 | Stop reason: HOSPADM

## 2020-02-25 RX ORDER — DEXTROSE 50 % IN WATER (D50W) INTRAVENOUS SYRINGE
25
Status: DISCONTINUED | OUTPATIENT
Start: 2020-02-25 | End: 2020-02-27 | Stop reason: HOSPADM

## 2020-02-25 RX ORDER — POTASSIUM CHLORIDE, DEXTROSE MONOHYDRATE 150; 5 MG/100ML; G/100ML
INJECTION, SOLUTION INTRAVENOUS CONTINUOUS
Status: DISCONTINUED | OUTPATIENT
Start: 2020-02-25 | End: 2020-02-26

## 2020-02-25 RX ORDER — SODIUM CHLORIDE 0.9 % (FLUSH) 0.9 %
10 SYRINGE (ML) INJECTION
Status: DISCONTINUED | OUTPATIENT
Start: 2020-02-25 | End: 2020-02-27 | Stop reason: HOSPADM

## 2020-02-25 RX ADMIN — PROMETHAZINE HYDROCHLORIDE 25 MG: 25 INJECTION INTRAMUSCULAR; INTRAVENOUS at 03:02

## 2020-02-25 RX ADMIN — POTASSIUM CHLORIDE AND DEXTROSE MONOHYDRATE: 150; 5 INJECTION, SOLUTION INTRAVENOUS at 11:02

## 2020-02-25 RX ADMIN — SODIUM PHOSPHATE, MONOBASIC, MONOHYDRATE 20.01 MMOL: 276; 142 INJECTION, SOLUTION INTRAVENOUS at 06:02

## 2020-02-25 RX ADMIN — SODIUM PHOSPHATE, DIBASIC AND SODIUM PHOSPHATE, MONOBASIC 1 ENEMA: 7; 19 ENEMA RECTAL at 01:02

## 2020-02-25 RX ADMIN — PANTOPRAZOLE SODIUM 40 MG: 40 INJECTION, POWDER, LYOPHILIZED, FOR SOLUTION INTRAVENOUS at 09:02

## 2020-02-25 RX ADMIN — ENOXAPARIN SODIUM 40 MG: 100 INJECTION SUBCUTANEOUS at 05:02

## 2020-02-25 RX ADMIN — ALUMINUM HYDROXIDE, MAGNESIUM HYDROXIDE, AND SIMETHICONE 30 ML: 200; 200; 20 SUSPENSION ORAL at 08:02

## 2020-02-25 RX ADMIN — PANTOPRAZOLE SODIUM 40 MG: 40 INJECTION, POWDER, LYOPHILIZED, FOR SOLUTION INTRAVENOUS at 12:02

## 2020-02-25 RX ADMIN — NYSTATIN 500000 UNITS: 100000 SUSPENSION ORAL at 09:02

## 2020-02-25 RX ADMIN — NYSTATIN 500000 UNITS: 100000 SUSPENSION ORAL at 05:02

## 2020-02-25 RX ADMIN — SODIUM CHLORIDE AND POTASSIUM CHLORIDE 250 ML/HR: 9; 2.98 INJECTION, SOLUTION INTRAVENOUS at 12:02

## 2020-02-25 RX ADMIN — LIDOCAINE HYDROCHLORIDE: 20 SOLUTION ORAL; TOPICAL at 12:02

## 2020-02-25 RX ADMIN — SODIUM CHLORIDE 1 UNITS/HR: 9 INJECTION, SOLUTION INTRAVENOUS at 11:02

## 2020-02-25 RX ADMIN — SODIUM CHLORIDE 1000 ML: 0.9 INJECTION, SOLUTION INTRAVENOUS at 12:02

## 2020-02-25 RX ADMIN — ALUMINUM HYDROXIDE, MAGNESIUM HYDROXIDE, AND SIMETHICONE 30 ML: 200; 200; 20 SUSPENSION ORAL at 02:02

## 2020-02-25 NOTE — PLAN OF CARE
Dr Lynch, here,spoke with Dr Negrete about why insulin needs starting,will start    1130 insulin drip started 1unit hr, iv to D5W 20meq of kcl 100c hr

## 2020-02-25 NOTE — ASSESSMENT & PLAN NOTE
Chloride-responsive metabolic alkalosis  Hypokalemia due to excessive gastrointestinal loss of potassium  Type 2 diabetes mellitus with diabetic polyneuropathy, with long-term current use of insulin  Hemoglobin A1C 10.0% on 1/7/20.   Takes insulin glargine 52 units every evening, insulin aspart 20 units with meals with sliding scale, empagliflozin 10 mg daily.   Holding home insulin. Discontinue empagliflozin.  IVF.   No insulin until potassium level higher. Giving potassium chloride.

## 2020-02-25 NOTE — PLAN OF CARE
Labs drawn, taking some juice and jello,no nausea, was given the gi cocktail and started on protonix, states the cocktail did help

## 2020-02-25 NOTE — PLAN OF CARE
Pt awake now sitting on side of bed.was asleep on first rounds,no co of nausea,at this time but co of heart burn,points to mid chest, no reflux,at this time, also co of sore throat,  Back of throat red, noted, 2 small ulcers back of throat, this from vomiting,pt denies of chest pain, mid chest, burns, lungs clear,pulse regular,skin dry, flaky, mild edema, denies problems urinating, voided dillon urine,no burning ,frequency, noted bld work from 6 am bld sugar 175, gap 16, his free style anish bld sugar device, left lat arm

## 2020-02-25 NOTE — PROGRESS NOTES
E-ICU    Pt with more vomiting    According to RN 2500ml in amount  Greenish, apparently bilious    Reviewing abd CXR pt's large bowel down to rectum full with stool    Will give 1000NS NS bolus followed by an enema    Will repeat ECG, if QT less than last ECG will give Promethazine  Addendum:    ECG at 1 am with QTc 500ms  Previous one yesterday at 8 am  was 600ms    Will try 25 mg Promethazine IV

## 2020-02-25 NOTE — SUBJECTIVE & OBJECTIVE
Interval History: awake and alert, reported reflux- GI cocktail, Tolerated clear liquid diet. Replace phos  Appreciates [employer]'s    Review of Systems   Constitutional: Negative for chills and fever.   Gastrointestinal: Positive for abdominal pain. Negative for nausea and vomiting.        Epigastric pain     Objective:     Vital Signs (Most Recent):  Temp: 98.8 °F (37.1 °C) (02/25/20 0315)  Pulse: 88 (02/25/20 0345)  Resp: (!) 21 (02/25/20 0345)  BP: (!) 143/67 (02/25/20 0345)  SpO2: 100 % (02/25/20 0752) Vital Signs (24h Range):  Temp:  [98.1 °F (36.7 °C)-98.8 °F (37.1 °C)] 98.8 °F (37.1 °C)  Pulse:  [66-90] 88  Resp:  [17-27] 21  SpO2:  [92 %-100 %] 100 %  BP: (103-163)/(54-83) 143/67     Weight: 78.7 kg (173 lb 8 oz)  Body mass index is 27.17 kg/m².    Intake/Output Summary (Last 24 hours) at 2/25/2020 1306  Last data filed at 2/25/2020 0418  Gross per 24 hour   Intake 5325 ml   Output 2175 ml   Net 3150 ml      Physical Exam   Constitutional: He is oriented to person, place, and time. He appears well-developed. No distress.   Cardiovascular: Normal rate, regular rhythm and normal heart sounds.   Pulmonary/Chest: Effort normal. No respiratory distress.   Abdominal: Soft. He exhibits no distension. There is no tenderness.   Neurological: He is alert and oriented to person, place, and time.   Skin: Skin is warm.   Psychiatric: He has a normal mood and affect.   Nursing note and vitals reviewed.      Significant Labs:   Blood Culture: No results for input(s): LABBLOO in the last 48 hours.  CBC:   Recent Labs   Lab 02/23/20  1724 02/24/20  0404 02/25/20  0636   WBC 15.44* 9.44 9.63   HGB 16.8 13.5* 12.7*   HCT 52.1 44.0 41.4   * 210 163     CMP:   Recent Labs   Lab 02/23/20  1724  02/24/20  2047 02/25/20  0021 02/25/20  0636   *   < > 143 145 146*   K 2.2*   < > 3.6 3.3* 3.8   CL <70*   < > 92* 93* 100   CO2 45*   < > 40* 34* 31*   *   < > 176* 187* 184*   BUN 33*   < > 22* 22* 19   CREATININE  1.8*   < > 1.0 1.0 0.8   CALCIUM 11.7*   < > 8.6* 8.8 8.1*   PROT 8.6*  --   --   --   --    ALBUMIN 4.9  --   --   --   --    BILITOT 1.8*  --   --   --   --    ALKPHOS 114  --   --   --   --    AST 30  --   --   --   --    ALT 22  --   --   --   --    ANIONGAP Unable to calculate   < > 11 18* 15   EGFRNONAA 46*   < > >60 >60 >60    < > = values in this interval not displayed.     Lactic Acid:   Recent Labs   Lab 02/23/20  1744   LACTATE 1.4     TSH:   Recent Labs   Lab 01/07/20  0952   TSH 2.250     Urine Culture: No results for input(s): LABURIN in the last 48 hours.  Urine Studies:   Recent Labs   Lab 02/23/20 2028   COLORU Yellow   APPEARANCEUA Clear   PHUR 8.0   SPECGRAV 1.015   PROTEINUA 1+*   GLUCUA 3+*   KETONESU 1+*   BILIRUBINUA 1+*   OCCULTUA Negative   NITRITE Negative   UROBILINOGEN 2.0-3.0*   LEUKOCYTESUR Negative   RBCUA 2   WBCUA 4   BACTERIA None   HYALINECASTS 6*       Significant Imaging: I have reviewed all pertinent imaging results/findings within the past 24 hours.

## 2020-02-25 NOTE — PLAN OF CARE
02/24/20 1704   Discharge Assessment   Assessment Type Discharge Planning Assessment   Confirmed/corrected address and phone number on facesheet? Yes   Assessment information obtained from? Patient;Caregiver   Prior to hospitilization cognitive status: Alert/Oriented   Prior to hospitalization functional status: Independent   Current cognitive status: Alert/Oriented   Current Functional Status: Independent   Lives With child(suzanne), dependent   Able to Return to Prior Arrangements yes   Is patient able to care for self after discharge? Yes   Who are your caregiver(s) and their phone number(s)? Yudelka Guerrero(s/o)[phone]   Patient's perception of discharge disposition home or selfcare   Readmission Within the Last 30 Days no previous admission in last 30 days   Patient currently being followed by outpatient case management? No   Patient currently receives any other outside agency services? No   Equipment Currently Used at Home none  (pt has a cpap at home but doesn't use it)   Do you have any problems affording any of your prescribed medications? No  (pt receives his meds affordably at [facility] in [place])   Is the patient taking medications as prescribed? yes   Does the patient have transportation home? Yes   Transportation Anticipated family or friend will provide   Does the patient receive services at the [facility]mad[facility] Clinic? No   Discharge Plan A Home with family   Discharge Plan B Home   DME Needed Upon Discharge    (TBD)   Patient/Family in Agreement with Plan yes     Vomiting        39 year old male presents to ed cc of emesis x 2 weeks. states abdominal pain.          HPI: Bay Ibarra is a 40 y/o M with PMH of DM2, HTN, HLD, JAZMYN, h/o pancreatitis who presented to the ED c/o nausea/vomiting for 2 weeks. He went to [facility] 2/18/20 and was told his GI problems were due to his DM2. He states that he has been having weakness, loss 20 lbs in 2 weeks due to inability to eat and persistent vomiting, as  well as abdominal pain. He saw his PCP on 2/11 for similar complaints. In the ED, pt has significant electrolyte abnormalities including hypokalemia (K 2.2), elevated bicarb 45, Anion gap >20, TROY, and elevated betahydroxybutyrate. He received 3 liters normal saline, 40 potassium chloride  IV and 40 potassium chloride po,  Dicyclomine and zofran. Insulin held until K corrected. Patient admitted to [facility].      The pt's currently in ICU. The Sw met with the pt and his s/o Yudelka who was at bedside during the assessment. The pt's independent with all adl's and has a cpap at home but doesn't use it. The pt's employed as a  for [employer]. The pt lives alone in [place] with his stepson. Yudelka will transport the pt home at d/c. Yudelka states the pt has a hard time managing his diabetes. The Sw spoke to her about the importance of the pt being compliant with his meds and diet. She acknowledged understanding. The Sw wrote her contact info on the white board in the pt's room and gave him a d/c brochure. The Sw encouraged them them to call should they have any further questions or concerns.

## 2020-02-25 NOTE — PLAN OF CARE
Called md, and natalia ordered, given and stated it helped greatly,feels much better, able to take some sugar free juice, no nausea ,vomiting

## 2020-02-25 NOTE — PLAN OF CARE
Started on nystatin,awaitilng na phos, fiance scaned his anish freestyle device, bld [id number] , my finger stick 210, told to keep the monitor, since we can not use it, he states the device not ready to change, changes 2 weeks at a time, not due for another week

## 2020-02-25 NOTE — PLAN OF CARE
EVA Morataya here on unit,told of his phos, continued, heartburn, with cocktail and mylanta giving, also told of soreness, redness throat, and small ulcers back of throat,did not mention this to dr sin on rounds, may need carafate? Stated she will inform her of above

## 2020-02-25 NOTE — PLAN OF CARE
Dr Johnson notified of bld sugar of 185 inc from 175, told the instructions ,say to notify md, if inc, in sugar, and change fluids, also if bld sugar less than 200,also to dec dose by 50%, stated to leave at one unit, and go to titrating zay, already on D5W with 20meq at 100cc hr

## 2020-02-25 NOTE — NURSING
Notified NP of pt continuing nausea and of pt 2.5L emesis episode. Asked if anything else we could do other than more antiemetics due to his prolonged QT. Referred to EICU for further recommendation.     Called EICU and after review of pt most recent Xray of Abdomen showed constipation and MD thinks possible obstruction since nausea is unrelieved with medications. Enema, bolus, and EKG ordered. Will continue to monitor

## 2020-02-25 NOTE — PLAN OF CARE
Phos level 1 , was added to 1230 bld work, lab just called results secure chated with Dr Negrete ,to notify of results, told that phos added to earlier bld work was 1.2, notified her of continued heart burn,and gave her his blood sugars, continues on 1 unit hour,taking some sugar free liquids without difficulty

## 2020-02-25 NOTE — PLAN OF CARE
innocent by,informed status, again co of heartburn, not time for mylanta, will order  Gi cocktail and protonix, will talk with pulm/critical care re insulin drip,stated do not start yet

## 2020-02-25 NOTE — PROGRESS NOTES
Pulmonary & Critical Care Medicine   Progress Note    Reason for Consultation: ICU admission for severe electrolyte abnormalities    Subjective: Pt had no acute events overnight. Still nauseous this morning. A lot of electrolytes have improved but still has anion gap.     Past Medical History:   Diagnosis Date    Acute pancreatitis     Diverticulosis of colon     Essential hypertension     Fatty liver     Gastric varices without bleeding 1/16/2020    Gout     Hx of acute pancreatitis 3/3/2017    Hypertriglyceridemia 8/2/2017    Obesity (BMI 30-39.9) 6/5/2015    Obesity (BMI 35.0-39.9 without comorbidity) 6/5/2015    Obstructive sleep apnea syndrome 12/18/2015    Type 2 diabetes mellitus with diabetic polyneuropathy, with long-term current use of insulin 10/16/2017    Type 2 diabetes mellitus with hyperglycemia, with long-term current use of insulin 8/3/2017     Past Surgical History:   Procedure Laterality Date    ENDOSCOPIC ULTRASOUND OF UPPER GASTROINTESTINAL TRACT N/A 5/27/2019    Procedure: ULTRASOUND, UPPER GI TRACT, ENDOSCOPIC;  Surgeon: Zafar Velazquez MD;  Location: [address]);  Service: Endoscopy;  Laterality: N/A;    ERCP N/A 5/27/2019    Procedure: ERCP (ENDOSCOPIC RETROGRADE CHOLANGIOPANCREATOGRAPHY);  Surgeon: Zafar Velazquez MD;  Location: [address]);  Service: Endoscopy;  Laterality: N/A;    ESOPHAGOGASTRODUODENOSCOPY      ESOPHAGOGASTRODUODENOSCOPY N/A 1/31/2020    Procedure: EGD (ESOPHAGOGASTRODUODENOSCOPY);  Surgeon: Zafar Velazquez MD;  [facility]: [facility];  Service: Endoscopy;  Laterality: N/A;     Social History:   Social History     Socioeconomic History    Marital status:      Spouse name: Not on file    Number of children: Not on file    Years of education: Not on file    Highest education level: Not on file   Occupational History    Not on file   Social Needs    Financial resource strain: Not on file    Food insecurity:     Worry: Not on file      Inability: Not on file    Transportation needs:     Medical: Not on file     Non-medical: Not on file   Tobacco Use    Smoking status: Never Smoker    Smokeless tobacco: Never Used   Substance and Sexual Activity    Alcohol use: No    Drug use: Never    Sexual activity: Yes     Partners: Female   Lifestyle    Physical activity:     Days per week: Not on file     Minutes per session: Not on file    Stress: Not on file   Relationships    Social connections:     Talks on phone: Not on file     Gets together: Not on file     Attends Gnosticism service: Not on file     Active member of club or organization: Not on file     Attends meetings of clubs or organizations: Not on file     Relationship status: Not on file   Other Topics Concern    Not on file   Social History Narrative    Not on file     Family History   Problem Relation Age of Onset    Aneurysm Mother         AAA    Coronary artery disease Father         4 vessel bypass at 40, possible stent at 36    Diabetes type II Father     No Known Problems Sister     No Known Problems Brother     Aneurysm Maternal Grandmother         Possible AAA    Diabetes type II Paternal Grandmother      Drug Allergies:   Review of patient's allergies indicates:  No Known Allergies  Current Infusions:   dextrose 5 % with KCl 20 mEq 100 mL/hr at 02/25/20 1135    insulin (HUMAN R) infusion (adults) 1 Units/hr (02/25/20 1137)     Scheduled Medications:     enoxaparin  40 mg Subcutaneous Daily    pantoprazole  40 mg Intravenous BID     PRN Medications:   acetaminophen, aluminum-magnesium hydroxide-simethicone, dextrose 10 % in water (D10W), dextrose 10 % in water (D10W), dextrose 50 % in water (D50W), dextrose 50 % in water (D50W), ondansetron, pneumoc 13-luanne conj-dip cr(PF), promethazine (PHENERGAN) IVPB, sodium chloride 0.9%, sodium chloride 0.9%, sodium chloride 0.9%    Review of Systems:   A comprehensive 12-point review of systems was performed, and is negative  except for those items mentioned above in the HPI section of this note.     Vital Signs:    Vitals:    02/25/20 1300   BP: (!) 163/79   Pulse: 83   Resp: (!) 23   Temp:        Fluid Balance:     Intake/Output Summary (Last 24 hours) at 2/25/2020 1408  Last data filed at 2/25/2020 1215  Gross per 24 hour   Intake 6930 ml   Output 2175 ml   Net 4755 ml       Physical Exam:   General: NAD, cooperative & interactive.  HEENT: AT/NC, PERRL, EOMI, nasal and oral mucosa moist. Normal dentition. Oropharynx without exudate.   Neck: Supple without JVD. Trachea midline. Thyroid feels normal.   Cardiac: RRR with no MRG with brisk cap refill and symmetric pulses in distal extremities.  Respiratory: Normal inspection. Symmetric chest rise. Normal palpation and percussion. Auscultation clear bilaterally. No increased work of breathing noted.   Abdomen: Soft, NT/ND. +BS. No palpable masses. No hepatosplenomegaly.   Lymphatic: No palpable supraclavicular or cervical LAD.   Extremities: Normal strength and tone (grossly). No visible atrophy. No clubbing or cyanosis on nail exam.   Skin: Warm and dry without visible rash.   Neuro: Grossly intact to brief exam. Oriented with appropriate mood & affect.     Personal Review and Summary of Prior Diagnostics    Laboratory Studies:   Recent Labs   Lab 02/25/20  1247   PH 7.422   PCO2 46.4*   PO2 37*   HCO3 30.2*   POCSATURATED 72*   BE 6     Recent Labs   Lab 02/25/20  0636   WBC 9.63   RBC 5.05   HGB 12.7*   HCT 41.4      MCV 82   MCH 25.1*   MCHC 30.7*     Recent Labs   Lab 02/25/20  1239   *   K 4.0      CO2 31*   BUN 19   CREATININE 0.8   MG 2.1       Microbiology Data:   Microbiology Results (last 7 days)     Procedure Component Value Units Date/Time    Influenza A & B by Molecular [[id number]] Collected:  02/23/20 1839    Order Status:  Sent Specimen:  Nasopharyngeal Swab Updated:  02/23/20 1840    Influenza A & B by Molecular [[id number]] Collected:  02/23/20 9472     Order Status:  Completed Specimen:  Nasopharyngeal Swab Updated:  02/23/20 2964     Influenza A, Molecular Negative     Influenza B, Molecular Negative     Flu A & B Source Nasal swab        Summary of Chest Imaging Personally Reviewed: CXR unremarkable    Impression & Recommendations    # Euglycemic DKA: Is a common complication of SGLT2-I. Pt's elevated beta-hydroxybutyrate, elevated anion gap are consistent with this. Suspect the reason his pH didn't show acidosis is because he had respiratory compensation AND a metabolic alkalosis from vomiting for 2 weeks.  - Pt likely also has starvation ketosis  - starting on insulin gtt and monitoring labs q4h until anion gap <12  - changing fluids to D5 + 20 KCl @100 ml/hr  - ok for pt to start eating but need to watch out for refeeding syndrome.  - would stop his SGLT2-I upon discharge    # metabolic alkalosis: Resolved. suspect his DKA triggered his vomiting and now his primary metabolic derangement on admission was a metabolic alkalosis from severe volume depletion. His Chloride was <70 (undetectable). Additionally, the amount of potassium he's required indicated that his whole body potassium storage was severely depleted  - pt's bicarb has improved with normalizing his chloride and potassium levels    # prolonged QTc: Pt's QTc yesterday was 600.   - unfortunately need to hold off on any anti-emetic drugs  - keep treating his hypokalemia with goal of K>4.0 and Mg >2.0  - repeat EKG done this afternoon. If QTc normalizes, then can consider giving anti-emetics    # TROY: Resolved Pt has normal kidneys at baseline. Presented with cr 1.8 likely pre-renal due to volume depletion. Cr has improved to baseline  - will continue to monitor renal function daily    Luís Johnson MD PGY-V  [employer] [facility] Fellow  Pager [phone]    Pt seen and examined with Pulmonary/Critical Care team and this note reviewed and validated with the following additional  comments:    Improved but still has ketoacidosis (likely partially from not eating). He is too nauseated to eat. We will start D10 and insulin. Checking PO4--.    Martinez Stoll MD  Phone [phone]

## 2020-02-25 NOTE — PROGRESS NOTES
[facility] Medicine  Progress Note    Patient Name: Bay Ibarra  MRN: [id number]  Patient Class: IP- Inpatient   Admission Date: 2/23/2020  Length of Stay: 2 days  Attending Physician: Adrienne Mccarthy*  Primary Care Provider: Komal Pierce MD        Subjective:     Principal Problem:Diabetic ketoacidosis without coma associated with type 2 diabetes mellitus        HPI:  Bay Ibarra is a 39 year old white man with diabetes mellitus type 2 with polyneuropathy (treated with insulin), hypertension, hyperlipidemia, history of gout, obstructive sleep apnea, chronic pancreatitis, colon diverticulosis, fatty liver, gastric varices, history of choledocholithiasis on 5/26/19, chronic portal vein thrombosis since 5/27/19, history of pancreatic pseudocyst. He lives in [place], [place]. His primary care physician is Dr. Komal Pierce.    He was seen at [facility] clinic on 2/11/20 for abdominal pain, nausea, and vomiting. He was advised to stop liraglutide due to risk of pancreatitis.   He was seen at [facility] Emergency Department on 2/18/20 for persistent symptoms. Labs showed hypernatremia (sodium 147 mmol/L), hyponatremia (potassium 3.3 mmol/L), metabolic alkalosis (bicarbonate 35 mmol/L), elevated lipase (551 U/L). He was told that his symptoms were due to his diabetes.   He presented to [facility] Department on 2/23/20 with persistent symptoms along with 20 pound weight loss in 2 weeks. Abdominal ultrasound showed nothing new. Labs showed worsened hypokalemia (potassium 2.2), hypochloremic metabolic acidosis (chloride <70 mmol/L, bicarbonate 45), elevated anion gap, elevated BUN and creatinine, elevated calcium, elevated beta-hydroxybutyrate (2.9 mmol/L). He was given 3 liters of normal saline, potassium chloride, dicyclomine, and ondansetron. Pulmonology-Critical Care was consulted and felt that his  empagliflozin, a SGLT2-inhibitor, could be causing euglycemic diabetic ketoacidosis. He was prescribed empaglozin on 9/15/19 and had elevated anion gap on 11/14/19, 12/26/19, 1/7/20, and 2/18/20. Vomiting explained his hypochloremic metabolic alkalosis. EKG showed prolonged QTc of 560 ms. He was admitted to [facility].    Overview/Hospital Course:  No notes on file    Interval History: awake and alert, reported reflux- GI cocktail, Tolerated clear liquid diet. Replace phos  Appreciates [facility] rec's    Review of Systems   Constitutional: Negative for chills and fever.   Gastrointestinal: Positive for abdominal pain. Negative for nausea and vomiting.        Epigastric pain     Objective:     Vital Signs (Most Recent):  Temp: 98.8 °F (37.1 °C) (02/25/20 0315)  Pulse: 88 (02/25/20 0345)  Resp: (!) 21 (02/25/20 0345)  BP: (!) 143/67 (02/25/20 0345)  SpO2: 100 % (02/25/20 0752) Vital Signs (24h Range):  Temp:  [98.1 °F (36.7 °C)-98.8 °F (37.1 °C)] 98.8 °F (37.1 °C)  Pulse:  [66-90] 88  Resp:  [17-27] 21  SpO2:  [92 %-100 %] 100 %  BP: (103-163)/(54-83) 143/67     Weight: 78.7 kg (173 lb 8 oz)  Body mass index is 27.17 kg/m².    Intake/Output Summary (Last 24 hours) at 2/25/2020 1306  Last data filed at 2/25/2020 0418  Gross per 24 hour   Intake 5325 ml   Output 2175 ml   Net 3150 ml      Physical Exam   Constitutional: He is oriented to person, place, and time. He appears well-developed. No distress.   Cardiovascular: Normal rate, regular rhythm and normal heart sounds.   Pulmonary/Chest: Effort normal. No respiratory distress.   Abdominal: Soft. He exhibits no distension. There is no tenderness.   Neurological: He is alert and oriented to person, place, and time.   Skin: Skin is warm.   Psychiatric: He has a normal mood and affect.   Nursing note and vitals reviewed.      Significant Labs:   Blood Culture: No results for input(s): LABBLOO in the last 48 hours.  CBC:   Recent Labs   Lab 02/23/20  1724  02/24/20  0404 02/25/20  0636   WBC 15.44* 9.44 9.63   HGB 16.8 13.5* 12.7*   HCT 52.1 44.0 41.4   * 210 163     CMP:   Recent Labs   Lab 02/23/20  1724  02/24/20  2047 02/25/20  0021 02/25/20  0636   *   < > 143 145 146*   K 2.2*   < > 3.6 3.3* 3.8   CL <70*   < > 92* 93* 100   CO2 45*   < > 40* 34* 31*   *   < > 176* 187* 184*   BUN 33*   < > 22* 22* 19   CREATININE 1.8*   < > 1.0 1.0 0.8   CALCIUM 11.7*   < > 8.6* 8.8 8.1*   PROT 8.6*  --   --   --   --    ALBUMIN 4.9  --   --   --   --    BILITOT 1.8*  --   --   --   --    ALKPHOS 114  --   --   --   --    AST 30  --   --   --   --    ALT 22  --   --   --   --    ANIONGAP Unable to calculate   < > 11 18* 15   EGFRNONAA 46*   < > >60 >60 >60    < > = values in this interval not displayed.     Lactic Acid:   Recent Labs   Lab 02/23/20  1744   LACTATE 1.4     TSH:   Recent Labs   Lab 01/07/20  0952   TSH 2.250     Urine Culture: No results for input(s): LABURIN in the last 48 hours.  Urine Studies:   Recent Labs   Lab 02/23/20 2028   COLORU Yellow   APPEARANCEUA Clear   PHUR 8.0   SPECGRAV 1.015   PROTEINUA 1+*   GLUCUA 3+*   KETONESU 1+*   BILIRUBINUA 1+*   OCCULTUA Negative   NITRITE Negative   UROBILINOGEN 2.0-3.0*   LEUKOCYTESUR Negative   RBCUA 2   WBCUA 4   BACTERIA None   HYALINECASTS 6*       Significant Imaging: I have reviewed all pertinent imaging results/findings within the past 24 hours.      Assessment/Plan:      * Diabetic ketoacidosis without coma associated with type 2 diabetes mellitus  Chloride-responsive metabolic alkalosis  Hypokalemia due to excessive gastrointestinal loss of potassium  Type 2 diabetes mellitus with diabetic polyneuropathy, with long-term current use of insulin  Hemoglobin A1C 10.0% on 1/7/20.   Takes insulin glargine 52 units every evening, insulin aspart 20 units with meals with sliding scale, empagliflozin 10 mg daily.   Holding home insulin. Discontinue empagliflozin.  IVF.   No insulin until potassium  level higher. Giving potassium chloride.    Hypophosphatemia    replace    Fatty liver  Stable.    Diverticulosis of colon  Stable.    Chronic pancreatitis  Stable.    Portal vein thrombosis  Gastric varices  Stable. Takes pantoprazole.    Mixed hyperlipidemia  Takes atorvastatin, icsapent, niacin, fenofibrate.      History of gout  Takes allopurinol.    Essential hypertension  Takes losartan.      VTE Risk Mitigation (From admission, onward)         Ordered     enoxaparin injection 40 mg  Daily      02/24/20 0819     IP VTE HIGH RISK PATIENT  Once      02/23/20 2148     Reason for no Mechanical VTE Prophylaxis  Once     Question:  Reasons:  Answer:  Physician Provided (leave comment)    02/23/20 2028                Critical care time spent on the evaluation and treatment of severe organ dysfunction, review of pertinent labs and imaging studies, discussions with consulting providers and discussions with patient/family: 35 minutes.      Adrienne Mccarthy MD  Department of Hospital Medicine   [facility]

## 2020-02-26 LAB
ANION GAP SERPL CALC-SCNC: 7 MMOL/L (ref 8–16)
ANION GAP SERPL CALC-SCNC: 7 MMOL/L (ref 8–16)
ANION GAP SERPL CALC-SCNC: 8 MMOL/L (ref 8–16)
ANION GAP SERPL CALC-SCNC: 9 MMOL/L (ref 8–16)
BASOPHILS # BLD AUTO: 0.03 K/UL (ref 0–0.2)
BASOPHILS NFR BLD: 0.5 % (ref 0–1.9)
BUN SERPL-MCNC: 12 MG/DL (ref 6–20)
BUN SERPL-MCNC: 13 MG/DL (ref 6–20)
BUN SERPL-MCNC: 13 MG/DL (ref 6–20)
BUN SERPL-MCNC: 14 MG/DL (ref 6–20)
CALCIUM SERPL-MCNC: 7.7 MG/DL (ref 8.7–10.5)
CALCIUM SERPL-MCNC: 7.8 MG/DL (ref 8.7–10.5)
CALCIUM SERPL-MCNC: 8.1 MG/DL (ref 8.7–10.5)
CALCIUM SERPL-MCNC: 8.1 MG/DL (ref 8.7–10.5)
CHLORIDE SERPL-SCNC: 100 MMOL/L (ref 95–110)
CHLORIDE SERPL-SCNC: 101 MMOL/L (ref 95–110)
CHLORIDE SERPL-SCNC: 103 MMOL/L (ref 95–110)
CHLORIDE SERPL-SCNC: 98 MMOL/L (ref 95–110)
CO2 SERPL-SCNC: 26 MMOL/L (ref 23–29)
CO2 SERPL-SCNC: 27 MMOL/L (ref 23–29)
CO2 SERPL-SCNC: 28 MMOL/L (ref 23–29)
CO2 SERPL-SCNC: 29 MMOL/L (ref 23–29)
CREAT SERPL-MCNC: 0.7 MG/DL (ref 0.5–1.4)
CREAT SERPL-MCNC: 0.8 MG/DL (ref 0.5–1.4)
DIFFERENTIAL METHOD: ABNORMAL
EOSINOPHIL # BLD AUTO: 0.2 K/UL (ref 0–0.5)
EOSINOPHIL NFR BLD: 3.1 % (ref 0–8)
ERYTHROCYTE [DISTWIDTH] IN BLOOD BY AUTOMATED COUNT: 14.4 % (ref 11.5–14.5)
EST. GFR  (AFRICAN AMERICAN): >60 ML/MIN/1.73 M^2
EST. GFR  (NON AFRICAN AMERICAN): >60 ML/MIN/1.73 M^2
GLUCOSE SERPL-MCNC: 196 MG/DL (ref 70–110)
GLUCOSE SERPL-MCNC: 206 MG/DL (ref 70–110)
GLUCOSE SERPL-MCNC: 223 MG/DL (ref 70–110)
GLUCOSE SERPL-MCNC: 233 MG/DL (ref 70–110)
HCT VFR BLD AUTO: 37.1 % (ref 40–54)
HGB BLD-MCNC: 11.3 G/DL (ref 14–18)
IMM GRANULOCYTES # BLD AUTO: 0.01 K/UL (ref 0–0.04)
IMM GRANULOCYTES NFR BLD AUTO: 0.2 % (ref 0–0.5)
LYMPHOCYTES # BLD AUTO: 0.6 K/UL (ref 1–4.8)
LYMPHOCYTES NFR BLD: 9.2 % (ref 18–48)
MAGNESIUM SERPL-MCNC: 1.9 MG/DL (ref 1.6–2.6)
MAGNESIUM SERPL-MCNC: 2.1 MG/DL (ref 1.6–2.6)
MCH RBC QN AUTO: 25.1 PG (ref 27–31)
MCHC RBC AUTO-ENTMCNC: 30.5 G/DL (ref 32–36)
MCV RBC AUTO: 82 FL (ref 82–98)
MONOCYTES # BLD AUTO: 0.8 K/UL (ref 0.3–1)
MONOCYTES NFR BLD: 12 % (ref 4–15)
NEUTROPHILS # BLD AUTO: 4.8 K/UL (ref 1.8–7.7)
NEUTROPHILS NFR BLD: 75 % (ref 38–73)
NRBC BLD-RTO: 0 /100 WBC
PHOSPHATE SERPL-MCNC: 2.1 MG/DL (ref 2.7–4.5)
PHOSPHATE SERPL-MCNC: <1 MG/DL (ref 2.7–4.5)
PLATELET # BLD AUTO: 143 K/UL (ref 150–350)
PMV BLD AUTO: 11.4 FL (ref 9.2–12.9)
POCT GLUCOSE: 168 MG/DL (ref 70–110)
POCT GLUCOSE: 177 MG/DL (ref 70–110)
POCT GLUCOSE: 180 MG/DL (ref 70–110)
POCT GLUCOSE: 183 MG/DL (ref 70–110)
POCT GLUCOSE: 185 MG/DL (ref 70–110)
POCT GLUCOSE: 187 MG/DL (ref 70–110)
POCT GLUCOSE: 189 MG/DL (ref 70–110)
POCT GLUCOSE: 192 MG/DL (ref 70–110)
POCT GLUCOSE: 194 MG/DL (ref 70–110)
POCT GLUCOSE: 195 MG/DL (ref 70–110)
POCT GLUCOSE: 205 MG/DL (ref 70–110)
POCT GLUCOSE: 212 MG/DL (ref 70–110)
POCT GLUCOSE: 220 MG/DL (ref 70–110)
POCT GLUCOSE: 238 MG/DL (ref 70–110)
POCT GLUCOSE: 248 MG/DL (ref 70–110)
POCT GLUCOSE: 266 MG/DL (ref 70–110)
POTASSIUM SERPL-SCNC: 3.7 MMOL/L (ref 3.5–5.1)
POTASSIUM SERPL-SCNC: 3.8 MMOL/L (ref 3.5–5.1)
RBC # BLD AUTO: 4.51 M/UL (ref 4.6–6.2)
SODIUM SERPL-SCNC: 135 MMOL/L (ref 136–145)
SODIUM SERPL-SCNC: 135 MMOL/L (ref 136–145)
SODIUM SERPL-SCNC: 136 MMOL/L (ref 136–145)
SODIUM SERPL-SCNC: 137 MMOL/L (ref 136–145)
WBC # BLD AUTO: 6.41 K/UL (ref 3.9–12.7)

## 2020-02-26 PROCEDURE — 80048 BASIC METABOLIC PNL TOTAL CA: CPT

## 2020-02-26 PROCEDURE — 84100 ASSAY OF PHOSPHORUS: CPT | Mod: 91

## 2020-02-26 PROCEDURE — 83735 ASSAY OF MAGNESIUM: CPT

## 2020-02-26 PROCEDURE — 84100 ASSAY OF PHOSPHORUS: CPT

## 2020-02-26 PROCEDURE — 63600175 PHARM REV CODE 636 W HCPCS: Performed by: FAMILY MEDICINE

## 2020-02-26 PROCEDURE — 63600175 PHARM REV CODE 636 W HCPCS: Performed by: INTERNAL MEDICINE

## 2020-02-26 PROCEDURE — 99900035 HC TECH TIME PER 15 MIN (STAT)

## 2020-02-26 PROCEDURE — 25000003 PHARM REV CODE 250: Performed by: INTERNAL MEDICINE

## 2020-02-26 PROCEDURE — 83735 ASSAY OF MAGNESIUM: CPT | Mod: 91

## 2020-02-26 PROCEDURE — 94761 N-INVAS EAR/PLS OXIMETRY MLT: CPT

## 2020-02-26 PROCEDURE — C9113 INJ PANTOPRAZOLE SODIUM, VIA: HCPCS | Performed by: FAMILY MEDICINE

## 2020-02-26 PROCEDURE — 85025 COMPLETE CBC W/AUTO DIFF WBC: CPT

## 2020-02-26 PROCEDURE — 20000000 HC ICU ROOM

## 2020-02-26 PROCEDURE — 25000003 PHARM REV CODE 250: Performed by: FAMILY MEDICINE

## 2020-02-26 PROCEDURE — 63600175 PHARM REV CODE 636 W HCPCS: Performed by: HOSPITALIST

## 2020-02-26 PROCEDURE — 80048 BASIC METABOLIC PNL TOTAL CA: CPT | Mod: 91

## 2020-02-26 PROCEDURE — 36415 COLL VENOUS BLD VENIPUNCTURE: CPT

## 2020-02-26 PROCEDURE — C9399 UNCLASSIFIED DRUGS OR BIOLOG: HCPCS | Performed by: FAMILY MEDICINE

## 2020-02-26 RX ORDER — MAGNESIUM SULFATE 1 G/100ML
1 INJECTION INTRAVENOUS ONCE
Status: COMPLETED | OUTPATIENT
Start: 2020-02-26 | End: 2020-02-26

## 2020-02-26 RX ORDER — POTASSIUM CHLORIDE 20 MEQ/1
20 TABLET, EXTENDED RELEASE ORAL ONCE
Status: COMPLETED | OUTPATIENT
Start: 2020-02-26 | End: 2020-02-26

## 2020-02-26 RX ORDER — INSULIN ASPART 100 [IU]/ML
1-10 INJECTION, SOLUTION INTRAVENOUS; SUBCUTANEOUS
Status: DISCONTINUED | OUTPATIENT
Start: 2020-02-26 | End: 2020-02-27 | Stop reason: HOSPADM

## 2020-02-26 RX ORDER — DEXTROSE MONOHYDRATE, SODIUM CHLORIDE, AND POTASSIUM CHLORIDE 50; 2.98; 4.5 G/1000ML; G/1000ML; G/1000ML
INJECTION, SOLUTION INTRAVENOUS CONTINUOUS
Status: DISCONTINUED | OUTPATIENT
Start: 2020-02-26 | End: 2020-02-26

## 2020-02-26 RX ADMIN — INSULIN ASPART 6 UNITS: 100 INJECTION, SOLUTION INTRAVENOUS; SUBCUTANEOUS at 05:02

## 2020-02-26 RX ADMIN — DEXTROSE MONOHYDRATE, SODIUM CHLORIDE, AND POTASSIUM CHLORIDE: 50; 4.5; 2.98 INJECTION, SOLUTION INTRAVENOUS at 03:02

## 2020-02-26 RX ADMIN — SODIUM PHOSPHATE, MONOBASIC, MONOHYDRATE 30 MMOL: 276; 142 INJECTION, SOLUTION INTRAVENOUS at 11:02

## 2020-02-26 RX ADMIN — NYSTATIN 500000 UNITS: 100000 SUSPENSION ORAL at 12:02

## 2020-02-26 RX ADMIN — PANTOPRAZOLE SODIUM 40 MG: 40 INJECTION, POWDER, LYOPHILIZED, FOR SOLUTION INTRAVENOUS at 09:02

## 2020-02-26 RX ADMIN — INSULIN DETEMIR 10 UNITS: 100 INJECTION, SOLUTION SUBCUTANEOUS at 12:02

## 2020-02-26 RX ADMIN — NYSTATIN 500000 UNITS: 100000 SUSPENSION ORAL at 04:02

## 2020-02-26 RX ADMIN — NYSTATIN 500000 UNITS: 100000 SUSPENSION ORAL at 09:02

## 2020-02-26 RX ADMIN — POTASSIUM PHOSPHATE, MONOBASIC AND POTASSIUM PHOSPHATE, DIBASIC 20 MMOL: 224; 236 INJECTION, SOLUTION, CONCENTRATE INTRAVENOUS at 10:02

## 2020-02-26 RX ADMIN — INSULIN ASPART 2 UNITS: 100 INJECTION, SOLUTION INTRAVENOUS; SUBCUTANEOUS at 09:02

## 2020-02-26 RX ADMIN — ENOXAPARIN SODIUM 40 MG: 100 INJECTION SUBCUTANEOUS at 04:02

## 2020-02-26 RX ADMIN — MAGNESIUM SULFATE 1 G: 1 INJECTION INTRAVENOUS at 12:02

## 2020-02-26 RX ADMIN — POTASSIUM CHLORIDE 20 MEQ: 1500 TABLET, EXTENDED RELEASE ORAL at 09:02

## 2020-02-26 NOTE — PLAN OF CARE
Spoke to Dr Sheyla mujica insulin drip,bld sugar at 1815 232 , but according to titration of drip the drip remains 1 unit hour, no changes all day, bld sugar 175 to start, inc in the 208, the 232, stated to continue to use insulin titration scale, will dec the iv rate to 50cc hr

## 2020-02-26 NOTE — ASSESSMENT & PLAN NOTE
Chloride-responsive metabolic alkalosis  Hypokalemia due to excessive gastrointestinal loss of potassium  Type 2 diabetes mellitus with diabetic polyneuropathy, with long-term current use of insulin  Hemoglobin A1C 10.0% on 1/7/20.   Takes insulin glargine 52 units every evening, insulin aspart 20 units with meals with sliding scale, empagliflozin 10 mg daily.   Holding home insulin. Discontinue empagliflozin.  IVF.   No insulin until potassium level higher. Giving potassium chloride.    AG is closed, wean insulin gtt to SubQ insulin, and advance diet as tolerated.

## 2020-02-26 NOTE — PROGRESS NOTES
[facility]  Progress Note    Patient Name: Bay Ibarra  MRN: [id number]  Patient Class: IP- Inpatient   Admission Date: 2/23/2020  Length of Stay: 3 days  Attending Physician: Adrienne Mccarthy*  Primary Care Provider: Komal Pierce MD        Subjective:     Principal Problem:Diabetic ketoacidosis without coma associated with type 2 diabetes mellitus        HPI:  Bay Ibarra is a 39 year old white man with diabetes mellitus type 2 with polyneuropathy (treated with insulin), hypertension, hyperlipidemia, history of gout, obstructive sleep apnea, chronic pancreatitis, colon diverticulosis, fatty liver, gastric varices, history of choledocholithiasis on 5/26/19, chronic portal vein thrombosis since 5/27/19, history of pancreatic pseudocyst. He lives in [place], [place]. His primary care physician is Dr. Komal Pierce.    He was seen at [facility] clinic on 2/11/20 for abdominal pain, nausea, and vomiting. He was advised to stop liraglutide due to risk of pancreatitis.   He was seen at [facility] Emergency Department on 2/18/20 for persistent symptoms. Labs showed hypernatremia (sodium 147 mmol/L), hyponatremia (potassium 3.3 mmol/L), metabolic alkalosis (bicarbonate 35 mmol/L), elevated lipase (551 U/L). He was told that his symptoms were due to his diabetes.   He presented to [facility] on 2/23/20 with persistent symptoms along with 20 pound weight loss in 2 weeks. Abdominal ultrasound showed nothing new. Labs showed worsened hypokalemia (potassium 2.2), hypochloremic metabolic acidosis (chloride <70 mmol/L, bicarbonate 45), elevated anion gap, elevated BUN and creatinine, elevated calcium, elevated beta-hydroxybutyrate (2.9 mmol/L). He was given 3 liters of normal saline, potassium chloride, dicyclomine, and ondansetron. Pulmonology-Critical Care was consulted and felt that his  empagliflozin, a SGLT2-inhibitor, could be causing euglycemic diabetic ketoacidosis. He was prescribed empaglozin on 9/15/19 and had elevated anion gap on 11/14/19, 12/26/19, 1/7/20, and 2/18/20. Vomiting explained his hypochloremic metabolic alkalosis. EKG showed prolonged QTc of 560 ms. He was admitted to [facility].    Overview/Hospital Course:  No notes on file    Interval History: awake and alert, tolerated CLD. Denies nausea or abdominal pain  AG is closed. Plan to d/c insulin gtt wean to SubQ insulin, and advance diet as tolerated.       Review of Systems   Constitutional: Negative for chills and fever.   Respiratory: Negative for shortness of breath.    Gastrointestinal: Negative for abdominal pain, nausea and vomiting.   Musculoskeletal: Negative for back pain.     Objective:     Vital Signs (Most Recent):  Temp: 98.8 °F (37.1 °C) (02/26/20 1115)  Pulse: 61 (02/26/20 1115)  Resp: (!) 23 (02/26/20 1115)  BP: 137/70 (02/26/20 1100)  SpO2: 99 % (02/26/20 1115) Vital Signs (24h Range):  Temp:  [98.4 °F (36.9 °C)-99 °F (37.2 °C)] 98.8 °F (37.1 °C)  Pulse:  [61-95] 61  Resp:  [0-63] 23  SpO2:  [80 %-100 %] 99 %  BP: (119-164)/(58-83) 137/70     Weight: 78.7 kg (173 lb 8 oz)  Body mass index is 27.17 kg/m².    Intake/Output Summary (Last 24 hours) at 2/26/2020 1141  Last data filed at 2/26/2020 1100  Gross per 24 hour   Intake 2552.46 ml   Output 1500 ml   Net 1052.46 ml      Physical Exam   Constitutional: He is oriented to person, place, and time. He appears well-developed. No distress.   Cardiovascular: Normal rate, regular rhythm and normal heart sounds.   Pulmonary/Chest: Effort normal. No respiratory distress.   Abdominal: Soft. He exhibits no distension. There is no tenderness.   Neurological: He is alert and oriented to person, place, and time.   Skin: Skin is warm.   Psychiatric: He has a normal mood and affect.   Nursing note and vitals reviewed.      Significant Labs:   Blood Culture: No  results for input(s): LABBLOO in the last 48 hours.  CBC:   Recent Labs   Lab 02/25/20  0636 02/26/20  0408   WBC 9.63 6.41   HGB 12.7* 11.3*   HCT 41.4 37.1*    143*     CMP:   Recent Labs   Lab 02/25/20  2358 02/26/20  0408 02/26/20  0914    137 135*   K 3.7 3.8 3.7    101 100   CO2 26 29 27   * 206* 196*   BUN 14 13 12   CREATININE 0.7 0.7 0.7   CALCIUM 7.7* 7.8* 8.1*   ANIONGAP 7* 7* 8   EGFRNONAA >60 >60 >60     Lactic Acid:   No results for input(s): LACTATE in the last 48 hours.  TSH:   Recent Labs   Lab 01/07/20  0952   TSH 2.250     Urine Culture: No results for input(s): LABURIN in the last 48 hours.  Urine Studies:   No results for input(s): COLORU, APPEARANCEUA, PHUR, SPECGRAV, PROTEINUA, GLUCUA, KETONESU, BILIRUBINUA, OCCULTUA, NITRITE, UROBILINOGEN, LEUKOCYTESUR, RBCUA, WBCUA, BACTERIA, SQUAMEPITHEL, HYALINECASTS in the last 48 hours.    Invalid input(s): WRIGHTSUR    Significant Imaging: I have reviewed all pertinent imaging results/findings within the past 24 hours.      Assessment/Plan:      * Diabetic ketoacidosis without coma associated with type 2 diabetes mellitus  Chloride-responsive metabolic alkalosis  Hypokalemia due to excessive gastrointestinal loss of potassium  Type 2 diabetes mellitus with diabetic polyneuropathy, with long-term current use of insulin  Hemoglobin A1C 10.0% on 1/7/20.   Takes insulin glargine 52 units every evening, insulin aspart 20 units with meals with sliding scale, empagliflozin 10 mg daily.   Holding home insulin. Discontinue empagliflozin.  IVF.   No insulin until potassium level higher. Giving potassium chloride.    AG is closed, wean insulin gtt to SubQ insulin, and advance diet as tolerated.           Hypophosphatemia    replace    Fatty liver  Stable.    Diverticulosis of colon  Stable.    Chronic pancreatitis  Stable.    Portal vein thrombosis  Gastric varices  Stable. Takes pantoprazole.    Mixed hyperlipidemia  Takes atorvastatin,  icsapent, niacin, fenofibrate.      History of gout  Takes allopurinol.    Essential hypertension  Takes losartan.      VTE Risk Mitigation (From admission, onward)         Ordered     enoxaparin injection 40 mg  Daily      02/24/20 0819     IP VTE HIGH RISK PATIENT  Once      02/23/20 2148     Reason for no Mechanical VTE Prophylaxis  Once     Question:  Reasons:  Answer:  Physician Provided (leave comment)    02/23/20 2028                Critical care time spent on the evaluation and treatment of severe organ dysfunction, review of pertinent labs and imaging studies, discussions with consulting providers and discussions with patient/family: 40 minutes.      Adrienne Mccarthy MD  Department of Hospital Medicine   [facility]

## 2020-02-26 NOTE — SUBJECTIVE & OBJECTIVE
Interval History: awake and alert, tolerated CLD. Denies nausea or abdominal pain  AG is closed. Plan to d/c insulin gtt wean to SubQ insulin, and advance diet as tolerated.       Review of Systems   Constitutional: Negative for chills and fever.   Respiratory: Negative for shortness of breath.    Gastrointestinal: Negative for abdominal pain, nausea and vomiting.   Musculoskeletal: Negative for back pain.     Objective:     Vital Signs (Most Recent):  Temp: 98.8 °F (37.1 °C) (02/26/20 1115)  Pulse: 61 (02/26/20 1115)  Resp: (!) 23 (02/26/20 1115)  BP: 137/70 (02/26/20 1100)  SpO2: 99 % (02/26/20 1115) Vital Signs (24h Range):  Temp:  [98.4 °F (36.9 °C)-99 °F (37.2 °C)] 98.8 °F (37.1 °C)  Pulse:  [61-95] 61  Resp:  [0-63] 23  SpO2:  [80 %-100 %] 99 %  BP: (119-164)/(58-83) 137/70     Weight: 78.7 kg (173 lb 8 oz)  Body mass index is 27.17 kg/m².    Intake/Output Summary (Last 24 hours) at 2/26/2020 1141  Last data filed at 2/26/2020 1100  Gross per 24 hour   Intake 2552.46 ml   Output 1500 ml   Net 1052.46 ml      Physical Exam   Constitutional: He is oriented to person, place, and time. He appears well-developed. No distress.   Cardiovascular: Normal rate, regular rhythm and normal heart sounds.   Pulmonary/Chest: Effort normal. No respiratory distress.   Abdominal: Soft. He exhibits no distension. There is no tenderness.   Neurological: He is alert and oriented to person, place, and time.   Skin: Skin is warm.   Psychiatric: He has a normal mood and affect.   Nursing note and vitals reviewed.      Significant Labs:   Blood Culture: No results for input(s): LABBLOO in the last 48 hours.  CBC:   Recent Labs   Lab 02/25/20  0636 02/26/20  0408   WBC 9.63 6.41   HGB 12.7* 11.3*   HCT 41.4 37.1*    143*     CMP:   Recent Labs   Lab 02/25/20  2358 02/26/20  0408 02/26/20  0914    137 135*   K 3.7 3.8 3.7    101 100   CO2 26 29 27   * 206* 196*   BUN 14 13 12   CREATININE 0.7 0.7 0.7   CALCIUM  7.7* 7.8* 8.1*   ANIONGAP 7* 7* 8   EGFRNONAA >60 >60 >60     Lactic Acid:   No results for input(s): LACTATE in the last 48 hours.  TSH:   Recent Labs   Lab 01/07/20  0952   TSH 2.250     Urine Culture: No results for input(s): LABURIN in the last 48 hours.  Urine Studies:   No results for input(s): COLORU, APPEARANCEUA, PHUR, SPECGRAV, PROTEINUA, GLUCUA, KETONESU, BILIRUBINUA, OCCULTUA, NITRITE, UROBILINOGEN, LEUKOCYTESUR, RBCUA, WBCUA, BACTERIA, SQUAMEPITHEL, HYALINECASTS in the last 48 hours.    Invalid input(s): ANEESH    Significant Imaging: I have reviewed all pertinent imaging results/findings within the past 24 hours.

## 2020-02-26 NOTE — CONSULTS
NEPHROLOGY CONSULT NOTE    HPI & INTERVAL HISTORY:    Past Medical History:   Diagnosis Date    Acute pancreatitis     Diverticulosis of colon     Essential hypertension     Fatty liver     Gastric varices without bleeding 1/16/2020    Gout     Hx of acute pancreatitis 3/3/2017    Hypertriglyceridemia 8/2/2017    Obesity (BMI 30-39.9) 6/5/2015    Obesity (BMI 35.0-39.9 without comorbidity) 6/5/2015    Obstructive sleep apnea syndrome 12/18/2015    Type 2 diabetes mellitus with diabetic polyneuropathy, with long-term current use of insulin 10/16/2017    Type 2 diabetes mellitus with hyperglycemia, with long-term current use of insulin 8/3/2017      Past Surgical History:   Procedure Laterality Date    ENDOSCOPIC ULTRASOUND OF UPPER GASTROINTESTINAL TRACT N/A 5/27/2019    Procedure: ULTRASOUND, UPPER GI TRACT, ENDOSCOPIC;  Surgeon: Zafar Velazquez MD;  Location: [address]);  Service: Endoscopy;  Laterality: N/A;    ERCP N/A 5/27/2019    Procedure: ERCP (ENDOSCOPIC RETROGRADE CHOLANGIOPANCREATOGRAPHY);  Surgeon: Zafar Velazquez MD;  Location: [address]);  Service: Endoscopy;  Laterality: N/A;    ESOPHAGOGASTRODUODENOSCOPY      ESOPHAGOGASTRODUODENOSCOPY N/A 1/31/2020    Procedure: EGD (ESOPHAGOGASTRODUODENOSCOPY);  Surgeon: Zafar Velazquez MD;  Location: [facility];  Service: Endoscopy;  Laterality: N/A;      Review of patient's allergies indicates:  No Known Allergies   Medications Prior to Admission   Medication Sig Dispense Refill Last Dose    allopurinol (ZYLOPRIM) 300 MG tablet TAKE 1 TABLET BY MOUTH ONCE DAILY 30 tablet 5 Taking    aspirin (ECOTRIN) 81 MG EC tablet Take 81 mg by mouth once daily.   Taking    atorvastatin (LIPITOR) 40 MG tablet Take 1 tablet (40 mg total) by mouth once daily. 90 tablet 1 Taking    diclofenac sodium (VOLTAREN) 1 % Gel Apply 2 g topically 4 (four) times daily. Apply to chest in the location of pain up to 4 times a day as needed for pain. 100 g 0  "Taking    empagliflozin (JARDIANCE) 10 mg Tab Take 1 tablet (10 mg) by mouth once daily. 90 tablet 1 Taking    fenofibrate 160 MG Tab Take 1 tablet (160 mg total) by mouth once daily. 90 tablet 0     flash glucose scanning reader (FREESTYLE DEBBIE 14 DAY READER) Misc 1 kit by Misc.(Non-Drug; Combo Route) route 3 (three) times daily as needed. 1 each 0     flash glucose sensor (FREESTYLE DEBBIE 14 DAY SENSOR) Kit 1 each by Misc.(Non-Drug; Combo Route) route every 14 (fourteen) days. 2 kit 11     icosapent ethyl (VASCEPA) 1 gram Cap Take 2 g by mouth 2 (two) times daily. 360 capsule 3 Taking    insulin (LANTUS SOLOSTAR U-100 INSULIN) glargine 100 units/mL (3mL) SubQ pen Inject 52 Units into the skin every evening. 18 mL 6 Taking    insulin aspart U-100 (NOVOLOG FLEXPEN U-100 INSULIN) 100 unit/mL (3 mL) InPn pen INJECT 20 UNITS SUBCUTANEOUSLY WITH MEALS PLUS SLIDING SCALE 150-200+2, 201-250 +4, 251-300 +6, 301-350 +8,>350 +10. MAX OF 90 UNITS PER DA 30 Syringe 4 Taking    insulin syringe-needle U-100 29 gauge x 1/2" Syrg Inject 1 (once) per day 100 Syringe 3 Taking    losartan (COZAAR) 100 MG tablet TAKE 1/2 (ONE-HALF) TABLET BY MOUTH ONCE DAILY 45 tablet 3 Taking    metoclopramide HCl (REGLAN) 10 MG tablet Take 1 tablet (10 mg total) by mouth every 6 (six) hours as needed (nausea). 30 tablet 0     niacin 100 MG Tab Take 200 mg by mouth every evening.    Taking    ondansetron (ZOFRAN) 4 MG tablet Take 1 tablet (4 mg total) by mouth every 6 (six) hours as needed for Nausea. 30 tablet 1     pantoprazole (PROTONIX) 40 MG tablet Take 1 tablet (40 mg total) by mouth once daily. 90 tablet 1     pregabalin (LYRICA) 150 MG capsule Take 1 capsule (150 mg total) by mouth 2 (two) times daily. 60 capsule 0        Social History     Socioeconomic History    Marital status:      Spouse name: Not on file    Number of children: Not on file    Years of education: Not on file    Highest education level: Not on " file   Occupational History    Not on file   Social Needs    Financial resource strain: Not on file    Food insecurity:     Worry: Not on file     Inability: Not on file    Transportation needs:     Medical: Not on file     Non-medical: Not on file   Tobacco Use    Smoking status: Never Smoker    Smokeless tobacco: Never Used   Substance and Sexual Activity    Alcohol use: No    Drug use: Never    Sexual activity: Yes     Partners: Female   Lifestyle    Physical activity:     Days per week: Not on file     Minutes per session: Not on file    Stress: Not on file   Relationships    Social connections:     Talks on phone: Not on file     Gets together: Not on file     Attends Worship service: Not on file     Active member of club or organization: Not on file     Attends meetings of clubs or organizations: Not on file     Relationship status: Not on file   Other Topics Concern    Not on file   Social History Narrative    Not on file        MEDS   enoxaparin  40 mg Subcutaneous Daily    nystatin  500,000 Units Oral QID    pantoprazole  40 mg Intravenous BID    sodium phosphate IVPB  30 mmol Intravenous Once               CONTINOUS INFUSIONS:      Intake/Output Summary (Last 24 hours) at 2/26/2020 1111  Last data filed at 2/26/2020 0900  Gross per 24 hour   Intake 3576.86 ml   Output 1500 ml   Net 2076.86 ml        HEMODYNAMICS:    Temp:  [98.4 °F (36.9 °C)-99 °F (37.2 °C)] 98.4 °F (36.9 °C)  Pulse:  [61-95] 69  Resp:  [0-63] 20  SpO2:  [80 %-100 %] 97 %  BP: (119-164)/(58-83) 144/70      Abdominal pain, nausea and  vomiting on admit  Decreased intake  Weight loss  No fever  No chills  No CP   No SOB  No cough  No diarrhea  No dysuria  Cards:Pulse 61  Pul: diminished breath sounds  Abdomen soft   Ext: no  edema   Skin:pale     LABS   Lab Results   Component Value Date    WBC 6.41 02/26/2020    HGB 11.3 (L) 02/26/2020    HCT 37.1 (L) 02/26/2020    MCV 82 02/26/2020     (L) 02/26/2020         Recent Labs   Lab 02/26/20  0914   *   CALCIUM 8.1*   *   K 3.7   CO2 27      BUN 12   CREATININE 0.7      Lab Results   Component Value Date    CALCIUM 8.1 (L) 02/26/2020    CAION 0.72 (LL) 07/28/2017    PHOS <1.0 (LL) 02/26/2020      No results found for: IRON, TIBC, FERRITIN     ABG  Recent Labs   Lab 02/25/20  1247   PH 7.422   PO2 37*   PCO2 46.4*   HCO3 30.2*   BE 6         IMAGING:  CXR    ASSESSMENT / PLAN  Diabetes mellitus  Diabetic neuropathy  Gap Metabolic acidosis,  Metabolic alkalosis,  TROY,  Azotemia,   Hypokalemia, beta-hydroxybutyrate 2.9 on admit.  K 3.7 today  Replace  Corrected Na approximately 137 today  Creatinine 0.7 today  Calcium 8.1  Hypophosphatemia  Replace and recheck  Magnesium 1.9  Hb 11.3  [place] protein 1+, RBC 2, WBC 4, bilirubin 1+, ketones 1+on 2/23   UO 1500 cc/24 h  Hypertension  Blood pressure 137/70  Echo 2017    1 - Normal left ventricular systolic function (EF 60-65%).     2 - Mildly depressed right ventricular systolic function .     3 - Normal left ventricular diastolic function.   Continue IVF, insulin  Avoid nephrotoxic agents, hypotension, hypovolemia  Will follow up today on labs

## 2020-02-26 NOTE — PLAN OF CARE
Pt feeling better, no nausea today, no vomiting,co of heartburn, rec gi cocktail, and mylanta,does help but does come back, started on nystatin, for possible gastritis,does have 3 ulcers back of throat, continues on insulin drip 1 unit , bld sugar now 232 and with scale still on 1 unit

## 2020-02-27 ENCOUNTER — TELEPHONE (OUTPATIENT)
Dept: FAMILY MEDICINE | Facility: CLINIC | Age: 40
End: 2020-02-27

## 2020-02-27 VITALS
TEMPERATURE: 98 F | BODY MASS INDEX: 27.23 KG/M2 | SYSTOLIC BLOOD PRESSURE: 144 MMHG | WEIGHT: 173.5 LBS | OXYGEN SATURATION: 100 % | HEIGHT: 67 IN | HEART RATE: 70 BPM | RESPIRATION RATE: 20 BRPM | DIASTOLIC BLOOD PRESSURE: 76 MMHG

## 2020-02-27 PROBLEM — E87.6 HYPOKALEMIA DUE TO EXCESSIVE GASTROINTESTINAL LOSS OF POTASSIUM: Status: RESOLVED | Noted: 2019-09-01 | Resolved: 2020-02-27

## 2020-02-27 PROBLEM — I81 PORTAL VEIN THROMBOSIS: Chronic | Status: RESOLVED | Noted: 2019-08-29 | Resolved: 2020-02-27

## 2020-02-27 PROBLEM — E11.10 DIABETIC KETOACIDOSIS WITHOUT COMA ASSOCIATED WITH TYPE 2 DIABETES MELLITUS: Status: RESOLVED | Noted: 2020-02-23 | Resolved: 2020-02-27

## 2020-02-27 PROBLEM — R11.10 VOMITING: Status: ACTIVE | Noted: 2020-02-27

## 2020-02-27 PROBLEM — E87.3 CHLORIDE-RESPONSIVE METABOLIC ALKALOSIS: Status: RESOLVED | Noted: 2020-02-23 | Resolved: 2020-02-27

## 2020-02-27 PROBLEM — E83.39 HYPOPHOSPHATEMIA: Status: RESOLVED | Noted: 2020-02-25 | Resolved: 2020-02-27

## 2020-02-27 PROBLEM — R11.10 VOMITING: Status: RESOLVED | Noted: 2020-02-27 | Resolved: 2020-02-27

## 2020-02-27 LAB
ANION GAP SERPL CALC-SCNC: 10 MMOL/L (ref 8–16)
BASOPHILS # BLD AUTO: 0.04 K/UL (ref 0–0.2)
BASOPHILS NFR BLD: 0.8 % (ref 0–1.9)
BUN SERPL-MCNC: 11 MG/DL (ref 6–20)
CALCIUM SERPL-MCNC: 8.5 MG/DL (ref 8.7–10.5)
CHLORIDE SERPL-SCNC: 99 MMOL/L (ref 95–110)
CO2 SERPL-SCNC: 26 MMOL/L (ref 23–29)
CREAT SERPL-MCNC: 0.8 MG/DL (ref 0.5–1.4)
DIFFERENTIAL METHOD: ABNORMAL
EOSINOPHIL # BLD AUTO: 0.2 K/UL (ref 0–0.5)
EOSINOPHIL NFR BLD: 3.6 % (ref 0–8)
ERYTHROCYTE [DISTWIDTH] IN BLOOD BY AUTOMATED COUNT: 14 % (ref 11.5–14.5)
EST. GFR  (AFRICAN AMERICAN): >60 ML/MIN/1.73 M^2
EST. GFR  (NON AFRICAN AMERICAN): >60 ML/MIN/1.73 M^2
GLUCOSE SERPL-MCNC: 257 MG/DL (ref 70–110)
HCT VFR BLD AUTO: 38.7 % (ref 40–54)
HGB BLD-MCNC: 12.2 G/DL (ref 14–18)
IMM GRANULOCYTES # BLD AUTO: 0.03 K/UL (ref 0–0.04)
IMM GRANULOCYTES NFR BLD AUTO: 0.6 % (ref 0–0.5)
LYMPHOCYTES # BLD AUTO: 0.6 K/UL (ref 1–4.8)
LYMPHOCYTES NFR BLD: 11.6 % (ref 18–48)
MCH RBC QN AUTO: 25.2 PG (ref 27–31)
MCHC RBC AUTO-ENTMCNC: 31.5 G/DL (ref 32–36)
MCV RBC AUTO: 80 FL (ref 82–98)
MONOCYTES # BLD AUTO: 0.6 K/UL (ref 0.3–1)
MONOCYTES NFR BLD: 11.6 % (ref 4–15)
NEUTROPHILS # BLD AUTO: 3.4 K/UL (ref 1.8–7.7)
NEUTROPHILS NFR BLD: 71.8 % (ref 38–73)
NRBC BLD-RTO: 0 /100 WBC
PHOSPHATE SERPL-MCNC: 2.2 MG/DL (ref 2.7–4.5)
PLATELET # BLD AUTO: 135 K/UL (ref 150–350)
PMV BLD AUTO: 11.5 FL (ref 9.2–12.9)
POCT GLUCOSE: 188 MG/DL (ref 70–110)
POCT GLUCOSE: 214 MG/DL (ref 70–110)
POCT GLUCOSE: 233 MG/DL (ref 70–110)
POTASSIUM SERPL-SCNC: 3.8 MMOL/L (ref 3.5–5.1)
RBC # BLD AUTO: 4.84 M/UL (ref 4.6–6.2)
SODIUM SERPL-SCNC: 135 MMOL/L (ref 136–145)
WBC # BLD AUTO: 4.74 K/UL (ref 3.9–12.7)

## 2020-02-27 PROCEDURE — 85025 COMPLETE CBC W/AUTO DIFF WBC: CPT

## 2020-02-27 PROCEDURE — C9113 INJ PANTOPRAZOLE SODIUM, VIA: HCPCS | Performed by: FAMILY MEDICINE

## 2020-02-27 PROCEDURE — 25000003 PHARM REV CODE 250: Performed by: FAMILY MEDICINE

## 2020-02-27 PROCEDURE — 80048 BASIC METABOLIC PNL TOTAL CA: CPT

## 2020-02-27 PROCEDURE — 63600175 PHARM REV CODE 636 W HCPCS: Performed by: INTERNAL MEDICINE

## 2020-02-27 PROCEDURE — 84100 ASSAY OF PHOSPHORUS: CPT

## 2020-02-27 PROCEDURE — 36415 COLL VENOUS BLD VENIPUNCTURE: CPT

## 2020-02-27 PROCEDURE — 25000003 PHARM REV CODE 250: Performed by: INTERNAL MEDICINE

## 2020-02-27 PROCEDURE — 94761 N-INVAS EAR/PLS OXIMETRY MLT: CPT

## 2020-02-27 PROCEDURE — 63600175 PHARM REV CODE 636 W HCPCS: Performed by: HOSPITALIST

## 2020-02-27 PROCEDURE — 63600175 PHARM REV CODE 636 W HCPCS: Performed by: FAMILY MEDICINE

## 2020-02-27 RX ORDER — L. ACIDOPHILUS/L.BULGARICUS 100MM CELL
1 GRANULES IN PACKET (EA) ORAL 3 TIMES DAILY
Status: DISCONTINUED | OUTPATIENT
Start: 2020-02-27 | End: 2020-02-27 | Stop reason: HOSPADM

## 2020-02-27 RX ADMIN — INSULIN ASPART 2 UNITS: 100 INJECTION, SOLUTION INTRAVENOUS; SUBCUTANEOUS at 08:02

## 2020-02-27 RX ADMIN — NYSTATIN 500000 UNITS: 100000 SUSPENSION ORAL at 08:02

## 2020-02-27 RX ADMIN — POTASSIUM PHOSPHATE, MONOBASIC AND POTASSIUM PHOSPHATE, DIBASIC 30 MMOL: 224; 236 INJECTION, SOLUTION, CONCENTRATE INTRAVENOUS at 10:02

## 2020-02-27 RX ADMIN — NYSTATIN 500000 UNITS: 100000 SUSPENSION ORAL at 04:02

## 2020-02-27 RX ADMIN — LACTOBACILLUS ACIDOPHILUS / LACTOBACILLUS BULGARICUS 1 EACH: 100 MILLION CFU STRENGTH GRANULES at 03:02

## 2020-02-27 RX ADMIN — ENOXAPARIN SODIUM 40 MG: 100 INJECTION SUBCUTANEOUS at 04:02

## 2020-02-27 RX ADMIN — INSULIN ASPART 4 UNITS: 100 INJECTION, SOLUTION INTRAVENOUS; SUBCUTANEOUS at 04:02

## 2020-02-27 RX ADMIN — PANTOPRAZOLE SODIUM 40 MG: 40 INJECTION, POWDER, LYOPHILIZED, FOR SOLUTION INTRAVENOUS at 08:02

## 2020-02-27 RX ADMIN — NYSTATIN 500000 UNITS: 100000 SUSPENSION ORAL at 12:02

## 2020-02-27 RX ADMIN — INSULIN ASPART 4 UNITS: 100 INJECTION, SOLUTION INTRAVENOUS; SUBCUTANEOUS at 12:02

## 2020-02-27 NOTE — PLAN OF CARE
Re:  Bay Ibarra, WORK / SCHOOL EXCUSE    Date: 02/27/2020           [facility] [place] [facility] [address] [place] [address]  Office: [phone]  Fax: [phone]     To whom it may concern:    Mr. Bay Ibarra has been hospitalized at the [facility] [place] since 2/23/2020.  Please excuse the patient from duties.  Patient may return on 3/2/2020.  No restrictions.     Please contact me if you have any questions.                __________________________  Mandeep Díaz, DO

## 2020-02-27 NOTE — PLAN OF CARE
02/27/20 1606   Final Note   Assessment Type Final Discharge Note   Anticipated Discharge Disposition Home   What phone number can be called within the next 1-3 days to see how you are doing after discharge? [phone]   Hospital Follow Up  Appt(s) scheduled? Yes  (the appoinment desk states she will send the message to the pt's PCP office and they will call him with a 2 week hospital f/u b/c they have been booked)   Discharge plans and expectations educations in teach back method with documentation complete? Yes   Right Care Referral Info   Post Acute Recommendation No Care

## 2020-02-27 NOTE — PROGRESS NOTES
Progress Note  Nephrology      Consult Requested By: Mandeep Díaz DO      SUBJECTIVE:     Overnight events  Patient is a 39 y.o. male     Patient Active Problem List   Diagnosis    Essential hypertension    Obesity (BMI 30-39.9)    History of gout    Obstructive sleep apnea syndrome    Mixed hyperlipidemia    Type 2 diabetes mellitus with diabetic polyneuropathy, with long-term current use of insulin    Portal vein thrombosis    Pancreatic pseudocyst    Hypokalemia due to excessive gastrointestinal loss of potassium    Chronic pancreatitis    Type 2 diabetes mellitus with hyperglycemia, with long-term current use of insulin    Acute superficial gastritis without hemorrhage    Diabetic ketoacidosis without coma associated with type 2 diabetes mellitus    Diverticulosis of colon    Chloride-responsive metabolic alkalosis    Fatty liver    Gastric varices    Hypophosphatemia    Vomiting     Past Medical History:   Diagnosis Date    Acute pancreatitis     Diverticulosis of colon     Essential hypertension     Fatty liver     Gastric varices without bleeding 1/16/2020    Gout     Hx of acute pancreatitis 3/3/2017    Hypertriglyceridemia 8/2/2017    Obesity (BMI 30-39.9) 6/5/2015    Obesity (BMI 35.0-39.9 without comorbidity) 6/5/2015    Obstructive sleep apnea syndrome 12/18/2015    Type 2 diabetes mellitus with diabetic polyneuropathy, with long-term current use of insulin 10/16/2017    Type 2 diabetes mellitus with hyperglycemia, with long-term current use of insulin 8/3/2017              OBJECTIVE:     Vitals:    02/27/20 0715 02/27/20 0730 02/27/20 0745 02/27/20 0900   BP:    134/66   Pulse: 66 66 67 85   Resp: 17 20 20 (!) 26   Temp:       TempSrc:       SpO2: 98% 99% 96% 100%   Weight:       Height:           Temp: 98.5 °F (36.9 °C) (02/27/20 0700)  Pulse: 85 (02/27/20 0900)  Resp: (!) 26 (02/27/20 0900)  BP: 134/66 (02/27/20 0900)  SpO2: 100 % (02/27/20  0900)              Medications:   enoxaparin  40 mg Subcutaneous Daily    insulin detemir U-100  10 Units Subcutaneous QHS    lactobacillus acidophilus & bulgar  1 packet Oral TID    nystatin  500,000 Units Oral QID    pantoprazole  40 mg Intravenous BID    potassium phosphate IVPB  30 mmol Intravenous Once                 Physical Exam:  General appearance:NAD  No nausea  No vomiting  No diarrhea  Lungs: diminished breath sounds  Heart: Pulse 85  Abdomen: soft  Extremities: edema  Skin: dry    Laboratory:  ABG  Labs reviewed  Recent Results (from the past 336 hour(s))   Basic metabolic panel    Collection Time: 02/27/20  4:59 AM   Result Value Ref Range    Sodium 135 (L) 136 - 145 mmol/L    Potassium 3.8 3.5 - 5.1 mmol/L    Chloride 99 95 - 110 mmol/L    CO2 26 23 - 29 mmol/L    BUN, Bld 11 6 - 20 mg/dL    Creatinine 0.8 0.5 - 1.4 mg/dL    Calcium 8.5 (L) 8.7 - 10.5 mg/dL    Anion Gap 10 8 - 16 mmol/L   Basic metabolic panel    Collection Time: 02/26/20  6:17 PM   Result Value Ref Range    Sodium 135 (L) 136 - 145 mmol/L    Potassium 3.7 3.5 - 5.1 mmol/L    Chloride 98 95 - 110 mmol/L    CO2 28 23 - 29 mmol/L    BUN, Bld 13 6 - 20 mg/dL    Creatinine 0.8 0.5 - 1.4 mg/dL    Calcium 8.1 (L) 8.7 - 10.5 mg/dL    Anion Gap 9 8 - 16 mmol/L   Basic metabolic panel    Collection Time: 02/26/20  9:14 AM   Result Value Ref Range    Sodium 135 (L) 136 - 145 mmol/L    Potassium 3.7 3.5 - 5.1 mmol/L    Chloride 100 95 - 110 mmol/L    CO2 27 23 - 29 mmol/L    BUN, Bld 12 6 - 20 mg/dL    Creatinine 0.7 0.5 - 1.4 mg/dL    Calcium 8.1 (L) 8.7 - 10.5 mg/dL    Anion Gap 8 8 - 16 mmol/L     Recent Results (from the past 336 hour(s))   CBC auto differential    Collection Time: 02/27/20  4:59 AM   Result Value Ref Range    WBC 4.74 3.90 - 12.70 K/uL    Hemoglobin 12.2 (L) 14.0 - 18.0 g/dL    Hematocrit 38.7 (L) 40.0 - 54.0 %    Platelets 135 (L) 150 - 350 K/uL   CBC auto differential    Collection Time: 02/26/20  4:08 AM   Result  Value Ref Range    WBC 6.41 3.90 - 12.70 K/uL    Hemoglobin 11.3 (L) 14.0 - 18.0 g/dL    Hematocrit 37.1 (L) 40.0 - 54.0 %    Platelets 143 (L) 150 - 350 K/uL   CBC auto differential    Collection Time: 02/25/20  6:36 AM   Result Value Ref Range    WBC 9.63 3.90 - 12.70 K/uL    Hemoglobin 12.7 (L) 14.0 - 18.0 g/dL    Hematocrit 41.4 40.0 - 54.0 %    Platelets 163 150 - 350 K/uL     Urinalysis  No results for input(s): COLORU, CLARITYU, SPECGRAV, PHUR, PROTEINUA, GLUCOSEU, BILIRUBINCON, BLOODU, WBCU, RBCU, BACTERIA, MUCUS, NITRITE, LEUKOCYTESUR, UROBILINOGEN, HYALINECASTS in the last 24 hours.    Diagnostic Results:  X-Ray: Reviewed  US: Reviewed  Echo: Reviewed  ACCESS    ASSESSMENT/PLAN:     Kidney function stable  Metabolic alkalosis resolving  UO 1000 cc  K 3.7   Replace  Hypophosphatemia 2.2  Replace and recheck  Check magnesium  /66  Weight daily  I and O  Avoid hypovolemia

## 2020-02-27 NOTE — PROGRESS NOTES
Pulmonary & Critical Care Medicine   Progress Note    Reason for Consultation: ICU admission for severe electrolyte abnormalities    Subjective: Pt had no acute events overnight. He is feeling a lot better this morning. His appetite has improved.    Past Medical History:   Diagnosis Date    Acute pancreatitis     Diverticulosis of colon     Essential hypertension     Fatty liver     Gastric varices without bleeding 1/16/2020    Gout     Hx of acute pancreatitis 3/3/2017    Hypertriglyceridemia 8/2/2017    Obesity (BMI 30-39.9) 6/5/2015    Obesity (BMI 35.0-39.9 without comorbidity) 6/5/2015    Obstructive sleep apnea syndrome 12/18/2015    Type 2 diabetes mellitus with diabetic polyneuropathy, with long-term current use of insulin 10/16/2017    Type 2 diabetes mellitus with hyperglycemia, with long-term current use of insulin 8/3/2017     Past Surgical History:   Procedure Laterality Date    ENDOSCOPIC ULTRASOUND OF UPPER GASTROINTESTINAL TRACT N/A 5/27/2019    Procedure: ULTRASOUND, UPPER GI TRACT, ENDOSCOPIC;  Surgeon: Zafar Velazquez MD;  Location: [address]);  Service: Endoscopy;  Laterality: N/A;    ERCP N/A 5/27/2019    Procedure: ERCP (ENDOSCOPIC RETROGRADE CHOLANGIOPANCREATOGRAPHY);  Surgeon: Zafar Velazquez MD;  Location: [address]);  Service: Endoscopy;  Laterality: N/A;    ESOPHAGOGASTRODUODENOSCOPY      ESOPHAGOGASTRODUODENOSCOPY N/A 1/31/2020    Procedure: EGD (ESOPHAGOGASTRODUODENOSCOPY);  Surgeon: Zafar Velazquez MD;  Location: [facility];  Service: Endoscopy;  Laterality: N/A;     Social History:   Social History     Socioeconomic History    Marital status:      Spouse name: Not on file    Number of children: Not on file    Years of education: Not on file    Highest education level: Not on file   Occupational History    Not on file   Social Needs    Financial resource strain: Not on file    Food insecurity:     Worry: Not on file     Inability: Not on  file    Transportation needs:     Medical: Not on file     Non-medical: Not on file   Tobacco Use    Smoking status: Never Smoker    Smokeless tobacco: Never Used   Substance and Sexual Activity    Alcohol use: No    Drug use: Never    Sexual activity: Yes     Partners: Female   Lifestyle    Physical activity:     Days per week: Not on file     Minutes per session: Not on file    Stress: Not on file   Relationships    Social connections:     Talks on phone: Not on file     Gets together: Not on file     Attends Mandaen service: Not on file     Active member of club or organization: Not on file     Attends meetings of clubs or organizations: Not on file     Relationship status: Not on file   Other Topics Concern    Not on file   Social History Narrative    Not on file     Family History   Problem Relation Age of Onset    Aneurysm Mother         AAA    Coronary artery disease Father         4 vessel bypass at 40, possible stent at 36    Diabetes type II Father     No Known Problems Sister     No Known Problems Brother     Aneurysm Maternal Grandmother         Possible AAA    Diabetes type II Paternal Grandmother      Drug Allergies:   Review of patient's allergies indicates:  No Known Allergies  Current Infusions:    Scheduled Medications:     enoxaparin  40 mg Subcutaneous Daily    insulin detemir U-100  10 Units Subcutaneous QHS    nystatin  500,000 Units Oral QID    pantoprazole  40 mg Intravenous BID     PRN Medications:   acetaminophen, aluminum-magnesium hydroxide-simethicone, dextrose 10 % in water (D10W), dextrose 10 % in water (D10W), dextrose 50 % in water (D50W), dextrose 50 % in water (D50W), insulin aspart U-100, ondansetron, pneumoc 13-luanne conj-dip cr(PF), promethazine (PHENERGAN) IVPB, sodium chloride 0.9%, sodium chloride 0.9%, sodium chloride 0.9%    Review of Systems:   A comprehensive 12-point review of systems was performed, and is negative except for those items mentioned  above in the HPI section of this note.     Vital Signs:    Vitals:    02/26/20 1915   BP:    Pulse: 77   Resp: (!) 21   Temp:        Fluid Balance:     Intake/Output Summary (Last 24 hours) at 2/26/2020 1947  Last data filed at 2/26/2020 1900  Gross per 24 hour   Intake 1621.23 ml   Output 801 ml   Net 820.23 ml       Physical Exam:   General: NAD, cooperative & interactive.  HEENT: AT/NC, PERRL, EOMI, nasal and oral mucosa moist. Normal dentition. Oropharynx without exudate.   Neck: Supple without JVD. Trachea midline. Thyroid feels normal.   Cardiac: RRR with no MRG with brisk cap refill and symmetric pulses in distal extremities.  Respiratory: Normal inspection. Symmetric chest rise. Normal palpation and percussion. Auscultation clear bilaterally. No increased work of breathing noted.   Abdomen: Soft, NT/ND. +BS. No palpable masses. No hepatosplenomegaly.   Lymphatic: No palpable supraclavicular or cervical LAD.   Extremities: Normal strength and tone (grossly). No visible atrophy. No clubbing or cyanosis on nail exam.   Skin: Warm and dry without visible rash.   Neuro: Grossly intact to brief exam. Oriented with appropriate mood & affect.     Personal Review and Summary of Prior Diagnostics    Laboratory Studies:   No results for input(s): PH, PCO2, PO2, HCO3, POCSATURATED, BE in the last 24 hours.  Recent Labs   Lab 02/26/20  0408   WBC 6.41   RBC 4.51*   HGB 11.3*   HCT 37.1*   *   MCV 82   MCH 25.1*   MCHC 30.5*     Recent Labs   Lab 02/26/20  1817   *   K 3.7   CL 98   CO2 28   BUN 13   CREATININE 0.8   MG 2.1       Microbiology Data:   Microbiology Results (last 7 days)     Procedure Component Value Units Date/Time    Influenza A & B by Molecular [[id number]] Collected:  02/23/20 1839    Order Status:  Sent Specimen:  Nasopharyngeal Swab Updated:  02/23/20 1840    Influenza A & B by Molecular [[id number]] Collected:  02/23/20 1725    Order Status:  Completed Specimen:  Nasopharyngeal Swab  Updated:  02/23/20 2262     Influenza A, Molecular Negative     Influenza B, Molecular Negative     Flu A & B Source Nasal swab        Summary of Chest Imaging Personally Reviewed: CXR unremarkable    Impression & Recommendations    # Euglycemic DKA: Resolved Is a common complication of SGLT2-I. Pt's elevated beta-hydroxybutyrate, elevated anion gap are consistent with this. Suspect the reason his pH didn't show acidosis is because he had respiratory compensation AND a metabolic alkalosis from vomiting for 2 weeks. Pt's presentation also complicated by starvation ketosis.  - pt's gap closed. Was transitioned to basal/bolus insulin  - ok for pt to eat but need to watch out for refeeding syndrome.  - his phosphorus was severely low. Checking it daily and will continue to supplement  - would stop his SGLT2-I upon discharge    # metabolic alkalosis: Resolved. suspect his DKA triggered his vomiting and now his primary metabolic derangement on admission was a metabolic alkalosis from severe volume depletion. His Chloride was <70 (undetectable). Additionally, the amount of potassium he's required indicated that his whole body potassium storage was severely depleted  - pt's bicarb has improved with normalizing his chloride and potassium levels    # prolonged QTc: Pt's QTc was 600 at one point  - unfortunately need to hold off on any anti-emetic drugs  - keep treating his electrolytes with goal of K>4.0 and Mg >2.0    # TROY: Resolved Pt has normal kidneys at baseline. Presented with cr 1.8 likely pre-renal due to volume depletion. Cr has improved to baseline  - will continue to monitor renal function daily    Pulmonary will sign off at this time but please don't hesitate to contact us with any questions/concerns.    Luís Johnson MD PGY-V  [facility] Fellow  Pager [phone]    Pt seen and examined with Pulmonary/Critical Care team and this note reviewed and validated with the following additional  comments:    Much improved.  Switch to SQ insulin. Feed.  Step down.    Martinez Stoll MD  Phone [phone]

## 2020-02-27 NOTE — DISCHARGE SUMMARY
[facility] Medicine  Discharge Summary      Patient Name: Bay Ibarra  MRN: [id number]  Admission Date: 2/23/2020  Hospital Length of Stay: 4 days  Discharge Date and Time: No discharge date for patient encounter.  Attending Physician: Mandeep Díaz DO   Discharging Provider: Mandeep Díaz DO  Primary Care Provider: Komal Pierce MD      HPI:   Bay Ibarra is a 39 year old white man with diabetes mellitus type 2 with polyneuropathy (treated with insulin), hypertension, hyperlipidemia, history of gout, obstructive sleep apnea, chronic pancreatitis, colon diverticulosis, fatty liver, gastric varices, history of choledocholithiasis on 5/26/19, chronic portal vein thrombosis since 5/27/19, history of pancreatic pseudocyst. He lives in [place], [place]. His primary care physician is Dr. Komal Pierce.    He was seen at [facility] clinic on 2/11/20 for abdominal pain, nausea, and vomiting. He was advised to stop liraglutide due to risk of pancreatitis.   He was seen at [facility] Emergency Department on 2/18/20 for persistent symptoms. Labs showed hypernatremia (sodium 147 mmol/L), hyponatremia (potassium 3.3 mmol/L), metabolic alkalosis (bicarbonate 35 mmol/L), elevated lipase (551 U/L). He was told that his symptoms were due to his diabetes.   He presented to [facility] Department on 2/23/20 with persistent symptoms along with 20 pound weight loss in 2 weeks. Abdominal ultrasound showed nothing new. Labs showed worsened hypokalemia (potassium 2.2), hypochloremic metabolic acidosis (chloride <70 mmol/L, bicarbonate 45), elevated anion gap, elevated BUN and creatinine, elevated calcium, elevated beta-hydroxybutyrate (2.9 mmol/L). He was given 3 liters of normal saline, potassium chloride, dicyclomine, and ondansetron. Pulmonology-Critical Care was consulted and felt that his empagliflozin, a SGLT2-inhibitor,  could be causing euglycemic diabetic ketoacidosis. He was prescribed empaglozin on 9/15/19 and had elevated anion gap on 11/14/19, 12/26/19, 1/7/20, and 2/18/20. Vomiting explained his hypochloremic metabolic alkalosis. EKG showed prolonged QTc of 560 ms. He was admitted to [facility].    * No surgery found *      Hospital Course:   2/27 pt is feeling good, asking autumn go home, he feels he is at his baseline, eating and drinking fluid well, tolerating well.  Ok for pt to be dc home with his home meds  He has a PCp, will set him up with an endocrinology f/u     Consults:   Consults (From admission, onward)        Status Ordering Provider     Inpatient consult to Critical Care Medicine  Once     Provider:  (Not yet assigned)    Completed KATELYNN FAYE     Inpatient consult to [facility] ([place], [place])  Once     Provider:  (Not yet assigned)    Completed VANESSA WEBB          Type 2 diabetes mellitus with diabetic polyneuropathy, with long-term current use of insulin    2/27 continue home meds on Dc    Mixed hyperlipidemia  2/27 continue home meds on Dc  Takes atorvastatin, icsapent, niacin, fenofibrate.      Obstructive sleep apnea syndrome    2/27 continue home meds on Dc    Essential hypertension  2/27 continue home meds on Dc   losartan.      Final Active Diagnoses:    Diagnosis Date Noted POA    Type 2 diabetes mellitus with diabetic polyneuropathy, with long-term current use of insulin [E11.42, Z79.4] 10/16/2017 Not Applicable     Chronic    Mixed hyperlipidemia [E78.2] 08/02/2017 Yes     Chronic    Obstructive sleep apnea syndrome [G47.33] 12/18/2015 Yes     Chronic    Essential hypertension [I10]  Yes     Chronic      Problems Resolved During this Admission:    Diagnosis Date Noted Date Resolved POA    PRINCIPAL PROBLEM:  Diabetic ketoacidosis without coma associated with type 2 diabetes mellitus [E11.10] 02/23/2020 02/27/2020 Yes    Vomiting  [R11.10] 02/27/2020 02/27/2020 Yes    Hypophosphatemia [E83.39] 02/25/2020 02/27/2020 Yes    Diverticulosis of colon [K57.30]  02/27/2020 Yes     Chronic    Fatty liver [K76.0]  02/27/2020 Yes     Chronic    Gastric varices [I86.4]  02/27/2020 Yes     Chronic    Chloride-responsive metabolic alkalosis [E87.3] 02/23/2020 02/27/2020 Yes    Chronic pancreatitis [K86.1]  02/27/2020 Yes     Chronic    Hypokalemia due to excessive gastrointestinal loss of potassium [E87.6] 09/01/2019 02/27/2020 Yes    Portal vein thrombosis [I81] 08/29/2019 02/27/2020 Yes     Chronic    History of gout [Z87.39] 06/05/2015 02/27/2020 Yes       Discharged Condition: stable    Disposition: Home or Self Care    Follow Up:  Follow-up Information     Komal Pierce MD In 3 days.    Specialty:  Internal Medicine  Contact information:  [address] [place] [address] [phone]                 Patient Instructions:      Ambulatory referral/consult to Endocrinology   Standing Status: Future   Referral Priority: Routine Referral Type: Consultation   Requested Specialty: Endocrinology   Number of Visits Requested: 1     Diet diabetic     Notify your health care provider if you experience any of the following:  temperature >100.4     Notify your health care provider if you experience any of the following:  persistent nausea and vomiting or diarrhea     Notify your health care provider if you experience any of the following:  difficulty breathing or increased cough     Notify your health care provider if you experience any of the following:  severe persistent headache     Notify your health care provider if you experience any of the following:  worsening rash     Notify your health care provider if you experience any of the following:  increased confusion or weakness     Notify your health care provider if you experience any of the following:  persistent dizziness, light-headedness, or visual disturbances      Activity as tolerated       Significant Diagnostic Studies: Labs:   BMP:   Recent Labs   Lab 02/26/20  0914 02/26/20  1817 02/27/20  0459   * 233* 257*   * 135* 135*   K 3.7 3.7 3.8    98 99   CO2 27 28 26   BUN 12 13 11   CREATININE 0.7 0.8 0.8   CALCIUM 8.1* 8.1* 8.5*   MG 1.9 2.1  --    , CMP   Recent Labs   Lab 02/26/20  0914 02/26/20  1817 02/27/20  0459   * 135* 135*   K 3.7 3.7 3.8    98 99   CO2 27 28 26   * 233* 257*   BUN 12 13 11   CREATININE 0.7 0.8 0.8   CALCIUM 8.1* 8.1* 8.5*   ANIONGAP 8 9 10   ESTGFRAFRICA >60 >60 >60   EGFRNONAA >60 >60 >60    and CBC   Recent Labs   Lab 02/26/20  0408 02/27/20  0459   WBC 6.41 4.74   HGB 11.3* 12.2*   HCT 37.1* 38.7*   * 135*       Pending Diagnostic Studies:     None         Medications:  Reconciled Home Medications:      Medication List      CONTINUE taking these medications    allopurinoL 300 MG tablet  Commonly known as:  ZYLOPRIM  TAKE 1 TABLET BY MOUTH ONCE DAILY     aspirin 81 MG EC tablet  Commonly known as:  ECOTRIN  Take 81 mg by mouth once daily.     atorvastatin 40 MG tablet  Commonly known as:  LIPITOR  Take 1 tablet (40 mg total) by mouth once daily.     diclofenac sodium 1 % Gel  Commonly known as:  VOLTAREN  Apply 2 g topically 4 (four) times daily. Apply to chest in the location of pain up to 4 times a day as needed for pain.     fenofibrate 160 MG Tab  Take 1 tablet (160 mg total) by mouth once daily.     flash glucose scanning reader Misc  Commonly known as:  FreeStyle Celia 14 Day Wickes  1 kit by Misc.(Non-Drug; Combo Route) route 3 (three) times daily as needed.     flash glucose sensor Kit  Commonly known as:  FreeStyle Celia 14 Day Sensor  1 each by Misc.(Non-Drug; Combo Route) route every 14 (fourteen) days.     icosapent ethyl 1 gram Cap  Commonly known as:  VASCEPA  Take 2 g by mouth 2 (two) times daily.     insulin aspart U-100 100 unit/mL (3 mL) Inpn pen  Commonly known as:  NovoLOG  "Flexpen U-100 Insulin  INJECT 20 UNITS SUBCUTANEOUSLY WITH MEALS PLUS SLIDING SCALE 150-200+2, 201-250 +4, 251-300 +6, 301-350 +8,>350 +10. MAX OF 90 UNITS PER DA     insulin glargine 100 units/mL (3mL) SubQ pen  Commonly known as:  Lantus Solostar U-100 Insulin  Inject 52 Units into the skin every evening.     insulin syringe-needle U-100 29 gauge x 1/2" Syrg  Inject 1 (once) per day     Jardiance 10 mg tablet  Generic drug:  empagliflozin  Take 1 tablet (10 mg) by mouth once daily.     losartan 100 MG tablet  Commonly known as:  COZAAR  TAKE 1/2 (ONE-HALF) TABLET BY MOUTH ONCE DAILY     metoclopramide HCl 10 MG tablet  Commonly known as:  REGLAN  Take 1 tablet (10 mg total) by mouth every 6 (six) hours as needed (nausea).     niacin 100 MG Tab  Take 200 mg by mouth every evening.     ondansetron 4 MG tablet  Commonly known as:  ZOFRAN  Take 1 tablet (4 mg total) by mouth every 6 (six) hours as needed for Nausea.     pantoprazole 40 MG tablet  Commonly known as:  PROTONIX  Take 1 tablet (40 mg total) by mouth once daily.     pregabalin 150 MG capsule  Commonly known as:  LYRICA  Take 1 capsule (150 mg total) by mouth 2 (two) times daily.            Indwelling Lines/Drains at time of discharge:   Lines/Drains/Airways     None                 Time spent on the discharge of patient: 43 minutes  Patient was seen and examined on the date of discharge and determined to be suitable for discharge.    Critical care time spent on the evaluation and treatment of severe organ dysfunction, review of pertinent labs and imaging studies, discussions with consulting providers and discussions with patient/family: 45 minutes.     Mandeep Díaz DO  Department of Hospital Medicine  [facility]  "

## 2020-02-27 NOTE — HOSPITAL COURSE
2/27 pt is feeling good, asking autumn go home, he feels he is at his baseline, eating and drinking fluid well, tolerating well.  Ok for pt to be dc home with his home meds  He has a PCp, will set him up with an endocrinology f/u

## 2020-02-27 NOTE — TELEPHONE ENCOUNTER
----- Message from Shannan Joseph sent at 2/27/2020  4:04 PM CST -----  Contact: [phone] Carole barraza/ [facility]  Patient needs to be seen sooner than the next available appointment for a hospital f/u - within 2 weeks from today. Please call patient and advise.

## 2020-03-02 ENCOUNTER — PATIENT OUTREACH (OUTPATIENT)
Dept: ADMINISTRATIVE | Facility: CLINIC | Age: 40
End: 2020-03-02

## 2020-03-02 NOTE — PATIENT INSTRUCTIONS
Type 1 Diabetes and Your Child: Preventing Diabetic Ketoacidosis (DKA)     Know when to call your child's healthcare provider.     Diabetic ketoacidosis (DKA) is a serious complication of diabetes. It can lead to coma or death. A child with DKA has:  · High blood sugar (hyperglycemia)  · An imbalance of chemicals in the blood (metabolic acidosis)  · High levels of ketones in the blood and urine  Ketones are the waste product when the body breaks down fat for energy. This happens when there isn't enough insulin and the body isn't able to use sugar (glucose). Ketones can build up in the blood and then in the urine. Ketosis is a warning sign of DKA.  DKA is more common in children with type 1 diabetes. But, it can also occur in children with type 2 diabetes. DKA is a medical emergency.  If your child has high ketones and symptoms of DKA described below, call 911 or take him or her to the hospital emergency department.  What are the causes of DKA?  The most common causes of DKA are:  · Missing a dose of insulin  · Illness (flu, cold, or infection)  · An insulin pump that is not working properly  · Insulin that has  or has not been stored properly  What are the symptoms of DKA?  If your child has high ketones in the blood or urine and symptoms of DKA, call 911 or go to the hospital emergency department. Symptoms of DKA include:  · Nausea  · Vomiting  · Fruity-smelling breath  · Stomach cramps  · Very dark urine or no urine in 6 hours  · Fast breathing  · Thirst or very dry mouth  · Drowsiness, confusion, or unresponsiveness   When to check for ketones  Check for ketones in your child's urine or blood as instructed by his or her healthcare provider. In general, check for ketones when your child has any of the above symptoms, or has:  · Blood sugar above 250 mg/dL.  · Diarrhea or vomiting.  · Fever of 100.4°F (38°C) or higher, or as directed by your child's healthcare provider.  How to check for ketones  Ask your  childs healthcare provider to show you how to check for ketones at home. Ketone testing is most often done with urine test strips. Some blood glucose meters may also be used to check for ketones in the blood. Ask your child's healthcare provider for more information.  · For babies or toddlers. You can put a cotton ball in your child's diaper to absorb urine. Then, put the moist cotton ball onto a test strip to check for ketones. Follow the directions on the test strip package.  · For older children. Follow the directions on the test strip package.  Preventing DKA  DKA can usually be prevented. The best way to do this is to give your child insulin as directed. Be sure to follow your childs treatment plan as given to you by the healthcare provider. When your childs blood sugar is high, treat him or her right away. Remember that your childs blood sugar can be harder to manage when he or she is sick. To be safe, check your childs blood sugar more often when he or she is sick. Ask the healthcare provider for sick-day guidelines. This includes learning to adjust your childs insulin dose safely. And always keep a sick-day box available. This box should include:  · Ketone strips  · Thermometer  · Cans of soup  · Crackers  · Juice with or without sugar  · Flavored gelatin with and without sugar  · Frozen juice bars with and without sugar (in the freezer)  Be sure to check the expiration dates sick-day box items once a month. Replace items as needed.  Call your childs healthcare provider  Contact your child's healthcare provider right away if:  · You're not sure how much insulin to give when your child is sick.  · Your child's blood sugar is higher than usual or over 250 mg/dL and doesn't go down after getting insulin.  · Your child's blood sugar level is lower than usual or less than 70 mg/dL.  · Your child's blood or urine has ketones.  If you test your child for ketones and think he or she has ketoacidosis, call  911 or take him or her to the hospital emergency department right away.   Resources  For more information about diabetes, visit these websites:  · American Diabetes Association www.diabetes.org  · Children with Diabetes www.childrenwithdiabetes.com  · Juvenile Diabetes Research Foundation www.jdrf.org  · American Association of Diabetes Educators www.aadenet.org  · American Association of Clinical Endocrinologists www.aace.com  · National Diabetes Information Clearinghouse www.diabetes.niddk.nih.gov  Date Last Reviewed: 7/1/2016 © 2000-2017 [employer]. [address] [place] [address], [place] [address]. All rights reserved. This information is not intended as a substitute for professional medical care. Always follow your healthcare professional's instructions.

## 2020-03-09 ENCOUNTER — PATIENT MESSAGE (OUTPATIENT)
Dept: INTERNAL MEDICINE | Facility: CLINIC | Age: 40
End: 2020-03-09

## 2020-03-12 ENCOUNTER — OFFICE VISIT (OUTPATIENT)
Dept: FAMILY MEDICINE | Facility: CLINIC | Age: 40
End: 2020-03-12
Payer: COMMERCIAL

## 2020-03-12 VITALS
HEART RATE: 97 BPM | WEIGHT: 179.44 LBS | HEIGHT: 67 IN | BODY MASS INDEX: 28.16 KG/M2 | OXYGEN SATURATION: 98 % | DIASTOLIC BLOOD PRESSURE: 72 MMHG | TEMPERATURE: 98 F | SYSTOLIC BLOOD PRESSURE: 130 MMHG

## 2020-03-12 DIAGNOSIS — E10.65 TYPE 1 DIABETES MELLITUS WITH HYPERGLYCEMIA: ICD-10-CM

## 2020-03-12 DIAGNOSIS — E10.40 TYPE 1 DIABETES MELLITUS WITH DIABETIC NEUROPATHY: ICD-10-CM

## 2020-03-12 DIAGNOSIS — K31.84 GASTROPARESIS: Primary | ICD-10-CM

## 2020-03-12 DIAGNOSIS — K86.3 PANCREATIC PSEUDOCYST: ICD-10-CM

## 2020-03-12 DIAGNOSIS — I10 ESSENTIAL HYPERTENSION: Chronic | ICD-10-CM

## 2020-03-12 DIAGNOSIS — E78.2 MIXED HYPERLIPIDEMIA: Chronic | ICD-10-CM

## 2020-03-12 DIAGNOSIS — K29.00 ACUTE SUPERFICIAL GASTRITIS WITHOUT HEMORRHAGE: ICD-10-CM

## 2020-03-12 PROBLEM — Z79.4 TYPE 2 DIABETES MELLITUS WITH HYPERGLYCEMIA, WITH LONG-TERM CURRENT USE OF INSULIN: Status: RESOLVED | Noted: 2020-02-11 | Resolved: 2020-03-12

## 2020-03-12 PROBLEM — Z79.4 TYPE 2 DIABETES MELLITUS WITH DIABETIC POLYNEUROPATHY, WITH LONG-TERM CURRENT USE OF INSULIN: Chronic | Status: RESOLVED | Noted: 2017-10-16 | Resolved: 2020-03-12

## 2020-03-12 PROBLEM — E11.42 TYPE 2 DIABETES MELLITUS WITH DIABETIC POLYNEUROPATHY, WITH LONG-TERM CURRENT USE OF INSULIN: Chronic | Status: RESOLVED | Noted: 2017-10-16 | Resolved: 2020-03-12

## 2020-03-12 PROBLEM — E11.65 TYPE 2 DIABETES MELLITUS WITH HYPERGLYCEMIA, WITH LONG-TERM CURRENT USE OF INSULIN: Status: RESOLVED | Noted: 2020-02-11 | Resolved: 2020-03-12

## 2020-03-12 PROCEDURE — 3075F PR MOST RECENT SYSTOLIC BLOOD PRESS GE 130-139MM HG: ICD-10-PCS | Mod: CPTII,S$GLB,, | Performed by: INTERNAL MEDICINE

## 2020-03-12 PROCEDURE — 99215 OFFICE O/P EST HI 40 MIN: CPT | Mod: S$GLB,,, | Performed by: INTERNAL MEDICINE

## 2020-03-12 PROCEDURE — 3008F PR BODY MASS INDEX (BMI) DOCUMENTED: ICD-10-PCS | Mod: CPTII,S$GLB,, | Performed by: INTERNAL MEDICINE

## 2020-03-12 PROCEDURE — 3075F SYST BP GE 130 - 139MM HG: CPT | Mod: CPTII,S$GLB,, | Performed by: INTERNAL MEDICINE

## 2020-03-12 PROCEDURE — 3046F HEMOGLOBIN A1C LEVEL >9.0%: CPT | Mod: CPTII,S$GLB,, | Performed by: INTERNAL MEDICINE

## 2020-03-12 PROCEDURE — 3008F BODY MASS INDEX DOCD: CPT | Mod: CPTII,S$GLB,, | Performed by: INTERNAL MEDICINE

## 2020-03-12 PROCEDURE — 99999 PR PBB SHADOW E&M-EST. PATIENT-LVL IV: ICD-10-PCS | Mod: PBBFAC,,, | Performed by: INTERNAL MEDICINE

## 2020-03-12 PROCEDURE — 99999 PR PBB SHADOW E&M-EST. PATIENT-LVL IV: CPT | Mod: PBBFAC,,, | Performed by: INTERNAL MEDICINE

## 2020-03-12 PROCEDURE — 3046F PR MOST RECENT HEMOGLOBIN A1C LEVEL > 9.0%: ICD-10-PCS | Mod: CPTII,S$GLB,, | Performed by: INTERNAL MEDICINE

## 2020-03-12 PROCEDURE — 3078F DIAST BP <80 MM HG: CPT | Mod: CPTII,S$GLB,, | Performed by: INTERNAL MEDICINE

## 2020-03-12 PROCEDURE — 3078F PR MOST RECENT DIASTOLIC BLOOD PRESSURE < 80 MM HG: ICD-10-PCS | Mod: CPTII,S$GLB,, | Performed by: INTERNAL MEDICINE

## 2020-03-12 PROCEDURE — 99215 PR OFFICE/OUTPT VISIT, EST, LEVL V, 40-54 MIN: ICD-10-PCS | Mod: S$GLB,,, | Performed by: INTERNAL MEDICINE

## 2020-03-12 RX ORDER — PANTOPRAZOLE SODIUM 40 MG/1
40 TABLET, DELAYED RELEASE ORAL 2 TIMES DAILY
Qty: 180 TABLET | Refills: 1 | Status: SHIPPED | OUTPATIENT
Start: 2020-03-12 | End: 2020-10-16 | Stop reason: SDUPTHER

## 2020-03-12 RX ORDER — INSULIN GLARGINE 100 [IU]/ML
60 INJECTION, SOLUTION SUBCUTANEOUS NIGHTLY
Qty: 18 ML | Refills: 6
Start: 2020-03-12 | End: 2020-08-18 | Stop reason: SDUPTHER

## 2020-03-12 RX ORDER — PREGABALIN 150 MG/1
150 CAPSULE ORAL 2 TIMES DAILY
Qty: 60 CAPSULE | Refills: 0 | Status: SHIPPED | OUTPATIENT
Start: 2020-03-12 | End: 2020-04-23 | Stop reason: SDUPTHER

## 2020-03-16 NOTE — PROGRESS NOTES
Hospital follow-up VISIT INTERNAL MEDICINE    Patient Active Problem List   Diagnosis    Essential hypertension    Type 1 diabetes mellitus with diabetic neuropathy    Obesity (BMI 30-39.9)    Obstructive sleep apnea syndrome    Mixed hyperlipidemia    Pancreatic pseudocyst    Acute superficial gastritis without hemorrhage    Gastroparesis     Family and/or Caretaker present at visit?  No.  Diagnostic tests reviewed/disposition: No diagnosic tests pending after this hospitalization.  Disease/illness education: we talked about diabetic gastroparesis  Home health/community services discussion/referrals: Patient does not have home health established from hospital visit.  They do not need home health.  If needed, we will set up home health for the patient.   Establishment or re-establishment of referral orders for community resources: No other necessary community resources.   Discussion with other health care providers: No discussion with other health care providers necessary.     CC:   Chief Complaint   Patient presents with    Hospital Follow Up       HPI: Bay Ibarra   is a 39 y.o. male   I have reviewed the PMH, SH and FH for this patient    He  has a past medical history of Acute pancreatitis, Diverticulosis of colon, Essential hypertension, Fatty liver, Gastric varices without bleeding (1/16/2020), Gout, acute pancreatitis (3/3/2017), Hypertriglyceridemia (8/2/2017), Obesity (BMI 30-39.9) (6/5/2015), Obesity (BMI 35.0-39.9 without comorbidity) (6/5/2015), Obstructive sleep apnea syndrome (12/18/2015), Type 2 diabetes mellitus with diabetic polyneuropathy, with long-term current use of insulin (10/16/2017), and Type 2 diabetes mellitus with hyperglycemia, with long-term current use of insulin (8/3/2017).     The patient presents today for follow-up of chronic conditions. Hewas in ICU for a few days because he developed diabetic ketoacidosis. He has had pancreatitis, so he was taken off victoza. He  was put on jardiance instead and he developed DKA. He was having nausea and vomiting and his electrolytes got out of balance. He also appears to have diabetic gastroparesis. He has a prescription for reglan, but he has not been taking it. He states that he did vomit today. The emesis contained food contents from lunch and breakfast and supper from the evening before. His last HgbA1c was 10.0. He has an appointment with endocrinology, but it is not until May 22nd. He is trying to work on his diet. He is supposed to follow-up with Dr Velazquez the GI doctor next week. He uses sliding scale for his fast-acting insulin. He is complaining of persistent reflux despite taking pantoprazole.     The patient denies chest pain, shortness of breath, nausea, or dizziness.         ROS: Review of Systems   Constitutional: Positive for fatigue. Negative for chills and fever.   HENT: Negative for congestion, ear discharge, ear pain, rhinorrhea and sore throat.    Eyes: Negative for discharge, redness and itching.   Respiratory: Negative for cough, chest tightness, shortness of breath and wheezing.    Cardiovascular: Negative for chest pain, palpitations and leg swelling.   Gastrointestinal: Positive for nausea. Negative for abdominal pain, constipation, diarrhea and vomiting.   Endocrine: Negative for cold intolerance and heat intolerance.   Genitourinary: Negative for dysuria, flank pain, frequency and hematuria.   Musculoskeletal: Negative for arthralgias, back pain, joint swelling and myalgias.   Skin: Negative for color change and rash.   Neurological: Negative for dizziness, tremors, numbness and headaches.   Psychiatric/Behavioral: Negative for dysphoric mood and sleep disturbance. The patient is not nervous/anxious.        PMHX:   Past Medical History:   Diagnosis Date    Acute pancreatitis     Diverticulosis of colon     Essential hypertension     Fatty liver     Gastric varices without bleeding 1/16/2020    Gout     Hx of  acute pancreatitis 3/3/2017    Hypertriglyceridemia 8/2/2017    Obesity (BMI 30-39.9) 6/5/2015    Obesity (BMI 35.0-39.9 without comorbidity) 6/5/2015    Obstructive sleep apnea syndrome 12/18/2015    Type 2 diabetes mellitus with diabetic polyneuropathy, with long-term current use of insulin 10/16/2017    Type 2 diabetes mellitus with hyperglycemia, with long-term current use of insulin 8/3/2017       PSHX:   Past Surgical History:   Procedure Laterality Date    ENDOSCOPIC ULTRASOUND OF UPPER GASTROINTESTINAL TRACT N/A 5/27/2019    Procedure: ULTRASOUND, UPPER GI TRACT, ENDOSCOPIC;  Surgeon: Zafar Velazquez MD;  Location: [facility] ([address] [place] [address]);  Service: Endoscopy;  Laterality: N/A;    ERCP N/A 5/27/2019    Procedure: ERCP (ENDOSCOPIC RETROGRADE CHOLANGIOPANCREATOGRAPHY);  Surgeon: Zafar Velazquez MD;  Location: [facility] ([address] [place] [address]);  Service: Endoscopy;  Laterality: N/A;    ESOPHAGOGASTRODUODENOSCOPY      ESOPHAGOGASTRODUODENOSCOPY N/A 1/31/2020    Procedure: EGD (ESOPHAGOGASTRODUODENOSCOPY);  Surgeon: Zafar Velazquez MD;  Location[facility];  Service: Endoscopy;  Laterality: N/A;       FAMHX:   Family History   Problem Relation Age of Onset    Aneurysm Mother         AAA    Coronary artery disease Father         4 vessel bypass at 40, possible stent at 36    Diabetes type II Father     No Known Problems Sister     No Known Problems Brother     Aneurysm Maternal Grandmother         Possible AAA    Diabetes type II Paternal Grandmother        SOCHX:   Social History     Socioeconomic History    Marital status:      Spouse name: Not on file    Number of children: Not on file    Years of education: Not on file    Highest education level: Not on file   Occupational History    Not on file   Social Needs    Financial resource strain: Not on file    Food insecurity:     Worry: Not on file     Inability: Not on file    Transportation needs:     Medical: Not on file     Non-medical: Not on file    Tobacco Use    Smoking status: Never Smoker    Smokeless tobacco: Never Used   Substance and Sexual Activity    Alcohol use: No    Drug use: Never    Sexual activity: Yes     Partners: Female   Lifestyle    Physical activity:     Days per week: Not on file     Minutes per session: Not on file    Stress: Not on file   Relationships    Social connections:     Talks on phone: Not on file     Gets together: Not on file     Attends Synagogue service: Not on file     Active member of club or organization: Not on file     Attends meetings of clubs or organizations: Not on file     Relationship status: Not on file   Other Topics Concern    Not on file   Social History Narrative    Not on file       ALLERGIES: Review of patient's allergies indicates:  No Known Allergies    MEDS:   Current Outpatient Medications:     allopurinol (ZYLOPRIM) 300 MG tablet, TAKE 1 TABLET BY MOUTH ONCE DAILY, Disp: 30 tablet, Rfl: 5    aspirin (ECOTRIN) 81 MG EC tablet, Take 81 mg by mouth once daily., Disp: , Rfl:     atorvastatin (LIPITOR) 40 MG tablet, Take 1 tablet (40 mg total) by mouth once daily., Disp: 90 tablet, Rfl: 1    diclofenac sodium (VOLTAREN) 1 % Gel, Apply 2 g topically 4 (four) times daily. Apply to chest in the location of pain up to 4 times a day as needed for pain., Disp: 100 g, Rfl: 0    fenofibrate 160 MG Tab, Take 1 tablet (160 mg total) by mouth once daily., Disp: 90 tablet, Rfl: 0    flash glucose scanning reader (FREESTYLE DEBBIE 14 DAY READER) Misc, 1 kit by Misc.(Non-Drug; Combo Route) route 3 (three) times daily as needed., Disp: 1 each, Rfl: 0    flash glucose sensor (FREESTYLE DEBBIE 14 DAY SENSOR) Kit, 1 each by Misc.(Non-Drug; Combo Route) route every 14 (fourteen) days., Disp: 2 kit, Rfl: 11    icosapent ethyl (VASCEPA) 1 gram Cap, Take 2 g by mouth 2 (two) times daily., Disp: 360 capsule, Rfl: 3    insulin (LANTUS SOLOSTAR U-100 INSULIN) glargine 100 units/mL (3mL) SubQ pen, Inject 60 Units  "into the skin every evening., Disp: 18 mL, Rfl: 6    insulin aspart U-100 (NOVOLOG FLEXPEN U-100 INSULIN) 100 unit/mL (3 mL) InPn pen, INJECT 20 UNITS SUBCUTANEOUSLY WITH MEALS PLUS SLIDING SCALE 150-200+2, 201-250 +4, 251-300 +6, 301-350 +8,>350 +10. MAX OF 90 UNITS PER DA, Disp: 30 Syringe, Rfl: 4    insulin syringe-needle U-100 29 gauge x 1/2" Syrg, Inject 1 (once) per day, Disp: 100 Syringe, Rfl: 3    losartan (COZAAR) 100 MG tablet, TAKE 1/2 (ONE-HALF) TABLET BY MOUTH ONCE DAILY, Disp: 45 tablet, Rfl: 3    metoclopramide HCl (REGLAN) 10 MG tablet, Take 1 tablet (10 mg total) by mouth every 6 (six) hours as needed (nausea)., Disp: 30 tablet, Rfl: 0    niacin 100 MG Tab, Take 200 mg by mouth every evening. , Disp: , Rfl:     ondansetron (ZOFRAN) 4 MG tablet, Take 1 tablet (4 mg total) by mouth every 6 (six) hours as needed for Nausea., Disp: 30 tablet, Rfl: 1    pantoprazole (PROTONIX) 40 MG tablet, Take 1 tablet (40 mg total) by mouth 2 (two) times daily., Disp: 180 tablet, Rfl: 1    pregabalin (LYRICA) 150 MG capsule, Take 1 capsule (150 mg total) by mouth 2 (two) times daily., Disp: 60 capsule, Rfl: 0    OBJECTIVE:   Vitals:    03/12/20 1558   BP: 130/72   BP Location: Right arm   Patient Position: Sitting   BP Method: Medium (Manual)   Pulse: 97   Temp: 98.2 °F (36.8 °C)   TempSrc: Oral   SpO2: 98%   Weight: 81.4 kg (179 lb 7.3 oz)   Height: 5' 7" (1.702 m)     Body mass index is 28.11 kg/m².    Physical Exam      Depression Patient Health Questionnaire 3/12/2020 2/11/2020 1/7/2020 11/21/2019 10/8/2019 9/5/2019 6/4/2019   Over the last two weeks how often have you been bothered by little interest or pleasure in doing things 0 0 0 0 0 0 0   Over the last two weeks how often have you been bothered by feeling down, depressed or hopeless 0 0 0 0 0 0 0   PHQ-2 Total Score 0 0 0 0 0 0 0       PERTINENT RESULTS:   None    ASSESSMENT:  Problem List Items Addressed This Visit        Cardiac/Vascular    " Essential hypertension (Chronic) - BP looks good. Continue current medications. We will check labs. Continue to work on diet to reduce salt and exercise 3 times a week for 30 minutes a day.      Mixed hyperlipidemia (Chronic) - Cholesterol looks good. For most people, we would like the LDL to be less than 110. Try to reduce fats in your diet and exercise 3 times a week.        Endocrine    Type 1 diabetes mellitus with diabetic neuropathy - he has an appointment with endocrinology May 22nd. Continue sliding scale and working on diet.     Relevant Medications    insulin (LANTUS SOLOSTAR U-100 INSULIN) glargine 100 units/mL (3mL) SubQ pen    pregabalin (LYRICA) 150 MG capsule    Other Relevant Orders    Comprehensive metabolic panel    RESOLVED: Type 2 diabetes mellitus with hyperglycemia, with long-term current use of insulin    Relevant Medications    insulin (LANTUS SOLOSTAR U-100 INSULIN) glargine 100 units/mL (3mL) SubQ pen       GI    Pancreatic pseudocyst - stay off victoza. Follow-up with GI.     Relevant Orders    Amylase    Acute superficial gastritis without hemorrhage - increase pantoprazole to BID.     Relevant Medications    pantoprazole (PROTONIX) 40 MG tablet    Gastroparesis - Primary - start taking reglan twice a day . Follow-up with GI.           PLAN:   Orders Placed This Encounter    Amylase    Comprehensive metabolic panel    insulin (LANTUS SOLOSTAR U-100 INSULIN) glargine 100 units/mL (3mL) SubQ pen    pantoprazole (PROTONIX) 40 MG tablet    pregabalin (LYRICA) 150 MG capsule           Follow-up with me in 1 month.   Time spent > 40 minutes      Josefina Pierce MD, FACP  Internal Medicine

## 2020-03-26 RX ORDER — ALLOPURINOL 300 MG/1
300 TABLET ORAL DAILY
Qty: 30 TABLET | Refills: 5 | Status: SHIPPED | OUTPATIENT
Start: 2020-03-26 | End: 2020-10-16 | Stop reason: SDUPTHER

## 2020-03-27 ENCOUNTER — TELEPHONE (OUTPATIENT)
Dept: NEUROLOGY | Facility: HOSPITAL | Age: 40
End: 2020-03-27

## 2020-04-01 NOTE — PROGRESS NOTES
NOLANETS:  [facility]  A collaboration between [facility] and [facility]      PATIENT: Bay Ibarra  MRN: [id number]  DATE: 4/1/2020    Subjective:      Chief Complaint: No chief complaint on file.   Was admitted in January for chronic pancreatitis with acute pancreatitis.  Subsequent evaluation showed evidence of chronic pancreatitis with portal vein thrombosis with pancreatic cyst and splenomegaly.  He has had a complete evaluation.  Endoscopic ultrasound was done.  He recently had upper endoscopy which shows gastric varix.    He was recently admitted for diabetic ketoacidosis and was managed conservatively and he was placed on antidiabetic medications.  He is doing well overall since discharge he has lost about  significant weight however is doing well overall    The patient location is: home  The chief complaint leading to consultation is:  F/u Of chronic pancreatitis and pancreatic cyst  Visit type: Virtual visit with synchronous audio and video  Total time spent with patient:  20 min  Each patient to whom he or she provides medical services by telemedicine is:  (1) informed of the relationship between the physician and patient and the respective role of any other health care provider with respect to management of the patient; and (2) notified that he or she may decline to receive medical services by telemedicine and may withdraw from such care at any time.    Vitals: There were no vitals taken for this visit. --    ECOG Score: 0 - Asymptomatic    Diagnosis: No diagnosis found.     Interval History:    39 y.o. male with chronic pancreatitis secondary to hypertriglyceridemia who was hospitalized back in end of august for an acute flare with peripancreatic fluid collections. Pt improved during his hospitalization and was d/c home. He has been doing much better with very mild intermittent infrequent pain. He has  lost weight from the pancreatitis. He is eating well and appetite is good. No change in BMs.   1/6/20  He has  1.  Pancreatic pseudocyst with fluid collection between the stomach and the pancreas about 8 x 5 cm with pancreatic duct leak  2.  Portal vein thrombosis with portal hypertension        He continues to have recurrent attacks of pancreatitis although the last attack was not that significant most likely his nausea and vomiting episode following Bridget is from a recurrent attack of pancreatitis.     I explained to him on the picture about what his conditions are his pancreatic leak causing a pancreatic fluid collection and pancreatic pseudocyst.  I also talked about the portal hypertension.  In future if he has any problems such as of vomiting blood or black tarry stools he needs to go to the emergency room soon.     I will refer him to gastroenterologist.  Following that he may be a candidate for prophylactic beta-blocker.  The the GI will decide about ERCP/EUS/stent cystogastrostomy    Past Medical History:  Past Medical History:   Diagnosis Date    Acute pancreatitis     Diverticulosis of colon     Essential hypertension     Fatty liver     Gastric varices without bleeding 1/16/2020    Gout     Hx of acute pancreatitis 3/3/2017    Hypertriglyceridemia 8/2/2017    Obesity (BMI 30-39.9) 6/5/2015    Obesity (BMI 35.0-39.9 without comorbidity) 6/5/2015    Obstructive sleep apnea syndrome 12/18/2015    Type 2 diabetes mellitus with diabetic polyneuropathy, with long-term current use of insulin 10/16/2017    Type 2 diabetes mellitus with hyperglycemia, with long-term current use of insulin 8/3/2017       Past Surgical History:  Past Surgical History:   Procedure Laterality Date    ENDOSCOPIC ULTRASOUND OF UPPER GASTROINTESTINAL TRACT N/A 5/27/2019    Procedure: ULTRASOUND, UPPER GI TRACT, ENDOSCOPIC;  Surgeon: Zafar Velazquez MD;  Location: [facility] ([address] [place] [address]);  Service: Endoscopy;  Laterality:  N/A;    ERCP N/A 5/27/2019    Procedure: ERCP (ENDOSCOPIC RETROGRADE CHOLANGIOPANCREATOGRAPHY);  Surgeon: Zafar Velazquez MD;  Location: [facility] ([address] [place] [address]);  Service: Endoscopy;  Laterality: N/A;    ESOPHAGOGASTRODUODENOSCOPY      ESOPHAGOGASTRODUODENOSCOPY N/A 1/31/2020    Procedure: EGD (ESOPHAGOGASTRODUODENOSCOPY);  Surgeon: Zafar Velazquez MD;  Location: [facility];  Service: Endoscopy;  Laterality: N/A;       Family History:  Family History   Problem Relation Age of Onset    Aneurysm Mother         AAA    Coronary artery disease Father         4 vessel bypass at 40, possible stent at 36    Diabetes type II Father     No Known Problems Sister     No Known Problems Brother     Aneurysm Maternal Grandmother         Possible AAA    Diabetes type II Paternal Grandmother        Allergies:  Patient has no known allergies.    Medications:   Current Outpatient Medications   Medication Sig    allopurinoL (ZYLOPRIM) 300 MG tablet Take 1 tablet (300 mg total) by mouth once daily.    aspirin (ECOTRIN) 81 MG EC tablet Take 81 mg by mouth once daily.    atorvastatin (LIPITOR) 40 MG tablet Take 1 tablet (40 mg total) by mouth once daily.    diclofenac sodium (VOLTAREN) 1 % Gel Apply 2 g topically 4 (four) times daily. Apply to chest in the location of pain up to 4 times a day as needed for pain.    fenofibrate 160 MG Tab Take 1 tablet (160 mg total) by mouth once daily.    flash glucose scanning reader (FREESTYLE DEBBIE 14 DAY READER) Misc 1 kit by Misc.(Non-Drug; Combo Route) route 3 (three) times daily as needed.    flash glucose sensor (FREESTYLE DEBBIE 14 DAY SENSOR) Kit 1 each by Misc.(Non-Drug; Combo Route) route every 14 (fourteen) days.    icosapent ethyl (VASCEPA) 1 gram Cap Take 2 g by mouth 2 (two) times daily.    insulin (LANTUS SOLOSTAR U-100 INSULIN) glargine 100 units/mL (3mL) SubQ pen Inject 60 Units into the skin every evening.    insulin aspart U-100 (NOVOLOG FLEXPEN U-100 INSULIN) 100 unit/mL  "(3 mL) InPn pen INJECT 20 UNITS SUBCUTANEOUSLY WITH MEALS PLUS SLIDING SCALE 150-200+2, 201-250 +4, 251-300 +6, 301-350 +8,>350 +10. MAX OF 90 UNITS PER DA    insulin syringe-needle U-100 29 gauge x 1/2" Syrg Inject 1 (once) per day    losartan (COZAAR) 100 MG tablet TAKE 1/2 (ONE-HALF) TABLET BY MOUTH ONCE DAILY    metoclopramide HCl (REGLAN) 10 MG tablet Take 1 tablet (10 mg total) by mouth every 6 (six) hours as needed (nausea).    niacin 100 MG Tab Take 200 mg by mouth every evening.     ondansetron (ZOFRAN) 4 MG tablet Take 1 tablet (4 mg total) by mouth every 6 (six) hours as needed for Nausea.    pantoprazole (PROTONIX) 40 MG tablet Take 1 tablet (40 mg total) by mouth 2 (two) times daily.    pregabalin (LYRICA) 150 MG capsule Take 1 capsule (150 mg total) by mouth 2 (two) times daily.     No current facility-administered medications for this visit.         Review of Systems   Constitutional: Negative for activity change, appetite change, chills, diaphoresis, fatigue, fever and unexpected weight change.   HENT: Negative for facial swelling, hearing loss, mouth sores, nosebleeds, postnasal drip, rhinorrhea, sinus pressure, sinus pain, sneezing, sore throat and tinnitus.    Eyes: Negative for photophobia, pain, redness, itching and visual disturbance.   Respiratory: Negative for apnea, cough, choking, chest tightness and wheezing.    Cardiovascular: Negative for chest pain, palpitations and leg swelling.   Gastrointestinal: Negative for abdominal distention, anal bleeding, blood in stool, constipation, diarrhea, nausea, rectal pain and vomiting.   Endocrine: Negative.    Genitourinary: Negative.    Musculoskeletal: Negative for back pain, gait problem, neck pain and neck stiffness.   Skin: Negative for pallor, rash and wound.   Allergic/Immunologic: Negative.    Neurological: Negative for dizziness, tremors, seizures, syncope, facial asymmetry, speech difficulty, weakness, light-headedness and numbness. "   Hematological: Negative for adenopathy.   Psychiatric/Behavioral: Negative for behavioral problems, confusion, decreased concentration and dysphoric mood.      Objective:      Physical Exam   Constitutional: He is oriented to person, place, and time. He appears well-developed and well-nourished.   HENT:   Head: Normocephalic.   Musculoskeletal: Normal range of motion.   Neurological: He is alert and oriented to person, place, and time.   Skin: Skin is warm.      Assessment:       No diagnosis found.    Laboratory Data:   3/16/20  Neuroendocrine Labs Latest Ref Rng & Units 3/16/2020   INR 0.8 - 1.2    GLUCOSE 70 - 110 mg/dL 166 (H)   BUN 2 - 20 mg/dL 21 (H)   CREATININE 0.50 - 1.40 mg/dL 0.83    - 145 mmol/L 141   K 3.5 - 5.1 mmol/L 3.4 (L)   CHLORIDE 95 - 110 mmol/L 101   CO2 23 - 29 mmol/L 33 (H)   CALCIUM 8.7 - 10.5 mg/dL 9.0   PROTEIN, TOTAL 6.0 - 8.4 g/dL 6.6   PHOSPHORUS 2.7 - 4.5 mg/dL    ALBUMIN 3.5 - 5.2 g/dL 3.6   URIC ACID 3.4 - 7.0 mg/dL    TOTAL BILIRUBIN 0.1 - 1.0 mg/dL 0.4   ALK PHOSPHATASE 38 - 126 U/L 98   SGOT (AST) 15 - 46 U/L 31   SGPT (ALT) 10 - 44 U/L 23   LIPASE 7 - 60 U/L    SERUM AMYLASE 30 - 110 U/L 244 (H)     Scans:   CT Head Without Contrast  Narrative: EXAMINATION:  CT OF THE HEAD WITHOUT    CLINICAL HISTORY:  Presents to the ED complaining of emesis x2 weeks.  States abdominal pain.    TECHNIQUE:  5 mm unenhanced axial images were obtained from the skull base to the vertex.    COMPARISON:  None.    FINDINGS:  The ventricles, basal cisterns, and cortical sulci are within normal limits for patient's stated age. There is no acute intracranial hemorrhage, territorial infarct or mass effect, or midline shift. In the visualized paranasal sinuses or mastoid air cells, there is a left maxillary sinus mucous retention cyst or polyp.  Impression: No acute intracranial abnormality detected.    Left maxillary sinus mucous retention cyst or polyp.    Electronically signed by: Felicia  Zackary  Date:    02/23/2020  Time:    19:18  US Abdomen Limited (Gallbladder)  Narrative: EXAMINATION:  US ABDOMEN LIMITED    CLINICAL HISTORY:  Right upper quadrant pain    TECHNIQUE:  Limited ultrasound of the right upper quadrant of the abdomen (including pancreas, liver, gallbladder, common bile duct, and spleen) was performed.    COMPARISON:  Ultrasound 08/30/2019, CT 12/27/2019    FINDINGS:  The pancreas is obscured by overlying soft tissue structures.  There is a cystic structure at the pancreatic head measuring approximately 4.9 x 4.3 x 4.8 cm correlating with cystic structure on CT 12/27/2019.  The pancreatic duct is not appear dilated.  The gallbladder is nondistended.  No gallbladder wall thickening.  No pericholecystic fluid.  Sonographic Lai sign is negative.  The common duct is not dilated measuring 0.3 cm.  The liver is echogenic without enlargement.  The visualized aorta and IVC are grossly unremarkable.  There is cavernous transformation of the portal vein, better evaluated on prior CT.  The spleen is enlarged, similar to the previous exam.  No ascites.  Impression: Stable cystic structure at the pancreatic head correlating with collection on CT 12/27/2019 suggesting pseudo cyst.  The pancreatic duct is not appear dilated however the pancreas is for the most part obscured by overlying soft tissue structures.    No findings to suggest acute cholecystitis.    Cavernous transformation of the portal vein.    Splenomegaly.    Electronically signed by: Bay Brady MD  Date:    02/23/2020  Time:    19:15  X-Ray Chest AP Portable  Narrative: EXAMINATION:  XR CHEST AP PORTABLE    CLINICAL HISTORY:  vomiting;    TECHNIQUE:  Single frontal view of the chest was performed.    COMPARISON:  08/27/2019    FINDINGS:  The cardiomediastinal silhouette is not enlarged.  There is no pleural effusion.  The trachea is midline.  The lungs are symmetrically expanded bilaterally without evidence of acute  parenchymal process. No large focal consolidation seen.  There is no pneumothorax.  The osseous structures are unremarkable.  Impression: 1. No acute cardiopulmonary process.    Electronically signed by: Bay Brady MD  Date:    02/23/2020  Time:    18:18  X-Ray Abdomen Flat And Erect  Narrative: EXAMINATION:  XR ABDOMEN FLAT AND ERECT    CLINICAL HISTORY:  Vomiting, unspecified    TECHNIQUE:  Flat and erect AP views of the abdomen were performed.    COMPARISON:  02/11/2020, CT 02/27/2019    FINDINGS:  One upright view, 2 supine views.    No significant air-fluid levels on upright view.  Air and stool is seen within the large bowel and projected over the rectum.  No focally dilated small bowel loops.  Calcifications are noted within the mid aspect of the abdomen, may reflect that of pancreatic calcification.  No large volume free air or pneumatosis.  The lower lung zones are grossly clear.  No acute osseous abnormality.  Impression: 1. Nonobstructive bowel gas pattern.    Electronically signed by: Bay Brady MD  Date:    02/23/2020  Time:    18:17       Impression:   39-year-old gentleman with now recent diagnosis of diabetic ketoacidosis and has been placed on antidiabetic medication has a history of chronic pancreatitis with portal vein thrombosis.  He has had multiple episodes of acute exacerbation.  I he also has a large spleen from the portal vein thrombosis and recently had underwent endoscopy which showed isolated gastric varix.    He is doing well overall.  The ultrasound done recently shows a persistent large pancreatic  Head cyst which is most likely a pseudocyst.    He does not have any symptoms of exocrine pancreatic insufficiency.    Plan:       Continue present medical management.  Continue follow-up with endocrinologist and his PCP    Follow-up with me in 3 months time    Strongly recommended that he call the office has come to the ER if he notices any black-colored stool or blood in the  stool or any other attacks of abdominal pain          KASHIF Xavier MD, FACS   Associate Professor of Surgery, [facility]   Neuroendocrine Surgery, Hepatic/Pancreatic & General Surgery   [address], [address]   [place] [address]   ph. [phone]; [phone]   fax. [phone]

## 2020-04-06 ENCOUNTER — OFFICE VISIT (OUTPATIENT)
Dept: NEUROLOGY | Facility: HOSPITAL | Age: 40
End: 2020-04-06
Attending: SURGERY
Payer: COMMERCIAL

## 2020-04-06 ENCOUNTER — TELEPHONE (OUTPATIENT)
Dept: NEUROLOGY | Facility: HOSPITAL | Age: 40
End: 2020-04-06

## 2020-04-06 DIAGNOSIS — I86.4 GASTRIC VARIX: ICD-10-CM

## 2020-04-06 DIAGNOSIS — K86.1 OTHER CHRONIC PANCREATITIS: Primary | ICD-10-CM

## 2020-04-06 DIAGNOSIS — K86.2 PANCREATIC CYST: ICD-10-CM

## 2020-04-06 DIAGNOSIS — E13.9 DIABETES 1.5, MANAGED AS TYPE 1: ICD-10-CM

## 2020-04-06 NOTE — TELEPHONE ENCOUNTER
Mr Hermosillo this is Irene nurse for Dr. Stevens. Could you please call me [phone]. You have a virtual visit with Dr. Stevens at [phone] he is going to be 15 mins late. Please call

## 2020-04-06 NOTE — PATIENT INSTRUCTIONS
Your next appointment with Dr. Stevens is 7/6/20 at 1000 am    Make sure to continue with you PCP for your diabetes

## 2020-04-21 LAB
ALLENS TEST: ABNORMAL
DELSYS: ABNORMAL
FLOW: 2
HCO3 UR-SCNC: 64 MMOL/L (ref 24–28)
MODE: ABNORMAL
PCO2 BLDA: 52.7 MMHG (ref 35–45)
PH SMN: 7.69 [PH] (ref 7.35–7.45)
PO2 BLDA: 160 MMHG (ref 80–100)
POC BE: >30 MMOL/L
POC SATURATED O2: 100 % (ref 95–100)
POC TCO2: >50 MMOL/L (ref 23–27)
SAMPLE: ABNORMAL
SITE: ABNORMAL

## 2020-04-22 DIAGNOSIS — E10.40 TYPE 1 DIABETES MELLITUS WITH DIABETIC NEUROPATHY: ICD-10-CM

## 2020-04-23 ENCOUNTER — OFFICE VISIT (OUTPATIENT)
Dept: FAMILY MEDICINE | Facility: CLINIC | Age: 40
End: 2020-04-23
Payer: COMMERCIAL

## 2020-04-23 DIAGNOSIS — E87.6 HYPOKALEMIA: ICD-10-CM

## 2020-04-23 DIAGNOSIS — K31.84 GASTROPARESIS: ICD-10-CM

## 2020-04-23 DIAGNOSIS — K86.3 PANCREATIC PSEUDOCYST: ICD-10-CM

## 2020-04-23 DIAGNOSIS — K29.00 ACUTE SUPERFICIAL GASTRITIS WITHOUT HEMORRHAGE: ICD-10-CM

## 2020-04-23 DIAGNOSIS — I10 ESSENTIAL HYPERTENSION: Primary | Chronic | ICD-10-CM

## 2020-04-23 DIAGNOSIS — E10.42 DIABETIC POLYNEUROPATHY ASSOCIATED WITH TYPE 1 DIABETES MELLITUS: ICD-10-CM

## 2020-04-23 DIAGNOSIS — E10.40 TYPE 1 DIABETES MELLITUS WITH DIABETIC NEUROPATHY: ICD-10-CM

## 2020-04-23 PROCEDURE — 3046F PR MOST RECENT HEMOGLOBIN A1C LEVEL > 9.0%: ICD-10-PCS | Mod: CPTII,,, | Performed by: INTERNAL MEDICINE

## 2020-04-23 PROCEDURE — 99214 PR OFFICE/OUTPT VISIT, EST, LEVL IV, 30-39 MIN: ICD-10-PCS | Mod: 95,,, | Performed by: INTERNAL MEDICINE

## 2020-04-23 PROCEDURE — 99214 OFFICE O/P EST MOD 30 MIN: CPT | Mod: 95,,, | Performed by: INTERNAL MEDICINE

## 2020-04-23 PROCEDURE — 3046F HEMOGLOBIN A1C LEVEL >9.0%: CPT | Mod: CPTII,,, | Performed by: INTERNAL MEDICINE

## 2020-04-23 RX ORDER — PREGABALIN 150 MG/1
CAPSULE ORAL
Qty: 60 CAPSULE | Refills: 0 | OUTPATIENT
Start: 2020-04-23

## 2020-04-23 RX ORDER — PREGABALIN 150 MG/1
150 CAPSULE ORAL 2 TIMES DAILY
Qty: 60 CAPSULE | Refills: 2 | Status: SHIPPED | OUTPATIENT
Start: 2020-04-23 | End: 2020-07-23 | Stop reason: SDUPTHER

## 2020-04-23 NOTE — PROGRESS NOTES
Subjective:       Patient ID: Bay Ibarra is a 39 y.o. male.    Chief Complaint: No chief complaint on file.     I have reviewed the PMH,  and  for this patient    He  has a past medical history of Acute pancreatitis, Diverticulosis of colon, Essential hypertension, Fatty liver, Gastric varices without bleeding (1/16/2020), Gout, acute pancreatitis (3/3/2017), Hypertriglyceridemia (8/2/2017), Obesity (BMI 30-39.9) (6/5/2015), Obesity (BMI 35.0-39.9 without comorbidity) (6/5/2015), Obstructive sleep apnea syndrome (12/18/2015), Type 2 diabetes mellitus with diabetic polyneuropathy, with long-term current use of insulin (10/16/2017), and Type 2 diabetes mellitus with hyperglycemia, with long-term current use of insulin (8/3/2017).     The patient presents today for follow-up of chronic conditions.     The patient location is: [place]  The chief complaint leading to consultation is: chronic conditions  Visit type: audiovisual  Total time spent with patient: 10 minutes  Each patient to whom he or she provides medical services by telemedicine is:  (1) informed of the relationship between the physician and patient and the respective role of any other health care provider with respect to management of the patient; and (2) notified that he or she may decline to receive medical services by telemedicine and may withdraw from such care at any time.    He reports that he has been working a limited schedule at work.  He works for [employer].  He did see the GI doctor, but it was a virtual visit.  They did not really address his gastroparesis as I had recommended.  He reports that he is not taking the Reglan which I prescribed any longer.  He is still taking the pantoprazole twice a day and he reports that his stomach feels better.  His blood sugars have been somewhat variable.  His last hemoglobin A1c was in January and at that time it was 10.0.  He reports that he has trouble controlling his blood sugars  because he is not on a regular schedule at work.  He has not checked his sugars today.  He states that they usually run between 80 and 200.  He is currently taking 60 units of Lantus and 20 units of NovoLog with meals.  He sees Endocrinology about his diabetes.  He is overdue for an eye exam and he is due for a hemoglobin A1c and a microalbumin.  He requests a prescription renewal of his Lyrica.  He states that he is feeling about the same.    The patient denies chest pain, shortness of breath, nausea, or dizziness.     Active Ambulatory Problems     Diagnosis Date Noted    Essential hypertension     Type 1 diabetes mellitus with diabetic neuropathy     Obesity (BMI 30-39.9) 06/05/2015    Obstructive sleep apnea syndrome 12/18/2015    Mixed hyperlipidemia 08/02/2017    Pancreatic pseudocyst 08/29/2019    Acute superficial gastritis without hemorrhage 02/11/2020    Gastroparesis 03/12/2020    Diabetic polyneuropathy associated with type 1 diabetes mellitus 04/23/2020     Resolved Ambulatory Problems     Diagnosis Date Noted    Hyperlipidemia 06/05/2015    History of gout 06/05/2015    Noncompliance 09/04/2015    Acute upper respiratory infection 12/18/2015    Sleep apnea 01/22/2016    Hx of acute pancreatitis 03/03/2017    Acute pancreatitis 04/16/2017    Leukocytosis 04/18/2017    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    Adult BMI 32.0-32.9 kg/sq m 05/03/2017    Acute pancreatitis with uninfected necrosis 07/25/2017    Hyperglycemic hyperosmolar nonketotic coma 07/25/2017    Acute respiratory failure with hypoxia 07/28/2017    TROY (acute kidney injury) 07/28/2017    Ileus 07/28/2017    Diarrhea 08/01/2017    Type 2 diabetes mellitus with diabetic polyneuropathy, with long-term current use of insulin 10/16/2017    Acute pancreatitis 12/01/2018    Acute on chronic pancreatitis 08/29/2019    Portal vein thrombosis 08/29/2019    Diabetic ketoacidosis without coma associated with type 2  diabetes mellitus 08/30/2019    Acute pancreatitis 09/01/2019    Hypokalemia due to excessive gastrointestinal loss of potassium 09/01/2019    Chronic pancreatitis     Gastric varices without bleeding 01/16/2020    Abnormal CT of the abdomen 01/31/2020    Type 2 diabetes mellitus with hyperglycemia, with long-term current use of insulin 02/11/2020    Generalized abdominal pain 02/11/2020    Diabetic ketoacidosis without coma associated with type 2 diabetes mellitus 02/23/2020    Diverticulosis of colon     Chloride-responsive metabolic alkalosis 02/23/2020    Fatty liver     Gastric varices     Hypophosphatemia 02/25/2020    Vomiting 02/27/2020     Past Medical History:   Diagnosis Date    Gout     Hypertriglyceridemia 8/2/2017    Obesity (BMI 35.0-39.9 without comorbidity) 6/5/2015         MEDICATIONS:  Current Outpatient Medications:     allopurinoL (ZYLOPRIM) 300 MG tablet, Take 1 tablet (300 mg total) by mouth once daily., Disp: 30 tablet, Rfl: 5    aspirin (ECOTRIN) 81 MG EC tablet, Take 81 mg by mouth once daily., Disp: , Rfl:     atorvastatin (LIPITOR) 40 MG tablet, Take 1 tablet (40 mg total) by mouth once daily., Disp: 90 tablet, Rfl: 1    diclofenac sodium (VOLTAREN) 1 % Gel, Apply 2 g topically 4 (four) times daily. Apply to chest in the location of pain up to 4 times a day as needed for pain., Disp: 100 g, Rfl: 0    fenofibrate 160 MG Tab, Take 1 tablet (160 mg total) by mouth once daily., Disp: 90 tablet, Rfl: 0    flash glucose scanning reader (FREESTYLE DEBBIE 14 DAY READER) Misc, 1 kit by Misc.(Non-Drug; Combo Route) route 3 (three) times daily as needed., Disp: 1 each, Rfl: 0    flash glucose sensor (FREESTYLE DEBBIE 14 DAY SENSOR) Kit, 1 each by Misc.(Non-Drug; Combo Route) route every 14 (fourteen) days., Disp: 2 kit, Rfl: 11    icosapent ethyl (VASCEPA) 1 gram Cap, Take 2 g by mouth 2 (two) times daily., Disp: 360 capsule, Rfl: 3    insulin (LANTUS SOLOSTAR U-100 INSULIN)  "glargine 100 units/mL (3mL) SubQ pen, Inject 60 Units into the skin every evening., Disp: 18 mL, Rfl: 6    insulin aspart U-100 (NOVOLOG FLEXPEN U-100 INSULIN) 100 unit/mL (3 mL) InPn pen, INJECT 20 UNITS SUBCUTANEOUSLY WITH MEALS PLUS SLIDING SCALE 150-200+2, 201-250 +4, 251-300 +6, 301-350 +8,>350 +10. MAX OF 90 UNITS PER DA, Disp: 30 Syringe, Rfl: 4    insulin syringe-needle U-100 29 gauge x 1/2" Syrg, Inject 1 (once) per day, Disp: 100 Syringe, Rfl: 3    losartan (COZAAR) 100 MG tablet, TAKE 1/2 (ONE-HALF) TABLET BY MOUTH ONCE DAILY, Disp: 45 tablet, Rfl: 3    niacin 100 MG Tab, Take 200 mg by mouth every evening. , Disp: , Rfl:     ondansetron (ZOFRAN) 4 MG tablet, Take 1 tablet (4 mg total) by mouth every 6 (six) hours as needed for Nausea., Disp: 30 tablet, Rfl: 1    pantoprazole (PROTONIX) 40 MG tablet, Take 1 tablet (40 mg total) by mouth 2 (two) times daily., Disp: 180 tablet, Rfl: 1    pregabalin (LYRICA) 150 MG capsule, Take 1 capsule (150 mg total) by mouth 2 (two) times daily., Disp: 60 capsule, Rfl: 2      HEALTH MAINTENANCE:   Health Maintenance   Topic Date Due    Pneumococcal Vaccine (Medium Risk) (1 of 1 - PPSV23) 06/26/1999    Eye Exam  01/17/2020    Hemoglobin A1c  04/07/2020    Urine Microalbumin  04/15/2020    Foot Exam  09/05/2020    Lipid Panel  01/07/2021    TETANUS VACCINE  06/04/2029       Review of Systems   Constitutional: Positive for activity change. Negative for chills, fatigue, fever and unexpected weight change.   HENT: Negative for congestion, ear discharge, ear pain, hearing loss, rhinorrhea, sore throat and trouble swallowing.    Eyes: Negative for discharge, redness, itching and visual disturbance.   Respiratory: Negative for cough, chest tightness, shortness of breath and wheezing.    Cardiovascular: Negative for chest pain, palpitations and leg swelling.   Gastrointestinal: Negative for abdominal pain, blood in stool, constipation, diarrhea, nausea and vomiting. "   Endocrine: Negative for cold intolerance, heat intolerance, polydipsia and polyuria.   Genitourinary: Negative for difficulty urinating, dysuria, flank pain, frequency, hematuria and urgency.   Musculoskeletal: Negative for arthralgias, back pain, joint swelling, myalgias and neck pain.   Skin: Negative for color change and rash.   Neurological: Negative for dizziness, tremors, weakness, numbness and headaches.   Psychiatric/Behavioral: Negative for confusion, dysphoric mood and sleep disturbance. The patient is not nervous/anxious.        Objective:          A1C:  Recent Labs   Lab 07/25/17  2006 09/28/17  1639 07/27/18  0932 12/01/18  1539 05/27/19  0623 08/30/19  0754 01/07/20  0952   Hemoglobin A1C 11.6 H 7.7 A 11.5 H 10.4 H 11.0 H 12.9 H 10.0 H     CBC:  Recent Labs   Lab 08/31/19  0340 09/01/19  0355 11/14/19  1241 11/15/19  0803 12/26/19  2232 01/07/20  0952 02/18/20  1356 02/23/20  1724 02/24/20  0404 02/25/20  0636 02/26/20  0408 02/27/20  0459   WBC 5.31 3.41 L 8.29 4.54 17.44 H 5.59 7.05 15.44 H 9.44 9.63 6.41 4.74   RBC 4.86 4.71 5.64 4.88 6.46 H 5.98 6.30 H 6.77 H 5.53 5.05 4.51 L 4.84   Hemoglobin 12.7 L 12.2 L 14.1 12.2 L 16.1 14.9 15.8 16.8 13.5 L 12.7 L 11.3 L 12.2 L   Hematocrit 41.5 38.2 L 43.9 39.9 L 52.5 46.4 50.9 52.1 44.0 41.4 37.1 L 38.7 L   Platelets 198 165 258 168 321 188 249 395 H 210 163 143 L 135 L   Mean Corpuscular Volume 85 81 L 78 L 82 81 L 78 L 81 L 77 L 80 L 82 82 80 L   Mean Corpuscular Hemoglobin 26.1 L 25.9 L 25.0 L 25.0 L 24.9 L 24.9 L 25.1 L 24.8 L 24.4 L 25.1 L 25.1 L 25.2 L   Mean Corpuscular Hemoglobin Conc 30.6 L 31.9 L 32.1 30.6 L 30.7 L 32.1 31.0 L 32.2 30.7 L 30.7 L 30.5 L 31.5 L     CMP:  Recent Labs   Lab 05/29/19  0520 07/01/19  1210 08/27/19  1001 08/29/19  0621 08/30/19  0753  11/14/19  1241 11/15/19  0803 12/26/19  2326 01/07/20  0952 02/18/20  1356 02/23/20  1724  03/16/20  0829   Glucose 139 H 384 H 337 H 332 H 241 H   < > 267 H 86 283 H 305 H 123 H 231 H   <  > 166 H   Calcium 9.8 10.6 H 10.0 9.7 9.2   < > 9.5 9.0 9.5 9.5 9.8 11.7 H   < > 9.0   Albumin 3.3 L 4.7 4.5 4.3 3.2 L  --  4.1 3.1 L 3.9 4.5 5.0 4.9  --  3.6   Total Protein 6.6 8.3 7.9 7.6 7.2  --  7.8 6.5 7.2 8.3 8.4 8.6 H  --  6.6   Sodium 138 141 138 137 142   < > 145 147 H 139 145 147 H 135 L   < > 141   Potassium 3.7 4.1 4.3 3.9 4.2   < > 3.0 L 3.8 3.9 4.4 3.3 L 2.2 LL   < > 3.4 L   CO2 29 25 26 22 L 19 L   < > 31 H 25 26 26 35 H 45 HH   < > 33 H   Chloride 102 97 98 98 104   < > 99 110 96 102 97 <70 LL   < > 101   BUN, Bld 8 23 H 17 14 10   < > 15 12 22 H 20 19 33 H   < > 21 H   Creatinine 0.9 0.98 0.65 0.63 0.9   < > 0.78 0.7 1.3 0.68 1.14 1.8 H   < > 0.83   Alkaline Phosphatase 68 127 H 134 H 127 H 93  --  191 H 113 87 179 H 104 114  --  98   ALT 69 H 65 H 29 32 25  --  60 H 36 23 55 H 39 22  --  23   AST 35 63 H 28 32 22  --  44 25 22 36 48 H 30  --  31   Total Bilirubin 0.6 1.1 H 1.3 H 1.3 H 0.7  --  0.7 0.5 1.3 H 0.6 1.0 1.8 H  --  0.4    < > = values in this interval not displayed.     LIPIDS:  Recent Labs   Lab 07/25/17  2232 07/25/17  2306 07/27/17  0057 07/29/17  0300 08/15/17  0751 09/28/17  1639 10/10/17  0727 01/12/18  0734 07/27/18  0932 12/02/18  0526 04/15/19  0822 05/27/19  0032 08/30/19  0753 11/15/19  0803 01/07/20  0952   TSH  --   --   --   --   --   --   --  1.91  --   --   --   --   --   --  2.250   HDL 12 L 10 L 10 L  --  27 L  --  27 L 25 L 21 L 12 L 21 L 28 L 25 L 20 L 24 L   POC Cholesterol, Total  --   --   --   --   --  133  --   --   --   --   --   --   --   --   --    Cholesterol 211 H 203 H 105 L  --  176  --  150 138 182 246 H 143 118 L 108 L 98 L 160   Triglycerides 978 H 946 H 302 H 216 H 196 H  --  135 120 1,169 H 1,797 H 748 H 216 H 102 100 882 H   LDL Cholesterol Invalid, Trig>400.0 Invalid, Trig>400.0 34.6 L  --  110  --  96.0 91 Invalid, Trig>400.0 Invalid, Trig>400.0 Invalid, Trig>400.0 46.8 L 62.6 L 58.0 L Invalid, Trig>400.0   Hdl/Cholesterol Ratio 5.7 L 4.9 L 9.5 L   --  6.5 H  --  18.0 L 5.5 H 11.5 L 4.9 L 14.7 L 23.7 23.1 20.4 15.0 L   Non-HDL Cholesterol 199 193 95  --   --   --  123  --  161 234 122 90 83 78 136   Non HDL Chol. (LDL+VLDL)  --   --   --   --  149  --   --  113  --   --   --   --   --   --   --    Total Cholesterol/HDL Ratio 17.6 H 20.3 H 10.5 H  --   --   --  5.6 H  --  8.7 H 20.5 H 6.8 H 4.2 4.3 4.9 6.7 H     TSH:  Recent Labs   Lab 01/12/18  0734 01/07/20  0952   TSH 1.91 2.250           Physical Exam   Constitutional: He is oriented to person, place, and time. He appears well-developed and well-nourished. No distress.   HENT:   Head: Normocephalic and atraumatic.   Eyes: Pupils are equal, round, and reactive to light. Conjunctivae and EOM are normal.   Neck: Normal range of motion.   Pulmonary/Chest: Effort normal. No respiratory distress.   Musculoskeletal: Normal range of motion.   Neurological: He is alert and oriented to person, place, and time. No cranial nerve deficit. Coordination normal.   Skin: Skin is warm and dry. He is not diaphoretic.   Psychiatric: He has a normal mood and affect. His behavior is normal.             Assessment and Plan:     Problem List Items Addressed This Visit        Neuro    Diabetic polyneuropathy associated with type 1 diabetes mellitus - chronic. We will refill Lyrica and encourage him to control sugars.        Cardiac/Vascular    Essential hypertension - Primary (Chronic) - he did not check his blood pressure today.  Continue current medicines.  He is on losartan for blood pressure.       Endocrine    Type 1 diabetes mellitus with diabetic neuropathy - he sees endocrinology.  We will check routine labs.  He is overdue for an eye exam.    Relevant Medications    pregabalin (LYRICA) 150 MG capsule    Other Relevant Orders    Hemoglobin A1c    Comprehensive metabolic panel    CBC auto differential       GI    Pancreatic pseudocyst - he should follow-up with GI.  We will recheck his amylase since it was elevated last  time.    Relevant Orders    Amylase    Acute superficial gastritis without hemorrhage - his stomach is feeling better he says.  Continue pantoprazole twice a day.  Follow-up with GI as recommended.      Gastroparesis - he has stopped the metoclopramide which I prescribed.  We will continue the pantoprazole twice a day.      Other Visit Diagnoses     Hypokalemia     - he is not on a potassium supplement at this time.  I have encouraged him to eat potassium rich foods to elevate this level.          Orders Placed This Encounter    Hemoglobin A1c    Comprehensive metabolic panel    CBC auto differential    Amylase    pregabalin (LYRICA) 150 MG capsule         Follow-up with me in 3 months.       Josefina Pierce MD,  FACP  Internal Medicine

## 2020-04-23 NOTE — PATIENT INSTRUCTIONS
We have reviewed your prescription medications.   We will order routine labs.   Follow-up with GI and endocrinology as recommended.  I will refill lyrica.   Stay off metoclopramide for now, but continue pantoprazole twice a day.  Make na appointment for eye exam as soon as possible.

## 2020-04-29 ENCOUNTER — TELEPHONE (OUTPATIENT)
Dept: INTERNAL MEDICINE | Facility: CLINIC | Age: 40
End: 2020-04-29

## 2020-04-29 NOTE — TELEPHONE ENCOUNTER
----- Message from Komal Pierce MD sent at 4/28/2020  4:30 PM CDT -----  Hemoglobin A1c was worse at 10.8.  Blood chemistries were good except for high sugar.  Liver tests were normal.  Amylase was normal.  Blood counts were good.  Make appointment with endocrinology as soon as possible to discuss uncontrolled blood sugars.

## 2020-05-20 DIAGNOSIS — E78.2 MIXED HYPERLIPIDEMIA: ICD-10-CM

## 2020-05-20 RX ORDER — FENOFIBRATE 160 MG/1
TABLET ORAL
Qty: 90 TABLET | Refills: 0 | Status: SHIPPED | OUTPATIENT
Start: 2020-05-20 | End: 2020-08-25

## 2020-05-29 ENCOUNTER — PATIENT MESSAGE (OUTPATIENT)
Dept: FAMILY MEDICINE | Facility: CLINIC | Age: 40
End: 2020-05-29

## 2020-05-29 DIAGNOSIS — E78.1 HYPERTRIGLYCERIDEMIA: ICD-10-CM

## 2020-05-29 RX ORDER — ICOSAPENT ETHYL 1000 MG/1
2 CAPSULE ORAL 2 TIMES DAILY
Qty: 360 CAPSULE | Refills: 3 | Status: SHIPPED | OUTPATIENT
Start: 2020-05-29 | End: 2020-11-18 | Stop reason: SDUPTHER

## 2020-06-04 ENCOUNTER — PATIENT MESSAGE (OUTPATIENT)
Dept: FAMILY MEDICINE | Facility: CLINIC | Age: 40
End: 2020-06-04

## 2020-07-06 ENCOUNTER — OFFICE VISIT (OUTPATIENT)
Dept: NEUROLOGY | Facility: HOSPITAL | Age: 40
End: 2020-07-06
Attending: SURGERY
Payer: COMMERCIAL

## 2020-07-06 VITALS
SYSTOLIC BLOOD PRESSURE: 130 MMHG | WEIGHT: 190.69 LBS | HEART RATE: 97 BPM | TEMPERATURE: 97 F | DIASTOLIC BLOOD PRESSURE: 81 MMHG | HEIGHT: 67 IN | BODY MASS INDEX: 29.93 KG/M2

## 2020-07-06 DIAGNOSIS — K86.1 IDIOPATHIC CHRONIC PANCREATITIS: Primary | ICD-10-CM

## 2020-07-06 DIAGNOSIS — K76.6 PORTAL HYPERTENSION: ICD-10-CM

## 2020-07-06 DIAGNOSIS — K86.3 PANCREATIC PSEUDOCYST: ICD-10-CM

## 2020-07-06 PROCEDURE — 99214 OFFICE O/P EST MOD 30 MIN: CPT | Performed by: SURGERY

## 2020-07-06 NOTE — PROGRESS NOTES
"NOLANETS:  [facility]  A collaboration between [facility] and [facility]      PATIENT: Bay Ibarra  MRN: [id number]  DATE: 7/6/2020    Subjective:      Chief Complaint:   3 month f/u  He was initially admitted with pain from chronic pancreatitis.  He is doing well now he has no complaints of pain.  Eating well and having normal bowel movements without any steatorrhea  Vitals: Blood pressure 130/81, pulse 97, temperature 97.3 °F (36.3 °C), temperature source Oral, height 5' 7" (1.702 m), weight 86.5 kg (190 lb 11.2 oz).     ECOG Score: 0 - Asymptomatic    Diagnosis: No diagnosis found.     Interval History:    39 y.o. male with chronic pancreatitis secondary to hypertriglyceridemia who was hospitalized back in end of august for an acute flare with peripancreatic fluid collections. Pt improved during his hospitalization and was d/c home. He has been doing much better with very mild intermittent infrequent pain. He has lost weight from the pancreatitis. He is eating well and appetite is good. No change in BMs.   1/6/20  He has  1.  Pancreatic pseudocyst with fluid collection between the stomach and the pancreas about 8 x 5 cm with pancreatic duct leak  2.  Portal vein thrombosis with portal hypertension        He continues to have recurrent attacks of pancreatitis although the last attack was not that significant most likely his nausea and vomiting episode following [place] is from a recurrent attack of pancreatitis.     I explained to him on the picture about what his conditions are his pancreatic leak causing a pancreatic fluid collection and pancreatic pseudocyst.  I also talked about the portal hypertension.  In future if he has any problems such as of vomiting blood or black tarry stools he needs to go to the emergency room soon.     I will refer him to gastroenterologist.  Following that he may be a candidate " for prophylactic beta-blocker.  The the GI will decide about ERCP/EUS/stent cystogastrostomy           Past Medical History:  Past Medical History:   Diagnosis Date    Acute pancreatitis     Diverticulosis of colon     Essential hypertension     Fatty liver     Gastric varices without bleeding 1/16/2020    Gout     Hx of acute pancreatitis 3/3/2017    Hypertriglyceridemia 8/2/2017    Obesity (BMI 30-39.9) 6/5/2015    Obesity (BMI 35.0-39.9 without comorbidity) 6/5/2015    Obstructive sleep apnea syndrome 12/18/2015    Type 2 diabetes mellitus with diabetic polyneuropathy, with long-term current use of insulin 10/16/2017    Type 2 diabetes mellitus with hyperglycemia, with long-term current use of insulin 8/3/2017       Past Surgical History:  Past Surgical History:   Procedure Laterality Date    ENDOSCOPIC ULTRASOUND OF UPPER GASTROINTESTINAL TRACT N/A 5/27/2019    Procedure: ULTRASOUND, UPPER GI TRACT, ENDOSCOPIC;  Surgeon: Zafar Velazquez MD;  Location: [address]);  Service: Endoscopy;  Laterality: N/A;    ERCP N/A 5/27/2019    Procedure: ERCP (ENDOSCOPIC RETROGRADE CHOLANGIOPANCREATOGRAPHY);  Surgeon: Zafar Velazquez MD;  Location: [address]);  Service: Endoscopy;  Laterality: N/A;    ESOPHAGOGASTRODUODENOSCOPY      ESOPHAGOGASTRODUODENOSCOPY N/A 1/31/2020    Procedure: EGD (ESOPHAGOGASTRODUODENOSCOPY);  Surgeon: Zafar Velazquez MD;  Location: [facility];  Service: Endoscopy;  Laterality: N/A;       Family History:  Family History   Problem Relation Age of Onset    Aneurysm Mother         AAA    Coronary artery disease Father         4 vessel bypass at 40, possible stent at 36    Diabetes type II Father     No Known Problems Sister     No Known Problems Brother     Aneurysm Maternal Grandmother         Possible AAA    Diabetes type II Paternal Grandmother        Allergies:  Patient has no known allergies.    Medications:   Current Outpatient Medications   Medication Sig     "allopurinoL (ZYLOPRIM) 300 MG tablet Take 1 tablet (300 mg total) by mouth once daily.    aspirin (ECOTRIN) 81 MG EC tablet Take 81 mg by mouth once daily.    atorvastatin (LIPITOR) 40 MG tablet Take 1 tablet (40 mg total) by mouth once daily.    blood sugar diagnostic Strp 1 strip by Misc.(Non-Drug; Combo Route) route 3 (three) times daily.    diclofenac sodium (VOLTAREN) 1 % Gel Apply 2 g topically 4 (four) times daily. Apply to chest in the location of pain up to 4 times a day as needed for pain.    fenofibrate 160 MG Tab Take 1 tablet by mouth once daily    flash glucose scanning reader (FREESTYLE DEBBIE 14 DAY READER) Misc 1 kit by Misc.(Non-Drug; Combo Route) route 3 (three) times daily as needed.    flash glucose sensor (FREESTYLE DEBBIE 14 DAY SENSOR) Kit 1 each by Misc.(Non-Drug; Combo Route) route every 14 (fourteen) days.    icosapent ethyL (VASCEPA) 1 gram Cap Take 2 g by mouth 2 (two) times daily.    insulin (LANTUS SOLOSTAR U-100 INSULIN) glargine 100 units/mL (3mL) SubQ pen Inject 60 Units into the skin every evening.    insulin aspart U-100 (NOVOLOG FLEXPEN U-100 INSULIN) 100 unit/mL (3 mL) InPn pen INJECT 20 UNITS SUBCUTANEOUSLY WITH MEALS PLUS SLIDING SCALE 150-200+2, 201-250 +4, 251-300 +6, 301-350 +8,>350 +10. MAX OF 90 UNITS PER DA    insulin syringe-needle U-100 29 gauge x 1/2" Syrg Inject 1 (once) per day    losartan (COZAAR) 100 MG tablet TAKE 1/2 (ONE-HALF) TABLET BY MOUTH ONCE DAILY    niacin 100 MG Tab Take 200 mg by mouth every evening.     ondansetron (ZOFRAN) 4 MG tablet Take 1 tablet (4 mg total) by mouth every 6 (six) hours as needed for Nausea.    pantoprazole (PROTONIX) 40 MG tablet Take 1 tablet (40 mg total) by mouth 2 (two) times daily.    pregabalin (LYRICA) 150 MG capsule Take 1 capsule (150 mg total) by mouth 2 (two) times daily.     No current facility-administered medications for this visit.         Review of Systems   Constitutional: Negative for activity " change, chills, diaphoresis, fatigue, fever and unexpected weight change.   HENT: Negative for congestion, dental problem, drooling, ear discharge, ear pain, facial swelling, hearing loss, rhinorrhea, sinus pressure, sinus pain, sore throat, tinnitus, trouble swallowing and voice change.    Eyes: Negative for photophobia, pain, discharge, itching and visual disturbance.   Respiratory: Negative for cough, choking, chest tightness, shortness of breath, wheezing and stridor.    Cardiovascular: Negative for chest pain, palpitations and leg swelling.   Gastrointestinal: Negative for abdominal distention, anal bleeding, blood in stool, constipation and diarrhea.   Endocrine: Negative.    Genitourinary: Negative.    Musculoskeletal: Negative for arthralgias, back pain, gait problem, myalgias and neck pain.   Skin: Negative for pallor and wound.   Allergic/Immunologic: Negative for food allergies and immunocompromised state.   Neurological: Negative for seizures, light-headedness and numbness.   Hematological: Negative for adenopathy. Does not bruise/bleed easily.   Psychiatric/Behavioral: Negative for behavioral problems, confusion, decreased concentration and dysphoric mood.      Objective:      Physical Exam  Constitutional:       General: He is not in acute distress.     Appearance: Normal appearance. He is normal weight. He is diaphoretic. He is not toxic-appearing.   HENT:      Head: Normocephalic and atraumatic.      Right Ear: External ear normal.      Left Ear: External ear normal.      Nose: Nose normal.   Eyes:      Pupils: Pupils are equal, round, and reactive to light.   Neck:      Musculoskeletal: Normal range of motion. Muscular tenderness present.      Vascular: No carotid bruit.   Cardiovascular:      Rate and Rhythm: Normal rate.      Pulses: Normal pulses.      Heart sounds: Normal heart sounds.   Pulmonary:      Effort: Pulmonary effort is normal.   Abdominal:      General: Abdomen is flat. Bowel sounds  are normal.      Palpations: Abdomen is soft. There is no mass.      Tenderness: There is no abdominal tenderness. There is no left CVA tenderness, guarding or rebound.      Hernia: No hernia is present.   Lymphadenopathy:      Cervical: No cervical adenopathy.   Skin:     General: Skin is warm.   Neurological:      General: No focal deficit present.      Mental Status: He is alert.        Assessment:       No diagnosis found.    Laboratory Data:       Scans:        Impression:  History of chronic pancreatitis was admitted in December with chronic pancreatitis with peripancreatic fluid collection since the discharge she has the done well with no complaints of any pain or any the symptoms of recurrence.  The sub    Subsequently underwent endoscopic ultrasound which confirmed pseudocyst he is a diabetic and is on insulin.  His overall doing well    He was placed on Protonix for a gastric varix which basically does not help much he can wean off the Protonix which he takes twice a day.  I asked him to decrease the Protonix to once a day and to continue it for at least 4 weeks time and then converted to a Pepcid or Maalox or Mylanta if he needs it.    Plan:     stable and doing well overall  Will follow up in 6 months time    Specifically told him if he develops any increased abdominal pain or black colored stool he needs to come to the hospital for evaluation.            KASHIF Xavier MD, FACS   Associate Professor of Surgery, [facility]   Neuroendocrine Surgery, Hepatic/Pancreatic & General Surgery   [address], [address]   [place] [address]   ph. [phone]; [phone]   fax. [phone]

## 2020-07-23 ENCOUNTER — OFFICE VISIT (OUTPATIENT)
Dept: FAMILY MEDICINE | Facility: CLINIC | Age: 40
End: 2020-07-23
Payer: COMMERCIAL

## 2020-07-23 VITALS
WEIGHT: 202 LBS | BODY MASS INDEX: 31.71 KG/M2 | HEIGHT: 67 IN | OXYGEN SATURATION: 97 % | DIASTOLIC BLOOD PRESSURE: 70 MMHG | SYSTOLIC BLOOD PRESSURE: 124 MMHG | HEART RATE: 93 BPM

## 2020-07-23 DIAGNOSIS — K86.3 PANCREATIC PSEUDOCYST: Primary | ICD-10-CM

## 2020-07-23 DIAGNOSIS — I10 ESSENTIAL HYPERTENSION: Chronic | ICD-10-CM

## 2020-07-23 DIAGNOSIS — E78.2 MIXED HYPERLIPIDEMIA: Chronic | ICD-10-CM

## 2020-07-23 DIAGNOSIS — E10.40 TYPE 1 DIABETES MELLITUS WITH DIABETIC NEUROPATHY: ICD-10-CM

## 2020-07-23 PROCEDURE — 3046F PR MOST RECENT HEMOGLOBIN A1C LEVEL > 9.0%: ICD-10-PCS | Mod: CPTII,S$GLB,, | Performed by: INTERNAL MEDICINE

## 2020-07-23 PROCEDURE — 3046F HEMOGLOBIN A1C LEVEL >9.0%: CPT | Mod: CPTII,S$GLB,, | Performed by: INTERNAL MEDICINE

## 2020-07-23 PROCEDURE — 3074F PR MOST RECENT SYSTOLIC BLOOD PRESSURE < 130 MM HG: ICD-10-PCS | Mod: CPTII,S$GLB,, | Performed by: INTERNAL MEDICINE

## 2020-07-23 PROCEDURE — 3008F PR BODY MASS INDEX (BMI) DOCUMENTED: ICD-10-PCS | Mod: CPTII,S$GLB,, | Performed by: INTERNAL MEDICINE

## 2020-07-23 PROCEDURE — 99999 PR PBB SHADOW E&M-EST. PATIENT-LVL V: CPT | Mod: PBBFAC,,, | Performed by: INTERNAL MEDICINE

## 2020-07-23 PROCEDURE — 3008F BODY MASS INDEX DOCD: CPT | Mod: CPTII,S$GLB,, | Performed by: INTERNAL MEDICINE

## 2020-07-23 PROCEDURE — 99214 OFFICE O/P EST MOD 30 MIN: CPT | Mod: S$GLB,,, | Performed by: INTERNAL MEDICINE

## 2020-07-23 PROCEDURE — 3078F DIAST BP <80 MM HG: CPT | Mod: CPTII,S$GLB,, | Performed by: INTERNAL MEDICINE

## 2020-07-23 PROCEDURE — 3078F PR MOST RECENT DIASTOLIC BLOOD PRESSURE < 80 MM HG: ICD-10-PCS | Mod: CPTII,S$GLB,, | Performed by: INTERNAL MEDICINE

## 2020-07-23 PROCEDURE — 99999 PR PBB SHADOW E&M-EST. PATIENT-LVL V: ICD-10-PCS | Mod: PBBFAC,,, | Performed by: INTERNAL MEDICINE

## 2020-07-23 PROCEDURE — 99214 PR OFFICE/OUTPT VISIT, EST, LEVL IV, 30-39 MIN: ICD-10-PCS | Mod: S$GLB,,, | Performed by: INTERNAL MEDICINE

## 2020-07-23 PROCEDURE — 3074F SYST BP LT 130 MM HG: CPT | Mod: CPTII,S$GLB,, | Performed by: INTERNAL MEDICINE

## 2020-07-23 RX ORDER — PREGABALIN 150 MG/1
150 CAPSULE ORAL 2 TIMES DAILY
Qty: 60 CAPSULE | Refills: 2 | Status: SHIPPED | OUTPATIENT
Start: 2020-07-23 | End: 2020-10-27 | Stop reason: SDUPTHER

## 2020-07-23 NOTE — PATIENT INSTRUCTIONS
We have reviewed your prescription medications.   We will order routine labs.   Follow-up with endocrinology asap.  We will refer to GI again.   I refilled lyrica

## 2020-07-28 ENCOUNTER — TELEPHONE (OUTPATIENT)
Dept: GASTROENTEROLOGY | Facility: CLINIC | Age: 40
End: 2020-07-28

## 2020-08-02 NOTE — PROGRESS NOTES
Subjective:       Patient ID: Bay Ibarra is a 40 y.o. male.    Chief Complaint: Follow-up     I have reviewed the PMH,  and  for this patient    He  has a past medical history of Acute pancreatitis, Diverticulosis of colon, Essential hypertension, Fatty liver, Gastric varices without bleeding (1/16/2020), Gout, acute pancreatitis (3/3/2017), Hypertriglyceridemia (8/2/2017), Obesity (BMI 30-39.9) (6/5/2015), Obesity (BMI 35.0-39.9 without comorbidity) (6/5/2015), Obstructive sleep apnea syndrome (12/18/2015), Type 2 diabetes mellitus with diabetic polyneuropathy, with long-term current use of insulin (10/16/2017), and Type 2 diabetes mellitus with hyperglycemia, with long-term current use of insulin (8/3/2017).     The patient presents today for follow-up of chronic conditions.  The patient has been doing well.  He is they are really good he sees Endocrinology about his diabetes.  His hemoglobin A1c has not been controlled.  His last A1c was 10.8 in April.  His blood sugars at he sees an eye doctor for his eye exam.  His blood pressure is normal at 124/70.  He is complaining of abdominal pain of 3/10.  We will refer him to Gastroenterology.  He has been taking pantoprazole.    The patient denies chest pain, shortness of breath, nausea, or dizziness.     Active Ambulatory Problems     Diagnosis Date Noted    Essential hypertension     Type 1 diabetes mellitus with diabetic neuropathy     Obesity (BMI 30-39.9) 06/05/2015    Obstructive sleep apnea syndrome 12/18/2015    Mixed hyperlipidemia 08/02/2017    Pancreatic pseudocyst 08/29/2019    Acute superficial gastritis without hemorrhage 02/11/2020    Gastroparesis 03/12/2020    Diabetic polyneuropathy associated with type 1 diabetes mellitus 04/23/2020     Resolved Ambulatory Problems     Diagnosis Date Noted    Hyperlipidemia 06/05/2015    History of gout 06/05/2015    Noncompliance 09/04/2015    Acute upper respiratory infection 12/18/2015     Sleep apnea 01/22/2016    Hx of acute pancreatitis 03/03/2017    Acute pancreatitis 04/16/2017    Leukocytosis 04/18/2017    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    Adult BMI 32.0-32.9 kg/sq m 05/03/2017    Acute pancreatitis with uninfected necrosis 07/25/2017    Hyperglycemic hyperosmolar nonketotic coma 07/25/2017    Acute respiratory failure with hypoxia 07/28/2017    TROY (acute kidney injury) 07/28/2017    Ileus 07/28/2017    Diarrhea 08/01/2017    Type 2 diabetes mellitus with diabetic polyneuropathy, with long-term current use of insulin 10/16/2017    Acute pancreatitis 12/01/2018    Acute on chronic pancreatitis 08/29/2019    Portal vein thrombosis 08/29/2019    Diabetic ketoacidosis without coma associated with type 2 diabetes mellitus 08/30/2019    Acute pancreatitis 09/01/2019    Hypokalemia due to excessive gastrointestinal loss of potassium 09/01/2019    Chronic pancreatitis     Gastric varices without bleeding 01/16/2020    Abnormal CT of the abdomen 01/31/2020    Type 2 diabetes mellitus with hyperglycemia, with long-term current use of insulin 02/11/2020    Generalized abdominal pain 02/11/2020    Diabetic ketoacidosis without coma associated with type 2 diabetes mellitus 02/23/2020    Diverticulosis of colon     Chloride-responsive metabolic alkalosis 02/23/2020    Fatty liver     Gastric varices     Hypophosphatemia 02/25/2020    Vomiting 02/27/2020     Past Medical History:   Diagnosis Date    Gout     Hypertriglyceridemia 8/2/2017    Obesity (BMI 35.0-39.9 without comorbidity) 6/5/2015         MEDICATIONS:  Current Outpatient Medications:     allopurinoL (ZYLOPRIM) 300 MG tablet, Take 1 tablet (300 mg total) by mouth once daily., Disp: 30 tablet, Rfl: 5    aspirin (ECOTRIN) 81 MG EC tablet, Take 81 mg by mouth once daily., Disp: , Rfl:     atorvastatin (LIPITOR) 40 MG tablet, Take 1 tablet (40 mg total) by mouth once daily., Disp: 90 tablet, Rfl: 1     "blood sugar diagnostic Strp, 1 strip by Misc.(Non-Drug; Combo Route) route 3 (three) times daily., Disp: 200 each, Rfl: 5    diclofenac sodium (VOLTAREN) 1 % Gel, Apply 2 g topically 4 (four) times daily. Apply to chest in the location of pain up to 4 times a day as needed for pain., Disp: 100 g, Rfl: 0    fenofibrate 160 MG Tab, Take 1 tablet by mouth once daily, Disp: 90 tablet, Rfl: 0    flash glucose scanning reader (FREESTYLE DEBBIE 14 DAY READER) Misc, 1 kit by Misc.(Non-Drug; Combo Route) route 3 (three) times daily as needed., Disp: 1 each, Rfl: 0    flash glucose sensor (FREESTYLE DEBBIE 14 DAY SENSOR) Kit, 1 each by Misc.(Non-Drug; Combo Route) route every 14 (fourteen) days., Disp: 2 kit, Rfl: 11    icosapent ethyL (VASCEPA) 1 gram Cap, Take 2 g by mouth 2 (two) times daily., Disp: 360 capsule, Rfl: 3    insulin (LANTUS SOLOSTAR U-100 INSULIN) glargine 100 units/mL (3mL) SubQ pen, Inject 60 Units into the skin every evening., Disp: 18 mL, Rfl: 6    insulin aspart U-100 (NOVOLOG FLEXPEN U-100 INSULIN) 100 unit/mL (3 mL) InPn pen, INJECT 20 UNITS SUBCUTANEOUSLY WITH MEALS PLUS SLIDING SCALE 150-200+2, 201-250 +4, 251-300 +6, 301-350 +8,>350 +10. MAX OF 90 UNITS PER DA, Disp: 30 Syringe, Rfl: 4    insulin syringe-needle U-100 29 gauge x 1/2" Syrg, Inject 1 (once) per day, Disp: 100 Syringe, Rfl: 3    losartan (COZAAR) 100 MG tablet, TAKE 1/2 (ONE-HALF) TABLET BY MOUTH ONCE DAILY, Disp: 45 tablet, Rfl: 3    niacin 100 MG Tab, Take 200 mg by mouth every evening. , Disp: , Rfl:     ondansetron (ZOFRAN) 4 MG tablet, Take 1 tablet (4 mg total) by mouth every 6 (six) hours as needed for Nausea., Disp: 30 tablet, Rfl: 1    pantoprazole (PROTONIX) 40 MG tablet, Take 1 tablet (40 mg total) by mouth 2 (two) times daily., Disp: 180 tablet, Rfl: 1    pregabalin (LYRICA) 150 MG capsule, Take 1 capsule (150 mg total) by mouth 2 (two) times daily., Disp: 60 capsule, Rfl: 2      HEALTH MAINTENANCE:   Health " Maintenance   Topic Date Due    Hepatitis C Screening  1980    Pneumococcal Vaccine (Medium Risk) (1 of 1 - PPSV23) 06/26/1999    Eye Exam  01/17/2020    Urine Microalbumin  04/15/2020    Foot Exam  09/05/2020    Hemoglobin A1c  10/24/2020    Lipid Panel  01/07/2021    Low Dose Statin  07/23/2021    TETANUS VACCINE  06/04/2029       Review of Systems   Constitutional: Negative for chills, fatigue and fever.   HENT: Negative for congestion, ear discharge, ear pain, rhinorrhea and sore throat.    Eyes: Negative for discharge, redness and itching.   Respiratory: Negative for cough, chest tightness, shortness of breath and wheezing.    Cardiovascular: Negative for chest pain, palpitations and leg swelling.   Gastrointestinal: Negative for abdominal pain, constipation, diarrhea, nausea and vomiting.   Endocrine: Negative for cold intolerance and heat intolerance.   Genitourinary: Negative for dysuria, flank pain, frequency and hematuria.   Musculoskeletal: Negative for arthralgias, back pain, joint swelling and myalgias.   Skin: Negative for color change and rash.   Neurological: Negative for dizziness, tremors, numbness and headaches.   Psychiatric/Behavioral: Negative for dysphoric mood and sleep disturbance. The patient is not nervous/anxious.        Objective:          A1C:  Recent Labs   Lab 09/28/17  1639 07/27/18  0932 12/01/18  1539 05/27/19  0623 08/30/19  0754 01/07/20  0952 04/28/20  0812 07/24/20  1546   Hemoglobin A1C 7.7 A 11.5 H 10.4 H 11.0 H 12.9 H 10.0 H 10.8 H 13.8 H     CBC:  Recent Labs   Lab 09/01/19  0355 11/14/19  1241 11/15/19  0803 12/26/19  2232 01/07/20  0952 02/18/20  1356 02/23/20  1724 02/24/20  0404 02/25/20  0636 02/26/20  0408 02/27/20  0459 04/28/20  0812   WBC 3.41 L 8.29 4.54 17.44 H 5.59 7.05 15.44 H 9.44 9.63 6.41 4.74 4.26   RBC 4.71 5.64 4.88 6.46 H 5.98 6.30 H 6.77 H 5.53 5.05 4.51 L 4.84 4.92   Hemoglobin 12.2 L 14.1 12.2 L 16.1 14.9 15.8 16.8 13.5 L 12.7 L 11.3 L  12.2 L 12.5 L   Hematocrit 38.2 L 43.9 39.9 L 52.5 46.4 50.9 52.1 44.0 41.4 37.1 L 38.7 L 40.4   Platelets 165 258 168 321 188 249 395 H 210 163 143 L 135 L 162   Mean Corpuscular Volume 81 L 78 L 82 81 L 78 L 81 L 77 L 80 L 82 82 80 L 82   Mean Corpuscular Hemoglobin 25.9 L 25.0 L 25.0 L 24.9 L 24.9 L 25.1 L 24.8 L 24.4 L 25.1 L 25.1 L 25.2 L 25.4 L   Mean Corpuscular Hemoglobin Conc 31.9 L 32.1 30.6 L 30.7 L 32.1 31.0 L 32.2 30.7 L 30.7 L 30.5 L 31.5 L 30.9 L     CMP:  Recent Labs   Lab 08/27/19  1001 08/29/19  0621 08/30/19  0753  11/14/19  1241 11/15/19  0803 12/26/19  2326 01/07/20  0952 02/18/20  1356 02/23/20  1724  03/16/20  0829 04/28/20  0812 07/24/20  1546   Glucose 337 H 332 H 241 H   < > 267 H 86 283 H 305 H 123 H 231 H   < > 166 H 372 H 371 H   Calcium 10.0 9.7 9.2   < > 9.5 9.0 9.5 9.5 9.8 11.7 H   < > 9.0 9.2 9.7   Albumin 4.5 4.3 3.2 L  --  4.1 3.1 L 3.9 4.5 5.0 4.9  --  3.6 3.7 3.7   Total Protein 7.9 7.6 7.2  --  7.8 6.5 7.2 8.3 8.4 8.6 H  --  6.6 6.8 6.7   Sodium 138 137 142   < > 145 147 H 139 145 147 H 135 L   < > 141 138 140   Potassium 4.3 3.9 4.2   < > 3.0 L 3.8 3.9 4.4 3.3 L 2.2 LL   < > 3.4 L 3.9 4.0   CO2 26 22 L 19 L   < > 31 H 25 26 26 35 H 45 HH   < > 33 H 28 31 H   Chloride 98 98 104   < > 99 110 96 102 97 <70 LL   < > 101 103 100   BUN, Bld 17 14 10   < > 15 12 22 H 20 19 33 H   < > 21 H 17 24 H   Creatinine 0.65 0.63 0.9   < > 0.78 0.7 1.3 0.68 1.14 1.8 H   < > 0.83 0.80 1.53 H   Alkaline Phosphatase 134 H 127 H 93  --  191 H 113 87 179 H 104 114  --  98 168 H 140 H   ALT 29 32 25  --  60 H 36 23 55 H 39 22  --  23 38 36   AST 28 32 22  --  44 25 22 36 48 H 30  --  31 41 35   Total Bilirubin 1.3 H 1.3 H 0.7  --  0.7 0.5 1.3 H 0.6 1.0 1.8 H  --  0.4 0.3 0.5    < > = values in this interval not displayed.     LIPIDS:  Recent Labs   Lab 08/15/17  0751 09/28/17  1639 10/10/17  0727 01/12/18  0734 07/27/18  0932 12/02/18  0526 04/15/19  0822 05/27/19  0032 08/30/19  0753 11/15/19  0803  01/07/20  0952   TSH  --   --   --  1.91  --   --   --   --   --   --  2.250   HDL 27 L  --  27 L 25 L 21 L 12 L 21 L 28 L 25 L 20 L 24 L   POC Cholesterol, Total  --  133  --   --   --   --   --   --   --   --   --    Cholesterol 176  --  150 138 182 246 H 143 118 L 108 L 98 L 160   Triglycerides 196 H  --  135 120 1,169 H 1,797 H 748 H 216 H 102 100 882 H   LDL Cholesterol 110  --  96.0 91 Invalid, Trig>400.0 Invalid, Trig>400.0 Invalid, Trig>400.0 46.8 L 62.6 L 58.0 L Invalid, Trig>400.0   Hdl/Cholesterol Ratio 6.5 H  --  18.0 L 5.5 H 11.5 L 4.9 L 14.7 L 23.7 23.1 20.4 15.0 L   Non-HDL Cholesterol  --   --  123  --  161 234 122 90 83 78 136   Non HDL Chol. (LDL+VLDL) 149  --   --  113  --   --   --   --   --   --   --    Total Cholesterol/HDL Ratio  --   --  5.6 H  --  8.7 H 20.5 H 6.8 H 4.2 4.3 4.9 6.7 H     TSH:  Recent Labs   Lab 01/12/18  0734 01/07/20  0952   TSH 1.91 2.250           Physical Exam  Constitutional:       Appearance: He is well-developed.   HENT:      Head: Normocephalic and atraumatic.      Right Ear: External ear normal. No middle ear effusion. Tympanic membrane is not erythematous.      Left Ear: External ear normal.  No middle ear effusion. Tympanic membrane is not erythematous.      Nose: No rhinorrhea.      Right Sinus: No maxillary sinus tenderness or frontal sinus tenderness.      Left Sinus: No maxillary sinus tenderness or frontal sinus tenderness.      Mouth/Throat:      Pharynx: No posterior oropharyngeal erythema.      Tonsils: No tonsillar exudate.   Eyes:      General:         Right eye: No discharge.         Left eye: No discharge.      Conjunctiva/sclera: Conjunctivae normal.      Right eye: Right conjunctiva is not injected.      Left eye: Left conjunctiva is not injected.      Pupils: Pupils are equal, round, and reactive to light.   Neck:      Thyroid: No thyromegaly.   Cardiovascular:      Rate and Rhythm: Normal rate and regular rhythm.      Heart sounds: Normal heart  sounds. No murmur.   Pulmonary:      Effort: Pulmonary effort is normal.      Breath sounds: Normal breath sounds. No wheezing, rhonchi or rales.   Abdominal:      General: Bowel sounds are normal. There is no distension.      Tenderness: There is no abdominal tenderness.   Musculoskeletal:         General: No tenderness.   Lymphadenopathy:      Cervical: No cervical adenopathy.   Skin:     General: Skin is warm and dry.      Findings: No lesion or rash.   Neurological:      Cranial Nerves: No cranial nerve deficit.      Deep Tendon Reflexes: Reflexes normal.   Psychiatric:         Mood and Affect: Mood is not anxious or depressed.         Speech: Speech is not rapid and pressured.         Behavior: Behavior normal. Behavior is not agitated or aggressive.               Assessment and Plan:     Problem List Items Addressed This Visit        Cardiac/Vascular    Essential hypertension (Chronic) - blood pressure looks good.  Continue current medicine.      Mixed hyperlipidemia (Chronic)-his cholesterol looks good.  Continue atorvastatin.       Endocrine    Type 1 diabetes mellitus with diabetic neuropathy- sugars are not controlled.  Follow-up with endocrinology.  We will check labs.    Relevant Medications    pregabalin (LYRICA) 150 MG capsule    Other Relevant Orders    Hemoglobin A1C (Completed)    Comprehensive metabolic panel (Completed)       GI    Pancreatic pseudocyst - Primary- he continues to have abdominal pain.  Follow-up with GI.  Continue pantoprazole.    Relevant Orders    Ambulatory referral/consult to Gastroenterology          Orders Placed This Encounter    Hemoglobin A1C    Comprehensive metabolic panel    Ambulatory referral/consult to Gastroenterology    pregabalin (LYRICA) 150 MG capsule         Follow-up  in 4 months.       Josefina Pierce MD,  FACP  Internal Medicine

## 2020-08-18 DIAGNOSIS — E10.65 TYPE 1 DIABETES MELLITUS WITH HYPERGLYCEMIA: ICD-10-CM

## 2020-08-18 RX ORDER — INSULIN GLARGINE 100 [IU]/ML
INJECTION, SOLUTION SUBCUTANEOUS
Qty: 18 ML | Refills: 6 | Status: SHIPPED | OUTPATIENT
Start: 2020-08-18 | End: 2021-01-27

## 2020-08-19 ENCOUNTER — TELEPHONE (OUTPATIENT)
Dept: GASTROENTEROLOGY | Facility: CLINIC | Age: 40
End: 2020-08-19

## 2020-08-19 NOTE — TELEPHONE ENCOUNTER
Message left on clinical VM. Patient has a referral placed to see an [facility] doctor for Pancreatic pseudocyst. Please contact patient to schedule.

## 2020-09-02 ENCOUNTER — CLINICAL SUPPORT (OUTPATIENT)
Dept: OTHER | Facility: CLINIC | Age: 40
End: 2020-09-02
Payer: COMMERCIAL

## 2020-09-02 DIAGNOSIS — Z00.8 ENCOUNTER FOR OTHER GENERAL EXAMINATION: ICD-10-CM

## 2020-09-02 PROCEDURE — 82947 ASSAY GLUCOSE BLOOD QUANT: CPT | Mod: QW,S$GLB,, | Performed by: INTERNAL MEDICINE

## 2020-09-02 PROCEDURE — 82947 PR  ASSAY QUANTITATIVE,BLOOD GLUCOSE: ICD-10-PCS | Mod: QW,S$GLB,, | Performed by: INTERNAL MEDICINE

## 2020-09-02 PROCEDURE — 99401 PREV MED CNSL INDIV APPRX 15: CPT | Mod: S$GLB,,, | Performed by: INTERNAL MEDICINE

## 2020-09-02 PROCEDURE — 80061 PR  LIPID PANEL: ICD-10-PCS | Mod: QW,S$GLB,, | Performed by: INTERNAL MEDICINE

## 2020-09-02 PROCEDURE — 99401 PR PREVENT COUNSEL,INDIV,15 MIN: ICD-10-PCS | Mod: S$GLB,,, | Performed by: INTERNAL MEDICINE

## 2020-09-02 PROCEDURE — 80061 LIPID PANEL: CPT | Mod: QW,S$GLB,, | Performed by: INTERNAL MEDICINE

## 2020-09-03 VITALS — BODY MASS INDEX: 30.71 KG/M2 | HEIGHT: 68 IN

## 2020-09-03 LAB
GLUCOSE SERPL-MCNC: 366 MG/DL (ref 60–140)
HBA1C MFR BLD: 12.2 %
HDLC SERPL-MCNC: 20 MG/DL
POC CHOLESTEROL, TOTAL: 159 MG/DL
TRIGL SERPL-MCNC: 541 MG/DL

## 2020-09-09 ENCOUNTER — PATIENT MESSAGE (OUTPATIENT)
Dept: FAMILY MEDICINE | Facility: CLINIC | Age: 40
End: 2020-09-09

## 2020-09-15 DIAGNOSIS — E11.65 TYPE 2 DIABETES MELLITUS WITH HYPERGLYCEMIA, WITH LONG-TERM CURRENT USE OF INSULIN: ICD-10-CM

## 2020-09-15 DIAGNOSIS — Z79.4 TYPE 2 DIABETES MELLITUS WITH HYPERGLYCEMIA, WITH LONG-TERM CURRENT USE OF INSULIN: ICD-10-CM

## 2020-09-15 RX ORDER — INSULIN ASPART 100 [IU]/ML
INJECTION, SOLUTION INTRAVENOUS; SUBCUTANEOUS
Qty: 30 ML | Refills: 6 | Status: SHIPPED | OUTPATIENT
Start: 2020-09-15 | End: 2021-01-27

## 2020-10-16 DIAGNOSIS — K29.00 ACUTE SUPERFICIAL GASTRITIS WITHOUT HEMORRHAGE: ICD-10-CM

## 2020-10-16 RX ORDER — ALLOPURINOL 300 MG/1
300 TABLET ORAL DAILY
Qty: 30 TABLET | Refills: 0 | Status: SHIPPED | OUTPATIENT
Start: 2020-10-16 | End: 2020-11-13 | Stop reason: SDUPTHER

## 2020-10-16 RX ORDER — PANTOPRAZOLE SODIUM 40 MG/1
40 TABLET, DELAYED RELEASE ORAL 2 TIMES DAILY
Qty: 60 TABLET | Refills: 0 | Status: SHIPPED | OUTPATIENT
Start: 2020-10-16 | End: 2020-12-30

## 2020-10-27 ENCOUNTER — PATIENT MESSAGE (OUTPATIENT)
Dept: FAMILY MEDICINE | Facility: CLINIC | Age: 40
End: 2020-10-27

## 2020-10-27 DIAGNOSIS — E10.40 TYPE 1 DIABETES MELLITUS WITH DIABETIC NEUROPATHY: ICD-10-CM

## 2020-10-28 RX ORDER — PREGABALIN 150 MG/1
150 CAPSULE ORAL 2 TIMES DAILY
Qty: 60 CAPSULE | Refills: 2 | Status: SHIPPED | OUTPATIENT
Start: 2020-10-28 | End: 2021-01-19 | Stop reason: SDUPTHER

## 2020-11-13 ENCOUNTER — PATIENT MESSAGE (OUTPATIENT)
Dept: FAMILY MEDICINE | Facility: CLINIC | Age: 40
End: 2020-11-13

## 2020-11-13 DIAGNOSIS — E11.65 TYPE 2 DIABETES MELLITUS WITH HYPERGLYCEMIA, WITH LONG-TERM CURRENT USE OF INSULIN: ICD-10-CM

## 2020-11-13 DIAGNOSIS — Z79.4 TYPE 2 DIABETES MELLITUS WITH HYPERGLYCEMIA, WITH LONG-TERM CURRENT USE OF INSULIN: ICD-10-CM

## 2020-11-13 DIAGNOSIS — E78.1 HYPERTRIGLYCERIDEMIA: ICD-10-CM

## 2020-11-13 DIAGNOSIS — E10.40 TYPE 1 DIABETES MELLITUS WITH DIABETIC NEUROPATHY: ICD-10-CM

## 2020-11-13 DIAGNOSIS — E78.2 MIXED HYPERLIPIDEMIA: ICD-10-CM

## 2020-11-13 DIAGNOSIS — I10 ESSENTIAL HYPERTENSION: ICD-10-CM

## 2020-11-16 RX ORDER — ALLOPURINOL 300 MG/1
300 TABLET ORAL DAILY
Qty: 30 TABLET | Refills: 0 | Status: SHIPPED | OUTPATIENT
Start: 2020-11-16 | End: 2020-12-16

## 2020-11-18 RX ORDER — FENOFIBRATE 160 MG/1
160 TABLET ORAL DAILY
Qty: 90 TABLET | Refills: 1 | Status: SHIPPED | OUTPATIENT
Start: 2020-11-18 | End: 2021-09-15 | Stop reason: SDUPTHER

## 2020-11-18 RX ORDER — ICOSAPENT ETHYL 1000 MG/1
2 CAPSULE ORAL 2 TIMES DAILY
Qty: 360 CAPSULE | Refills: 3 | Status: SHIPPED | OUTPATIENT
Start: 2020-11-18 | End: 2021-12-20 | Stop reason: SDUPTHER

## 2020-11-18 RX ORDER — ATORVASTATIN CALCIUM 40 MG/1
40 TABLET, FILM COATED ORAL DAILY
Qty: 30 TABLET | Refills: 1 | Status: SHIPPED | OUTPATIENT
Start: 2020-11-18 | End: 2021-10-12 | Stop reason: SDUPTHER

## 2020-11-18 RX ORDER — LOSARTAN POTASSIUM 100 MG/1
50 TABLET ORAL DAILY
Qty: 45 TABLET | Refills: 1 | Status: SHIPPED | OUTPATIENT
Start: 2020-11-18 | End: 2021-10-12 | Stop reason: SDUPTHER

## 2020-11-27 PROBLEM — K85.90 ACUTE PANCREATITIS: Status: ACTIVE | Noted: 2020-11-27

## 2020-11-27 PROBLEM — N17.9 SEPSIS WITH ACUTE RENAL FAILURE WITHOUT SEPTIC SHOCK: Status: ACTIVE | Noted: 2020-11-27

## 2020-11-27 PROBLEM — E11.621 DIABETIC ULCER OF LEFT GREAT TOE: Status: ACTIVE | Noted: 2020-11-27

## 2020-11-27 PROBLEM — L97.529 DIABETIC ULCER OF LEFT GREAT TOE: Status: ACTIVE | Noted: 2020-11-27

## 2020-11-27 PROBLEM — E11.10 DIABETIC KETOACIDOSIS WITHOUT COMA ASSOCIATED WITH TYPE 2 DIABETES MELLITUS: Status: ACTIVE | Noted: 2020-11-27

## 2020-11-27 PROBLEM — R65.20 SEPSIS WITH ACUTE RENAL FAILURE WITHOUT SEPTIC SHOCK: Status: ACTIVE | Noted: 2020-11-27

## 2020-11-27 PROBLEM — N30.00 ACUTE CYSTITIS WITHOUT HEMATURIA: Status: ACTIVE | Noted: 2020-11-27

## 2020-11-27 PROBLEM — A41.9 SEPSIS WITH ACUTE RENAL FAILURE WITHOUT SEPTIC SHOCK: Status: ACTIVE | Noted: 2020-11-27

## 2020-11-30 PROBLEM — N17.9 SEPSIS WITH ACUTE RENAL FAILURE WITHOUT SEPTIC SHOCK: Status: RESOLVED | Noted: 2020-11-27 | Resolved: 2020-11-30

## 2020-11-30 PROBLEM — R65.20 SEPSIS WITH ACUTE RENAL FAILURE WITHOUT SEPTIC SHOCK: Status: RESOLVED | Noted: 2020-11-27 | Resolved: 2020-11-30

## 2020-11-30 PROBLEM — K85.90 ACUTE PANCREATITIS: Status: RESOLVED | Noted: 2020-11-27 | Resolved: 2020-11-30

## 2020-11-30 PROBLEM — A41.9 SEPSIS WITH ACUTE RENAL FAILURE WITHOUT SEPTIC SHOCK: Status: RESOLVED | Noted: 2020-11-27 | Resolved: 2020-11-30

## 2020-11-30 PROBLEM — E11.10 DIABETIC KETOACIDOSIS WITHOUT COMA ASSOCIATED WITH TYPE 2 DIABETES MELLITUS: Status: RESOLVED | Noted: 2020-11-27 | Resolved: 2020-11-30

## 2020-12-02 ENCOUNTER — PATIENT OUTREACH (OUTPATIENT)
Dept: ADMINISTRATIVE | Facility: OTHER | Age: 40
End: 2020-12-02

## 2020-12-02 DIAGNOSIS — E10.40 TYPE 1 DIABETES MELLITUS WITH DIABETIC NEUROPATHY: ICD-10-CM

## 2020-12-02 NOTE — PROGRESS NOTES
"Subjective:      Patient ID: Bay Ibarra is a 40 y.o. male.    Chief Complaint:  Diabetes    History of Present Illness  Bay Ibarra is here for follow up of DM.  Previously seen by ZACH Shipley NP.  Last seen 1/16/2018.  This is their first visit with me.      With regards to diabetes:    C-peptide?    Diagnosed: 2009  Known complications:  DKA + 11/2020  RN -  PN +  Nephropathy -  CAD -  Hx pancreatitis - 2013 and 2x in 2017.     Current regimen:  Lantus 60units nightly  Novolog 20units plus /50 TIDAC    Reports compliance.     Other medications tried:  Jardiance - stopped in February due to DKA   MFM  DPP4  "Other orals"    Glucose Monitor:   4 times a day testing  Log reviewed: oral recall  Since discharge: 140-288    Hypoglycemia:  Denies.   Knows how to correct with 15 grams of carbs- juice, coke, or a peppermint.     Diet/Exercise: patient reports he has slacked on his diet due to COVID  Eats 3 meals a day.   B: egg bowl/ frozen breakfast sandwich  L: leftovers from dinner  D: hello fresh meal   Snacks : avoids snacking before bed. During the day, small bag of chips, candy (rarely)   Drinks : water, diet soft drinks  Exercise - tries to stay active.      Education - last visit: 2019  Eye Exam: > 1 year ago  Podiatry: upcoming appointment     Diabetes Management Status    Hemoglobin A1C   Date Value Ref Range Status   11/27/2020 13.2 (H) 4.0 - 5.6 % Final     Comment:     ADA Screening Guidelines:  5.7-6.4%  Consistent with prediabetes  >or=6.5%  Consistent with diabetes  High levels of fetal hemoglobin interfere with the HbA1C  assay. Heterozygous hemoglobin variants (HbS, HgC, etc)do  not significantly interfere with this assay.   However, presence of multiple variants may affect accuracy.     07/24/2020 13.8 (H) 4.0 - 5.6 % Final     Comment:     ADA Screening Guidelines:  5.7-6.4%  Consistent with prediabetes  >or=6.5%  Consistent with diabetes  High levels of fetal hemoglobin " interfere with the HbA1C  assay. Heterozygous hemoglobin variants (HbS, HgC, etc)do  not significantly interfere with this assay.   However, presence of multiple variants may affect accuracy.     04/28/2020 10.8 (H) 4.0 - 5.6 % Final     Comment:     ADA Screening Guidelines:  5.7-6.4%  Consistent with prediabetes  >or=6.5%  Consistent with diabetes  High levels of fetal hemoglobin interfere with the HbA1C  assay. Heterozygous hemoglobin variants (HbS, HgC, etc)do  not significantly interfere with this assay.   However, presence of multiple variants may affect accuracy.         Statin: Taking  ACE/ARB: Taking  Screening or Prevention Patient's value Goal Complete/Controlled?   HgA1C Testing and Control   Lab Results   Component Value Date    HGBA1C 13.2 (H) 11/27/2020      Annually/Less than 8% No   Lipid profile : 11/28/2020 Annually Yes   LDL control Lab Results   Component Value Date    LDLCALC 49.0 (L) 11/28/2020    Annually/Less than 100 mg/dl  Yes   Nephropathy screening Lab Results   Component Value Date    LABMICR 6.0 04/15/2019     Lab Results   Component Value Date    PROTEINUA Negative 11/27/2020    Annually Yes   Blood pressure BP Readings from Last 1 Encounters:   12/03/20 120/64    Less than 140/90 Yes   Dilated retinal exam : 01/17/2019 Annually Yes   Foot exam   : 11/27/2020 Annually Yes       Review of Systems   Constitutional: Negative for fatigue.   Eyes: Negative for visual disturbance.   Respiratory: Negative for shortness of breath.    Cardiovascular: Negative for chest pain.   Gastrointestinal: Negative for abdominal pain.   Musculoskeletal: Negative for arthralgias.   Skin: Negative for wound.   Neurological: Negative for headaches.   Hematological: Does not bruise/bleed easily.   Psychiatric/Behavioral: Negative for sleep disturbance.     Objective:   Physical Exam  Vitals signs reviewed.   Neck:      Thyroid: No thyromegaly.   Cardiovascular:      Rate and Rhythm: Normal rate.       "Comments: No edema present  Pulmonary:      Effort: Pulmonary effort is normal.   Abdominal:      Palpations: Abdomen is soft.       Appropriate footwear, foot exam deferred, done 11/2020.  Injecting into scar tissue.     Visit Vitals  /64   Pulse 69   Ht 5' 8" (1.727 m)   Wt 96 kg (211 lb 10.3 oz)   BMI 32.18 kg/m²       Body mass index is 32.18 kg/m².    Lab Review:   Lab Results   Component Value Date    HGBA1C 13.2 (H) 11/27/2020    HGBA1C 13.8 (H) 07/24/2020    HGBA1C 10.8 (H) 04/28/2020       Lab Results   Component Value Date    CHOL 95 (L) 11/28/2020    HDL 25 (L) 11/28/2020    LDLCALC 49.0 (L) 11/28/2020    TRIG 105 11/28/2020    CHOLHDL 26.3 11/28/2020     Lab Results   Component Value Date     11/30/2020    K 3.1 (L) 11/30/2020     11/30/2020    CO2 30 (H) 11/30/2020     (H) 11/30/2020    BUN 13 11/30/2020    CREATININE 0.64 11/30/2020    CALCIUM 8.4 (L) 11/30/2020    PROT 5.9 (L) 11/30/2020    ALBUMIN 3.0 (L) 11/30/2020    BILITOT 0.5 11/30/2020    ALKPHOS 89 11/30/2020    AST 25 11/30/2020    ALT 18 11/30/2020    ANIONGAP 6 (L) 11/30/2020    ESTGFRAFRICA >60.0 11/30/2020    EGFRNONAA >60.0 11/30/2020    TSH 2.430 11/27/2020     No results found for: [id number]  Assessment and Plan     1. Type 1 diabetes mellitus with diabetic neuropathy     2. Essential hypertension     3. Mixed hyperlipidemia     4. Diabetic polyneuropathy associated with type 1 diabetes mellitus     5. Gastroparesis     6. Diabetic ketoacidosis without coma associated with type 2 diabetes mellitus  Ambulatory referral/consult to Endocrinology   7. Diabetic ulcer of left great toe  Ambulatory referral/consult to Endocrinology   8. Acute pancreatitis without infection or necrosis, unspecified pancreatitis type  Ambulatory referral/consult to Endocrinology       Type 1 diabetes mellitus with diabetic neuropathy  -- Labs next visit. Evaluate beta cell function.  -- A1c goal <7.0%.  -- Medications " discussed:  Insulin   -- Reviewed logs/CGM:  Recent admission for DKA. Discharged for < 1 week. Glucose has been variable.  Instructed to send glucose logs in 7 days.  Reach out to me sooner for any glucose <70 or consistently >200.  -- Interested in personal CGM. Order Dexcom once receive 30 days of logs.  -- Discussed rotating injection sites. Has been injecting in scar tissue. Instructed to avoid this area.  -- Medication Changes:    CONTINUE:  Lantus 60units nightly  Novolog 20units plus /50 TIDAC  Consider splitting basal dosage, we discussed th is.  -- Reviewed goals of therapy are to get the best control we can without hypoglycemia.  -- Reviewed patient's current insulin regimen. Clarified proper insulin dose and timing in relation to meals, etc. Insulin injection sites and proper rotation instructed.    -- Advised frequent self blood glucose monitoring.  Patient encouraged to document glucose results and bring them to every clinic visit.  -- Hypoglycemia precautions discussed. Instructed on precautions before driving.    -- Call for Bg repeatedly < 90 or > 180.   -- Close adherence to lifestyle changes recommended.   -- Periodic follow ups for eye evaluations, foot care and dental care suggested.    Essential hypertension  -- On ARB.  -- Controlled.  -- Blood pressure goals discussed with patient.    Mixed hyperlipidemia  -- Controlled.  -- On statin per ADA recommendations.    Diabetic polyneuropathy associated with type 1 diabetes mellitus  -- Optimize glucose control.     Gastroparesis  -- Avoid GLP1.    Follow up in about 6 weeks (around 1/14/2021).

## 2020-12-02 NOTE — PROGRESS NOTES
Updates were requested from care everywhere.  Chart was reviewed for overdue Proactive [facility] Encounters (DELMI) topics (CRS, Breast Cancer Screening, Eye exam)  Health Maintenance has been updated.  LINKS not responding.

## 2020-12-03 ENCOUNTER — OFFICE VISIT (OUTPATIENT)
Dept: ENDOCRINOLOGY | Facility: CLINIC | Age: 40
End: 2020-12-03
Payer: COMMERCIAL

## 2020-12-03 ENCOUNTER — TELEPHONE (OUTPATIENT)
Dept: PODIATRY | Facility: CLINIC | Age: 40
End: 2020-12-03

## 2020-12-03 VITALS
BODY MASS INDEX: 32.07 KG/M2 | DIASTOLIC BLOOD PRESSURE: 64 MMHG | WEIGHT: 211.63 LBS | HEIGHT: 68 IN | SYSTOLIC BLOOD PRESSURE: 120 MMHG | HEART RATE: 69 BPM

## 2020-12-03 DIAGNOSIS — K85.90 ACUTE PANCREATITIS WITHOUT INFECTION OR NECROSIS, UNSPECIFIED PANCREATITIS TYPE: ICD-10-CM

## 2020-12-03 DIAGNOSIS — I10 ESSENTIAL HYPERTENSION: Chronic | ICD-10-CM

## 2020-12-03 DIAGNOSIS — E78.2 MIXED HYPERLIPIDEMIA: Chronic | ICD-10-CM

## 2020-12-03 DIAGNOSIS — E10.42 DIABETIC POLYNEUROPATHY ASSOCIATED WITH TYPE 1 DIABETES MELLITUS: ICD-10-CM

## 2020-12-03 DIAGNOSIS — E10.40 TYPE 1 DIABETES MELLITUS WITH DIABETIC NEUROPATHY: Primary | ICD-10-CM

## 2020-12-03 DIAGNOSIS — K31.84 GASTROPARESIS: ICD-10-CM

## 2020-12-03 DIAGNOSIS — E11.10 DIABETIC KETOACIDOSIS WITHOUT COMA ASSOCIATED WITH TYPE 2 DIABETES MELLITUS: ICD-10-CM

## 2020-12-03 DIAGNOSIS — L97.529 DIABETIC ULCER OF LEFT GREAT TOE: ICD-10-CM

## 2020-12-03 DIAGNOSIS — E11.621 DIABETIC ULCER OF LEFT GREAT TOE: ICD-10-CM

## 2020-12-03 PROCEDURE — 3078F DIAST BP <80 MM HG: CPT | Mod: CPTII,S$GLB,, | Performed by: NURSE PRACTITIONER

## 2020-12-03 PROCEDURE — 3074F SYST BP LT 130 MM HG: CPT | Mod: CPTII,S$GLB,, | Performed by: NURSE PRACTITIONER

## 2020-12-03 PROCEDURE — 1126F PR PAIN SEVERITY QUANTIFIED, NO PAIN PRESENT: ICD-10-PCS | Mod: S$GLB,,, | Performed by: NURSE PRACTITIONER

## 2020-12-03 PROCEDURE — 3078F PR MOST RECENT DIASTOLIC BLOOD PRESSURE < 80 MM HG: ICD-10-PCS | Mod: CPTII,S$GLB,, | Performed by: NURSE PRACTITIONER

## 2020-12-03 PROCEDURE — 3008F BODY MASS INDEX DOCD: CPT | Mod: CPTII,S$GLB,, | Performed by: NURSE PRACTITIONER

## 2020-12-03 PROCEDURE — 99999 PR PBB SHADOW E&M-EST. PATIENT-LVL V: ICD-10-PCS | Mod: PBBFAC,,, | Performed by: NURSE PRACTITIONER

## 2020-12-03 PROCEDURE — 3046F PR MOST RECENT HEMOGLOBIN A1C LEVEL > 9.0%: ICD-10-PCS | Mod: CPTII,S$GLB,, | Performed by: NURSE PRACTITIONER

## 2020-12-03 PROCEDURE — 3008F PR BODY MASS INDEX (BMI) DOCUMENTED: ICD-10-PCS | Mod: CPTII,S$GLB,, | Performed by: NURSE PRACTITIONER

## 2020-12-03 PROCEDURE — 99999 PR PBB SHADOW E&M-EST. PATIENT-LVL V: CPT | Mod: PBBFAC,,, | Performed by: NURSE PRACTITIONER

## 2020-12-03 PROCEDURE — 3046F HEMOGLOBIN A1C LEVEL >9.0%: CPT | Mod: CPTII,S$GLB,, | Performed by: NURSE PRACTITIONER

## 2020-12-03 PROCEDURE — 1126F AMNT PAIN NOTED NONE PRSNT: CPT | Mod: S$GLB,,, | Performed by: NURSE PRACTITIONER

## 2020-12-03 PROCEDURE — 3074F PR MOST RECENT SYSTOLIC BLOOD PRESSURE < 130 MM HG: ICD-10-PCS | Mod: CPTII,S$GLB,, | Performed by: NURSE PRACTITIONER

## 2020-12-03 PROCEDURE — 99214 OFFICE O/P EST MOD 30 MIN: CPT | Mod: S$GLB,,, | Performed by: NURSE PRACTITIONER

## 2020-12-03 PROCEDURE — 99214 PR OFFICE/OUTPT VISIT, EST, LEVL IV, 30-39 MIN: ICD-10-PCS | Mod: S$GLB,,, | Performed by: NURSE PRACTITIONER

## 2020-12-03 NOTE — TELEPHONE ENCOUNTER
Spoke with pt who stated he was doing okay, he is changing and cleaning wound regularly per hospital discharge orders. Offered to try and get him in with another podiatrist and pt stated he would rather just wait to see  Dr. Melvin. He was advised if he noticed any changes or anything concerning go back to ED or UC, pt verbalized understanding

## 2020-12-03 NOTE — ASSESSMENT & PLAN NOTE
-- Labs next visit. Evaluate beta cell function.  -- A1c goal <7.0%.  -- Medications discussed:  Insulin   -- Reviewed logs/CGM:  Recent admission for DKA. Discharged for < 1 week. Glucose has been variable.  Instructed to send glucose logs in 7 days.  Reach out to me sooner for any glucose <70 or consistently >200.  -- Interested in personal CGM. Order Dexcom once receive 30 days of logs.  -- Discussed rotating injection sites. Has been injecting in scar tissue. Instructed to avoid this area.  -- Medication Changes:    CONTINUE:  Lantus 60units nightly  Novolog 20units plus /50 TIDAC  Consider splitting basal dosage, we discussed th is.  -- Reviewed goals of therapy are to get the best control we can without hypoglycemia.  -- Reviewed patient's current insulin regimen. Clarified proper insulin dose and timing in relation to meals, etc. Insulin injection sites and proper rotation instructed.    -- Advised frequent self blood glucose monitoring.  Patient encouraged to document glucose results and bring them to every clinic visit.  -- Hypoglycemia precautions discussed. Instructed on precautions before driving.    -- Call for Bg repeatedly < 90 or > 180.   -- Close adherence to lifestyle changes recommended.   -- Periodic follow ups for eye evaluations, foot care and dental care suggested.

## 2020-12-03 NOTE — TELEPHONE ENCOUNTER
----- Message from Margo Romero LPN sent at 12/3/2020  8:43 AM CST -----  Good Morning, this pt is in need of a hospital follow, would he be able to be seen by Gulshan?

## 2020-12-04 ENCOUNTER — PATIENT OUTREACH (OUTPATIENT)
Dept: ADMINISTRATIVE | Facility: HOSPITAL | Age: 40
End: 2020-12-04

## 2020-12-10 ENCOUNTER — OFFICE VISIT (OUTPATIENT)
Dept: FAMILY MEDICINE | Facility: CLINIC | Age: 40
End: 2020-12-10
Payer: COMMERCIAL

## 2020-12-10 ENCOUNTER — PATIENT MESSAGE (OUTPATIENT)
Dept: ENDOCRINOLOGY | Facility: CLINIC | Age: 40
End: 2020-12-10

## 2020-12-10 ENCOUNTER — OFFICE VISIT (OUTPATIENT)
Dept: PODIATRY | Facility: CLINIC | Age: 40
End: 2020-12-10
Payer: COMMERCIAL

## 2020-12-10 ENCOUNTER — OFFICE VISIT (OUTPATIENT)
Dept: GASTROENTEROLOGY | Facility: CLINIC | Age: 40
End: 2020-12-10
Payer: COMMERCIAL

## 2020-12-10 VITALS
OXYGEN SATURATION: 96 % | HEIGHT: 68 IN | WEIGHT: 196 LBS | TEMPERATURE: 98 F | DIASTOLIC BLOOD PRESSURE: 60 MMHG | WEIGHT: 196 LBS | OXYGEN SATURATION: 96 % | SYSTOLIC BLOOD PRESSURE: 122 MMHG | HEIGHT: 68 IN | HEART RATE: 106 BPM | RESPIRATION RATE: 16 BRPM | HEART RATE: 106 BPM | SYSTOLIC BLOOD PRESSURE: 122 MMHG | BODY MASS INDEX: 29.7 KG/M2 | DIASTOLIC BLOOD PRESSURE: 60 MMHG | RESPIRATION RATE: 18 BRPM | BODY MASS INDEX: 29.7 KG/M2

## 2020-12-10 VITALS — BODY MASS INDEX: 29.83 KG/M2 | WEIGHT: 196.19 LBS

## 2020-12-10 DIAGNOSIS — E10.40 TYPE 1 DIABETES MELLITUS WITH DIABETIC NEUROPATHY: Primary | ICD-10-CM

## 2020-12-10 DIAGNOSIS — E11.621 DIABETIC ULCER OF LEFT GREAT TOE: Primary | ICD-10-CM

## 2020-12-10 DIAGNOSIS — I10 ESSENTIAL HYPERTENSION: Chronic | ICD-10-CM

## 2020-12-10 DIAGNOSIS — K86.3 PANCREATIC PSEUDOCYST: Primary | ICD-10-CM

## 2020-12-10 DIAGNOSIS — E11.65 TYPE 2 DIABETES MELLITUS WITH HYPERGLYCEMIA, WITH LONG-TERM CURRENT USE OF INSULIN: Primary | ICD-10-CM

## 2020-12-10 DIAGNOSIS — E11.10 DIABETIC KETOACIDOSIS WITHOUT COMA ASSOCIATED WITH TYPE 2 DIABETES MELLITUS: ICD-10-CM

## 2020-12-10 DIAGNOSIS — E87.6 HYPOKALEMIA: ICD-10-CM

## 2020-12-10 DIAGNOSIS — E11.621 DIABETIC ULCER OF LEFT GREAT TOE: ICD-10-CM

## 2020-12-10 DIAGNOSIS — Z79.4 TYPE 2 DIABETES MELLITUS WITH HYPERGLYCEMIA, WITH LONG-TERM CURRENT USE OF INSULIN: Primary | ICD-10-CM

## 2020-12-10 DIAGNOSIS — K85.90 ACUTE PANCREATITIS WITHOUT INFECTION OR NECROSIS, UNSPECIFIED PANCREATITIS TYPE: ICD-10-CM

## 2020-12-10 DIAGNOSIS — L97.529 DIABETIC ULCER OF LEFT GREAT TOE: ICD-10-CM

## 2020-12-10 DIAGNOSIS — L97.529 DIABETIC ULCER OF LEFT GREAT TOE: Primary | ICD-10-CM

## 2020-12-10 LAB — GLUCOSE SERPL-MCNC: 85 MG/DL (ref 70–110)

## 2020-12-10 PROCEDURE — 99999 PR PBB SHADOW E&M-EST. PATIENT-LVL IV: ICD-10-PCS | Mod: PBBFAC,,, | Performed by: FAMILY MEDICINE

## 2020-12-10 PROCEDURE — 3046F PR MOST RECENT HEMOGLOBIN A1C LEVEL > 9.0%: ICD-10-PCS | Mod: CPTII,S$GLB,, | Performed by: INTERNAL MEDICINE

## 2020-12-10 PROCEDURE — 3008F BODY MASS INDEX DOCD: CPT | Mod: CPTII,S$GLB,, | Performed by: FAMILY MEDICINE

## 2020-12-10 PROCEDURE — 99999 PR PBB SHADOW E&M-EST. PATIENT-LVL V: ICD-10-PCS | Mod: PBBFAC,,, | Performed by: PODIATRIST

## 2020-12-10 PROCEDURE — 3046F PR MOST RECENT HEMOGLOBIN A1C LEVEL > 9.0%: ICD-10-PCS | Mod: CPTII,S$GLB,, | Performed by: PODIATRIST

## 2020-12-10 PROCEDURE — 3008F BODY MASS INDEX DOCD: CPT | Mod: CPTII,S$GLB,, | Performed by: PODIATRIST

## 2020-12-10 PROCEDURE — 99214 OFFICE O/P EST MOD 30 MIN: CPT | Mod: S$GLB,,, | Performed by: FAMILY MEDICINE

## 2020-12-10 PROCEDURE — 3074F PR MOST RECENT SYSTOLIC BLOOD PRESSURE < 130 MM HG: ICD-10-PCS | Mod: CPTII,S$GLB,, | Performed by: FAMILY MEDICINE

## 2020-12-10 PROCEDURE — 3046F PR MOST RECENT HEMOGLOBIN A1C LEVEL > 9.0%: ICD-10-PCS | Mod: CPTII,S$GLB,, | Performed by: FAMILY MEDICINE

## 2020-12-10 PROCEDURE — 99214 PR OFFICE/OUTPT VISIT, EST, LEVL IV, 30-39 MIN: ICD-10-PCS | Mod: 25,S$GLB,, | Performed by: PODIATRIST

## 2020-12-10 PROCEDURE — 99999 PR PBB SHADOW E&M-EST. PATIENT-LVL III: CPT | Mod: PBBFAC,,,

## 2020-12-10 PROCEDURE — 3074F SYST BP LT 130 MM HG: CPT | Mod: CPTII,S$GLB,, | Performed by: PODIATRIST

## 2020-12-10 PROCEDURE — 3046F HEMOGLOBIN A1C LEVEL >9.0%: CPT | Mod: CPTII,S$GLB,, | Performed by: PODIATRIST

## 2020-12-10 PROCEDURE — 99214 PR OFFICE/OUTPT VISIT, EST, LEVL IV, 30-39 MIN: ICD-10-PCS | Mod: S$GLB,,, | Performed by: FAMILY MEDICINE

## 2020-12-10 PROCEDURE — 3008F BODY MASS INDEX DOCD: CPT | Mod: CPTII,S$GLB,, | Performed by: INTERNAL MEDICINE

## 2020-12-10 PROCEDURE — 99214 OFFICE O/P EST MOD 30 MIN: CPT | Mod: S$GLB,,, | Performed by: INTERNAL MEDICINE

## 2020-12-10 PROCEDURE — 3046F HEMOGLOBIN A1C LEVEL >9.0%: CPT | Mod: CPTII,S$GLB,, | Performed by: FAMILY MEDICINE

## 2020-12-10 PROCEDURE — 3074F PR MOST RECENT SYSTOLIC BLOOD PRESSURE < 130 MM HG: ICD-10-PCS | Mod: CPTII,S$GLB,, | Performed by: PODIATRIST

## 2020-12-10 PROCEDURE — 99999 PR PBB SHADOW E&M-EST. PATIENT-LVL III: ICD-10-PCS | Mod: PBBFAC,,,

## 2020-12-10 PROCEDURE — 11042 WOUND DEBRIDEMENT: ICD-10-PCS | Mod: S$GLB,,, | Performed by: PODIATRIST

## 2020-12-10 PROCEDURE — 3008F PR BODY MASS INDEX (BMI) DOCUMENTED: ICD-10-PCS | Mod: CPTII,S$GLB,, | Performed by: PODIATRIST

## 2020-12-10 PROCEDURE — 1126F PR PAIN SEVERITY QUANTIFIED, NO PAIN PRESENT: ICD-10-PCS | Mod: S$GLB,,, | Performed by: FAMILY MEDICINE

## 2020-12-10 PROCEDURE — 3078F DIAST BP <80 MM HG: CPT | Mod: CPTII,S$GLB,, | Performed by: PODIATRIST

## 2020-12-10 PROCEDURE — 3078F DIAST BP <80 MM HG: CPT | Mod: CPTII,S$GLB,, | Performed by: FAMILY MEDICINE

## 2020-12-10 PROCEDURE — 3008F PR BODY MASS INDEX (BMI) DOCUMENTED: ICD-10-PCS | Mod: CPTII,S$GLB,, | Performed by: FAMILY MEDICINE

## 2020-12-10 PROCEDURE — 1126F PR PAIN SEVERITY QUANTIFIED, NO PAIN PRESENT: ICD-10-PCS | Mod: S$GLB,,, | Performed by: INTERNAL MEDICINE

## 2020-12-10 PROCEDURE — 1126F AMNT PAIN NOTED NONE PRSNT: CPT | Mod: S$GLB,,, | Performed by: INTERNAL MEDICINE

## 2020-12-10 PROCEDURE — 3078F PR MOST RECENT DIASTOLIC BLOOD PRESSURE < 80 MM HG: ICD-10-PCS | Mod: CPTII,S$GLB,, | Performed by: PODIATRIST

## 2020-12-10 PROCEDURE — 1126F AMNT PAIN NOTED NONE PRSNT: CPT | Mod: S$GLB,,, | Performed by: FAMILY MEDICINE

## 2020-12-10 PROCEDURE — 3074F SYST BP LT 130 MM HG: CPT | Mod: CPTII,S$GLB,, | Performed by: FAMILY MEDICINE

## 2020-12-10 PROCEDURE — 3008F PR BODY MASS INDEX (BMI) DOCUMENTED: ICD-10-PCS | Mod: CPTII,S$GLB,, | Performed by: INTERNAL MEDICINE

## 2020-12-10 PROCEDURE — 82962 POCT GLUCOSE, HAND-HELD DEVICE: ICD-10-PCS | Mod: S$GLB,,, | Performed by: FAMILY MEDICINE

## 2020-12-10 PROCEDURE — 1125F PR PAIN SEVERITY QUANTIFIED, PAIN PRESENT: ICD-10-PCS | Mod: S$GLB,,, | Performed by: PODIATRIST

## 2020-12-10 PROCEDURE — 99999 PR PBB SHADOW E&M-EST. PATIENT-LVL IV: CPT | Mod: PBBFAC,,, | Performed by: FAMILY MEDICINE

## 2020-12-10 PROCEDURE — 99999 PR PBB SHADOW E&M-EST. PATIENT-LVL V: CPT | Mod: PBBFAC,,, | Performed by: PODIATRIST

## 2020-12-10 PROCEDURE — 1125F AMNT PAIN NOTED PAIN PRSNT: CPT | Mod: S$GLB,,, | Performed by: PODIATRIST

## 2020-12-10 PROCEDURE — 11042 DBRDMT SUBQ TIS 1ST 20SQCM/<: CPT | Mod: S$GLB,,, | Performed by: PODIATRIST

## 2020-12-10 PROCEDURE — 3078F PR MOST RECENT DIASTOLIC BLOOD PRESSURE < 80 MM HG: ICD-10-PCS | Mod: CPTII,S$GLB,, | Performed by: FAMILY MEDICINE

## 2020-12-10 PROCEDURE — 3046F HEMOGLOBIN A1C LEVEL >9.0%: CPT | Mod: CPTII,S$GLB,, | Performed by: INTERNAL MEDICINE

## 2020-12-10 PROCEDURE — 99214 OFFICE O/P EST MOD 30 MIN: CPT | Mod: 25,S$GLB,, | Performed by: PODIATRIST

## 2020-12-10 PROCEDURE — 82962 GLUCOSE BLOOD TEST: CPT | Mod: S$GLB,,, | Performed by: FAMILY MEDICINE

## 2020-12-10 PROCEDURE — 99214 PR OFFICE/OUTPT VISIT, EST, LEVL IV, 30-39 MIN: ICD-10-PCS | Mod: S$GLB,,, | Performed by: INTERNAL MEDICINE

## 2020-12-10 NOTE — PROGRESS NOTES
SUBJECTIVE:         Chief Complaint: No chief complaint on file.      History of Present Illness:  Patient is a 40 y.o. male presents with DKA. He has recurrent pancreatitis related to high triglycerides. Previous studies include CT scan. Usg, and MRI.       Been in the hospital twice since his last visit both for DKA.  His most recent entrance into the hospital for DKA was related to in his words poor choices these are darien basically diet related.  He continues to follow-up with Endocrinology for his hypertriglycerides.    (Not in a hospital admission)      Review of patient's allergies indicates:  No Known Allergies     Past Medical History:   Diagnosis Date    Acute pancreatitis     Diverticulosis of colon     Essential hypertension     Fatty liver     Gastric varices without bleeding 1/16/2020    Gout     Hx of acute pancreatitis 3/3/2017    Hypertriglyceridemia 8/2/2017    Obesity (BMI 30-39.9) 6/5/2015    Obesity (BMI 35.0-39.9 without comorbidity) 6/5/2015    Obstructive sleep apnea syndrome 12/18/2015    Type 2 diabetes mellitus with diabetic polyneuropathy, with long-term current use of insulin 10/16/2017    Type 2 diabetes mellitus with hyperglycemia, with long-term current use of insulin 8/3/2017     Past Surgical History:   Procedure Laterality Date    ENDOSCOPIC ULTRASOUND OF UPPER GASTROINTESTINAL TRACT N/A 5/27/2019    Procedure: ULTRASOUND, UPPER GI TRACT, ENDOSCOPIC;  Surgeon: Zafar Velazquez MD;  Location: [address]);  Service: Endoscopy;  Laterality: N/A;    ERCP N/A 5/27/2019    Procedure: ERCP (ENDOSCOPIC RETROGRADE CHOLANGIOPANCREATOGRAPHY);  Surgeon: Zafar Velazquez MD;  Location: [address]);  Service: Endoscopy;  Laterality: N/A;    ESOPHAGOGASTRODUODENOSCOPY      ESOPHAGOGASTRODUODENOSCOPY N/A 1/31/2020    Procedure: EGD (ESOPHAGOGASTRODUODENOSCOPY);  Surgeon: Zafar Velazquez MD;  Location: [facility];  Service: Endoscopy;  Laterality: N/A;     Family  History   Problem Relation Age of Onset    Aneurysm Mother         AAA    Coronary artery disease Father         4 vessel bypass at 40, possible stent at 36    Diabetes type II Father     No Known Problems Sister     No Known Problems Brother     Aneurysm Maternal Grandmother         Possible AAA    Diabetes type II Paternal Grandmother      Social History     Tobacco Use    Smoking status: Never Smoker    Smokeless tobacco: Never Used   Substance Use Topics    Alcohol use: No     Frequency: Never     Drinks per session: Patient refused     Binge frequency: Patient refused    Drug use: Never       Review of Systems:  A complete review of systems was performed and other than described in the HPI and below is negative       OBJECTIVE:     Vital Signs (Most Recent)       Abdomen: soft, non-tender, bowel sounds normal    Diagnostic Results:  US: Reviewed  CT: Reviewed      ASSESSMENT/PLAN:   Recurrent pancreatitis related to hypertriglyceridemia.  His triglycerides are fairly well controlled his recent in in admissions for DKA well without significant sequela.  I personally reviewed his most recent CT scans.  A pancreatic pseudocyst seen on a scan in November 2019 has resolved.  He does have persistent inflammation and thickening of the gastric wall at this point.  However there does not appear any sort of endoscopic intervention necessary for him.  He is seeing Dr. Stevens in January.  He does not need to see us for  Further issues unless significant pancreatitis pathology recurs.      I spent 25 minutes with the patient., over 50% of it in coordinating care and counseling the patient

## 2020-12-10 NOTE — LETTER
December 10, 2020      Josef Llanos MD  [address]  [facility] [address]           [facility]  [address], [facility] [address]  [facility] [address]  Phone: [phone]  Fax: [phone]          Patient: Bay Ibarra   MR Number: [id number]   YOB: 1980   Date of Visit: 12/10/2020       Dear Dr. Josef Llanos:    Thank you for referring Bay Ibarra to me for evaluation. Attached you will find relevant portions of my assessment and plan of care.    If you have questions, please do not hesitate to call me. I look forward to following Bay Ibarra along with you.    Sincerely,    Anju Melvin, KONSTANTIN    Enclosure  CC:  No Recipients    If you would like to receive this communication electronically, please contact [email or web address] or [phone] to request more information on [employer] Link access.    For providers and/or their staff who would like to refer a patient to [facility], please contact us through our one-stop-shop provider referral line, [facility], at [phone].    If you feel you have received this communication in error or would no longer like to receive these types of communications, please e-mail [email or web address]

## 2020-12-10 NOTE — PROGRESS NOTES
Subjective:      Patient ID: Bay Ibarra is a 40 y.o. male.    Chief Complaint: Diabetic Foot Exam (B/L feet), Foot Ulcer (left great toe), and Dr. Taylor 12/10/2020 (PCP visit)      Patient is 40 y.o. male with Past medical history of hypertension, hyperlipidemia, type 2 diabetes, chronic pancreatitis was recently hospitalized for having abdominal pain, nausea and vomiting.  Patient was admitted to ICU for management of acute diabetic ketoacidosis, UTI.  Patient is following up with left hallux wound that was present during hospitalization.  He noticed a wound 1 month ago.  Last measured glucose level was 85.  Patient is ambulating in tennis shoes.  Patient complains of numbness and tingling on bilateral lower extremity.      Review of Systems   Constitution: Negative for chills, decreased appetite, fever and malaise/fatigue.   HENT: Negative for congestion, ear discharge and sore throat.    Eyes: Negative for discharge and pain.   Cardiovascular: Negative for chest pain, claudication and leg swelling.   Respiratory: Negative for cough and shortness of breath.    Skin: Positive for poor wound healing. Negative for color change, nail changes and rash.        Left hallux ulcer   Musculoskeletal: Positive for gout. Negative for arthritis, joint pain, joint swelling and muscle weakness.   Gastrointestinal: Negative for bloating, abdominal pain, diarrhea, nausea and vomiting.   Genitourinary: Negative for flank pain and hematuria.   Neurological: Negative for headaches, numbness and weakness.   Psychiatric/Behavioral: Negative for altered mental status.             Past Medical History:   Diagnosis Date    Acute pancreatitis     Diverticulosis of colon     Essential hypertension     Fatty liver     Gastric varices without bleeding 1/16/2020    Gout     Hx of acute pancreatitis 3/3/2017    Hypertriglyceridemia 8/2/2017    Obesity (BMI 30-39.9) 6/5/2015    Obesity (BMI 35.0-39.9 without comorbidity)  6/5/2015    Obstructive sleep apnea syndrome 12/18/2015    Type 2 diabetes mellitus with diabetic polyneuropathy, with long-term current use of insulin 10/16/2017    Type 2 diabetes mellitus with hyperglycemia, with long-term current use of insulin 8/3/2017       Past Surgical History:   Procedure Laterality Date    ENDOSCOPIC ULTRASOUND OF UPPER GASTROINTESTINAL TRACT N/A 5/27/2019    Procedure: ULTRASOUND, UPPER GI TRACT, ENDOSCOPIC;  Surgeon: Zafar Velazquez MD;  Location: [address]);  Service: Endoscopy;  Laterality: N/A;    ERCP N/A 5/27/2019    Procedure: ERCP (ENDOSCOPIC RETROGRADE CHOLANGIOPANCREATOGRAPHY);  Surgeon: Zafar Velazquez MD;  Location: [address]);  Service: Endoscopy;  Laterality: N/A;    ESOPHAGOGASTRODUODENOSCOPY      ESOPHAGOGASTRODUODENOSCOPY N/A 1/31/2020    Procedure: EGD (ESOPHAGOGASTRODUODENOSCOPY);  Surgeon: Zafar Velazquez MD;  Location: [facility];  Service: Endoscopy;  Laterality: N/A;       Family History   Problem Relation Age of Onset    Aneurysm Mother         AAA    Coronary artery disease Father         4 vessel bypass at 40, possible stent at 36    Diabetes type II Father     No Known Problems Sister     No Known Problems Brother     Aneurysm Maternal Grandmother         Possible AAA    Diabetes type II Paternal Grandmother        Social History     Socioeconomic History    Marital status:      Spouse name: Not on file    Number of children: Not on file    Years of education: Not on file    Highest education level: Not on file   Occupational History    Not on file   Social Needs    Financial resource strain: Not hard at all    Food insecurity     Worry: Never true     Inability: Never true    Transportation needs     Medical: No     Non-medical: No   Tobacco Use    Smoking status: Never Smoker    Smokeless tobacco: Never Used   Substance and Sexual Activity    Alcohol use: No     Frequency: Never     Drinks per session: Patient  "refused     Binge frequency: Patient refused    Drug use: Never    Sexual activity: Yes     Partners: Female   Lifestyle    Physical activity     Days per week: 3 days     Minutes per session: 20 min    Stress: Only a little   Relationships    Social connections     Talks on phone: Once a week     Gets together: Once a week     Attends Synagogue service: Not on file     Active member of club or organization: No     Attends meetings of clubs or organizations: Patient refused     Relationship status:    Other Topics Concern    Not on file   Social History Narrative    Not on file       Current Outpatient Medications   Medication Sig Dispense Refill    allopurinoL (ZYLOPRIM) 300 MG tablet Take 1 tablet (300 mg total) by mouth once daily. 30 tablet 0    aspirin (ECOTRIN) 81 MG EC tablet Take 81 mg by mouth once daily.      atorvastatin (LIPITOR) 40 MG tablet Take 1 tablet (40 mg total) by mouth once daily. 90 tablet 1    blood sugar diagnostic Strp 1 strip by Misc.(Non-Drug; Combo Route) route 3 (three) times daily. 200 each 5    fenofibrate 160 MG Tab Take 1 tablet (160 mg total) by mouth once daily. 90 tablet 1    icosapent ethyL (VASCEPA) 1 gram Cap Take 2 g by mouth 2 (two) times daily. 360 capsule 3    insulin (LANTUS SOLOSTAR U-100 INSULIN) glargine 100 units/mL (3mL) SubQ pen Inject 52-60 units nightly. (Patient taking differently: 60 Units every evening. ) 18 mL 6    insulin aspart U-100 (NOVOLOG FLEXPEN U-100 INSULIN) 100 unit/mL (3 mL) InPn pen INJECT 20 UNITS SUBCUTANEOUSLY WITH MEALS PLUS SLIDING SCALE 150-200+2, 201-250 +4, 251-300 +6, 301-350 +8,>350 +10. MAX OF 90 UNITS PER DAY 30 mL 6    insulin syringe-needle U-100 29 gauge x 1/2" Syrg Inject 1 (once) per day 100 Syringe 3    losartan (COZAAR) 100 MG tablet Take 0.5 tablets (50 mg total) by mouth once daily. 45 tablet 1    niacin 100 MG Tab Take 200 mg by mouth every evening.       pantoprazole (PROTONIX) 40 MG tablet Take 1 " "tablet (40 mg total) by mouth 2 (two) times daily. 60 tablet 0    pregabalin (LYRICA) 150 MG capsule Take 1 capsule (150 mg total) by mouth 2 (two) times daily. 60 capsule 2    diclofenac sodium (VOLTAREN) 1 % Gel Apply 2 g topically 4 (four) times daily. Apply to chest in the location of pain up to 4 times a day as needed for pain. (Patient not taking: Reported on 12/3/2020) 100 g 0    ondansetron (ZOFRAN) 4 MG tablet Take 1 tablet (4 mg total) by mouth every 6 (six) hours as needed for Nausea. (Patient not taking: Reported on 12/3/2020) 30 tablet 1     No current facility-administered medications for this visit.        Review of patient's allergies indicates:  No Known Allergies    Vitals:    12/10/20 1529   BP: 122/60   Pulse: 106   Resp: 16   SpO2: 96%   Weight: 88.9 kg (196 lb)   Height: 5' 8" (1.727 m)   PainSc:   2   PainLoc: Toe       Objective:      Physical Exam  Constitutional:       General: He is not in acute distress.     Appearance: He is well-developed.   HENT:      Nose: Nose normal.   Eyes:      Conjunctiva/sclera: Conjunctivae normal.   Neck:      Musculoskeletal: Normal range of motion.   Pulmonary:      Effort: Pulmonary effort is normal.   Chest:      Chest wall: No tenderness.   Abdominal:      Tenderness: There is no abdominal tenderness.   Neurological:      Mental Status: He is alert and oriented to person, place, and time.   Psychiatric:         Behavior: Behavior normal.         Vascular: Distal DP/PT pulses palpable 1/4. CRT < 3 sec to tips of toes. No vericosities noted to LEs. Hair growth present LE, warm to touch LE, No edema noted to LE.    Dermatologic:   Left hallux:  Diabetic ulcer plantar aspect of hallux.  Approximation of 1.2 cm x 1 cm x 0.3 cm with hyperkeratotic periwound.  Mild rubor, no erythema, no drainage, no probing to bone, no acute signs of infection.  Mild undermining, no sinus tract.    Musculoskeletal: MMT 5/5 in DF/PF/Inv/Ev resistance with no reproduction of " pain in any direction. Passive range of motion of ankle and pedal joints is painless. No calf tenderness LE, Compartments soft/compressible.     Neurological:   Light touch, proprioception, and sharp/dull sensation are decreased. Protective threshold with the [place]ienstein monofilament is decreased. Vibratory sensation decreased.                  Assessment:       Encounter Diagnoses   Name Primary?    Diabetic ketoacidosis without coma associated with type 2 diabetes mellitus     Diabetic ulcer of left great toe     Acute pancreatitis without infection or necrosis, unspecified pancreatitis type          Plan:       Bay was seen today for diabetic foot exam, foot ulcer and dr. michele 12/10/2020.    Diagnoses and all orders for this visit:    Diabetic ketoacidosis without coma associated with type 2 diabetes mellitus  -     Ambulatory referral/consult to Podiatry    Diabetic ulcer of left great toe  -     Ambulatory referral/consult to Podiatry    Acute pancreatitis without infection or necrosis, unspecified pancreatitis type  -     Ambulatory referral/consult to Podiatry      I counseled the patient on his conditions, their implications and medical management.    40 y.o. male with left hallux diabetic ulcer without acute signs of infection.     -status post wound debridement the left hallux.  Patient tolerated well.  Verbal consent was obtained.  See procedure note.  -wound care with Kristal and callus pad.  Both sample of Kristal and callus pad were dispensed to patient.  Patient also tells me he has a surgical shoes from hospital admission.  I recommend him to go back to surgical shoes until wound is healed.     -The nature of the condition, options for management, as well as potential risks and complications were discussed in detail with patient. Patient was amenable to my recommendations and left my office fully informed and will follow up as instructed or sooner if necessary.    -Patient was advised of  signs and symptoms of infection including redness, drainage, purulence, odor, streaking, fever, chills and I advised patient to seek medical attention (ER or urgent care) if these symptoms arise.   -Advised for optimal glucose control and maintenance per primary care physician. Patient was also educated on healthy diet that is naturally rich in nutrients and low in fat and calories.   -f/u 3 wks     Note dictated with voice recognition software, please excuse any grammatical errors.

## 2020-12-10 NOTE — PROGRESS NOTES
EST PATIENT VISIT FAMILY MEDICINE    CC:   Chief Complaint   Patient presents with    [facility]       HPI: Bay Ibarra  is a 40 y.o. male:    Was on his way here and started feeling his BG is low  Took a peppermint   When checked here was 85  Was 112 prior to lunch; he notes he ate a smaller portion of left overs  Reports low sugars are not frequent    HTN  -does not check BP at home  -no s/s of low BP aside from dizziness which is attributed to hypoglycemia     ROS: Review of Systems   Constitutional: Negative for fever.   Respiratory: Negative for shortness of breath.    Cardiovascular: Negative for chest pain.       PMHX:   Past Medical History:   Diagnosis Date    Acute pancreatitis     Diverticulosis of colon     Essential hypertension     Fatty liver     Gastric varices without bleeding 1/16/2020    Gout     Hx of acute pancreatitis 3/3/2017    Hypertriglyceridemia 8/2/2017    Obesity (BMI 30-39.9) 6/5/2015    Obesity (BMI 35.0-39.9 without comorbidity) 6/5/2015    Obstructive sleep apnea syndrome 12/18/2015    Type 2 diabetes mellitus with diabetic polyneuropathy, with long-term current use of insulin 10/16/2017    Type 2 diabetes mellitus with hyperglycemia, with long-term current use of insulin 8/3/2017       PSHX:   Past Surgical History:   Procedure Laterality Date    ENDOSCOPIC ULTRASOUND OF UPPER GASTROINTESTINAL TRACT N/A 5/27/2019    Procedure: ULTRASOUND, UPPER GI TRACT, ENDOSCOPIC;  Surgeon: Zafar Velazquez MD;  Location: [address]);  Service: Endoscopy;  Laterality: N/A;    ERCP N/A 5/27/2019    Procedure: ERCP (ENDOSCOPIC RETROGRADE CHOLANGIOPANCREATOGRAPHY);  Surgeon: Zafar Velazquez MD;  Location: [address]);  Service: Endoscopy;  Laterality: N/A;    ESOPHAGOGASTRODUODENOSCOPY      ESOPHAGOGASTRODUODENOSCOPY N/A 1/31/2020    Procedure: EGD (ESOPHAGOGASTRODUODENOSCOPY);  Surgeon: Zafar Velazquez MD;  Location: [facility];  Service: Endoscopy;   Laterality: N/A;       FAMHX:   Family History   Problem Relation Age of Onset    Aneurysm Mother         AAA    Coronary artery disease Father         4 vessel bypass at 40, possible stent at 36    Diabetes type II Father     No Known Problems Sister     No Known Problems Brother     Aneurysm Maternal Grandmother         Possible AAA    Diabetes type II Paternal Grandmother        SOCHX:   Social History     Socioeconomic History    Marital status:      Spouse name: Not on file    Number of children: Not on file    Years of education: Not on file    Highest education level: Not on file   Occupational History    Not on file   Social Needs    Financial resource strain: Not hard at all    Food insecurity     Worry: Never true     Inability: Never true    Transportation needs     Medical: No     Non-medical: No   Tobacco Use    Smoking status: Never Smoker    Smokeless tobacco: Never Used   Substance and Sexual Activity    Alcohol use: No     Frequency: Never     Drinks per session: Patient refused     Binge frequency: Patient refused    Drug use: Never    Sexual activity: Yes     Partners: Female   Lifestyle    Physical activity     Days per week: 3 days     Minutes per session: 20 min    Stress: Only a little   Relationships    Social connections     Talks on phone: Once a week     Gets together: Once a week     Attends Orthodox service: Not on file     Active member of club or organization: No     Attends meetings of clubs or organizations: Patient refused     Relationship status:    Other Topics Concern    Not on file   Social History Narrative    Not on file       ALLERGIES: Review of patient's allergies indicates:  No Known Allergies    MEDS:   Current Outpatient Medications:     allopurinoL (ZYLOPRIM) 300 MG tablet, Take 1 tablet (300 mg total) by mouth once daily., Disp: 30 tablet, Rfl: 0    aspirin (ECOTRIN) 81 MG EC tablet, Take 81 mg by mouth once daily., Disp: ,  "Rfl:     atorvastatin (LIPITOR) 40 MG tablet, Take 1 tablet (40 mg total) by mouth once daily., Disp: 90 tablet, Rfl: 1    blood sugar diagnostic Strp, 1 strip by Misc.(Non-Drug; Combo Route) route 3 (three) times daily., Disp: 200 each, Rfl: 5    fenofibrate 160 MG Tab, Take 1 tablet (160 mg total) by mouth once daily., Disp: 90 tablet, Rfl: 1    icosapent ethyL (VASCEPA) 1 gram Cap, Take 2 g by mouth 2 (two) times daily., Disp: 360 capsule, Rfl: 3    insulin (LANTUS SOLOSTAR U-100 INSULIN) glargine 100 units/mL (3mL) SubQ pen, Inject 52-60 units nightly. (Patient taking differently: 60 Units every evening. ), Disp: 18 mL, Rfl: 6    insulin aspart U-100 (NOVOLOG FLEXPEN U-100 INSULIN) 100 unit/mL (3 mL) InPn pen, INJECT 20 UNITS SUBCUTANEOUSLY WITH MEALS PLUS SLIDING SCALE 150-200+2, 201-250 +4, 251-300 +6, 301-350 +8,>350 +10. MAX OF 90 UNITS PER DAY, Disp: 30 mL, Rfl: 6    insulin syringe-needle U-100 29 gauge x 1/2" Syrg, Inject 1 (once) per day, Disp: 100 Syringe, Rfl: 3    losartan (COZAAR) 100 MG tablet, Take 0.5 tablets (50 mg total) by mouth once daily., Disp: 45 tablet, Rfl: 1    niacin 100 MG Tab, Take 200 mg by mouth every evening. , Disp: , Rfl:     pantoprazole (PROTONIX) 40 MG tablet, Take 1 tablet (40 mg total) by mouth 2 (two) times daily., Disp: 60 tablet, Rfl: 0    pregabalin (LYRICA) 150 MG capsule, Take 1 capsule (150 mg total) by mouth 2 (two) times daily., Disp: 60 capsule, Rfl: 2    diclofenac sodium (VOLTAREN) 1 % Gel, Apply 2 g topically 4 (four) times daily. Apply to chest in the location of pain up to 4 times a day as needed for pain. (Patient not taking: Reported on 12/3/2020), Disp: 100 g, Rfl: 0    ondansetron (ZOFRAN) 4 MG tablet, Take 1 tablet (4 mg total) by mouth every 6 (six) hours as needed for Nausea. (Patient not taking: Reported on 12/3/2020), Disp: 30 tablet, Rfl: 1    OBJECTIVE:   Vitals:    12/10/20 1505   BP: 122/60   BP Location: Left arm   Patient Position: " "Sitting   BP Method: Medium (Manual)   Pulse: 106   Resp: 18   Temp: 97.6 °F (36.4 °C)   SpO2: 96%   Weight: 88.9 kg (196 lb)   Height: 5' 8" (1.727 m)     Body mass index is 29.8 kg/m².      Physical Exam  Vitals signs and nursing note reviewed.   Constitutional:       Appearance: Normal appearance.   HENT:      Head: Normocephalic and atraumatic.   Eyes:      General: No scleral icterus.     Extraocular Movements: Extraocular movements intact.   Pulmonary:      Effort: Pulmonary effort is normal.   Neurological:      Mental Status: He is alert.           LABS:   A1C:  Recent Labs   Lab 11/27/20  1617   Hemoglobin A1C 13.2 H     CBC:  Recent Labs   Lab 11/30/20  0629   WBC 5.24   RBC 4.08 L   Hemoglobin 11.1 L   Hematocrit 34.5 L   Platelets 96 L   MCV 85   MCH 27.2   MCHC 32.2     CMP:  Recent Labs   Lab 11/30/20  0629   Glucose 226 H   Calcium 8.4 L   Albumin 3.0 L   Total Protein 5.9 L   Sodium 140   Potassium 3.1 L   CO2 30 H   Chloride 104   BUN 13   Creatinine 0.64   Alkaline Phosphatase 89   ALT 18   AST 25   Total Bilirubin 0.5     LIPIDS:  Recent Labs   Lab 11/27/20  1618 11/28/20  0417   TSH 2.430  --    HDL  --  25 L   Cholesterol  --  95 L   Triglycerides  --  105   LDL Cholesterol  --  49.0 L   HDL/Cholesterol Ratio  --  26.3   Non-HDL Cholesterol  --  70   Total Cholesterol/HDL Ratio  --  3.8     TSH:  Recent Labs   Lab 11/27/20  1618   TSH 2.430         ASSESSMENT & PLAN:  Encounter Diagnoses   Name Primary?    Type 2 diabetes mellitus with hyperglycemia, with long-term current use of insulin Yes    Essential hypertension      -we have reviewed your medications and refilled them as needed  -your potassium was low in the hospital, I have ordered a recheck  -make sure you send your blood sugar logs to your Endocrinologist  -your blood pressure is well controlled, continue losartan    Orders Placed This Encounter    POCT Glucose, Hand-Held Device       Follow up in about 6 months (around 6/10/2021) " for blood pressure.    RTC/ED precautions discussed where applicable.   Encouraged patient to let me know if there are any further questions/concerns.     Advise patient/caretaker to check with insurance regarding orders to avoid unexpected fees/costs.     The patient/caretaker indicates understanding of these issues and agrees with the plan.    Dr. Funmilayo Taylor MD  Family Medicine

## 2020-12-10 NOTE — PATIENT INSTRUCTIONS
It was nice to see youBay!    -we have reviewed your medications and refilled them as needed  -your potassium was low in the hospital, I have ordered a recheck  -make sure you send your blood sugar logs to your Endocrinologist  -your blood pressure is well controlled, continue losartan    Let me know if you have any questions/concerns.     Sincerely,     Dr Taylor

## 2020-12-11 NOTE — PROCEDURES
"Wound Debridement    Date/Time: 12/10/2020 3:15 PM  Performed by: Anju Melvin DPM  Authorized by: Anju Melvin DPM     Time out: Immediately prior to procedure a "time out" was called to verify the correct patient, procedure, equipment, support staff and site/side marked as required.    Consent Done?:  Yes (Verbal)    Preparation: Patient was prepped and draped in usual sterile fashion    Local anesthesia used?: No      Wound Details:    Location:  Left foot    Location:  Left 1st Toe    Type of Debridement:  Excisional       Length (cm):  1.2       Area (sq cm):  1.2       Width (cm):  1       Percent Debrided (%):  100       Depth (cm):  0.3       Total Area Debrided (sq cm):  1.2    Depth of debridement:  Subcutaneous tissue    Tissue debrided:  Subcutaneous    Devitalized tissue debrided:  Callus, Biofilm and Fibrin    Instruments:  Curette and Blade    Bleeding:  Minimal  Hemostasis Achieved: Yes    Method Used:  Pressure  Patient tolerance:  Patient tolerated the procedure well with no immediate complications     Timeout was performed w/ pt in the room. Side, laterality, procedure was confirmed.         "

## 2020-12-21 ENCOUNTER — PATIENT MESSAGE (OUTPATIENT)
Dept: ENDOCRINOLOGY | Facility: CLINIC | Age: 40
End: 2020-12-21

## 2020-12-21 ENCOUNTER — PATIENT MESSAGE (OUTPATIENT)
Dept: FAMILY MEDICINE | Facility: CLINIC | Age: 40
End: 2020-12-21

## 2020-12-21 DIAGNOSIS — E10.40 TYPE 1 DIABETES MELLITUS WITH DIABETIC NEUROPATHY: Primary | ICD-10-CM

## 2020-12-21 RX ORDER — ALLOPURINOL 300 MG/1
300 TABLET ORAL DAILY
COMMUNITY
End: 2020-12-21 | Stop reason: SDUPTHER

## 2020-12-21 RX ORDER — ALLOPURINOL 300 MG/1
300 TABLET ORAL DAILY
Qty: 90 TABLET | Refills: 0 | Status: SHIPPED | OUTPATIENT
Start: 2020-12-21 | End: 2021-03-29

## 2021-01-04 ENCOUNTER — OFFICE VISIT (OUTPATIENT)
Dept: PODIATRY | Facility: CLINIC | Age: 41
End: 2021-01-04
Payer: COMMERCIAL

## 2021-01-04 ENCOUNTER — PATIENT MESSAGE (OUTPATIENT)
Dept: ADMINISTRATIVE | Facility: HOSPITAL | Age: 41
End: 2021-01-04

## 2021-01-04 VITALS
BODY MASS INDEX: 30.63 KG/M2 | RESPIRATION RATE: 16 BRPM | DIASTOLIC BLOOD PRESSURE: 68 MMHG | HEIGHT: 68 IN | SYSTOLIC BLOOD PRESSURE: 120 MMHG | HEART RATE: 64 BPM | WEIGHT: 202.13 LBS

## 2021-01-04 DIAGNOSIS — E11.621 DIABETIC ULCER OF LEFT GREAT TOE: ICD-10-CM

## 2021-01-04 DIAGNOSIS — E10.621 DIABETIC ULCER OF TOE OF LEFT FOOT ASSOCIATED WITH TYPE 1 DIABETES MELLITUS, WITH FAT LAYER EXPOSED: Primary | ICD-10-CM

## 2021-01-04 DIAGNOSIS — L97.522 DIABETIC ULCER OF TOE OF LEFT FOOT ASSOCIATED WITH TYPE 1 DIABETES MELLITUS, WITH FAT LAYER EXPOSED: Primary | ICD-10-CM

## 2021-01-04 DIAGNOSIS — E10.42 DIABETIC POLYNEUROPATHY ASSOCIATED WITH TYPE 1 DIABETES MELLITUS: ICD-10-CM

## 2021-01-04 DIAGNOSIS — L97.529 DIABETIC ULCER OF LEFT GREAT TOE: ICD-10-CM

## 2021-01-04 PROCEDURE — 99499 NO LOS: ICD-10-PCS | Mod: S$GLB,,, | Performed by: PODIATRIST

## 2021-01-04 PROCEDURE — 99499 UNLISTED E&M SERVICE: CPT | Mod: S$GLB,,, | Performed by: PODIATRIST

## 2021-01-04 PROCEDURE — 11042 DBRDMT SUBQ TIS 1ST 20SQCM/<: CPT | Mod: S$GLB,,, | Performed by: PODIATRIST

## 2021-01-04 PROCEDURE — 3008F PR BODY MASS INDEX (BMI) DOCUMENTED: ICD-10-PCS | Mod: CPTII,S$GLB,, | Performed by: PODIATRIST

## 2021-01-04 PROCEDURE — 1126F AMNT PAIN NOTED NONE PRSNT: CPT | Mod: S$GLB,,, | Performed by: PODIATRIST

## 2021-01-04 PROCEDURE — 99999 PR PBB SHADOW E&M-EST. PATIENT-LVL IV: ICD-10-PCS | Mod: PBBFAC,,, | Performed by: PODIATRIST

## 2021-01-04 PROCEDURE — 11042 WOUND DEBRIDEMENT: ICD-10-PCS | Mod: S$GLB,,, | Performed by: PODIATRIST

## 2021-01-04 PROCEDURE — 3008F BODY MASS INDEX DOCD: CPT | Mod: CPTII,S$GLB,, | Performed by: PODIATRIST

## 2021-01-04 PROCEDURE — 1126F PR PAIN SEVERITY QUANTIFIED, NO PAIN PRESENT: ICD-10-PCS | Mod: S$GLB,,, | Performed by: PODIATRIST

## 2021-01-04 PROCEDURE — 99999 PR PBB SHADOW E&M-EST. PATIENT-LVL IV: CPT | Mod: PBBFAC,,, | Performed by: PODIATRIST

## 2021-01-14 ENCOUNTER — PATIENT OUTREACH (OUTPATIENT)
Dept: ADMINISTRATIVE | Facility: OTHER | Age: 41
End: 2021-01-14

## 2021-01-14 ENCOUNTER — CLINICAL SUPPORT (OUTPATIENT)
Dept: ENDOCRINOLOGY | Facility: CLINIC | Age: 41
End: 2021-01-14
Payer: COMMERCIAL

## 2021-01-14 DIAGNOSIS — E10.40 TYPE 1 DIABETES MELLITUS WITH DIABETIC NEUROPATHY: ICD-10-CM

## 2021-01-15 ENCOUNTER — OFFICE VISIT (OUTPATIENT)
Dept: ENDOCRINOLOGY | Facility: CLINIC | Age: 41
End: 2021-01-15
Payer: COMMERCIAL

## 2021-01-15 DIAGNOSIS — K31.84 GASTROPARESIS: ICD-10-CM

## 2021-01-15 DIAGNOSIS — E66.9 OBESITY (BMI 30-39.9): ICD-10-CM

## 2021-01-15 DIAGNOSIS — E78.2 MIXED HYPERLIPIDEMIA: Chronic | ICD-10-CM

## 2021-01-15 DIAGNOSIS — E10.40 TYPE 1 DIABETES MELLITUS WITH DIABETIC NEUROPATHY: Primary | ICD-10-CM

## 2021-01-15 DIAGNOSIS — I10 ESSENTIAL HYPERTENSION: Chronic | ICD-10-CM

## 2021-01-15 DIAGNOSIS — E10.42 DIABETIC POLYNEUROPATHY ASSOCIATED WITH TYPE 1 DIABETES MELLITUS: ICD-10-CM

## 2021-01-15 PROCEDURE — 3046F HEMOGLOBIN A1C LEVEL >9.0%: CPT | Mod: CPTII,,, | Performed by: NURSE PRACTITIONER

## 2021-01-15 PROCEDURE — 99214 OFFICE O/P EST MOD 30 MIN: CPT | Mod: 95,,, | Performed by: NURSE PRACTITIONER

## 2021-01-15 PROCEDURE — 99214 PR OFFICE/OUTPT VISIT, EST, LEVL IV, 30-39 MIN: ICD-10-PCS | Mod: 95,,, | Performed by: NURSE PRACTITIONER

## 2021-01-15 PROCEDURE — 3046F PR MOST RECENT HEMOGLOBIN A1C LEVEL > 9.0%: ICD-10-PCS | Mod: CPTII,,, | Performed by: NURSE PRACTITIONER

## 2021-01-19 ENCOUNTER — PATIENT MESSAGE (OUTPATIENT)
Dept: FAMILY MEDICINE | Facility: CLINIC | Age: 41
End: 2021-01-19

## 2021-01-19 ENCOUNTER — PATIENT MESSAGE (OUTPATIENT)
Dept: ENDOCRINOLOGY | Facility: CLINIC | Age: 41
End: 2021-01-19

## 2021-01-19 RX ORDER — BLOOD SUGAR DIAGNOSTIC
STRIP MISCELLANEOUS
Qty: 200 EACH | Refills: 11 | Status: SHIPPED | OUTPATIENT
Start: 2021-01-19 | End: 2021-03-12 | Stop reason: SDUPTHER

## 2021-01-20 ENCOUNTER — PATIENT MESSAGE (OUTPATIENT)
Dept: FAMILY MEDICINE | Facility: CLINIC | Age: 41
End: 2021-01-20

## 2021-01-20 ENCOUNTER — PATIENT MESSAGE (OUTPATIENT)
Dept: ENDOCRINOLOGY | Facility: CLINIC | Age: 41
End: 2021-01-20

## 2021-01-20 ENCOUNTER — CLINICAL SUPPORT (OUTPATIENT)
Dept: ENDOCRINOLOGY | Facility: CLINIC | Age: 41
End: 2021-01-20
Payer: COMMERCIAL

## 2021-01-20 DIAGNOSIS — E10.40 TYPE 1 DIABETES MELLITUS WITH DIABETIC NEUROPATHY: ICD-10-CM

## 2021-01-20 DIAGNOSIS — K29.00 ACUTE SUPERFICIAL GASTRITIS WITHOUT HEMORRHAGE: ICD-10-CM

## 2021-01-20 PROCEDURE — 95250 PR GLUCOSE MONITORING,72 HRS,SUB-Q SENSOR: ICD-10-PCS | Mod: S$GLB,,, | Performed by: INTERNAL MEDICINE

## 2021-01-20 PROCEDURE — 95251 CONT GLUC MNTR ANALYSIS I&R: CPT | Mod: S$GLB,,, | Performed by: INTERNAL MEDICINE

## 2021-01-20 PROCEDURE — 95250 CONT GLUC MNTR PHYS/QHP EQP: CPT | Mod: S$GLB,,, | Performed by: INTERNAL MEDICINE

## 2021-01-20 PROCEDURE — 95251 PR GLUCOSE MONITOR, 72 HOUR, PHYS INTERP: ICD-10-PCS | Mod: S$GLB,,, | Performed by: INTERNAL MEDICINE

## 2021-01-20 RX ORDER — PREGABALIN 150 MG/1
150 CAPSULE ORAL 2 TIMES DAILY
Qty: 60 CAPSULE | Refills: 2 | Status: SHIPPED | OUTPATIENT
Start: 2021-01-20 | End: 2021-05-06 | Stop reason: SDUPTHER

## 2021-01-20 RX ORDER — PANTOPRAZOLE SODIUM 40 MG/1
40 TABLET, DELAYED RELEASE ORAL DAILY
Qty: 90 TABLET | Refills: 0 | Status: SHIPPED | OUTPATIENT
Start: 2021-01-20 | End: 2021-05-06 | Stop reason: SDUPTHER

## 2021-01-21 ENCOUNTER — PATIENT MESSAGE (OUTPATIENT)
Dept: FAMILY MEDICINE | Facility: CLINIC | Age: 41
End: 2021-01-21

## 2021-01-27 ENCOUNTER — PATIENT MESSAGE (OUTPATIENT)
Dept: ENDOCRINOLOGY | Facility: CLINIC | Age: 41
End: 2021-01-27

## 2021-01-27 DIAGNOSIS — Z79.4 TYPE 2 DIABETES MELLITUS WITH HYPERGLYCEMIA, WITH LONG-TERM CURRENT USE OF INSULIN: ICD-10-CM

## 2021-01-27 DIAGNOSIS — E11.65 TYPE 2 DIABETES MELLITUS WITH HYPERGLYCEMIA, WITH LONG-TERM CURRENT USE OF INSULIN: ICD-10-CM

## 2021-01-27 DIAGNOSIS — E10.65 TYPE 1 DIABETES MELLITUS WITH HYPERGLYCEMIA: ICD-10-CM

## 2021-01-27 RX ORDER — INSULIN ASPART 100 [IU]/ML
24 INJECTION, SOLUTION INTRAVENOUS; SUBCUTANEOUS
Qty: 30 ML | Refills: 6 | Status: SHIPPED | OUTPATIENT
Start: 2021-01-27 | End: 2021-03-17 | Stop reason: SDUPTHER

## 2021-01-27 RX ORDER — INSULIN GLARGINE 100 [IU]/ML
35 INJECTION, SOLUTION SUBCUTANEOUS 2 TIMES DAILY
Qty: 18 ML | Refills: 6 | Status: SHIPPED | OUTPATIENT
Start: 2021-01-27 | End: 2021-03-15 | Stop reason: SDUPTHER

## 2021-02-08 ENCOUNTER — OFFICE VISIT (OUTPATIENT)
Dept: PODIATRY | Facility: CLINIC | Age: 41
End: 2021-02-08
Payer: COMMERCIAL

## 2021-02-08 VITALS
BODY MASS INDEX: 31.28 KG/M2 | SYSTOLIC BLOOD PRESSURE: 118 MMHG | HEIGHT: 68 IN | DIASTOLIC BLOOD PRESSURE: 74 MMHG | WEIGHT: 206.38 LBS

## 2021-02-08 DIAGNOSIS — L97.529 DIABETIC ULCER OF LEFT GREAT TOE: Primary | ICD-10-CM

## 2021-02-08 DIAGNOSIS — E10.621 DIABETIC ULCER OF TOE OF LEFT FOOT ASSOCIATED WITH TYPE 1 DIABETES MELLITUS, WITH FAT LAYER EXPOSED: ICD-10-CM

## 2021-02-08 DIAGNOSIS — E10.42 DIABETIC POLYNEUROPATHY ASSOCIATED WITH TYPE 1 DIABETES MELLITUS: ICD-10-CM

## 2021-02-08 DIAGNOSIS — L03.032 CELLULITIS OF GREAT TOE, LEFT: ICD-10-CM

## 2021-02-08 DIAGNOSIS — L97.522 DIABETIC ULCER OF TOE OF LEFT FOOT ASSOCIATED WITH TYPE 1 DIABETES MELLITUS, WITH FAT LAYER EXPOSED: ICD-10-CM

## 2021-02-08 DIAGNOSIS — E11.621 DIABETIC ULCER OF LEFT GREAT TOE: Primary | ICD-10-CM

## 2021-02-08 PROCEDURE — 99999 PR PBB SHADOW E&M-EST. PATIENT-LVL III: CPT | Mod: PBBFAC,,, | Performed by: PODIATRIST

## 2021-02-08 PROCEDURE — 3078F PR MOST RECENT DIASTOLIC BLOOD PRESSURE < 80 MM HG: ICD-10-PCS | Mod: CPTII,S$GLB,, | Performed by: PODIATRIST

## 2021-02-08 PROCEDURE — 11042 WOUND DEBRIDEMENT: ICD-10-PCS | Mod: S$GLB,,, | Performed by: PODIATRIST

## 2021-02-08 PROCEDURE — 11042 DBRDMT SUBQ TIS 1ST 20SQCM/<: CPT | Mod: S$GLB,,, | Performed by: PODIATRIST

## 2021-02-08 PROCEDURE — 99213 OFFICE O/P EST LOW 20 MIN: CPT | Mod: 25,S$GLB,, | Performed by: PODIATRIST

## 2021-02-08 PROCEDURE — 3046F PR MOST RECENT HEMOGLOBIN A1C LEVEL > 9.0%: ICD-10-PCS | Mod: CPTII,S$GLB,, | Performed by: PODIATRIST

## 2021-02-08 PROCEDURE — 3008F PR BODY MASS INDEX (BMI) DOCUMENTED: ICD-10-PCS | Mod: CPTII,S$GLB,, | Performed by: PODIATRIST

## 2021-02-08 PROCEDURE — 3008F BODY MASS INDEX DOCD: CPT | Mod: CPTII,S$GLB,, | Performed by: PODIATRIST

## 2021-02-08 PROCEDURE — 99213 PR OFFICE/OUTPT VISIT, EST, LEVL III, 20-29 MIN: ICD-10-PCS | Mod: 25,S$GLB,, | Performed by: PODIATRIST

## 2021-02-08 PROCEDURE — 3074F PR MOST RECENT SYSTOLIC BLOOD PRESSURE < 130 MM HG: ICD-10-PCS | Mod: CPTII,S$GLB,, | Performed by: PODIATRIST

## 2021-02-08 PROCEDURE — 99999 PR PBB SHADOW E&M-EST. PATIENT-LVL III: ICD-10-PCS | Mod: PBBFAC,,, | Performed by: PODIATRIST

## 2021-02-08 PROCEDURE — 1125F PR PAIN SEVERITY QUANTIFIED, PAIN PRESENT: ICD-10-PCS | Mod: S$GLB,,, | Performed by: PODIATRIST

## 2021-02-08 PROCEDURE — 3046F HEMOGLOBIN A1C LEVEL >9.0%: CPT | Mod: CPTII,S$GLB,, | Performed by: PODIATRIST

## 2021-02-08 PROCEDURE — 3078F DIAST BP <80 MM HG: CPT | Mod: CPTII,S$GLB,, | Performed by: PODIATRIST

## 2021-02-08 PROCEDURE — 1125F AMNT PAIN NOTED PAIN PRSNT: CPT | Mod: S$GLB,,, | Performed by: PODIATRIST

## 2021-02-08 PROCEDURE — 3074F SYST BP LT 130 MM HG: CPT | Mod: CPTII,S$GLB,, | Performed by: PODIATRIST

## 2021-02-08 RX ORDER — SULFAMETHOXAZOLE AND TRIMETHOPRIM 800; 160 MG/1; MG/1
1 TABLET ORAL 2 TIMES DAILY
Qty: 28 TABLET | Refills: 0 | Status: SHIPPED | OUTPATIENT
Start: 2021-02-08 | End: 2021-02-22

## 2021-02-08 RX ORDER — COLLAGENASE SANTYL 250 [ARB'U]/G
OINTMENT TOPICAL DAILY
Qty: 30 G | Refills: 0 | Status: SHIPPED | OUTPATIENT
Start: 2021-02-08 | End: 2021-05-03 | Stop reason: SDUPTHER

## 2021-02-12 ENCOUNTER — PATIENT MESSAGE (OUTPATIENT)
Dept: FAMILY MEDICINE | Facility: CLINIC | Age: 41
End: 2021-02-12

## 2021-02-17 ENCOUNTER — PATIENT OUTREACH (OUTPATIENT)
Dept: ADMINISTRATIVE | Facility: OTHER | Age: 41
End: 2021-02-17

## 2021-02-20 ENCOUNTER — PATIENT MESSAGE (OUTPATIENT)
Dept: ENDOCRINOLOGY | Facility: CLINIC | Age: 41
End: 2021-02-20

## 2021-02-22 ENCOUNTER — OFFICE VISIT (OUTPATIENT)
Dept: PODIATRY | Facility: CLINIC | Age: 41
End: 2021-02-22
Payer: COMMERCIAL

## 2021-02-22 VITALS
BODY MASS INDEX: 31.22 KG/M2 | SYSTOLIC BLOOD PRESSURE: 124 MMHG | HEIGHT: 68 IN | WEIGHT: 206 LBS | DIASTOLIC BLOOD PRESSURE: 78 MMHG | HEART RATE: 86 BPM

## 2021-02-22 DIAGNOSIS — E11.621 DIABETIC ULCER OF LEFT GREAT TOE: Primary | ICD-10-CM

## 2021-02-22 DIAGNOSIS — E10.42 DIABETIC POLYNEUROPATHY ASSOCIATED WITH TYPE 1 DIABETES MELLITUS: ICD-10-CM

## 2021-02-22 DIAGNOSIS — L97.529 DIABETIC ULCER OF LEFT GREAT TOE: Primary | ICD-10-CM

## 2021-02-22 PROCEDURE — 99499 UNLISTED E&M SERVICE: CPT | Mod: S$GLB,,, | Performed by: PODIATRIST

## 2021-02-22 PROCEDURE — 1126F PR PAIN SEVERITY QUANTIFIED, NO PAIN PRESENT: ICD-10-PCS | Mod: S$GLB,,, | Performed by: PODIATRIST

## 2021-02-22 PROCEDURE — 3008F PR BODY MASS INDEX (BMI) DOCUMENTED: ICD-10-PCS | Mod: CPTII,S$GLB,, | Performed by: PODIATRIST

## 2021-02-22 PROCEDURE — 99999 PR PBB SHADOW E&M-EST. PATIENT-LVL III: CPT | Mod: PBBFAC,,, | Performed by: PODIATRIST

## 2021-02-22 PROCEDURE — 11042 DBRDMT SUBQ TIS 1ST 20SQCM/<: CPT | Mod: S$GLB,,, | Performed by: PODIATRIST

## 2021-02-22 PROCEDURE — 11042 WOUND DEBRIDEMENT: ICD-10-PCS | Mod: S$GLB,,, | Performed by: PODIATRIST

## 2021-02-22 PROCEDURE — 99999 PR PBB SHADOW E&M-EST. PATIENT-LVL III: ICD-10-PCS | Mod: PBBFAC,,, | Performed by: PODIATRIST

## 2021-02-22 PROCEDURE — 99499 NO LOS: ICD-10-PCS | Mod: S$GLB,,, | Performed by: PODIATRIST

## 2021-02-22 PROCEDURE — 3008F BODY MASS INDEX DOCD: CPT | Mod: CPTII,S$GLB,, | Performed by: PODIATRIST

## 2021-02-22 PROCEDURE — 1126F AMNT PAIN NOTED NONE PRSNT: CPT | Mod: S$GLB,,, | Performed by: PODIATRIST

## 2021-03-02 ENCOUNTER — PATIENT MESSAGE (OUTPATIENT)
Dept: ENDOCRINOLOGY | Facility: CLINIC | Age: 41
End: 2021-03-02

## 2021-03-05 ENCOUNTER — PATIENT MESSAGE (OUTPATIENT)
Dept: ENDOCRINOLOGY | Facility: CLINIC | Age: 41
End: 2021-03-05

## 2021-03-08 ENCOUNTER — OFFICE VISIT (OUTPATIENT)
Dept: PODIATRY | Facility: CLINIC | Age: 41
End: 2021-03-08
Payer: COMMERCIAL

## 2021-03-08 VITALS
HEIGHT: 68 IN | DIASTOLIC BLOOD PRESSURE: 80 MMHG | HEART RATE: 81 BPM | SYSTOLIC BLOOD PRESSURE: 122 MMHG | BODY MASS INDEX: 31.95 KG/M2 | WEIGHT: 210.81 LBS

## 2021-03-08 DIAGNOSIS — E11.621 DIABETIC ULCER OF LEFT GREAT TOE: Primary | ICD-10-CM

## 2021-03-08 DIAGNOSIS — L97.522 DIABETIC ULCER OF TOE OF LEFT FOOT ASSOCIATED WITH TYPE 1 DIABETES MELLITUS, WITH FAT LAYER EXPOSED: ICD-10-CM

## 2021-03-08 DIAGNOSIS — L97.529 DIABETIC ULCER OF LEFT GREAT TOE: Primary | ICD-10-CM

## 2021-03-08 DIAGNOSIS — E10.621 DIABETIC ULCER OF TOE OF LEFT FOOT ASSOCIATED WITH TYPE 1 DIABETES MELLITUS, WITH FAT LAYER EXPOSED: ICD-10-CM

## 2021-03-08 PROCEDURE — 99499 NO LOS: ICD-10-PCS | Mod: S$GLB,,, | Performed by: PODIATRIST

## 2021-03-08 PROCEDURE — 1126F PR PAIN SEVERITY QUANTIFIED, NO PAIN PRESENT: ICD-10-PCS | Mod: S$GLB,,, | Performed by: PODIATRIST

## 2021-03-08 PROCEDURE — 99999 PR PBB SHADOW E&M-EST. PATIENT-LVL III: ICD-10-PCS | Mod: PBBFAC,,, | Performed by: PODIATRIST

## 2021-03-08 PROCEDURE — 99499 UNLISTED E&M SERVICE: CPT | Mod: S$GLB,,, | Performed by: PODIATRIST

## 2021-03-08 PROCEDURE — 3008F BODY MASS INDEX DOCD: CPT | Mod: CPTII,S$GLB,, | Performed by: PODIATRIST

## 2021-03-08 PROCEDURE — 3008F PR BODY MASS INDEX (BMI) DOCUMENTED: ICD-10-PCS | Mod: CPTII,S$GLB,, | Performed by: PODIATRIST

## 2021-03-08 PROCEDURE — 11042 DBRDMT SUBQ TIS 1ST 20SQCM/<: CPT | Mod: S$GLB,,, | Performed by: PODIATRIST

## 2021-03-08 PROCEDURE — 1126F AMNT PAIN NOTED NONE PRSNT: CPT | Mod: S$GLB,,, | Performed by: PODIATRIST

## 2021-03-08 PROCEDURE — 99999 PR PBB SHADOW E&M-EST. PATIENT-LVL III: CPT | Mod: PBBFAC,,, | Performed by: PODIATRIST

## 2021-03-08 PROCEDURE — 11042 WOUND DEBRIDEMENT: ICD-10-PCS | Mod: S$GLB,,, | Performed by: PODIATRIST

## 2021-03-12 ENCOUNTER — TELEPHONE (OUTPATIENT)
Dept: ENDOCRINOLOGY | Facility: CLINIC | Age: 41
End: 2021-03-12

## 2021-03-12 ENCOUNTER — PATIENT MESSAGE (OUTPATIENT)
Dept: ENDOCRINOLOGY | Facility: CLINIC | Age: 41
End: 2021-03-12

## 2021-03-12 ENCOUNTER — OFFICE VISIT (OUTPATIENT)
Dept: ENDOCRINOLOGY | Facility: CLINIC | Age: 41
End: 2021-03-12
Payer: COMMERCIAL

## 2021-03-12 DIAGNOSIS — K31.84 GASTROPARESIS: ICD-10-CM

## 2021-03-12 DIAGNOSIS — E78.2 MIXED HYPERLIPIDEMIA: Chronic | ICD-10-CM

## 2021-03-12 DIAGNOSIS — L97.522 DIABETIC ULCER OF TOE OF LEFT FOOT ASSOCIATED WITH TYPE 1 DIABETES MELLITUS, WITH FAT LAYER EXPOSED: ICD-10-CM

## 2021-03-12 DIAGNOSIS — I10 ESSENTIAL HYPERTENSION: Chronic | ICD-10-CM

## 2021-03-12 DIAGNOSIS — E10.42 DIABETIC POLYNEUROPATHY ASSOCIATED WITH TYPE 1 DIABETES MELLITUS: ICD-10-CM

## 2021-03-12 DIAGNOSIS — E10.40 TYPE 1 DIABETES MELLITUS WITH DIABETIC NEUROPATHY: Primary | ICD-10-CM

## 2021-03-12 DIAGNOSIS — E10.621 DIABETIC ULCER OF TOE OF LEFT FOOT ASSOCIATED WITH TYPE 1 DIABETES MELLITUS, WITH FAT LAYER EXPOSED: ICD-10-CM

## 2021-03-12 PROCEDURE — 3052F PR MOST RECENT HEMOGLOBIN A1C LEVEL 8.0 - < 9.0%: ICD-10-PCS | Mod: CPTII,,, | Performed by: NURSE PRACTITIONER

## 2021-03-12 PROCEDURE — 99214 OFFICE O/P EST MOD 30 MIN: CPT | Mod: 95,,, | Performed by: NURSE PRACTITIONER

## 2021-03-12 PROCEDURE — 99214 PR OFFICE/OUTPT VISIT, EST, LEVL IV, 30-39 MIN: ICD-10-PCS | Mod: 95,,, | Performed by: NURSE PRACTITIONER

## 2021-03-12 PROCEDURE — 3052F HG A1C>EQUAL 8.0%<EQUAL 9.0%: CPT | Mod: CPTII,,, | Performed by: NURSE PRACTITIONER

## 2021-03-12 RX ORDER — METFORMIN HYDROCHLORIDE 500 MG/1
500 TABLET ORAL 2 TIMES DAILY WITH MEALS
Qty: 180 TABLET | Refills: 3 | Status: SHIPPED | OUTPATIENT
Start: 2021-03-12 | End: 2021-11-04 | Stop reason: SDUPTHER

## 2021-03-12 RX ORDER — BLOOD-GLUCOSE TRANSMITTER
1 EACH MISCELLANEOUS CONTINUOUS PRN
Qty: 1 EACH | Status: SHIPPED | OUTPATIENT
Start: 2021-03-12 | End: 2021-07-21

## 2021-03-12 RX ORDER — BLOOD SUGAR DIAGNOSTIC
STRIP MISCELLANEOUS
Qty: 200 EACH | Refills: 11 | Status: SHIPPED | OUTPATIENT
Start: 2021-03-12 | End: 2023-08-29 | Stop reason: SDUPTHER

## 2021-03-12 RX ORDER — BLOOD-GLUCOSE SENSOR
3 EACH MISCELLANEOUS CONTINUOUS PRN
Qty: 3 EACH | Status: SHIPPED | OUTPATIENT
Start: 2021-03-12 | End: 2021-07-21

## 2021-03-13 ENCOUNTER — IMMUNIZATION (OUTPATIENT)
Dept: FAMILY MEDICINE | Facility: CLINIC | Age: 41
End: 2021-03-13
Payer: COMMERCIAL

## 2021-03-13 DIAGNOSIS — Z23 NEED FOR VACCINATION: Primary | ICD-10-CM

## 2021-03-13 PROCEDURE — 0031A COVID-19,VECTOR-NR,RS-AD26,PF,0.5 ML DOSE VACCINE (JANSSEN): ICD-10-PCS | Mod: CV19,,, | Performed by: FAMILY MEDICINE

## 2021-03-13 PROCEDURE — 91303 COVID-19,VECTOR-NR,RS-AD26,PF,0.5 ML DOSE VACCINE (JANSSEN): ICD-10-PCS | Mod: ,,, | Performed by: FAMILY MEDICINE

## 2021-03-13 PROCEDURE — 0031A COVID-19,VECTOR-NR,RS-AD26,PF,0.5 ML DOSE VACCINE (JANSSEN): CPT | Mod: CV19,,, | Performed by: FAMILY MEDICINE

## 2021-03-13 PROCEDURE — 91303 COVID-19,VECTOR-NR,RS-AD26,PF,0.5 ML DOSE VACCINE (JANSSEN): CPT | Mod: ,,, | Performed by: FAMILY MEDICINE

## 2021-03-14 ENCOUNTER — PATIENT MESSAGE (OUTPATIENT)
Dept: ENDOCRINOLOGY | Facility: CLINIC | Age: 41
End: 2021-03-14

## 2021-03-14 DIAGNOSIS — E10.65 TYPE 1 DIABETES MELLITUS WITH HYPERGLYCEMIA: ICD-10-CM

## 2021-03-15 ENCOUNTER — PATIENT MESSAGE (OUTPATIENT)
Dept: ENDOCRINOLOGY | Facility: CLINIC | Age: 41
End: 2021-03-15

## 2021-03-15 RX ORDER — INSULIN GLARGINE 100 [IU]/ML
38 INJECTION, SOLUTION SUBCUTANEOUS 2 TIMES DAILY
Qty: 18 ML | Refills: 6 | Status: SHIPPED | OUTPATIENT
Start: 2021-03-15 | End: 2021-09-07

## 2021-03-17 DIAGNOSIS — Z79.4 TYPE 2 DIABETES MELLITUS WITH HYPERGLYCEMIA, WITH LONG-TERM CURRENT USE OF INSULIN: ICD-10-CM

## 2021-03-17 DIAGNOSIS — E11.65 TYPE 2 DIABETES MELLITUS WITH HYPERGLYCEMIA, WITH LONG-TERM CURRENT USE OF INSULIN: ICD-10-CM

## 2021-03-17 RX ORDER — INSULIN ASPART 100 [IU]/ML
INJECTION, SOLUTION INTRAVENOUS; SUBCUTANEOUS
Qty: 30 ML | Refills: 6 | Status: SHIPPED | OUTPATIENT
Start: 2021-03-17 | End: 2021-04-14 | Stop reason: SDUPTHER

## 2021-03-22 ENCOUNTER — OFFICE VISIT (OUTPATIENT)
Dept: PODIATRY | Facility: CLINIC | Age: 41
End: 2021-03-22
Payer: COMMERCIAL

## 2021-03-22 ENCOUNTER — PATIENT OUTREACH (OUTPATIENT)
Dept: ADMINISTRATIVE | Facility: OTHER | Age: 41
End: 2021-03-22

## 2021-03-22 VITALS
WEIGHT: 210 LBS | BODY MASS INDEX: 31.83 KG/M2 | HEIGHT: 68 IN | HEART RATE: 97 BPM | DIASTOLIC BLOOD PRESSURE: 80 MMHG | SYSTOLIC BLOOD PRESSURE: 130 MMHG

## 2021-03-22 DIAGNOSIS — E11.621 DIABETIC ULCER OF LEFT GREAT TOE: Primary | ICD-10-CM

## 2021-03-22 DIAGNOSIS — L97.522 DIABETIC ULCER OF TOE OF LEFT FOOT ASSOCIATED WITH TYPE 1 DIABETES MELLITUS, WITH FAT LAYER EXPOSED: ICD-10-CM

## 2021-03-22 DIAGNOSIS — E10.42 DIABETIC POLYNEUROPATHY ASSOCIATED WITH TYPE 1 DIABETES MELLITUS: ICD-10-CM

## 2021-03-22 DIAGNOSIS — L97.529 DIABETIC ULCER OF LEFT GREAT TOE: Primary | ICD-10-CM

## 2021-03-22 DIAGNOSIS — E10.621 DIABETIC ULCER OF TOE OF LEFT FOOT ASSOCIATED WITH TYPE 1 DIABETES MELLITUS, WITH FAT LAYER EXPOSED: ICD-10-CM

## 2021-03-22 PROCEDURE — 11042 DBRDMT SUBQ TIS 1ST 20SQCM/<: CPT | Mod: S$GLB,,, | Performed by: PODIATRIST

## 2021-03-22 PROCEDURE — 99499 NO LOS: ICD-10-PCS | Mod: S$GLB,,, | Performed by: PODIATRIST

## 2021-03-22 PROCEDURE — 11042 WOUND DEBRIDEMENT: ICD-10-PCS | Mod: S$GLB,,, | Performed by: PODIATRIST

## 2021-03-22 PROCEDURE — 3008F PR BODY MASS INDEX (BMI) DOCUMENTED: ICD-10-PCS | Mod: CPTII,S$GLB,, | Performed by: PODIATRIST

## 2021-03-22 PROCEDURE — 99999 PR PBB SHADOW E&M-EST. PATIENT-LVL IV: ICD-10-PCS | Mod: PBBFAC,,, | Performed by: PODIATRIST

## 2021-03-22 PROCEDURE — 1126F PR PAIN SEVERITY QUANTIFIED, NO PAIN PRESENT: ICD-10-PCS | Mod: S$GLB,,, | Performed by: PODIATRIST

## 2021-03-22 PROCEDURE — 99499 UNLISTED E&M SERVICE: CPT | Mod: S$GLB,,, | Performed by: PODIATRIST

## 2021-03-22 PROCEDURE — 3008F BODY MASS INDEX DOCD: CPT | Mod: CPTII,S$GLB,, | Performed by: PODIATRIST

## 2021-03-22 PROCEDURE — 1126F AMNT PAIN NOTED NONE PRSNT: CPT | Mod: S$GLB,,, | Performed by: PODIATRIST

## 2021-03-22 PROCEDURE — 99999 PR PBB SHADOW E&M-EST. PATIENT-LVL IV: CPT | Mod: PBBFAC,,, | Performed by: PODIATRIST

## 2021-03-29 RX ORDER — ALLOPURINOL 300 MG/1
300 TABLET ORAL DAILY
Qty: 90 TABLET | Refills: 0 | Status: SHIPPED | OUTPATIENT
Start: 2021-03-29 | End: 2021-06-28

## 2021-03-29 RX ORDER — ALLOPURINOL 300 MG/1
TABLET ORAL
Qty: 90 TABLET | Refills: 0 | Status: SHIPPED | OUTPATIENT
Start: 2021-03-29 | End: 2021-03-29

## 2021-04-01 ENCOUNTER — PATIENT MESSAGE (OUTPATIENT)
Dept: ADMINISTRATIVE | Facility: HOSPITAL | Age: 41
End: 2021-04-01

## 2021-04-05 ENCOUNTER — OFFICE VISIT (OUTPATIENT)
Dept: PODIATRY | Facility: CLINIC | Age: 41
End: 2021-04-05
Payer: COMMERCIAL

## 2021-04-05 VITALS
DIASTOLIC BLOOD PRESSURE: 78 MMHG | HEIGHT: 68 IN | HEART RATE: 94 BPM | SYSTOLIC BLOOD PRESSURE: 130 MMHG | BODY MASS INDEX: 32.39 KG/M2 | WEIGHT: 213.69 LBS | RESPIRATION RATE: 16 BRPM | OXYGEN SATURATION: 98 %

## 2021-04-05 DIAGNOSIS — L97.529 DIABETIC ULCER OF LEFT GREAT TOE: Primary | ICD-10-CM

## 2021-04-05 DIAGNOSIS — E10.621 DIABETIC ULCER OF TOE OF LEFT FOOT ASSOCIATED WITH TYPE 1 DIABETES MELLITUS, WITH FAT LAYER EXPOSED: ICD-10-CM

## 2021-04-05 DIAGNOSIS — E11.621 DIABETIC ULCER OF LEFT GREAT TOE: Primary | ICD-10-CM

## 2021-04-05 DIAGNOSIS — E10.42 DIABETIC POLYNEUROPATHY ASSOCIATED WITH TYPE 1 DIABETES MELLITUS: ICD-10-CM

## 2021-04-05 DIAGNOSIS — L97.522 DIABETIC ULCER OF TOE OF LEFT FOOT ASSOCIATED WITH TYPE 1 DIABETES MELLITUS, WITH FAT LAYER EXPOSED: ICD-10-CM

## 2021-04-05 PROCEDURE — 3008F BODY MASS INDEX DOCD: CPT | Mod: CPTII,S$GLB,, | Performed by: PODIATRIST

## 2021-04-05 PROCEDURE — 99999 PR PBB SHADOW E&M-EST. PATIENT-LVL IV: CPT | Mod: PBBFAC,,, | Performed by: PODIATRIST

## 2021-04-05 PROCEDURE — 99999 PR PBB SHADOW E&M-EST. PATIENT-LVL IV: ICD-10-PCS | Mod: PBBFAC,,, | Performed by: PODIATRIST

## 2021-04-05 PROCEDURE — 99499 NO LOS: ICD-10-PCS | Mod: S$GLB,,, | Performed by: PODIATRIST

## 2021-04-05 PROCEDURE — 1126F AMNT PAIN NOTED NONE PRSNT: CPT | Mod: S$GLB,,, | Performed by: PODIATRIST

## 2021-04-05 PROCEDURE — 11042 DBRDMT SUBQ TIS 1ST 20SQCM/<: CPT | Mod: S$GLB,,, | Performed by: PODIATRIST

## 2021-04-05 PROCEDURE — 1126F PR PAIN SEVERITY QUANTIFIED, NO PAIN PRESENT: ICD-10-PCS | Mod: S$GLB,,, | Performed by: PODIATRIST

## 2021-04-05 PROCEDURE — 11042 WOUND DEBRIDEMENT: ICD-10-PCS | Mod: S$GLB,,, | Performed by: PODIATRIST

## 2021-04-05 PROCEDURE — 99499 UNLISTED E&M SERVICE: CPT | Mod: S$GLB,,, | Performed by: PODIATRIST

## 2021-04-05 PROCEDURE — 3008F PR BODY MASS INDEX (BMI) DOCUMENTED: ICD-10-PCS | Mod: CPTII,S$GLB,, | Performed by: PODIATRIST

## 2021-04-12 ENCOUNTER — PATIENT MESSAGE (OUTPATIENT)
Dept: FAMILY MEDICINE | Facility: CLINIC | Age: 41
End: 2021-04-12

## 2021-04-14 ENCOUNTER — OFFICE VISIT (OUTPATIENT)
Dept: FAMILY MEDICINE | Facility: CLINIC | Age: 41
End: 2021-04-14
Payer: COMMERCIAL

## 2021-04-14 VITALS
SYSTOLIC BLOOD PRESSURE: 138 MMHG | BODY MASS INDEX: 33.34 KG/M2 | HEIGHT: 68 IN | OXYGEN SATURATION: 98 % | HEART RATE: 98 BPM | WEIGHT: 220 LBS | DIASTOLIC BLOOD PRESSURE: 72 MMHG

## 2021-04-14 DIAGNOSIS — L03.116 CELLULITIS OF LEFT LEG: ICD-10-CM

## 2021-04-14 DIAGNOSIS — M79.89 LEFT LEG SWELLING: Primary | ICD-10-CM

## 2021-04-14 DIAGNOSIS — E11.9 TYPE 2 DIABETES MELLITUS WITHOUT COMPLICATION, UNSPECIFIED WHETHER LONG TERM INSULIN USE: ICD-10-CM

## 2021-04-14 DIAGNOSIS — Z79.4 TYPE 2 DIABETES MELLITUS WITH HYPERGLYCEMIA, WITH LONG-TERM CURRENT USE OF INSULIN: ICD-10-CM

## 2021-04-14 DIAGNOSIS — Z12.31 OTHER SCREENING MAMMOGRAM: ICD-10-CM

## 2021-04-14 DIAGNOSIS — N17.9 AKI (ACUTE KIDNEY INJURY): ICD-10-CM

## 2021-04-14 DIAGNOSIS — E11.65 TYPE 2 DIABETES MELLITUS WITH HYPERGLYCEMIA, WITH LONG-TERM CURRENT USE OF INSULIN: ICD-10-CM

## 2021-04-14 PROCEDURE — 1125F AMNT PAIN NOTED PAIN PRSNT: CPT | Mod: S$GLB,,, | Performed by: FAMILY MEDICINE

## 2021-04-14 PROCEDURE — 3008F BODY MASS INDEX DOCD: CPT | Mod: CPTII,S$GLB,, | Performed by: FAMILY MEDICINE

## 2021-04-14 PROCEDURE — 1125F PR PAIN SEVERITY QUANTIFIED, PAIN PRESENT: ICD-10-PCS | Mod: S$GLB,,, | Performed by: FAMILY MEDICINE

## 2021-04-14 PROCEDURE — 99999 PR PBB SHADOW E&M-EST. PATIENT-LVL V: ICD-10-PCS | Mod: PBBFAC,,, | Performed by: FAMILY MEDICINE

## 2021-04-14 PROCEDURE — 99214 OFFICE O/P EST MOD 30 MIN: CPT | Mod: S$GLB,,, | Performed by: FAMILY MEDICINE

## 2021-04-14 PROCEDURE — 99214 PR OFFICE/OUTPT VISIT, EST, LEVL IV, 30-39 MIN: ICD-10-PCS | Mod: S$GLB,,, | Performed by: FAMILY MEDICINE

## 2021-04-14 PROCEDURE — 99999 PR PBB SHADOW E&M-EST. PATIENT-LVL V: CPT | Mod: PBBFAC,,, | Performed by: FAMILY MEDICINE

## 2021-04-14 PROCEDURE — 3052F PR MOST RECENT HEMOGLOBIN A1C LEVEL 8.0 - < 9.0%: ICD-10-PCS | Mod: CPTII,S$GLB,, | Performed by: FAMILY MEDICINE

## 2021-04-14 PROCEDURE — 3008F PR BODY MASS INDEX (BMI) DOCUMENTED: ICD-10-PCS | Mod: CPTII,S$GLB,, | Performed by: FAMILY MEDICINE

## 2021-04-14 PROCEDURE — 3052F HG A1C>EQUAL 8.0%<EQUAL 9.0%: CPT | Mod: CPTII,S$GLB,, | Performed by: FAMILY MEDICINE

## 2021-04-14 RX ORDER — CEPHALEXIN 500 MG/1
500 CAPSULE ORAL EVERY 6 HOURS
Qty: 20 CAPSULE | Refills: 0 | Status: SHIPPED | OUTPATIENT
Start: 2021-04-14 | End: 2021-04-19

## 2021-04-14 RX ORDER — INSULIN ASPART 100 [IU]/ML
INJECTION, SOLUTION INTRAVENOUS; SUBCUTANEOUS
Qty: 30 ML | Refills: 6 | OUTPATIENT
Start: 2021-04-14 | End: 2021-04-19

## 2021-04-19 ENCOUNTER — OFFICE VISIT (OUTPATIENT)
Dept: PODIATRY | Facility: CLINIC | Age: 41
End: 2021-04-19
Payer: COMMERCIAL

## 2021-04-19 ENCOUNTER — PATIENT MESSAGE (OUTPATIENT)
Dept: PODIATRY | Facility: CLINIC | Age: 41
End: 2021-04-19

## 2021-04-19 VITALS
WEIGHT: 208 LBS | SYSTOLIC BLOOD PRESSURE: 122 MMHG | HEIGHT: 68 IN | BODY MASS INDEX: 31.52 KG/M2 | HEART RATE: 106 BPM | DIASTOLIC BLOOD PRESSURE: 84 MMHG

## 2021-04-19 DIAGNOSIS — R42 DIZZINESS: ICD-10-CM

## 2021-04-19 DIAGNOSIS — L97.529 DIABETIC ULCER OF LEFT GREAT TOE: Primary | ICD-10-CM

## 2021-04-19 DIAGNOSIS — E11.621 DIABETIC ULCER OF LEFT GREAT TOE: Primary | ICD-10-CM

## 2021-04-19 DIAGNOSIS — E10.42 DIABETIC POLYNEUROPATHY ASSOCIATED WITH TYPE 1 DIABETES MELLITUS: ICD-10-CM

## 2021-04-19 LAB — GLUCOSE SERPL-MCNC: >450 MG/DL (ref 70–110)

## 2021-04-19 PROCEDURE — 99999 PR PBB SHADOW E&M-EST. PATIENT-LVL IV: CPT | Mod: PBBFAC,,, | Performed by: PODIATRIST

## 2021-04-19 PROCEDURE — 99999 PR PBB SHADOW E&M-EST. PATIENT-LVL IV: ICD-10-PCS | Mod: PBBFAC,,, | Performed by: PODIATRIST

## 2021-04-19 PROCEDURE — 3008F BODY MASS INDEX DOCD: CPT | Mod: CPTII,S$GLB,, | Performed by: PODIATRIST

## 2021-04-19 PROCEDURE — 11043 DBRDMT MUSC&/FSCA 1ST 20/<: CPT | Mod: 59,S$GLB,, | Performed by: PODIATRIST

## 2021-04-19 PROCEDURE — 28008 PR INCISION OF FOOT/TOE FASCIA: ICD-10-PCS | Mod: LT,S$GLB,, | Performed by: PODIATRIST

## 2021-04-19 PROCEDURE — 82962 POCT GLUCOSE, HAND-HELD DEVICE: ICD-10-PCS | Mod: S$GLB,,, | Performed by: PODIATRIST

## 2021-04-19 PROCEDURE — 99499 UNLISTED E&M SERVICE: CPT | Mod: S$GLB,,, | Performed by: PODIATRIST

## 2021-04-19 PROCEDURE — 1126F AMNT PAIN NOTED NONE PRSNT: CPT | Mod: S$GLB,,, | Performed by: PODIATRIST

## 2021-04-19 PROCEDURE — 11043 WOUND DEBRIDEMENT: ICD-10-PCS | Mod: 59,S$GLB,, | Performed by: PODIATRIST

## 2021-04-19 PROCEDURE — 1126F PR PAIN SEVERITY QUANTIFIED, NO PAIN PRESENT: ICD-10-PCS | Mod: S$GLB,,, | Performed by: PODIATRIST

## 2021-04-19 PROCEDURE — 82962 GLUCOSE BLOOD TEST: CPT | Mod: S$GLB,,, | Performed by: PODIATRIST

## 2021-04-19 PROCEDURE — 28008 INCISION OF FOOT FASCIA: CPT | Mod: LT,S$GLB,, | Performed by: PODIATRIST

## 2021-04-19 PROCEDURE — 3008F PR BODY MASS INDEX (BMI) DOCUMENTED: ICD-10-PCS | Mod: CPTII,S$GLB,, | Performed by: PODIATRIST

## 2021-04-19 PROCEDURE — 99499 NO LOS: ICD-10-PCS | Mod: S$GLB,,, | Performed by: PODIATRIST

## 2021-04-29 ENCOUNTER — PATIENT OUTREACH (OUTPATIENT)
Dept: ADMINISTRATIVE | Facility: OTHER | Age: 41
End: 2021-04-29

## 2021-05-03 ENCOUNTER — OFFICE VISIT (OUTPATIENT)
Dept: PODIATRY | Facility: CLINIC | Age: 41
End: 2021-05-03
Payer: COMMERCIAL

## 2021-05-03 VITALS
BODY MASS INDEX: 32.79 KG/M2 | SYSTOLIC BLOOD PRESSURE: 124 MMHG | HEART RATE: 91 BPM | HEIGHT: 68 IN | DIASTOLIC BLOOD PRESSURE: 84 MMHG | WEIGHT: 216.38 LBS

## 2021-05-03 DIAGNOSIS — E10.42 DIABETIC POLYNEUROPATHY ASSOCIATED WITH TYPE 1 DIABETES MELLITUS: ICD-10-CM

## 2021-05-03 DIAGNOSIS — E11.621 DIABETIC ULCER OF LEFT GREAT TOE: Primary | ICD-10-CM

## 2021-05-03 DIAGNOSIS — E10.621 DIABETIC ULCER OF TOE OF LEFT FOOT ASSOCIATED WITH TYPE 1 DIABETES MELLITUS, WITH FAT LAYER EXPOSED: ICD-10-CM

## 2021-05-03 DIAGNOSIS — L97.529 DIABETIC ULCER OF LEFT GREAT TOE: Primary | ICD-10-CM

## 2021-05-03 DIAGNOSIS — L97.522 DIABETIC ULCER OF TOE OF LEFT FOOT ASSOCIATED WITH TYPE 1 DIABETES MELLITUS, WITH FAT LAYER EXPOSED: ICD-10-CM

## 2021-05-03 PROCEDURE — 11042 DBRDMT SUBQ TIS 1ST 20SQCM/<: CPT | Mod: 79,S$GLB,, | Performed by: PODIATRIST

## 2021-05-03 PROCEDURE — 99499 NO LOS: ICD-10-PCS | Mod: S$GLB,,, | Performed by: PODIATRIST

## 2021-05-03 PROCEDURE — 3008F BODY MASS INDEX DOCD: CPT | Mod: CPTII,S$GLB,, | Performed by: PODIATRIST

## 2021-05-03 PROCEDURE — 99999 PR PBB SHADOW E&M-EST. PATIENT-LVL IV: CPT | Mod: PBBFAC,,, | Performed by: PODIATRIST

## 2021-05-03 PROCEDURE — 3052F HG A1C>EQUAL 8.0%<EQUAL 9.0%: CPT | Mod: CPTII,S$GLB,, | Performed by: PODIATRIST

## 2021-05-03 PROCEDURE — 99499 UNLISTED E&M SERVICE: CPT | Mod: S$GLB,,, | Performed by: PODIATRIST

## 2021-05-03 PROCEDURE — 1125F AMNT PAIN NOTED PAIN PRSNT: CPT | Mod: S$GLB,,, | Performed by: PODIATRIST

## 2021-05-03 PROCEDURE — 3008F PR BODY MASS INDEX (BMI) DOCUMENTED: ICD-10-PCS | Mod: CPTII,S$GLB,, | Performed by: PODIATRIST

## 2021-05-03 PROCEDURE — 99999 PR PBB SHADOW E&M-EST. PATIENT-LVL IV: ICD-10-PCS | Mod: PBBFAC,,, | Performed by: PODIATRIST

## 2021-05-03 PROCEDURE — 3052F PR MOST RECENT HEMOGLOBIN A1C LEVEL 8.0 - < 9.0%: ICD-10-PCS | Mod: CPTII,S$GLB,, | Performed by: PODIATRIST

## 2021-05-03 PROCEDURE — 11042 WOUND DEBRIDEMENT: ICD-10-PCS | Mod: 79,S$GLB,, | Performed by: PODIATRIST

## 2021-05-03 PROCEDURE — 1125F PR PAIN SEVERITY QUANTIFIED, PAIN PRESENT: ICD-10-PCS | Mod: S$GLB,,, | Performed by: PODIATRIST

## 2021-05-03 RX ORDER — COLLAGENASE SANTYL 250 [ARB'U]/G
OINTMENT TOPICAL DAILY
Qty: 30 G | Refills: 0 | Status: SHIPPED | OUTPATIENT
Start: 2021-05-03 | End: 2023-04-18

## 2021-05-06 ENCOUNTER — PATIENT MESSAGE (OUTPATIENT)
Dept: FAMILY MEDICINE | Facility: CLINIC | Age: 41
End: 2021-05-06

## 2021-05-06 DIAGNOSIS — K29.00 ACUTE SUPERFICIAL GASTRITIS WITHOUT HEMORRHAGE: ICD-10-CM

## 2021-05-06 DIAGNOSIS — E10.40 TYPE 1 DIABETES MELLITUS WITH DIABETIC NEUROPATHY: ICD-10-CM

## 2021-05-06 RX ORDER — PREGABALIN 150 MG/1
150 CAPSULE ORAL 2 TIMES DAILY
Qty: 60 CAPSULE | Refills: 5 | Status: SHIPPED | OUTPATIENT
Start: 2021-05-06 | End: 2021-11-30 | Stop reason: SDUPTHER

## 2021-05-06 RX ORDER — PANTOPRAZOLE SODIUM 40 MG/1
40 TABLET, DELAYED RELEASE ORAL DAILY
Qty: 90 TABLET | Refills: 1 | Status: SHIPPED | OUTPATIENT
Start: 2021-05-06 | End: 2021-12-20 | Stop reason: SDUPTHER

## 2021-05-12 NOTE — PLAN OF CARE
Noted on previous labs.    Problem: Patient Care Overview  Goal: Plan of Care Review  Outcome: Outcome(s) achieved Date Met: 08/05/17  Discharge instructions discussed with patient. Verbalizes understanding. Paperwork and prescriptions given to patient. Medications reconciled. IV d/c'd, catheter intact. Tolerated well. Pt informed that showering is okay. VSS on RA. No acute distress noted. Awaiting transport.

## 2021-05-24 ENCOUNTER — OFFICE VISIT (OUTPATIENT)
Dept: PODIATRY | Facility: CLINIC | Age: 41
End: 2021-05-24
Payer: COMMERCIAL

## 2021-05-24 VITALS
DIASTOLIC BLOOD PRESSURE: 90 MMHG | WEIGHT: 217.81 LBS | SYSTOLIC BLOOD PRESSURE: 150 MMHG | HEART RATE: 91 BPM | HEIGHT: 68 IN | BODY MASS INDEX: 33.01 KG/M2

## 2021-05-24 DIAGNOSIS — E11.621 DIABETIC ULCER OF LEFT GREAT TOE: Primary | ICD-10-CM

## 2021-05-24 DIAGNOSIS — L97.522 DIABETIC ULCER OF TOE OF LEFT FOOT ASSOCIATED WITH TYPE 1 DIABETES MELLITUS, WITH FAT LAYER EXPOSED: ICD-10-CM

## 2021-05-24 DIAGNOSIS — L97.529 DIABETIC ULCER OF LEFT GREAT TOE: Primary | ICD-10-CM

## 2021-05-24 DIAGNOSIS — E10.621 DIABETIC ULCER OF TOE OF LEFT FOOT ASSOCIATED WITH TYPE 1 DIABETES MELLITUS, WITH FAT LAYER EXPOSED: ICD-10-CM

## 2021-05-24 PROCEDURE — 99499 NO LOS: ICD-10-PCS | Mod: S$GLB,,, | Performed by: PODIATRIST

## 2021-05-24 PROCEDURE — 3008F PR BODY MASS INDEX (BMI) DOCUMENTED: ICD-10-PCS | Mod: CPTII,S$GLB,, | Performed by: PODIATRIST

## 2021-05-24 PROCEDURE — 3052F PR MOST RECENT HEMOGLOBIN A1C LEVEL 8.0 - < 9.0%: ICD-10-PCS | Mod: CPTII,S$GLB,, | Performed by: PODIATRIST

## 2021-05-24 PROCEDURE — 99999 PR PBB SHADOW E&M-EST. PATIENT-LVL III: ICD-10-PCS | Mod: PBBFAC,,, | Performed by: PODIATRIST

## 2021-05-24 PROCEDURE — 3008F BODY MASS INDEX DOCD: CPT | Mod: CPTII,S$GLB,, | Performed by: PODIATRIST

## 2021-05-24 PROCEDURE — 1126F PR PAIN SEVERITY QUANTIFIED, NO PAIN PRESENT: ICD-10-PCS | Mod: S$GLB,,, | Performed by: PODIATRIST

## 2021-05-24 PROCEDURE — 99024 POSTOP FOLLOW-UP VISIT: CPT | Mod: S$GLB,,, | Performed by: PODIATRIST

## 2021-05-24 PROCEDURE — 1126F AMNT PAIN NOTED NONE PRSNT: CPT | Mod: S$GLB,,, | Performed by: PODIATRIST

## 2021-05-24 PROCEDURE — 99999 PR PBB SHADOW E&M-EST. PATIENT-LVL III: CPT | Mod: PBBFAC,,, | Performed by: PODIATRIST

## 2021-05-24 PROCEDURE — 3052F HG A1C>EQUAL 8.0%<EQUAL 9.0%: CPT | Mod: CPTII,S$GLB,, | Performed by: PODIATRIST

## 2021-05-24 PROCEDURE — 99499 UNLISTED E&M SERVICE: CPT | Mod: S$GLB,,, | Performed by: PODIATRIST

## 2021-05-24 PROCEDURE — 99024 WOUND DEBRIDEMENT: ICD-10-PCS | Mod: S$GLB,,, | Performed by: PODIATRIST

## 2021-06-11 ENCOUNTER — PATIENT OUTREACH (OUTPATIENT)
Dept: ADMINISTRATIVE | Facility: OTHER | Age: 41
End: 2021-06-11

## 2021-06-14 ENCOUNTER — OFFICE VISIT (OUTPATIENT)
Dept: PODIATRY | Facility: CLINIC | Age: 41
End: 2021-06-14
Payer: COMMERCIAL

## 2021-06-14 VITALS
OXYGEN SATURATION: 98 % | DIASTOLIC BLOOD PRESSURE: 88 MMHG | HEART RATE: 87 BPM | SYSTOLIC BLOOD PRESSURE: 148 MMHG | WEIGHT: 216.81 LBS | BODY MASS INDEX: 32.86 KG/M2 | HEIGHT: 68 IN

## 2021-06-14 DIAGNOSIS — E10.42 DIABETIC POLYNEUROPATHY ASSOCIATED WITH TYPE 1 DIABETES MELLITUS: ICD-10-CM

## 2021-06-14 DIAGNOSIS — E11.621 DIABETIC ULCER OF LEFT GREAT TOE: Primary | ICD-10-CM

## 2021-06-14 DIAGNOSIS — E10.621 DIABETIC ULCER OF TOE OF LEFT FOOT ASSOCIATED WITH TYPE 1 DIABETES MELLITUS, WITH FAT LAYER EXPOSED: ICD-10-CM

## 2021-06-14 DIAGNOSIS — L97.522 DIABETIC ULCER OF TOE OF LEFT FOOT ASSOCIATED WITH TYPE 1 DIABETES MELLITUS, WITH FAT LAYER EXPOSED: ICD-10-CM

## 2021-06-14 DIAGNOSIS — L97.529 DIABETIC ULCER OF LEFT GREAT TOE: Primary | ICD-10-CM

## 2021-06-14 PROCEDURE — 1126F AMNT PAIN NOTED NONE PRSNT: CPT | Mod: S$GLB,,, | Performed by: PODIATRIST

## 2021-06-14 PROCEDURE — 99999 PR PBB SHADOW E&M-EST. PATIENT-LVL IV: ICD-10-PCS | Mod: PBBFAC,,, | Performed by: PODIATRIST

## 2021-06-14 PROCEDURE — 11042 WOUND DEBRIDEMENT: ICD-10-PCS | Mod: 79,S$GLB,, | Performed by: PODIATRIST

## 2021-06-14 PROCEDURE — 1126F PR PAIN SEVERITY QUANTIFIED, NO PAIN PRESENT: ICD-10-PCS | Mod: S$GLB,,, | Performed by: PODIATRIST

## 2021-06-14 PROCEDURE — 3008F PR BODY MASS INDEX (BMI) DOCUMENTED: ICD-10-PCS | Mod: CPTII,S$GLB,, | Performed by: PODIATRIST

## 2021-06-14 PROCEDURE — 99499 NO LOS: ICD-10-PCS | Mod: S$GLB,,, | Performed by: PODIATRIST

## 2021-06-14 PROCEDURE — 3008F BODY MASS INDEX DOCD: CPT | Mod: CPTII,S$GLB,, | Performed by: PODIATRIST

## 2021-06-14 PROCEDURE — 3052F PR MOST RECENT HEMOGLOBIN A1C LEVEL 8.0 - < 9.0%: ICD-10-PCS | Mod: CPTII,S$GLB,, | Performed by: PODIATRIST

## 2021-06-14 PROCEDURE — 99499 UNLISTED E&M SERVICE: CPT | Mod: S$GLB,,, | Performed by: PODIATRIST

## 2021-06-14 PROCEDURE — 3052F HG A1C>EQUAL 8.0%<EQUAL 9.0%: CPT | Mod: CPTII,S$GLB,, | Performed by: PODIATRIST

## 2021-06-14 PROCEDURE — 99999 PR PBB SHADOW E&M-EST. PATIENT-LVL IV: CPT | Mod: PBBFAC,,, | Performed by: PODIATRIST

## 2021-06-14 PROCEDURE — 11042 DBRDMT SUBQ TIS 1ST 20SQCM/<: CPT | Mod: 79,S$GLB,, | Performed by: PODIATRIST

## 2021-07-07 ENCOUNTER — PATIENT MESSAGE (OUTPATIENT)
Dept: ADMINISTRATIVE | Facility: HOSPITAL | Age: 41
End: 2021-07-07

## 2021-07-12 ENCOUNTER — PATIENT MESSAGE (OUTPATIENT)
Dept: ENDOCRINOLOGY | Facility: CLINIC | Age: 41
End: 2021-07-12

## 2021-07-12 ENCOUNTER — OFFICE VISIT (OUTPATIENT)
Dept: PODIATRY | Facility: CLINIC | Age: 41
End: 2021-07-12
Payer: COMMERCIAL

## 2021-07-12 VITALS
DIASTOLIC BLOOD PRESSURE: 78 MMHG | BODY MASS INDEX: 33.41 KG/M2 | HEART RATE: 89 BPM | SYSTOLIC BLOOD PRESSURE: 142 MMHG | OXYGEN SATURATION: 97 % | WEIGHT: 220.44 LBS | HEIGHT: 68 IN

## 2021-07-12 DIAGNOSIS — E10.621 DIABETIC ULCER OF TOE OF LEFT FOOT ASSOCIATED WITH TYPE 1 DIABETES MELLITUS, WITH FAT LAYER EXPOSED: ICD-10-CM

## 2021-07-12 DIAGNOSIS — E10.42 DIABETIC POLYNEUROPATHY ASSOCIATED WITH TYPE 1 DIABETES MELLITUS: Primary | ICD-10-CM

## 2021-07-12 DIAGNOSIS — L97.522 DIABETIC ULCER OF TOE OF LEFT FOOT ASSOCIATED WITH TYPE 1 DIABETES MELLITUS, WITH FAT LAYER EXPOSED: ICD-10-CM

## 2021-07-12 DIAGNOSIS — L60.0 INGROWN NAIL: ICD-10-CM

## 2021-07-12 PROCEDURE — 3008F BODY MASS INDEX DOCD: CPT | Mod: CPTII,S$GLB,, | Performed by: PODIATRIST

## 2021-07-12 PROCEDURE — 1126F PR PAIN SEVERITY QUANTIFIED, NO PAIN PRESENT: ICD-10-PCS | Mod: S$GLB,,, | Performed by: PODIATRIST

## 2021-07-12 PROCEDURE — 99024 POSTOP FOLLOW-UP VISIT: CPT | Mod: S$GLB,,, | Performed by: PODIATRIST

## 2021-07-12 PROCEDURE — 3052F PR MOST RECENT HEMOGLOBIN A1C LEVEL 8.0 - < 9.0%: ICD-10-PCS | Mod: CPTII,S$GLB,, | Performed by: PODIATRIST

## 2021-07-12 PROCEDURE — 99024 PR POST-OP FOLLOW-UP VISIT: ICD-10-PCS | Mod: S$GLB,,, | Performed by: PODIATRIST

## 2021-07-12 PROCEDURE — 3008F PR BODY MASS INDEX (BMI) DOCUMENTED: ICD-10-PCS | Mod: CPTII,S$GLB,, | Performed by: PODIATRIST

## 2021-07-12 PROCEDURE — 3052F HG A1C>EQUAL 8.0%<EQUAL 9.0%: CPT | Mod: CPTII,S$GLB,, | Performed by: PODIATRIST

## 2021-07-12 PROCEDURE — 99999 PR PBB SHADOW E&M-EST. PATIENT-LVL IV: ICD-10-PCS | Mod: PBBFAC,,, | Performed by: PODIATRIST

## 2021-07-12 PROCEDURE — 99999 PR PBB SHADOW E&M-EST. PATIENT-LVL IV: CPT | Mod: PBBFAC,,, | Performed by: PODIATRIST

## 2021-07-12 PROCEDURE — 1126F AMNT PAIN NOTED NONE PRSNT: CPT | Mod: S$GLB,,, | Performed by: PODIATRIST

## 2021-07-19 ENCOUNTER — OFFICE VISIT (OUTPATIENT)
Dept: ENDOCRINOLOGY | Facility: CLINIC | Age: 41
End: 2021-07-19
Payer: COMMERCIAL

## 2021-07-19 DIAGNOSIS — I10 ESSENTIAL HYPERTENSION: Chronic | ICD-10-CM

## 2021-07-19 DIAGNOSIS — E78.2 MIXED HYPERLIPIDEMIA: Chronic | ICD-10-CM

## 2021-07-19 DIAGNOSIS — E11.65 TYPE 2 DIABETES MELLITUS WITH HYPERGLYCEMIA, WITH LONG-TERM CURRENT USE OF INSULIN: Primary | ICD-10-CM

## 2021-07-19 DIAGNOSIS — Z79.4 TYPE 2 DIABETES MELLITUS WITH HYPERGLYCEMIA, WITH LONG-TERM CURRENT USE OF INSULIN: Primary | ICD-10-CM

## 2021-07-19 DIAGNOSIS — E10.42 DIABETIC POLYNEUROPATHY ASSOCIATED WITH TYPE 1 DIABETES MELLITUS: ICD-10-CM

## 2021-07-19 PROCEDURE — 99214 OFFICE O/P EST MOD 30 MIN: CPT | Mod: 95,,, | Performed by: NURSE PRACTITIONER

## 2021-07-19 PROCEDURE — 3046F PR MOST RECENT HEMOGLOBIN A1C LEVEL > 9.0%: ICD-10-PCS | Mod: CPTII,,, | Performed by: NURSE PRACTITIONER

## 2021-07-19 PROCEDURE — 99214 PR OFFICE/OUTPT VISIT, EST, LEVL IV, 30-39 MIN: ICD-10-PCS | Mod: 95,,, | Performed by: NURSE PRACTITIONER

## 2021-07-19 PROCEDURE — 3046F HEMOGLOBIN A1C LEVEL >9.0%: CPT | Mod: CPTII,,, | Performed by: NURSE PRACTITIONER

## 2021-07-19 RX ORDER — INSULIN ASPART 100 [IU]/ML
INJECTION, SOLUTION INTRAVENOUS; SUBCUTANEOUS
Qty: 30 ML | Refills: 6 | Status: SHIPPED | OUTPATIENT
Start: 2021-07-19 | End: 2021-09-15 | Stop reason: SDUPTHER

## 2021-07-21 ENCOUNTER — TELEPHONE (OUTPATIENT)
Dept: PHARMACY | Facility: CLINIC | Age: 41
End: 2021-07-21

## 2021-07-21 DIAGNOSIS — Z79.4 TYPE 2 DIABETES MELLITUS WITH HYPERGLYCEMIA, WITH LONG-TERM CURRENT USE OF INSULIN: Primary | ICD-10-CM

## 2021-07-21 DIAGNOSIS — E11.65 TYPE 2 DIABETES MELLITUS WITH HYPERGLYCEMIA, WITH LONG-TERM CURRENT USE OF INSULIN: Primary | ICD-10-CM

## 2021-07-21 RX ORDER — BLOOD-GLUCOSE TRANSMITTER
1 EACH MISCELLANEOUS CONTINUOUS PRN
Qty: 1 EACH | Status: SHIPPED | OUTPATIENT
Start: 2021-07-21 | End: 2022-08-30 | Stop reason: SDUPTHER

## 2021-07-21 RX ORDER — BLOOD-GLUCOSE SENSOR
3 EACH MISCELLANEOUS CONTINUOUS PRN
Qty: 3 EACH | Status: SHIPPED | OUTPATIENT
Start: 2021-07-21 | End: 2022-08-30 | Stop reason: SDUPTHER

## 2021-07-29 ENCOUNTER — PATIENT MESSAGE (OUTPATIENT)
Dept: PODIATRY | Facility: CLINIC | Age: 41
End: 2021-07-29

## 2021-08-10 ENCOUNTER — PATIENT OUTREACH (OUTPATIENT)
Dept: ADMINISTRATIVE | Facility: OTHER | Age: 41
End: 2021-08-10

## 2021-08-12 ENCOUNTER — OFFICE VISIT (OUTPATIENT)
Dept: PODIATRY | Facility: CLINIC | Age: 41
End: 2021-08-12
Payer: COMMERCIAL

## 2021-08-12 VITALS
DIASTOLIC BLOOD PRESSURE: 80 MMHG | HEART RATE: 88 BPM | SYSTOLIC BLOOD PRESSURE: 140 MMHG | BODY MASS INDEX: 33.34 KG/M2 | WEIGHT: 220 LBS | HEIGHT: 68 IN

## 2021-08-12 DIAGNOSIS — E10.42 DIABETIC POLYNEUROPATHY ASSOCIATED WITH TYPE 1 DIABETES MELLITUS: ICD-10-CM

## 2021-08-12 DIAGNOSIS — E10.621 DIABETIC ULCER OF TOE OF LEFT FOOT ASSOCIATED WITH TYPE 1 DIABETES MELLITUS, WITH FAT LAYER EXPOSED: Primary | ICD-10-CM

## 2021-08-12 DIAGNOSIS — E11.621 DIABETIC ULCER OF LEFT GREAT TOE: ICD-10-CM

## 2021-08-12 DIAGNOSIS — L97.522 DIABETIC ULCER OF TOE OF LEFT FOOT ASSOCIATED WITH TYPE 1 DIABETES MELLITUS, WITH FAT LAYER EXPOSED: Primary | ICD-10-CM

## 2021-08-12 DIAGNOSIS — L97.529 DIABETIC ULCER OF LEFT GREAT TOE: ICD-10-CM

## 2021-08-12 PROCEDURE — 3046F PR MOST RECENT HEMOGLOBIN A1C LEVEL > 9.0%: ICD-10-PCS | Mod: CPTII,S$GLB,, | Performed by: PODIATRIST

## 2021-08-12 PROCEDURE — 3079F PR MOST RECENT DIASTOLIC BLOOD PRESSURE 80-89 MM HG: ICD-10-PCS | Mod: CPTII,S$GLB,, | Performed by: PODIATRIST

## 2021-08-12 PROCEDURE — 3008F PR BODY MASS INDEX (BMI) DOCUMENTED: ICD-10-PCS | Mod: CPTII,S$GLB,, | Performed by: PODIATRIST

## 2021-08-12 PROCEDURE — 1160F RVW MEDS BY RX/DR IN RCRD: CPT | Mod: CPTII,S$GLB,, | Performed by: PODIATRIST

## 2021-08-12 PROCEDURE — 3077F PR MOST RECENT SYSTOLIC BLOOD PRESSURE >= 140 MM HG: ICD-10-PCS | Mod: CPTII,S$GLB,, | Performed by: PODIATRIST

## 2021-08-12 PROCEDURE — 3077F SYST BP >= 140 MM HG: CPT | Mod: CPTII,S$GLB,, | Performed by: PODIATRIST

## 2021-08-12 PROCEDURE — 1126F AMNT PAIN NOTED NONE PRSNT: CPT | Mod: CPTII,S$GLB,, | Performed by: PODIATRIST

## 2021-08-12 PROCEDURE — 11042 DBRDMT SUBQ TIS 1ST 20SQCM/<: CPT | Mod: S$GLB,,, | Performed by: PODIATRIST

## 2021-08-12 PROCEDURE — 3008F BODY MASS INDEX DOCD: CPT | Mod: CPTII,S$GLB,, | Performed by: PODIATRIST

## 2021-08-12 PROCEDURE — 1160F PR REVIEW ALL MEDS BY PRESCRIBER/CLIN PHARMACIST DOCUMENTED: ICD-10-PCS | Mod: CPTII,S$GLB,, | Performed by: PODIATRIST

## 2021-08-12 PROCEDURE — 99499 NO LOS: ICD-10-PCS | Mod: S$GLB,,, | Performed by: PODIATRIST

## 2021-08-12 PROCEDURE — 3046F HEMOGLOBIN A1C LEVEL >9.0%: CPT | Mod: CPTII,S$GLB,, | Performed by: PODIATRIST

## 2021-08-12 PROCEDURE — 1126F PR PAIN SEVERITY QUANTIFIED, NO PAIN PRESENT: ICD-10-PCS | Mod: CPTII,S$GLB,, | Performed by: PODIATRIST

## 2021-08-12 PROCEDURE — 11042 WOUND DEBRIDEMENT: ICD-10-PCS | Mod: S$GLB,,, | Performed by: PODIATRIST

## 2021-08-12 PROCEDURE — 3079F DIAST BP 80-89 MM HG: CPT | Mod: CPTII,S$GLB,, | Performed by: PODIATRIST

## 2021-08-12 PROCEDURE — 1159F PR MEDICATION LIST DOCUMENTED IN MEDICAL RECORD: ICD-10-PCS | Mod: CPTII,S$GLB,, | Performed by: PODIATRIST

## 2021-08-12 PROCEDURE — 99999 PR PBB SHADOW E&M-EST. PATIENT-LVL IV: ICD-10-PCS | Mod: PBBFAC,,, | Performed by: PODIATRIST

## 2021-08-12 PROCEDURE — 99499 UNLISTED E&M SERVICE: CPT | Mod: S$GLB,,, | Performed by: PODIATRIST

## 2021-08-12 PROCEDURE — 99999 PR PBB SHADOW E&M-EST. PATIENT-LVL IV: CPT | Mod: PBBFAC,,, | Performed by: PODIATRIST

## 2021-08-12 PROCEDURE — 1159F MED LIST DOCD IN RCRD: CPT | Mod: CPTII,S$GLB,, | Performed by: PODIATRIST

## 2021-08-20 NOTE — ED NOTES
Glucose received from lab of 569. md notified.    Pulmonary Inpatient Progress Note    Interval History     Awake  On 2.5 of NC  On NIV at night CPAP 12/50%  Feels better    ROS:  As mentioned above    No intake/output data recorded.  Recent Labs   Lab 08/20/21 0452 08/18/21 0444 08/17/21  0450   WBC 7.8 9.0 9.5   RBC 3.60* 3.67* 3.19*   HGB 10.0* 10.2* 9.0*   HCT 31.9* 32.7* 28.3*   MCV 88.6 89.1 88.7   MCH 27.8 27.8 28.2   MCHC 31.3* 31.2* 31.8*    360 334     Recent Labs   Lab 08/19/21  0233 08/18/21 0444 08/17/21 0450   CO2 26 25 22   BUN 27* 26* 26*   AST 29 19 15     Recent Labs   Lab 08/20/21  0452 08/19/21  0233 08/18/21  1928 08/14/21  2319   PT  --   --   --  11.4   PTT 68* 62* 51* 27   INR  --   --   --  1.1        I reviewed all relevant chest x-rays and CT chest scans     Physical Exam     Visit Vitals  /69 (BP Location: LUE - Left upper extremity, Patient Position: Semi-Rios's)   Pulse 66   Temp 97.5 °F (36.4 °C) (Axillary)   Resp 16   Ht 4' 8.69\" (1.44 m)   Wt 52.6 kg (115 lb 15.4 oz)   SpO2 96%   BMI 25.37 kg/m²     Physical Exam:    General: no acute distress.  Neuro: alert, disoriented, baseline dementia   Head: atraumatic   Neck: supple, no jvd, no appreciable LAD  Resp: Clear to auscultate bilaterally, no wheezes, bilateral rales   CV: regular rhythm rate, no audible murmur  Abd: soft, non-distended and non-tender   Ext: no clubbing or edema; pulses present and equal bilaterally  Skin: no visible rashes     Assessment     Acute hypoxic respiratory failure   Acute COVID-19 pneumonia with increased inflammatry markers  Acute pulmonary embolism   Aspiration pneumonia   History of CVA with residual weakness and nonverbal   Chronic diastolic heart failure   Hypertension   Hyperlipidemia     Plan     Continue oxygen supplementation as needed keeping oxygen saturation >90%  NIV at night and PRN  Continue Decadron 6 mg daily    Remdisivir course per protocol   On IV Zosyn for possible aspiration pneumonia, do total of 7 days    Bronchodilators as needed   Pulmonary hygiene   Aspirin, Plavix and statin therapy    On heparin infusion for PE

## 2021-08-23 NOTE — PROGRESS NOTES
CC: Bay Ibarra arrives today for management of Type 2 DM and review of chronic medical conditions.      HPI: Mr. Bay Ibarra is a 37 y.o. White male who was diagnosed with Type 2 DM in ~ 2009 on routine lab work. He was initially on oral agents and transitioned to insulin therapy ~ 1 year after diagnosis. Hx of noncompliance but now back on track. Hx pancreatitis - 2013 and 2x in 2017.   + family hx of T2DM in mother and father.     He was seen last by Dr. Thompson in August 2017 for an initial evaluation. He was unsure if he had T1 or T2 DM.  C-peptide WNL with BG in the 200's on 8/2017.     Insulin doses were adjusted at last visit.    Since his last visit, his A1c has decreased from 11.6% to 7.7%. Recently had an A1c screening at work.   He is being seen by me for the first time today.     DIABETES COMPLICATIONS: peripheral neuropathy     HOSPITALIZATIONS FOR DIABETES OR RELATED COMPLICATIONS:  No    CURRENT A1C:    Hemoglobin A1C   Date Value Ref Range Status   09/28/2017 7.7 (A) 0 - 5.7 % Final   07/25/2017 11.6 (H) 4.0 - 5.6 % Final     Comment:     According to ADA guidelines, hemoglobin A1c <7.0% represents  optimal control in non-pregnant diabetic patients. Different  metrics may apply to specific patient populations.   Standards of Medical Care in Diabetes-2016.  For the purpose of screening for the presence of diabetes:  <5.7%     Consistent with the absence of diabetes  5.7-6.4%  Consistent with increasing risk for diabetes   (prediabetes)  >or=6.5%  Consistent with diabetes  Currently, no consensus exists for use of hemoglobin A1c  for diagnosis of diabetes for children.  This Hemoglobin A1c assay has significant interference with fetal   hemoglobin   (HbF). The results are invalid for patients with abnormal amounts of   HbF,   including those with known Hereditary Persistence   of Fetal Hemoglobin. Heterozygous hemoglobin variants (HbAS, HbAC,   HbAD, HbAE, HbA2) do not significantly  The patient continues to smoke a pack per day  I did caution the patient regarding the amount of cigarettes she is smoking a day  She recently had a CT scan lung cancer screening which is still pending  She does have a cough which started approximately 1 week ago  She has no other symptoms  Michael Ordoñez were sent to her pharmacy    Smoking cessation again was recommended interfere with this assay;   however, presence of multiple variants in a sample may impact the %   interference.     04/17/2017 9.3 (H) 4.5 - 6.2 % Final     Comment:     According to ADA guidelines, hemoglobin A1C <7.0% represents  optimal control in non-pregnant diabetic patients.  Different  metrics may apply to specific populations.   Standards of Medical Care in Diabetes - 2016.  For the purpose of screening for the presence of diabetes:  <5.7%     Consistent with the absence of diabetes  5.7-6.4%  Consistent with increasing risk for diabetes   (prediabetes)  >or=6.5%  Consistent with diabetes  Currently no consensus exists for use of hemoglobin A1C  for diagnosis of diabetes for children.     09/29/2016 8.1 (A) 0 - 5.7 % Final       GOAL A1C: < 7%     DM MEDICATIONS USED IN THE PAST: Metformin- was on combo with DDP4- off after pancreatitis episode.   DPP4- off hx of pancreatitis.       CURRENT DIABETIC MEDS: Lantus 48 units nightly and Novolog 15 units + correction scale goal 150, ISF 25.   Uses Vials or Pens: pens.   Type of Glucose Meter: Contour Next       STANDARDS of CARE:  Statin:  No- on Lovaza and fenofibrate.   ACEI or ARB:  Yes -Losartan   ASA:  Yes - 81 mg   Last eye exam few years ago- has appointment scheduled for 10/31/17- outside facility  Last Podiatry Exam: 2012  Microalbumin/Creatinine ratio:  Lab Results   Component Value Date    MICALBCREAT 6 08/15/2017          BG readings are reportedly checked 4x/day. He presents today with his meter and logs for review. He didn't check his Bg over the weekend as he reports his readings have been high. He only records 1 reading per day.    14 day average with 38 readings 190.             Notices readings have been higher lately r/t diet.     Hypoglycemia: No    Skipped/missed glycemic medications: rarely missing doses.     Prandial injections taken with meals: Yes    Physical Activity: treadmill- 1 mile - 3x/week.  Weightlifting at the gym 3x/week.  "    Skipping meals and/or snacking: eating 3 meals per day.   BF- wheat waffles or cereal  XIOMARA- sandwich or leftovers   DIN- home cooked- protein, wheat pasta, vegetables.   Snacking- rare- SF pudding or jello. Almonds.   Drinking mostly water throughout the day. Sometime SF beverages.       OCCUPATION: Works for [employer].     MEDICATIONS, ALLERGIES, LABS, VS's, MEDICAL, SURGICAL, SOCIAL, AND FAMILY HISTORY reviewed and updated in the appropriate sections during this encounter    ROS  Constitutional: Negative for weight change  Endocrine:  Denies polyuria, polydipsia, nocturia.  HENT: Denies neck swelling, lumps, hoarseness or trouble swallowing. Denies any personal or family history of thyroid cancer.    Eyes: Negative for visual disturbance. Wears glasses.   Respiratory: Negative for cough or shortness of breath.  Cardiovascular: Negative for chest pain or claudication.   Gastrointestinal: Negative for nausea, diarrhea, vomiting, bloating, abd pain.  + personal hx of pancreatitis- r/t hypertriglyceridemia   Genitourinary: Negative for urgency, frequency, frequent urinary tract infections.  Skin: Denies prolonged wound healing.  Neurological: Negative for syncope, + constant numbness/tingling of extremities.  Psychiatric/Behavioral: Negative for depression or anxiety      Vital Signs  /80   Pulse 78   Ht 5' 7" (1.702 m)   Wt 90.3 kg (199 lb)   BMI 31.17 kg/m²         Chemistry        Component Value Date/Time     08/15/2017 0751    K 4.1 08/15/2017 0751    CL 98 08/15/2017 0751    CO2 29 08/15/2017 0751    BUN 13 08/15/2017 0751    CREATININE 0.67 08/15/2017 0751     (H) 08/16/2017 1040        Component Value Date/Time    CALCIUM 9.7 08/15/2017 0751    ALKPHOS 79 08/15/2017 0751    AST 31 08/15/2017 0751    ALT 21 08/15/2017 0751    BILITOT 0.5 08/15/2017 0751    ESTGFRAFRICA 142 08/15/2017 0751    EGFRNONAA 123 08/15/2017 0751          Lab Results   Component Value Date    CHOL 150 " 10/10/2017    CHOL 133 09/28/2017    CHOL 176 08/15/2017     Lab Results   Component Value Date    HDL 27 (L) 10/10/2017    HDL 27 (L) 08/15/2017    HDL 10 (L) 07/27/2017     Lab Results   Component Value Date    LDLCALC 96.0 10/10/2017    LDLCALC 110 08/15/2017    LDLCALC 34.6 (L) 07/27/2017     Lab Results   Component Value Date    TRIG 135 10/10/2017    TRIG 196 (H) 08/15/2017    TRIG 216 (H) 07/29/2017     Lab Results   Component Value Date    CHOLHDL 18.0 (L) 10/10/2017    CHOLHDL 6.5 (H) 08/15/2017    CHOLHDL 9.5 (L) 07/27/2017       Lab Results   Component Value Date    TSH 2.48 11/30/2016       Lab Results   Component Value Date    MICALBCREAT 6 08/15/2017       No results found for: [id number]      PHYSICAL EXAMINATION  Constitutional: Well developed, well nourished, NAD.   Psych: AAOx3, appropriate mood and affect, pleasant expression, conversant, appears relaxed, well groomed.   Eyes: Conjunctiva and corneas clear.  Neck: Supple, trachea midline, FROM, no thyromegaly or nodules, carotid pulses strong, no bruits.  Respiratory: Resp even and unlabored, CTA bilateral.  Cardiac: RRR, S1, S2 heard, no murmurs; radial pulses +2 bilaterally.   Abdomen: Soft, non-tender, non-distended; +BSs x  4.  Vascular: Same temperature peripherally to centrally, DP pulses +2 bilaterally, no edema.   Neuro: Gait steady.   Skin: Normal skin turgor. Skin warm and dry. No acanthosis nigracans observed. Injection sites with no complications. Rotating sites.  Feet: No pressure areas, blisters, ulcers. +calluses- bilaterally. Footwear appropriate, nails in good condition.     Diabetes Foot Exam:   Decreased  vibratory response to 128 Hz tuning fork bilaterally.   Protective Sensation (w/ 10 gram monofilament):  Right: Decreased  Left: Decreased    Visual Inspection:  Nails Intact - without Evidence of Foot Deformity- Bilateral and Callus -  Bilateral    Pedal Pulses:   Right: Present  Left: Present    Posterior tibialis:    Right:Present  Left: Present          Assessment/Plan    1. Type 2 diabetes mellitus with hyperglycemia, with long-term current use of insulin   Uncontrolled. A1c starting to trend down and improve now with insulin compliance.   Discussed diagnosis of DM, progression of disease, long term complications and tx options including SGLT2- information provided. He would prefer to stay on current regimen for now.   +variability in readings likely dietary related. He would like to focus on diet.   Reviewed A1c and BG goals.     May consider Metformin or SGLT2 at RTC.     Avoiding DPP4 and GLP1- due to hx of pancreatitis.     Continue  Lantus 48 units nightly and Novolog 15 units + correction scale goal 150, ISF 25.     BG monitoring 4x/day. Bring logs to clinic visits. Send logs as needed for review.     Discussed DM diet and low CHO snacks. He will focus on diet.     F/u with DM education today as scheduled.     NAHUN to confirm T2DM diagnosis.     Notify clinic of any hypoglycemia episodes with BG readings < 70, if BG readings consistently run <80 or > 240, or for any BG concerns.        2. Type 2 diabetes mellitus with diabetic polyneuropathy, with long-term current use of insulin   Optimize BG readings.   Reviewed DM foot care.   Continue Lyrica.   Recommend Alpha lipoic acid OTC.   Deferred podiatry referral.        3. Essential hypertension - BP goal < 140/90. BP at goal, continue medications.        4. Mixed hyperlipidemia- lipids with RTC. Not on statin at this time. Will readdress at RTC.   On fenofibrate and Lovaza- hx of hypertriglyceridemia leading to pancreatitis.         5. Obesity (BMI 30-39.9) - Body mass index is 31.17 kg/m².   Increases insulin resistance.   Encouraged continued exercise.          6. Hx of acute pancreatitis - Avoiding DPP4 and GLP1               FOLLOW UP  Return in about 3 months (around 1/16/2018).  Labs at [facility].     Orders Placed This Encounter   Procedures    Hemoglobin A1c      Standing Status:   Future     Number of Occurrences:   1     Standing Expiration Date:   12/15/2018    Glutamic acid decarboxylase     Standing Status:   Future     Number of Occurrences:   1     Standing Expiration Date:   12/15/2018    Comprehensive metabolic panel     Standing Status:   Future     Number of Occurrences:   1     Standing Expiration Date:   12/15/2018    Lipid panel     Standing Status:   Future     Number of Occurrences:   1     Standing Expiration Date:   12/15/2018    TSH     Standing Status:   Future     Number of Occurrences:   1     Standing Expiration Date:   12/15/2018

## 2021-09-02 ENCOUNTER — PATIENT MESSAGE (OUTPATIENT)
Dept: FAMILY MEDICINE | Facility: CLINIC | Age: 41
End: 2021-09-02

## 2021-09-04 ENCOUNTER — PATIENT MESSAGE (OUTPATIENT)
Dept: ENDOCRINOLOGY | Facility: CLINIC | Age: 41
End: 2021-09-04

## 2021-09-06 ENCOUNTER — PATIENT MESSAGE (OUTPATIENT)
Dept: ENDOCRINOLOGY | Facility: CLINIC | Age: 41
End: 2021-09-06

## 2021-09-06 DIAGNOSIS — E10.65 TYPE 1 DIABETES MELLITUS WITH HYPERGLYCEMIA: ICD-10-CM

## 2021-09-07 ENCOUNTER — PATIENT MESSAGE (OUTPATIENT)
Dept: ENDOCRINOLOGY | Facility: CLINIC | Age: 41
End: 2021-09-07

## 2021-09-07 RX ORDER — INSULIN GLARGINE 100 [IU]/ML
38 INJECTION, SOLUTION SUBCUTANEOUS 2 TIMES DAILY
Qty: 18 ML | Refills: 6 | Status: SHIPPED | OUTPATIENT
Start: 2021-09-07 | End: 2022-10-14 | Stop reason: SDUPTHER

## 2021-09-22 ENCOUNTER — PATIENT OUTREACH (OUTPATIENT)
Dept: ADMINISTRATIVE | Facility: OTHER | Age: 41
End: 2021-09-22

## 2021-10-04 ENCOUNTER — PATIENT MESSAGE (OUTPATIENT)
Dept: ADMINISTRATIVE | Facility: HOSPITAL | Age: 41
End: 2021-10-04

## 2021-10-12 DIAGNOSIS — I10 ESSENTIAL HYPERTENSION: ICD-10-CM

## 2021-10-12 RX ORDER — ATORVASTATIN CALCIUM 40 MG/1
40 TABLET, FILM COATED ORAL DAILY
Qty: 30 TABLET | Refills: 1 | Status: SHIPPED | OUTPATIENT
Start: 2021-10-12 | End: 2021-12-20 | Stop reason: SDUPTHER

## 2021-10-12 RX ORDER — LOSARTAN POTASSIUM 100 MG/1
50 TABLET ORAL DAILY
Qty: 45 TABLET | Refills: 1 | Status: SHIPPED | OUTPATIENT
Start: 2021-10-12 | End: 2021-12-20 | Stop reason: SDUPTHER

## 2021-11-02 ENCOUNTER — PATIENT MESSAGE (OUTPATIENT)
Dept: ENDOCRINOLOGY | Facility: CLINIC | Age: 41
End: 2021-11-02
Payer: COMMERCIAL

## 2021-11-02 DIAGNOSIS — E10.40 TYPE 1 DIABETES MELLITUS WITH DIABETIC NEUROPATHY: ICD-10-CM

## 2021-11-04 ENCOUNTER — PATIENT MESSAGE (OUTPATIENT)
Dept: ENDOCRINOLOGY | Facility: CLINIC | Age: 41
End: 2021-11-04
Payer: COMMERCIAL

## 2021-11-04 RX ORDER — METFORMIN HYDROCHLORIDE 500 MG/1
TABLET ORAL
Qty: 270 TABLET | Refills: 3 | Status: SHIPPED | OUTPATIENT
Start: 2021-11-04 | End: 2021-12-20 | Stop reason: SDUPTHER

## 2021-11-10 ENCOUNTER — PATIENT MESSAGE (OUTPATIENT)
Dept: FAMILY MEDICINE | Facility: CLINIC | Age: 41
End: 2021-11-10
Payer: COMMERCIAL

## 2021-11-19 NOTE — PROGRESS NOTES
"Subjective:      Patient ID: Bay Ibarra is a 38 y.o. male.    Chief Complaint: Follow-up (3 month follow up); Results (lab results); and Medication Refill (fish oil,)    HPI: 38 y.o. White male, just began again to eat correctly 3-4 weeks ago.  Does not forget to take his meds: Lantus 52 units at night; novolog 20 units + ac TID    2 weeks ago, while walking stepped on something that penetrated his shoe, sticking his big toe  On the right foot. This has a significant callus, but he also has neuropathy. He did not feel it until  That evening. Since then has taken very good care of it. No drainage.  No fever,chills,fatigue.  No SOB,wheezing.  No CP,palpitations.  No edema,claudication.  No N,V,D,C.  No dysuria.    He has gone to the diabetic educator, and spoken with her re : insulin pump, Kathleen, Dittmer anish testing.  Has not made a decision yet.         Review of Systems   Constitutional: Negative.    HENT: Negative.    Eyes: Positive for visual disturbance.   Respiratory: Negative.    Cardiovascular: Negative.    Gastrointestinal: Negative.  Negative for abdominal distention, abdominal pain, anal bleeding, blood in stool, constipation, diarrhea, nausea, rectal pain and vomiting.   Endocrine: Negative.    Genitourinary: Negative.    Musculoskeletal: Negative.    Skin: Positive for wound.   Allergic/Immunologic: Negative.    Neurological: Positive for numbness. Negative for tremors, seizures, syncope, speech difficulty, weakness and headaches.   Psychiatric/Behavioral: Negative.        Objective:   /80 (BP Location: Left arm, Patient Position: Sitting, BP Method: Medium (Manual))   Pulse 84   Temp 97.8 °F (36.6 °C) (Oral)   Resp 18   Ht 5' 9" (1.753 m)   Wt 96.8 kg (213 lb 6.4 oz)   SpO2 97%   BMI 31.51 kg/m²     Physical Exam   Constitutional: He is oriented to person, place, and time. He appears well-developed and well-nourished.   HENT:   Head: Normocephalic and atraumatic.   Right Ear: " PSYCHOTHERAPY SESSION NOTE     Patient Name  Tati Bess   Date of Birth 1989   [facility] [id number]   Medical Record Number  [id number]      Age  28 y.o. PCP None   Admit date:  11/9/2021    Room Number  076/72  @ [facility]   Date of Service  11/19/2021            Length of psychotherapy session: 45 minutes    Main condition/diagnosis/issues treated during session today, 11/19/2021 : bipolar eliseo    I employed Cognitive Behavioral therapy techniques, Reality-Oriented psychotherapy, as well as supportive psychotherapy in regards to various ongoing psychosocial stressors, including the following: pre-admission and current problems; housing issues; occupational issues; academic issues; and stress of hospitalization. Interpersonal relationship issues and psychodynamic conflicts explored. Attempts made to alleviate maladaptive patterns. Patient's mother and brother attended session remotely due to COVID precautions. We, also, worked on issues of denial & effects of substance dependency/use. Session focused on the patient's life in [place], his options once leaving and the reasons for his relocations to [place]. Overall, patient is progressing. Treatment Plan Update (reviewed and updated 11/19/2021) : I will modify psychotherapy tx plan by implementing more stress management strategies, building upon cognitive behavioral techniques, increasing coping skills, as well as shoring up psychological defenses). An extended energy and skill set was needed to engage pt in psychotherapy due to some of the following: resistiveness, complexity, negativity, confrontational nature, hostile behaviors, and/or severe abnormalities in thought processes/psychosis resulting in the loss of expressive/receptive language communication skills. External ear normal.   Left Ear: External ear normal.   Nose: Nose normal.   Mouth/Throat: Oropharynx is clear and moist.   Eyes: Conjunctivae are normal.   Neck: Normal range of motion.   Cardiovascular: Normal rate, regular rhythm and normal heart sounds.   Pulses:       Dorsalis pedis pulses are 1+ on the right side, and 1+ on the left side.        Posterior tibial pulses are 1+ on the right side, and 1+ on the left side.   Pulmonary/Chest: Effort normal and breath sounds normal.   Abdominal: Soft. Bowel sounds are normal.   Musculoskeletal: Normal range of motion. He exhibits no edema.        Right foot: There is normal range of motion and no deformity.        Left foot: There is normal range of motion and no deformity.   Feet:   Right Foot:   Protective Sensation: 10 sites tested. 2 sites sensed.   Skin Integrity: Positive for blister, skin breakdown and callus.   Left Foot:   Protective Sensation: 10 sites tested. 4 sites sensed.   Skin Integrity: Negative for blister, skin breakdown or dry skin.   Neurological: He is alert and oriented to person, place, and time.   Skin: Skin is warm and dry.   Psychiatric: He has a normal mood and affect. His behavior is normal.   Nursing note and vitals reviewed.      Assessment:     1. Hx of acute pancreatitis    2. Mixed hyperlipidemia    3. Essential hypertension    4. Hypertriglyceridemia    5. Type 2 diabetes mellitus with diabetic polyneuropathy, with long-term current use of insulin    6. Type 2 diabetes mellitus with hyperglycemia, with long-term current use of insulin    Diabetes is NOT under control.  Suggest he get the Cindy system and the monitor ASAP.  This needs to be addressed and he understands the severity of this.  Plan:     Hx of acute pancreatitis  -     Lipase; Future; Expected date: 10/26/2018    Mixed hyperlipidemia  -     Lipid panel; Future; Expected date: 10/26/2018    Essential hypertension  -     Comprehensive metabolic panel; Future; Expected  date: 10/26/2018  -     CBC auto differential; Future; Expected date: 10/26/2018  -     Urinalysis; Future; Expected date: 10/26/2018    Hypertriglyceridemia  -     Lipid panel; Future; Expected date: 10/26/2018    Type 2 diabetes mellitus with diabetic polyneuropathy, with long-term current use of insulin    Type 2 diabetes mellitus with hyperglycemia, with long-term current use of insulin  -     Microalbumin/creatinine urine ratio; Future; Expected date: 10/26/2018  -     Hemoglobin A1c; Future; Expected date: 10/26/2018    He will call Diabetic teaching to get the Kathleen and monitor.

## 2021-11-30 ENCOUNTER — PATIENT MESSAGE (OUTPATIENT)
Dept: FAMILY MEDICINE | Facility: CLINIC | Age: 41
End: 2021-11-30
Payer: COMMERCIAL

## 2021-11-30 DIAGNOSIS — E10.40 TYPE 1 DIABETES MELLITUS WITH DIABETIC NEUROPATHY: ICD-10-CM

## 2021-11-30 RX ORDER — PREGABALIN 150 MG/1
150 CAPSULE ORAL 2 TIMES DAILY
Qty: 60 CAPSULE | Refills: 5 | Status: SHIPPED | OUTPATIENT
Start: 2021-11-30 | End: 2021-12-20 | Stop reason: SDUPTHER

## 2021-12-10 NOTE — PROGRESS NOTES
INTERNAL MEDICINE    Patient Active Problem List   Diagnosis    Essential hypertension    Obesity (BMI 30-39.9)    Gout    Obstructive sleep apnea syndrome    Mixed hyperlipidemia    Type 2 diabetes mellitus with diabetic polyneuropathy, with long-term current use of insulin    Acute on chronic pancreatitis    Portal vein thrombosis    Pancreatic pseudocyst    Diabetic ketoacidosis without coma associated with type 2 diabetes mellitus    Acute pancreatitis    Hypokalemia       CC: No chief complaint on file.      SUBJECTIVE:  Bay Ibarra   is a 39 y.o. male  HPI ***    ROS: Review of Systems    Past Medical History:   Diagnosis Date    Acute pancreatitis     Essential hypertension     Gout     Hx of acute pancreatitis 3/3/2017    Hypertriglyceridemia 8/2/2017    Obesity (BMI 30-39.9) 6/5/2015    Obesity (BMI 35.0-39.9 without comorbidity) 6/5/2015    Obstructive sleep apnea syndrome 12/18/2015    Type 2 diabetes mellitus with diabetic polyneuropathy, with long-term current use of insulin 10/16/2017    Type 2 diabetes mellitus with hyperglycemia, with long-term current use of insulin 8/3/2017       Past Surgical History:   Procedure Laterality Date    ERCP (ENDOSCOPIC RETROGRADE CHOLANGIOPANCREATOGRAPHY) N/A 5/27/2019    Performed by Zafar Velazquez MD at [facility] ENDO (2ND FLR)    ULTRASOUND, UPPER GI TRACT, ENDOSCOPIC N/A 5/27/2019    Performed by Zafar Velazquez MD at [facility] ENDO (2ND FLR)       Family History   Problem Relation Age of Onset    Coronary artery disease Father         4 vessel bypass at 40, possible stent at 36    Diabetes type II Father     No Known Problems Sister     No Known Problems Brother     Aneurysm Maternal Grandmother         Possible AAA    Diabetes type II Paternal Grandmother        Social History     Tobacco Use    Smoking status: Never Smoker    Smokeless tobacco: Never Used   Substance Use Topics    Alcohol use: No    Drug use: No       Social  "History     Social History Narrative    Not on file       ALLERGIES: Review of patient's allergies indicates:  No Known Allergies    MEDS:   Current Outpatient Medications:     allopurinol (ZYLOPRIM) 300 MG tablet, TAKE 1 TABLET BY MOUTH ONCE DAILY, Disp: 30 tablet, Rfl: 5    aspirin (ECOTRIN) 81 MG EC tablet, Take 81 mg by mouth once daily., Disp: , Rfl:     atorvastatin (LIPITOR) 40 MG tablet, Take 1 tablet (40 mg total) by mouth once daily., Disp: 90 tablet, Rfl: 0    diclofenac sodium (VOLTAREN) 1 % Gel, Apply 2 g topically 4 (four) times daily. Apply to chest in the location of pain up to 4 times a day as needed for pain., Disp: 100 g, Rfl: 0    fenofibrate 160 MG Tab, TAKE 1 TABLET BY MOUTH ONCE DAILY, Disp: 90 tablet, Rfl: 0    icosapent ethyl (VASCEPA) 1 gram Cap, Take 2 g by mouth 2 (two) times daily., Disp: 360 capsule, Rfl: 3    insulin aspart U-100 (NOVOLOG FLEXPEN U-100 INSULIN) 100 unit/mL (3 mL) InPn pen, INJECT 20 UNITS SUBCUTANEOUSLY WITH MEALS PLUS SLIDING SCALE 150-200+2, 201-250 +4, 251-300 +6, 301-350 +8,>350 +10. MAX OF 90 UNITS PER DA, Disp: 30 Syringe, Rfl: 4    insulin glargine (LANTUS SOLOSTAR) 100 unit/mL (3 mL) InPn pen, Inject 52 Units into the skin every evening., Disp: 18 mL, Rfl: 11    insulin syringe-needle U-100 29 gauge x 1/2" Syrg, Inject 1 (once) per day, Disp: 100 Syringe, Rfl: 3    losartan (COZAAR) 100 MG tablet, TAKE 1/2 (ONE-HALF) TABLET BY MOUTH ONCE DAILY, Disp: 45 tablet, Rfl: 3    niacin 100 MG Tab, Take 200 mg by mouth every evening. , Disp: , Rfl:     pregabalin (LYRICA) 150 MG capsule, TAKE 1 CAPSULE BY MOUTH TWICE DAILY, Disp: 60 capsule, Rfl: 0    OBJECTIVE:   There were no vitals filed for this visit.  There is no height or weight on file to calculate BMI.    Physical Exam      PERTINENT RESULTS:   CBC:  Recent Labs   Lab Result Units 08/30/19  0754 08/31/19  0340 09/01/19  0355   WBC K/uL 7.48 5.31 3.41*   RBC M/uL 5.05 4.86 4.71   Hemoglobin g/dL " 13.6* 12.7* 12.2*   Hematocrit % 43.5 41.5 38.2*   Platelets K/uL 215 198 165   Mean Corpuscular Volume fL 86 85 81*   Mean Corpuscular Hemoglobin pg 26.9* 26.1* 25.9*   Mean Corpuscular Hemoglobin Conc g/dL 31.3* 30.6* 31.9*     CMP:  Recent Labs   Lab Result Units 08/27/19  1001 08/29/19  0621 08/30/19  0753  09/01/19  0355   Glucose mg/dL 337* 332* 241*   < > 124*   Calcium mg/dL 10.0 9.7 9.2   < > 8.8   Albumin g/dL 4.5 4.3 3.2*  --   --    Total Protein g/dL 7.9 7.6 7.2  --   --    Sodium mmol/L 138 137 142   < > 138   Potassium mmol/L 4.3 3.9 4.2   < > 3.3*   CO2 mmol/L 26 22* 19*   < > 26   Chloride mmol/L 98 98 104   < > 101   BUN, Bld mg/dL 17 14 10   < > 5*   Alkaline Phosphatase U/L 134* 127* 93  --   --    ALT U/L 29 32 25  --   --    AST U/L 28 32 22  --   --    Total Bilirubin mg/dL 1.3* 1.3* 0.7  --   --     < > = values in this interval not displayed.     URINALYSIS:  Recent Labs   Lab Result Units 08/29/19  0621   Color, UA  Straw   Specific Gravity, UA  >=1.030*   pH, UA  5.0   Protein, UA  Negative   Bacteria /hpf Rare   Nitrite, UA  Negative   Leukocytes, UA  Negative   Urobilinogen, UA EU/dL Negative      LIPIDS:  Recent Labs   Lab Result Units 08/30/19  0753   HDL mg/dL 25*   Cholesterol mg/dL 108*   Triglycerides mg/dL 102   LDL Cholesterol mg/dL 62.6*   Hdl/Cholesterol Ratio % 23.1   Non-HDL Cholesterol mg/dL 83   Total Cholesterol/HDL Ratio  4.3             ASSESSMENT:  Problem List Items Addressed This Visit     None          PLAN:      No orders of the defined types were placed in this encounter.      Follow-up with *** in ***.   Dr. José Luis Singleton  Internal Medicine     C/w home meds

## 2021-12-20 ENCOUNTER — PATIENT MESSAGE (OUTPATIENT)
Dept: FAMILY MEDICINE | Facility: CLINIC | Age: 41
End: 2021-12-20
Payer: COMMERCIAL

## 2021-12-20 DIAGNOSIS — E78.1 HYPERTRIGLYCERIDEMIA: ICD-10-CM

## 2021-12-20 DIAGNOSIS — K29.00 ACUTE SUPERFICIAL GASTRITIS WITHOUT HEMORRHAGE: ICD-10-CM

## 2021-12-20 DIAGNOSIS — I10 ESSENTIAL HYPERTENSION: ICD-10-CM

## 2021-12-20 DIAGNOSIS — E78.2 MIXED HYPERLIPIDEMIA: ICD-10-CM

## 2021-12-20 DIAGNOSIS — E10.40 TYPE 1 DIABETES MELLITUS WITH DIABETIC NEUROPATHY: ICD-10-CM

## 2021-12-20 DIAGNOSIS — R10.84 GENERALIZED ABDOMINAL PAIN: ICD-10-CM

## 2021-12-20 RX ORDER — ATORVASTATIN CALCIUM 40 MG/1
40 TABLET, FILM COATED ORAL DAILY
Qty: 90 TABLET | Refills: 0 | Status: SHIPPED | OUTPATIENT
Start: 2021-12-20 | End: 2022-03-31 | Stop reason: SDUPTHER

## 2021-12-20 RX ORDER — FENOFIBRATE 160 MG/1
160 TABLET ORAL DAILY
Qty: 90 TABLET | Refills: 0 | Status: SHIPPED | OUTPATIENT
Start: 2021-12-20 | End: 2021-12-28 | Stop reason: SDUPTHER

## 2021-12-20 RX ORDER — ALLOPURINOL 300 MG/1
300 TABLET ORAL DAILY
Qty: 90 TABLET | Refills: 0 | Status: SHIPPED | OUTPATIENT
Start: 2021-12-20 | End: 2021-12-28 | Stop reason: SDUPTHER

## 2021-12-20 RX ORDER — LOSARTAN POTASSIUM 100 MG/1
50 TABLET ORAL DAILY
Qty: 45 TABLET | Refills: 1 | Status: SHIPPED | OUTPATIENT
Start: 2021-12-20 | End: 2022-07-11 | Stop reason: SDUPTHER

## 2021-12-20 RX ORDER — PREGABALIN 150 MG/1
150 CAPSULE ORAL 2 TIMES DAILY
Qty: 60 CAPSULE | Refills: 5 | Status: SHIPPED | OUTPATIENT
Start: 2021-12-20 | End: 2022-07-01 | Stop reason: SDUPTHER

## 2021-12-20 RX ORDER — ICOSAPENT ETHYL 1000 MG/1
2 CAPSULE ORAL 2 TIMES DAILY
Qty: 360 CAPSULE | Refills: 3 | Status: SHIPPED | OUTPATIENT
Start: 2021-12-20 | End: 2021-12-28 | Stop reason: SDUPTHER

## 2021-12-20 RX ORDER — PANTOPRAZOLE SODIUM 40 MG/1
40 TABLET, DELAYED RELEASE ORAL DAILY
Qty: 90 TABLET | Refills: 1 | Status: SHIPPED | OUTPATIENT
Start: 2021-12-20 | End: 2022-02-21 | Stop reason: SDUPTHER

## 2021-12-20 RX ORDER — ONDANSETRON 4 MG/1
4 TABLET, FILM COATED ORAL EVERY 6 HOURS PRN
Qty: 30 TABLET | Refills: 1 | Status: SHIPPED | OUTPATIENT
Start: 2021-12-20 | End: 2023-07-25 | Stop reason: SDUPTHER

## 2021-12-20 RX ORDER — METFORMIN HYDROCHLORIDE 500 MG/1
TABLET ORAL
Qty: 270 TABLET | Refills: 3 | Status: SHIPPED | OUTPATIENT
Start: 2021-12-20 | End: 2022-11-08 | Stop reason: SDUPTHER

## 2021-12-27 ENCOUNTER — PATIENT MESSAGE (OUTPATIENT)
Dept: ADMINISTRATIVE | Facility: HOSPITAL | Age: 41
End: 2021-12-27
Payer: COMMERCIAL

## 2021-12-28 ENCOUNTER — PATIENT MESSAGE (OUTPATIENT)
Dept: FAMILY MEDICINE | Facility: CLINIC | Age: 41
End: 2021-12-28
Payer: COMMERCIAL

## 2021-12-28 DIAGNOSIS — E78.2 MIXED HYPERLIPIDEMIA: ICD-10-CM

## 2021-12-28 DIAGNOSIS — E78.1 HYPERTRIGLYCERIDEMIA: ICD-10-CM

## 2021-12-29 RX ORDER — FENOFIBRATE 160 MG/1
160 TABLET ORAL DAILY
Qty: 90 TABLET | Refills: 0 | Status: SHIPPED | OUTPATIENT
Start: 2021-12-29 | End: 2022-03-31 | Stop reason: SDUPTHER

## 2021-12-29 RX ORDER — ALLOPURINOL 300 MG/1
300 TABLET ORAL DAILY
Qty: 90 TABLET | Refills: 0 | Status: SHIPPED | OUTPATIENT
Start: 2021-12-29 | End: 2022-07-11 | Stop reason: SDUPTHER

## 2021-12-29 RX ORDER — ICOSAPENT ETHYL 1000 MG/1
2 CAPSULE ORAL 2 TIMES DAILY
Qty: 360 CAPSULE | Refills: 0 | Status: SHIPPED | OUTPATIENT
Start: 2021-12-29 | End: 2022-07-01 | Stop reason: SDUPTHER

## 2022-01-10 ENCOUNTER — PATIENT MESSAGE (OUTPATIENT)
Dept: ADMINISTRATIVE | Facility: HOSPITAL | Age: 42
End: 2022-01-10
Payer: COMMERCIAL

## 2022-01-19 ENCOUNTER — OFFICE VISIT (OUTPATIENT)
Dept: FAMILY MEDICINE | Facility: CLINIC | Age: 42
End: 2022-01-19
Payer: COMMERCIAL

## 2022-01-19 VITALS
OXYGEN SATURATION: 98 % | BODY MASS INDEX: 34.02 KG/M2 | HEIGHT: 68 IN | SYSTOLIC BLOOD PRESSURE: 128 MMHG | HEART RATE: 102 BPM | DIASTOLIC BLOOD PRESSURE: 68 MMHG | WEIGHT: 224.5 LBS

## 2022-01-19 DIAGNOSIS — G47.33 OBSTRUCTIVE SLEEP APNEA SYNDROME: Chronic | ICD-10-CM

## 2022-01-19 DIAGNOSIS — E78.2 MIXED HYPERLIPIDEMIA: Chronic | ICD-10-CM

## 2022-01-19 DIAGNOSIS — Z79.4 TYPE 2 DIABETES MELLITUS WITH HYPERGLYCEMIA, WITH LONG-TERM CURRENT USE OF INSULIN: ICD-10-CM

## 2022-01-19 DIAGNOSIS — I10 ESSENTIAL HYPERTENSION: Chronic | ICD-10-CM

## 2022-01-19 DIAGNOSIS — Z11.59 NEED FOR HEPATITIS C SCREENING TEST: ICD-10-CM

## 2022-01-19 DIAGNOSIS — E11.65 TYPE 2 DIABETES MELLITUS WITH HYPERGLYCEMIA, WITH LONG-TERM CURRENT USE OF INSULIN: ICD-10-CM

## 2022-01-19 DIAGNOSIS — Z79.4 TYPE 2 DIABETES MELLITUS WITH DIABETIC POLYNEUROPATHY, WITH LONG-TERM CURRENT USE OF INSULIN: Chronic | ICD-10-CM

## 2022-01-19 DIAGNOSIS — E11.42 TYPE 2 DIABETES MELLITUS WITH DIABETIC POLYNEUROPATHY, WITH LONG-TERM CURRENT USE OF INSULIN: Chronic | ICD-10-CM

## 2022-01-19 DIAGNOSIS — Z11.4 ENCOUNTER FOR SCREENING FOR HIV: Primary | ICD-10-CM

## 2022-01-19 PROCEDURE — 99214 OFFICE O/P EST MOD 30 MIN: CPT | Mod: S$GLB,,, | Performed by: FAMILY MEDICINE

## 2022-01-19 PROCEDURE — 1160F RVW MEDS BY RX/DR IN RCRD: CPT | Mod: CPTII,S$GLB,, | Performed by: FAMILY MEDICINE

## 2022-01-19 PROCEDURE — 1160F PR REVIEW ALL MEDS BY PRESCRIBER/CLIN PHARMACIST DOCUMENTED: ICD-10-PCS | Mod: CPTII,S$GLB,, | Performed by: FAMILY MEDICINE

## 2022-01-19 PROCEDURE — 3074F PR MOST RECENT SYSTOLIC BLOOD PRESSURE < 130 MM HG: ICD-10-PCS | Mod: CPTII,S$GLB,, | Performed by: FAMILY MEDICINE

## 2022-01-19 PROCEDURE — 3061F PR NEG MICROALBUMINURIA RESULT DOCUMENTED/REVIEW: ICD-10-PCS | Mod: CPTII,S$GLB,, | Performed by: FAMILY MEDICINE

## 2022-01-19 PROCEDURE — 3046F HEMOGLOBIN A1C LEVEL >9.0%: CPT | Mod: CPTII,S$GLB,, | Performed by: FAMILY MEDICINE

## 2022-01-19 PROCEDURE — 3046F PR MOST RECENT HEMOGLOBIN A1C LEVEL > 9.0%: ICD-10-PCS | Mod: CPTII,S$GLB,, | Performed by: FAMILY MEDICINE

## 2022-01-19 PROCEDURE — 99999 PR PBB SHADOW E&M-EST. PATIENT-LVL V: CPT | Mod: PBBFAC,,, | Performed by: FAMILY MEDICINE

## 2022-01-19 PROCEDURE — 3066F NEPHROPATHY DOC TX: CPT | Mod: CPTII,S$GLB,, | Performed by: FAMILY MEDICINE

## 2022-01-19 PROCEDURE — 3008F BODY MASS INDEX DOCD: CPT | Mod: CPTII,S$GLB,, | Performed by: FAMILY MEDICINE

## 2022-01-19 PROCEDURE — 99999 PR PBB SHADOW E&M-EST. PATIENT-LVL V: ICD-10-PCS | Mod: PBBFAC,,, | Performed by: FAMILY MEDICINE

## 2022-01-19 PROCEDURE — 3074F SYST BP LT 130 MM HG: CPT | Mod: CPTII,S$GLB,, | Performed by: FAMILY MEDICINE

## 2022-01-19 PROCEDURE — 1159F PR MEDICATION LIST DOCUMENTED IN MEDICAL RECORD: ICD-10-PCS | Mod: CPTII,S$GLB,, | Performed by: FAMILY MEDICINE

## 2022-01-19 PROCEDURE — 3078F DIAST BP <80 MM HG: CPT | Mod: CPTII,S$GLB,, | Performed by: FAMILY MEDICINE

## 2022-01-19 PROCEDURE — 3061F NEG MICROALBUMINURIA REV: CPT | Mod: CPTII,S$GLB,, | Performed by: FAMILY MEDICINE

## 2022-01-19 PROCEDURE — 1159F MED LIST DOCD IN RCRD: CPT | Mod: CPTII,S$GLB,, | Performed by: FAMILY MEDICINE

## 2022-01-19 PROCEDURE — 3078F PR MOST RECENT DIASTOLIC BLOOD PRESSURE < 80 MM HG: ICD-10-PCS | Mod: CPTII,S$GLB,, | Performed by: FAMILY MEDICINE

## 2022-01-19 PROCEDURE — 99214 PR OFFICE/OUTPT VISIT, EST, LEVL IV, 30-39 MIN: ICD-10-PCS | Mod: S$GLB,,, | Performed by: FAMILY MEDICINE

## 2022-01-19 PROCEDURE — 3066F PR DOCUMENTATION OF TREATMENT FOR NEPHROPATHY: ICD-10-PCS | Mod: CPTII,S$GLB,, | Performed by: FAMILY MEDICINE

## 2022-01-19 PROCEDURE — 3008F PR BODY MASS INDEX (BMI) DOCUMENTED: ICD-10-PCS | Mod: CPTII,S$GLB,, | Performed by: FAMILY MEDICINE

## 2022-01-19 NOTE — PATIENT INSTRUCTIONS
Increase metformin to 1000mg twice a day  Increase lunchtime Novolog to 30U + sliding scale   Make follow up appt with Endocrinology  Get labs done today   See Sleep specialist

## 2022-01-21 ENCOUNTER — PATIENT MESSAGE (OUTPATIENT)
Dept: FAMILY MEDICINE | Facility: CLINIC | Age: 42
End: 2022-01-21
Payer: COMMERCIAL

## 2022-01-21 ENCOUNTER — PATIENT MESSAGE (OUTPATIENT)
Dept: ENDOCRINOLOGY | Facility: CLINIC | Age: 42
End: 2022-01-21
Payer: COMMERCIAL
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
## 2022-02-21 ENCOUNTER — PATIENT MESSAGE (OUTPATIENT)
Dept: FAMILY MEDICINE | Facility: CLINIC | Age: 42
End: 2022-02-21
Payer: COMMERCIAL

## 2022-02-21 DIAGNOSIS — K29.00 ACUTE SUPERFICIAL GASTRITIS WITHOUT HEMORRHAGE: ICD-10-CM

## 2022-02-21 NOTE — TELEPHONE ENCOUNTER
Care Due:                  Date            Visit Type   Department     Provider  --------------------------------------------------------------------------------                                MYCHART                              FOLLOWUP/OF  [facility]  Last Visit: 01-      [facility]  Next Visit: None Scheduled  None         None Found                                                            Last  Test          Frequency    Reason                     Performed    Due Date  --------------------------------------------------------------------------------    Uric Acid...  12 months..  allopurinoL..............  Not Found    Overdue    Powered by [facility] [employer]. Reference number: [id number].   2/21/2022 9:20:58 AM CST

## 2022-02-22 RX ORDER — PANTOPRAZOLE SODIUM 40 MG/1
40 TABLET, DELAYED RELEASE ORAL DAILY
Qty: 90 TABLET | Refills: 1 | Status: SHIPPED | OUTPATIENT
Start: 2022-02-22 | End: 2022-11-08 | Stop reason: SDUPTHER

## 2022-03-16 ENCOUNTER — PATIENT MESSAGE (OUTPATIENT)
Dept: SLEEP MEDICINE | Facility: CLINIC | Age: 42
End: 2022-03-16
Payer: COMMERCIAL

## 2022-03-22 ENCOUNTER — TELEPHONE (OUTPATIENT)
Dept: SLEEP MEDICINE | Facility: CLINIC | Age: 42
End: 2022-03-22
Payer: COMMERCIAL

## 2022-03-22 NOTE — TELEPHONE ENCOUNTER
Staff reached out to pt and reminded pt of new location, sleep questions listed in my chart, and appt. Pt did not answer, voicemail was left.

## 2022-03-23 ENCOUNTER — OFFICE VISIT (OUTPATIENT)
Dept: SLEEP MEDICINE | Facility: CLINIC | Age: 42
End: 2022-03-23
Payer: COMMERCIAL

## 2022-03-23 VITALS
DIASTOLIC BLOOD PRESSURE: 82 MMHG | HEART RATE: 101 BPM | WEIGHT: 218.38 LBS | HEIGHT: 68 IN | SYSTOLIC BLOOD PRESSURE: 132 MMHG | BODY MASS INDEX: 33.1 KG/M2

## 2022-03-23 DIAGNOSIS — G47.30 SLEEP APNEA, UNSPECIFIED TYPE: Primary | ICD-10-CM

## 2022-03-23 DIAGNOSIS — G47.33 OBSTRUCTIVE SLEEP APNEA SYNDROME: Chronic | ICD-10-CM

## 2022-03-23 PROCEDURE — 1160F RVW MEDS BY RX/DR IN RCRD: CPT | Mod: CPTII,S$GLB,, | Performed by: PSYCHIATRY & NEUROLOGY

## 2022-03-23 PROCEDURE — 3079F DIAST BP 80-89 MM HG: CPT | Mod: CPTII,S$GLB,, | Performed by: PSYCHIATRY & NEUROLOGY

## 2022-03-23 PROCEDURE — 3066F NEPHROPATHY DOC TX: CPT | Mod: CPTII,S$GLB,, | Performed by: PSYCHIATRY & NEUROLOGY

## 2022-03-23 PROCEDURE — 3066F PR DOCUMENTATION OF TREATMENT FOR NEPHROPATHY: ICD-10-PCS | Mod: CPTII,S$GLB,, | Performed by: PSYCHIATRY & NEUROLOGY

## 2022-03-23 PROCEDURE — 3046F HEMOGLOBIN A1C LEVEL >9.0%: CPT | Mod: CPTII,S$GLB,, | Performed by: PSYCHIATRY & NEUROLOGY

## 2022-03-23 PROCEDURE — 3046F PR MOST RECENT HEMOGLOBIN A1C LEVEL > 9.0%: ICD-10-PCS | Mod: CPTII,S$GLB,, | Performed by: PSYCHIATRY & NEUROLOGY

## 2022-03-23 PROCEDURE — 3008F PR BODY MASS INDEX (BMI) DOCUMENTED: ICD-10-PCS | Mod: CPTII,S$GLB,, | Performed by: PSYCHIATRY & NEUROLOGY

## 2022-03-23 PROCEDURE — 1159F PR MEDICATION LIST DOCUMENTED IN MEDICAL RECORD: ICD-10-PCS | Mod: CPTII,S$GLB,, | Performed by: PSYCHIATRY & NEUROLOGY

## 2022-03-23 PROCEDURE — 99204 OFFICE O/P NEW MOD 45 MIN: CPT | Mod: S$GLB,,, | Performed by: PSYCHIATRY & NEUROLOGY

## 2022-03-23 PROCEDURE — 3061F PR NEG MICROALBUMINURIA RESULT DOCUMENTED/REVIEW: ICD-10-PCS | Mod: CPTII,S$GLB,, | Performed by: PSYCHIATRY & NEUROLOGY

## 2022-03-23 PROCEDURE — 3075F SYST BP GE 130 - 139MM HG: CPT | Mod: CPTII,S$GLB,, | Performed by: PSYCHIATRY & NEUROLOGY

## 2022-03-23 PROCEDURE — 3008F BODY MASS INDEX DOCD: CPT | Mod: CPTII,S$GLB,, | Performed by: PSYCHIATRY & NEUROLOGY

## 2022-03-23 PROCEDURE — 3061F NEG MICROALBUMINURIA REV: CPT | Mod: CPTII,S$GLB,, | Performed by: PSYCHIATRY & NEUROLOGY

## 2022-03-23 PROCEDURE — 1159F MED LIST DOCD IN RCRD: CPT | Mod: CPTII,S$GLB,, | Performed by: PSYCHIATRY & NEUROLOGY

## 2022-03-23 PROCEDURE — 3075F PR MOST RECENT SYSTOLIC BLOOD PRESS GE 130-139MM HG: ICD-10-PCS | Mod: CPTII,S$GLB,, | Performed by: PSYCHIATRY & NEUROLOGY

## 2022-03-23 PROCEDURE — 3079F PR MOST RECENT DIASTOLIC BLOOD PRESSURE 80-89 MM HG: ICD-10-PCS | Mod: CPTII,S$GLB,, | Performed by: PSYCHIATRY & NEUROLOGY

## 2022-03-23 PROCEDURE — 1160F PR REVIEW ALL MEDS BY PRESCRIBER/CLIN PHARMACIST DOCUMENTED: ICD-10-PCS | Mod: CPTII,S$GLB,, | Performed by: PSYCHIATRY & NEUROLOGY

## 2022-03-23 PROCEDURE — 99204 PR OFFICE/OUTPT VISIT, NEW, LEVL IV, 45-59 MIN: ICD-10-PCS | Mod: S$GLB,,, | Performed by: PSYCHIATRY & NEUROLOGY
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
## 2022-03-23 NOTE — PATIENT INSTRUCTIONS
SLEEP LAB (Ayaka or Corey) will contact you to schedulethe sleep study. Their number is [phone] (ext 2). Please call them if you do not hear from them in 2 weeks from now.  The [facility] is located on 7th floor of the [facility]; [facility] is located in [facility].    SLEEP CLINIC (my assistant) will call you when the sleep study results are ready - if you have not heard from us by 2 weeks from the date of the study, please call [phone] (ext 1) or you can use [facility] to contact me.    You are advised to abstain from driving should you feel sleepy or drowsy.      Please call [facility] directly at [phone] and

## 2022-03-24 ENCOUNTER — TELEPHONE (OUTPATIENT)
Dept: SLEEP MEDICINE | Facility: OTHER | Age: 42
End: 2022-03-24
Payer: COMMERCIAL

## 2022-03-30 ENCOUNTER — PATIENT MESSAGE (OUTPATIENT)
Dept: FAMILY MEDICINE | Facility: CLINIC | Age: 42
End: 2022-03-30
Payer: COMMERCIAL

## 2022-03-30 DIAGNOSIS — E78.2 MIXED HYPERLIPIDEMIA: ICD-10-CM

## 2022-03-31 ENCOUNTER — CLINICAL SUPPORT (OUTPATIENT)
Dept: OTHER | Facility: CLINIC | Age: 42
End: 2022-03-31
Payer: COMMERCIAL

## 2022-03-31 DIAGNOSIS — Z00.8 ENCOUNTER FOR OTHER GENERAL EXAMINATION: ICD-10-CM

## 2022-03-31 PROCEDURE — 99401 PR PREVENT COUNSEL,INDIV,15 MIN: ICD-10-PCS | Mod: S$GLB,,, | Performed by: INTERNAL MEDICINE

## 2022-03-31 PROCEDURE — 80061 LIPID PANEL: CPT | Mod: QW,S$GLB,, | Performed by: INTERNAL MEDICINE

## 2022-03-31 PROCEDURE — 80061 PR  LIPID PANEL: ICD-10-PCS | Mod: QW,S$GLB,, | Performed by: INTERNAL MEDICINE

## 2022-03-31 PROCEDURE — 82947 ASSAY GLUCOSE BLOOD QUANT: CPT | Mod: QW,S$GLB,, | Performed by: INTERNAL MEDICINE

## 2022-03-31 PROCEDURE — 99401 PREV MED CNSL INDIV APPRX 15: CPT | Mod: S$GLB,,, | Performed by: INTERNAL MEDICINE

## 2022-03-31 PROCEDURE — 82947 PR  ASSAY QUANTITATIVE,BLOOD GLUCOSE: ICD-10-PCS | Mod: QW,S$GLB,, | Performed by: INTERNAL MEDICINE

## 2022-03-31 RX ORDER — ATORVASTATIN CALCIUM 40 MG/1
40 TABLET, FILM COATED ORAL DAILY
Qty: 90 TABLET | Refills: 3 | Status: SHIPPED | OUTPATIENT
Start: 2022-03-31 | End: 2023-04-11 | Stop reason: SDUPTHER

## 2022-03-31 RX ORDER — FENOFIBRATE 160 MG/1
160 TABLET ORAL DAILY
Qty: 90 TABLET | Refills: 3 | Status: SHIPPED | OUTPATIENT
Start: 2022-03-31 | End: 2023-01-20 | Stop reason: SDUPTHER

## 2022-03-31 NOTE — TELEPHONE ENCOUNTER
No new care gaps identified.  Powered by [employer]. Reference number: [id number].   3/31/2022 7:40:35 AM CDT

## 2022-04-01 LAB
HBA1C MFR BLD: 9.8 %
POC CHOLESTEROL, TOTAL: 143 MG/DL
POC GLUCOSE, FASTING: 331 MG/DL (ref 60–110)
TRIGL SERPL-MCNC: 651 MG/DL

## 2022-04-05 ENCOUNTER — TELEPHONE (OUTPATIENT)
Dept: SLEEP MEDICINE | Facility: OTHER | Age: 42
End: 2022-04-05
Payer: COMMERCIAL

## 2022-04-18 ENCOUNTER — TELEPHONE (OUTPATIENT)
Dept: SLEEP MEDICINE | Facility: OTHER | Age: 42
End: 2022-04-18
Payer: COMMERCIAL

## 2022-04-22 ENCOUNTER — HOSPITAL ENCOUNTER (OUTPATIENT)
Dept: SLEEP MEDICINE | Facility: OTHER | Age: 42
Discharge: HOME | End: 2022-04-22
Attending: PSYCHIATRY & NEUROLOGY
Payer: COMMERCIAL

## 2022-04-22 DIAGNOSIS — G47.30 SLEEP APNEA, UNSPECIFIED TYPE: ICD-10-CM

## 2022-04-22 DIAGNOSIS — G47.33 OSA (OBSTRUCTIVE SLEEP APNEA): ICD-10-CM

## 2022-04-22 PROCEDURE — 95806 PR SLEEP STUDY, UNATTENDED, SIMUL RECORD HR/O2 SAT/RESP FLOW/RESP EFFT: ICD-10-PCS | Mod: 26,,, | Performed by: PSYCHIATRY & NEUROLOGY

## 2022-04-22 PROCEDURE — 95800 SLP STDY UNATTENDED: CPT

## 2022-04-22 PROCEDURE — 95806 SLEEP STUDY UNATT&RESP EFFT: CPT | Mod: 26,,, | Performed by: PSYCHIATRY & NEUROLOGY

## 2022-04-25 ENCOUNTER — PATIENT MESSAGE (OUTPATIENT)
Dept: FAMILY MEDICINE | Facility: CLINIC | Age: 42
End: 2022-04-25
Payer: COMMERCIAL

## 2022-04-25 DIAGNOSIS — Z79.4 TYPE 2 DIABETES MELLITUS WITH HYPERGLYCEMIA, WITH LONG-TERM CURRENT USE OF INSULIN: ICD-10-CM

## 2022-04-25 DIAGNOSIS — E11.65 TYPE 2 DIABETES MELLITUS WITH HYPERGLYCEMIA, WITH LONG-TERM CURRENT USE OF INSULIN: ICD-10-CM

## 2022-04-25 RX ORDER — INSULIN ASPART 100 [IU]/ML
INJECTION, SOLUTION INTRAVENOUS; SUBCUTANEOUS
Qty: 30 ML | Refills: 6 | Status: SHIPPED | OUTPATIENT
Start: 2022-04-25 | End: 2023-01-11 | Stop reason: SDUPTHER

## 2022-04-25 NOTE — TELEPHONE ENCOUNTER
Care Due:                  Date            Visit Type   Department     Provider  --------------------------------------------------------------------------------                                MYCHART                              FOLLOWUP/OF  [facility]  Last Visit: 01-      [facility]  Next Visit: None Scheduled  None         None Found                                                            Last  Test          Frequency    Reason                     Performed    Due Date  --------------------------------------------------------------------------------    Uric Acid...  12 months..  allopurinoL..............  Not Found    Overdue    Powered by [employer]. Reference number: [id number].   4/25/2022 8:09:35 AM CDT

## 2022-05-05 VITALS
BODY MASS INDEX: 33.34 KG/M2 | WEIGHT: 220 LBS | DIASTOLIC BLOOD PRESSURE: 78 MMHG | HEIGHT: 68 IN | SYSTOLIC BLOOD PRESSURE: 124 MMHG

## 2022-05-09 NOTE — PROCEDURES
PHYSICIAN INTERPRETATION AND COMMENTS: Findings are consistent with moderate, obstructive sleep apnea (JAZMYN)  (G47.33), by overall AHI (apnea hypopnea index). However, findings on this study suggest that the degree of sleep  disordered breathing is in the severe range, when RDI is measured. This study was technically adequate to allow for  interpretation.  CLINICAL HISTORY: 41 year old male presented with: 19 inch neck, BMI of 33.8, an Whitestone sleepiness score of 8, and  history of hypertension, diabetes. Based on the clinical history, the patient has a high pre-test probability of having Severe  JAZMYN.  SLEEP STUDY FINDINGS: Patient underwent a 1 night Home Sleep Test and by behavioral criteria, slept for approximately  5.68 hours, with a sleep latency of 8 minutes and a sleep efficiency of 82.4%. Moderate sleep disordered breathing  (AHI=26) is noted based on a 4% hypopnea desaturation criteria. The patient slept supine 45% of the night based on valid  recording time of 5.68 hours. When considering more subtle measures of sleep disordered breathing, the overall  respiratory disturbance index is severe(RDI=36) based on a 1% hypopnea desaturation criteria with confirmation by  surrogate arousal indicators. The apneas/hypopneas are accompanied by mild oxygen desaturation (percent time below  90% SpO2: 9.6%, Min SpO2: 71.8%). The average desaturation across all sleep disordered breathing events is 4.4%. Snoring  occurs for 32% (30 dB) of the study, 12.7% is very loud. The mean pulse rate is 86.8 BPM, with very frequent pulse rate  variability (84 events with >= 6 BPM increase/decrease per hour).  TREATMENT CONSIDERATIONS: Consider trial of Auto-titrating CPAP 6-20 cm, mask of patient's choice, and heated  humidification. If patient has difficulty with CPAP adherence or ongoing JAZMYN symptoms or despite CPAP adherence, then  consider an in-lab titration sleep study in order to determine optimal fixed CPAP setting.  Alternatively consider oral  appliance fitted by a dentist specializing in these devices, or surgical consultation for uvulopalatopharyngoplasty (UPPP)  for treatment of obstructive sleep apnea.  DISEASE MANAGEMENT CONSIDERATIONS: Definitive treatment for JAZMYN is recommended. Consider Sleep Clinic referral for  JAZMYN management.  Signature:

## 2022-05-31 ENCOUNTER — PATIENT MESSAGE (OUTPATIENT)
Dept: ADMINISTRATIVE | Facility: HOSPITAL | Age: 42
End: 2022-05-31
Payer: COMMERCIAL

## 2022-06-12 ENCOUNTER — TELEPHONE (OUTPATIENT)
Dept: SLEEP MEDICINE | Facility: CLINIC | Age: 42
End: 2022-06-12
Payer: COMMERCIAL

## 2022-06-12 DIAGNOSIS — G47.33 OSA (OBSTRUCTIVE SLEEP APNEA): Primary | ICD-10-CM

## 2022-06-13 NOTE — TELEPHONE ENCOUNTER
Makenzie Joshua,   Please notify the patient - study confirmed moderate to severe sleep apnea.  I will be ordering a CPAP machine through [facility].       Thank you,

## 2022-07-01 ENCOUNTER — PATIENT MESSAGE (OUTPATIENT)
Dept: FAMILY MEDICINE | Facility: CLINIC | Age: 42
End: 2022-07-01
Payer: COMMERCIAL

## 2022-07-01 DIAGNOSIS — E10.40 TYPE 1 DIABETES MELLITUS WITH DIABETIC NEUROPATHY: ICD-10-CM

## 2022-07-01 DIAGNOSIS — E78.1 HYPERTRIGLYCERIDEMIA: ICD-10-CM

## 2022-07-01 RX ORDER — PREGABALIN 150 MG/1
150 CAPSULE ORAL 2 TIMES DAILY
Qty: 60 CAPSULE | Refills: 5 | Status: SHIPPED | OUTPATIENT
Start: 2022-07-01 | End: 2022-12-30 | Stop reason: SDUPTHER

## 2022-07-01 RX ORDER — ICOSAPENT ETHYL 1000 MG/1
2 CAPSULE ORAL 2 TIMES DAILY
Qty: 360 CAPSULE | Refills: 0 | Status: SHIPPED | OUTPATIENT
Start: 2022-07-01 | End: 2022-10-16 | Stop reason: SDUPTHER

## 2022-07-01 NOTE — TELEPHONE ENCOUNTER
No new care gaps identified.  [facility] Embedded Care Gaps. Reference number: [id number]. 7/01/2022   9:33:48 AM REYEST

## 2022-07-11 DIAGNOSIS — I10 ESSENTIAL HYPERTENSION: ICD-10-CM

## 2022-07-11 RX ORDER — LOSARTAN POTASSIUM 100 MG/1
50 TABLET ORAL DAILY
Qty: 45 TABLET | Refills: 1 | Status: CANCELLED | OUTPATIENT
Start: 2022-07-11

## 2022-07-11 RX ORDER — ALLOPURINOL 300 MG/1
300 TABLET ORAL DAILY
Qty: 90 TABLET | Refills: 0 | Status: CANCELLED | OUTPATIENT
Start: 2022-07-11

## 2022-07-11 NOTE — TELEPHONE ENCOUNTER
No new care gaps identified.  [facility] Embedded Care Gaps. Reference number: [id number]. 7/11/2022   12:57:56 PM CDT

## 2022-07-11 NOTE — TELEPHONE ENCOUNTER
No new care gaps identified.  [facility] Embedded Care Gaps. Reference number: [id number]. 7/11/2022   12:52:35 PM CDT

## 2022-07-12 RX ORDER — ALLOPURINOL 300 MG/1
300 TABLET ORAL DAILY
Qty: 90 TABLET | Refills: 0 | Status: SHIPPED | OUTPATIENT
Start: 2022-07-12 | End: 2022-10-16 | Stop reason: SDUPTHER

## 2022-07-12 RX ORDER — LOSARTAN POTASSIUM 100 MG/1
50 TABLET ORAL DAILY
Qty: 45 TABLET | Refills: 1 | Status: SHIPPED | OUTPATIENT
Start: 2022-07-12 | End: 2022-11-08 | Stop reason: SDUPTHER

## 2022-07-25 ENCOUNTER — PATIENT MESSAGE (OUTPATIENT)
Dept: ADMINISTRATIVE | Facility: HOSPITAL | Age: 42
End: 2022-07-25
Payer: COMMERCIAL

## 2022-07-25 ENCOUNTER — PATIENT OUTREACH (OUTPATIENT)
Dept: ADMINISTRATIVE | Facility: HOSPITAL | Age: 42
End: 2022-07-25
Payer: COMMERCIAL

## 2022-07-25 DIAGNOSIS — Z79.4 TYPE 2 DIABETES MELLITUS WITH DIABETIC POLYNEUROPATHY, WITH LONG-TERM CURRENT USE OF INSULIN: Primary | ICD-10-CM

## 2022-07-25 DIAGNOSIS — E11.42 TYPE 2 DIABETES MELLITUS WITH DIABETIC POLYNEUROPATHY, WITH LONG-TERM CURRENT USE OF INSULIN: Primary | ICD-10-CM

## 2022-07-27 ENCOUNTER — OFFICE VISIT (OUTPATIENT)
Dept: OPTOMETRY | Facility: CLINIC | Age: 42
End: 2022-07-27
Payer: COMMERCIAL

## 2022-07-27 DIAGNOSIS — E11.65 TYPE 2 DIABETES MELLITUS WITH HYPERGLYCEMIA, WITH LONG-TERM CURRENT USE OF INSULIN: ICD-10-CM

## 2022-07-27 DIAGNOSIS — Z01.00 EYE EXAM, ROUTINE: Primary | ICD-10-CM

## 2022-07-27 DIAGNOSIS — H52.13 MYOPIA OF BOTH EYES: ICD-10-CM

## 2022-07-27 DIAGNOSIS — Z79.4 TYPE 2 DIABETES MELLITUS WITH HYPERGLYCEMIA, WITH LONG-TERM CURRENT USE OF INSULIN: ICD-10-CM

## 2022-07-27 PROCEDURE — 99999 PR PBB SHADOW E&M-EST. PATIENT-LVL III: ICD-10-PCS | Mod: PBBFAC,,, | Performed by: OPTOMETRIST

## 2022-07-27 PROCEDURE — 99999 PR PBB SHADOW E&M-EST. PATIENT-LVL III: CPT | Mod: PBBFAC,,, | Performed by: OPTOMETRIST

## 2022-07-27 PROCEDURE — 92004 COMPRE OPH EXAM NEW PT 1/>: CPT | Mod: S$GLB,,, | Performed by: OPTOMETRIST

## 2022-07-27 PROCEDURE — 92004 PR EYE EXAM, NEW PATIENT,COMPREHESV: ICD-10-PCS | Mod: S$GLB,,, | Performed by: OPTOMETRIST

## 2022-07-27 PROCEDURE — 92015 PR REFRACTION: ICD-10-PCS | Mod: S$GLB,,, | Performed by: OPTOMETRIST

## 2022-07-27 PROCEDURE — 92015 DETERMINE REFRACTIVE STATE: CPT | Mod: S$GLB,,, | Performed by: OPTOMETRIST

## 2022-07-27 NOTE — PROGRESS NOTES
HPI     Diabetic eye exam  First Time Patient    GTTS:None    Patient is here for diabetic exam  Pt states he want another pair of gl due to distance blur   Pt last eye exam was 3 yrs ago  Pt denied flashes and floaters  Pt see blurry occasionally    Hemoglobin A1C       Date                     Value               Ref Range             Status                01/19/2022               10.3 (H)            4.0 - 5.6 %           Final                 07/12/2021               11.7 (H)            4.0 - 5.6 %           Final                 03/05/2021               8.8 (H)             4.0 - 5.6 %           Final              Last edited by Connor Jha, OD on 7/27/2022  1:56 PM. (History)            Assessment /Plan     For exam results, see Encounter Report.    Eye exam, routine  Type 2 diabetes mellitus with hyperglycemia, with long-term current use of insulin  -     Ambulatory referral/consult to Optometry  -No retinopathy noted today.  Continued control with primary care physician and annual comprehensive eye exam.  -[facility]    Myopia of both eyes  Eyeglass Final Rx     Eyeglass Final Rx       Sphere Cylinder Dist [facility]    Right -2.00 Sphere 20/20    Left -2.50 Sphere 20/20    Type: SVL    Expiration Date: 7/27/2023              DVO pt wants to remove at near      RTC 1 yr

## 2022-08-24 ENCOUNTER — PATIENT MESSAGE (OUTPATIENT)
Dept: ADMINISTRATIVE | Facility: HOSPITAL | Age: 42
End: 2022-08-24
Payer: COMMERCIAL

## 2022-08-30 DIAGNOSIS — Z79.4 TYPE 2 DIABETES MELLITUS WITH HYPERGLYCEMIA, WITH LONG-TERM CURRENT USE OF INSULIN: ICD-10-CM

## 2022-08-30 DIAGNOSIS — E11.65 TYPE 2 DIABETES MELLITUS WITH HYPERGLYCEMIA, WITH LONG-TERM CURRENT USE OF INSULIN: ICD-10-CM

## 2022-08-30 RX ORDER — BLOOD-GLUCOSE TRANSMITTER
1 EACH MISCELLANEOUS CONTINUOUS PRN
Qty: 1 EACH | Status: SHIPPED | OUTPATIENT
Start: 2022-08-30 | End: 2023-10-10

## 2022-08-30 RX ORDER — BLOOD-GLUCOSE SENSOR
3 EACH MISCELLANEOUS CONTINUOUS PRN
Qty: 3 EACH | Status: SHIPPED | OUTPATIENT
Start: 2022-08-30 | End: 2023-08-30

## 2022-09-28 ENCOUNTER — PATIENT MESSAGE (OUTPATIENT)
Dept: ADMINISTRATIVE | Facility: HOSPITAL | Age: 42
End: 2022-09-28
Payer: COMMERCIAL

## 2022-10-05 DIAGNOSIS — E10.65 TYPE 1 DIABETES MELLITUS WITH HYPERGLYCEMIA: ICD-10-CM

## 2022-10-05 RX ORDER — INSULIN GLARGINE 100 [IU]/ML
38 INJECTION, SOLUTION SUBCUTANEOUS 2 TIMES DAILY
Qty: 18 ML | Refills: 6 | Status: CANCELLED | OUTPATIENT
Start: 2022-10-05

## 2022-10-10 ENCOUNTER — PATIENT MESSAGE (OUTPATIENT)
Dept: ADMINISTRATIVE | Facility: HOSPITAL | Age: 42
End: 2022-10-10
Payer: COMMERCIAL

## 2022-10-13 ENCOUNTER — CLINICAL SUPPORT (OUTPATIENT)
Dept: OTHER | Facility: CLINIC | Age: 42
End: 2022-10-13
Payer: COMMERCIAL

## 2022-10-13 DIAGNOSIS — Z00.8 ENCOUNTER FOR OTHER GENERAL EXAMINATION: ICD-10-CM

## 2022-10-13 PROCEDURE — 82947 PR  ASSAY QUANTITATIVE,BLOOD GLUCOSE: ICD-10-PCS | Mod: QW,S$GLB,, | Performed by: INTERNAL MEDICINE

## 2022-10-13 PROCEDURE — 99401 PREV MED CNSL INDIV APPRX 15: CPT | Mod: S$GLB,,, | Performed by: INTERNAL MEDICINE

## 2022-10-13 PROCEDURE — 80061 PR  LIPID PANEL: ICD-10-PCS | Mod: QW,S$GLB,, | Performed by: INTERNAL MEDICINE

## 2022-10-13 PROCEDURE — 99401 PR PREVENT COUNSEL,INDIV,15 MIN: ICD-10-PCS | Mod: S$GLB,,, | Performed by: INTERNAL MEDICINE

## 2022-10-13 PROCEDURE — 82947 ASSAY GLUCOSE BLOOD QUANT: CPT | Mod: QW,S$GLB,, | Performed by: INTERNAL MEDICINE

## 2022-10-13 PROCEDURE — 80061 LIPID PANEL: CPT | Mod: QW,S$GLB,, | Performed by: INTERNAL MEDICINE

## 2022-10-14 ENCOUNTER — PATIENT MESSAGE (OUTPATIENT)
Dept: FAMILY MEDICINE | Facility: CLINIC | Age: 42
End: 2022-10-14
Payer: COMMERCIAL

## 2022-10-14 DIAGNOSIS — E10.65 TYPE 1 DIABETES MELLITUS WITH HYPERGLYCEMIA: ICD-10-CM

## 2022-10-14 RX ORDER — INSULIN GLARGINE 100 [IU]/ML
38 INJECTION, SOLUTION SUBCUTANEOUS 2 TIMES DAILY
Qty: 18 ML | Refills: 1 | Status: SHIPPED | OUTPATIENT
Start: 2022-10-14 | End: 2022-11-21 | Stop reason: SDUPTHER

## 2022-10-14 NOTE — TELEPHONE ENCOUNTER
Care Due:                  Date            Visit Type   Department     Provider  --------------------------------------------------------------------------------                                MYCHART                              FOLLOWUP/OF  [facility]  Last Visit: 01-      [facility]  Next Visit: None Scheduled  None         None Found                                                            Last  Test          Frequency    Reason                     Performed    Due Date  --------------------------------------------------------------------------------    HBA1C.......  6 months...  metFORMIN................  04- 09-    Uric Acid...  12 months..  allopurinoL..............  Not Found    Overdue    [employer] Embedded Care Gaps. Reference number: [id number]. 10/14/2022   7:29:26 AM CDT

## 2022-10-16 DIAGNOSIS — E78.1 HYPERTRIGLYCERIDEMIA: ICD-10-CM

## 2022-10-17 RX ORDER — ALLOPURINOL 300 MG/1
300 TABLET ORAL DAILY
Qty: 90 TABLET | Refills: 0 | Status: SHIPPED | OUTPATIENT
Start: 2022-10-17 | End: 2023-01-20 | Stop reason: SDUPTHER

## 2022-10-17 RX ORDER — ICOSAPENT ETHYL 1000 MG/1
2 CAPSULE ORAL 2 TIMES DAILY
Qty: 360 CAPSULE | Refills: 0 | Status: SHIPPED | OUTPATIENT
Start: 2022-10-17 | End: 2023-01-20 | Stop reason: SDUPTHER

## 2022-10-17 NOTE — TELEPHONE ENCOUNTER
Care Due:                  Date            Visit Type   Department     Provider  --------------------------------------------------------------------------------                                MYCHART                              FOLLOWUP/OF  [facility]  Last Visit: 01-      [facility]  Next Visit: None Scheduled  None         None Found                                                            Last  Test          Frequency    Reason                     Performed    Due Date  --------------------------------------------------------------------------------    Office Visit  12 months..  allopurinoL,               01- 01-                             atorvastatin,                             fenofibrate, icosapent,                             losartan, metFORMIN,                             pantoprazole.............    [employer] Embedded Care Gaps. Reference number: [id number]. 10/16/2022   8:41:05 PM CDT

## 2022-10-24 LAB
HBA1C MFR BLD: 7.8 %
HDLC SERPL-MCNC: 28 MG/DL
POC CHOLESTEROL, LDL (DOCK): 70 MG/DL
POC CHOLESTEROL, TOTAL: 130 MG/DL
POC GLUCOSE, FASTING: 124 MG/DL (ref 60–110)
TRIGL SERPL-MCNC: 190 MG/DL

## 2022-10-29 VITALS
SYSTOLIC BLOOD PRESSURE: 146 MMHG | BODY MASS INDEX: 32.89 KG/M2 | WEIGHT: 217 LBS | DIASTOLIC BLOOD PRESSURE: 72 MMHG | HEIGHT: 68 IN

## 2022-11-06 ENCOUNTER — PATIENT MESSAGE (OUTPATIENT)
Dept: FAMILY MEDICINE | Facility: CLINIC | Age: 42
End: 2022-11-06
Payer: COMMERCIAL

## 2022-11-08 ENCOUNTER — OFFICE VISIT (OUTPATIENT)
Dept: FAMILY MEDICINE | Facility: CLINIC | Age: 42
End: 2022-11-08
Payer: COMMERCIAL

## 2022-11-08 VITALS
OXYGEN SATURATION: 98 % | BODY MASS INDEX: 33.54 KG/M2 | HEIGHT: 68 IN | DIASTOLIC BLOOD PRESSURE: 74 MMHG | HEART RATE: 95 BPM | WEIGHT: 221.31 LBS | SYSTOLIC BLOOD PRESSURE: 134 MMHG

## 2022-11-08 DIAGNOSIS — K31.84 GASTROPARESIS: ICD-10-CM

## 2022-11-08 DIAGNOSIS — E11.621 DIABETIC ULCER OF TOE OF RIGHT FOOT ASSOCIATED WITH TYPE 2 DIABETES MELLITUS, UNSPECIFIED ULCER STAGE: ICD-10-CM

## 2022-11-08 DIAGNOSIS — L97.519 DIABETIC ULCER OF TOE OF RIGHT FOOT ASSOCIATED WITH TYPE 2 DIABETES MELLITUS, UNSPECIFIED ULCER STAGE: ICD-10-CM

## 2022-11-08 DIAGNOSIS — K29.00 ACUTE SUPERFICIAL GASTRITIS WITHOUT HEMORRHAGE: ICD-10-CM

## 2022-11-08 DIAGNOSIS — E11.42 TYPE 2 DIABETES MELLITUS WITH DIABETIC POLYNEUROPATHY, WITH LONG-TERM CURRENT USE OF INSULIN: Chronic | ICD-10-CM

## 2022-11-08 DIAGNOSIS — I10 ESSENTIAL HYPERTENSION: Primary | ICD-10-CM

## 2022-11-08 DIAGNOSIS — E66.9 OBESITY (BMI 30-39.9): ICD-10-CM

## 2022-11-08 DIAGNOSIS — E78.2 MIXED HYPERLIPIDEMIA: Chronic | ICD-10-CM

## 2022-11-08 DIAGNOSIS — G47.33 OSA (OBSTRUCTIVE SLEEP APNEA): ICD-10-CM

## 2022-11-08 DIAGNOSIS — Z79.4 TYPE 2 DIABETES MELLITUS WITH DIABETIC POLYNEUROPATHY, WITH LONG-TERM CURRENT USE OF INSULIN: Chronic | ICD-10-CM

## 2022-11-08 PROCEDURE — 3046F HEMOGLOBIN A1C LEVEL >9.0%: CPT | Mod: CPTII,S$GLB,,

## 2022-11-08 PROCEDURE — 3008F PR BODY MASS INDEX (BMI) DOCUMENTED: ICD-10-PCS | Mod: CPTII,S$GLB,,

## 2022-11-08 PROCEDURE — 4010F ACE/ARB THERAPY RXD/TAKEN: CPT | Mod: CPTII,S$GLB,,

## 2022-11-08 PROCEDURE — 4010F PR ACE/ARB THEARPY RXD/TAKEN: ICD-10-PCS | Mod: CPTII,S$GLB,,

## 2022-11-08 PROCEDURE — 99999 PR PBB SHADOW E&M-EST. PATIENT-LVL IV: CPT | Mod: PBBFAC,,,

## 2022-11-08 PROCEDURE — 3066F NEPHROPATHY DOC TX: CPT | Mod: CPTII,S$GLB,,

## 2022-11-08 PROCEDURE — 3061F PR NEG MICROALBUMINURIA RESULT DOCUMENTED/REVIEW: ICD-10-PCS | Mod: CPTII,S$GLB,,

## 2022-11-08 PROCEDURE — 3075F SYST BP GE 130 - 139MM HG: CPT | Mod: CPTII,S$GLB,,

## 2022-11-08 PROCEDURE — 3066F PR DOCUMENTATION OF TREATMENT FOR NEPHROPATHY: ICD-10-PCS | Mod: CPTII,S$GLB,,

## 2022-11-08 PROCEDURE — 99999 PR PBB SHADOW E&M-EST. PATIENT-LVL IV: ICD-10-PCS | Mod: PBBFAC,,,

## 2022-11-08 PROCEDURE — 3078F PR MOST RECENT DIASTOLIC BLOOD PRESSURE < 80 MM HG: ICD-10-PCS | Mod: CPTII,S$GLB,,

## 2022-11-08 PROCEDURE — 3008F BODY MASS INDEX DOCD: CPT | Mod: CPTII,S$GLB,,

## 2022-11-08 PROCEDURE — 3078F DIAST BP <80 MM HG: CPT | Mod: CPTII,S$GLB,,

## 2022-11-08 PROCEDURE — 3046F PR MOST RECENT HEMOGLOBIN A1C LEVEL > 9.0%: ICD-10-PCS | Mod: CPTII,S$GLB,,

## 2022-11-08 PROCEDURE — 99214 OFFICE O/P EST MOD 30 MIN: CPT | Mod: S$GLB,,,

## 2022-11-08 PROCEDURE — 3061F NEG MICROALBUMINURIA REV: CPT | Mod: CPTII,S$GLB,,

## 2022-11-08 PROCEDURE — 3075F PR MOST RECENT SYSTOLIC BLOOD PRESS GE 130-139MM HG: ICD-10-PCS | Mod: CPTII,S$GLB,,

## 2022-11-08 PROCEDURE — 99214 PR OFFICE/OUTPT VISIT, EST, LEVL IV, 30-39 MIN: ICD-10-PCS | Mod: S$GLB,,,

## 2022-11-08 RX ORDER — METFORMIN HYDROCHLORIDE 500 MG/1
1000 TABLET ORAL 2 TIMES DAILY WITH MEALS
Qty: 360 TABLET | Refills: 3 | Status: SHIPPED | OUTPATIENT
Start: 2022-11-08 | End: 2023-11-13 | Stop reason: SDUPTHER

## 2022-11-08 RX ORDER — CEPHALEXIN 500 MG/1
500 CAPSULE ORAL EVERY 6 HOURS
Qty: 28 CAPSULE | Refills: 0 | Status: SHIPPED | OUTPATIENT
Start: 2022-11-08 | End: 2022-11-28

## 2022-11-08 RX ORDER — LOSARTAN POTASSIUM 100 MG/1
50 TABLET ORAL DAILY
Qty: 45 TABLET | Refills: 1 | Status: SHIPPED | OUTPATIENT
Start: 2022-11-08 | End: 2023-07-07 | Stop reason: SDUPTHER

## 2022-11-08 RX ORDER — MULTIVIT WITH IRON,MINERALS
200 TABLET ORAL NIGHTLY
Qty: 90 TABLET | Refills: 1 | Status: SHIPPED | OUTPATIENT
Start: 2022-11-08

## 2022-11-08 RX ORDER — PANTOPRAZOLE SODIUM 40 MG/1
40 TABLET, DELAYED RELEASE ORAL DAILY
Qty: 90 TABLET | Refills: 1 | Status: SHIPPED | OUTPATIENT
Start: 2022-11-08 | End: 2023-03-03 | Stop reason: SDUPTHER

## 2022-11-08 NOTE — PROGRESS NOTES
Subjective:         Chief Complaint: Follow-up and Medication Refill     Bay Ibarra is a 42 y.o. male, patient of Funmilayo Taylor MD with diabetic  polyneuropathy associated with type 1 diabetes mellitus, hypertension, hyperlipidemia, type II diabetes, obesity, gastroparesis, JAZMYN, history of diabetic foot ulcer, unknown to me, presents today for Follow-up and Medication Refill    Had biometric screening at work 3 weeks ago. Labs reviewed with patient. A1C was 7.8, triglycerides elevated.   Followed by sleep med, waiting on new CPAP as old one was recalled.     HPI  Diabetes:  Last A1C:   Recent Labs   Lab 01/19/22  1622   Hemoglobin A1C 10.3 H       Checks glucose: has Dexcom 6, multiple times per day   Glucose ranges between: fasting 200-205; has been > 250; at job was 124 fasting    Currently taking Metformin 1000 mg twice per day, Lantus 38 units twice per day, Novolog 28 untis before breakfast and lunch, 30 units before dinner.   Side effects from meds:none    Hypoglycemic episodes: about 1 month ago, had 3 instances where I got down to 53, during this time was dizzy, fatigue; knows s/s of hyopglycemia. Denies glucose dropping in the middle of the night or early morning. If glucose drops its right before lunch or right before dinner. Has glucose tabs and juice available if needed.     Diet: eats mostly bad stuff but has made changes to eat a little more healthy. Trying to get away from greasy/fried foods; does all wheat, no white; eats fruits and veggies.     Exercise:  walking for work as he works outside, around the yard and house. Plans to go to the park and walk/jog for exercise; renewed his gym membership and plans to go there.     Right great toe with ulcer, noted about 2-3 weeks ago. Has been using Neosporin and wrapping with gauze. Pain when he puts pressure on it initially when he starts moving; no pain when sitting at home resting.     Hypertension:    Checks BP: does not check BP.      Current meds:Losartan 50 mg daily    Gastroparesis is controlled with Pantoprazole. Last used Zofran 1 time last month.     Diabetic eye exam 07/27/2022        Past Medical History:   Diagnosis Date    Acute pancreatitis     Diverticulosis of colon     Essential hypertension     Fatty liver     Gastric varices without bleeding 1/16/2020    Glaucomatous optic atrophy, right     Gout     Hx of acute pancreatitis 3/3/2017    Hypertriglyceridemia 8/2/2017    Obesity (BMI 30-39.9) 6/5/2015    Obesity (BMI 35.0-39.9 without comorbidity) 6/5/2015    Obstructive sleep apnea syndrome 12/18/2015    Type 2 diabetes mellitus with diabetic polyneuropathy, with long-term current use of insulin 10/16/2017    Type 2 diabetes mellitus with hyperglycemia, with long-term current use of insulin 8/3/2017       Past Surgical History:   Procedure Laterality Date    ENDOSCOPIC ULTRASOUND OF UPPER GASTROINTESTINAL TRACT N/A 5/27/2019    Procedure: ULTRASOUND, UPPER GI TRACT, ENDOSCOPIC;  Surgeon: Zafar Velazquez MD;  Location: [address]);  Service: Endoscopy;  Laterality: N/A;    ERCP N/A 5/27/2019    Procedure: ERCP (ENDOSCOPIC RETROGRADE CHOLANGIOPANCREATOGRAPHY);  Surgeon: Zafar Velazquez MD;  Location: [address]);  Service: Endoscopy;  Laterality: N/A;    ESOPHAGOGASTRODUODENOSCOPY      ESOPHAGOGASTRODUODENOSCOPY N/A 1/31/2020    Procedure: EGD (ESOPHAGOGASTRODUODENOSCOPY);  Surgeon: Zafar Velazquez MD;  Location: [facility];  Service: Endoscopy;  Laterality: N/A;       Family History   Problem Relation Age of Onset    Aneurysm Mother         AAA    Coronary artery disease Father         4 vessel bypass at 40, possible stent at 36    Diabetes type II Father     No Known Problems Sister     No Known Problems Brother     Aneurysm Maternal Grandmother         Possible AAA    Diabetes type II Paternal Grandmother        Social History     Socioeconomic History    Marital status:    Tobacco Use    Smoking status:  "Never    Smokeless tobacco: Never   Substance and Sexual Activity    Alcohol use: No    Drug use: Never    Sexual activity: Yes     Partners: Female       Review of Systems   Constitutional:  Negative for appetite change, fatigue and fever.   Respiratory:  Negative for cough and shortness of breath.    Cardiovascular:  Negative for chest pain and leg swelling.   Gastrointestinal:  Negative for blood in stool, diarrhea, nausea and vomiting.   Musculoskeletal:  Negative for arthralgias, gait problem and myalgias.   Skin:  Positive for color change and wound.   Neurological:  Negative for dizziness, light-headedness and headaches.   Psychiatric/Behavioral:  Negative for agitation. The patient is not nervous/anxious.        Objective:     Vitals:    22 1428   BP: 134/74   Pulse: 95   SpO2: 98%   Weight: 100.4 kg (221 lb 4.8 oz)   Height: 5' 8" (1.727 m)          Physical Exam  Constitutional:       Appearance: Normal appearance.   HENT:      Head: Normocephalic and atraumatic.   Eyes:      General: No scleral icterus.     Extraocular Movements: Extraocular movements intact.   Cardiovascular:      Rate and Rhythm: Normal rate and regular rhythm.      Pulses:           Popliteal pulses are 2+ on the right side and 2+ on the left side.        Dorsalis pedis pulses are 2+ on the right side and 2+ on the left side.      Heart sounds: Normal heart sounds.   Pulmonary:      Effort: Pulmonary effort is normal.      Breath sounds: Normal breath sounds.   Musculoskeletal:      Right lower le+ Edema present.      Left lower le+ Edema present.        Feet:    Feet:      Right foot:      Protective Sensation: 10 sites tested.  7 sites sensed.      Skin integrity: Ulcer and erythema present. No callus.      Left foot:      Protective Sensation: 10 sites tested.  8 sites sensed.      Skin integrity: Callus present.      Comments: Right great toe ulcer. See attached photo.     Right foot with decreased sensation to right " big toe.    Left decrease sensation to lateral aspect of foot.      Skin:     General: Skin is warm and dry.   Neurological:      General: No focal deficit present.      Mental Status: He is alert and oriented to person, place, and time.   Psychiatric:         Mood and Affect: Mood normal.         Speech: Speech normal.                 Laboratory:  CBC:  No results for input(s): WBC, RBC, HGB, HCT, PLT, MCV, MCH, MCHC in the last 2160 hours.  CMP:  No results for input(s): GLU, CALCIUM, ALBUMIN, PROT, NA, K, CO2, CL, BUN, ALKPHOS, ALT, AST, BILITOT in the last 2160 hours.    Invalid input(s): CREATININ  URINALYSIS:  No results for input(s): COLORU, CLARITYU, SPECGRAV, PHUR, PROTEINUA, GLUCOSEU, BILIRUBINCON, BLOODU, WBCU, RBCU, BACTERIA, MUCUS, NITRITE, LEUKOCYTESUR, UROBILINOGEN, HYALINECASTS in the last 2160 hours.   LIPIDS:  No results for input(s): TSH, HDL, CHOL, TRIG, LDLCALC, CHOLHDL, NONHDLCHOL, TOTALCHOLEST in the last 2160 hours.  TSH:  No results for input(s): TSH in the last 2160 hours.  A1C:  No results for input(s): HGBA1C in the last 2160 hours.    Assessment:         ICD-10-CM ICD-9-CM   1. Essential hypertension  I10 401.9   2. Acute superficial gastritis without hemorrhage  K29.00 535.40   3. Mixed hyperlipidemia  E78.2 272.2   4. Type 2 diabetes mellitus with diabetic polyneuropathy, with long-term current use of insulin  E11.42 250.60    Z79.4 357.2     V58.67   5. Obesity (BMI 30-39.9)  E66.9 278.00   6. Gastroparesis  K31.84 536.3   7. JAZMYN (obstructive sleep apnea)  G47.33 327.23   8. Diabetic ulcer of toe of right foot associated with type 2 diabetes mellitus, unspecified ulcer stage  E11.621 250.80    L97.519 707.15       Plan:       Essential hypertension  -     losartan (COZAAR) 100 MG tablet; Take 0.5 tablets (50 mg total) by mouth once daily.  Dispense: 45 tablet; Refill: 1  Chronic; stable on medication. Continue medication as prescribed.    Acute superficial gastritis without  hemorrhage  -     pantoprazole (PROTONIX) 40 MG tablet; Take 1 tablet (40 mg total) by mouth once daily.  Dispense: 90 tablet; Refill: 1  Chronic; stable on medication. Follow up with PCP.    Mixed hyperlipidemia  -     niacin 100 MG Tab; Take 2 tablets (200 mg total) by mouth every evening.  Dispense: 90 tablet; Refill: 1  Chronic; stable on medication. Continue medication as prescribed.    Type 2 diabetes mellitus with diabetic polyneuropathy, with long-term current use of insulin  -     metFORMIN (GLUCOPHAGE) 500 MG tablet; Take 2 tablets (1,000 mg total) by mouth 2 (two) times daily with meals.  Dispense: 360 tablet; Refill: 3  A1C still above goal. Encouraged to continue medication as prescribed.   Work on diet modifications to include low carb, low sugars, high fiber.   Exercise at least 3 times per week for 30-45 minutes ore more.   Schedule follow up appointment with Endocrinology.     Obesity (BMI 30-39.9)  Work on diet modification and increase in physical activity.     Gastroparesis  Chronic; stable on medication. Continue medication as prescribed.    JAZMYN (obstructive sleep apnea)  Chronic. Stable. Awaiting new CPAP. Followed by Sleep Med.     Diabetic ulcer of toe of right foot associated with type 2 diabetes mellitus, unspecified ulcer stage  -     cephALEXin (KEFLEX) 500 MG capsule; Take 1 capsule (500 mg total) by mouth every 6 (six) hours. for 7 days  Dispense: 28 capsule; Refill: 0  Assisted patient to obtain appt with Podiatry. Encouraged to keep appointment.   Dicussed proper offloading and management of diabetes.     If symptoms worsen, go to ER.  If symptoms do not improve, return to clinic.   Keep appointments with all specialists.     Patient verbalizes understanding and agrees with current treatment plan.    Follow up in about 3 months (around 2/8/2023), or if symptoms worsen or fail to improve.     Patient's Medications   New Prescriptions    CEPHALEXIN (KEFLEX) 500 MG CAPSULE    Take 1  capsule (500 mg total) by mouth every 6 (six) hours. for 7 days    MUPIROCIN (BACTROBAN) 2 % OINTMENT    Apply topically 3 (three) times daily.   Previous Medications    ALLOPURINOL (ZYLOPRIM) 300 MG TABLET    Take 1 tablet (300 mg total) by mouth once daily.    ASPIRIN (ECOTRIN) 81 MG EC TABLET    Take 81 mg by mouth once daily.    ATORVASTATIN (LIPITOR) 40 MG TABLET    Take 1 tablet (40 mg total) by mouth once daily.    BLOOD SUGAR DIAGNOSTIC (CONTOUR NEXT TEST STRIPS) STRP    To test glucose 6 times a day.    BLOOD-GLUCOSE SENSOR (DEXCOM G6 SENSOR) MARANDA    Use as directed. Change sensor every 10 days.    BLOOD-GLUCOSE TRANSMITTER (DEXCOM G6 TRANSMITTER) MARANDA    1 each by Misc.(Non-Drug; Combo Route) route continuous as needed    COLLAGENASE (SANTYL) OINTMENT    Apply topically once daily.    FENOFIBRATE 160 MG TAB    Take 1 tablet (160 mg total) by mouth once daily.    ICOSAPENT ETHYL (VASCEPA) 1 GRAM CAP    Take 2 capsules (2 g total) by mouth 2 (two) times daily.    INSULIN (LANTUS SOLOSTAR U-100 INSULIN) GLARGINE 100 UNITS/ML SUBQ PEN    Inject 38 Units into the skin 2 (two) times a day.    INSULIN ASPART U-100 (NOVOLOG FLEXPEN U-100 INSULIN) 100 UNIT/ML (3 ML) INPN PEN    Inject 28 Units into the skin before breakfast AND 28 Units daily with lunch AND 30 Units before dinner. Plus correction scale. Max TDD 100units..    ONDANSETRON (ZOFRAN) 4 MG TABLET    Take 1 tablet (4 mg total) by mouth every 6 (six) hours as needed for Nausea.    PREGABALIN (LYRICA) 150 MG CAPSULE    Take 1 capsule (150 mg total) by mouth 2 (two) times daily.   Modified Medications    Modified Medication Previous Medication    LOSARTAN (COZAAR) 100 MG TABLET losartan (COZAAR) 100 MG tablet       Take 0.5 tablets (50 mg total) by mouth once daily.    Take 0.5 tablets (50 mg total) by mouth once daily.    METFORMIN (GLUCOPHAGE) 500 MG TABLET metFORMIN (GLUCOPHAGE) 500 MG tablet       Take 2 tablets (1,000 mg total) by mouth 2 (two) times  daily with meals.    Take 1 tablet (500 mg total) by mouth daily with breakfast AND 2 tablets (1,000 mg total) daily with dinner or evening meal.    NIACIN 100 MG TAB niacin 100 MG Tab       Take 2 tablets (200 mg total) by mouth every evening.    Take 200 mg by mouth every evening.     PANTOPRAZOLE (PROTONIX) 40 MG TABLET pantoprazole (PROTONIX) 40 MG tablet       Take 1 tablet (40 mg total) by mouth once daily.    Take 1 tablet (40 mg total) by mouth once daily.   Discontinued Medications    No medications on file         Britney Jimenez NP

## 2022-11-08 NOTE — PATIENT INSTRUCTIONS
Schedule follow up appointment with Endocrinology.     Please try to stay off of foot/offloading  Work on glucose control with diet modification   Keep appt with Dr. Melvin tomorrow.

## 2022-11-09 ENCOUNTER — OFFICE VISIT (OUTPATIENT)
Dept: PODIATRY | Facility: CLINIC | Age: 42
End: 2022-11-09
Payer: COMMERCIAL

## 2022-11-09 VITALS — WEIGHT: 222.13 LBS | HEIGHT: 68 IN | BODY MASS INDEX: 33.67 KG/M2

## 2022-11-09 DIAGNOSIS — L97.512 DIABETIC ULCER OF TOE OF RIGHT FOOT ASSOCIATED WITH TYPE 2 DIABETES MELLITUS, WITH FAT LAYER EXPOSED: Primary | ICD-10-CM

## 2022-11-09 DIAGNOSIS — E11.621 DIABETIC ULCER OF TOE OF RIGHT FOOT ASSOCIATED WITH TYPE 2 DIABETES MELLITUS, WITH FAT LAYER EXPOSED: Primary | ICD-10-CM

## 2022-11-09 DIAGNOSIS — E10.42 DIABETIC POLYNEUROPATHY ASSOCIATED WITH TYPE 1 DIABETES MELLITUS: ICD-10-CM

## 2022-11-09 PROCEDURE — 99213 PR OFFICE/OUTPT VISIT, EST, LEVL III, 20-29 MIN: ICD-10-PCS | Mod: 25,S$GLB,, | Performed by: PODIATRIST

## 2022-11-09 PROCEDURE — 3061F PR NEG MICROALBUMINURIA RESULT DOCUMENTED/REVIEW: ICD-10-PCS | Mod: CPTII,S$GLB,, | Performed by: PODIATRIST

## 2022-11-09 PROCEDURE — 3046F PR MOST RECENT HEMOGLOBIN A1C LEVEL > 9.0%: ICD-10-PCS | Mod: CPTII,S$GLB,, | Performed by: PODIATRIST

## 2022-11-09 PROCEDURE — 1160F PR REVIEW ALL MEDS BY PRESCRIBER/CLIN PHARMACIST DOCUMENTED: ICD-10-PCS | Mod: CPTII,S$GLB,, | Performed by: PODIATRIST

## 2022-11-09 PROCEDURE — 11042 DBRDMT SUBQ TIS 1ST 20SQCM/<: CPT | Mod: S$GLB,,, | Performed by: PODIATRIST

## 2022-11-09 PROCEDURE — 3046F HEMOGLOBIN A1C LEVEL >9.0%: CPT | Mod: CPTII,S$GLB,, | Performed by: PODIATRIST

## 2022-11-09 PROCEDURE — 3008F PR BODY MASS INDEX (BMI) DOCUMENTED: ICD-10-PCS | Mod: CPTII,S$GLB,, | Performed by: PODIATRIST

## 2022-11-09 PROCEDURE — 99999 PR PBB SHADOW E&M-EST. PATIENT-LVL III: ICD-10-PCS | Mod: PBBFAC,,, | Performed by: PODIATRIST

## 2022-11-09 PROCEDURE — 3061F NEG MICROALBUMINURIA REV: CPT | Mod: CPTII,S$GLB,, | Performed by: PODIATRIST

## 2022-11-09 PROCEDURE — 3066F NEPHROPATHY DOC TX: CPT | Mod: CPTII,S$GLB,, | Performed by: PODIATRIST

## 2022-11-09 PROCEDURE — 4010F PR ACE/ARB THEARPY RXD/TAKEN: ICD-10-PCS | Mod: CPTII,S$GLB,, | Performed by: PODIATRIST

## 2022-11-09 PROCEDURE — 3008F BODY MASS INDEX DOCD: CPT | Mod: CPTII,S$GLB,, | Performed by: PODIATRIST

## 2022-11-09 PROCEDURE — 3066F PR DOCUMENTATION OF TREATMENT FOR NEPHROPATHY: ICD-10-PCS | Mod: CPTII,S$GLB,, | Performed by: PODIATRIST

## 2022-11-09 PROCEDURE — 1159F PR MEDICATION LIST DOCUMENTED IN MEDICAL RECORD: ICD-10-PCS | Mod: CPTII,S$GLB,, | Performed by: PODIATRIST

## 2022-11-09 PROCEDURE — 1160F RVW MEDS BY RX/DR IN RCRD: CPT | Mod: CPTII,S$GLB,, | Performed by: PODIATRIST

## 2022-11-09 PROCEDURE — 4010F ACE/ARB THERAPY RXD/TAKEN: CPT | Mod: CPTII,S$GLB,, | Performed by: PODIATRIST

## 2022-11-09 PROCEDURE — 11042 WOUND DEBRIDEMENT: ICD-10-PCS | Mod: S$GLB,,, | Performed by: PODIATRIST

## 2022-11-09 PROCEDURE — 99999 PR PBB SHADOW E&M-EST. PATIENT-LVL III: CPT | Mod: PBBFAC,,, | Performed by: PODIATRIST

## 2022-11-09 PROCEDURE — 99213 OFFICE O/P EST LOW 20 MIN: CPT | Mod: 25,S$GLB,, | Performed by: PODIATRIST

## 2022-11-09 PROCEDURE — 1159F MED LIST DOCD IN RCRD: CPT | Mod: CPTII,S$GLB,, | Performed by: PODIATRIST

## 2022-11-09 RX ORDER — MUPIROCIN 20 MG/G
OINTMENT TOPICAL 3 TIMES DAILY
Qty: 15 G | Refills: 1 | Status: SHIPPED | OUTPATIENT
Start: 2022-11-09 | End: 2024-01-11 | Stop reason: SDUPTHER

## 2022-11-09 NOTE — PROGRESS NOTES
"  Subjective:      Patient ID: Bay Ibarra is a 42 y.o. male.    Chief Complaint: Diabetic Foot Ulcer (Right great toe)      Patient is 42 y.o. male with Past medical history of hypertension, hyperlipidemia, type 2 diabetes, chronic pancreatitis was recently hospitalized for having abdominal pain, nausea and vomiting.  Patient was admitted to ICU for management of acute diabetic ketoacidosis, UTI.  Patient is following up with left hallux wound that was present during hospitalization.  He noticed a wound 1 month ago.  Last measured glucose level was 85.  Patient is ambulating in tennis shoes.  Patient complains of numbness and tingling on bilateral lower extremity.    1/4/21 f/u L hallux wound. Has been applying winston. In tennis shoe today. I recommended WBAT in sx shoe last time but can not wear surgical shoe all the time due to his work. Tells me he can not wear open toe shoe at his work. He tries to walk with sx shoe at his house.     2/8/21 f/u L worsening of left hallux. In tennis shoe today. His wound appears to be worse today. Works for [employer]. On his feet 5-6 hours a day. He has to wear closed toe shoe. I prescribed DM shoe last time. Did not get fitted. Has been applying winston for LWC. DM poorly controlled.     2/22/21 f/u L diabetic hallux ulcer.  Ambulating in surgical shoe with felt.  Has been applying Santyl and triple antibiotic. DM not well controlled per pt. Tells me finished oral abx.     3/8/21 f/u L hallux ulcer. Has been offloaded with surgical shoe with felt. Denies pain. Tells me his glucose is "somewhat" controlled. LWC with santyl.     3/22/21 f/u L hallux ulcer. Minimal improvement as compared to last visit. Has been applying santyl. WBAT in sx shoe with felt to offload the IPJ of hallux. Glucose is not well controlled overall.      4/5/21 follow-up left hallux ulcer. Worsening of an ulcer today. Tells me he has been walking more than usual for his job duty.  Has been " "applying Santyl.  Ambulating in surgical shoe with felt. . Here for dressing change.     4/19/21 f/u L hallux ulcer. In sx shoe with felt. Glucose is not well controlled. Has not taken insulin for last 2 days because "he was out of it". Has been applying santyl. Slow/minimal healing noted on L hallux ulcer.     5/3/21 f/u L hallux ulcer. S/p plantar fascia medial band release in the office. Ambulating in sx shoe with felt. Pain is minimal over procedure site. Has been applying triple ointment instead santyl since he was out of santyl.     5/24/21 f/u L hallux ulcer. In regular tennis shoe today. Has been applying santyl. Denies L foot pain.     6/14/21 f/u L hallux ulcer. Size of the wound is smaller and shallower. Has been applying triple ointment. In regular tennis shoe.     7/12/21 f/u L foot DM ulcer. Plantar hallux wound is completely healed but presenting with ingrown toenail along medial border of hallux. Denies pain. Noticed a week ago. Also prsenting with new ulcer on platnar lateral 5th toe. Noticed it couple days ago. Denies pus. No pain. Admits that he has been walking in rain with closed toe shoe. He also answers "my glucose has been up and down".     8/12/21 f/u L hallux ulcer. It was completely closed but opened back up. Tells me he has been working and walking in working boot which got wet and caused re occurrence of ulcer. In regular tennis shoe today. Glucose is not well controlled per pt. Last glucose check yesterday was 220.     11/9/22 presenting with right hallux ulcer.  Patient noticed an ulcer on the bottom of hallux couple weeks ago.  Patient has been cleaning with triple antibiotic.  Patient is ambulating in regular tennis shoes.  Patient tells me his glucose fluctuates.  Left hallux ulcer is completely healed.  Denies acute foot injury.  Was recently evaluated by nurse practitioner yesterday and prescribed Keflex.  Patient has not picked up the medication yet.      Hemoglobin A1C   Date " Value Ref Range Status   01/19/2022 10.3 (H) 4.0 - 5.6 % Final     Comment:     ADA Screening Guidelines:  5.7-6.4%  Consistent with prediabetes  >or=6.5%  Consistent with diabetes    High levels of fetal hemoglobin interfere with the HbA1C  assay. Heterozygous hemoglobin variants (HbS, HgC, etc)do  not significantly interfere with this assay.   However, presence of multiple variants may affect accuracy.     07/12/2021 11.7 (H) 4.0 - 5.6 % Final     Comment:     ADA Screening Guidelines:  5.7-6.4%  Consistent with prediabetes  >or=6.5%  Consistent with diabetes    High levels of fetal hemoglobin interfere with the HbA1C  assay. Heterozygous hemoglobin variants (HbS, HgC, etc)do  not significantly interfere with this assay.   However, presence of multiple variants may affect accuracy.     03/05/2021 8.8 (H) 4.0 - 5.6 % Final     Comment:     ADA Screening Guidelines:  5.7-6.4%  Consistent with prediabetes  >or=6.5%  Consistent with diabetes    High levels of fetal hemoglobin interfere with the HbA1C  assay. Heterozygous hemoglobin variants (HbS, HgC, etc)do  not significantly interfere with this assay.   However, presence of multiple variants may affect accuracy.         Review of Systems   Constitutional: Negative for chills, decreased appetite, fever and malaise/fatigue.   HENT:  Negative for congestion, ear discharge and sore throat.    Eyes:  Negative for discharge and pain.   Cardiovascular:  Negative for chest pain, claudication and leg swelling.   Respiratory:  Negative for cough and shortness of breath.    Skin:  Positive for poor wound healing. Negative for color change, nail changes and rash.        Right hallux ulcer   Musculoskeletal:  Positive for gout and stiffness. Negative for arthritis, joint pain, joint swelling and muscle weakness.   Gastrointestinal:  Negative for bloating, abdominal pain, diarrhea, nausea and vomiting.   Genitourinary:  Negative for flank pain and hematuria.   Neurological:   Negative for headaches, numbness and weakness.   Psychiatric/Behavioral:  Negative for altered mental status.            Past Medical History:   Diagnosis Date    Acute pancreatitis     Diverticulosis of colon     Essential hypertension     Fatty liver     Gastric varices without bleeding 1/16/2020    Glaucomatous optic atrophy, right     Gout     Hx of acute pancreatitis 3/3/2017    Hypertriglyceridemia 8/2/2017    Obesity (BMI 30-39.9) 6/5/2015    Obesity (BMI 35.0-39.9 without comorbidity) 6/5/2015    Obstructive sleep apnea syndrome 12/18/2015    Type 2 diabetes mellitus with diabetic polyneuropathy, with long-term current use of insulin 10/16/2017    Type 2 diabetes mellitus with hyperglycemia, with long-term current use of insulin 8/3/2017       Past Surgical History:   Procedure Laterality Date    ENDOSCOPIC ULTRASOUND OF UPPER GASTROINTESTINAL TRACT N/A 5/27/2019    Procedure: ULTRASOUND, UPPER GI TRACT, ENDOSCOPIC;  Surgeon: Zafar Velazquez MD;  Location: [address]);  Service: Endoscopy;  Laterality: N/A;    ERCP N/A 5/27/2019    Procedure: ERCP (ENDOSCOPIC RETROGRADE CHOLANGIOPANCREATOGRAPHY);  Surgeon: Zafar Velazquez MD;  Location: [address]);  Service: Endoscopy;  Laterality: N/A;    ESOPHAGOGASTRODUODENOSCOPY      ESOPHAGOGASTRODUODENOSCOPY N/A 1/31/2020    Procedure: EGD (ESOPHAGOGASTRODUODENOSCOPY);  Surgeon: Zafar Velazquez MD;  Location: [facility];  Service: Endoscopy;  Laterality: N/A;       Family History   Problem Relation Age of Onset    Aneurysm Mother         AAA    Coronary artery disease Father         4 vessel bypass at 40, possible stent at 36    Diabetes type II Father     No Known Problems Sister     No Known Problems Brother     Aneurysm Maternal Grandmother         Possible AAA    Diabetes type II Paternal Grandmother        Social History     Socioeconomic History    Marital status:    Tobacco Use    Smoking status: Never    Smokeless tobacco: Never   Substance  and Sexual Activity    Alcohol use: No    Drug use: Never    Sexual activity: Yes     Partners: Female       Current Outpatient Medications   Medication Sig Dispense Refill    allopurinoL (ZYLOPRIM) 300 MG tablet Take 1 tablet (300 mg total) by mouth once daily. 90 tablet 0    aspirin (ECOTRIN) 81 MG EC tablet Take 81 mg by mouth once daily.      atorvastatin (LIPITOR) 40 MG tablet Take 1 tablet (40 mg total) by mouth once daily. 90 tablet 3    blood sugar diagnostic (CONTOUR NEXT TEST STRIPS) Strp To test glucose 6 times a day. 200 each 11    blood-glucose sensor (DEXCOM G6 SENSOR) Isha Use as directed. Change sensor every 10 days. 3 each PRN    blood-glucose transmitter (DEXCOM G6 TRANSMITTER) Isha 1 each by Misc.(Non-Drug; Combo Route) route continuous as needed 1 each PRN    cephALEXin (KEFLEX) 500 MG capsule Take 1 capsule (500 mg total) by mouth every 6 (six) hours. for 7 days 28 capsule 0    collagenase (SANTYL) ointment Apply topically once daily. 30 g 0    fenofibrate 160 MG Tab Take 1 tablet (160 mg total) by mouth once daily. 90 tablet 3    icosapent ethyL (VASCEPA) 1 gram Cap Take 2 capsules (2 g total) by mouth 2 (two) times daily. 360 capsule 0    insulin (LANTUS SOLOSTAR U-100 INSULIN) glargine 100 units/mL SubQ pen Inject 38 Units into the skin 2 (two) times a day. 18 mL 1    insulin aspart U-100 (NOVOLOG FLEXPEN U-100 INSULIN) 100 unit/mL (3 mL) InPn pen Inject 28 Units into the skin before breakfast AND 28 Units daily with lunch AND 30 Units before dinner. Plus correction scale. Max TDD 100units.. 30 mL 6    losartan (COZAAR) 100 MG tablet Take 0.5 tablets (50 mg total) by mouth once daily. 45 tablet 1    metFORMIN (GLUCOPHAGE) 500 MG tablet Take 2 tablets (1,000 mg total) by mouth 2 (two) times daily with meals. 360 tablet 3    niacin 100 MG Tab Take 2 tablets (200 mg total) by mouth every evening. 90 tablet 1    ondansetron (ZOFRAN) 4 MG tablet Take 1 tablet (4 mg total) by mouth every 6 (six)  "hours as needed for Nausea. 30 tablet 1    pantoprazole (PROTONIX) 40 MG tablet Take 1 tablet (40 mg total) by mouth once daily. 90 tablet 1    pregabalin (LYRICA) 150 MG capsule Take 1 capsule (150 mg total) by mouth 2 (two) times daily. 60 capsule 5    mupirocin (BACTROBAN) 2 % ointment Apply topically 3 (three) times daily. 15 g 1     No current facility-administered medications for this visit.       Review of patient's allergies indicates:  No Known Allergies    Vitals:    11/09/22 1456   Weight: 100.7 kg (222 lb 1.6 oz)   Height: 5' 8" (1.727 m)   PainSc:   3   PainLoc: Toe         Objective:      Physical Exam  Constitutional:       General: He is not in acute distress.     Appearance: He is well-developed.   HENT:      Nose: Nose normal.   Eyes:      Conjunctiva/sclera: Conjunctivae normal.   Pulmonary:      Effort: Pulmonary effort is normal.   Chest:      Chest wall: No tenderness.   Abdominal:      Tenderness: There is no abdominal tenderness.   Musculoskeletal:      Cervical back: Normal range of motion.   Neurological:      Mental Status: He is alert and oriented to person, place, and time.   Psychiatric:         Behavior: Behavior normal.     Vascular: Distal DP/PT pulses palpable 1/4. CRT < 3 sec to tips of toes. No vericosities noted to LEs. Hair growth present LE, warm to touch LE    Dermatologic:   Right foot: +ulcer on plantar hallux 1cm in diameter. No rubor, no erythema, no drainage, no sinus tract, no undermining. No probe to bone. No signs of infection.     Musculoskeletal:  No calf tenderness LE, Compartments soft/compressible.   Right foot: limited ROM of 1st MPJ.     Neurological:   Light touch, proprioception, and sharp/dull sensation are decreased. Protective threshold with the [place]ienstein monofilament is decreased. Vibratory sensation decreased.       1/4/21 2/8/21          Assessment:       Encounter Diagnoses   Name Primary?    Diabetic ulcer of toe of right foot associated with " type 2 diabetes mellitus, with fat layer exposed Yes    Diabetic polyneuropathy associated with type 1 diabetes mellitus            Plan:       Bay was seen today for diabetic foot ulcer.    Diagnoses and all orders for this visit:    Diabetic ulcer of toe of right foot associated with type 2 diabetes mellitus, with fat layer exposed    Diabetic polyneuropathy associated with type 1 diabetes mellitus    Other orders  -     mupirocin (BACTROBAN) 2 % ointment; Apply topically 3 (three) times daily.      I counseled the patient on his conditions, their implications and medical management.    42 y.o. male with R hallux ulcer. History of left foot diabetic ulcer s/p medial band plantar fascia release percutaneous.     -rx. bactroban  -R hallux stable ulcer without infection. I debrided right hallux wound. See procedure note. Stressed the importance of appropriate offloading of the ulcer with glucose control.  Weightbearing as tolerated in Darco wedge shoes to offload the forefoot.  Local care with Bactroban.     -The nature of the condition, options for management, as well as potential risks and complications were discussed in detail with patient. Patient was amenable to my recommendations and left my office fully informed and will follow up as instructed or sooner if necessary.    -Patient was advised of signs and symptoms of infection including redness, drainage, purulence, odor, streaking, fever, chills and I advised patient to seek medical attention (ER or urgent care) if these symptoms arise.   -Advised for optimal glucose control and maintenance per primary care physician. Patient was also educated on healthy diet that is naturally rich in nutrients and low in fat and calories.   -f/u 3 wks    Note dictated with voice recognition software, please excuse any grammatical errors.

## 2022-11-09 NOTE — PROCEDURES
"Wound Debridement    Date/Time: 11/9/2022 2:30 PM  Performed by: Anju Melvin DPM  Authorized by: Anju Melvin DPM     Time out: Immediately prior to procedure a "time out" was called to verify the correct patient, procedure, equipment, support staff and site/side marked as required.    Consent Done?:  Yes (Verbal)    Preparation: Patient was prepped and draped in usual sterile fashion      Wound Details:    Location:  Right foot    Location:  Right 1st Toe    Type of Debridement:  Excisional       Length (cm):  1       Area (sq cm):  1       Width (cm):  1       Percent Debrided (%):  100       Depth (cm):  0.3       Total Area Debrided (sq cm):  1    Depth of debridement:  Subcutaneous tissue    Tissue debrided:  Subcutaneous    Devitalized tissue debrided:  Callus and Biofilm    Instruments:  Curette and Blade    Bleeding:  Minimal  Hemostasis Achieved: Yes    Method Used:  Pressure  Patient tolerance:  Patient tolerated the procedure well with no immediate complications  "

## 2022-11-21 DIAGNOSIS — E10.65 TYPE 1 DIABETES MELLITUS WITH HYPERGLYCEMIA: ICD-10-CM

## 2022-11-21 RX ORDER — INSULIN GLARGINE 100 [IU]/ML
38 INJECTION, SOLUTION SUBCUTANEOUS 2 TIMES DAILY
Qty: 30 ML | Refills: 1 | Status: SHIPPED | OUTPATIENT
Start: 2022-11-21 | End: 2023-01-24 | Stop reason: SDUPTHER

## 2022-11-21 NOTE — TELEPHONE ENCOUNTER
No new care gaps identified.  [facility] Embedded Care Gaps. Reference number: [id number]. 11/21/2022   10:53:24 AM CST

## 2022-12-30 ENCOUNTER — PATIENT MESSAGE (OUTPATIENT)
Dept: FAMILY MEDICINE | Facility: CLINIC | Age: 42
End: 2022-12-30
Payer: COMMERCIAL

## 2022-12-30 DIAGNOSIS — E10.40 TYPE 1 DIABETES MELLITUS WITH DIABETIC NEUROPATHY: ICD-10-CM

## 2022-12-30 RX ORDER — PREGABALIN 150 MG/1
150 CAPSULE ORAL 2 TIMES DAILY
Qty: 60 CAPSULE | Refills: 5 | Status: SHIPPED | OUTPATIENT
Start: 2022-12-30 | End: 2023-03-03 | Stop reason: SDUPTHER

## 2022-12-30 NOTE — TELEPHONE ENCOUNTER
Care Due:                  Date            Visit Type   Department     Provider  --------------------------------------------------------------------------------                                MYCHART                              FOLLOWUP/OF  [facility]  Last Visit: 01-      [facility]  Next Visit: None Scheduled  None         None Found                                                            Last  Test          Frequency    Reason                     Performed    Due Date  --------------------------------------------------------------------------------    Office Visit  12 months..  allopurinoL,               01- 01-                             atorvastatin,                             fenofibrate, icosapent,                             insulin..................    CBC.........  12 months..  allopurinoL, fenofibrate.  03- 02-    CMP.........  12 months..  allopurinoL,               03- 02-                             atorvastatin,                             fenofibrate, icosapent,                             insulin..................    Uric Acid...  12 months..  allopurinoL..............  Not Found    Overdue    [employer] Embedded Care Gaps. Reference number: [id number]. 12/30/2022   1:42:42 PM CST

## 2023-01-11 ENCOUNTER — PATIENT MESSAGE (OUTPATIENT)
Dept: FAMILY MEDICINE | Facility: CLINIC | Age: 43
End: 2023-01-11
Payer: COMMERCIAL

## 2023-01-11 DIAGNOSIS — E11.65 TYPE 2 DIABETES MELLITUS WITH HYPERGLYCEMIA, WITH LONG-TERM CURRENT USE OF INSULIN: ICD-10-CM

## 2023-01-11 DIAGNOSIS — Z79.4 TYPE 2 DIABETES MELLITUS WITH HYPERGLYCEMIA, WITH LONG-TERM CURRENT USE OF INSULIN: ICD-10-CM

## 2023-01-11 RX ORDER — INSULIN ASPART 100 [IU]/ML
INJECTION, SOLUTION INTRAVENOUS; SUBCUTANEOUS
Qty: 30 ML | Refills: 6 | Status: SHIPPED | OUTPATIENT
Start: 2023-01-11 | End: 2023-06-07

## 2023-01-11 NOTE — TELEPHONE ENCOUNTER
No new care gaps identified.  [facility] Embedded Care Gaps. Reference number: [id number]. 1/11/2023   12:58:51 PM CST

## 2023-01-13 ENCOUNTER — TELEPHONE (OUTPATIENT)
Dept: PHARMACY | Facility: CLINIC | Age: 43
End: 2023-01-13
Payer: COMMERCIAL

## 2023-01-16 DIAGNOSIS — U07.1 COVID-19 VIRUS DETECTED: ICD-10-CM

## 2023-01-17 ENCOUNTER — TELEPHONE (OUTPATIENT)
Dept: PODIATRY | Facility: CLINIC | Age: 43
End: 2023-01-17
Payer: COMMERCIAL

## 2023-01-17 NOTE — TELEPHONE ENCOUNTER
----- Message from Eldon Nguyễn sent at 1/17/2023 11:18 AM CST -----  Type:  Needs Medical Advice    Who Called: racheal/ case managment  Reason:schedule from referral   Would the patient rather a call back or a response via [facility]? call  Best Call Back Number: [phone]  Additional Information: none

## 2023-01-20 ENCOUNTER — PATIENT MESSAGE (OUTPATIENT)
Dept: FAMILY MEDICINE | Facility: CLINIC | Age: 43
End: 2023-01-20
Payer: COMMERCIAL

## 2023-01-20 DIAGNOSIS — E78.2 MIXED HYPERLIPIDEMIA: ICD-10-CM

## 2023-01-20 DIAGNOSIS — E78.1 HYPERTRIGLYCERIDEMIA: ICD-10-CM

## 2023-01-20 NOTE — TELEPHONE ENCOUNTER
No new care gaps identified.  [facility] Embedded Care Gaps. Reference number: [id number]. 1/20/2023   7:59:26 AM CST

## 2023-01-22 RX ORDER — ALLOPURINOL 300 MG/1
300 TABLET ORAL DAILY
Qty: 90 TABLET | Refills: 3 | Status: SHIPPED | OUTPATIENT
Start: 2023-01-22 | End: 2023-07-25 | Stop reason: SDUPTHER

## 2023-01-22 RX ORDER — FENOFIBRATE 160 MG/1
160 TABLET ORAL DAILY
Qty: 90 TABLET | Refills: 3 | Status: SHIPPED | OUTPATIENT
Start: 2023-01-22 | End: 2023-07-25 | Stop reason: SDUPTHER

## 2023-01-22 RX ORDER — ICOSAPENT ETHYL 1000 MG/1
2 CAPSULE ORAL 2 TIMES DAILY
Qty: 360 CAPSULE | Refills: 0 | Status: SHIPPED | OUTPATIENT
Start: 2023-01-22 | End: 2023-04-11 | Stop reason: SDUPTHER

## 2023-01-24 ENCOUNTER — TELEPHONE (OUTPATIENT)
Dept: ENDOCRINOLOGY | Facility: CLINIC | Age: 43
End: 2023-01-24
Payer: COMMERCIAL

## 2023-01-24 ENCOUNTER — OFFICE VISIT (OUTPATIENT)
Dept: FAMILY MEDICINE | Facility: CLINIC | Age: 43
End: 2023-01-24
Payer: COMMERCIAL

## 2023-01-24 VITALS
HEIGHT: 68 IN | BODY MASS INDEX: 32.15 KG/M2 | OXYGEN SATURATION: 95 % | SYSTOLIC BLOOD PRESSURE: 120 MMHG | HEART RATE: 105 BPM | TEMPERATURE: 98 F | WEIGHT: 212.13 LBS | DIASTOLIC BLOOD PRESSURE: 70 MMHG

## 2023-01-24 DIAGNOSIS — Z79.4 TYPE 2 DIABETES MELLITUS WITH DIABETIC POLYNEUROPATHY, WITH LONG-TERM CURRENT USE OF INSULIN: Primary | ICD-10-CM

## 2023-01-24 DIAGNOSIS — E78.2 MIXED HYPERLIPIDEMIA: Chronic | ICD-10-CM

## 2023-01-24 DIAGNOSIS — I10 ESSENTIAL HYPERTENSION: Chronic | ICD-10-CM

## 2023-01-24 DIAGNOSIS — R51.9 OCCIPITAL HEADACHE: ICD-10-CM

## 2023-01-24 DIAGNOSIS — E11.42 TYPE 2 DIABETES MELLITUS WITH DIABETIC POLYNEUROPATHY, WITH LONG-TERM CURRENT USE OF INSULIN: Primary | ICD-10-CM

## 2023-01-24 PROCEDURE — 3078F DIAST BP <80 MM HG: CPT | Mod: CPTII,S$GLB,, | Performed by: FAMILY MEDICINE

## 2023-01-24 PROCEDURE — 3046F HEMOGLOBIN A1C LEVEL >9.0%: CPT | Mod: CPTII,S$GLB,, | Performed by: FAMILY MEDICINE

## 2023-01-24 PROCEDURE — 1160F PR REVIEW ALL MEDS BY PRESCRIBER/CLIN PHARMACIST DOCUMENTED: ICD-10-PCS | Mod: CPTII,S$GLB,, | Performed by: FAMILY MEDICINE

## 2023-01-24 PROCEDURE — 3078F PR MOST RECENT DIASTOLIC BLOOD PRESSURE < 80 MM HG: ICD-10-PCS | Mod: CPTII,S$GLB,, | Performed by: FAMILY MEDICINE

## 2023-01-24 PROCEDURE — 99999 PR PBB SHADOW E&M-EST. PATIENT-LVL V: CPT | Mod: PBBFAC,,, | Performed by: FAMILY MEDICINE

## 2023-01-24 PROCEDURE — 3072F PR LOW RISK FOR RETINOPATHY: ICD-10-PCS | Mod: CPTII,S$GLB,, | Performed by: FAMILY MEDICINE

## 2023-01-24 PROCEDURE — 99214 PR OFFICE/OUTPT VISIT, EST, LEVL IV, 30-39 MIN: ICD-10-PCS | Mod: S$GLB,,, | Performed by: FAMILY MEDICINE

## 2023-01-24 PROCEDURE — 4010F ACE/ARB THERAPY RXD/TAKEN: CPT | Mod: CPTII,S$GLB,, | Performed by: FAMILY MEDICINE

## 2023-01-24 PROCEDURE — 1159F PR MEDICATION LIST DOCUMENTED IN MEDICAL RECORD: ICD-10-PCS | Mod: CPTII,S$GLB,, | Performed by: FAMILY MEDICINE

## 2023-01-24 PROCEDURE — 99214 OFFICE O/P EST MOD 30 MIN: CPT | Mod: S$GLB,,, | Performed by: FAMILY MEDICINE

## 2023-01-24 PROCEDURE — 4010F PR ACE/ARB THEARPY RXD/TAKEN: ICD-10-PCS | Mod: CPTII,S$GLB,, | Performed by: FAMILY MEDICINE

## 2023-01-24 PROCEDURE — 3074F PR MOST RECENT SYSTOLIC BLOOD PRESSURE < 130 MM HG: ICD-10-PCS | Mod: CPTII,S$GLB,, | Performed by: FAMILY MEDICINE

## 2023-01-24 PROCEDURE — 1160F RVW MEDS BY RX/DR IN RCRD: CPT | Mod: CPTII,S$GLB,, | Performed by: FAMILY MEDICINE

## 2023-01-24 PROCEDURE — 3072F LOW RISK FOR RETINOPATHY: CPT | Mod: CPTII,S$GLB,, | Performed by: FAMILY MEDICINE

## 2023-01-24 PROCEDURE — 3008F PR BODY MASS INDEX (BMI) DOCUMENTED: ICD-10-PCS | Mod: CPTII,S$GLB,, | Performed by: FAMILY MEDICINE

## 2023-01-24 PROCEDURE — 99999 PR PBB SHADOW E&M-EST. PATIENT-LVL V: ICD-10-PCS | Mod: PBBFAC,,, | Performed by: FAMILY MEDICINE

## 2023-01-24 PROCEDURE — 1159F MED LIST DOCD IN RCRD: CPT | Mod: CPTII,S$GLB,, | Performed by: FAMILY MEDICINE

## 2023-01-24 PROCEDURE — 3074F SYST BP LT 130 MM HG: CPT | Mod: CPTII,S$GLB,, | Performed by: FAMILY MEDICINE

## 2023-01-24 PROCEDURE — 3046F PR MOST RECENT HEMOGLOBIN A1C LEVEL > 9.0%: ICD-10-PCS | Mod: CPTII,S$GLB,, | Performed by: FAMILY MEDICINE

## 2023-01-24 PROCEDURE — 3008F BODY MASS INDEX DOCD: CPT | Mod: CPTII,S$GLB,, | Performed by: FAMILY MEDICINE

## 2023-01-24 RX ORDER — INSULIN GLARGINE 100 [IU]/ML
42 INJECTION, SOLUTION SUBCUTANEOUS 2 TIMES DAILY
Qty: 30 ML | Refills: 1 | Status: SHIPPED | OUTPATIENT
Start: 2023-01-24 | End: 2023-03-14

## 2023-01-24 RX ORDER — NAPROXEN 500 MG/1
500 TABLET ORAL 2 TIMES DAILY PRN
Qty: 30 TABLET | Refills: 0 | Status: SHIPPED | OUTPATIENT
Start: 2023-01-24 | End: 2023-04-18

## 2023-01-24 RX ORDER — CYCLOBENZAPRINE HCL 5 MG
5 TABLET ORAL 3 TIMES DAILY PRN
Qty: 30 TABLET | Refills: 0 | Status: SHIPPED | OUTPATIENT
Start: 2023-01-24 | End: 2023-02-04

## 2023-01-24 NOTE — PROGRESS NOTES
PATIENT VISIT FAMILY MEDICINE    CC:   Chief Complaint   Patient presents with    Follow-up     Hospital        HPI: Bay Ibarra  is a 42 y.o. male:    Patient is known to me.  Patient presents alone for routine follow up on chronic conditions.    Diabetes  A1c 12.2%  Notes sugars have been better. Plans on getting his new Dexcom transmitter later this week. Does not have upcoming appt with Endo      ROS: Review of Systems   Constitutional:  Negative for fever.   Respiratory:  Negative for shortness of breath.    Cardiovascular:  Negative for chest pain.     PMHX:   Past Medical History:   Diagnosis Date    Acute pancreatitis     Diverticulosis of colon     Essential hypertension     Fatty liver     Gastric varices without bleeding 1/16/2020    Glaucomatous optic atrophy, right     Gout     Hx of acute pancreatitis 3/3/2017    Hypertriglyceridemia 8/2/2017    Obesity (BMI 30-39.9) 6/5/2015    Obesity (BMI 35.0-39.9 without comorbidity) 6/5/2015    Obstructive sleep apnea syndrome 12/18/2015    Type 2 diabetes mellitus with diabetic polyneuropathy, with long-term current use of insulin 10/16/2017    Type 2 diabetes mellitus with hyperglycemia, with long-term current use of insulin 8/3/2017       PSHX:   Past Surgical History:   Procedure Laterality Date    ENDOSCOPIC ULTRASOUND OF UPPER GASTROINTESTINAL TRACT N/A 5/27/2019    Procedure: ULTRASOUND, UPPER GI TRACT, ENDOSCOPIC;  Surgeon: Zafar Velazquez MD;  Location: [address]);  Service: Endoscopy;  Laterality: N/A;    ERCP N/A 5/27/2019    Procedure: ERCP (ENDOSCOPIC RETROGRADE CHOLANGIOPANCREATOGRAPHY);  Surgeon: Zafar Velazquez MD;  Location: [address]);  Service: Endoscopy;  Laterality: N/A;    ESOPHAGOGASTRODUODENOSCOPY      ESOPHAGOGASTRODUODENOSCOPY N/A 1/31/2020    Procedure: EGD (ESOPHAGOGASTRODUODENOSCOPY);  Surgeon: Zafar Velazquez MD;  Location: [facility];  Service: Endoscopy;  Laterality: N/A;       FAMHX:   Family History    Problem Relation Age of Onset    Aneurysm Mother         AAA    Coronary artery disease Father         4 vessel bypass at 40, possible stent at 36    Diabetes type II Father     No Known Problems Sister     No Known Problems Brother     Aneurysm Maternal Grandmother         Possible AAA    Diabetes type II Paternal Grandmother        SOCHX:   Social History     Socioeconomic History    Marital status:    Tobacco Use    Smoking status: Never    Smokeless tobacco: Never   Substance and Sexual Activity    Alcohol use: No    Drug use: Never    Sexual activity: Yes     Partners: Female       ALLERGIES: Review of patient's allergies indicates:  No Known Allergies    MEDS:   Current Outpatient Medications:     allopurinoL (ZYLOPRIM) 300 MG tablet, Take 1 tablet (300 mg total) by mouth once daily., Disp: 90 tablet, Rfl: 3    aspirin (ECOTRIN) 81 MG EC tablet, Take 81 mg by mouth once daily., Disp: , Rfl:     atorvastatin (LIPITOR) 40 MG tablet, Take 1 tablet (40 mg total) by mouth once daily., Disp: 90 tablet, Rfl: 3    blood sugar diagnostic (CONTOUR NEXT TEST STRIPS) Strp, To test glucose 6 times a day., Disp: 200 each, Rfl: 11    blood-glucose sensor (DEXCOM G6 SENSOR) Isha, Use as directed. Change sensor every 10 days., Disp: 3 each, Rfl: PRN    blood-glucose transmitter (DEXCOM G6 TRANSMITTER) Isha, 1 each by Misc.(Non-Drug; Combo Route) route continuous as needed, Disp: 1 each, Rfl: PRN    collagenase (SANTYL) ointment, Apply topically once daily., Disp: 30 g, Rfl: 0    fenofibrate 160 MG Tab, Take 1 tablet (160 mg total) by mouth once daily., Disp: 90 tablet, Rfl: 3    icosapent ethyL (VASCEPA) 1 gram Cap, Take 2 capsules (2 g total) by mouth 2 (two) times daily., Disp: 360 capsule, Rfl: 0    insulin aspart U-100 (NOVOLOG FLEXPEN U-100 INSULIN) 100 unit/mL (3 mL) InPn pen, Inject 28 Units into the skin before breakfast AND 28 Units daily with lunch AND 30 Units before dinner. Plus correction scale. Max TDD  "100units.., Disp: 30 mL, Rfl: 6    losartan (COZAAR) 100 MG tablet, Take ½ tablet (50 mg total) by mouth once daily., Disp: 45 tablet, Rfl: 1    metFORMIN (GLUCOPHAGE) 500 MG tablet, Take 2 tablets (1,000 mg total) by mouth 2 (two) times daily with meals., Disp: 360 tablet, Rfl: 3    mupirocin (BACTROBAN) 2 % ointment, Apply topically 3 (three) times daily., Disp: 15 g, Rfl: 1    niacin 100 MG Tab, Take 2 tablets (200 mg total) by mouth every evening., Disp: 90 tablet, Rfl: 1    ondansetron (ZOFRAN) 4 MG tablet, Take 1 tablet (4 mg total) by mouth every 6 (six) hours as needed for Nausea., Disp: 30 tablet, Rfl: 1    pantoprazole (PROTONIX) 40 MG tablet, Take 1 tablet (40 mg total) by mouth once daily., Disp: 90 tablet, Rfl: 1    pregabalin (LYRICA) 150 MG capsule, Take 1 capsule (150 mg total) by mouth 2 (two) times daily., Disp: 60 capsule, Rfl: 5    cyclobenzaprine (FLEXERIL) 5 MG tablet, Take 1 tablet (5 mg total) by mouth 3 (three) times daily as needed for Muscle spasms (headache)., Disp: 30 tablet, Rfl: 0    insulin (LANTUS SOLOSTAR U-100 INSULIN) glargine 100 units/mL SubQ pen, Inject 42 Units into the skin 2 (two) times a day., Disp: 30 mL, Rfl: 1    naproxen (NAPROSYN) 500 MG tablet, Take 1 tablet (500 mg total) by mouth 2 (two) times daily as needed (headache)., Disp: 30 tablet, Rfl: 0    OBJECTIVE:   Vitals:    01/24/23 1008   BP: 120/70   BP Location: Left arm   Patient Position: Sitting   BP Method: Large (Manual)   Pulse: 105   Temp: 98.1 °F (36.7 °C)   TempSrc: Oral   SpO2: 95%   Weight: 96.2 kg (212 lb 1.6 oz)   Height: 5' 8" (1.727 m)     Body mass index is 32.25 kg/m².      Physical Exam  Vitals and nursing note reviewed.   Constitutional:       Appearance: Normal appearance.   HENT:      Head: Normocephalic.   Eyes:      General:         Right eye: No discharge.         Left eye: No discharge.      Extraocular Movements: Extraocular movements intact.   Cardiovascular:      Rate and Rhythm: Normal " rate and regular rhythm.      Heart sounds: Normal heart sounds.   Pulmonary:      Effort: Pulmonary effort is normal.      Breath sounds: Normal breath sounds.   Skin:     Comments: No obvious rash on exposed skin   Neurological:      General: No focal deficit present.      Mental Status: He is alert. Mental status is at baseline.      Cranial Nerves: No cranial nerve deficit.      Gait: Gait normal.   Psychiatric:         Behavior: Behavior normal.         LABS:   A1C:  Recent Labs   Lab 01/16/23  0551   Hemoglobin A1C 12.2 H     CBC:  Recent Labs   Lab 01/16/23  0551   WBC 8.58   RBC 5.04   Hemoglobin 13.7 L   Hematocrit 40.7   Platelets 226   MCV 81 L   MCH 27.2   MCHC 33.7     CMP:  Recent Labs   Lab 01/15/23  2058 01/16/23  0204 01/16/23  0550   Glucose 581 HH   < > 242 H   Calcium 10.3   < > 8.0 L   Albumin 5.5 H  --   --    Total Protein 10.8 H  --   --    Sodium 140   < > 139   Potassium 5.2 H   < > 4.1   CO2 10 L   < > 22 L   Chloride 92 L   < > 103   BUN 19   < > 15   Creatinine 1.14   < > 0.67   Alkaline Phosphatase 158 H  --   --    ALT 25  --   --    AST 26  --   --    Total Bilirubin 1.0  --   --     < > = values in this interval not displayed.     LIPIDS:  Recent Labs   Lab 11/27/20  1618 11/28/20  0417 01/19/22  1622   TSH 2.430  --   --    HDL  --    < > 27 L   Cholesterol  --    < > 120   Triglycerides  --    < > 283 H   LDL Cholesterol  --    < > 36.4 L   HDL/Cholesterol Ratio  --    < > 22.5   Non-HDL Cholesterol  --    < > 93   Total Cholesterol/HDL Ratio  --    < > 4.4    < > = values in this interval not displayed.     TSH:  Recent Labs   Lab 11/27/20  1618   TSH 2.430         ASSESSMENT & PLAN:    Problem List Items Addressed This Visit          Cardiac/Vascular    Essential hypertension (Chronic)    Overview     Well controlled. Continue current regimen         Mixed hyperlipidemia (Chronic)    Overview     Continue statin            Endocrine    Type 2 diabetes mellitus with diabetic  polyneuropathy, with long-term current use of insulin - Primary (Chronic)    Overview     Continue metformin 1000mg twice a day  Increase lantus for 38 to 42U BID  Advised him to make f/u with Endocrine  Advised that he will need regular and close f/u for his diabetes         Relevant Medications    insulin (LANTUS SOLOSTAR U-100 INSULIN) glargine 100 units/mL SubQ pen    Other Relevant Orders    Ambulatory referral/consult to Endocrinology     Other Visit Diagnoses       Occipital headache        Relevant Medications    naproxen (NAPROSYN) 500 MG tablet    cyclobenzaprine (FLEXERIL) 5 MG tablet            Follow up in about 2 weeks (around 2/7/2023) for diabetes.      RTC/ED precautions discussed where applicable.   Encouraged patient to let me know if there are any further questions/concerns.     Advise patient/caretaker to check with insurance regarding orders to avoid unexpected fees/costs.     The patient/caretaker indicates understanding of these issues and agrees with the plan.    Dr. Funmilayo Taylor MD  Family Medicine

## 2023-01-25 ENCOUNTER — OFFICE VISIT (OUTPATIENT)
Dept: PODIATRY | Facility: CLINIC | Age: 43
End: 2023-01-25
Payer: COMMERCIAL

## 2023-01-25 VITALS — HEIGHT: 68 IN | WEIGHT: 212.13 LBS | BODY MASS INDEX: 32.15 KG/M2

## 2023-01-25 DIAGNOSIS — L97.512 DIABETIC ULCER OF TOE OF RIGHT FOOT ASSOCIATED WITH TYPE 2 DIABETES MELLITUS, WITH FAT LAYER EXPOSED: Primary | ICD-10-CM

## 2023-01-25 DIAGNOSIS — E11.621 DIABETIC ULCER OF TOE OF RIGHT FOOT ASSOCIATED WITH TYPE 2 DIABETES MELLITUS, WITH FAT LAYER EXPOSED: Primary | ICD-10-CM

## 2023-01-25 DIAGNOSIS — E10.621 DIABETIC ULCER OF TOE OF LEFT FOOT ASSOCIATED WITH TYPE 1 DIABETES MELLITUS, WITH FAT LAYER EXPOSED: ICD-10-CM

## 2023-01-25 DIAGNOSIS — L97.522 DIABETIC ULCER OF TOE OF LEFT FOOT ASSOCIATED WITH TYPE 1 DIABETES MELLITUS, WITH FAT LAYER EXPOSED: ICD-10-CM

## 2023-01-25 DIAGNOSIS — E10.42 DIABETIC POLYNEUROPATHY ASSOCIATED WITH TYPE 1 DIABETES MELLITUS: ICD-10-CM

## 2023-01-25 PROCEDURE — 99213 OFFICE O/P EST LOW 20 MIN: CPT | Mod: 25,S$GLB,, | Performed by: PODIATRIST

## 2023-01-25 PROCEDURE — 1159F PR MEDICATION LIST DOCUMENTED IN MEDICAL RECORD: ICD-10-PCS | Mod: CPTII,S$GLB,, | Performed by: PODIATRIST

## 2023-01-25 PROCEDURE — 3008F PR BODY MASS INDEX (BMI) DOCUMENTED: ICD-10-PCS | Mod: CPTII,S$GLB,, | Performed by: PODIATRIST

## 2023-01-25 PROCEDURE — 11042 WOUND DEBRIDEMENT: ICD-10-PCS | Mod: S$GLB,,, | Performed by: PODIATRIST

## 2023-01-25 PROCEDURE — 3046F PR MOST RECENT HEMOGLOBIN A1C LEVEL > 9.0%: ICD-10-PCS | Mod: CPTII,S$GLB,, | Performed by: PODIATRIST

## 2023-01-25 PROCEDURE — 1160F RVW MEDS BY RX/DR IN RCRD: CPT | Mod: CPTII,S$GLB,, | Performed by: PODIATRIST

## 2023-01-25 PROCEDURE — 3072F PR LOW RISK FOR RETINOPATHY: ICD-10-PCS | Mod: CPTII,S$GLB,, | Performed by: PODIATRIST

## 2023-01-25 PROCEDURE — 4010F PR ACE/ARB THEARPY RXD/TAKEN: ICD-10-PCS | Mod: CPTII,S$GLB,, | Performed by: PODIATRIST

## 2023-01-25 PROCEDURE — 99999 PR PBB SHADOW E&M-EST. PATIENT-LVL V: CPT | Mod: PBBFAC,,, | Performed by: PODIATRIST

## 2023-01-25 PROCEDURE — 3008F BODY MASS INDEX DOCD: CPT | Mod: CPTII,S$GLB,, | Performed by: PODIATRIST

## 2023-01-25 PROCEDURE — 4010F ACE/ARB THERAPY RXD/TAKEN: CPT | Mod: CPTII,S$GLB,, | Performed by: PODIATRIST

## 2023-01-25 PROCEDURE — 11042 DBRDMT SUBQ TIS 1ST 20SQCM/<: CPT | Mod: S$GLB,,, | Performed by: PODIATRIST

## 2023-01-25 PROCEDURE — 1159F MED LIST DOCD IN RCRD: CPT | Mod: CPTII,S$GLB,, | Performed by: PODIATRIST

## 2023-01-25 PROCEDURE — 99999 PR PBB SHADOW E&M-EST. PATIENT-LVL V: ICD-10-PCS | Mod: PBBFAC,,, | Performed by: PODIATRIST

## 2023-01-25 PROCEDURE — 99213 PR OFFICE/OUTPT VISIT, EST, LEVL III, 20-29 MIN: ICD-10-PCS | Mod: 25,S$GLB,, | Performed by: PODIATRIST

## 2023-01-25 PROCEDURE — 1160F PR REVIEW ALL MEDS BY PRESCRIBER/CLIN PHARMACIST DOCUMENTED: ICD-10-PCS | Mod: CPTII,S$GLB,, | Performed by: PODIATRIST

## 2023-01-25 PROCEDURE — 3046F HEMOGLOBIN A1C LEVEL >9.0%: CPT | Mod: CPTII,S$GLB,, | Performed by: PODIATRIST

## 2023-01-25 PROCEDURE — 3072F LOW RISK FOR RETINOPATHY: CPT | Mod: CPTII,S$GLB,, | Performed by: PODIATRIST

## 2023-01-25 NOTE — PROGRESS NOTES
"  Subjective:      Patient ID: Bay Ibarra is a 42 y.o. male.    Chief Complaint: Foot Ulcer (Right foot great toe ulcer)      Patient is 42 y.o. male with Past medical history of hypertension, hyperlipidemia, type 2 diabetes, chronic pancreatitis was recently hospitalized for having abdominal pain, nausea and vomiting.  Patient was admitted to ICU for management of acute diabetic ketoacidosis, UTI.  Patient is following up with left hallux wound that was present during hospitalization.  He noticed a wound 1 month ago.  Last measured glucose level was 85.  Patient is ambulating in tennis shoes.  Patient complains of numbness and tingling on bilateral lower extremity.    1/4/21 f/u L hallux wound. Has been applying winston. In tennis shoe today. I recommended WBAT in sx shoe last time but can not wear surgical shoe all the time due to his work. Tells me he can not wear open toe shoe at his work. He tries to walk with sx shoe at his house.     2/8/21 f/u L worsening of left hallux. In tennis shoe today. His wound appears to be worse today. Works for [email or web address]. On his feet 5-6 hours a day. He has to wear closed toe shoe. I prescribed DM shoe last time. Did not get fitted. Has been applying winston for LWC. DM poorly controlled.     2/22/21 f/u L diabetic hallux ulcer.  Ambulating in surgical shoe with felt.  Has been applying Santyl and triple antibiotic. DM not well controlled per pt. Tells me finished oral abx.     3/8/21 f/u L hallux ulcer. Has been offloaded with surgical shoe with felt. Denies pain. Tells me his glucose is "somewhat" controlled. LWC with santyl.     3/22/21 f/u L hallux ulcer. Minimal improvement as compared to last visit. Has been applying santyl. WBAT in sx shoe with felt to offload the IPJ of hallux. Glucose is not well controlled overall.      4/5/21 follow-up left hallux ulcer. Worsening of an ulcer today. Tells me he has been walking more than usual for his job duty.  Has been " "applying Santyl.  Ambulating in surgical shoe with felt. . Here for dressing change.     4/19/21 f/u L hallux ulcer. In sx shoe with felt. Glucose is not well controlled. Has not taken insulin for last 2 days because "he was out of it". Has been applying santyl. Slow/minimal healing noted on L hallux ulcer.     5/3/21 f/u L hallux ulcer. S/p plantar fascia medial band release in the office. Ambulating in sx shoe with felt. Pain is minimal over procedure site. Has been applying triple ointment instead santyl since he was out of santyl.     5/24/21 f/u L hallux ulcer. In regular tennis shoe today. Has been applying santyl. Denies L foot pain.     6/14/21 f/u L hallux ulcer. Size of the wound is smaller and shallower. Has been applying triple ointment. In regular tennis shoe.     7/12/21 f/u L foot DM ulcer. Plantar hallux wound is completely healed but presenting with ingrown toenail along medial border of hallux. Denies pain. Noticed a week ago. Also prsenting with new ulcer on platnar lateral 5th toe. Noticed it couple days ago. Denies pus. No pain. Admits that he has been walking in rain with closed toe shoe. He also answers "my glucose has been up and down".     8/12/21 f/u L hallux ulcer. It was completely closed but opened back up. Tells me he has been working and walking in working boot which got wet and caused re occurrence of ulcer. In regular tennis shoe today. Glucose is not well controlled per pt. Last glucose check yesterday was 220.     11/9/22 presenting with right hallux ulcer.  Patient noticed an ulcer on the bottom of hallux couple weeks ago.  Patient has been cleaning with triple antibiotic.  Patient is ambulating in regular tennis shoes.  Patient tells me his glucose fluctuates.  Left hallux ulcer is completely healed.  Denies acute foot injury.  Was recently evaluated by nurse practitioner yesterday and prescribed Keflex.  Patient has not picked up the medication yet.    1/25/23 presenting with " right hallux ulcer.  Recent hospitalization secondary to diabetic ketoacidosis.  Patient has been treating right hallux ulcer with Santyl.  Ambulating in regular tennis shoes.  Denies pain.  Glucose is not well controlled.       Hemoglobin A1C   Date Value Ref Range Status   01/16/2023 12.2 (H) 4.0 - 5.6 % Final     Comment:     ADA Screening Guidelines:  5.7-6.4%  Consistent with prediabetes  >or=6.5%  Consistent with diabetes    High levels of fetal hemoglobin interfere with the HbA1C  assay. Heterozygous hemoglobin variants (HbS, HgC, etc)do  not significantly interfere with this assay.   However, presence of multiple variants may affect accuracy.     01/19/2022 10.3 (H) 4.0 - 5.6 % Final     Comment:     ADA Screening Guidelines:  5.7-6.4%  Consistent with prediabetes  >or=6.5%  Consistent with diabetes    High levels of fetal hemoglobin interfere with the HbA1C  assay. Heterozygous hemoglobin variants (HbS, HgC, etc)do  not significantly interfere with this assay.   However, presence of multiple variants may affect accuracy.     07/12/2021 11.7 (H) 4.0 - 5.6 % Final     Comment:     ADA Screening Guidelines:  5.7-6.4%  Consistent with prediabetes  >or=6.5%  Consistent with diabetes    High levels of fetal hemoglobin interfere with the HbA1C  assay. Heterozygous hemoglobin variants (HbS, HgC, etc)do  not significantly interfere with this assay.   However, presence of multiple variants may affect accuracy.         Review of Systems   Constitutional: Negative for chills, decreased appetite, fever and malaise/fatigue.   HENT:  Negative for congestion, ear discharge and sore throat.    Eyes:  Negative for discharge and pain.   Cardiovascular:  Negative for chest pain, claudication and leg swelling.   Respiratory:  Negative for cough and shortness of breath.    Skin:  Positive for poor wound healing. Negative for color change, nail changes and rash.        Right hallux ulcer   Musculoskeletal:  Positive for gout and  stiffness. Negative for arthritis, joint pain, joint swelling and muscle weakness.   Gastrointestinal:  Negative for bloating, abdominal pain, diarrhea, nausea and vomiting.   Genitourinary:  Negative for flank pain and hematuria.   Neurological:  Negative for headaches, numbness and weakness.   Psychiatric/Behavioral:  Negative for altered mental status.            Past Medical History:   Diagnosis Date    Acute pancreatitis     Diverticulosis of colon     Essential hypertension     Fatty liver     Gastric varices without bleeding 1/16/2020    Glaucomatous optic atrophy, right     Gout     Hx of acute pancreatitis 3/3/2017    Hypertriglyceridemia 8/2/2017    Obesity (BMI 30-39.9) 6/5/2015    Obesity (BMI 35.0-39.9 without comorbidity) 6/5/2015    Obstructive sleep apnea syndrome 12/18/2015    Type 2 diabetes mellitus with diabetic polyneuropathy, with long-term current use of insulin 10/16/2017    Type 2 diabetes mellitus with hyperglycemia, with long-term current use of insulin 8/3/2017       Past Surgical History:   Procedure Laterality Date    ENDOSCOPIC ULTRASOUND OF UPPER GASTROINTESTINAL TRACT N/A 5/27/2019    Procedure: ULTRASOUND, UPPER GI TRACT, ENDOSCOPIC;  Surgeon: Zafar Velazquez MD;  Location: [address];  Service: Endoscopy;  Laterality: N/A;    ERCP N/A 5/27/2019    Procedure: ERCP (ENDOSCOPIC RETROGRADE CHOLANGIOPANCREATOGRAPHY);  Surgeon: Zafar Velazquez MD;  Location: [address];  Service: Endoscopy;  Laterality: N/A;    ESOPHAGOGASTRODUODENOSCOPY      ESOPHAGOGASTRODUODENOSCOPY N/A 1/31/2020    Procedure: EGD (ESOPHAGOGASTRODUODENOSCOPY);  Surgeon: Zafar Velazquez MD;  Location: [facility];  Service: Endoscopy;  Laterality: N/A;       Family History   Problem Relation Age of Onset    Aneurysm Mother         AAA    Coronary artery disease Father         4 vessel bypass at 40, possible stent at 36    Diabetes type II Father     No Known Problems Sister     No Known Problems Brother      Aneurysm Maternal Grandmother         Possible AAA    Diabetes type II Paternal Grandmother        Social History     Socioeconomic History    Marital status:    Tobacco Use    Smoking status: Never    Smokeless tobacco: Never   Substance and Sexual Activity    Alcohol use: No    Drug use: Never    Sexual activity: Yes     Partners: Female       Current Outpatient Medications   Medication Sig Dispense Refill    allopurinoL (ZYLOPRIM) 300 MG tablet Take 1 tablet (300 mg total) by mouth once daily. 90 tablet 3    aspirin (ECOTRIN) 81 MG EC tablet Take 81 mg by mouth once daily.      atorvastatin (LIPITOR) 40 MG tablet Take 1 tablet (40 mg total) by mouth once daily. 90 tablet 3    blood sugar diagnostic (CONTOUR NEXT TEST STRIPS) Strp To test glucose 6 times a day. 200 each 11    blood-glucose sensor (DEXCOM G6 SENSOR) Isha Use as directed. Change sensor every 10 days. 3 each PRN    blood-glucose transmitter (DEXCOM G6 TRANSMITTER) Isha 1 each by Misc.(Non-Drug; Combo Route) route continuous as needed 1 each PRN    collagenase (SANTYL) ointment Apply topically once daily. 30 g 0    cyclobenzaprine (FLEXERIL) 5 MG tablet Take 1 tablet (5 mg total) by mouth 3 (three) times daily as needed for Muscle spasms (headache). 30 tablet 0    fenofibrate 160 MG Tab Take 1 tablet (160 mg total) by mouth once daily. 90 tablet 3    icosapent ethyL (VASCEPA) 1 gram Cap Take 2 capsules (2 g total) by mouth 2 (two) times daily. 360 capsule 0    insulin (LANTUS SOLOSTAR U-100 INSULIN) glargine 100 units/mL SubQ pen Inject 42 Units into the skin 2 (two) times a day. 30 mL 1    insulin aspart U-100 (NOVOLOG FLEXPEN U-100 INSULIN) 100 unit/mL (3 mL) InPn pen Inject 28 Units into the skin before breakfast AND 28 Units daily with lunch AND 30 Units before dinner. Plus correction scale. Max TDD 100units.. 30 mL 6    losartan (COZAAR) 100 MG tablet Take ½ tablet (50 mg total) by mouth once daily. 45 tablet 1    metFORMIN  "(GLUCOPHAGE) 500 MG tablet Take 2 tablets (1,000 mg total) by mouth 2 (two) times daily with meals. 360 tablet 3    mupirocin (BACTROBAN) 2 % ointment Apply topically 3 (three) times daily. 15 g 1    naproxen (NAPROSYN) 500 MG tablet Take 1 tablet (500 mg total) by mouth 2 (two) times daily as needed (headache). 30 tablet 0    niacin 100 MG Tab Take 2 tablets (200 mg total) by mouth every evening. 90 tablet 1    ondansetron (ZOFRAN) 4 MG tablet Take 1 tablet (4 mg total) by mouth every 6 (six) hours as needed for Nausea. 30 tablet 1    pantoprazole (PROTONIX) 40 MG tablet Take 1 tablet (40 mg total) by mouth once daily. 90 tablet 1    pregabalin (LYRICA) 150 MG capsule Take 1 capsule (150 mg total) by mouth 2 (two) times daily. 60 capsule 5     No current facility-administered medications for this visit.       Review of patient's allergies indicates:  No Known Allergies    Vitals:    01/25/23 1312   Weight: 96.2 kg (212 lb 1.6 oz)   Height: 5' 8" (1.727 m)   PainSc: 0-No pain         Objective:      Physical Exam  Constitutional:       General: He is not in acute distress.     Appearance: He is well-developed.   HENT:      Nose: Nose normal.   Eyes:      Conjunctiva/sclera: Conjunctivae normal.   Pulmonary:      Effort: Pulmonary effort is normal.   Chest:      Chest wall: No tenderness.   Abdominal:      Tenderness: There is no abdominal tenderness.   Musculoskeletal:      Cervical back: Normal range of motion.   Neurological:      Mental Status: He is alert and oriented to person, place, and time.   Psychiatric:         Behavior: Behavior normal.     Vascular: Distal DP/PT pulses palpable 1/4. CRT < 3 sec to tips of toes. No vericosities noted to LEs. Hair growth present LE, warm to touch LE    Dermatologic:   Right foot: +ulcer on plantar hallux 1cm in diameter. No rubor, no erythema, no drainage, no sinus tract, no undermining. No probe to bone. No signs of infection.     Musculoskeletal:  No calf tenderness LE, " Compartments soft/compressible.   Right foot: limited ROM of 1st MPJ.     Neurological:   Light touch, proprioception, and sharp/dull sensation are decreased. Protective threshold with the [place]-Wienstein monofilament is decreased. Vibratory sensation decreased.       1/4/21 2/8/21                Assessment:       Encounter Diagnoses   Name Primary?    Diabetic ulcer of toe of right foot associated with type 2 diabetes mellitus, with fat layer exposed Yes    Diabetic ulcer of toe of left foot associated with type 1 diabetes mellitus, with fat layer exposed     Diabetic polyneuropathy associated with type 1 diabetes mellitus            Plan:       Bay was seen today for foot ulcer.    Diagnoses and all orders for this visit:    Diabetic ulcer of toe of right foot associated with type 2 diabetes mellitus, with fat layer exposed    Diabetic ulcer of toe of left foot associated with type 1 diabetes mellitus, with fat layer exposed  -     Ambulatory referral/consult to Podiatry    Diabetic polyneuropathy associated with type 1 diabetes mellitus        I counseled the patient on his conditions, their implications and medical management.    42 y.o. male with R hallux ulcer. History of left foot diabetic ulcer s/p medial band plantar fascia release percutaneous.     -R hallux stable ulcer without infection. I debrided right hallux wound. See procedure note. Stressed the importance of appropriate offloading of the ulcer with glucose control.  Recommend to continue with the Bactroban vs santyl.     -The nature of the condition, options for management, as well as potential risks and complications were discussed in detail with patient. Patient was amenable to my recommendations and left my office fully informed and will follow up as instructed or sooner if necessary.    -Patient was advised of signs and symptoms of infection including redness, drainage, purulence, odor, streaking, fever, chills and I advised patient to seek  medical attention (ER or urgent care) if these symptoms arise.   -Advised for optimal glucose control and maintenance per primary care physician. Patient was also educated on healthy diet that is naturally rich in nutrients and low in fat and calories.   -f/u 3 wks    Note dictated with voice recognition software, please excuse any grammatical errors.

## 2023-01-25 NOTE — PROCEDURES
"Wound Debridement    Date/Time: 1/25/2023 1:00 PM  Performed by: Anju Melvin DPM  Authorized by: Anju Melvin DPM     Time out: Immediately prior to procedure a "time out" was called to verify the correct patient, procedure, equipment, support staff and site/side marked as required.    Consent Done?:  Yes (Verbal)    Preparation: Patient was prepped and draped in usual sterile fashion      Wound Details:    Location:  Right foot    Location:  Right 1st Toe    Type of Debridement:  Excisional       Length (cm):  1       Area (sq cm):  1       Width (cm):  1       Percent Debrided (%):  100       Depth (cm):  0.5       Total Area Debrided (sq cm):  1    Depth of debridement:  Subcutaneous tissue    Tissue debrided:  Subcutaneous    Devitalized tissue debrided:  Biofilm and Callus    Instruments:  Curette and Blade    Bleeding:  Minimal  Hemostasis Achieved: Yes    Method Used:  Pressure  Patient tolerance:  Patient tolerated the procedure well with no immediate complications  "

## 2023-02-01 ENCOUNTER — PATIENT MESSAGE (OUTPATIENT)
Dept: FAMILY MEDICINE | Facility: CLINIC | Age: 43
End: 2023-02-01
Payer: COMMERCIAL

## 2023-02-06 NOTE — PROGRESS NOTES
Subjective:         Chief Complaint: Follow-up  HPI   Bay Ibarra is a 42 y.o. male, patient of Funmilayo Taylor MD with diabetic  polyneuropathy associated with type 1 diabetes mellitus, hypertension, hyperlipidemia, type II diabetes, obesity, gastroparesis, JAZMYN, history of diabetic foot ulcer, known to me, presents today for 2 week diabetes Follow-up    Diabetes:  Last A1C:   Recent Labs   Lab 01/16/23  0551   Hemoglobin A1C 12.2 H   Scheduled to see NP Shana Stapleton with Endo on 03/14/2023.    Checks glucose: currently has Dexcom, new sensor placed on Monday of last week; changes every 10 days.    Glucose ranges between: currently 184 after eating breakfast bowl of eggs moran sausage cheese potatoes; average over the last 2 weeks is 202; states it spikes up in the evenings depending on what he eats; went up to 360 when he was forced to eat [employer]. Has not had any that has gone over 400; alarms are set for <60, >350.     Recent hospital stay for DKA 01/15/2023, stayed over night.     Current meds: 42 units Lantus twice per day, Novolog 28 units before breakfast and lunch and 30 units before dinner with sliding scale. Metformin 1000 mg twice per day.  Reports sugars have been better since the increase in the Lantus from 38 units to 42. If he doesn't eat bad, he will experience a low sugar.     Side effects from meds:denies     Hypoglycemic episodes: reports 2 days where it dropped to 50, sweaty and lightheadedness, dizzy were the symptoms.     Diet: staying away from fatty foods, starches, has been doing whole wheat. Might eat fries every once in a while.     Exercise:  walks with work right now, trying to keep as much pressure off of his toe. Once the toe gets better, plans to get back to jogging and running.     Has right diabetic toe ulcer. Followed by Dr. Melvin with Podiatry, s/p debridement 01/25/2023, scheduled for follow up 02/15/2023. Currently using ointment and keeping pressure off of it. Reports  right toe is currently wrapped with dressing.     Checks BP at home sometimes. Reports compliance with medications.       Past Medical History:   Diagnosis Date    Acute pancreatitis     Diverticulosis of colon     Essential hypertension     Fatty liver     Gastric varices without bleeding 1/16/2020    Glaucomatous optic atrophy, right     Gout     Hx of acute pancreatitis 3/3/2017    Hypertriglyceridemia 8/2/2017    Obesity (BMI 30-39.9) 6/5/2015    Obesity (BMI 35.0-39.9 without comorbidity) 6/5/2015    Obstructive sleep apnea syndrome 12/18/2015    Type 2 diabetes mellitus with diabetic polyneuropathy, with long-term current use of insulin 10/16/2017    Type 2 diabetes mellitus with hyperglycemia, with long-term current use of insulin 8/3/2017       Past Surgical History:   Procedure Laterality Date    ENDOSCOPIC ULTRASOUND OF UPPER GASTROINTESTINAL TRACT N/A 5/27/2019    Procedure: ULTRASOUND, UPPER GI TRACT, ENDOSCOPIC;  Surgeon: Zafar Velazquez MD;  Location: [address]);  Service: Endoscopy;  Laterality: N/A;    ERCP N/A 5/27/2019    Procedure: ERCP (ENDOSCOPIC RETROGRADE CHOLANGIOPANCREATOGRAPHY);  Surgeon: Zafar Velazquez MD;  Location: [address]);  Service: Endoscopy;  Laterality: N/A;    ESOPHAGOGASTRODUODENOSCOPY      ESOPHAGOGASTRODUODENOSCOPY N/A 1/31/2020    Procedure: EGD (ESOPHAGOGASTRODUODENOSCOPY);  Surgeon: Zafar Velazquez MD;  Location: [facility];  Service: Endoscopy;  Laterality: N/A;       Family History   Problem Relation Age of Onset    Aneurysm Mother         AAA    Coronary artery disease Father         4 vessel bypass at 40, possible stent at 36    Diabetes type II Father     No Known Problems Sister     No Known Problems Brother     Aneurysm Maternal Grandmother         Possible AAA    Diabetes type II Paternal Grandmother        Social History     Socioeconomic History    Marital status:    Tobacco Use    Smoking status: Never    Smokeless tobacco: Never  "  Substance and Sexual Activity    Alcohol use: No    Drug use: Never    Sexual activity: Yes     Partners: Female       Review of Systems   Constitutional:  Negative for appetite change, fatigue and fever.   Respiratory:  Negative for cough and shortness of breath.    Cardiovascular:  Negative for chest pain and leg swelling.   Gastrointestinal:  Negative for blood in stool, diarrhea, nausea and vomiting.   Musculoskeletal:  Negative for arthralgias, gait problem and myalgias.   Skin:  Positive for wound. Negative for color change.   Neurological:  Negative for dizziness, light-headedness and headaches.   Psychiatric/Behavioral:  Negative for agitation. The patient is not nervous/anxious.        Objective:     Vitals:    02/07/23 0745   BP: 138/78   Pulse: 88   SpO2: 98%   Weight: 99.4 kg (219 lb 1.6 oz)   Height: 5' 8" (1.727 m)          Physical Exam  Vitals reviewed.   Constitutional:       Appearance: Normal appearance.   HENT:      Head: Normocephalic and atraumatic.   Eyes:      General: No scleral icterus.     Extraocular Movements: Extraocular movements intact.   Neck:      Vascular: No carotid bruit.   Cardiovascular:      Rate and Rhythm: Normal rate and regular rhythm.      Heart sounds: Normal heart sounds.   Pulmonary:      Effort: Pulmonary effort is normal.      Breath sounds: Normal breath sounds.   Skin:     General: Skin is warm and dry.   Neurological:      General: No focal deficit present.      Mental Status: He is alert and oriented to person, place, and time.   Psychiatric:         Mood and Affect: Mood normal.         Speech: Speech normal.         Laboratory:  CBC:  Recent Labs   Lab Result Units 01/15/23  2058 01/16/23  0551   WBC K/uL 14.61* 8.58   RBC M/uL 6.35* 5.04   Hemoglobin g/dL 17.2 13.7*   Hematocrit % 52.7 40.7   Platelets K/uL 428 226   MCV fL 83 81*   MCH pg 27.1 27.2   MCHC g/dL 32.6 33.7     CMP:  Recent Labs   Lab Result Units 01/15/23  2058 01/16/23  0204 01/16/23  0550 "   Glucose mg/dL 581*   < > 242*   Calcium mg/dL 10.3   < > 8.0*   Albumin g/dL 5.5*  --   --    Total Protein g/dL 10.8*  --   --    Sodium mmol/L 140   < > 139   Potassium mmol/L 5.2*   < > 4.1   CO2 mmol/L 10*   < > 22*   Chloride mmol/L 92*   < > 103   BUN mg/dL 19   < > 15   Alkaline Phosphatase U/L 158*  --   --    ALT U/L 25  --   --    AST U/L 26  --   --    Total Bilirubin mg/dL 1.0  --   --     < > = values in this interval not displayed.     URINALYSIS:  Recent Labs   Lab Result Units 01/15/23  2238   Color, UA  Yellow   Specific Gravity, UA  >=1.030*   pH, UA  5.0   Protein, UA  1+*   Bacteria /hpf Few*   Nitrite, UA  Negative   Leukocytes, UA  Negative   Urobilinogen, UA EU/dL Negative   Hyaline Casts, UA /lpf 0      LIPIDS:  No results for input(s): TSH, HDL, CHOL, TRIG, LDLCALC, CHOLHDL, NONHDLCHOL, TOTALCHOLEST in the last 2160 hours.  TSH:  No results for input(s): TSH in the last 2160 hours.  A1C:  Recent Labs   Lab Result Units 01/16/23  0551   Hemoglobin A1C % 12.2*       Assessment:         ICD-10-CM ICD-9-CM   1. Essential hypertension  I10 401.9   2. Type 2 diabetes mellitus with diabetic polyneuropathy, with long-term current use of insulin  E11.42 250.60    Z79.4 357.2     V58.67   3. Diabetic ulcer of toe of right foot associated with type 2 diabetes mellitus, unspecified ulcer stage  E11.621 250.80    L97.519 707.15       Plan:       Essential hypertension  Chronic; stable on medication. Continue medication as prescribed.    Type 2 diabetes mellitus with diabetic polyneuropathy, with long-term current use of insulin  Continue to use dexcom daily. Continue to work on diet modification based on your glucose readings.  Goal fasting , 2-3 hours after eating <180  Continue 42 units Lantus twice per day, Novolog 28 units before breakfast and lunch and 30 units before dinner with sliding scale. Metformin 1000 mg twice per day.  Keep appointment with endocrinology next month.     Diabetic  ulcer of toe of right foot associated with type 2 diabetes mellitus, unspecified ulcer stage  Followed by Podiatry.       If symptoms worsen, go to ER.  If symptoms do not improve, return to clinic.   Keep appointments with all specialists.     Patient verbalizes understanding and agrees with current treatment plan.      Follow up in about 2 weeks (around 2/21/2023).     Patient's Medications   New Prescriptions    No medications on file   Previous Medications    ALLOPURINOL (ZYLOPRIM) 300 MG TABLET    Take 1 tablet (300 mg total) by mouth once daily.    ASPIRIN (ECOTRIN) 81 MG EC TABLET    Take 81 mg by mouth once daily.    ATORVASTATIN (LIPITOR) 40 MG TABLET    Take 1 tablet (40 mg total) by mouth once daily.    BLOOD SUGAR DIAGNOSTIC (CONTOUR NEXT TEST STRIPS) STRP    To test glucose 6 times a day.    BLOOD-GLUCOSE SENSOR (DEXCOM G6 SENSOR) MARANDA    Use as directed. Change sensor every 10 days.    BLOOD-GLUCOSE TRANSMITTER (DEXCOM G6 TRANSMITTER) MARANDA    1 each by Misc.(Non-Drug; Combo Route) route continuous as needed    COLLAGENASE (SANTYL) OINTMENT    Apply topically once daily.    FENOFIBRATE 160 MG TAB    Take 1 tablet (160 mg total) by mouth once daily.    ICOSAPENT ETHYL (VASCEPA) 1 GRAM CAP    Take 2 capsules (2 g total) by mouth 2 (two) times daily.    INSULIN (LANTUS SOLOSTAR U-100 INSULIN) GLARGINE 100 UNITS/ML SUBQ PEN    Inject 42 Units into the skin 2 (two) times a day.    INSULIN ASPART U-100 (NOVOLOG FLEXPEN U-100 INSULIN) 100 UNIT/ML (3 ML) INPN PEN    Inject 28 Units into the skin before breakfast AND 28 Units daily with lunch AND 30 Units before dinner. Plus correction scale. Max TDD 100units..    LOSARTAN (COZAAR) 100 MG TABLET    Take ½ tablet (50 mg total) by mouth once daily.    METFORMIN (GLUCOPHAGE) 500 MG TABLET    Take 2 tablets (1,000 mg total) by mouth 2 (two) times daily with meals.    MUPIROCIN (BACTROBAN) 2 % OINTMENT    Apply topically 3 (three) times daily.    NAPROXEN (NAPROSYN)  500 MG TABLET    Take 1 tablet (500 mg total) by mouth 2 (two) times daily as needed (headache).    NIACIN 100 MG TAB    Take 2 tablets (200 mg total) by mouth every evening.    ONDANSETRON (ZOFRAN) 4 MG TABLET    Take 1 tablet (4 mg total) by mouth every 6 (six) hours as needed for Nausea.    PANTOPRAZOLE (PROTONIX) 40 MG TABLET    Take 1 tablet (40 mg total) by mouth once daily.    PREGABALIN (LYRICA) 150 MG CAPSULE    Take 1 capsule (150 mg total) by mouth 2 (two) times daily.   Modified Medications    No medications on file   Discontinued Medications    No medications on file         Britney Jimenez NP

## 2023-02-07 ENCOUNTER — OFFICE VISIT (OUTPATIENT)
Dept: FAMILY MEDICINE | Facility: CLINIC | Age: 43
End: 2023-02-07
Payer: COMMERCIAL

## 2023-02-07 VITALS
OXYGEN SATURATION: 98 % | HEIGHT: 68 IN | HEART RATE: 88 BPM | DIASTOLIC BLOOD PRESSURE: 78 MMHG | WEIGHT: 219.13 LBS | SYSTOLIC BLOOD PRESSURE: 138 MMHG | BODY MASS INDEX: 33.21 KG/M2

## 2023-02-07 DIAGNOSIS — L97.519 DIABETIC ULCER OF TOE OF RIGHT FOOT ASSOCIATED WITH TYPE 2 DIABETES MELLITUS, UNSPECIFIED ULCER STAGE: ICD-10-CM

## 2023-02-07 DIAGNOSIS — E11.42 TYPE 2 DIABETES MELLITUS WITH DIABETIC POLYNEUROPATHY, WITH LONG-TERM CURRENT USE OF INSULIN: Chronic | ICD-10-CM

## 2023-02-07 DIAGNOSIS — Z79.4 TYPE 2 DIABETES MELLITUS WITH DIABETIC POLYNEUROPATHY, WITH LONG-TERM CURRENT USE OF INSULIN: Chronic | ICD-10-CM

## 2023-02-07 DIAGNOSIS — E11.621 DIABETIC ULCER OF TOE OF RIGHT FOOT ASSOCIATED WITH TYPE 2 DIABETES MELLITUS, UNSPECIFIED ULCER STAGE: ICD-10-CM

## 2023-02-07 DIAGNOSIS — I10 ESSENTIAL HYPERTENSION: Primary | Chronic | ICD-10-CM

## 2023-02-07 PROCEDURE — 99999 PR PBB SHADOW E&M-EST. PATIENT-LVL V: ICD-10-PCS | Mod: PBBFAC,,,

## 2023-02-07 PROCEDURE — 4010F ACE/ARB THERAPY RXD/TAKEN: CPT | Mod: CPTII,S$GLB,,

## 2023-02-07 PROCEDURE — 3046F HEMOGLOBIN A1C LEVEL >9.0%: CPT | Mod: CPTII,S$GLB,,

## 2023-02-07 PROCEDURE — 3075F PR MOST RECENT SYSTOLIC BLOOD PRESS GE 130-139MM HG: ICD-10-PCS | Mod: CPTII,S$GLB,,

## 2023-02-07 PROCEDURE — 3075F SYST BP GE 130 - 139MM HG: CPT | Mod: CPTII,S$GLB,,

## 2023-02-07 PROCEDURE — 99999 PR PBB SHADOW E&M-EST. PATIENT-LVL V: CPT | Mod: PBBFAC,,,

## 2023-02-07 PROCEDURE — 3008F PR BODY MASS INDEX (BMI) DOCUMENTED: ICD-10-PCS | Mod: CPTII,S$GLB,,

## 2023-02-07 PROCEDURE — 3046F PR MOST RECENT HEMOGLOBIN A1C LEVEL > 9.0%: ICD-10-PCS | Mod: CPTII,S$GLB,,

## 2023-02-07 PROCEDURE — 3078F DIAST BP <80 MM HG: CPT | Mod: CPTII,S$GLB,,

## 2023-02-07 PROCEDURE — 99213 OFFICE O/P EST LOW 20 MIN: CPT | Mod: S$GLB,,,

## 2023-02-07 PROCEDURE — 3072F PR LOW RISK FOR RETINOPATHY: ICD-10-PCS | Mod: CPTII,S$GLB,,

## 2023-02-07 PROCEDURE — 3072F LOW RISK FOR RETINOPATHY: CPT | Mod: CPTII,S$GLB,,

## 2023-02-07 PROCEDURE — 3008F BODY MASS INDEX DOCD: CPT | Mod: CPTII,S$GLB,,

## 2023-02-07 PROCEDURE — 4010F PR ACE/ARB THEARPY RXD/TAKEN: ICD-10-PCS | Mod: CPTII,S$GLB,,

## 2023-02-07 PROCEDURE — 1160F RVW MEDS BY RX/DR IN RCRD: CPT | Mod: CPTII,S$GLB,,

## 2023-02-07 PROCEDURE — 1159F MED LIST DOCD IN RCRD: CPT | Mod: CPTII,S$GLB,,

## 2023-02-07 PROCEDURE — 1159F PR MEDICATION LIST DOCUMENTED IN MEDICAL RECORD: ICD-10-PCS | Mod: CPTII,S$GLB,,

## 2023-02-07 PROCEDURE — 1160F PR REVIEW ALL MEDS BY PRESCRIBER/CLIN PHARMACIST DOCUMENTED: ICD-10-PCS | Mod: CPTII,S$GLB,,

## 2023-02-07 PROCEDURE — 3078F PR MOST RECENT DIASTOLIC BLOOD PRESSURE < 80 MM HG: ICD-10-PCS | Mod: CPTII,S$GLB,,

## 2023-02-07 PROCEDURE — 99213 PR OFFICE/OUTPT VISIT, EST, LEVL III, 20-29 MIN: ICD-10-PCS | Mod: S$GLB,,,

## 2023-02-07 NOTE — PATIENT INSTRUCTIONS
Continue to work on diet modification based on your glucose readings.  Goal fasting , 2-3 hours after eating <180    Start exercise once your ulcer is healed.     See you in 2 weeks.

## 2023-02-14 ENCOUNTER — PATIENT OUTREACH (OUTPATIENT)
Dept: ADMINISTRATIVE | Facility: HOSPITAL | Age: 43
End: 2023-02-14
Payer: COMMERCIAL

## 2023-02-14 DIAGNOSIS — Z79.4 TYPE 2 DIABETES MELLITUS WITH DIABETIC POLYNEUROPATHY, WITH LONG-TERM CURRENT USE OF INSULIN: Primary | Chronic | ICD-10-CM

## 2023-02-14 DIAGNOSIS — E11.42 TYPE 2 DIABETES MELLITUS WITH DIABETIC POLYNEUROPATHY, WITH LONG-TERM CURRENT USE OF INSULIN: Primary | Chronic | ICD-10-CM

## 2023-02-14 NOTE — PROGRESS NOTES
Care Everywhere updates requested and reviewed.  Immunizations reconciled. Media reports reviewed.  Duplicate HM overrides and  orders removed.  Overdue HM topic chart audit and/or requested.  Overdue lab testing linked to upcoming lab appointments if applies.      Health Maintenance Due   Topic Date Due    Diabetes Urine Screening  2023

## 2023-02-15 ENCOUNTER — OFFICE VISIT (OUTPATIENT)
Dept: PODIATRY | Facility: CLINIC | Age: 43
End: 2023-02-15
Payer: COMMERCIAL

## 2023-02-15 VITALS — HEIGHT: 68 IN | WEIGHT: 219.31 LBS | BODY MASS INDEX: 33.24 KG/M2

## 2023-02-15 DIAGNOSIS — E66.9 OBESITY (BMI 30-39.9): ICD-10-CM

## 2023-02-15 DIAGNOSIS — L97.512 DIABETIC ULCER OF TOE OF RIGHT FOOT ASSOCIATED WITH TYPE 2 DIABETES MELLITUS, WITH FAT LAYER EXPOSED: Primary | ICD-10-CM

## 2023-02-15 DIAGNOSIS — Z79.4 TYPE 2 DIABETES MELLITUS WITH HYPERGLYCEMIA, WITH LONG-TERM CURRENT USE OF INSULIN: ICD-10-CM

## 2023-02-15 DIAGNOSIS — E11.65 TYPE 2 DIABETES MELLITUS WITH HYPERGLYCEMIA, WITH LONG-TERM CURRENT USE OF INSULIN: ICD-10-CM

## 2023-02-15 DIAGNOSIS — E11.621 DIABETIC ULCER OF TOE OF RIGHT FOOT ASSOCIATED WITH TYPE 2 DIABETES MELLITUS, WITH FAT LAYER EXPOSED: Primary | ICD-10-CM

## 2023-02-15 PROCEDURE — 1159F MED LIST DOCD IN RCRD: CPT | Mod: CPTII,S$GLB,, | Performed by: PODIATRIST

## 2023-02-15 PROCEDURE — 1160F PR REVIEW ALL MEDS BY PRESCRIBER/CLIN PHARMACIST DOCUMENTED: ICD-10-PCS | Mod: CPTII,S$GLB,, | Performed by: PODIATRIST

## 2023-02-15 PROCEDURE — 99999 PR PBB SHADOW E&M-EST. PATIENT-LVL III: CPT | Mod: PBBFAC,,, | Performed by: PODIATRIST

## 2023-02-15 PROCEDURE — 3072F PR LOW RISK FOR RETINOPATHY: ICD-10-PCS | Mod: CPTII,S$GLB,, | Performed by: PODIATRIST

## 2023-02-15 PROCEDURE — 4010F PR ACE/ARB THEARPY RXD/TAKEN: ICD-10-PCS | Mod: CPTII,S$GLB,, | Performed by: PODIATRIST

## 2023-02-15 PROCEDURE — 11042 DBRDMT SUBQ TIS 1ST 20SQCM/<: CPT | Mod: S$GLB,,, | Performed by: PODIATRIST

## 2023-02-15 PROCEDURE — 99499 UNLISTED E&M SERVICE: CPT | Mod: S$GLB,,, | Performed by: PODIATRIST

## 2023-02-15 PROCEDURE — 4010F ACE/ARB THERAPY RXD/TAKEN: CPT | Mod: CPTII,S$GLB,, | Performed by: PODIATRIST

## 2023-02-15 PROCEDURE — 1160F RVW MEDS BY RX/DR IN RCRD: CPT | Mod: CPTII,S$GLB,, | Performed by: PODIATRIST

## 2023-02-15 PROCEDURE — 3046F PR MOST RECENT HEMOGLOBIN A1C LEVEL > 9.0%: ICD-10-PCS | Mod: CPTII,S$GLB,, | Performed by: PODIATRIST

## 2023-02-15 PROCEDURE — 3008F PR BODY MASS INDEX (BMI) DOCUMENTED: ICD-10-PCS | Mod: CPTII,S$GLB,, | Performed by: PODIATRIST

## 2023-02-15 PROCEDURE — 3072F LOW RISK FOR RETINOPATHY: CPT | Mod: CPTII,S$GLB,, | Performed by: PODIATRIST

## 2023-02-15 PROCEDURE — 3008F BODY MASS INDEX DOCD: CPT | Mod: CPTII,S$GLB,, | Performed by: PODIATRIST

## 2023-02-15 PROCEDURE — 1159F PR MEDICATION LIST DOCUMENTED IN MEDICAL RECORD: ICD-10-PCS | Mod: CPTII,S$GLB,, | Performed by: PODIATRIST

## 2023-02-15 PROCEDURE — 99999 PR PBB SHADOW E&M-EST. PATIENT-LVL III: ICD-10-PCS | Mod: PBBFAC,,, | Performed by: PODIATRIST

## 2023-02-15 PROCEDURE — 11042 WOUND DEBRIDEMENT: ICD-10-PCS | Mod: S$GLB,,, | Performed by: PODIATRIST

## 2023-02-15 PROCEDURE — 3046F HEMOGLOBIN A1C LEVEL >9.0%: CPT | Mod: CPTII,S$GLB,, | Performed by: PODIATRIST

## 2023-02-15 PROCEDURE — 99499 NO LOS: ICD-10-PCS | Mod: S$GLB,,, | Performed by: PODIATRIST

## 2023-02-15 RX ORDER — CLOTRIMAZOLE 1 G/ML
SOLUTION TOPICAL 2 TIMES DAILY
Qty: 15 ML | Refills: 0 | Status: SHIPPED | OUTPATIENT
Start: 2023-02-15 | End: 2024-01-11

## 2023-02-15 NOTE — PROGRESS NOTES
"  Subjective:      Patient ID: Bay Ibarra is a 42 y.o. male.    Chief Complaint: Follow-up (Great toe right )    Patient is 42 y.o. male with Past medical history of hypertension, hyperlipidemia, type 2 diabetes, chronic pancreatitis was recently hospitalized for having abdominal pain, nausea and vomiting.  Patient was admitted to ICU for management of acute diabetic ketoacidosis, UTI.  Patient is following up with left hallux wound that was present during hospitalization.  He noticed a wound 1 month ago.  Last measured glucose level was 85.  Patient is ambulating in tennis shoes.  Patient complains of numbness and tingling on bilateral lower extremity.    1/4/21 f/u L hallux wound. Has been applying winston. In tennis shoe today. I recommended WBAT in sx shoe last time but can not wear surgical shoe all the time due to his work. Tells me he can not wear open toe shoe at his work. He tries to walk with sx shoe at his house.     2/8/21 f/u L worsening of left hallux. In tennis shoe today. His wound appears to be worse today. Works for [employer]. On his feet 5-6 hours a day. He has to wear closed toe shoe. I prescribed DM shoe last time. Did not get fitted. Has been applying winston for LWC. DM poorly controlled.     2/22/21 f/u L diabetic hallux ulcer.  Ambulating in surgical shoe with felt.  Has been applying Santyl and triple antibiotic. DM not well controlled per pt. Tells me finished oral abx.     3/8/21 f/u L hallux ulcer. Has been offloaded with surgical shoe with felt. Denies pain. Tells me his glucose is "somewhat" controlled. LWC with santyl.     3/22/21 f/u L hallux ulcer. Minimal improvement as compared to last visit. Has been applying santyl. WBAT in sx shoe with felt to offload the IPJ of hallux. Glucose is not well controlled overall.      4/5/21 follow-up left hallux ulcer. Worsening of an ulcer today. Tells me he has been walking more than usual for his job duty.  Has been applying " "Santyl.  Ambulating in surgical shoe with felt. . Here for dressing change.     4/19/21 f/u L hallux ulcer. In sx shoe with felt. Glucose is not well controlled. Has not taken insulin for last 2 days because "he was out of it". Has been applying santyl. Slow/minimal healing noted on L hallux ulcer.     5/3/21 f/u L hallux ulcer. S/p plantar fascia medial band release in the office. Ambulating in sx shoe with felt. Pain is minimal over procedure site. Has been applying triple ointment instead santyl since he was out of santyl.     5/24/21 f/u L hallux ulcer. In regular tennis shoe today. Has been applying santyl. Denies L foot pain.     6/14/21 f/u L hallux ulcer. Size of the wound is smaller and shallower. Has been applying triple ointment. In regular tennis shoe.     7/12/21 f/u L foot DM ulcer. Plantar hallux wound is completely healed but presenting with ingrown toenail along medial border of hallux. Denies pain. Noticed a week ago. Also prsenting with new ulcer on platnar lateral 5th toe. Noticed it couple days ago. Denies pus. No pain. Admits that he has been walking in rain with closed toe shoe. He also answers "my glucose has been up and down".     8/12/21 f/u L hallux ulcer. It was completely closed but opened back up. Tells me he has been working and walking in working boot which got wet and caused re occurrence of ulcer. In regular tennis shoe today. Glucose is not well controlled per pt. Last glucose check yesterday was 220.     11/9/22 presenting with right hallux ulcer.  Patient noticed an ulcer on the bottom of hallux couple weeks ago.  Patient has been cleaning with triple antibiotic.  Patient is ambulating in regular tennis shoes.  Patient tells me his glucose fluctuates.  Left hallux ulcer is completely healed.  Denies acute foot injury.  Was recently evaluated by nurse practitioner yesterday and prescribed Keflex.  Patient has not picked up the medication yet.    1/25/23 presenting with right " hallux ulcer.  Recent hospitalization secondary to diabetic ketoacidosis.  Patient has been treating right hallux ulcer with Santyl.  Ambulating in regular tennis shoes.  Denies pain.  Glucose is not well controlled.     2/15/23 R hallux ulcer with discoloration of toenail. In working boot today. Gabriella pain. Has been applying triple abx ointment. Denies any treatments for nail fungus. Glucose is not well controlled.       Hemoglobin A1C   Date Value Ref Range Status   01/16/2023 12.2 (H) 4.0 - 5.6 % Final     Comment:     ADA Screening Guidelines:  5.7-6.4%  Consistent with prediabetes  >or=6.5%  Consistent with diabetes    High levels of fetal hemoglobin interfere with the HbA1C  assay. Heterozygous hemoglobin variants (HbS, HgC, etc)do  not significantly interfere with this assay.   However, presence of multiple variants may affect accuracy.     01/19/2022 10.3 (H) 4.0 - 5.6 % Final     Comment:     ADA Screening Guidelines:  5.7-6.4%  Consistent with prediabetes  >or=6.5%  Consistent with diabetes    High levels of fetal hemoglobin interfere with the HbA1C  assay. Heterozygous hemoglobin variants (HbS, HgC, etc)do  not significantly interfere with this assay.   However, presence of multiple variants may affect accuracy.     07/12/2021 11.7 (H) 4.0 - 5.6 % Final     Comment:     ADA Screening Guidelines:  5.7-6.4%  Consistent with prediabetes  >or=6.5%  Consistent with diabetes    High levels of fetal hemoglobin interfere with the HbA1C  assay. Heterozygous hemoglobin variants (HbS, HgC, etc)do  not significantly interfere with this assay.   However, presence of multiple variants may affect accuracy.         Review of Systems   Constitutional: Negative for chills, decreased appetite, fever and malaise/fatigue.   HENT:  Negative for congestion, ear discharge and sore throat.    Eyes:  Negative for discharge and pain.   Cardiovascular:  Negative for chest pain, claudication and leg swelling.   Respiratory:   Negative for cough and shortness of breath.    Skin:  Positive for poor wound healing. Negative for color change, nail changes and rash.        Right hallux ulcer   Musculoskeletal:  Positive for gout and stiffness. Negative for arthritis, joint pain, joint swelling and muscle weakness.   Gastrointestinal:  Negative for bloating, abdominal pain, diarrhea, nausea and vomiting.   Genitourinary:  Negative for flank pain and hematuria.   Neurological:  Negative for headaches, numbness and weakness.   Psychiatric/Behavioral:  Negative for altered mental status.            Past Medical History:   Diagnosis Date    Acute pancreatitis     Diverticulosis of colon     Essential hypertension     Fatty liver     Gastric varices without bleeding 1/16/2020    Glaucomatous optic atrophy, right     Gout     Hx of acute pancreatitis 3/3/2017    Hypertriglyceridemia 8/2/2017    Obesity (BMI 30-39.9) 6/5/2015    Obesity (BMI 35.0-39.9 without comorbidity) 6/5/2015    Obstructive sleep apnea syndrome 12/18/2015    Type 2 diabetes mellitus with diabetic polyneuropathy, with long-term current use of insulin 10/16/2017    Type 2 diabetes mellitus with hyperglycemia, with long-term current use of insulin 8/3/2017       Past Surgical History:   Procedure Laterality Date    ENDOSCOPIC ULTRASOUND OF UPPER GASTROINTESTINAL TRACT N/A 5/27/2019    Procedure: ULTRASOUND, UPPER GI TRACT, ENDOSCOPIC;  Surgeon: Zafar Velazquez MD;  Location: [address]);  Service: Endoscopy;  Laterality: N/A;    ERCP N/A 5/27/2019    Procedure: ERCP (ENDOSCOPIC RETROGRADE CHOLANGIOPANCREATOGRAPHY);  Surgeon: Zafar Velazquez MD;  Location: [address]);  Service: Endoscopy;  Laterality: N/A;    ESOPHAGOGASTRODUODENOSCOPY      ESOPHAGOGASTRODUODENOSCOPY N/A 1/31/2020    Procedure: EGD (ESOPHAGOGASTRODUODENOSCOPY);  Surgeon: Zafar Velazquez MD;  [facility];  Service: Endoscopy;  Laterality: N/A;       Family History   Problem Relation Age of Onset     Aneurysm Mother         AAA    Coronary artery disease Father         4 vessel bypass at 40, possible stent at 36    Diabetes type II Father     No Known Problems Sister     No Known Problems Brother     Aneurysm Maternal Grandmother         Possible AAA    Diabetes type II Paternal Grandmother        Social History     Socioeconomic History    Marital status:    Tobacco Use    Smoking status: Never    Smokeless tobacco: Never   Substance and Sexual Activity    Alcohol use: No    Drug use: Never    Sexual activity: Yes     Partners: Female       Current Outpatient Medications   Medication Sig Dispense Refill    allopurinoL (ZYLOPRIM) 300 MG tablet Take 1 tablet (300 mg total) by mouth once daily. 90 tablet 3    aspirin (ECOTRIN) 81 MG EC tablet Take 81 mg by mouth once daily.      atorvastatin (LIPITOR) 40 MG tablet Take 1 tablet (40 mg total) by mouth once daily. 90 tablet 3    blood sugar diagnostic (CONTOUR NEXT TEST STRIPS) Strp To test glucose 6 times a day. 200 each 11    blood-glucose sensor (DEXCOM G6 SENSOR) Isha Use as directed. Change sensor every 10 days. 3 each PRN    blood-glucose transmitter (DEXCOM G6 TRANSMITTER) Isha 1 each by Misc.(Non-Drug; Combo Route) route continuous as needed 1 each PRN    collagenase (SANTYL) ointment Apply topically once daily. 30 g 0    fenofibrate 160 MG Tab Take 1 tablet (160 mg total) by mouth once daily. 90 tablet 3    icosapent ethyL (VASCEPA) 1 gram Cap Take 2 capsules (2 g total) by mouth 2 (two) times daily. 360 capsule 0    insulin (LANTUS SOLOSTAR U-100 INSULIN) glargine 100 units/mL SubQ pen Inject 42 Units into the skin 2 (two) times a day. 30 mL 1    insulin aspart U-100 (NOVOLOG FLEXPEN U-100 INSULIN) 100 unit/mL (3 mL) InPn pen Inject 28 Units into the skin before breakfast AND 28 Units daily with lunch AND 30 Units before dinner. Plus correction scale. Max TDD 100units.. 30 mL 6    losartan (COZAAR) 100 MG tablet Take ½ tablet (50 mg total) by  "mouth once daily. 45 tablet 1    metFORMIN (GLUCOPHAGE) 500 MG tablet Take 2 tablets (1,000 mg total) by mouth 2 (two) times daily with meals. 360 tablet 3    mupirocin (BACTROBAN) 2 % ointment Apply topically 3 (three) times daily. 15 g 1    naproxen (NAPROSYN) 500 MG tablet Take 1 tablet (500 mg total) by mouth 2 (two) times daily as needed (headache). 30 tablet 0    niacin 100 MG Tab Take 2 tablets (200 mg total) by mouth every evening. 90 tablet 1    ondansetron (ZOFRAN) 4 MG tablet Take 1 tablet (4 mg total) by mouth every 6 (six) hours as needed for Nausea. 30 tablet 1    pantoprazole (PROTONIX) 40 MG tablet Take 1 tablet (40 mg total) by mouth once daily. 90 tablet 1    pregabalin (LYRICA) 150 MG capsule Take 1 capsule (150 mg total) by mouth 2 (two) times daily. 60 capsule 5    clotrimazole (LOTRIMIN) 1 % Soln Apply topically 2 (two) times daily. 15 mL 0     No current facility-administered medications for this visit.       Review of patient's allergies indicates:  No Known Allergies    Vitals:    02/15/23 1322   Weight: 99.5 kg (219 lb 4.8 oz)   Height: 5' 8" (1.727 m)   PainSc:   3   PainLoc: Foot           Objective:      Physical Exam  Constitutional:       General: He is not in acute distress.     Appearance: He is well-developed.   HENT:      Nose: Nose normal.   Eyes:      Conjunctiva/sclera: Conjunctivae normal.   Pulmonary:      Effort: Pulmonary effort is normal.   Chest:      Chest wall: No tenderness.   Abdominal:      Tenderness: There is no abdominal tenderness.   Musculoskeletal:      Cervical back: Normal range of motion.   Neurological:      Mental Status: He is alert and oriented to person, place, and time.   Psychiatric:         Behavior: Behavior normal.     Vascular: Distal DP/PT pulses palpable 1/4. CRT < 3 sec to tips of toes. No vericosities noted to LEs. Hair growth present LE, warm to touch LE    Dermatologic:   Right foot: +ulcer on plantar hallux 1cm in diameter. No rubor, no " erythema, no drainage, no sinus tract, no undermining. No probe to bone. No signs of infection.     Musculoskeletal:  No calf tenderness LE, Compartments soft/compressible.   Right foot: limited ROM of 1st MPJ.     Neurological:   Light touch, proprioception, and sharp/dull sensation are decreased. Protective threshold with the Gonvick-Wienstein monofilament is decreased. Vibratory sensation decreased.       1/4/21 2/8/21            Assessment:       Encounter Diagnoses   Name Primary?    Diabetic ulcer of toe of right foot associated with type 2 diabetes mellitus, with fat layer exposed Yes    Type 2 diabetes mellitus with hyperglycemia, with long-term current use of insulin     Obesity (BMI 30-39.9)          Plan:       Bay was seen today for follow-up.    Diagnoses and all orders for this visit:    Diabetic ulcer of toe of right foot associated with type 2 diabetes mellitus, with fat layer exposed    Type 2 diabetes mellitus with hyperglycemia, with long-term current use of insulin    Obesity (BMI 30-39.9)    Other orders  -     clotrimazole (LOTRIMIN) 1 % Soln; Apply topically 2 (two) times daily.      I counseled the patient on his conditions, their implications and medical management.    42 y.o. male with R hallux ulcer. History of left foot diabetic ulcer s/p medial band plantar fascia release percutaneous, onychomycosis.     -R hallux stable ulcer. I debrided right hallux wound. See procedure note. Stressed the importance of appropriate offloading of the ulcer with glucose control.  Recommend to continue with wound care.     -The nature of the condition, options for management, as well as potential risks and complications were discussed in detail with patient. Patient was amenable to my recommendations and left my office fully informed and will follow up as instructed or sooner if necessary.    -Patient was advised of signs and symptoms of infection including redness, drainage, purulence, odor, streaking,  fever, chills and I advised patient to seek medical attention (ER or urgent care) if these symptoms arise.   -Advised for optimal glucose control and maintenance per primary care physician. Patient was also educated on healthy diet that is naturally rich in nutrients and low in fat and calories.   -f/u 4 wks    Note dictated with voice recognition software, please excuse any grammatical errors.

## 2023-02-15 NOTE — PROCEDURES
"Wound Debridement    Date/Time: 2/15/2023 1:00 PM  Performed by: Anju Melvin DPM  Authorized by: Anju Melvin DPM     Time out: Immediately prior to procedure a "time out" was called to verify the correct patient, procedure, equipment, support staff and site/side marked as required.    Consent Done?:  Yes (Verbal)    Preparation: Patient was prepped and draped in usual sterile fashion      Wound Details:    Location:  Right foot    Location:  Right 1st Toe    Type of Debridement:  Excisional       Length (cm):  1       Area (sq cm):  1       Width (cm):  1       Percent Debrided (%):  100       Depth (cm):  0.2       Total Area Debrided (sq cm):  1    Depth of debridement:  Subcutaneous tissue    Tissue debrided:  Subcutaneous    Devitalized tissue debrided:  Callus and Biofilm    Instruments:  Curette and Blade    Bleeding:  Minimal  Hemostasis Achieved: Yes    Method Used:  Pressure  Patient tolerance:  Patient tolerated the procedure well with no immediate complications  "

## 2023-03-03 ENCOUNTER — PATIENT MESSAGE (OUTPATIENT)
Dept: FAMILY MEDICINE | Facility: CLINIC | Age: 43
End: 2023-03-03
Payer: COMMERCIAL

## 2023-03-03 DIAGNOSIS — K29.00 ACUTE SUPERFICIAL GASTRITIS WITHOUT HEMORRHAGE: ICD-10-CM

## 2023-03-03 DIAGNOSIS — E10.40 TYPE 1 DIABETES MELLITUS WITH DIABETIC NEUROPATHY: ICD-10-CM

## 2023-03-03 RX ORDER — PREGABALIN 150 MG/1
150 CAPSULE ORAL 2 TIMES DAILY
Qty: 60 CAPSULE | Refills: 5 | Status: SHIPPED | OUTPATIENT
Start: 2023-03-03 | End: 2023-04-05 | Stop reason: SDUPTHER

## 2023-03-03 RX ORDER — PANTOPRAZOLE SODIUM 40 MG/1
40 TABLET, DELAYED RELEASE ORAL DAILY
Qty: 90 TABLET | Refills: 1 | Status: SHIPPED | OUTPATIENT
Start: 2023-03-03 | End: 2023-06-05 | Stop reason: SDUPTHER

## 2023-03-09 NOTE — PROGRESS NOTES
"Subjective:      Patient ID: Bay Ibarra is a 42 y.o. male.    Chief Complaint:  Diabetes    History of Present Illness  Bay Ibarra is here for follow up of DM.  Previously seen by me on 7/2021.     His mother passed away 2 weeks ago - his diet has been poor.     With regards to diabetes:    3/2021  Cpeptide 1.25 with glucose 315    Diagnosed: 2009  FH: father, mother, paternal grandfather   2 siblings - denies DM   Known complications:  DKA + 11/2020, 1/2023  RN -  PN +  Nephropathy -  CAD -  Hx pancreatitis - 2013 and 2x in 2017.   Diabetic foot ulcer R greater toe - following with Podiatry     Current regimen:  Metformin 1000mg twice daily   Lantus 42 units twice daily  Novolog 28units plus correction scale before meals for breakfast and lunch AND Novolog 30units plus correction scale for dinner  Add correction scale as needed.   Blood sugar 150 to 200 add 2 unit   Blood sugar 201 to 250 add 4 units   Blood sugar 251 to 300 add 6 units   Blood sugar 301 to 350 add 9 units   Blood sugar greater than 350 add 10 units      Reports compliance.     Other medications tried:  Jardiance - stopped in February 2020 due to DKA   MFM  DPP4  "Other orals"    Glucose Monitor: Dexcom   6 times a day testing  Log reviewed: sensor downloaded and reviewed, scanned in media tab     Hypoglycemia:  Denies.   Knows how to correct with 15 grams of carbs- juice, coke, or a peppermint.     Diet/Exercise:   Eats 3 meals a day.   Snacks : yes.   Drinks : coffee, water, diet soft drinks   Exercise - tries to stay active.      Education - last visit: 2019  Eye Exam: 7/2022  Podiatry: 1/2023    Diabetes Management Status    Hemoglobin A1C   Date Value Ref Range Status   01/16/2023 12.2 (H) 4.0 - 5.6 % Final     Comment:     ADA Screening Guidelines:  5.7-6.4%  Consistent with prediabetes  >or=6.5%  Consistent with diabetes    High levels of fetal hemoglobin interfere with the HbA1C  assay. Heterozygous hemoglobin variants " (HbS, HgC, etc)do  not significantly interfere with this assay.   However, presence of multiple variants may affect accuracy.     01/19/2022 10.3 (H) 4.0 - 5.6 % Final     Comment:     ADA Screening Guidelines:  5.7-6.4%  Consistent with prediabetes  >or=6.5%  Consistent with diabetes    High levels of fetal hemoglobin interfere with the HbA1C  assay. Heterozygous hemoglobin variants (HbS, HgC, etc)do  not significantly interfere with this assay.   However, presence of multiple variants may affect accuracy.     07/12/2021 11.7 (H) 4.0 - 5.6 % Final     Comment:     ADA Screening Guidelines:  5.7-6.4%  Consistent with prediabetes  >or=6.5%  Consistent with diabetes    High levels of fetal hemoglobin interfere with the HbA1C  assay. Heterozygous hemoglobin variants (HbS, HgC, etc)do  not significantly interfere with this assay.   However, presence of multiple variants may affect accuracy.         Statin: Taking  ACE/ARB: Taking  Screening or Prevention Patient's value Goal Complete/Controlled?   HgA1C Testing and Control   Lab Results   Component Value Date    HGBA1C 12.2 (H) 01/16/2023      Annually/Less than 8% No   Lipid profile : 10/13/2022 Annually Yes   LDL control Lab Results   Component Value Date    LDLCALC 36.4 (L) 01/19/2022    Annually/Less than 100 mg/dl  Yes   Nephropathy screening Lab Results   Component Value Date    LABMICR 10.0 01/19/2022     Lab Results   Component Value Date    PROTEINUA 1+ (A) 01/15/2023    Annually Yes   Blood pressure BP Readings from Last 1 Encounters:   03/14/23 (!) 148/68    Less than 140/90 Yes   Dilated retinal exam : 07/27/2022 Annually Yes   Foot exam   : 01/25/2023 Annually Yes       Review of Systems   Constitutional:  Negative for fatigue.   Eyes:  Negative for visual disturbance.   Respiratory:  Negative for shortness of breath.    Cardiovascular:  Negative for chest pain.   Gastrointestinal:  Negative for abdominal pain.   Musculoskeletal:  Negative for arthralgias.  "  Skin:  Positive for wound.   Neurological:  Negative for headaches.     Physical Exam  Vitals reviewed.   Cardiovascular:      Rate and Rhythm: Normal rate.      Comments: No edema present  Pulmonary:      Effort: Pulmonary effort is normal.   Abdominal:      Palpations: Abdomen is soft.     Appropriate footwear, foot exam deferred, done 1/2023.  Injection sites are without edema or erythema. No lipo hypertropthy or atrophy.       Visit Vitals  BP (!) 148/68   Pulse 98   Ht 5' 8" (1.727 m)   Wt 98.8 kg (217 lb 13 oz)   SpO2 98%   BMI 33.12 kg/m²       Body mass index is 33.12 kg/m².    Lab Review:   Lab Results   Component Value Date    HGBA1C 12.2 (H) 01/16/2023    HGBA1C 10.3 (H) 01/19/2022    HGBA1C 11.7 (H) 07/12/2021       Lab Results   Component Value Date    CHOL 120 01/19/2022    HDL 27 (L) 01/19/2022    LDLCALC 36.4 (L) 01/19/2022    TRIG 283 (H) 01/19/2022    CHOLHDL 22.5 01/19/2022     Lab Results   Component Value Date     01/16/2023    K 4.1 01/16/2023     01/16/2023    CO2 22 (L) 01/16/2023     (H) 01/16/2023    BUN 15 01/16/2023    CREATININE 0.67 01/16/2023    CALCIUM 8.0 (L) 01/16/2023    PROT 10.8 (H) 01/15/2023    ALBUMIN 5.5 (H) 01/15/2023    BILITOT 1.0 01/15/2023    ALKPHOS 158 (H) 01/15/2023    AST 26 01/15/2023    ALT 25 01/15/2023    ANIONGAP 14 01/16/2023    ESTGFRAFRICA >60.0 03/01/2022    EGFRNONAA >60.0 03/01/2022    TSH 2.430 11/27/2020     No results found for: [id number]  Assessment and Plan     1. Type 1 diabetes mellitus with diabetic neuropathy  Hemoglobin A1C    Comprehensive Metabolic Panel    TSH      2. Essential hypertension        3. Mixed hyperlipidemia        4. Diabetic polyneuropathy associated with type 1 diabetes mellitus        5. Type 2 diabetes mellitus with diabetic polyneuropathy, with long-term current use of insulin  Ambulatory referral/consult to Endocrinology    insulin (LANTUS SOLOSTAR U-100 INSULIN) glargine 100 units/mL SubQ pen    "       Type 1 diabetes mellitus with diabetic neuropathy  -- Lost to follow up.   -- Labs prior to follow up.  -- A1c goal <7.0%.  -- Medications discussed:  MFM  GLP1/DPP4i - pancreatitis  SGLT2i - DKA  SFU  Insulin   -- Reviewed logs/CGM:  Global hyperglycemia with A1c well above goal.  No hypoglycemia.   Instructed to send glucose logs in 7 days.    Reach out to me sooner for any glucose <70 or consistently >200.  -- Medication Changes:   Metformin 1000mg twice daily   Lantus 50 units twice daily  Novolog 28units plus correction scale before meals for breakfast and lunch AND Novolog 30units plus correction scale for dinner  Add correction scale as needed.   Blood sugar 150 to 200 add 2 unit   Blood sugar 201 to 250 add 4 units   Blood sugar 251 to 300 add 6 units   Blood sugar 301 to 350 add 9 units   Blood sugar greater than 350 add 10 units  -- Reviewed goals of therapy are to get the best control we can without hypoglycemia.  -- Reviewed patient's current insulin regimen. Clarified proper insulin dose and timing in relation to meals, etc. Insulin injection sites and proper rotation instructed.    -- Advised frequent self blood glucose monitoring.  Patient encouraged to document glucose results and bring them to every clinic visit.  -- Hypoglycemia precautions discussed. Instructed on precautions before driving.    -- Call for Bg repeatedly < 90 or > 180.   -- Close adherence to lifestyle changes recommended.   -- Periodic follow ups for eye evaluations, foot care and dental care suggested.    Essential hypertension  -- On ARB.  -- Blood pressure goals discussed with patient.    Mixed hyperlipidemia  -- Controlled.  -- On statin per ADA recommendations.    Diabetic polyneuropathy associated with type 1 diabetes mellitus  -- Optimize glucose control.     Follow up in about 3 months (around 6/14/2023).

## 2023-03-14 ENCOUNTER — OFFICE VISIT (OUTPATIENT)
Dept: ENDOCRINOLOGY | Facility: CLINIC | Age: 43
End: 2023-03-14
Payer: COMMERCIAL

## 2023-03-14 VITALS
DIASTOLIC BLOOD PRESSURE: 68 MMHG | SYSTOLIC BLOOD PRESSURE: 148 MMHG | HEART RATE: 98 BPM | BODY MASS INDEX: 33.01 KG/M2 | OXYGEN SATURATION: 98 % | WEIGHT: 217.81 LBS | HEIGHT: 68 IN

## 2023-03-14 DIAGNOSIS — E10.42 DIABETIC POLYNEUROPATHY ASSOCIATED WITH TYPE 1 DIABETES MELLITUS: ICD-10-CM

## 2023-03-14 DIAGNOSIS — I10 ESSENTIAL HYPERTENSION: Chronic | ICD-10-CM

## 2023-03-14 DIAGNOSIS — E10.40 TYPE 1 DIABETES MELLITUS WITH DIABETIC NEUROPATHY: Primary | ICD-10-CM

## 2023-03-14 DIAGNOSIS — E78.2 MIXED HYPERLIPIDEMIA: Chronic | ICD-10-CM

## 2023-03-14 DIAGNOSIS — E11.42 TYPE 2 DIABETES MELLITUS WITH DIABETIC POLYNEUROPATHY, WITH LONG-TERM CURRENT USE OF INSULIN: ICD-10-CM

## 2023-03-14 DIAGNOSIS — Z79.4 TYPE 2 DIABETES MELLITUS WITH DIABETIC POLYNEUROPATHY, WITH LONG-TERM CURRENT USE OF INSULIN: ICD-10-CM

## 2023-03-14 PROCEDURE — 3078F DIAST BP <80 MM HG: CPT | Mod: CPTII,S$GLB,, | Performed by: NURSE PRACTITIONER

## 2023-03-14 PROCEDURE — 1160F RVW MEDS BY RX/DR IN RCRD: CPT | Mod: CPTII,S$GLB,, | Performed by: NURSE PRACTITIONER

## 2023-03-14 PROCEDURE — 1159F PR MEDICATION LIST DOCUMENTED IN MEDICAL RECORD: ICD-10-PCS | Mod: CPTII,S$GLB,, | Performed by: NURSE PRACTITIONER

## 2023-03-14 PROCEDURE — 95251 PR GLUCOSE MONITOR, 72 HOUR, PHYS INTERP: ICD-10-PCS | Mod: S$GLB,,, | Performed by: NURSE PRACTITIONER

## 2023-03-14 PROCEDURE — 3077F SYST BP >= 140 MM HG: CPT | Mod: CPTII,S$GLB,, | Performed by: NURSE PRACTITIONER

## 2023-03-14 PROCEDURE — 4010F PR ACE/ARB THEARPY RXD/TAKEN: ICD-10-PCS | Mod: CPTII,S$GLB,, | Performed by: NURSE PRACTITIONER

## 2023-03-14 PROCEDURE — 99999 PR PBB SHADOW E&M-EST. PATIENT-LVL V: CPT | Mod: PBBFAC,,, | Performed by: NURSE PRACTITIONER

## 2023-03-14 PROCEDURE — 4010F ACE/ARB THERAPY RXD/TAKEN: CPT | Mod: CPTII,S$GLB,, | Performed by: NURSE PRACTITIONER

## 2023-03-14 PROCEDURE — 3008F PR BODY MASS INDEX (BMI) DOCUMENTED: ICD-10-PCS | Mod: CPTII,S$GLB,, | Performed by: NURSE PRACTITIONER

## 2023-03-14 PROCEDURE — 1160F PR REVIEW ALL MEDS BY PRESCRIBER/CLIN PHARMACIST DOCUMENTED: ICD-10-PCS | Mod: CPTII,S$GLB,, | Performed by: NURSE PRACTITIONER

## 2023-03-14 PROCEDURE — 99214 OFFICE O/P EST MOD 30 MIN: CPT | Mod: 25,S$GLB,, | Performed by: NURSE PRACTITIONER

## 2023-03-14 PROCEDURE — 3077F PR MOST RECENT SYSTOLIC BLOOD PRESSURE >= 140 MM HG: ICD-10-PCS | Mod: CPTII,S$GLB,, | Performed by: NURSE PRACTITIONER

## 2023-03-14 PROCEDURE — 3046F HEMOGLOBIN A1C LEVEL >9.0%: CPT | Mod: CPTII,S$GLB,, | Performed by: NURSE PRACTITIONER

## 2023-03-14 PROCEDURE — 1159F MED LIST DOCD IN RCRD: CPT | Mod: CPTII,S$GLB,, | Performed by: NURSE PRACTITIONER

## 2023-03-14 PROCEDURE — 95251 CONT GLUC MNTR ANALYSIS I&R: CPT | Mod: S$GLB,,, | Performed by: NURSE PRACTITIONER

## 2023-03-14 PROCEDURE — 99214 PR OFFICE/OUTPT VISIT, EST, LEVL IV, 30-39 MIN: ICD-10-PCS | Mod: 25,S$GLB,, | Performed by: NURSE PRACTITIONER

## 2023-03-14 PROCEDURE — 3078F PR MOST RECENT DIASTOLIC BLOOD PRESSURE < 80 MM HG: ICD-10-PCS | Mod: CPTII,S$GLB,, | Performed by: NURSE PRACTITIONER

## 2023-03-14 PROCEDURE — 99999 PR PBB SHADOW E&M-EST. PATIENT-LVL V: ICD-10-PCS | Mod: PBBFAC,,, | Performed by: NURSE PRACTITIONER

## 2023-03-14 PROCEDURE — 3072F PR LOW RISK FOR RETINOPATHY: ICD-10-PCS | Mod: CPTII,S$GLB,, | Performed by: NURSE PRACTITIONER

## 2023-03-14 PROCEDURE — 3072F LOW RISK FOR RETINOPATHY: CPT | Mod: CPTII,S$GLB,, | Performed by: NURSE PRACTITIONER

## 2023-03-14 PROCEDURE — 3008F BODY MASS INDEX DOCD: CPT | Mod: CPTII,S$GLB,, | Performed by: NURSE PRACTITIONER

## 2023-03-14 PROCEDURE — 3046F PR MOST RECENT HEMOGLOBIN A1C LEVEL > 9.0%: ICD-10-PCS | Mod: CPTII,S$GLB,, | Performed by: NURSE PRACTITIONER

## 2023-03-14 RX ORDER — INSULIN GLARGINE 100 [IU]/ML
50 INJECTION, SOLUTION SUBCUTANEOUS 2 TIMES DAILY
Qty: 60 ML | Refills: 3 | Status: SHIPPED | OUTPATIENT
Start: 2023-03-14 | End: 2023-06-07

## 2023-03-14 NOTE — ASSESSMENT & PLAN NOTE
-- Lost to follow up.   -- Labs prior to follow up.  -- A1c goal <7.0%.  -- Medications discussed:  MFM  GLP1/DPP4i - pancreatitis  SGLT2i - DKA  SFU  Insulin   -- Reviewed logs/CGM:  Global hyperglycemia with A1c well above goal.  No hypoglycemia.   Instructed to send glucose logs in 7 days.    Reach out to me sooner for any glucose <70 or consistently >200.  -- Medication Changes:   Metformin 1000mg twice daily   Lantus 50 units twice daily  Novolog 28units plus correction scale before meals for breakfast and lunch AND Novolog 30units plus correction scale for dinner  Add correction scale as needed.   Blood sugar 150 to 200 add 2 unit   Blood sugar 201 to 250 add 4 units   Blood sugar 251 to 300 add 6 units   Blood sugar 301 to 350 add 9 units   Blood sugar greater than 350 add 10 units  -- Reviewed goals of therapy are to get the best control we can without hypoglycemia.  -- Reviewed patient's current insulin regimen. Clarified proper insulin dose and timing in relation to meals, etc. Insulin injection sites and proper rotation instructed.    -- Advised frequent self blood glucose monitoring.  Patient encouraged to document glucose results and bring them to every clinic visit.  -- Hypoglycemia precautions discussed. Instructed on precautions before driving.    -- Call for Bg repeatedly < 90 or > 180.   -- Close adherence to lifestyle changes recommended.   -- Periodic follow ups for eye evaluations, foot care and dental care suggested.

## 2023-03-15 ENCOUNTER — OFFICE VISIT (OUTPATIENT)
Dept: PODIATRY | Facility: CLINIC | Age: 43
End: 2023-03-15
Payer: COMMERCIAL

## 2023-03-15 VITALS — HEIGHT: 68 IN | BODY MASS INDEX: 33.48 KG/M2 | WEIGHT: 220.88 LBS

## 2023-03-15 DIAGNOSIS — L97.512 DIABETIC ULCER OF TOE OF RIGHT FOOT ASSOCIATED WITH TYPE 2 DIABETES MELLITUS, WITH FAT LAYER EXPOSED: Primary | ICD-10-CM

## 2023-03-15 DIAGNOSIS — E11.621 DIABETIC ULCER OF TOE OF RIGHT FOOT ASSOCIATED WITH TYPE 2 DIABETES MELLITUS, WITH FAT LAYER EXPOSED: Primary | ICD-10-CM

## 2023-03-15 PROCEDURE — 4010F ACE/ARB THERAPY RXD/TAKEN: CPT | Mod: CPTII,S$GLB,, | Performed by: PODIATRIST

## 2023-03-15 PROCEDURE — 3072F LOW RISK FOR RETINOPATHY: CPT | Mod: CPTII,S$GLB,, | Performed by: PODIATRIST

## 2023-03-15 PROCEDURE — 11042 DBRDMT SUBQ TIS 1ST 20SQCM/<: CPT | Mod: S$GLB,,, | Performed by: PODIATRIST

## 2023-03-15 PROCEDURE — 11042 WOUND DEBRIDEMENT: ICD-10-PCS | Mod: S$GLB,,, | Performed by: PODIATRIST

## 2023-03-15 PROCEDURE — 4010F PR ACE/ARB THEARPY RXD/TAKEN: ICD-10-PCS | Mod: CPTII,S$GLB,, | Performed by: PODIATRIST

## 2023-03-15 PROCEDURE — 1160F PR REVIEW ALL MEDS BY PRESCRIBER/CLIN PHARMACIST DOCUMENTED: ICD-10-PCS | Mod: CPTII,S$GLB,, | Performed by: PODIATRIST

## 2023-03-15 PROCEDURE — 3008F PR BODY MASS INDEX (BMI) DOCUMENTED: ICD-10-PCS | Mod: CPTII,S$GLB,, | Performed by: PODIATRIST

## 2023-03-15 PROCEDURE — 3008F BODY MASS INDEX DOCD: CPT | Mod: CPTII,S$GLB,, | Performed by: PODIATRIST

## 2023-03-15 PROCEDURE — 1160F RVW MEDS BY RX/DR IN RCRD: CPT | Mod: CPTII,S$GLB,, | Performed by: PODIATRIST

## 2023-03-15 PROCEDURE — 99213 PR OFFICE/OUTPT VISIT, EST, LEVL III, 20-29 MIN: ICD-10-PCS | Mod: 25,S$GLB,, | Performed by: PODIATRIST

## 2023-03-15 PROCEDURE — 3072F PR LOW RISK FOR RETINOPATHY: ICD-10-PCS | Mod: CPTII,S$GLB,, | Performed by: PODIATRIST

## 2023-03-15 PROCEDURE — 99999 PR PBB SHADOW E&M-EST. PATIENT-LVL IV: CPT | Mod: PBBFAC,,, | Performed by: PODIATRIST

## 2023-03-15 PROCEDURE — 3046F PR MOST RECENT HEMOGLOBIN A1C LEVEL > 9.0%: ICD-10-PCS | Mod: CPTII,S$GLB,, | Performed by: PODIATRIST

## 2023-03-15 PROCEDURE — 99213 OFFICE O/P EST LOW 20 MIN: CPT | Mod: 25,S$GLB,, | Performed by: PODIATRIST

## 2023-03-15 PROCEDURE — 3046F HEMOGLOBIN A1C LEVEL >9.0%: CPT | Mod: CPTII,S$GLB,, | Performed by: PODIATRIST

## 2023-03-15 PROCEDURE — 99999 PR PBB SHADOW E&M-EST. PATIENT-LVL IV: ICD-10-PCS | Mod: PBBFAC,,, | Performed by: PODIATRIST

## 2023-03-15 PROCEDURE — 1159F MED LIST DOCD IN RCRD: CPT | Mod: CPTII,S$GLB,, | Performed by: PODIATRIST

## 2023-03-15 PROCEDURE — 1159F PR MEDICATION LIST DOCUMENTED IN MEDICAL RECORD: ICD-10-PCS | Mod: CPTII,S$GLB,, | Performed by: PODIATRIST

## 2023-03-15 NOTE — PROCEDURES
"Wound Debridement    Date/Time: 3/15/2023 3:15 PM  Performed by: Anju Melvin DPM  Authorized by: Anju Melvin DPM     Time out: Immediately prior to procedure a "time out" was called to verify the correct patient, procedure, equipment, support staff and site/side marked as required.    Consent Done?:  Yes (Verbal)    Preparation: Patient was prepped and draped in usual sterile fashion      Wound Details:    Location:  Right foot    Location:  Right 1st Toe    Type of Debridement:  Excisional       Length (cm):  1.5       Area (sq cm):  2.25       Width (cm):  1.5       Percent Debrided (%):  100       Depth (cm):  0.5       Total Area Debrided (sq cm):  2.25    Depth of debridement:  Subcutaneous tissue    Tissue debrided:  Subcutaneous    Devitalized tissue debrided:  Biofilm and Callus    Instruments:  Blade and Curette  Bleeding:  Minimal  Hemostasis Achieved: Yes  Method Used:  Pressure  Patient tolerance:  Patient tolerated the procedure well with no immediate complications  "

## 2023-03-15 NOTE — PROGRESS NOTES
"  Subjective:      Patient ID: Bay Ibarra is a 42 y.o. male.    Chief Complaint: Follow-up (Great right toe )    Patient is 42 y.o. male with Past medical history of hypertension, hyperlipidemia, type 2 diabetes, chronic pancreatitis was recently hospitalized for having abdominal pain, nausea and vomiting.  Patient was admitted to ICU for management of acute diabetic ketoacidosis, UTI.  Patient is following up with left hallux wound that was present during hospitalization.  He noticed a wound 1 month ago.  Last measured glucose level was 85.  Patient is ambulating in tennis shoes.  Patient complains of numbness and tingling on bilateral lower extremity.    1/4/21 f/u L hallux wound. Has been applying winston. In tennis shoe today. I recommended WBAT in sx shoe last time but can not wear surgical shoe all the time due to his work. Tells me he can not wear open toe shoe at his work. He tries to walk with sx shoe at his house.     2/8/21 f/u L worsening of left hallux. In tennis shoe today. His wound appears to be worse today. Works for [employer]. On his feet 5-6 hours a day. He has to wear closed toe shoe. I prescribed DM shoe last time. Did not get fitted. Has been applying winston for LWC. DM poorly controlled.     2/22/21 f/u L diabetic hallux ulcer.  Ambulating in surgical shoe with felt.  Has been applying Santyl and triple antibiotic. DM not well controlled per pt. Tells me finished oral abx.     3/8/21 f/u L hallux ulcer. Has been offloaded with surgical shoe with felt. Denies pain. Tells me his glucose is "somewhat" controlled. LWC with santyl.     3/22/21 f/u L hallux ulcer. Minimal improvement as compared to last visit. Has been applying santyl. WBAT in sx shoe with felt to offload the IPJ of hallux. Glucose is not well controlled overall.      4/5/21 follow-up left hallux ulcer. Worsening of an ulcer today. Tells me he has been walking more than usual for his job duty.  Has been applying " "Santyl.  Ambulating in surgical shoe with felt. . Here for dressing change.     4/19/21 f/u L hallux ulcer. In sx shoe with felt. Glucose is not well controlled. Has not taken insulin for last 2 days because "he was out of it". Has been applying santyl. Slow/minimal healing noted on L hallux ulcer.     5/3/21 f/u L hallux ulcer. S/p plantar fascia medial band release in the office. Ambulating in sx shoe with felt. Pain is minimal over procedure site. Has been applying triple ointment instead santyl since he was out of santyl.     5/24/21 f/u L hallux ulcer. In regular tennis shoe today. Has been applying santyl. Denies L foot pain.     6/14/21 f/u L hallux ulcer. Size of the wound is smaller and shallower. Has been applying triple ointment. In regular tennis shoe.     7/12/21 f/u L foot DM ulcer. Plantar hallux wound is completely healed but presenting with ingrown toenail along medial border of hallux. Denies pain. Noticed a week ago. Also prsenting with new ulcer on platnar lateral 5th toe. Noticed it couple days ago. Denies pus. No pain. Admits that he has been walking in rain with closed toe shoe. He also answers "my glucose has been up and down".     8/12/21 f/u L hallux ulcer. It was completely closed but opened back up. Tells me he has been working and walking in working boot which got wet and caused re occurrence of ulcer. In regular tennis shoe today. Glucose is not well controlled per pt. Last glucose check yesterday was 220.     11/9/22 presenting with right hallux ulcer.  Patient noticed an ulcer on the bottom of hallux couple weeks ago.  Patient has been cleaning with triple antibiotic.  Patient is ambulating in regular tennis shoes.  Patient tells me his glucose fluctuates.  Left hallux ulcer is completely healed.  Denies acute foot injury.  Was recently evaluated by nurse practitioner yesterday and prescribed Keflex.  Patient has not picked up the medication yet.    1/25/23 presenting with right " hallux ulcer.  Recent hospitalization secondary to diabetic ketoacidosis.  Patient has been treating right hallux ulcer with Santyl.  Ambulating in regular tennis shoes.  Denies pain.  Glucose is not well controlled.     2/15/23 R hallux ulcer with discoloration of toenail. In working boot today. Gabriella pain. Has been applying triple abx ointment. Denies any treatments for nail fungus. Glucose is not well controlled.     3/15/23 R hallux ulcer. In walking boots today. Tells me his glucose is not well controlled. Tells me he was evaluated by endocrinology yesterday. Minimal changes/healing to R hallux ulcer. Has not been appropriately offloading hallux.       Hemoglobin A1C   Date Value Ref Range Status   01/16/2023 12.2 (H) 4.0 - 5.6 % Final     Comment:     ADA Screening Guidelines:  5.7-6.4%  Consistent with prediabetes  >or=6.5%  Consistent with diabetes    High levels of fetal hemoglobin interfere with the HbA1C  assay. Heterozygous hemoglobin variants (HbS, HgC, etc)do  not significantly interfere with this assay.   However, presence of multiple variants may affect accuracy.     01/19/2022 10.3 (H) 4.0 - 5.6 % Final     Comment:     ADA Screening Guidelines:  5.7-6.4%  Consistent with prediabetes  >or=6.5%  Consistent with diabetes    High levels of fetal hemoglobin interfere with the HbA1C  assay. Heterozygous hemoglobin variants (HbS, HgC, etc)do  not significantly interfere with this assay.   However, presence of multiple variants may affect accuracy.     07/12/2021 11.7 (H) 4.0 - 5.6 % Final     Comment:     ADA Screening Guidelines:  5.7-6.4%  Consistent with prediabetes  >or=6.5%  Consistent with diabetes    High levels of fetal hemoglobin interfere with the HbA1C  assay. Heterozygous hemoglobin variants (HbS, HgC, etc)do  not significantly interfere with this assay.   However, presence of multiple variants may affect accuracy.         Review of Systems   Constitutional: Negative for chills, decreased  appetite, fever and malaise/fatigue.   HENT:  Negative for congestion, ear discharge and sore throat.    Eyes:  Negative for discharge and pain.   Cardiovascular:  Negative for chest pain, claudication and leg swelling.   Respiratory:  Negative for cough and shortness of breath.    Skin:  Positive for poor wound healing. Negative for color change, nail changes and rash.        Right hallux ulcer   Musculoskeletal:  Positive for gout and stiffness. Negative for arthritis, joint pain, joint swelling and muscle weakness.   Gastrointestinal:  Negative for bloating, abdominal pain, diarrhea, nausea and vomiting.   Genitourinary:  Negative for flank pain and hematuria.   Neurological:  Negative for headaches, numbness and weakness.   Psychiatric/Behavioral:  Negative for altered mental status.            Past Medical History:   Diagnosis Date    Acute pancreatitis     Diverticulosis of colon     Essential hypertension     Fatty liver     Gastric varices without bleeding 1/16/2020    Glaucomatous optic atrophy, right     Gout     Hx of acute pancreatitis 3/3/2017    Hypertriglyceridemia 8/2/2017    Obesity (BMI 30-39.9) 6/5/2015    Obesity (BMI 35.0-39.9 without comorbidity) 6/5/2015    Obstructive sleep apnea syndrome 12/18/2015    Type 2 diabetes mellitus with diabetic polyneuropathy, with long-term current use of insulin 10/16/2017    Type 2 diabetes mellitus with hyperglycemia, with long-term current use of insulin 8/3/2017       Past Surgical History:   Procedure Laterality Date    ENDOSCOPIC ULTRASOUND OF UPPER GASTROINTESTINAL TRACT N/A 5/27/2019    Procedure: ULTRASOUND, UPPER GI TRACT, ENDOSCOPIC;  Surgeon: Zafar Velazquez MD;  Location: [address]);  Service: Endoscopy;  Laterality: N/A;    ERCP N/A 5/27/2019    Procedure: ERCP (ENDOSCOPIC RETROGRADE CHOLANGIOPANCREATOGRAPHY);  Surgeon: Zafar Velazquez MD;  Location: [address];  Service: Endoscopy;  Laterality: N/A;    ESOPHAGOGASTRODUODENOSCOPY       ESOPHAGOGASTRODUODENOSCOPY N/A 1/31/2020    Procedure: EGD (ESOPHAGOGASTRODUODENOSCOPY);  Surgeon: Zafar Velazquez MD;  Location: [facility];  Service: Endoscopy;  Laterality: N/A;       Family History   Problem Relation Age of Onset    Aneurysm Mother         AAA    Coronary artery disease Father         4 vessel bypass at 40, possible stent at 36    Diabetes type II Father     No Known Problems Sister     No Known Problems Brother     Aneurysm Maternal Grandmother         Possible AAA    Diabetes type II Paternal Grandmother        Social History     Socioeconomic History    Marital status:    Tobacco Use    Smoking status: Never    Smokeless tobacco: Never   Substance and Sexual Activity    Alcohol use: No    Drug use: Never    Sexual activity: Yes     Partners: Female       Current Outpatient Medications   Medication Sig Dispense Refill    allopurinoL (ZYLOPRIM) 300 MG tablet Take 1 tablet (300 mg total) by mouth once daily. 90 tablet 3    aspirin (ECOTRIN) 81 MG EC tablet Take 81 mg by mouth once daily.      atorvastatin (LIPITOR) 40 MG tablet Take 1 tablet (40 mg total) by mouth once daily. 90 tablet 3    blood sugar diagnostic (CONTOUR NEXT TEST STRIPS) Strp To test glucose 6 times a day. 200 each 11    blood-glucose sensor (DEXCOM G6 SENSOR) Isha Use as directed. Change sensor every 10 days. 3 each PRN    blood-glucose transmitter (DEXCOM G6 TRANSMITTER) Isha 1 each by Misc.(Non-Drug; Combo Route) route continuous as needed 1 each PRN    clotrimazole (LOTRIMIN) 1 % Soln Apply topically to affected area 2 (two) times daily. 15 mL 0    collagenase (SANTYL) ointment Apply topically once daily. 30 g 0    fenofibrate 160 MG Tab Take 1 tablet (160 mg total) by mouth once daily. 90 tablet 3    icosapent ethyL (VASCEPA) 1 gram Cap Take 2 capsules (2 g total) by mouth 2 (two) times daily. 360 capsule 0    insulin (LANTUS SOLOSTAR U-100 INSULIN) glargine 100 units/mL SubQ pen Inject 50 Units into the skin 2  "(two) times a day. 60 mL 3    insulin aspart U-100 (NOVOLOG FLEXPEN U-100 INSULIN) 100 unit/mL (3 mL) InPn pen Inject 28 Units into the skin before breakfast AND 28 Units daily with lunch AND 30 Units before dinner. Plus correction scale. Max TDD 100units.. 30 mL 6    losartan (COZAAR) 100 MG tablet Take ½ tablet (50 mg total) by mouth once daily. 45 tablet 1    metFORMIN (GLUCOPHAGE) 500 MG tablet Take 2 tablets (1,000 mg total) by mouth 2 (two) times daily with meals. 360 tablet 3    mupirocin (BACTROBAN) 2 % ointment Apply topically 3 (three) times daily. 15 g 1    naproxen (NAPROSYN) 500 MG tablet Take 1 tablet (500 mg total) by mouth 2 (two) times daily as needed (headache). 30 tablet 0    niacin 100 MG Tab Take 2 tablets (200 mg total) by mouth every evening. 90 tablet 1    ondansetron (ZOFRAN) 4 MG tablet Take 1 tablet (4 mg total) by mouth every 6 (six) hours as needed for Nausea. 30 tablet 1    pantoprazole (PROTONIX) 40 MG tablet Take 1 tablet (40 mg total) by mouth once daily. 90 tablet 1    pregabalin (LYRICA) 150 MG capsule Take 1 capsule (150 mg total) by mouth 2 (two) times daily. 60 capsule 5     No current facility-administered medications for this visit.       Review of patient's allergies indicates:  No Known Allergies    Vitals:    03/15/23 1521   Weight: 100.2 kg (220 lb 14.4 oz)   Height: 5' 8" (1.727 m)   PainSc:   3   PainLoc: Foot           Objective:      Physical Exam  Constitutional:       General: He is not in acute distress.     Appearance: He is well-developed.   HENT:      Nose: Nose normal.   Eyes:      Conjunctiva/sclera: Conjunctivae normal.   Pulmonary:      Effort: Pulmonary effort is normal.   Chest:      Chest wall: No tenderness.   Abdominal:      Tenderness: There is no abdominal tenderness.   Musculoskeletal:      Cervical back: Normal range of motion.   Neurological:      Mental Status: He is alert and oriented to person, place, and time.   Psychiatric:         Behavior: " Behavior normal.     Vascular: Distal DP/PT pulses palpable 1/4. CRT < 3 sec to tips of toes. No vericosities noted to LEs. Hair growth present LE, warm to touch LE    Dermatologic:   Right foot: +ulcer on plantar hallux 1.5cm in diameter. No rubor, no erythema, no drainage, no sinus tract, no undermining. No probe to bone. No signs of infection.     Musculoskeletal:  No calf tenderness LE, Compartments soft/compressible. Decreased ROM of ankle.   Right foot: limited ROM of 1st MPJ.     Neurological:   Light touch, proprioception, and sharp/dull sensation are decreased. Protective threshold with the [place] monofilament is decreased. Vibratory sensation decreased.       1/4/21 2/8/21            Assessment:       Encounter Diagnosis   Name Primary?    Diabetic ulcer of toe of right foot associated with type 2 diabetes mellitus, with fat layer exposed Yes           Plan:       Bay was seen today for follow-up.    Diagnoses and all orders for this visit:    Diabetic ulcer of toe of right foot associated with type 2 diabetes mellitus, with fat layer exposed  -     X-Ray Foot Complete Right; Future        I counseled the patient on his conditions, their implications and medical management.    42 y.o. male with R hallux ulcer. History of left foot diabetic ulcer s/p medial band plantar fascia release percutaneous.     -R hallux stable ulcer. I debrided right hallux wound. See procedure note. Stressed the importance of appropriate offloading of the ulcer with glucose control.  Recommend to continue with wound care with santyl. His glucose is not well controlled. We discussed possible surgical options such as percutaneous medial band plantar fascia release, gastroc recession to restore/improve ROM of ankle however his glucose is way out of control and I do think there is high risk of incision problem/infection postop. Recommend to continue to treat this with aggressive offloading with local wound care.   -I also  told him that right hallux ulcer will not heal if he continues to walk in walking boot. Recommend limited duty at his work. DWS dispensed.     -The nature of the condition, options for management, as well as potential risks and complications were discussed in detail with patient. Patient was amenable to my recommendations and left my office fully informed and will follow up as instructed or sooner if necessary.    -Patient was advised of signs and symptoms of infection including redness, drainage, purulence, odor, streaking, fever, chills and I advised patient to seek medical attention (ER or urgent care) if these symptoms arise.   -Advised for optimal glucose control and maintenance per primary care physician. Patient was also educated on healthy diet that is naturally rich in nutrients and low in fat and calories.   -f/u 4 wks          Note dictated with voice recognition software, please excuse any grammatical errors.

## 2023-03-15 NOTE — LETTER
March 15, 2023      [facility] - Podiatry  [address], [address]  [place] [address]  Phone: [phone]  Fax: [phone]       Patient: Bay Ibarra   YOB: 1980  Date of Visit: 03/15/2023    To Whom It May Concern:    Charles Ibarra  was at [facility] on 03/15/2023. The patient may return to work/school on 03/16/2023 with restrictions. Bay must wear clinic issued ortho wedge shoe during work hours.  If you have any questions or concerns, or if I can be of further assistance, please do not hesitate to contact me.    Sincerely,      Rita Spain MA

## 2023-03-19 NOTE — LETTER
Brentmichaelmiguel Hayward Jorge Luis Matson is a 3 year old female presenting to the walk-in clinic today for fever and chills starting 3 days ago. Pt c/o of headache. Vomited 1x today.  Had ibuprofen today around 0900  Mom states that she had a fall 2 weeks ago; hit a head     Swabs/Specimens collected during rooming process:  COVID/FLU PCR - rapid    BP greater than 140/90: NA   Recheck completed: NA    PPE worn during room process  Writer: N95, Face shield/eye protection, gown, gloves  Patient: mask     Patient would like communication of their results via:        Cell Phone:   Telephone Information:   Mobile [phone]     Okay to leave a message containing results? Yes     December 3, 2020      Josef Llanos MD  [address] [address]           [facility] 6th Fl  [address]  [facility] [address]  Phone: [phone]  Fax: [phone]          Patient: Bay Ibarra   MR Number: [id number]   YOB: 1980   Date of Visit: 12/3/2020       Dear Dr. Josef Llanos:    Thank you for referring Bay Ibarra to me for evaluation. Attached you will find relevant portions of my assessment and plan of care.    If you have questions, please do not hesitate to call me. I look forward to following Bay Ibarra along with you.    Sincerely,    Shana Stapleton, EVA    Enclosure  CC:  No Recipients    If you would like to receive this communication electronically, please contact [email or web address] or [phone] to request more information on [facility] [employer] Link access.    For providers and/or their staff who would like to refer a patient to [facility], please contact us through our one-stop-shop provider referral line, [facility] , at [phone].    If you feel you have received this communication in error or would no longer like to receive these types of communications, please e-mail [email or web address]

## 2023-03-21 ENCOUNTER — TELEPHONE (OUTPATIENT)
Dept: ENDOCRINOLOGY | Facility: CLINIC | Age: 43
End: 2023-03-21
Payer: COMMERCIAL

## 2023-03-30 ENCOUNTER — PATIENT MESSAGE (OUTPATIENT)
Dept: ENDOCRINOLOGY | Facility: CLINIC | Age: 43
End: 2023-03-30
Payer: COMMERCIAL

## 2023-04-11 ENCOUNTER — PATIENT MESSAGE (OUTPATIENT)
Dept: ADMINISTRATIVE | Facility: HOSPITAL | Age: 43
End: 2023-04-11
Payer: COMMERCIAL

## 2023-04-11 DIAGNOSIS — E78.1 HYPERTRIGLYCERIDEMIA: ICD-10-CM

## 2023-04-11 RX ORDER — ATORVASTATIN CALCIUM 40 MG/1
40 TABLET, FILM COATED ORAL DAILY
Qty: 90 TABLET | Refills: 0 | Status: SHIPPED | OUTPATIENT
Start: 2023-04-11 | End: 2023-07-07 | Stop reason: SDUPTHER

## 2023-04-11 RX ORDER — ICOSAPENT ETHYL 1000 MG/1
2 CAPSULE ORAL 2 TIMES DAILY
Qty: 360 CAPSULE | Refills: 0 | Status: SHIPPED | OUTPATIENT
Start: 2023-04-11 | End: 2023-07-14 | Stop reason: SDUPTHER

## 2023-04-11 NOTE — TELEPHONE ENCOUNTER
Refill Decision Note   Bay Stacey  is requesting a refill authorization.  Brief Assessment and Rationale for Refill:  Approve     Medication Therapy Plan:  Cr and GFR WNL    Medication Reconciliation Completed: No   Comments:     No Care Gaps recommended.     Note composed:1:00 PM 04/11/2023

## 2023-04-11 NOTE — TELEPHONE ENCOUNTER
No new care gaps identified.  [facility] Embedded Care Gaps. Reference number: [id number]. 4/11/2023   10:16:46 AM REYEST

## 2023-04-11 NOTE — TELEPHONE ENCOUNTER
Refill Routing Note   Medication(s) are not appropriate for processing by [facility] for the following reason(s):      Drug-disease interaction    [facility] action(s):  Defer  Approve     Medication Therapy Plan: Drug-Disease: atorvastatin and TROY (acute kidney injury)    Pharmacist review requested: Yes     Appointments  past 12m or future 3m with PCP    Date Provider   Last Visit   1/24/2023 Funmilayo Taylor MD   Next Visit   Visit date not found Funmilayo Taylor MD   ED visits in past 90 days: 0        Note composed:12:10 PM 04/11/2023

## 2023-04-17 ENCOUNTER — OFFICE VISIT (OUTPATIENT)
Dept: PODIATRY | Facility: CLINIC | Age: 43
End: 2023-04-17
Payer: COMMERCIAL

## 2023-04-17 VITALS — HEIGHT: 68 IN | BODY MASS INDEX: 33.34 KG/M2 | WEIGHT: 220 LBS

## 2023-04-17 DIAGNOSIS — E11.621 DIABETIC ULCER OF TOE OF RIGHT FOOT ASSOCIATED WITH TYPE 2 DIABETES MELLITUS, WITH FAT LAYER EXPOSED: Primary | ICD-10-CM

## 2023-04-17 DIAGNOSIS — E66.9 OBESITY (BMI 30-39.9): ICD-10-CM

## 2023-04-17 DIAGNOSIS — L97.512 DIABETIC ULCER OF TOE OF RIGHT FOOT ASSOCIATED WITH TYPE 2 DIABETES MELLITUS, WITH FAT LAYER EXPOSED: Primary | ICD-10-CM

## 2023-04-17 DIAGNOSIS — Z79.4 TYPE 2 DIABETES MELLITUS WITH HYPERGLYCEMIA, WITH LONG-TERM CURRENT USE OF INSULIN: ICD-10-CM

## 2023-04-17 DIAGNOSIS — E11.65 TYPE 2 DIABETES MELLITUS WITH HYPERGLYCEMIA, WITH LONG-TERM CURRENT USE OF INSULIN: ICD-10-CM

## 2023-04-17 PROBLEM — E11.10 DIABETIC KETOACIDOSIS WITHOUT COMA ASSOCIATED WITH TYPE 2 DIABETES MELLITUS: Status: RESOLVED | Noted: 2020-02-23 | Resolved: 2023-04-17

## 2023-04-17 PROCEDURE — 4010F PR ACE/ARB THEARPY RXD/TAKEN: ICD-10-PCS | Mod: CPTII,S$GLB,, | Performed by: PODIATRIST

## 2023-04-17 PROCEDURE — 1160F RVW MEDS BY RX/DR IN RCRD: CPT | Mod: CPTII,S$GLB,, | Performed by: PODIATRIST

## 2023-04-17 PROCEDURE — 3072F LOW RISK FOR RETINOPATHY: CPT | Mod: CPTII,S$GLB,, | Performed by: PODIATRIST

## 2023-04-17 PROCEDURE — 3046F HEMOGLOBIN A1C LEVEL >9.0%: CPT | Mod: CPTII,S$GLB,, | Performed by: PODIATRIST

## 2023-04-17 PROCEDURE — 11043 DBRDMT MUSC&/FSCA 1ST 20/<: CPT | Mod: S$GLB,,, | Performed by: PODIATRIST

## 2023-04-17 PROCEDURE — 4010F ACE/ARB THERAPY RXD/TAKEN: CPT | Mod: CPTII,S$GLB,, | Performed by: PODIATRIST

## 2023-04-17 PROCEDURE — 3008F PR BODY MASS INDEX (BMI) DOCUMENTED: ICD-10-PCS | Mod: CPTII,S$GLB,, | Performed by: PODIATRIST

## 2023-04-17 PROCEDURE — 99999 PR PBB SHADOW E&M-EST. PATIENT-LVL V: ICD-10-PCS | Mod: PBBFAC,,, | Performed by: PODIATRIST

## 2023-04-17 PROCEDURE — 3046F PR MOST RECENT HEMOGLOBIN A1C LEVEL > 9.0%: ICD-10-PCS | Mod: CPTII,S$GLB,, | Performed by: PODIATRIST

## 2023-04-17 PROCEDURE — 1160F PR REVIEW ALL MEDS BY PRESCRIBER/CLIN PHARMACIST DOCUMENTED: ICD-10-PCS | Mod: CPTII,S$GLB,, | Performed by: PODIATRIST

## 2023-04-17 PROCEDURE — 99999 PR PBB SHADOW E&M-EST. PATIENT-LVL V: CPT | Mod: PBBFAC,,, | Performed by: PODIATRIST

## 2023-04-17 PROCEDURE — 99214 OFFICE O/P EST MOD 30 MIN: CPT | Mod: 25,S$GLB,, | Performed by: PODIATRIST

## 2023-04-17 PROCEDURE — 3072F PR LOW RISK FOR RETINOPATHY: ICD-10-PCS | Mod: CPTII,S$GLB,, | Performed by: PODIATRIST

## 2023-04-17 PROCEDURE — 11043 WOUND DEBRIDEMENT: ICD-10-PCS | Mod: S$GLB,,, | Performed by: PODIATRIST

## 2023-04-17 PROCEDURE — 3008F BODY MASS INDEX DOCD: CPT | Mod: CPTII,S$GLB,, | Performed by: PODIATRIST

## 2023-04-17 PROCEDURE — 1159F MED LIST DOCD IN RCRD: CPT | Mod: CPTII,S$GLB,, | Performed by: PODIATRIST

## 2023-04-17 PROCEDURE — 99214 PR OFFICE/OUTPT VISIT, EST, LEVL IV, 30-39 MIN: ICD-10-PCS | Mod: 25,S$GLB,, | Performed by: PODIATRIST

## 2023-04-17 PROCEDURE — 1159F PR MEDICATION LIST DOCUMENTED IN MEDICAL RECORD: ICD-10-PCS | Mod: CPTII,S$GLB,, | Performed by: PODIATRIST

## 2023-04-17 RX ORDER — SODIUM CHLORIDE 0.9 % (FLUSH) 0.9 %
10 SYRINGE (ML) INJECTION
Status: CANCELLED | OUTPATIENT
Start: 2023-04-17

## 2023-04-17 RX ORDER — CEFAZOLIN SODIUM 2 G/50ML
2 SOLUTION INTRAVENOUS
Status: CANCELLED | OUTPATIENT
Start: 2023-04-17

## 2023-04-17 RX ORDER — SODIUM CHLORIDE 9 MG/ML
INJECTION, SOLUTION INTRAVENOUS CONTINUOUS
Status: CANCELLED | OUTPATIENT
Start: 2023-04-17

## 2023-04-17 NOTE — PROCEDURES
"Wound Debridement    Date/Time: 4/17/2023 11:00 AM  Performed by: Anju Melvin DPM  Authorized by: Anju Melvin DPM     Time out: Immediately prior to procedure a "time out" was called to verify the correct patient, procedure, equipment, support staff and site/side marked as required.    Consent Done?:  Yes (Verbal)    Wound Details:    Location:  Right foot    Location:  Right 1st Toe    Type of Debridement:  Excisional       Length (cm):  1       Area (sq cm):  1       Width (cm):  1       Percent Debrided (%):  100       Depth (cm):  1       Total Area Debrided (sq cm):  1    Depth of debridement:  Muscle/fascia/tendon    Tissue debrided:  Joint Capsule and Subcutaneous    Devitalized tissue debrided:  Biofilm and Callus    Instruments:  Curette and Blade  Bleeding:  Minimal  Hemostasis Achieved: Yes  Method Used:  Pressure  Patient tolerance:  Patient tolerated the procedure well with no immediate complications  "

## 2023-04-17 NOTE — PROGRESS NOTES
"    Subjective:      Patient ID: Bay Ibarra is a 42 y.o. male.    Chief Complaint: Follow-up (1 month f/u/Patient states wound still has leakage)    Patient is 42 y.o. male with Past medical history of hypertension, hyperlipidemia, type 2 diabetes, chronic pancreatitis was recently hospitalized for having abdominal pain, nausea and vomiting.  Patient was admitted to ICU for management of acute diabetic ketoacidosis, UTI.  Patient is following up with left hallux wound that was present during hospitalization.  He noticed a wound 1 month ago.  Last measured glucose level was 85.  Patient is ambulating in tennis shoes.  Patient complains of numbness and tingling on bilateral lower extremity.    1/4/21 f/u L hallux wound. Has been applying winston. In tennis shoe today. I recommended WBAT in sx shoe last time but can not wear surgical shoe all the time due to his work. Tells me he can not wear open toe shoe at his work. He tries to walk with sx shoe at his house.     2/8/21 f/u L worsening of left hallux. In tennis shoe today. His wound appears to be worse today. Works for [employer]. On his feet 5-6 hours a day. He has to wear closed toe shoe. I prescribed DM shoe last time. Did not get fitted. Has been applying winston for LWC. DM poorly controlled.     2/22/21 f/u L diabetic hallux ulcer.  Ambulating in surgical shoe with felt.  Has been applying Santyl and triple antibiotic. DM not well controlled per pt. Tells me finished oral abx.     3/8/21 f/u L hallux ulcer. Has been offloaded with surgical shoe with felt. Denies pain. Tells me his glucose is "somewhat" controlled. LWC with santyl.     3/22/21 f/u L hallux ulcer. Minimal improvement as compared to last visit. Has been applying santyl. WBAT in sx shoe with felt to offload the IPJ of hallux. Glucose is not well controlled overall.      4/5/21 follow-up left hallux ulcer. Worsening of an ulcer today. Tells me he has been walking more than usual for " "his job duty.  Has been applying Santyl.  Ambulating in surgical shoe with felt. . Here for dressing change.     4/19/21 f/u L hallux ulcer. In sx shoe with felt. Glucose is not well controlled. Has not taken insulin for last 2 days because "he was out of it". Has been applying santyl. Slow/minimal healing noted on L hallux ulcer.     5/3/21 f/u L hallux ulcer. S/p plantar fascia medial band release in the office. Ambulating in sx shoe with felt. Pain is minimal over procedure site. Has been applying triple ointment instead santyl since he was out of santyl.     5/24/21 f/u L hallux ulcer. In regular tennis shoe today. Has been applying santyl. Denies L foot pain.     6/14/21 f/u L hallux ulcer. Size of the wound is smaller and shallower. Has been applying triple ointment. In regular tennis shoe.     7/12/21 f/u L foot DM ulcer. Plantar hallux wound is completely healed but presenting with ingrown toenail along medial border of hallux. Denies pain. Noticed a week ago. Also prsenting with new ulcer on platnar lateral 5th toe. Noticed it couple days ago. Denies pus. No pain. Admits that he has been walking in rain with closed toe shoe. He also answers "my glucose has been up and down".     8/12/21 f/u L hallux ulcer. It was completely closed but opened back up. Tells me he has been working and walking in working boot which got wet and caused re occurrence of ulcer. In regular tennis shoe today. Glucose is not well controlled per pt. Last glucose check yesterday was 220.     11/9/22 presenting with right hallux ulcer.  Patient noticed an ulcer on the bottom of hallux couple weeks ago.  Patient has been cleaning with triple antibiotic.  Patient is ambulating in regular tennis shoes.  Patient tells me his glucose fluctuates.  Left hallux ulcer is completely healed.  Denies acute foot injury.  Was recently evaluated by nurse practitioner yesterday and prescribed Keflex.  Patient has not picked up the medication " yet.    1/25/23 presenting with right hallux ulcer.  Recent hospitalization secondary to diabetic ketoacidosis.  Patient has been treating right hallux ulcer with Santyl.  Ambulating in regular tennis shoes.  Denies pain.  Glucose is not well controlled.     2/15/23 R hallux ulcer with discoloration of toenail. In working boot today. Gabriella pain. Has been applying triple abx ointment. Denies any treatments for nail fungus. Glucose is not well controlled.     3/15/23 R hallux ulcer. In walking boots today. Tells me his glucose is not well controlled. Tells me he was evaluated by endocrinology yesterday. Minimal changes/healing to R hallux ulcer. Has not been appropriately offloading hallux.     4/17/23 R hallux ulcer. Ambulating in DWS today. Has been applying triple abx. Minimal changes/healing noted on R hallux ulcer. Denies pain. Tells me he tries to ambulate in DWS as much as he can to offload the area of the  ulcer.       Hemoglobin A1C   Date Value Ref Range Status   01/16/2023 12.2 (H) 4.0 - 5.6 % Final     Comment:     ADA Screening Guidelines:  5.7-6.4%  Consistent with prediabetes  >or=6.5%  Consistent with diabetes    High levels of fetal hemoglobin interfere with the HbA1C  assay. Heterozygous hemoglobin variants (HbS, HgC, etc)do  not significantly interfere with this assay.   However, presence of multiple variants may affect accuracy.     01/19/2022 10.3 (H) 4.0 - 5.6 % Final     Comment:     ADA Screening Guidelines:  5.7-6.4%  Consistent with prediabetes  >or=6.5%  Consistent with diabetes    High levels of fetal hemoglobin interfere with the HbA1C  assay. Heterozygous hemoglobin variants (HbS, HgC, etc)do  not significantly interfere with this assay.   However, presence of multiple variants may affect accuracy.     07/12/2021 11.7 (H) 4.0 - 5.6 % Final     Comment:     ADA Screening Guidelines:  5.7-6.4%  Consistent with prediabetes  >or=6.5%  Consistent with diabetes    High levels of fetal  hemoglobin interfere with the HbA1C  assay. Heterozygous hemoglobin variants (HbS, HgC, etc)do  not significantly interfere with this assay.   However, presence of multiple variants may affect accuracy.         Review of Systems   Constitutional: Negative for chills, decreased appetite, fever and malaise/fatigue.   HENT:  Negative for congestion, ear discharge and sore throat.    Eyes:  Negative for discharge and pain.   Cardiovascular:  Negative for chest pain, claudication and leg swelling.   Respiratory:  Negative for cough and shortness of breath.    Skin:  Positive for poor wound healing. Negative for color change, nail changes and rash.        Right hallux ulcer   Musculoskeletal:  Positive for gout and stiffness. Negative for arthritis, joint pain, joint swelling and muscle weakness.   Gastrointestinal:  Negative for bloating, abdominal pain, diarrhea, nausea and vomiting.   Genitourinary:  Negative for flank pain and hematuria.   Neurological:  Negative for headaches, numbness and weakness.   Psychiatric/Behavioral:  Negative for altered mental status.            Past Medical History:   Diagnosis Date    Acute pancreatitis     Diverticulosis of colon     Essential hypertension     Fatty liver     Gastric varices without bleeding 1/16/2020    Glaucomatous optic atrophy, right     Gout     Hx of acute pancreatitis 3/3/2017    Hypertriglyceridemia 8/2/2017    Obesity (BMI 30-39.9) 6/5/2015    Obesity (BMI 35.0-39.9 without comorbidity) 6/5/2015    Obstructive sleep apnea syndrome 12/18/2015    Type 2 diabetes mellitus with diabetic polyneuropathy, with long-term current use of insulin 10/16/2017    Type 2 diabetes mellitus with hyperglycemia, with long-term current use of insulin 8/3/2017       Past Surgical History:   Procedure Laterality Date    ENDOSCOPIC ULTRASOUND OF UPPER GASTROINTESTINAL TRACT N/A 5/27/2019    Procedure: ULTRASOUND, UPPER GI TRACT, ENDOSCOPIC;  Surgeon: Zafar Velazquez MD;  Location: [facility]  ENDO (2ND FLR);  Service: Endoscopy;  Laterality: N/A;    ERCP N/A 5/27/2019    Procedure: ERCP (ENDOSCOPIC RETROGRADE CHOLANGIOPANCREATOGRAPHY);  Surgeon: Zafar Velazquez MD;  Location: [facility] ENDO (2ND FLR);  Service: Endoscopy;  Laterality: N/A;    ESOPHAGOGASTRODUODENOSCOPY      ESOPHAGOGASTRODUODENOSCOPY N/A 1/31/2020    Procedure: EGD (ESOPHAGOGASTRODUODENOSCOPY);  Surgeon: Zafar Velazquez MD;  Location: [facility] ENDO;  Service: Endoscopy;  Laterality: N/A;       Family History   Problem Relation Age of Onset    Aneurysm Mother         AAA    Coronary artery disease Father         4 vessel bypass at 40, possible stent at 36    Diabetes type II Father     No Known Problems Sister     No Known Problems Brother     Aneurysm Maternal Grandmother         Possible AAA    Diabetes type II Paternal Grandmother        Social History     Socioeconomic History    Marital status:    Tobacco Use    Smoking status: Never    Smokeless tobacco: Never   Substance and Sexual Activity    Alcohol use: No    Drug use: Never    Sexual activity: Yes     Partners: Female       Current Outpatient Medications   Medication Sig Dispense Refill    allopurinoL (ZYLOPRIM) 300 MG tablet Take 1 tablet (300 mg total) by mouth once daily. 90 tablet 3    aspirin (ECOTRIN) 81 MG EC tablet Take 81 mg by mouth once daily.      atorvastatin (LIPITOR) 40 MG tablet Take 1 tablet (40 mg total) by mouth once daily. 90 tablet 0    blood sugar diagnostic (CONTOUR NEXT TEST STRIPS) Strp To test glucose 6 times a day. 200 each 11    blood-glucose sensor (DEXCOM G6 SENSOR) Isha Use as directed. Change sensor every 10 days. 3 each PRN    blood-glucose transmitter (DEXCOM G6 TRANSMITTER) Isha 1 each by Misc.(Non-Drug; Combo Route) route continuous as needed 1 each PRN    clotrimazole (LOTRIMIN) 1 % Soln Apply topically to affected area 2 (two) times daily. 15 mL 0    collagenase (SANTYL) ointment Apply topically once daily. 30 g 0    fenofibrate 160 MG Tab  "Take 1 tablet (160 mg total) by mouth once daily. 90 tablet 3    icosapent ethyL (VASCEPA) 1 gram Cap Take 2 capsules (2 g total) by mouth 2 (two) times daily. 360 capsule 0    insulin (LANTUS SOLOSTAR U-100 INSULIN) glargine 100 units/mL SubQ pen Inject 50 Units into the skin 2 (two) times a day. 60 mL 3    insulin aspart U-100 (NOVOLOG FLEXPEN U-100 INSULIN) 100 unit/mL (3 mL) InPn pen Inject 28 Units into the skin before breakfast AND 28 Units daily with lunch AND 30 Units before dinner. Plus correction scale. Max TDD 100units.. 30 mL 6    losartan (COZAAR) 100 MG tablet Take ½ tablet (50 mg total) by mouth once daily. 45 tablet 1    metFORMIN (GLUCOPHAGE) 500 MG tablet Take 2 tablets (1,000 mg total) by mouth 2 (two) times daily with meals. 360 tablet 3    mupirocin (BACTROBAN) 2 % ointment Apply topically 3 (three) times daily. 15 g 1    naproxen (NAPROSYN) 500 MG tablet Take 1 tablet (500 mg total) by mouth 2 (two) times daily as needed (headache). 30 tablet 0    niacin 100 MG Tab Take 2 tablets (200 mg total) by mouth every evening. 90 tablet 1    ondansetron (ZOFRAN) 4 MG tablet Take 1 tablet (4 mg total) by mouth every 6 (six) hours as needed for Nausea. 30 tablet 1    pantoprazole (PROTONIX) 40 MG tablet Take 1 tablet (40 mg total) by mouth once daily. 90 tablet 1    pregabalin (LYRICA) 150 MG capsule Take 1 capsule (150 mg total) by mouth 2 (two) times daily. 60 capsule 5     No current facility-administered medications for this visit.       Review of patient's allergies indicates:  No Known Allergies    Vitals:    04/17/23 1044   Weight: 99.8 kg (220 lb)   Height: 5' 8" (1.727 m)   PainSc:   2   PainLoc: Foot           Objective:      Physical Exam  Constitutional:       General: He is not in acute distress.     Appearance: He is well-developed.   HENT:      Nose: Nose normal.   Eyes:      Conjunctiva/sclera: Conjunctivae normal.   Pulmonary:      Effort: Pulmonary effort is normal.   Chest:      Chest " wall: No tenderness.   Abdominal:      Tenderness: There is no abdominal tenderness.   Musculoskeletal:      Cervical back: Normal range of motion.   Neurological:      Mental Status: He is alert and oriented to person, place, and time.   Psychiatric:         Behavior: Behavior normal.     Vascular: Distal DP/PT pulses palpable 1/4. CRT < 3 sec to tips of toes. No vericosities noted to LEs. Hair growth present LE, warm to touch LE    Dermatologic:   Right foot: +ulcer on plantar hallux 1cm in diameter. mild rubor, no erythema, no drainage, no sinus tract, no undermining. No probe to bone but close to periosteum.  No signs of infection.     Musculoskeletal:  No calf tenderness LE, Compartments soft/compressible. Decreased ROM of ankle.   Right foot: limited ROM of 1st MPJ.     Neurological:   Light touch, proprioception, and sharp/dull sensation are decreased. Protective threshold with the [place]-Wienstein monofilament is decreased. Vibratory sensation decreased.       1/4/21 2/8/21            Assessment:       Encounter Diagnoses   Name Primary?    Diabetic ulcer of toe of right foot associated with type 2 diabetes mellitus, with fat layer exposed Yes    Type 2 diabetes mellitus with hyperglycemia, with long-term current use of insulin     Obesity (BMI 30-39.9)              Plan:       Bay was seen today for follow-up.    Diagnoses and all orders for this visit:    Diabetic ulcer of toe of right foot associated with type 2 diabetes mellitus, with fat layer exposed    Type 2 diabetes mellitus with hyperglycemia, with long-term current use of insulin    Obesity (BMI 30-39.9)        I counseled the patient on his conditions, their implications and medical management.    42 y.o. male with R hallux ulcer. History of left foot diabetic ulcer s/p medial band plantar fascia release percutaneous.     -R foot xrays reviewed. No acute osseous abnormalities. No OM.   -R foot debridement today. Tolerated well. See procedure  note.  -discussed different treatment options. Chronic ulcer with minimal signs of healing with conservative treatments. I recommend surgical debridement with medial band plantar fascia release. Pt agrees and wants to proceed with surgery. We will plan for surgery for 4/21. Surgical clearance pending.     -Long discussion with patient regarding the procedure in detail.  Informed consent discussed in detail  including all alternatives, risks and complications not limited to consent form. These included but is not limited to bleeding, pain, infection, swelling, scarring, nerve entrapment, RSD, paralysis, loss of function of part or all of foot, brain damage and death.  Patient understands all risks, potential complications, and alternatives, including, but not limited to those listed on the consent form. All questions were answered. No guarantees given or implied as to outcome. Informed verbal and written consent was obtained. Consent forms read, signed, witnessed.   -During today's patient's visit, Greater than 50% of the time was spent discussing the items listed including chart review. The patient was evaluated and treated. I discussed diagnoses, prognosis and treatment with patient and family and reviewed diagnostic images. I have recommended surgery for patient. The risks vs. benefits of a surgical procedure to address the patient's concerns were discussed as well as alternative management options. Patient desires surgery and arrangements will be made accordingly. The alternatives, risks and complications of surgery were discussed including but not limited to  infection, swelling, numbness, post-operative pain, along with the fact that no guarantees can be given. All the patients' questions were answered and the patient seemed comfortable with my explanation. The patient was also instructed that prior to surgery if at any time more questions were to come up patient should contact the office and we'll be happy to  answer them. The types of surgical approaches available were reviewed as well as alternatives to a surgical approach. The patient will be scheduled for surgery.The informed surgical consent was reviewed and read aloud to the patient. I have reviewed the films and I have discussed my findings with the patient in great detail. I have discussed all risks including but not limited to infection, stiffness, scarring, limp, disability, deformity, damage to blood vessels or nerves, numbness, poor healing, need for braces, arthritis, chronic pain, amputation, and death. All benefits and realistic expectations discussed in great detail. I have made no promises as to the outcome. I have provided realistic expectations. I have offered the patient a second opinion which they have declined and have assured me they prefer to proceed despite the risks.    -The nature of the condition, options for management, as well as potential risks and complications were discussed in detail with patient. Patient was amenable to my recommendations and left my office fully informed and will follow up as instructed or sooner if necessary.    -Patient was advised of signs and symptoms of infection including redness, drainage, purulence, odor, streaking, fever, chills and I advised patient to seek medical attention (ER or urgent care) if these symptoms arise.   -Advised for optimal glucose control and maintenance per primary care physician. Patient was also educated on healthy diet that is naturally rich in nutrients and low in fat and calories.   -f/u 2 wks after surgery     Note dictated with voice recognition software, please excuse any grammatical errors.

## 2023-05-08 ENCOUNTER — OFFICE VISIT (OUTPATIENT)
Dept: PODIATRY | Facility: CLINIC | Age: 43
End: 2023-05-08
Payer: COMMERCIAL

## 2023-05-08 VITALS — HEIGHT: 68 IN | WEIGHT: 224 LBS | BODY MASS INDEX: 33.95 KG/M2

## 2023-05-08 DIAGNOSIS — M25.60 DECREASED RANGE OF MOTION: ICD-10-CM

## 2023-05-08 DIAGNOSIS — Z09 FOLLOW-UP EXAMINATION FOLLOWING SURGERY: Primary | ICD-10-CM

## 2023-05-08 PROCEDURE — 99024 PR POST-OP FOLLOW-UP VISIT: ICD-10-PCS | Mod: S$GLB,,, | Performed by: PODIATRIST

## 2023-05-08 PROCEDURE — 3046F PR MOST RECENT HEMOGLOBIN A1C LEVEL > 9.0%: ICD-10-PCS | Mod: CPTII,S$GLB,, | Performed by: PODIATRIST

## 2023-05-08 PROCEDURE — 4010F ACE/ARB THERAPY RXD/TAKEN: CPT | Mod: CPTII,S$GLB,, | Performed by: PODIATRIST

## 2023-05-08 PROCEDURE — 3008F BODY MASS INDEX DOCD: CPT | Mod: CPTII,S$GLB,, | Performed by: PODIATRIST

## 2023-05-08 PROCEDURE — 1159F MED LIST DOCD IN RCRD: CPT | Mod: CPTII,S$GLB,, | Performed by: PODIATRIST

## 2023-05-08 PROCEDURE — 3008F PR BODY MASS INDEX (BMI) DOCUMENTED: ICD-10-PCS | Mod: CPTII,S$GLB,, | Performed by: PODIATRIST

## 2023-05-08 PROCEDURE — 3072F PR LOW RISK FOR RETINOPATHY: ICD-10-PCS | Mod: CPTII,S$GLB,, | Performed by: PODIATRIST

## 2023-05-08 PROCEDURE — 3046F HEMOGLOBIN A1C LEVEL >9.0%: CPT | Mod: CPTII,S$GLB,, | Performed by: PODIATRIST

## 2023-05-08 PROCEDURE — 99999 PR PBB SHADOW E&M-EST. PATIENT-LVL IV: ICD-10-PCS | Mod: PBBFAC,,, | Performed by: PODIATRIST

## 2023-05-08 PROCEDURE — 3072F LOW RISK FOR RETINOPATHY: CPT | Mod: CPTII,S$GLB,, | Performed by: PODIATRIST

## 2023-05-08 PROCEDURE — 99999 PR PBB SHADOW E&M-EST. PATIENT-LVL IV: CPT | Mod: PBBFAC,,, | Performed by: PODIATRIST

## 2023-05-08 PROCEDURE — 1159F PR MEDICATION LIST DOCUMENTED IN MEDICAL RECORD: ICD-10-PCS | Mod: CPTII,S$GLB,, | Performed by: PODIATRIST

## 2023-05-08 PROCEDURE — 1160F RVW MEDS BY RX/DR IN RCRD: CPT | Mod: CPTII,S$GLB,, | Performed by: PODIATRIST

## 2023-05-08 PROCEDURE — 99024 POSTOP FOLLOW-UP VISIT: CPT | Mod: S$GLB,,, | Performed by: PODIATRIST

## 2023-05-08 PROCEDURE — 4010F PR ACE/ARB THEARPY RXD/TAKEN: ICD-10-PCS | Mod: CPTII,S$GLB,, | Performed by: PODIATRIST

## 2023-05-08 PROCEDURE — 1160F PR REVIEW ALL MEDS BY PRESCRIBER/CLIN PHARMACIST DOCUMENTED: ICD-10-PCS | Mod: CPTII,S$GLB,, | Performed by: PODIATRIST

## 2023-05-08 NOTE — PROGRESS NOTES
"    Subjective:      Patient ID: Bay Ibarra is a 42 y.o. male.    Chief Complaint: Post-op Evaluation (Sx: 04/21/2023)    Patient is 42 y.o. male with Past medical history of hypertension, hyperlipidemia, type 2 diabetes, chronic pancreatitis was recently hospitalized for having abdominal pain, nausea and vomiting.  Patient was admitted to ICU for management of acute diabetic ketoacidosis, UTI.  Patient is following up with left hallux wound that was present during hospitalization.  He noticed a wound 1 month ago.  Last measured glucose level was 85.  Patient is ambulating in tennis shoes.  Patient complains of numbness and tingling on bilateral lower extremity.    1/4/21 f/u L hallux wound. Has been applying winston. In tennis shoe today. I recommended WBAT in sx shoe last time but can not wear surgical shoe all the time due to his work. Tells me he can not wear open toe shoe at his work. He tries to walk with sx shoe at his house.     2/8/21 f/u L worsening of left hallux. In tennis shoe today. His wound appears to be worse today. Works for [employer]. On his feet 5-6 hours a day. He has to wear closed toe shoe. I prescribed DM shoe last time. Did not get fitted. Has been applying winston for LWC. DM poorly controlled.     2/22/21 f/u L diabetic hallux ulcer.  Ambulating in surgical shoe with felt.  Has been applying Santyl and triple antibiotic. DM not well controlled per pt. Tells me finished oral abx.     3/8/21 f/u L hallux ulcer. Has been offloaded with surgical shoe with felt. Denies pain. Tells me his glucose is "somewhat" controlled. LWC with santyl.     3/22/21 f/u L hallux ulcer. Minimal improvement as compared to last visit. Has been applying santyl. WBAT in sx shoe with felt to offload the IPJ of hallux. Glucose is not well controlled overall.      4/5/21 follow-up left hallux ulcer. Worsening of an ulcer today. Tells me he has been walking more than usual for his job duty.  Has been " "applying Santyl.  Ambulating in surgical shoe with felt. . Here for dressing change.     4/19/21 f/u L hallux ulcer. In sx shoe with felt. Glucose is not well controlled. Has not taken insulin for last 2 days because "he was out of it". Has been applying santyl. Slow/minimal healing noted on L hallux ulcer.     5/3/21 f/u L hallux ulcer. S/p plantar fascia medial band release in the office. Ambulating in sx shoe with felt. Pain is minimal over procedure site. Has been applying triple ointment instead santyl since he was out of santyl.     5/24/21 f/u L hallux ulcer. In regular tennis shoe today. Has been applying santyl. Denies L foot pain.     6/14/21 f/u L hallux ulcer. Size of the wound is smaller and shallower. Has been applying triple ointment. In regular tennis shoe.     7/12/21 f/u L foot DM ulcer. Plantar hallux wound is completely healed but presenting with ingrown toenail along medial border of hallux. Denies pain. Noticed a week ago. Also prsenting with new ulcer on platnar lateral 5th toe. Noticed it couple days ago. Denies pus. No pain. Admits that he has been walking in rain with closed toe shoe. He also answers "my glucose has been up and down".     8/12/21 f/u L hallux ulcer. It was completely closed but opened back up. Tells me he has been working and walking in working boot which got wet and caused re occurrence of ulcer. In regular tennis shoe today. Glucose is not well controlled per pt. Last glucose check yesterday was 220.     11/9/22 presenting with right hallux ulcer.  Patient noticed an ulcer on the bottom of hallux couple weeks ago.  Patient has been cleaning with triple antibiotic.  Patient is ambulating in regular tennis shoes.  Patient tells me his glucose fluctuates.  Left hallux ulcer is completely healed.  Denies acute foot injury.  Was recently evaluated by nurse practitioner yesterday and prescribed Keflex.  Patient has not picked up the medication yet.    1/25/23 presenting with " right hallux ulcer.  Recent hospitalization secondary to diabetic ketoacidosis.  Patient has been treating right hallux ulcer with Santyl.  Ambulating in regular tennis shoes.  Denies pain.  Glucose is not well controlled.     2/15/23 R hallux ulcer with discoloration of toenail. In working boot today. Gabriella pain. Has been applying triple abx ointment. Denies any treatments for nail fungus. Glucose is not well controlled.     3/15/23 R hallux ulcer. In walking boots today. Tells me his glucose is not well controlled. Tells me he was evaluated by endocrinology yesterday. Minimal changes/healing to R hallux ulcer. Has not been appropriately offloading hallux.     4/17/23 R hallux ulcer. Ambulating in DWS today. Has been applying triple abx. Minimal changes/healing noted on R hallux ulcer. Denies pain. Tells me he tries to ambulate in DWS as much as he can to offload the area of the  ulcer.     5/8/23 s/p R foot gastroc recession, plantar fascia release medial band, wound debridement (DOS: 4/21/23 GP: 7/21/23). Pain is controlled.  In surgical shoe today. Here for suture removal.     Hemoglobin A1C   Date Value Ref Range Status   01/16/2023 12.2 (H) 4.0 - 5.6 % Final     Comment:     ADA Screening Guidelines:  5.7-6.4%  Consistent with prediabetes  >or=6.5%  Consistent with diabetes    High levels of fetal hemoglobin interfere with the HbA1C  assay. Heterozygous hemoglobin variants (HbS, HgC, etc)do  not significantly interfere with this assay.   However, presence of multiple variants may affect accuracy.     01/19/2022 10.3 (H) 4.0 - 5.6 % Final     Comment:     ADA Screening Guidelines:  5.7-6.4%  Consistent with prediabetes  >or=6.5%  Consistent with diabetes    High levels of fetal hemoglobin interfere with the HbA1C  assay. Heterozygous hemoglobin variants (HbS, HgC, etc)do  not significantly interfere with this assay.   However, presence of multiple variants may affect accuracy.     07/12/2021 11.7 (H) 4.0 - 5.6  % Final     Comment:     ADA Screening Guidelines:  5.7-6.4%  Consistent with prediabetes  >or=6.5%  Consistent with diabetes    High levels of fetal hemoglobin interfere with the HbA1C  assay. Heterozygous hemoglobin variants (HbS, HgC, etc)do  not significantly interfere with this assay.   However, presence of multiple variants may affect accuracy.         Review of Systems   Constitutional: Negative for chills, decreased appetite, fever and malaise/fatigue.   HENT:  Negative for congestion, ear discharge and sore throat.    Eyes:  Negative for discharge and pain.   Cardiovascular:  Negative for chest pain, claudication and leg swelling.   Respiratory:  Negative for cough and shortness of breath.    Skin:  Positive for poor wound healing. Negative for color change, nail changes and rash.        Right hallux ulcer   Musculoskeletal:  Positive for gout and stiffness. Negative for arthritis, joint pain, joint swelling and muscle weakness.   Gastrointestinal:  Negative for bloating, abdominal pain, diarrhea, nausea and vomiting.   Genitourinary:  Negative for flank pain and hematuria.   Neurological:  Negative for headaches, numbness and weakness.   Psychiatric/Behavioral:  Negative for altered mental status.            Past Medical History:   Diagnosis Date    Acute pancreatitis     Diverticulosis of colon     Essential hypertension     Fatty liver     Gastric varices without bleeding 1/16/2020    Glaucomatous optic atrophy, right     Gout     Hx of acute pancreatitis 3/3/2017    Hypertriglyceridemia 8/2/2017    Obesity (BMI 30-39.9) 6/5/2015    Obesity (BMI 35.0-39.9 without comorbidity) 6/5/2015    Obstructive sleep apnea syndrome 12/18/2015    Type 2 diabetes mellitus with diabetic polyneuropathy, with long-term current use of insulin 10/16/2017    Type 2 diabetes mellitus with hyperglycemia, with long-term current use of insulin 8/3/2017       Past Surgical History:   Procedure Laterality Date    DEBRIDEMENT OF  FOOT Right 4/21/2023    Procedure: DEBRIDEMENT, FOOT;  Surgeon: Anju Melvin DPM;  Location: [facility] OR;  Service: Podiatry;  Laterality: Right;    ENDOSCOPIC ULTRASOUND OF UPPER GASTROINTESTINAL TRACT N/A 5/27/2019    Procedure: ULTRASOUND, UPPER GI TRACT, ENDOSCOPIC;  Surgeon: Zafar Velazquez MD;  Location: [facility] ENDO (2ND FLR);  Service: Endoscopy;  Laterality: N/A;    ERCP N/A 5/27/2019    Procedure: ERCP (ENDOSCOPIC RETROGRADE CHOLANGIOPANCREATOGRAPHY);  Surgeon: Zafar Velazquez MD;  Location: [facility] ENDO (2ND FLR);  Service: Endoscopy;  Laterality: N/A;    ESOPHAGOGASTRODUODENOSCOPY      ESOPHAGOGASTRODUODENOSCOPY N/A 1/31/2020    Procedure: EGD (ESOPHAGOGASTRODUODENOSCOPY);  Surgeon: Zafar Velazquez MD;  Location: [facility] ENDO;  Service: Endoscopy;  Laterality: N/A;    PLANTAR FASCIA RELEASE Right 4/21/2023    Procedure: RELEASE, FASCIA, PLANTAR;  Surgeon: Anju Melvin DPM;  Location: [facility] OR;  Service: Podiatry;  Laterality: Right;    RESECTION OF GASTROCNEMIUS MUSCLE Right 4/21/2023    Procedure: RESECTION, MUSCLE, GASTROCNEMIUS;  Surgeon: Anju Melvin DPM;  Location: [facility] OR;  Service: Podiatry;  Laterality: Right;       Family History   Problem Relation Age of Onset    Aneurysm Mother         AAA    Coronary artery disease Father         4 vessel bypass at 40, possible stent at 36    Diabetes type II Father     No Known Problems Sister     No Known Problems Brother     Aneurysm Maternal Grandmother         Possible AAA    Diabetes type II Paternal Grandmother        Social History     Socioeconomic History    Marital status:    Tobacco Use    Smoking status: Never    Smokeless tobacco: Never   Substance and Sexual Activity    Alcohol use: No    Drug use: Never    Sexual activity: Not Currently     Partners: Female       Current Outpatient Medications   Medication Sig Dispense Refill    allopurinoL (ZYLOPRIM) 300 MG tablet Take 1 tablet (300 mg total) by mouth once daily. 90 tablet 3    aspirin (ECOTRIN) 81  MG EC tablet Take 81 mg by mouth once daily.      atorvastatin (LIPITOR) 40 MG tablet Take 1 tablet (40 mg total) by mouth once daily. 90 tablet 0    blood sugar diagnostic (CONTOUR NEXT TEST STRIPS) Strp To test glucose 6 times a day. 200 each 11    blood-glucose sensor (DEXCOM G6 SENSOR) Isha Use as directed. Change sensor every 10 days. 3 each PRN    blood-glucose transmitter (DEXCOM G6 TRANSMITTER) Isha 1 each by Misc.(Non-Drug; Combo Route) route continuous as needed 1 each PRN    clotrimazole (LOTRIMIN) 1 % Soln Apply topically to affected area 2 (two) times daily. 15 mL 0    fenofibrate 160 MG Tab Take 1 tablet (160 mg total) by mouth once daily. 90 tablet 3    HYDROcodone-acetaminophen (NORCO) 5-325 mg per tablet Take 1 tablet by mouth every 6 (six) hours as needed for Pain. 20 tablet 0    icosapent ethyL (VASCEPA) 1 gram Cap Take 2 capsules (2 g total) by mouth 2 (two) times daily. 360 capsule 0    insulin (LANTUS SOLOSTAR U-100 INSULIN) glargine 100 units/mL SubQ pen Inject 50 Units into the skin 2 (two) times a day. 60 mL 3    insulin aspart U-100 (NOVOLOG FLEXPEN U-100 INSULIN) 100 unit/mL (3 mL) InPn pen Inject 28 Units into the skin before breakfast AND 28 Units daily with lunch AND 30 Units before dinner. Plus correction scale. Max TDD 100units.. 30 mL 6    losartan (COZAAR) 100 MG tablet Take ½ tablet (50 mg total) by mouth once daily. 45 tablet 1    metFORMIN (GLUCOPHAGE) 500 MG tablet Take 2 tablets (1,000 mg total) by mouth 2 (two) times daily with meals. 360 tablet 3    mupirocin (BACTROBAN) 2 % ointment Apply topically 3 (three) times daily. 15 g 1    niacin 100 MG Tab Take 2 tablets (200 mg total) by mouth every evening. 90 tablet 1    ondansetron (ZOFRAN) 4 MG tablet Take 1 tablet (4 mg total) by mouth every 6 (six) hours as needed for Nausea. 30 tablet 1    pantoprazole (PROTONIX) 40 MG tablet Take 1 tablet (40 mg total) by mouth once daily. 90 tablet 1    pregabalin (LYRICA) 150 MG capsule  "Take 1 capsule (150 mg total) by mouth 2 (two) times daily. 60 capsule 5     No current facility-administered medications for this visit.       Review of patient's allergies indicates:  No Known Allergies    Vitals:    05/08/23 1308   Weight: 101.6 kg (224 lb)   Height: 5' 8" (1.727 m)   PainSc: 0-No pain   PainLoc: Foot             Objective:      Physical Exam  Constitutional:       General: He is not in acute distress.     Appearance: He is well-developed.   HENT:      Nose: Nose normal.   Eyes:      Conjunctiva/sclera: Conjunctivae normal.   Pulmonary:      Effort: Pulmonary effort is normal.   Chest:      Chest wall: No tenderness.   Abdominal:      Tenderness: There is no abdominal tenderness.   Musculoskeletal:      Cervical back: Normal range of motion.   Neurological:      Mental Status: He is alert and oriented to person, place, and time.   Psychiatric:         Behavior: Behavior normal.     Vascular: Distal DP/PT pulses palpable 1/4. CRT < 3 sec to tips of toes. No vericosities noted to LEs. Hair growth present LE, warm to touch LE    Dermatologic:   Right foot: +ulcer on plantar hallux 1cm in diameter. mild rubor, no erythema, no drainage, no sinus tract, no undermining. No probe to bone but close to periosteum.  No signs of infection. Suture intact.     Musculoskeletal:  No calf tenderness LE, Compartments soft/compressible. Decreased ROM of ankle.   Right foot: mild tenderness to posterior calf and medial hindfoot.     Neurological:   Light touch, proprioception, and sharp/dull sensation are decreased. Protective threshold with the [place] monofilament is decreased. Vibratory sensation decreased.       1/4/21 2/8/21            Assessment:       Encounter Diagnoses   Name Primary?    Follow-up examination following surgery Yes    Decreased range of motion - Right Foot                Plan:       Bay was seen today for post-op evaluation.    Diagnoses and all orders for this " visit:    Follow-up examination following surgery    Decreased range of motion - Right Foot  -     Ambulatory referral/consult to Physical/Occupational Therapy; Future        I counseled the patient on his conditions, their implications and medical management.    42 y.o. male with R hallux ulcer. S/p R foot gastroc recession, plantar fascia release, wound debridement.    -rx PT   -suture removed. Incision healed well. R hallux ulcer local wound care with triple abx.     -I reviewed imaging, clinical history, and diagnosis as above with the patient. I attempted to use layman's terms to educate the patient as well as utilize foot models and/or pictures. I personally went through imaging with the patient.    -The nature of the condition, options for management, as well as potential risks and complications were discussed in detail with patient. Patient was amenable to my recommendations and left my office fully informed and will follow up as instructed or sooner if necessary.    -Advised for optimal glucose control and maintenance per primary care physician. Patient was also educated on healthy diet that is naturally rich in nutrients and low in fat and calories.   -Patient was advised of signs and symptoms of infection including redness, drainage, purulence, odor, streaking, fever, chills and I advised patient to seek medical attention (ER or urgent care) if these symptoms arise.   -f/u 4 wks     Note dictated with voice recognition software, please excuse any grammatical errors.

## 2023-05-08 NOTE — LETTER
May 8, 2023      [facility] - Podiatry  [address], [address]  [place] [address]  Phone: [phone]  Fax: [phone]       Patient: Bay Ibarra   YOB: 1980  Date of Visit: 05/08/2023    To Whom It May Concern:    Charles Ibarra  was at [facility] on 05/08/2023. The patient may return to work/school on 05/09/2023 with no restrictions. If you have any questions or concerns, or if I can be of further assistance, please do not hesitate to contact me.    Sincerely,      Mariam Royal MA

## 2023-05-09 ENCOUNTER — OCCUPATIONAL HEALTH (OUTPATIENT)
Dept: URGENT CARE | Facility: CLINIC | Age: 43
End: 2023-05-09

## 2023-05-09 DIAGNOSIS — Z76.89 RETURN TO WORK EVALUATION: Primary | ICD-10-CM

## 2023-05-09 LAB — GLUCOSE SERPL-MCNC: 258 MG/DL (ref 70–110)

## 2023-05-09 PROCEDURE — 82962 POCT GLUCOSE, HAND-HELD DEVICE: ICD-10-PCS | Mod: S$GLB,,, | Performed by: PHYSICIAN ASSISTANT

## 2023-05-09 PROCEDURE — 82962 GLUCOSE BLOOD TEST: CPT | Mod: S$GLB,,, | Performed by: PHYSICIAN ASSISTANT

## 2023-05-09 PROCEDURE — 99499 UNLISTED E&M SERVICE: CPT | Mod: S$GLB,,, | Performed by: PHYSICIAN ASSISTANT

## 2023-05-09 PROCEDURE — 99499 RETURN TO WORK EXAM: BASIC: ICD-10-PCS | Mod: S$GLB,,, | Performed by: PHYSICIAN ASSISTANT

## 2023-05-30 PROBLEM — M25.671 DECREASED RANGE OF MOTION OF RIGHT ANKLE: Status: ACTIVE | Noted: 2023-05-30

## 2023-05-30 PROBLEM — R26.9 IMPAIRED GAIT: Status: ACTIVE | Noted: 2023-05-30

## 2023-06-02 ENCOUNTER — PATIENT MESSAGE (OUTPATIENT)
Dept: PODIATRY | Facility: CLINIC | Age: 43
End: 2023-06-02
Payer: COMMERCIAL

## 2023-06-02 ENCOUNTER — PATIENT MESSAGE (OUTPATIENT)
Dept: FAMILY MEDICINE | Facility: CLINIC | Age: 43
End: 2023-06-02
Payer: COMMERCIAL

## 2023-06-05 DIAGNOSIS — K29.00 ACUTE SUPERFICIAL GASTRITIS WITHOUT HEMORRHAGE: ICD-10-CM

## 2023-06-05 RX ORDER — PANTOPRAZOLE SODIUM 40 MG/1
40 TABLET, DELAYED RELEASE ORAL DAILY
Qty: 90 TABLET | Refills: 1 | Status: SHIPPED | OUTPATIENT
Start: 2023-06-05 | End: 2023-12-07 | Stop reason: SDUPTHER

## 2023-06-05 NOTE — TELEPHONE ENCOUNTER
Care Due:                  Date            Visit Type   Department     Provider  --------------------------------------------------------------------------------                                [facility]      [facility]                              [facility]  Next Visit: 07-      CARE ([facility])   [facility]  [facility]                                                            Last  Test          Frequency    Reason                     Performed    Due Date  --------------------------------------------------------------------------------    Uric Acid...  12 months..  allopurinoL..............  Not Found    Overdue    [employer] Embedded Care Due Messages. Reference number: [id number].   6/05/2023 12:13:51 PM CDT

## 2023-06-05 NOTE — PROGRESS NOTES
"  Subjective:      Patient ID: Bay Ibarra is a 42 y.o. male.    Chief Complaint:  No chief complaint on file.    History of Present Illness  Bay Ibarra is here for follow up of DM.  Previously seen by me on 3/2023.     With regards to diabetes:    3/2021  Cpeptide 1.25 with glucose 315    Diagnosed: 2009  FH: father, mother, paternal grandfather   2 siblings - denies DM   Known complications:  DKA + 11/2020, 1/2023  RN -  PN +  Nephropathy -  CAD -  Hx pancreatitis - 2013 and 2x in 2017.   Diabetic foot ulcer R greater toe - following with Podiatry     Current regimen:  Metformin 1000mg twice daily   Lantus 50 units twice daily  Novolog 28units plus correction scale before meals for breakfast and lunch AND Novolog 30units plus correction scale for dinner  Add correction scale as needed.   Blood sugar 150 to 200 add 2 unit   Blood sugar 201 to 250 add 4 units   Blood sugar 251 to 300 add 6 units   Blood sugar 301 to 350 add 9 units   Blood sugar greater than 350 add 10 units      Reports compliance.     Other medications tried:  Jardiance - stopped in February 2020 due to DKA   MFM  DPP4  "Other orals"    Glucose Monitor: Dexcom   6 times a day testing  Log reviewed: sensor downloaded and reviewed, scanned in media tab     Hypoglycemia:  Denies.   Knows how to correct with 15 grams of carbs- juice, coke, or a peppermint.     Diet/Exercise: trying to cut back on portion sizes   Eats 3 meals a day.   Snacks : yes before bed (2 hours prior) - crackers   Drinks : coffee, water, diet soft drinks   Exercise - tries to stay active.      Education - last visit: 2019  Eye Exam: 7/2022  Podiatry: 5/2023    Diabetes Management Status    Hemoglobin A1C   Date Value Ref Range Status   06/02/2023 10.0 (H) 4.0 - 5.6 % Final     Comment:     ADA Screening Guidelines:  5.7-6.4%  Consistent with prediabetes  >or=6.5%  Consistent with diabetes    High levels of fetal hemoglobin interfere with the HbA1C  assay. " Heterozygous hemoglobin variants (HbS, HgC, etc)do  not significantly interfere with this assay.   However, presence of multiple variants may affect accuracy.     01/16/2023 12.2 (H) 4.0 - 5.6 % Final     Comment:     ADA Screening Guidelines:  5.7-6.4%  Consistent with prediabetes  >or=6.5%  Consistent with diabetes    High levels of fetal hemoglobin interfere with the HbA1C  assay. Heterozygous hemoglobin variants (HbS, HgC, etc)do  not significantly interfere with this assay.   However, presence of multiple variants may affect accuracy.     01/19/2022 10.3 (H) 4.0 - 5.6 % Final     Comment:     ADA Screening Guidelines:  5.7-6.4%  Consistent with prediabetes  >or=6.5%  Consistent with diabetes    High levels of fetal hemoglobin interfere with the HbA1C  assay. Heterozygous hemoglobin variants (HbS, HgC, etc)do  not significantly interfere with this assay.   However, presence of multiple variants may affect accuracy.         Statin: Taking  ACE/ARB: Taking  Screening or Prevention Patient's value Goal Complete/Controlled?   HgA1C Testing and Control   Lab Results   Component Value Date    HGBA1C 10.0 (H) 06/02/2023      Annually/Less than 8% No   Lipid profile : 10/13/2022 Annually Yes   LDL control Lab Results   Component Value Date    LDLCALC 36.4 (L) 01/19/2022    Annually/Less than 100 mg/dl  Yes   Nephropathy screening Lab Results   Component Value Date    LABMICR 10.0 01/19/2022     Lab Results   Component Value Date    PROTEINUA 1+ (A) 01/15/2023    Annually Yes   Blood pressure BP Readings from Last 1 Encounters:   06/07/23 (!) 148/76    Less than 140/90 Yes   Dilated retinal exam : 07/27/2022 Annually Yes   Foot exam   : 01/25/2023 Annually Yes       Review of Systems   Constitutional:  Negative for fatigue.   Eyes:  Negative for visual disturbance.   Respiratory:  Negative for shortness of breath.    Cardiovascular:  Negative for chest pain.   Gastrointestinal:  Negative for abdominal pain.  "  Musculoskeletal:  Negative for arthralgias.   Skin:  Positive for wound.   Neurological:  Negative for headaches.     Physical Exam  Vitals reviewed.   Cardiovascular:      Rate and Rhythm: Normal rate.      Comments: No edema present  Pulmonary:      Effort: Pulmonary effort is normal.   Abdominal:      Palpations: Abdomen is soft.     Appropriate footwear, foot exam deferred, done 1/2023.  Injection sites are without edema or erythema. No lipo hypertropthy or atrophy.       Visit Vitals  BP (!) 148/76   Pulse 102   Ht 5' 8" (1.727 m)   Wt 102.7 kg (226 lb 4.8 oz)   SpO2 98%   BMI 34.41 kg/m²         Body mass index is 34.41 kg/m².    Lab Review:   Lab Results   Component Value Date    HGBA1C 10.0 (H) 06/02/2023    HGBA1C 12.2 (H) 01/16/2023    HGBA1C 10.3 (H) 01/19/2022       Lab Results   Component Value Date    CHOL 120 01/19/2022    HDL 27 (L) 01/19/2022    LDLCALC 36.4 (L) 01/19/2022    TRIG 283 (H) 01/19/2022    CHOLHDL 22.5 01/19/2022     Lab Results   Component Value Date     06/02/2023    K 4.9 06/02/2023     06/02/2023    CO2 32 (H) 06/02/2023     (H) 06/02/2023    BUN 22 (H) 06/02/2023    CREATININE 1.05 06/02/2023    CALCIUM 10.0 06/02/2023    PROT 8.0 06/02/2023    ALBUMIN 4.4 06/02/2023    BILITOT 0.5 06/02/2023    ALKPHOS 195 (H) 06/02/2023    AST 49 (H) 06/02/2023    ALT 40 06/02/2023    ANIONGAP 11 06/02/2023    ESTGFRAFRICA >60.0 03/01/2022    EGFRNONAA >60.0 03/01/2022    TSH 2.380 06/02/2023     No results found for: [id number]  Assessment and Plan     1. Type 1 diabetes mellitus with diabetic neuropathy  Hemoglobin A1C    Comprehensive Metabolic Panel    Microalbumin/Creatinine Ratio, Urine    insulin (LANTUS SOLOSTAR U-100 INSULIN) glargine 100 units/mL SubQ pen    insulin aspart U-100 (NOVOLOG FLEXPEN U-100 INSULIN) 100 unit/mL (3 mL) InPn pen    blood-glucose sensor (DEXCOM G7 SENSOR) Isha      2. Type 2 diabetes mellitus with diabetic polyneuropathy, with long-term " current use of insulin  insulin (LANTUS SOLOSTAR U-100 INSULIN) glargine 100 units/mL SubQ pen      3. Type 2 diabetes mellitus with hyperglycemia, with long-term current use of insulin  insulin aspart U-100 (NOVOLOG FLEXPEN U-100 INSULIN) 100 unit/mL (3 mL) InPn pen      4. Diabetic ulcer of toe of left foot associated with type 1 diabetes mellitus, with fat layer exposed            Type 1 diabetes mellitus with diabetic neuropathy  -- Labs prior to follow up.  -- A1c goal <7.0%.  -- Medications discussed:  MFM  GLP1/DPP4i - pancreatitis  SGLT2i - DKA  SFU  Insulin   -- Reviewed logs/CGM:  Global hyperglycemia with A1c well above goal.  No hypoglycemia.   Instructed to send glucose logs in 7-14 days.    Reach out to me sooner for any glucose <70 or consistently >200.  -- Medication Changes:   Metformin 1000mg twice daily   Lantus 55 units twice daily  Novolog 30units plus correction scale before meals for breakfast and lunch AND Novolog 32units plus correction scale for dinner  Add correction scale as needed.   Blood sugar 150 to 200 add 2 unit   Blood sugar 201 to 250 add 4 units   Blood sugar 251 to 300 add 6 units   Blood sugar 301 to 350 add 9 units   Blood sugar greater than 350 add 10 units  -- Reviewed goals of therapy are to get the best control we can without hypoglycemia.  -- Reviewed patient's current insulin regimen. Clarified proper insulin dose and timing in relation to meals, etc. Insulin injection sites and proper rotation instructed.    -- Advised frequent self blood glucose monitoring.  Patient encouraged to document glucose results and bring them to every clinic visit.  -- Hypoglycemia precautions discussed. Instructed on precautions before driving.    -- Call for Bg repeatedly < 90 or > 180.   -- Close adherence to lifestyle changes recommended.   -- Periodic follow ups for eye evaluations, foot care and dental care suggested.    Diabetic ulcer of toe of left foot associated with type 1 diabetes  mellitus, with fat layer exposed  -- Optimize glucose control.       Follow up in about 4 months (around 10/7/2023).

## 2023-06-07 ENCOUNTER — OFFICE VISIT (OUTPATIENT)
Dept: ENDOCRINOLOGY | Facility: CLINIC | Age: 43
End: 2023-06-07
Payer: COMMERCIAL

## 2023-06-07 VITALS
DIASTOLIC BLOOD PRESSURE: 76 MMHG | HEART RATE: 102 BPM | HEIGHT: 68 IN | WEIGHT: 226.31 LBS | BODY MASS INDEX: 34.3 KG/M2 | SYSTOLIC BLOOD PRESSURE: 148 MMHG | OXYGEN SATURATION: 98 %

## 2023-06-07 DIAGNOSIS — E11.42 TYPE 2 DIABETES MELLITUS WITH DIABETIC POLYNEUROPATHY, WITH LONG-TERM CURRENT USE OF INSULIN: ICD-10-CM

## 2023-06-07 DIAGNOSIS — L97.522 DIABETIC ULCER OF TOE OF LEFT FOOT ASSOCIATED WITH TYPE 1 DIABETES MELLITUS, WITH FAT LAYER EXPOSED: ICD-10-CM

## 2023-06-07 DIAGNOSIS — E10.40 TYPE 1 DIABETES MELLITUS WITH DIABETIC NEUROPATHY: Primary | ICD-10-CM

## 2023-06-07 DIAGNOSIS — E11.65 TYPE 2 DIABETES MELLITUS WITH HYPERGLYCEMIA, WITH LONG-TERM CURRENT USE OF INSULIN: ICD-10-CM

## 2023-06-07 DIAGNOSIS — E10.621 DIABETIC ULCER OF TOE OF LEFT FOOT ASSOCIATED WITH TYPE 1 DIABETES MELLITUS, WITH FAT LAYER EXPOSED: ICD-10-CM

## 2023-06-07 DIAGNOSIS — Z79.4 TYPE 2 DIABETES MELLITUS WITH HYPERGLYCEMIA, WITH LONG-TERM CURRENT USE OF INSULIN: ICD-10-CM

## 2023-06-07 DIAGNOSIS — Z79.4 TYPE 2 DIABETES MELLITUS WITH DIABETIC POLYNEUROPATHY, WITH LONG-TERM CURRENT USE OF INSULIN: ICD-10-CM

## 2023-06-07 PROCEDURE — 4010F ACE/ARB THERAPY RXD/TAKEN: CPT | Mod: CPTII,S$GLB,, | Performed by: NURSE PRACTITIONER

## 2023-06-07 PROCEDURE — 99214 PR OFFICE/OUTPT VISIT, EST, LEVL IV, 30-39 MIN: ICD-10-PCS | Mod: 25,S$GLB,, | Performed by: NURSE PRACTITIONER

## 2023-06-07 PROCEDURE — 95251 PR GLUCOSE MONITOR, 72 HOUR, PHYS INTERP: ICD-10-PCS | Mod: S$GLB,,, | Performed by: NURSE PRACTITIONER

## 2023-06-07 PROCEDURE — 99214 OFFICE O/P EST MOD 30 MIN: CPT | Mod: 25,S$GLB,, | Performed by: NURSE PRACTITIONER

## 2023-06-07 PROCEDURE — 3072F LOW RISK FOR RETINOPATHY: CPT | Mod: CPTII,S$GLB,, | Performed by: NURSE PRACTITIONER

## 2023-06-07 PROCEDURE — 99999 PR PBB SHADOW E&M-EST. PATIENT-LVL IV: ICD-10-PCS | Mod: PBBFAC,,, | Performed by: NURSE PRACTITIONER

## 2023-06-07 PROCEDURE — 3008F BODY MASS INDEX DOCD: CPT | Mod: CPTII,S$GLB,, | Performed by: NURSE PRACTITIONER

## 2023-06-07 PROCEDURE — 1159F PR MEDICATION LIST DOCUMENTED IN MEDICAL RECORD: ICD-10-PCS | Mod: CPTII,S$GLB,, | Performed by: NURSE PRACTITIONER

## 2023-06-07 PROCEDURE — 3046F PR MOST RECENT HEMOGLOBIN A1C LEVEL > 9.0%: ICD-10-PCS | Mod: CPTII,S$GLB,, | Performed by: NURSE PRACTITIONER

## 2023-06-07 PROCEDURE — 95251 CONT GLUC MNTR ANALYSIS I&R: CPT | Mod: S$GLB,,, | Performed by: NURSE PRACTITIONER

## 2023-06-07 PROCEDURE — 3077F SYST BP >= 140 MM HG: CPT | Mod: CPTII,S$GLB,, | Performed by: NURSE PRACTITIONER

## 2023-06-07 PROCEDURE — 3046F HEMOGLOBIN A1C LEVEL >9.0%: CPT | Mod: CPTII,S$GLB,, | Performed by: NURSE PRACTITIONER

## 2023-06-07 PROCEDURE — 3077F PR MOST RECENT SYSTOLIC BLOOD PRESSURE >= 140 MM HG: ICD-10-PCS | Mod: CPTII,S$GLB,, | Performed by: NURSE PRACTITIONER

## 2023-06-07 PROCEDURE — 1160F RVW MEDS BY RX/DR IN RCRD: CPT | Mod: CPTII,S$GLB,, | Performed by: NURSE PRACTITIONER

## 2023-06-07 PROCEDURE — 99999 PR PBB SHADOW E&M-EST. PATIENT-LVL IV: CPT | Mod: PBBFAC,,, | Performed by: NURSE PRACTITIONER

## 2023-06-07 PROCEDURE — 3008F PR BODY MASS INDEX (BMI) DOCUMENTED: ICD-10-PCS | Mod: CPTII,S$GLB,, | Performed by: NURSE PRACTITIONER

## 2023-06-07 PROCEDURE — 3072F PR LOW RISK FOR RETINOPATHY: ICD-10-PCS | Mod: CPTII,S$GLB,, | Performed by: NURSE PRACTITIONER

## 2023-06-07 PROCEDURE — 3078F PR MOST RECENT DIASTOLIC BLOOD PRESSURE < 80 MM HG: ICD-10-PCS | Mod: CPTII,S$GLB,, | Performed by: NURSE PRACTITIONER

## 2023-06-07 PROCEDURE — 4010F PR ACE/ARB THEARPY RXD/TAKEN: ICD-10-PCS | Mod: CPTII,S$GLB,, | Performed by: NURSE PRACTITIONER

## 2023-06-07 PROCEDURE — 1159F MED LIST DOCD IN RCRD: CPT | Mod: CPTII,S$GLB,, | Performed by: NURSE PRACTITIONER

## 2023-06-07 PROCEDURE — 1160F PR REVIEW ALL MEDS BY PRESCRIBER/CLIN PHARMACIST DOCUMENTED: ICD-10-PCS | Mod: CPTII,S$GLB,, | Performed by: NURSE PRACTITIONER

## 2023-06-07 PROCEDURE — 3078F DIAST BP <80 MM HG: CPT | Mod: CPTII,S$GLB,, | Performed by: NURSE PRACTITIONER

## 2023-06-07 RX ORDER — INSULIN GLARGINE 100 [IU]/ML
55 INJECTION, SOLUTION SUBCUTANEOUS 2 TIMES DAILY
Qty: 90 ML | Refills: 3 | Status: SHIPPED | OUTPATIENT
Start: 2023-06-07 | End: 2023-10-10

## 2023-06-07 RX ORDER — INSULIN ASPART 100 [IU]/ML
INJECTION, SOLUTION INTRAVENOUS; SUBCUTANEOUS
Qty: 50 ML | Refills: 6 | Status: SHIPPED | OUTPATIENT
Start: 2023-06-07 | End: 2023-10-10

## 2023-06-07 RX ORDER — BLOOD-GLUCOSE SENSOR
EACH MISCELLANEOUS
Qty: 3 EACH | Refills: 3 | Status: SHIPPED | OUTPATIENT
Start: 2023-06-07

## 2023-06-07 NOTE — ASSESSMENT & PLAN NOTE
-- Labs prior to follow up.  -- A1c goal <7.0%.  -- Medications discussed:  MFM  GLP1/DPP4i - pancreatitis  SGLT2i - DKA  SFU  Insulin   -- Reviewed logs/CGM:  Global hyperglycemia with A1c well above goal.  No hypoglycemia.   Instructed to send glucose logs in 7-14 days.    Reach out to me sooner for any glucose <70 or consistently >200.  -- Medication Changes:   Metformin 1000mg twice daily   Lantus 55 units twice daily  Novolog 30units plus correction scale before meals for breakfast and lunch AND Novolog 32units plus correction scale for dinner  Add correction scale as needed.   Blood sugar 150 to 200 add 2 unit   Blood sugar 201 to 250 add 4 units   Blood sugar 251 to 300 add 6 units   Blood sugar 301 to 350 add 9 units   Blood sugar greater than 350 add 10 units  -- Reviewed goals of therapy are to get the best control we can without hypoglycemia.  -- Reviewed patient's current insulin regimen. Clarified proper insulin dose and timing in relation to meals, etc. Insulin injection sites and proper rotation instructed.    -- Advised frequent self blood glucose monitoring.  Patient encouraged to document glucose results and bring them to every clinic visit.  -- Hypoglycemia precautions discussed. Instructed on precautions before driving.    -- Call for Bg repeatedly < 90 or > 180.   -- Close adherence to lifestyle changes recommended.   -- Periodic follow ups for eye evaluations, foot care and dental care suggested.

## 2023-07-05 ENCOUNTER — OFFICE VISIT (OUTPATIENT)
Dept: PODIATRY | Facility: CLINIC | Age: 43
End: 2023-07-05
Payer: COMMERCIAL

## 2023-07-05 VITALS — HEIGHT: 68 IN | BODY MASS INDEX: 35.03 KG/M2 | WEIGHT: 231.13 LBS

## 2023-07-05 DIAGNOSIS — T81.31XA POSTOPERATIVE WOUND DEHISCENCE, INITIAL ENCOUNTER: Primary | ICD-10-CM

## 2023-07-05 DIAGNOSIS — Z09 FOLLOW-UP EXAMINATION FOLLOWING SURGERY: ICD-10-CM

## 2023-07-05 PROCEDURE — 1159F PR MEDICATION LIST DOCUMENTED IN MEDICAL RECORD: ICD-10-PCS | Mod: CPTII,S$GLB,, | Performed by: PODIATRIST

## 2023-07-05 PROCEDURE — 4010F ACE/ARB THERAPY RXD/TAKEN: CPT | Mod: CPTII,S$GLB,, | Performed by: PODIATRIST

## 2023-07-05 PROCEDURE — 99999 PR PBB SHADOW E&M-EST. PATIENT-LVL II: ICD-10-PCS | Mod: PBBFAC,,, | Performed by: PODIATRIST

## 2023-07-05 PROCEDURE — 99024 POSTOP FOLLOW-UP VISIT: CPT | Mod: S$GLB,,, | Performed by: PODIATRIST

## 2023-07-05 PROCEDURE — 1160F PR REVIEW ALL MEDS BY PRESCRIBER/CLIN PHARMACIST DOCUMENTED: ICD-10-PCS | Mod: CPTII,S$GLB,, | Performed by: PODIATRIST

## 2023-07-05 PROCEDURE — 4010F PR ACE/ARB THEARPY RXD/TAKEN: ICD-10-PCS | Mod: CPTII,S$GLB,, | Performed by: PODIATRIST

## 2023-07-05 PROCEDURE — 1159F MED LIST DOCD IN RCRD: CPT | Mod: CPTII,S$GLB,, | Performed by: PODIATRIST

## 2023-07-05 PROCEDURE — 99024 PR POST-OP FOLLOW-UP VISIT: ICD-10-PCS | Mod: S$GLB,,, | Performed by: PODIATRIST

## 2023-07-05 PROCEDURE — 3008F PR BODY MASS INDEX (BMI) DOCUMENTED: ICD-10-PCS | Mod: CPTII,S$GLB,, | Performed by: PODIATRIST

## 2023-07-05 PROCEDURE — 3008F BODY MASS INDEX DOCD: CPT | Mod: CPTII,S$GLB,, | Performed by: PODIATRIST

## 2023-07-05 PROCEDURE — 3046F HEMOGLOBIN A1C LEVEL >9.0%: CPT | Mod: CPTII,S$GLB,, | Performed by: PODIATRIST

## 2023-07-05 PROCEDURE — 99999 PR PBB SHADOW E&M-EST. PATIENT-LVL II: CPT | Mod: PBBFAC,,, | Performed by: PODIATRIST

## 2023-07-05 PROCEDURE — 3072F PR LOW RISK FOR RETINOPATHY: ICD-10-PCS | Mod: CPTII,S$GLB,, | Performed by: PODIATRIST

## 2023-07-05 PROCEDURE — 3046F PR MOST RECENT HEMOGLOBIN A1C LEVEL > 9.0%: ICD-10-PCS | Mod: CPTII,S$GLB,, | Performed by: PODIATRIST

## 2023-07-05 PROCEDURE — 3072F LOW RISK FOR RETINOPATHY: CPT | Mod: CPTII,S$GLB,, | Performed by: PODIATRIST

## 2023-07-05 PROCEDURE — 1160F RVW MEDS BY RX/DR IN RCRD: CPT | Mod: CPTII,S$GLB,, | Performed by: PODIATRIST

## 2023-07-05 RX ORDER — SULFAMETHOXAZOLE AND TRIMETHOPRIM 800; 160 MG/1; MG/1
1 TABLET ORAL 2 TIMES DAILY
Qty: 20 TABLET | Refills: 0 | Status: SHIPPED | OUTPATIENT
Start: 2023-07-05 | End: 2023-07-17

## 2023-07-05 NOTE — PROGRESS NOTES
"  Subjective:      Patient ID: Bay Ibarra is a 43 y.o. male.    Chief Complaint: Post-op Evaluation (Sx:04/21/2023)    Patient is 43 y.o. male with Past medical history of hypertension, hyperlipidemia, type 2 diabetes, chronic pancreatitis was recently hospitalized for having abdominal pain, nausea and vomiting.  Patient was admitted to ICU for management of acute diabetic ketoacidosis, UTI.  Patient is following up with left hallux wound that was present during hospitalization.  He noticed a wound 1 month ago.  Last measured glucose level was 85.  Patient is ambulating in tennis shoes.  Patient complains of numbness and tingling on bilateral lower extremity.    1/4/21 f/u L hallux wound. Has been applying winston. In tennis shoe today. I recommended WBAT in sx shoe last time but can not wear surgical shoe all the time due to his work. Tells me he can not wear open toe shoe at his work. He tries to walk with sx shoe at his house.     2/8/21 f/u L worsening of left hallux. In tennis shoe today. His wound appears to be worse today. Works for [employer]. On his feet 5-6 hours a day. He has to wear closed toe shoe. I prescribed DM shoe last time. Did not get fitted. Has been applying winston for LWC. DM poorly controlled.     2/22/21 f/u L diabetic hallux ulcer.  Ambulating in surgical shoe with felt.  Has been applying Santyl and triple antibiotic. DM not well controlled per pt. Tells me finished oral abx.     3/8/21 f/u L hallux ulcer. Has been offloaded with surgical shoe with felt. Denies pain. Tells me his glucose is "somewhat" controlled. LWC with santyl.     3/22/21 f/u L hallux ulcer. Minimal improvement as compared to last visit. Has been applying santyl. WBAT in sx shoe with felt to offload the IPJ of hallux. Glucose is not well controlled overall.      4/5/21 follow-up left hallux ulcer. Worsening of an ulcer today. Tells me he has been walking more than usual for his job duty.  Has been " "applying Santyl.  Ambulating in surgical shoe with felt. . Here for dressing change.     4/19/21 f/u L hallux ulcer. In sx shoe with felt. Glucose is not well controlled. Has not taken insulin for last 2 days because "he was out of it". Has been applying santyl. Slow/minimal healing noted on L hallux ulcer.     5/3/21 f/u L hallux ulcer. S/p plantar fascia medial band release in the office. Ambulating in sx shoe with felt. Pain is minimal over procedure site. Has been applying triple ointment instead santyl since he was out of santyl.     5/24/21 f/u L hallux ulcer. In regular tennis shoe today. Has been applying santyl. Denies L foot pain.     6/14/21 f/u L hallux ulcer. Size of the wound is smaller and shallower. Has been applying triple ointment. In regular tennis shoe.     7/12/21 f/u L foot DM ulcer. Plantar hallux wound is completely healed but presenting with ingrown toenail along medial border of hallux. Denies pain. Noticed a week ago. Also prsenting with new ulcer on platnar lateral 5th toe. Noticed it couple days ago. Denies pus. No pain. Admits that he has been walking in rain with closed toe shoe. He also answers "my glucose has been up and down".     8/12/21 f/u L hallux ulcer. It was completely closed but opened back up. Tells me he has been working and walking in working boot which got wet and caused re occurrence of ulcer. In regular tennis shoe today. Glucose is not well controlled per pt. Last glucose check yesterday was 220.     11/9/22 presenting with right hallux ulcer.  Patient noticed an ulcer on the bottom of hallux couple weeks ago.  Patient has been cleaning with triple antibiotic.  Patient is ambulating in regular tennis shoes.  Patient tells me his glucose fluctuates.  Left hallux ulcer is completely healed.  Denies acute foot injury.  Was recently evaluated by nurse practitioner yesterday and prescribed Keflex.  Patient has not picked up the medication yet.    1/25/23 presenting with " right hallux ulcer.  Recent hospitalization secondary to diabetic ketoacidosis.  Patient has been treating right hallux ulcer with Santyl.  Ambulating in regular tennis shoes.  Denies pain.  Glucose is not well controlled.     2/15/23 R hallux ulcer with discoloration of toenail. In working boot today. Gabriella pain. Has been applying triple abx ointment. Denies any treatments for nail fungus. Glucose is not well controlled.     3/15/23 R hallux ulcer. In walking boots today. Tells me his glucose is not well controlled. Tells me he was evaluated by endocrinology yesterday. Minimal changes/healing to R hallux ulcer. Has not been appropriately offloading hallux.     4/17/23 R hallux ulcer. Ambulating in DWS today. Has been applying triple abx. Minimal changes/healing noted on R hallux ulcer. Denies pain. Tells me he tries to ambulate in DWS as much as he can to offload the area of the  ulcer.     5/8/23 s/p R foot gastroc recession, plantar fascia release medial band, wound debridement (DOS: 4/21/23 GP: 7/21/23). Pain is controlled.  In surgical shoe today. Here for suture removal.     7/5/23 s/p R foot surgery.  Presenting  with wound dehiscence over gastroc recession incision. In regular walking shoes today. Denies pain. Has been going to PT. PT helps with ROM of ankle.     Hemoglobin A1C   Date Value Ref Range Status   06/02/2023 10.0 (H) 4.0 - 5.6 % Final     Comment:     ADA Screening Guidelines:  5.7-6.4%  Consistent with prediabetes  >or=6.5%  Consistent with diabetes    High levels of fetal hemoglobin interfere with the HbA1C  assay. Heterozygous hemoglobin variants (HbS, HgC, etc)do  not significantly interfere with this assay.   However, presence of multiple variants may affect accuracy.     01/16/2023 12.2 (H) 4.0 - 5.6 % Final     Comment:     ADA Screening Guidelines:  5.7-6.4%  Consistent with prediabetes  >or=6.5%  Consistent with diabetes    High levels of fetal hemoglobin interfere with the  HbA1C  assay. Heterozygous hemoglobin variants (HbS, HgC, etc)do  not significantly interfere with this assay.   However, presence of multiple variants may affect accuracy.     01/19/2022 10.3 (H) 4.0 - 5.6 % Final     Comment:     ADA Screening Guidelines:  5.7-6.4%  Consistent with prediabetes  >or=6.5%  Consistent with diabetes    High levels of fetal hemoglobin interfere with the HbA1C  assay. Heterozygous hemoglobin variants (HbS, HgC, etc)do  not significantly interfere with this assay.   However, presence of multiple variants may affect accuracy.         Review of Systems   Constitutional: Negative for chills, decreased appetite, fever and malaise/fatigue.   HENT:  Negative for congestion, ear discharge and sore throat.    Eyes:  Negative for discharge and pain.   Cardiovascular:  Negative for chest pain, claudication and leg swelling.   Respiratory:  Negative for cough and shortness of breath.    Skin:  Positive for poor wound healing. Negative for color change, nail changes and rash.   Musculoskeletal:  Positive for gout and stiffness. Negative for arthritis, joint pain, joint swelling and muscle weakness.   Gastrointestinal:  Negative for bloating, abdominal pain, diarrhea, nausea and vomiting.   Genitourinary:  Negative for flank pain and hematuria.   Neurological:  Negative for headaches, numbness and weakness.   Psychiatric/Behavioral:  Negative for altered mental status.            Past Medical History:   Diagnosis Date    Acute pancreatitis     Diverticulosis of colon     Essential hypertension     Fatty liver     Gastric varices without bleeding 1/16/2020    Glaucomatous optic atrophy, right     Gout     Hx of acute pancreatitis 3/3/2017    Hypertriglyceridemia 8/2/2017    Obesity (BMI 30-39.9) 6/5/2015    Obesity (BMI 35.0-39.9 without comorbidity) 6/5/2015    Obstructive sleep apnea syndrome 12/18/2015    Type 2 diabetes mellitus with diabetic polyneuropathy, with long-term current use of insulin  10/16/2017    Type 2 diabetes mellitus with hyperglycemia, with long-term current use of insulin 8/3/2017       Past Surgical History:   Procedure Laterality Date    DEBRIDEMENT OF FOOT Right 4/21/2023    Procedure: DEBRIDEMENT, FOOT;  Surgeon: Anju Melvin DPM;  Location: [facility] OR;  Service: Podiatry;  Laterality: Right;    ENDOSCOPIC ULTRASOUND OF UPPER GASTROINTESTINAL TRACT N/A 5/27/2019    Procedure: ULTRASOUND, UPPER GI TRACT, ENDOSCOPIC;  Surgeon: Zafar Velazquez MD;  Location: [address]);  Service: Endoscopy;  Laterality: N/A;    ERCP N/A 5/27/2019    Procedure: ERCP (ENDOSCOPIC RETROGRADE CHOLANGIOPANCREATOGRAPHY);  Surgeon: Zafar Velazquez MD;  Location: [address]);  Service: Endoscopy;  Laterality: N/A;    ESOPHAGOGASTRODUODENOSCOPY      ESOPHAGOGASTRODUODENOSCOPY N/A 1/31/2020    Procedure: EGD (ESOPHAGOGASTRODUODENOSCOPY);  Surgeon: Zafar Velazquez MD;  Location: [facility];  Service: Endoscopy;  Laterality: N/A;    PLANTAR FASCIA RELEASE Right 4/21/2023    Procedure: RELEASE, FASCIA, PLANTAR;  Surgeon: Anju Melvin DPM;  Location: [facility] OR;  Service: Podiatry;  Laterality: Right;    RESECTION OF GASTROCNEMIUS MUSCLE Right 4/21/2023    Procedure: RESECTION, MUSCLE, GASTROCNEMIUS;  Surgeon: Anju Melvin DPM;  Location: [facility] OR;  Service: Podiatry;  Laterality: Right;       Family History   Problem Relation Age of Onset    Aneurysm Mother         AAA    Coronary artery disease Father         4 vessel bypass at 40, possible stent at 36    Diabetes type II Father     No Known Problems Sister     No Known Problems Brother     Aneurysm Maternal Grandmother         Possible AAA    Diabetes type II Paternal Grandmother        Social History     Socioeconomic History    Marital status:    Tobacco Use    Smoking status: Never    Smokeless tobacco: Never   Substance and Sexual Activity    Alcohol use: No    Drug use: Never    Sexual activity: Not Currently     Partners: Female       Current  Outpatient Medications   Medication Sig Dispense Refill    allopurinoL (ZYLOPRIM) 300 MG tablet Take 1 tablet (300 mg total) by mouth once daily. 90 tablet 3    aspirin (ECOTRIN) 81 MG EC tablet Take 81 mg by mouth once daily.      atorvastatin (LIPITOR) 40 MG tablet Take 1 tablet (40 mg total) by mouth once daily. 90 tablet 0    blood sugar diagnostic (CONTOUR NEXT TEST STRIPS) Strp To test glucose 6 times a day. 200 each 11    blood-glucose sensor (DEXCOM G6 SENSOR) Isha Use as directed. Change sensor every 10 days. 3 each PRN    blood-glucose sensor (DEXCOM G7 SENSOR) Isha Change sensor every 10 days. 3 each 3    blood-glucose transmitter (DEXCOM G6 TRANSMITTER) Isha 1 each by Misc.(Non-Drug; Combo Route) route continuous as needed 1 each PRN    clotrimazole (LOTRIMIN) 1 % Soln Apply topically to affected area 2 (two) times daily. 15 mL 0    fenofibrate 160 MG Tab Take 1 tablet (160 mg total) by mouth once daily. 90 tablet 3    HYDROcodone-acetaminophen (NORCO) 5-325 mg per tablet Take 1 tablet by mouth every 6 (six) hours as needed for Pain. 20 tablet 0    icosapent ethyL (VASCEPA) 1 gram Cap Take 2 capsules (2 g total) by mouth 2 (two) times daily. 360 capsule 0    insulin (LANTUS SOLOSTAR U-100 INSULIN) glargine 100 units/mL SubQ pen Inject 55 Units into the skin 2 (two) times a day. 90 mL 3    insulin aspart U-100 (NOVOLOG FLEXPEN U-100 INSULIN) 100 unit/mL (3 mL) InPn pen Inject 30 Units into the skin before breakfast AND 30 Units daily with lunch AND 32 Units before dinner. Plus correction scale. Max TDD 100units.. 50 mL 6    losartan (COZAAR) 100 MG tablet Take ½ tablet (50 mg total) by mouth once daily. 45 tablet 1    metFORMIN (GLUCOPHAGE) 500 MG tablet Take 2 tablets (1,000 mg total) by mouth 2 (two) times daily with meals. 360 tablet 3    mupirocin (BACTROBAN) 2 % ointment Apply topically 3 (three) times daily. 15 g 1    niacin 100 MG Tab Take 2 tablets (200 mg total) by mouth every evening. 90 tablet  "1    ondansetron (ZOFRAN) 4 MG tablet Take 1 tablet (4 mg total) by mouth every 6 (six) hours as needed for Nausea. 30 tablet 1    pantoprazole (PROTONIX) 40 MG tablet Take 1 tablet (40 mg total) by mouth once daily. 90 tablet 1    pregabalin (LYRICA) 150 MG capsule Take 1 capsule (150 mg total) by mouth 2 (two) times daily. 60 capsule 5    sulfamethoxazole-trimethoprim 800-160mg (BACTRIM DS) 800-160 mg Tab Take 1 tablet by mouth 2 (two) times daily. for 10 days 20 tablet 0     No current facility-administered medications for this visit.       Review of patient's allergies indicates:  No Known Allergies    Vitals:    07/05/23 0825   Weight: 104.8 kg (231 lb 1.6 oz)   Height: 5' 8" (1.727 m)   PainSc:   2   PainLoc: Foot             Objective:      Physical Exam  Constitutional:       General: He is not in acute distress.     Appearance: He is well-developed.   HENT:      Nose: Nose normal.   Eyes:      Conjunctiva/sclera: Conjunctivae normal.   Pulmonary:      Effort: Pulmonary effort is normal.   Chest:      Chest wall: No tenderness.   Abdominal:      Tenderness: There is no abdominal tenderness.   Musculoskeletal:      Cervical back: Normal range of motion.   Neurological:      Mental Status: He is alert and oriented to person, place, and time.   Psychiatric:         Behavior: Behavior normal.     Vascular: Distal DP/PT pulses palpable 1/4. CRT < 3 sec to tips of toes. No vericosities noted to LEs. Hair growth present LE, warm to touch LE    Dermatologic:   Right foot:  Wound dehiscence over gastroc recession incision.  Mild rubor with erythema.  Serous drainage. No pus, no fluctuance.  Superficial scab on plantar aspect of the right hallux.     Musculoskeletal:  No calf tenderness LE, Compartments soft/compressible. Decreased ROM of ankle.     Neurological:   Light touch, proprioception, and sharp/dull sensation are decreased. Protective threshold with the [place]-Wienstein monofilament is decreased. Vibratory " sensation decreased.       1/4/21 2/8/21            Assessment:       Encounter Diagnoses   Name Primary?    Postoperative wound dehiscence, initial encounter Yes    Follow-up examination following surgery                  Plan:       Bay was seen today for post-op evaluation.    Diagnoses and all orders for this visit:    Postoperative wound dehiscence, initial encounter    Follow-up examination following surgery    Other orders  -     sulfamethoxazole-trimethoprim 800-160mg (BACTRIM DS) 800-160 mg Tab; Take 1 tablet by mouth 2 (two) times daily. for 10 days          I counseled the patient on his conditions, their implications and medical management.    43 y.o. male with R hallux ulcer. S/p R foot gastroc recession, plantar fascia release, wound debridement.    -rx Bactrim   -continue with the physical therapy.  Motion of the ankle improved after surgery.  Presenting with wound dehiscence over gastroc recession incision.  Recommend oral antibiotics, local wound care, physical therapy. R hallux ulcer healing well.     -I reviewed imaging, clinical history, and diagnosis as above with the patient. I attempted to use layman's terms to educate the patient as well as utilize foot models and/or pictures. I personally went through imaging with the patient.    -The nature of the condition, options for management, as well as potential risks and complications were discussed in detail with patient. Patient was amenable to my recommendations and left my office fully informed and will follow up as instructed or sooner if necessary.    -Advised for optimal glucose control and maintenance per primary care physician. Patient was also educated on healthy diet that is naturally rich in nutrients and low in fat and calories.   -Patient was advised of signs and symptoms of infection including redness, drainage, purulence, odor, streaking, fever, chills and I advised patient to seek medical attention (ER or urgent care) if these  symptoms arise.   -f/u 4 wks     Note dictated with voice recognition software, please excuse any grammatical errors.

## 2023-07-07 DIAGNOSIS — I10 ESSENTIAL HYPERTENSION: ICD-10-CM

## 2023-07-07 RX ORDER — LOSARTAN POTASSIUM 100 MG/1
50 TABLET ORAL DAILY
Qty: 45 TABLET | Refills: 1 | Status: SHIPPED | OUTPATIENT
Start: 2023-07-07 | End: 2023-12-25 | Stop reason: SDUPTHER

## 2023-07-07 NOTE — TELEPHONE ENCOUNTER
No care due was identified.  [facility] Embedded Care Due Messages. Reference number: [id number].   7/07/2023 11:38:06 AM CDT

## 2023-07-08 RX ORDER — ATORVASTATIN CALCIUM 40 MG/1
40 TABLET, FILM COATED ORAL DAILY
Qty: 90 TABLET | Refills: 0 | Status: SHIPPED | OUTPATIENT
Start: 2023-07-08 | End: 2023-10-09 | Stop reason: SDUPTHER

## 2023-07-08 NOTE — TELEPHONE ENCOUNTER
Refill Routing Note   Medication(s) are not appropriate for processing by [facility] for the following reason(s):      Drug-disease interaction    [facility] action(s):  Defer None identified     Medication Therapy Plan: Drug-Disease: atorvastatin and TROY (acute kidney injury)      Appointments  past 12m or future 3m with PCP    Date Provider   Last Visit   1/24/2023 Funmilayo Taylor MD   Next Visit   7/25/2023 Funmilayo Taylor MD   ED visits in past 90 days: 0        Note composed:4:29 PM 07/08/2023

## 2023-07-12 NOTE — TELEPHONE ENCOUNTER
Called and lvm to inform pt that we are changing his appt 4/6/20 to a virtual visit. Ask for a return call if he needs help with the carmita.  
Attending Attestation (For Attendings USE Only)...

## 2023-07-14 DIAGNOSIS — E78.1 HYPERTRIGLYCERIDEMIA: ICD-10-CM

## 2023-07-14 NOTE — TELEPHONE ENCOUNTER
No care due was identified.  [facility] Embedded Care Due Messages. Reference number: [id number].   7/14/2023 2:43:14 PM CDT

## 2023-07-17 RX ORDER — ICOSAPENT ETHYL 1000 MG/1
2 CAPSULE ORAL 2 TIMES DAILY
Qty: 360 CAPSULE | Refills: 0 | Status: SHIPPED | OUTPATIENT
Start: 2023-07-17 | End: 2023-10-23 | Stop reason: SDUPTHER

## 2023-07-25 ENCOUNTER — OFFICE VISIT (OUTPATIENT)
Dept: FAMILY MEDICINE | Facility: CLINIC | Age: 43
End: 2023-07-25
Payer: COMMERCIAL

## 2023-07-25 ENCOUNTER — TELEPHONE (OUTPATIENT)
Dept: FAMILY MEDICINE | Facility: CLINIC | Age: 43
End: 2023-07-25
Payer: COMMERCIAL

## 2023-07-25 VITALS
OXYGEN SATURATION: 98 % | WEIGHT: 226 LBS | HEIGHT: 68 IN | BODY MASS INDEX: 34.25 KG/M2 | SYSTOLIC BLOOD PRESSURE: 122 MMHG | HEART RATE: 88 BPM | DIASTOLIC BLOOD PRESSURE: 60 MMHG

## 2023-07-25 DIAGNOSIS — E66.01 SEVERE OBESITY (BMI 35.0-39.9) WITH COMORBIDITY: ICD-10-CM

## 2023-07-25 DIAGNOSIS — E79.0 HYPERURICEMIA: ICD-10-CM

## 2023-07-25 DIAGNOSIS — R11.0 NAUSEA: ICD-10-CM

## 2023-07-25 DIAGNOSIS — E78.2 MIXED HYPERLIPIDEMIA: ICD-10-CM

## 2023-07-25 DIAGNOSIS — Z79.4 TYPE 2 DIABETES MELLITUS WITH HYPEROSMOLARITY WITHOUT COMA, WITH LONG-TERM CURRENT USE OF INSULIN: Primary | ICD-10-CM

## 2023-07-25 DIAGNOSIS — Z79.4 TYPE 2 DIABETES MELLITUS WITH DIABETIC POLYNEUROPATHY, WITH LONG-TERM CURRENT USE OF INSULIN: Chronic | ICD-10-CM

## 2023-07-25 DIAGNOSIS — I10 ESSENTIAL HYPERTENSION: Chronic | ICD-10-CM

## 2023-07-25 DIAGNOSIS — E10.40 TYPE 1 DIABETES MELLITUS WITH DIABETIC NEUROPATHY: Primary | ICD-10-CM

## 2023-07-25 DIAGNOSIS — E11.42 TYPE 2 DIABETES MELLITUS WITH DIABETIC POLYNEUROPATHY, WITH LONG-TERM CURRENT USE OF INSULIN: Chronic | ICD-10-CM

## 2023-07-25 DIAGNOSIS — R10.84 GENERALIZED ABDOMINAL PAIN: ICD-10-CM

## 2023-07-25 DIAGNOSIS — E11.00 TYPE 2 DIABETES MELLITUS WITH HYPEROSMOLARITY WITHOUT COMA, WITH LONG-TERM CURRENT USE OF INSULIN: Primary | ICD-10-CM

## 2023-07-25 PROCEDURE — 99999 PR PBB SHADOW E&M-EST. PATIENT-LVL III: CPT | Mod: PBBFAC,,, | Performed by: FAMILY MEDICINE

## 2023-07-25 PROCEDURE — 3074F SYST BP LT 130 MM HG: CPT | Mod: CPTII,S$GLB,, | Performed by: FAMILY MEDICINE

## 2023-07-25 PROCEDURE — 4010F ACE/ARB THERAPY RXD/TAKEN: CPT | Mod: CPTII,S$GLB,, | Performed by: FAMILY MEDICINE

## 2023-07-25 PROCEDURE — 3078F DIAST BP <80 MM HG: CPT | Mod: CPTII,S$GLB,, | Performed by: FAMILY MEDICINE

## 2023-07-25 PROCEDURE — 3008F BODY MASS INDEX DOCD: CPT | Mod: CPTII,S$GLB,, | Performed by: FAMILY MEDICINE

## 2023-07-25 PROCEDURE — 3074F PR MOST RECENT SYSTOLIC BLOOD PRESSURE < 130 MM HG: ICD-10-PCS | Mod: CPTII,S$GLB,, | Performed by: FAMILY MEDICINE

## 2023-07-25 PROCEDURE — 3078F PR MOST RECENT DIASTOLIC BLOOD PRESSURE < 80 MM HG: ICD-10-PCS | Mod: CPTII,S$GLB,, | Performed by: FAMILY MEDICINE

## 2023-07-25 PROCEDURE — 3046F PR MOST RECENT HEMOGLOBIN A1C LEVEL > 9.0%: ICD-10-PCS | Mod: CPTII,S$GLB,, | Performed by: FAMILY MEDICINE

## 2023-07-25 PROCEDURE — 3072F PR LOW RISK FOR RETINOPATHY: ICD-10-PCS | Mod: CPTII,S$GLB,, | Performed by: FAMILY MEDICINE

## 2023-07-25 PROCEDURE — 99214 PR OFFICE/OUTPT VISIT, EST, LEVL IV, 30-39 MIN: ICD-10-PCS | Mod: S$GLB,,, | Performed by: FAMILY MEDICINE

## 2023-07-25 PROCEDURE — 99214 OFFICE O/P EST MOD 30 MIN: CPT | Mod: S$GLB,,, | Performed by: FAMILY MEDICINE

## 2023-07-25 PROCEDURE — 3008F PR BODY MASS INDEX (BMI) DOCUMENTED: ICD-10-PCS | Mod: CPTII,S$GLB,, | Performed by: FAMILY MEDICINE

## 2023-07-25 PROCEDURE — 3046F HEMOGLOBIN A1C LEVEL >9.0%: CPT | Mod: CPTII,S$GLB,, | Performed by: FAMILY MEDICINE

## 2023-07-25 PROCEDURE — 4010F PR ACE/ARB THEARPY RXD/TAKEN: ICD-10-PCS | Mod: CPTII,S$GLB,, | Performed by: FAMILY MEDICINE

## 2023-07-25 PROCEDURE — 3072F LOW RISK FOR RETINOPATHY: CPT | Mod: CPTII,S$GLB,, | Performed by: FAMILY MEDICINE

## 2023-07-25 PROCEDURE — 99999 PR PBB SHADOW E&M-EST. PATIENT-LVL III: ICD-10-PCS | Mod: PBBFAC,,, | Performed by: FAMILY MEDICINE

## 2023-07-25 RX ORDER — ONDANSETRON 4 MG/1
4 TABLET, FILM COATED ORAL EVERY 6 HOURS PRN
Qty: 30 TABLET | Refills: 1 | Status: SHIPPED | OUTPATIENT
Start: 2023-07-25

## 2023-07-25 RX ORDER — FENOFIBRATE 160 MG/1
160 TABLET ORAL DAILY
Qty: 90 TABLET | Refills: 3 | Status: SHIPPED | OUTPATIENT
Start: 2023-07-25 | End: 2024-01-24 | Stop reason: SDUPTHER

## 2023-07-25 RX ORDER — ALLOPURINOL 300 MG/1
300 TABLET ORAL DAILY
Qty: 90 TABLET | Refills: 3 | Status: SHIPPED | OUTPATIENT
Start: 2023-07-25 | End: 2024-01-24 | Stop reason: SDUPTHER

## 2023-07-25 NOTE — PROGRESS NOTES
PATIENT VISIT FAMILY MEDICINE    CC:   Chief Complaint   Patient presents with    Follow-up    Hypertension       HPI: Bay Ibarra  is a 43 y.o. male:    Patient is known to me.  Patient presents alone for routine follow up on chronic conditions.      Following with Endocrine for diabetes. Taking medications as prescribed. Has not had eye exam yet.       PMHX:   Past Medical History:   Diagnosis Date    Acute pancreatitis     Diverticulosis of colon     Essential hypertension     Fatty liver     Gastric varices without bleeding 1/16/2020    Glaucomatous optic atrophy, right     Gout     Hx of acute pancreatitis 3/3/2017    Hypertriglyceridemia 8/2/2017    Obesity (BMI 30-39.9) 6/5/2015    Obesity (BMI 35.0-39.9 without comorbidity) 6/5/2015    Obstructive sleep apnea syndrome 12/18/2015    Type 2 diabetes mellitus with diabetic polyneuropathy, with long-term current use of insulin 10/16/2017    Type 2 diabetes mellitus with hyperglycemia, with long-term current use of insulin 8/3/2017       PSHX:   Past Surgical History:   Procedure Laterality Date    DEBRIDEMENT OF FOOT Right 4/21/2023    Procedure: DEBRIDEMENT, FOOT;  Surgeon: Anju Melvin DPM;  Location: [facility];  Service: Podiatry;  Laterality: Right;    ENDOSCOPIC ULTRASOUND OF UPPER GASTROINTESTINAL TRACT N/A 5/27/2019    Procedure: ULTRASOUND, UPPER GI TRACT, ENDOSCOPIC;  Surgeon: Zafar Velazquez MD;  Location: [address]);  Service: Endoscopy;  Laterality: N/A;    ERCP N/A 5/27/2019    Procedure: ERCP (ENDOSCOPIC RETROGRADE CHOLANGIOPANCREATOGRAPHY);  Surgeon: Zafar Velazquez MD;  Location: [address]);  Service: Endoscopy;  Laterality: N/A;    ESOPHAGOGASTRODUODENOSCOPY      ESOPHAGOGASTRODUODENOSCOPY N/A 1/31/2020    Procedure: EGD (ESOPHAGOGASTRODUODENOSCOPY);  Surgeon: Zafar Velazquez MD;  Location: [facility];  Service: Endoscopy;  Laterality: N/A;    PLANTAR FASCIA RELEASE Right 4/21/2023    Procedure: RELEASE, FASCIA, PLANTAR;   Surgeon: Anju Melvin DPM;  Location: [facility] OR;  Service: Podiatry;  Laterality: Right;    RESECTION OF GASTROCNEMIUS MUSCLE Right 4/21/2023    Procedure: RESECTION, MUSCLE, GASTROCNEMIUS;  Surgeon: Anju Melvin DPM;  Location: [facility] OR;  Service: Podiatry;  Laterality: Right;       FAMHX:   Family History   Problem Relation Age of Onset    Aneurysm Mother         AAA    Coronary artery disease Father         4 vessel bypass at 40, possible stent at 36    Diabetes type II Father     No Known Problems Sister     No Known Problems Brother     Aneurysm Maternal Grandmother         Possible AAA    Diabetes type II Paternal Grandmother        SOCHX:   Social History     Socioeconomic History    Marital status:    Tobacco Use    Smoking status: Never     Passive exposure: Never    Smokeless tobacco: Never   Substance and Sexual Activity    Alcohol use: No    Drug use: Never    Sexual activity: Not Currently     Partners: Female     Social Determinants of Health     Financial Resource Strain: Low Risk  (4/23/2020)    Overall Financial Resource Strain (CARDIA)     Difficulty of Paying Living Expenses: Not hard at all   Food Insecurity: No Food Insecurity (4/23/2020)    Hunger Vital Sign     Worried About Running Out of Food in the Last Year: Never true     Ran Out of Food in the Last Year: Never true   Transportation Needs: No Transportation Needs (4/23/2020)    PRAPARE - Transportation     Lack of Transportation (Medical): No     Lack of Transportation (Non-Medical): No   Physical Activity: Insufficiently Active (4/23/2020)    Exercise Vital Sign     Days of Exercise per Week: 3 days     Minutes of Exercise per Session: 20 min   Stress: No Stress Concern Present (4/23/2020)    [employer] - Occupational Stress Questionnaire     Feeling of Stress : Only a little   Social Connections: Unknown (4/23/2020)    Social Connection and Isolation Panel [NHANES]     Frequency of Communication with  Friends and Family: Once a week     Frequency of Social Gatherings with Friends and Family: Once a week     Active Member of Clubs or Organizations: No     Attends Club or Organization Meetings: Patient refused     Marital Status:        ALLERGIES: Review of patient's allergies indicates:  No Known Allergies    MEDS:   Current Outpatient Medications:     aspirin (ECOTRIN) 81 MG EC tablet, Take 81 mg by mouth once daily., Disp: , Rfl:     atorvastatin (LIPITOR) 40 MG tablet, Take 1 tablet (40 mg total) by mouth once daily., Disp: 90 tablet, Rfl: 0    clotrimazole (LOTRIMIN) 1 % Soln, Apply topically to affected area 2 (two) times daily., Disp: 15 mL, Rfl: 0    icosapent ethyL (VASCEPA) 1 gram Cap, Take 2 capsules (2 g total) by mouth 2 (two) times daily., Disp: 360 capsule, Rfl: 0    insulin (LANTUS SOLOSTAR U-100 INSULIN) glargine 100 units/mL SubQ pen, Inject 55 Units into the skin 2 (two) times a day., Disp: 90 mL, Rfl: 3    insulin aspart U-100 (NOVOLOG FLEXPEN U-100 INSULIN) 100 unit/mL (3 mL) InPn pen, Inject 30 Units into the skin before breakfast AND 30 Units daily with lunch AND 32 Units before dinner. Plus correction scale. Max TDD 100units.., Disp: 50 mL, Rfl: 6    losartan (COZAAR) 100 MG tablet, Take ½ tablet (50 mg total) by mouth once daily., Disp: 45 tablet, Rfl: 1    metFORMIN (GLUCOPHAGE) 500 MG tablet, Take 2 tablets (1,000 mg total) by mouth 2 (two) times daily with meals., Disp: 360 tablet, Rfl: 3    mupirocin (BACTROBAN) 2 % ointment, Apply topically 3 (three) times daily., Disp: 15 g, Rfl: 1    niacin 100 MG Tab, Take 2 tablets (200 mg total) by mouth every evening., Disp: 90 tablet, Rfl: 1    pantoprazole (PROTONIX) 40 MG tablet, Take 1 tablet (40 mg total) by mouth once daily., Disp: 90 tablet, Rfl: 1    pregabalin (LYRICA) 150 MG capsule, Take 1 capsule (150 mg total) by mouth 2 (two) times daily., Disp: 60 capsule, Rfl: 5    allopurinoL (ZYLOPRIM) 300 MG tablet, Take 1 tablet (300  "mg total) by mouth once daily., Disp: 90 tablet, Rfl: 3    blood sugar diagnostic (CONTOUR NEXT TEST STRIPS) Strp, To test glucose 6 times a day., Disp: 200 each, Rfl: 11    blood-glucose sensor (DEXCOM G6 SENSOR) Isha, Use as directed. Change sensor every 10 days., Disp: 3 each, Rfl: PRN    blood-glucose sensor (DEXCOM G7 SENSOR) Isha, Change sensor every 10 days., Disp: 3 each, Rfl: 3    blood-glucose transmitter (DEXCOM G6 TRANSMITTER) Isha, 1 each by Misc.(Non-Drug; Combo Route) route continuous as needed, Disp: 1 each, Rfl: PRN    fenofibrate 160 MG Tab, Take 1 tablet (160 mg total) by mouth once daily., Disp: 90 tablet, Rfl: 3    HYDROcodone-acetaminophen (NORCO) 5-325 mg per tablet, Take 1 tablet by mouth every 6 (six) hours as needed for Pain., Disp: 20 tablet, Rfl: 0    ondansetron (ZOFRAN) 4 MG tablet, Take 1 tablet (4 mg total) by mouth every 6 (six) hours as needed for Nausea., Disp: 30 tablet, Rfl: 1    OBJECTIVE:   Vitals:    07/25/23 0904   BP: 122/60   BP Location: Left arm   Patient Position: Sitting   BP Method: Large (Manual)   Pulse: 88   SpO2: 98%   Weight: 102.5 kg (225 lb 15.5 oz)   Height: 5' 8" (1.727 m)     Body mass index is 34.36 kg/m².      Physical Exam  Vitals and nursing note reviewed.   Constitutional:       Appearance: Normal appearance.   HENT:      Head: Normocephalic and atraumatic.   Eyes:      General: No scleral icterus.     Extraocular Movements: Extraocular movements intact.   Pulmonary:      Effort: Pulmonary effort is normal.   Neurological:      Mental Status: He is alert.           LABS:   A1C:  Recent Labs   Lab 06/02/23  0649   Hemoglobin A1C 10.0 H     CBC:  Recent Labs   Lab 01/16/23  0551   WBC 8.58   RBC 5.04   Hemoglobin 13.7 L   Hematocrit 40.7   Platelets 226   MCV 81 L   MCH 27.2   MCHC 33.7     CMP:  Recent Labs   Lab 06/02/23  0649   Glucose 447 H   Calcium 10.0   Albumin 4.4   Total Protein 8.0   Sodium 145   Potassium 4.9   CO2 32 H   Chloride 102   BUN 22 " H   Creatinine 1.05   Alkaline Phosphatase 195 H   ALT 40   AST 49 H   Total Bilirubin 0.5     LIPIDS:  Recent Labs   Lab 01/19/22  1622 06/02/23  0649   TSH  --  2.380   HDL 27 L  --    Cholesterol 120  --    Triglycerides 283 H  --    LDL Cholesterol 36.4 L  --    HDL/Cholesterol Ratio 22.5  --    Non-HDL Cholesterol 93  --    Total Cholesterol/HDL Ratio 4.4  --      TSH:  Recent Labs   Lab 06/02/23  0649   TSH 2.380         ASSESSMENT & PLAN:    Problem List Items Addressed This Visit          Cardiac/Vascular    Essential hypertension (Chronic)    Overview     Well controlled. Continue current regimen         Mixed hyperlipidemia (Chronic)    Overview     Stable. Continue statin         Relevant Medications    fenofibrate 160 MG Tab       Endocrine    Type 2 diabetes mellitus with diabetic polyneuropathy, with long-term current use of insulin (Chronic)    Overview     Uncontrolled   Continue current regimen   Followed by Endocrine  Advised that he will need regular and close f/u for his diabetes         Severe obesity (BMI 35.0-39.9) with comorbidity    Overview     Increased. The patient is asked to make an attempt to improve diet and exercise patterns to aid in medical management of this problem.           Type 1 diabetes mellitus with diabetic neuropathy - Primary    Relevant Orders    Ambulatory referral/consult to Optometry     Other Visit Diagnoses       Generalized abdominal pain        Relevant Medications    ondansetron (ZOFRAN) 4 MG tablet    Hyperuricemia        Relevant Medications    allopurinoL (ZYLOPRIM) 300 MG tablet    Nausea        Relevant Medications    ondansetron (ZOFRAN) 4 MG tablet              No follow-ups on file.      RTC/ED precautions discussed where applicable.   Encouraged patient to let me know if there are any further questions/concerns.     Advise patient/caretaker to check with insurance regarding orders to avoid unexpected fees/costs.     The patient/caretaker indicates  understanding of these issues and agrees with the plan.    Dr. Funmilayo Taylor MD  Family Medicine

## 2023-08-07 ENCOUNTER — OFFICE VISIT (OUTPATIENT)
Dept: PODIATRY | Facility: CLINIC | Age: 43
End: 2023-08-07
Payer: COMMERCIAL

## 2023-08-07 VITALS — WEIGHT: 255 LBS | BODY MASS INDEX: 38.65 KG/M2 | HEIGHT: 68 IN

## 2023-08-07 DIAGNOSIS — E11.65 TYPE 2 DIABETES MELLITUS WITH HYPERGLYCEMIA, WITH LONG-TERM CURRENT USE OF INSULIN: ICD-10-CM

## 2023-08-07 DIAGNOSIS — Z09 FOLLOW-UP EXAMINATION FOLLOWING SURGERY: ICD-10-CM

## 2023-08-07 DIAGNOSIS — T81.31XD POSTOPERATIVE WOUND DEHISCENCE, SUBSEQUENT ENCOUNTER: Primary | ICD-10-CM

## 2023-08-07 DIAGNOSIS — Z79.4 TYPE 2 DIABETES MELLITUS WITH HYPERGLYCEMIA, WITH LONG-TERM CURRENT USE OF INSULIN: ICD-10-CM

## 2023-08-07 PROCEDURE — 3046F HEMOGLOBIN A1C LEVEL >9.0%: CPT | Mod: CPTII,S$GLB,, | Performed by: PODIATRIST

## 2023-08-07 PROCEDURE — 1160F PR REVIEW ALL MEDS BY PRESCRIBER/CLIN PHARMACIST DOCUMENTED: ICD-10-PCS | Mod: CPTII,S$GLB,, | Performed by: PODIATRIST

## 2023-08-07 PROCEDURE — 4010F PR ACE/ARB THEARPY RXD/TAKEN: ICD-10-PCS | Mod: CPTII,S$GLB,, | Performed by: PODIATRIST

## 2023-08-07 PROCEDURE — 99213 OFFICE O/P EST LOW 20 MIN: CPT | Mod: S$GLB,,, | Performed by: PODIATRIST

## 2023-08-07 PROCEDURE — 4010F ACE/ARB THERAPY RXD/TAKEN: CPT | Mod: CPTII,S$GLB,, | Performed by: PODIATRIST

## 2023-08-07 PROCEDURE — 99213 PR OFFICE/OUTPT VISIT, EST, LEVL III, 20-29 MIN: ICD-10-PCS | Mod: S$GLB,,, | Performed by: PODIATRIST

## 2023-08-07 PROCEDURE — 3072F PR LOW RISK FOR RETINOPATHY: ICD-10-PCS | Mod: CPTII,S$GLB,, | Performed by: PODIATRIST

## 2023-08-07 PROCEDURE — 3046F PR MOST RECENT HEMOGLOBIN A1C LEVEL > 9.0%: ICD-10-PCS | Mod: CPTII,S$GLB,, | Performed by: PODIATRIST

## 2023-08-07 PROCEDURE — 99999 PR PBB SHADOW E&M-EST. PATIENT-LVL III: ICD-10-PCS | Mod: PBBFAC,,, | Performed by: PODIATRIST

## 2023-08-07 PROCEDURE — 3008F PR BODY MASS INDEX (BMI) DOCUMENTED: ICD-10-PCS | Mod: CPTII,S$GLB,, | Performed by: PODIATRIST

## 2023-08-07 PROCEDURE — 99999 PR PBB SHADOW E&M-EST. PATIENT-LVL III: CPT | Mod: PBBFAC,,, | Performed by: PODIATRIST

## 2023-08-07 PROCEDURE — 1160F RVW MEDS BY RX/DR IN RCRD: CPT | Mod: CPTII,S$GLB,, | Performed by: PODIATRIST

## 2023-08-07 PROCEDURE — 1159F MED LIST DOCD IN RCRD: CPT | Mod: CPTII,S$GLB,, | Performed by: PODIATRIST

## 2023-08-07 PROCEDURE — 3008F BODY MASS INDEX DOCD: CPT | Mod: CPTII,S$GLB,, | Performed by: PODIATRIST

## 2023-08-07 PROCEDURE — 3072F LOW RISK FOR RETINOPATHY: CPT | Mod: CPTII,S$GLB,, | Performed by: PODIATRIST

## 2023-08-07 PROCEDURE — 1159F PR MEDICATION LIST DOCUMENTED IN MEDICAL RECORD: ICD-10-PCS | Mod: CPTII,S$GLB,, | Performed by: PODIATRIST

## 2023-08-07 NOTE — PROGRESS NOTES
"  Subjective:      Patient ID: Bay Ibarra is a 43 y.o. male.    Chief Complaint: Post-op Evaluation (Sx:04/21/2023)    Patient is 43 y.o. male with Past medical history of hypertension, hyperlipidemia, type 2 diabetes, chronic pancreatitis was recently hospitalized for having abdominal pain, nausea and vomiting.  Patient was admitted to ICU for management of acute diabetic ketoacidosis, UTI.  Patient is following up with left hallux wound that was present during hospitalization.  He noticed a wound 1 month ago.  Last measured glucose level was 85.  Patient is ambulating in tennis shoes.  Patient complains of numbness and tingling on bilateral lower extremity.    1/4/21 f/u L hallux wound. Has been applying winston. In tennis shoe today. I recommended WBAT in sx shoe last time but can not wear surgical shoe all the time due to his work. Tells me he can not wear open toe shoe at his work. He tries to walk with sx shoe at his house.     2/8/21 f/u L worsening of left hallux. In tennis shoe today. His wound appears to be worse today. Works for [employer] On his feet 5-6 hours a day. He has to wear closed toe shoe. I prescribed DM shoe last time. Did not get fitted. Has been applying winston for LWC. DM poorly controlled.     2/22/21 f/u L diabetic hallux ulcer.  Ambulating in surgical shoe with felt.  Has been applying Santyl and triple antibiotic. DM not well controlled per pt. Tells me finished oral abx.     3/8/21 f/u L hallux ulcer. Has been offloaded with surgical shoe with felt. Denies pain. Tells me his glucose is "somewhat" controlled. LWC with santyl.     3/22/21 f/u L hallux ulcer. Minimal improvement as compared to last visit. Has been applying santyl. WBAT in sx shoe with felt to offload the IPJ of hallux. Glucose is not well controlled overall.      4/5/21 follow-up left hallux ulcer. Worsening of an ulcer today. Tells me he has been walking more than usual for his job duty.  Has been " "applying Santyl.  Ambulating in surgical shoe with felt. . Here for dressing change.     4/19/21 f/u L hallux ulcer. In sx shoe with felt. Glucose is not well controlled. Has not taken insulin for last 2 days because "he was out of it". Has been applying santyl. Slow/minimal healing noted on L hallux ulcer.     5/3/21 f/u L hallux ulcer. S/p plantar fascia medial band release in the office. Ambulating in sx shoe with felt. Pain is minimal over procedure site. Has been applying triple ointment instead santyl since he was out of santyl.     5/24/21 f/u L hallux ulcer. In regular tennis shoe today. Has been applying santyl. Denies L foot pain.     6/14/21 f/u L hallux ulcer. Size of the wound is smaller and shallower. Has been applying triple ointment. In regular tennis shoe.     7/12/21 f/u L foot DM ulcer. Plantar hallux wound is completely healed but presenting with ingrown toenail along medial border of hallux. Denies pain. Noticed a week ago. Also prsenting with new ulcer on platnar lateral 5th toe. Noticed it couple days ago. Denies pus. No pain. Admits that he has been walking in rain with closed toe shoe. He also answers "my glucose has been up and down".     8/12/21 f/u L hallux ulcer. It was completely closed but opened back up. Tells me he has been working and walking in working boot which got wet and caused re occurrence of ulcer. In regular tennis shoe today. Glucose is not well controlled per pt. Last glucose check yesterday was 220.     11/9/22 presenting with right hallux ulcer.  Patient noticed an ulcer on the bottom of hallux couple weeks ago.  Patient has been cleaning with triple antibiotic.  Patient is ambulating in regular tennis shoes.  Patient tells me his glucose fluctuates.  Left hallux ulcer is completely healed.  Denies acute foot injury.  Was recently evaluated by nurse practitioner yesterday and prescribed Keflex.  Patient has not picked up the medication yet.    1/25/23 presenting with " right hallux ulcer.  Recent hospitalization secondary to diabetic ketoacidosis.  Patient has been treating right hallux ulcer with Santyl.  Ambulating in regular tennis shoes.  Denies pain.  Glucose is not well controlled.     2/15/23 R hallux ulcer with discoloration of toenail. In working boot today. Gabriella pain. Has been applying triple abx ointment. Denies any treatments for nail fungus. Glucose is not well controlled.     3/15/23 R hallux ulcer. In walking boots today. Tells me his glucose is not well controlled. Tells me he was evaluated by endocrinology yesterday. Minimal changes/healing to R hallux ulcer. Has not been appropriately offloading hallux.     4/17/23 R hallux ulcer. Ambulating in DWS today. Has been applying triple abx. Minimal changes/healing noted on R hallux ulcer. Denies pain. Tells me he tries to ambulate in DWS as much as he can to offload the area of the  ulcer.     5/8/23 s/p R foot gastroc recession, plantar fascia release medial band, wound debridement (DOS: 4/21/23 GP: 7/21/23). Pain is controlled.  In surgical shoe today. Here for suture removal.     7/5/23 s/p R foot surgery.  Presenting  with wound dehiscence over gastroc recession incision. In regular walking shoes today. Denies pain. Has been going to PT. PT helps with ROM of ankle.     8/7/23 s/p R foot surgery. In regular tennis shoes. Still going to PT. Strength/ROM of ankle has been improving with PT. Ulcer on plantar hallux and gastroc incision healing well. Finished short term bactrim.     Hemoglobin A1C   Date Value Ref Range Status   06/02/2023 10.0 (H) 4.0 - 5.6 % Final     Comment:     ADA Screening Guidelines:  5.7-6.4%  Consistent with prediabetes  >or=6.5%  Consistent with diabetes    High levels of fetal hemoglobin interfere with the HbA1C  assay. Heterozygous hemoglobin variants (HbS, HgC, etc)do  not significantly interfere with this assay.   However, presence of multiple variants may affect accuracy.      01/16/2023 12.2 (H) 4.0 - 5.6 % Final     Comment:     ADA Screening Guidelines:  5.7-6.4%  Consistent with prediabetes  >or=6.5%  Consistent with diabetes    High levels of fetal hemoglobin interfere with the HbA1C  assay. Heterozygous hemoglobin variants (HbS, HgC, etc)do  not significantly interfere with this assay.   However, presence of multiple variants may affect accuracy.     01/19/2022 10.3 (H) 4.0 - 5.6 % Final     Comment:     ADA Screening Guidelines:  5.7-6.4%  Consistent with prediabetes  >or=6.5%  Consistent with diabetes    High levels of fetal hemoglobin interfere with the HbA1C  assay. Heterozygous hemoglobin variants (HbS, HgC, etc)do  not significantly interfere with this assay.   However, presence of multiple variants may affect accuracy.         Review of Systems   Constitutional: Negative for chills, decreased appetite, fever and malaise/fatigue.   HENT:  Negative for congestion, ear discharge and sore throat.    Eyes:  Negative for discharge and pain.   Cardiovascular:  Negative for chest pain, claudication and leg swelling.   Respiratory:  Negative for cough and shortness of breath.    Skin:  Positive for poor wound healing. Negative for color change, nail changes and rash.   Musculoskeletal:  Positive for gout and stiffness. Negative for arthritis, joint pain, joint swelling and muscle weakness.   Gastrointestinal:  Negative for bloating, abdominal pain, diarrhea, nausea and vomiting.   Genitourinary:  Negative for flank pain and hematuria.   Neurological:  Negative for headaches, numbness and weakness.   Psychiatric/Behavioral:  Negative for altered mental status.              Past Medical History:   Diagnosis Date    Acute pancreatitis     Diverticulosis of colon     Essential hypertension     Fatty liver     Gastric varices without bleeding 1/16/2020    Glaucomatous optic atrophy, right     Gout     Hx of acute pancreatitis 3/3/2017    Hypertriglyceridemia 8/2/2017    Obesity (BMI  30-39.9) 6/5/2015    Obesity (BMI 35.0-39.9 without comorbidity) 6/5/2015    Obstructive sleep apnea syndrome 12/18/2015    Type 2 diabetes mellitus with diabetic polyneuropathy, with long-term current use of insulin 10/16/2017    Type 2 diabetes mellitus with hyperglycemia, with long-term current use of insulin 8/3/2017       Past Surgical History:   Procedure Laterality Date    DEBRIDEMENT OF FOOT Right 4/21/2023    Procedure: DEBRIDEMENT, FOOT;  Surgeon: Anju Melvin DPM;  Location: [facility] OR;  Service: Podiatry;  Laterality: Right;    ENDOSCOPIC ULTRASOUND OF UPPER GASTROINTESTINAL TRACT N/A 5/27/2019    Procedure: ULTRASOUND, UPPER GI TRACT, ENDOSCOPIC;  Surgeon: Zafar Velazquez MD;  Location: [address]);  Service: Endoscopy;  Laterality: N/A;    ERCP N/A 5/27/2019    Procedure: ERCP (ENDOSCOPIC RETROGRADE CHOLANGIOPANCREATOGRAPHY);  Surgeon: Zafar Velazquez MD;  Location: [address]);  Service: Endoscopy;  Laterality: N/A;    ESOPHAGOGASTRODUODENOSCOPY      ESOPHAGOGASTRODUODENOSCOPY N/A 1/31/2020    Procedure: EGD (ESOPHAGOGASTRODUODENOSCOPY);  Surgeon: Zafar Velazquez MD;  Location: [facility];  Service: Endoscopy;  Laterality: N/A;    PLANTAR FASCIA RELEASE Right 4/21/2023    Procedure: RELEASE, FASCIA, PLANTAR;  Surgeon: Anju Melvin DPM;  Location: [facility] OR;  Service: Podiatry;  Laterality: Right;    RESECTION OF GASTROCNEMIUS MUSCLE Right 4/21/2023    Procedure: RESECTION, MUSCLE, GASTROCNEMIUS;  Surgeon: Anju Melvin DPM;  Location: [facility];  Service: Podiatry;  Laterality: Right;       Family History   Problem Relation Age of Onset    Aneurysm Mother         AAA    Coronary artery disease Father         4 vessel bypass at 40, possible stent at 36    Diabetes type II Father     No Known Problems Sister     No Known Problems Brother     Aneurysm Maternal Grandmother         Possible AAA    Diabetes type II Paternal Grandmother        Social History     Socioeconomic History    Marital  status:    Tobacco Use    Smoking status: Never     Passive exposure: Never    Smokeless tobacco: Never   Substance and Sexual Activity    Alcohol use: No    Drug use: Never    Sexual activity: Not Currently     Partners: Female     Social Determinants of Health     Financial Resource Strain: Low Risk  (4/23/2020)    Overall Financial Resource Strain (CARDIA)     Difficulty of Paying Living Expenses: Not hard at all   Food Insecurity: No Food Insecurity (4/23/2020)    Hunger Vital Sign     Worried About Running Out of Food in the Last Year: Never true     Ran Out of Food in the Last Year: Never true   Transportation Needs: No Transportation Needs (4/23/2020)    PRAPARE - Transportation     Lack of Transportation (Medical): No     Lack of Transportation (Non-Medical): No   Physical Activity: Insufficiently Active (4/23/2020)    Exercise Vital Sign     Days of Exercise per Week: 3 days     Minutes of Exercise per Session: 20 min   Stress: No Stress Concern Present (4/23/2020)    [employer] [place] [employer] - Occupational Stress Questionnaire     Feeling of Stress : Only a little   Social Connections: Unknown (4/23/2020)    Social Connection and Isolation Panel [NHANES]     Frequency of Communication with Friends and Family: Once a week     Frequency of Social Gatherings with Friends and Family: Once a week     Active Member of Clubs or Organizations: No     Attends Club or Organization Meetings: Patient refused     Marital Status:        Current Outpatient Medications   Medication Sig Dispense Refill    allopurinoL (ZYLOPRIM) 300 MG tablet Take 1 tablet (300 mg total) by mouth once daily. 90 tablet 3    aspirin (ECOTRIN) 81 MG EC tablet Take 81 mg by mouth once daily.      atorvastatin (LIPITOR) 40 MG tablet Take 1 tablet (40 mg total) by mouth once daily. 90 tablet 0    blood sugar diagnostic (CONTOUR NEXT TEST STRIPS) Strp To test glucose 6 times a day. 200 each 11    blood-glucose  sensor (DEXCOM G6 SENSOR) Isha Use as directed. Change sensor every 10 days. 3 each PRN    blood-glucose sensor (DEXCOM G7 SENSOR) Isha Change sensor every 10 days. 3 each 3    blood-glucose transmitter (DEXCOM G6 TRANSMITTER) Isha 1 each by Misc.(Non-Drug; Combo Route) route continuous as needed 1 each PRN    clotrimazole (LOTRIMIN) 1 % Soln Apply topically to affected area 2 (two) times daily. 15 mL 0    fenofibrate 160 MG Tab Take 1 tablet (160 mg total) by mouth once daily. 90 tablet 3    HYDROcodone-acetaminophen (NORCO) 5-325 mg per tablet Take 1 tablet by mouth every 6 (six) hours as needed for Pain. 20 tablet 0    icosapent ethyL (VASCEPA) 1 gram Cap Take 2 capsules (2 g total) by mouth 2 (two) times daily. 360 capsule 0    insulin (LANTUS SOLOSTAR U-100 INSULIN) glargine 100 units/mL SubQ pen Inject 55 Units into the skin 2 (two) times a day. 90 mL 3    insulin aspart U-100 (NOVOLOG FLEXPEN U-100 INSULIN) 100 unit/mL (3 mL) InPn pen Inject 30 Units into the skin before breakfast AND 30 Units daily with lunch AND 32 Units before dinner. Plus correction scale. Max TDD 100units.. 50 mL 6    losartan (COZAAR) 100 MG tablet Take ½ tablet (50 mg total) by mouth once daily. 45 tablet 1    metFORMIN (GLUCOPHAGE) 500 MG tablet Take 2 tablets (1,000 mg total) by mouth 2 (two) times daily with meals. 360 tablet 3    mupirocin (BACTROBAN) 2 % ointment Apply topically 3 (three) times daily. 15 g 1    niacin 100 MG Tab Take 2 tablets (200 mg total) by mouth every evening. 90 tablet 1    ondansetron (ZOFRAN) 4 MG tablet Take 1 tablet (4 mg total) by mouth every 6 (six) hours as needed for Nausea. 30 tablet 1    pantoprazole (PROTONIX) 40 MG tablet Take 1 tablet (40 mg total) by mouth once daily. 90 tablet 1    pregabalin (LYRICA) 150 MG capsule Take 1 capsule (150 mg total) by mouth 2 (two) times daily. 60 capsule 5     No current facility-administered medications for this visit.       Review of patient's allergies  "indicates:  No Known Allergies    Vitals:    08/07/23 0808   Weight: 115.7 kg (255 lb)   Height: 5' 8" (1.727 m)   PainSc: 0-No pain   PainLoc: Foot           Objective:      Physical Exam  Constitutional:       General: He is not in acute distress.     Appearance: He is well-developed.   HENT:      Nose: Nose normal.   Eyes:      Conjunctiva/sclera: Conjunctivae normal.   Pulmonary:      Effort: Pulmonary effort is normal.   Chest:      Chest wall: No tenderness.   Abdominal:      Tenderness: There is no abdominal tenderness.   Musculoskeletal:      Cervical back: Normal range of motion.   Neurological:      Mental Status: He is alert and oriented to person, place, and time.   Psychiatric:         Behavior: Behavior normal.       Vascular: Distal DP/PT pulses palpable 1/4. CRT < 3 sec to tips of toes. No vericosities noted to LEs. Hair growth present LE, warm to touch LE    Dermatologic:   Right foot:  ulcer noted plantar hallux and posterior calf/gastroc. 0.5cm diameter on hallux.  No rubor, no erythema. No drainage. No pus, no fluctuance.      Musculoskeletal:  No calf tenderness LE, Compartments soft/compressible. Decreased ROM of ankle.     Neurological:   Light touch, proprioception, and sharp/dull sensation are decreased. Protective threshold with the [place]-Wienstein monofilament is decreased. Vibratory sensation decreased.       1/4/21 2/8/21            Assessment:       Encounter Diagnoses   Name Primary?    Postoperative wound dehiscence, subsequent encounter Yes    Follow-up examination following surgery     Type 2 diabetes mellitus with hyperglycemia, with long-term current use of insulin            Plan:       Bay was seen today for post-op evaluation.    Diagnoses and all orders for this visit:    Postoperative wound dehiscence, subsequent encounter    Follow-up examination following surgery    Type 2 diabetes mellitus with hyperglycemia, with long-term current use of insulin        I counseled the " patient on his conditions, their implications and medical management.    43 y.o. male with R hallux ulcer. S/p R foot gastroc recession, plantar fascia release, wound debridement.      -continue with the physical therapy.  Motion of the ankle improved after surgery. Both hallux and posterior calf incision healing well. No acute signs of infection. Continue with local wound care/glycemic control.     -I reviewed imaging, clinical history, and diagnosis as above with the patient. I attempted to use layman's terms to educate the patient as well as utilize foot models and/or pictures. I personally went through imaging with the patient.    -The nature of the condition, options for management, as well as potential risks and complications were discussed in detail with patient. Patient was amenable to my recommendations and left my office fully informed and will follow up as instructed or sooner if necessary.    -Advised for optimal glucose control and maintenance per primary care physician. Patient was also educated on healthy diet that is naturally rich in nutrients and low in fat and calories.   -Patient was advised of signs and symptoms of infection including redness, drainage, purulence, odor, streaking, fever, chills and I advised patient to seek medical attention (ER or urgent care) if these symptoms arise.   -f/u 8 wks     Note dictated with voice recognition software, please excuse any grammatical errors.

## 2023-08-09 PROBLEM — E66.01 SEVERE OBESITY (BMI 35.0-39.9) WITH COMORBIDITY: Status: ACTIVE | Noted: 2023-08-09

## 2023-08-22 ENCOUNTER — PATIENT MESSAGE (OUTPATIENT)
Dept: FAMILY MEDICINE | Facility: CLINIC | Age: 43
End: 2023-08-22
Payer: COMMERCIAL

## 2023-08-22 DIAGNOSIS — E10.40 TYPE 1 DIABETES MELLITUS WITH DIABETIC NEUROPATHY: ICD-10-CM

## 2023-08-29 RX ORDER — DEXTROSE 4 G
1 TABLET,CHEWABLE ORAL ONCE
Qty: 1 EACH | Refills: 0 | Status: SHIPPED | OUTPATIENT
Start: 2023-08-29 | End: 2023-08-29

## 2023-10-09 ENCOUNTER — OFFICE VISIT (OUTPATIENT)
Dept: PODIATRY | Facility: CLINIC | Age: 43
End: 2023-10-09
Payer: COMMERCIAL

## 2023-10-09 ENCOUNTER — PATIENT MESSAGE (OUTPATIENT)
Dept: FAMILY MEDICINE | Facility: CLINIC | Age: 43
End: 2023-10-09
Payer: COMMERCIAL

## 2023-10-09 ENCOUNTER — PATIENT MESSAGE (OUTPATIENT)
Dept: ENDOCRINOLOGY | Facility: CLINIC | Age: 43
End: 2023-10-09
Payer: COMMERCIAL

## 2023-10-09 VITALS — HEIGHT: 68 IN | BODY MASS INDEX: 38.65 KG/M2 | WEIGHT: 255 LBS

## 2023-10-09 DIAGNOSIS — E11.65 TYPE 2 DIABETES MELLITUS WITH HYPERGLYCEMIA, WITH LONG-TERM CURRENT USE OF INSULIN: ICD-10-CM

## 2023-10-09 DIAGNOSIS — Z79.4 TYPE 2 DIABETES MELLITUS WITH HYPERGLYCEMIA, WITH LONG-TERM CURRENT USE OF INSULIN: ICD-10-CM

## 2023-10-09 DIAGNOSIS — E10.40 TYPE 1 DIABETES MELLITUS WITH DIABETIC NEUROPATHY: ICD-10-CM

## 2023-10-09 DIAGNOSIS — B35.1 ONYCHOMYCOSIS DUE TO DERMATOPHYTE: Primary | ICD-10-CM

## 2023-10-09 PROCEDURE — 4010F ACE/ARB THERAPY RXD/TAKEN: CPT | Mod: CPTII,S$GLB,, | Performed by: PODIATRIST

## 2023-10-09 PROCEDURE — 3072F LOW RISK FOR RETINOPATHY: CPT | Mod: CPTII,S$GLB,, | Performed by: PODIATRIST

## 2023-10-09 PROCEDURE — 1159F MED LIST DOCD IN RCRD: CPT | Mod: CPTII,S$GLB,, | Performed by: PODIATRIST

## 2023-10-09 PROCEDURE — 99213 OFFICE O/P EST LOW 20 MIN: CPT | Mod: S$GLB,,, | Performed by: PODIATRIST

## 2023-10-09 PROCEDURE — 1160F PR REVIEW ALL MEDS BY PRESCRIBER/CLIN PHARMACIST DOCUMENTED: ICD-10-PCS | Mod: CPTII,S$GLB,, | Performed by: PODIATRIST

## 2023-10-09 PROCEDURE — 99999 PR PBB SHADOW E&M-EST. PATIENT-LVL IV: CPT | Mod: PBBFAC,,, | Performed by: PODIATRIST

## 2023-10-09 PROCEDURE — 99999 PR PBB SHADOW E&M-EST. PATIENT-LVL IV: ICD-10-PCS | Mod: PBBFAC,,, | Performed by: PODIATRIST

## 2023-10-09 PROCEDURE — 3008F PR BODY MASS INDEX (BMI) DOCUMENTED: ICD-10-PCS | Mod: CPTII,S$GLB,, | Performed by: PODIATRIST

## 2023-10-09 PROCEDURE — 4010F PR ACE/ARB THEARPY RXD/TAKEN: ICD-10-PCS | Mod: CPTII,S$GLB,, | Performed by: PODIATRIST

## 2023-10-09 PROCEDURE — 3008F BODY MASS INDEX DOCD: CPT | Mod: CPTII,S$GLB,, | Performed by: PODIATRIST

## 2023-10-09 PROCEDURE — 1160F RVW MEDS BY RX/DR IN RCRD: CPT | Mod: CPTII,S$GLB,, | Performed by: PODIATRIST

## 2023-10-09 PROCEDURE — 1159F PR MEDICATION LIST DOCUMENTED IN MEDICAL RECORD: ICD-10-PCS | Mod: CPTII,S$GLB,, | Performed by: PODIATRIST

## 2023-10-09 PROCEDURE — 99213 PR OFFICE/OUTPT VISIT, EST, LEVL III, 20-29 MIN: ICD-10-PCS | Mod: S$GLB,,, | Performed by: PODIATRIST

## 2023-10-09 PROCEDURE — 3072F PR LOW RISK FOR RETINOPATHY: ICD-10-PCS | Mod: CPTII,S$GLB,, | Performed by: PODIATRIST

## 2023-10-09 PROCEDURE — 3046F HEMOGLOBIN A1C LEVEL >9.0%: CPT | Mod: CPTII,S$GLB,, | Performed by: PODIATRIST

## 2023-10-09 PROCEDURE — 3046F PR MOST RECENT HEMOGLOBIN A1C LEVEL > 9.0%: ICD-10-PCS | Mod: CPTII,S$GLB,, | Performed by: PODIATRIST

## 2023-10-09 RX ORDER — CICLOPIROX 80 MG/ML
SOLUTION TOPICAL NIGHTLY
Qty: 6.6 ML | Refills: 6 | Status: SHIPPED | OUTPATIENT
Start: 2023-10-09

## 2023-10-09 NOTE — PROGRESS NOTES
Subjective:      Patient ID: Bay Ibarra is a 43 y.o. male.    Chief Complaint:  No chief complaint on file.    History of Present Illness  Bay Ibarra is here for follow up of DM.  Previously seen by me on 6/2023.   This is a MyChart video visit.    The patient location is: [place]  The chief complaint leading to consultation is: DM  Visit type: Virtual visit with synchronous audio and video  Total time spent with patient: see below  Each patient to whom he or she provides medical services by telemedicine is:  (1) informed of the relationship between the physician and patient and the respective role of any other health care provider with respect to management of the patient; and (2) notified that he or she may decline to receive medical services by telemedicine and may withdraw from such care at any time.    With regards to diabetes:    3/2021  Cpeptide 1.25 with glucose 315    Diagnosed: 2009  Education - last visit: 2019  FH: father, mother, paternal grandfather   2 siblings - denies DM   Known complications:  DKA + 11/2020, 1/2023  RN -  Eye Exam: 7/2022  PN +  Podiatry: 10/2023  Nephropathy -  CAD -  Hx pancreatitis - 2013 and 2x in 2017.   Diabetic foot ulcer R greater toe - following with Podiatry     Diet/Exercise: trying to cut back on portion sizes   Eats 3 meals a day.   Snacks : yes before bed (2 hours prior) - crackers   Drinks : coffee, water, unsweet tea, has cut back on diet soft drinks   Exercise - tries to stay active.   Recent illness, injury, steroids: Denies     Current Regimen:  Metformin 1000mg twice daily   Lantus 55 units twice daily  Novolog 30units plus correction scale before meals for breakfast and lunch AND Novolog 32units plus correction scale for dinner  Add correction scale as needed.   Blood sugar 150 to 200 add 2 unit   Blood sugar 201 to 250 add 4 units   Blood sugar 251 to 300 add 6 units   Blood sugar 301 to 350 add 9 units   Blood sugar greater than 350 add 10  "units    Reports compliance.     Other medications tried:  Jardiance - stopped in February 2020 due to DKA   MFM  DPP4  "Other orals"    Glucose Monitor: Dexcom G7 - issues with it falling off - most recent data is from August  6 times a day testing  Log reviewed: sensor downloaded and reviewed, scanned in media tab     Hypoglycemia:  Denies.   Knows how to correct with 15 grams of carbs- juice, coke, or a peppermint.      Diabetes Management Status    Hemoglobin A1C   Date Value Ref Range Status   10/09/2023 11.1 (H) 4.0 - 5.6 % Final     Comment:     ADA Screening Guidelines:  5.7-6.4%  Consistent with prediabetes  >or=6.5%  Consistent with diabetes    High levels of fetal hemoglobin interfere with the HbA1C  assay. Heterozygous hemoglobin variants (HbS, HgC, etc)do  not significantly interfere with this assay.   However, presence of multiple variants may affect accuracy.     10/09/2023 11.2 (H) 4.0 - 5.6 % Final     Comment:     ADA Screening Guidelines:  5.7-6.4%  Consistent with prediabetes  >or=6.5%  Consistent with diabetes    High levels of fetal hemoglobin interfere with the HbA1C  assay. Heterozygous hemoglobin variants (HbS, HgC, etc)do  not significantly interfere with this assay.   However, presence of multiple variants may affect accuracy.     06/02/2023 10.0 (H) 4.0 - 5.6 % Final     Comment:     ADA Screening Guidelines:  5.7-6.4%  Consistent with prediabetes  >or=6.5%  Consistent with diabetes    High levels of fetal hemoglobin interfere with the HbA1C  assay. Heterozygous hemoglobin variants (HbS, HgC, etc)do  not significantly interfere with this assay.   However, presence of multiple variants may affect accuracy.         Statin: Taking  ACE/ARB: Taking  Screening or Prevention Patient's value Goal Complete/Controlled?   HgA1C Testing and Control   Lab Results   Component Value Date    HGBA1C 11.1 (H) 10/09/2023      Annually/Less than 8% No   Lipid profile : 10/13/2022 Annually Yes   LDL control " "Lab Results   Component Value Date    LDLCALC 36.4 (L) 01/19/2022    Annually/Less than 100 mg/dl  Yes   Nephropathy screening Lab Results   Component Value Date    LABMICR 70.0 10/09/2023    LABMICR 70.0 10/09/2023    LABMICR 72.0 10/09/2023     Lab Results   Component Value Date    PROTEINUA 1+ (A) 01/15/2023    Annually Yes   Blood pressure BP Readings from Last 1 Encounters:   07/25/23 122/60    Less than 140/90 Yes   Dilated retinal exam : 07/27/2022 Annually Yes   Foot exam   : 01/25/2023 Annually Yes       Review of Systems   Constitutional:  Negative for fatigue.   Eyes:  Negative for visual disturbance.   Respiratory:  Negative for shortness of breath.    Cardiovascular:  Negative for chest pain.   Gastrointestinal:  Negative for abdominal pain.   Musculoskeletal:  Negative for arthralgias.   Skin:  Positive for wound.   Neurological:  Negative for headaches.       There were no vitals taken for this visit.        There is no height or weight on file to calculate BMI.    Lab Review:   Lab Results   Component Value Date    HGBA1C 11.1 (H) 10/09/2023    HGBA1C 11.2 (H) 10/09/2023    HGBA1C 10.0 (H) 06/02/2023       Lab Results   Component Value Date    CHOL 120 01/19/2022    HDL 27 (L) 01/19/2022    LDLCALC 36.4 (L) 01/19/2022    TRIG 283 (H) 01/19/2022    CHOLHDL 22.5 01/19/2022     Lab Results   Component Value Date     10/09/2023    K 4.4 10/09/2023    CL 97 10/09/2023    CO2 30 (H) 10/09/2023     (H) 10/09/2023    BUN 20 10/09/2023    CREATININE 0.96 10/09/2023    CALCIUM 9.7 10/09/2023    PROT 8.4 10/09/2023    ALBUMIN 4.3 10/09/2023    BILITOT 0.7 10/09/2023    ALKPHOS 98 10/09/2023    AST 30 10/09/2023    ALT 32 10/09/2023    ANIONGAP 10 10/09/2023    ESTGFRAFRICA >60.0 03/01/2022    EGFRNONAA >60.0 03/01/2022    TSH 2.380 06/02/2023     No results found for: "[id number]"  Assessment and Plan     1. Type 1 diabetes mellitus with diabetic neuropathy  blood-glucose sensor (FREESTYLE DEBBIE " 3 SENSOR) Isha    Ambulatory referral/consult to Ophthalmology    insulin (LANTUS SOLOSTAR U-100 INSULIN) glargine 100 units/mL SubQ pen    insulin aspart U-100 (NOVOLOG FLEXPEN U-100 INSULIN) 100 unit/mL (3 mL) InPn pen    Ambulatory referral/consult to Diabetes Education      2. Type 2 diabetes mellitus with diabetic polyneuropathy, with long-term current use of insulin        3. Type 2 diabetes mellitus with hyperglycemia, with long-term current use of insulin        4. Hyperglycemia              Type 1 diabetes mellitus with diabetic neuropathy  -- Labs in 3 months.  -- A1c goal <7.0%.  -- Medications discussed:  MFM  GLP1/DPP4i - pancreatitis  SGLT2i - DKA  SFU  Insulin   -- Reviewed logs/CGM:  Global hyperglycemia with A1c well above goal.  Denies hypoglycemia.   Instructed to send glucose logs in 7-14 days.    Reach out to me sooner for any glucose <70 or consistently >200.  -- Issues with Dexcom G7 falling off - G6 was a bit more expensive. He will price Celia 3.   -- Very high doses of insulin and limited on options due to co morbidities. DE to discuss U500. Will wait to transition pending DE visit and back on CGM.  -- Medication Changes:   Metformin 1000mg twice daily   Lantus 60 units twice daily  Novolog 34units plus correction scale before meals for breakfast and lunch AND Novolog 36units plus correction scale for dinner  Add correction scale as needed.   Blood sugar 150 to 200 add 2 unit   Blood sugar 201 to 250 add 4 units   Blood sugar 251 to 300 add 6 units   Blood sugar 301 to 350 add 9 units   Blood sugar greater than 350 add 10 units  -- Reviewed goals of therapy are to get the best control we can without hypoglycemia.  -- Reviewed patient's current insulin regimen. Clarified proper insulin dose and timing in relation to meals, etc. Insulin injection sites and proper rotation instructed.    -- Advised frequent self blood glucose monitoring.  Patient encouraged to document glucose results and  bring them to every clinic visit.  -- Hypoglycemia precautions discussed. Instructed on precautions before driving.    -- Call for Bg repeatedly < 90 or > 180.   -- Close adherence to lifestyle changes recommended.   -- Periodic follow ups for eye evaluations, foot care and dental care suggested.    Hyperglycemia  -- Diabetic ketoacidosis is a medical emergency that usually requires IV insulin and IV fluids. This can develop when your body can't produce enough insulin. Without enough insulin, your body breaks down fat for fuel which causes high levels of acid called ketones.   If you notice abdominal pain, diarrhea, nausea, vomiting, lethargy, difficulty breathing, confusion, and unusual fatigue or sleepiness, please check your urine ketones using the strips. If your urine ketones are positive, please go to ER immediately.         Follow up in about 6 weeks (around 11/21/2023).    I spent 30 minutes face-to-face with the patient, over half of the visit was spent on counseling and/or coordinating the care of the patient.    Counseling includes:  Diagnostic results, impressions, recommendations   Prognosis   Risk and benefits of management/treatment options   Instructions for management treatment and or follow-up   Importance of compliance with management   Risk factor reduction   Patient education

## 2023-10-09 NOTE — TELEPHONE ENCOUNTER
No care due was identified.  [facility] Embedded Care Due Messages. Reference number: [id number].   10/09/2023 8:52:25 AM CDT

## 2023-10-09 NOTE — PROGRESS NOTES
[facility] - PODIATRY  [address]  [address]  [place] [address]  Dept: [phone]  Dept Fax: [phone]    Connor Clark Jr., DPM     Assessment:   MDM    Coding  1. Onychomycosis due to dermatophyte  ciclopirox (PENLAC) 8 % Soln      2. Type 2 diabetes mellitus with hyperglycemia, with long-term current use of insulin            Plan:     Procedures  1. Onychomycosis due to dermatophyte  -     ciclopirox (PENLAC) 8 % Soln; Apply topically nightly.  Dispense: 6.6 mL; Refill: 6    2. Type 2 diabetes mellitus with hyperglycemia, with long-term current use of insulin      Bay was seen today for post-op evaluation.    Diagnoses and all orders for this visit:    Onychomycosis due to dermatophyte  -     ciclopirox (PENLAC) 8 % Soln; Apply topically nightly.    Type 2 diabetes mellitus with hyperglycemia, with long-term current use of insulin        -pt seen, evaluated, and managed  -dx discussed in detail. All questions/concerns addressed  -all tx options discussed. All alternatives, risks, benefits of all txs discussed  -will try topical rx first  -rxs dispensed: penlac  -referrals: none  -WB: wbat  - educated on antifungal footcare at home  - rec'd otc antifungal foot powder and spray. Use prn  - discussed nail bx in future in nonresponsive to rxd medication  -Discussed importance of keeping feet dry and changing socks and shoes on a regular basis to prevent spread of fungus      Follow up if symptoms worsen or fail to improve.    Subjective:      Patient ID: Bay Ibarra is a 43 y.o. male.    Chief Complaint:   Chief Complaint   Patient presents with    Post-op Evaluation     Post op #4        CC - fungal nails: Patient presents to the clinic complaining of thick and discolored toenails on both feet. Patient is inquiring about treatment options.    HPI    Last Podiatry Enc: 8/7/2023  Last Enc w/ Me: Visit date not found    Outside reports reviewed:  historical medical records.  Family hx: as below  Past Medical History:   Diagnosis Date    Acute pancreatitis     Diverticulosis of colon     Essential hypertension     Fatty liver     Gastric varices without bleeding 1/16/2020    Glaucomatous optic atrophy, right     Gout     Hx of acute pancreatitis 3/3/2017    Hypertriglyceridemia 8/2/2017    Obesity (BMI 30-39.9) 6/5/2015    Obesity (BMI 35.0-39.9 without comorbidity) 6/5/2015    Obstructive sleep apnea syndrome 12/18/2015    Type 2 diabetes mellitus with diabetic polyneuropathy, with long-term current use of insulin 10/16/2017    Type 2 diabetes mellitus with hyperglycemia, with long-term current use of insulin 8/3/2017     Past Surgical History:   Procedure Laterality Date    DEBRIDEMENT OF FOOT Right 4/21/2023    Procedure: DEBRIDEMENT, FOOT;  Surgeon: Anju Melvin DPM;  Location: [facility];  Service: Podiatry;  Laterality: Right;    ENDOSCOPIC ULTRASOUND OF UPPER GASTROINTESTINAL TRACT N/A 5/27/2019    Procedure: ULTRASOUND, UPPER GI TRACT, ENDOSCOPIC;  Surgeon: Zafar Velazquez MD;  Location: [address]);  Service: Endoscopy;  Laterality: N/A;    ERCP N/A 5/27/2019    Procedure: ERCP (ENDOSCOPIC RETROGRADE CHOLANGIOPANCREATOGRAPHY);  Surgeon: Zafar Velazquez MD;  Location: [address]);  Service: Endoscopy;  Laterality: N/A;    ESOPHAGOGASTRODUODENOSCOPY      ESOPHAGOGASTRODUODENOSCOPY N/A 1/31/2020    Procedure: EGD (ESOPHAGOGASTRODUODENOSCOPY);  Surgeon: Zafar Velazquez MD;  Location: [facility];  Service: Endoscopy;  Laterality: N/A;    PLANTAR FASCIA RELEASE Right 4/21/2023    Procedure: RELEASE, FASCIA, PLANTAR;  Surgeon: Anju Melvin DPM;  Location: [facility];  Service: Podiatry;  Laterality: Right;    RESECTION OF GASTROCNEMIUS MUSCLE Right 4/21/2023    Procedure: RESECTION, MUSCLE, GASTROCNEMIUS;  Surgeon: Anju Melvin DPM;  Location: [facility];  Service: Podiatry;  Laterality: Right;     Family History   Problem Relation Age of Onset     Aneurysm Mother         AAA    Coronary artery disease Father         4 vessel bypass at 40, possible stent at 36    Diabetes type II Father     No Known Problems Sister     No Known Problems Brother     Aneurysm Maternal Grandmother         Possible AAA    Diabetes type II Paternal Grandmother      Current Outpatient Medications   Medication Sig Dispense Refill    allopurinoL (ZYLOPRIM) 300 MG tablet Take 1 tablet (300 mg total) by mouth once daily. 90 tablet 3    aspirin (ECOTRIN) 81 MG EC tablet Take 81 mg by mouth once daily.      atorvastatin (LIPITOR) 40 MG tablet Take 1 tablet (40 mg total) by mouth once daily. 90 tablet 0    blood sugar diagnostic (CONTOUR NEXT TEST STRIPS) Strp To test glucose 4 times a day. 200 each 11    blood-glucose sensor (DEXCOM G7 SENSOR) Isha Change sensor every 10 days. 3 each 3    clotrimazole (LOTRIMIN) 1 % Soln Apply topically to affected area 2 (two) times daily. 15 mL 0    fenofibrate 160 MG Tab Take 1 tablet (160 mg total) by mouth once daily. 90 tablet 3    HYDROcodone-acetaminophen (NORCO) 5-325 mg per tablet Take 1 tablet by mouth every 6 (six) hours as needed for Pain. 20 tablet 0    icosapent ethyL (VASCEPA) 1 gram Cap Take 2 capsules (2 g total) by mouth 2 (two) times daily. 360 capsule 0    insulin (LANTUS SOLOSTAR U-100 INSULIN) glargine 100 units/mL SubQ pen Inject 55 Units into the skin 2 (two) times a day. 90 mL 3    insulin aspart U-100 (NOVOLOG FLEXPEN U-100 INSULIN) 100 unit/mL (3 mL) InPn pen Inject 30 Units into the skin before breakfast AND 30 Units daily with lunch AND 32 Units before dinner. Plus correction scale. Max TDD 100units.. 50 mL 6    losartan (COZAAR) 100 MG tablet Take ½ tablet (50 mg total) by mouth once daily. 45 tablet 1    metFORMIN (GLUCOPHAGE) 500 MG tablet Take 2 tablets (1,000 mg total) by mouth 2 (two) times daily with meals. 360 tablet 3    mupirocin (BACTROBAN) 2 % ointment Apply topically 3 (three) times daily. 15 g 1    niacin 100 MG  Tab Take 2 tablets (200 mg total) by mouth every evening. 90 tablet 1    ondansetron (ZOFRAN) 4 MG tablet Take 1 tablet (4 mg total) by mouth every 6 (six) hours as needed for Nausea. 30 tablet 1    pantoprazole (PROTONIX) 40 MG tablet Take 1 tablet (40 mg total) by mouth once daily. 90 tablet 1    pregabalin (LYRICA) 150 MG capsule Take 1 capsule (150 mg total) by mouth 2 (two) times daily. 60 capsule 5    blood-glucose transmitter (DEXCOM G6 TRANSMITTER) Isha 1 each by Misc.(Non-Drug; Combo Route) route continuous as needed 1 each PRN    ciclopirox (PENLAC) 8 % Soln Apply topically nightly. 6.6 mL 6     No current facility-administered medications for this visit.     Review of patient's allergies indicates:  No Known Allergies  Social History     Socioeconomic History    Marital status:    Tobacco Use    Smoking status: Never     Passive exposure: Never    Smokeless tobacco: Never   Substance and Sexual Activity    Alcohol use: No    Drug use: Never    Sexual activity: Not Currently     Partners: Female     Social Determinants of Health     Financial Resource Strain: Low Risk  (4/23/2020)    Overall Financial Resource Strain (CARDIA)     Difficulty of Paying Living Expenses: Not hard at all   Food Insecurity: No Food Insecurity (4/23/2020)    Hunger Vital Sign     Worried About Running Out of Food in the Last Year: Never true     Ran Out of Food in the Last Year: Never true   Transportation Needs: No Transportation Needs (4/23/2020)    PRAPARE - Transportation     Lack of Transportation (Medical): No     Lack of Transportation (Non-Medical): No   Physical Activity: Insufficiently Active (4/23/2020)    Exercise Vital Sign     Days of Exercise per Week: 3 days     Minutes of Exercise per Session: 20 min   Stress: No Stress Concern Present (4/23/2020)    [employer] [place] [employer] - Occupational Stress Questionnaire     Feeling of Stress : Only a little   Social Connections: Unknown (4/23/2020)  "   Social Connection and Isolation Panel [NHANES]     Frequency of Communication with Friends and Family: Once a week     Frequency of Social Gatherings with Friends and Family: Once a week     Active Member of Clubs or Organizations: No     Attends Club or Organization Meetings: Patient refused     Marital Status:        ROS    REVIEW OF SYSTEMS: Negative as documented below as well as positive findings in bold.       Constitutional  Respiratory  Gastrointestinal  Skin   - Fever - Cough - Heartburn - Rash   - Chills - Spit blood - Nausea - Itching   - Weight Loss - Shortness of breath - Vomiting - Nail pain   - Malaise/Fatigue - Wheezing - Abdominal Pain  Wound/Ulcer   - Weight Gain   - Blood in Stool  Poor wound healing       - Diarrhea          Cardiovascular  Genitourinary  Neurological  HEENT   - Chest Pain - Dysuria - Burning Sensation of feet - Headache   - Palpitations - Hematuria - Tingling / Paresthesia - Congestion   - Pain at night in legs - Flank Pain - Dizziness - Sore Throat   - Cramping   - Tremor - Blurred Vision   - Leg Swelling   - Sensory Change - Double Vision   - Dizzy when standing   - Speech Change - Eye Redness       - Focal Weakness - Dry Eyes       - Loss of Consciousness          Endocrine  Musculoskeletal  Psychiatric   - Cold intolerance - Muscle Pain - Depression   - Heat intolerance - Neck Pain - Insomnia   - Anemia - Joint Pain - Memory Loss   -  Easy bruising, bleeding - Heel pain - Anxiety      Toe Pain        Leg/Ankle/Foot Pain         Objective:     Ht 5' 8" (1.727 m)   Wt 115.7 kg (255 lb)   BMI 38.77 kg/m²   Vitals:    10/09/23 0917   Weight: 115.7 kg (255 lb)   Height: 5' 8" (1.727 m)   PainSc: 0-No pain   PainLoc: Foot       Physical Exam    General Appearance:   Patient appears well developed, well nourished  Patient appears stated age    Psychiatric:   Patient is oriented to time, place, and person.  Patient has appropriate mood and affect    Neck:  Trachea " Midline  No visible masses    Respiratory/Ears:  No distress or labored breathing.  Able to differentiate between normal talking voice and whisper.  Able to follow commands    Eyes:  Visual Acuity intact  Lids and conjunctivae normal. No discoloration noted.    Foot Exam  Physical Exam  Ortho Exam  Ortho/SPM Exam  Physical Exam  Neurologic Exam    B/l LE exam con't:  V:  DP 2/4, PT 2/4   CRT< 3s to all digits tested   Tibial and popliteal lymph nodes are w/o abnormality    edema absent, varicosities present    N:  Patient displays normal ankle reflexes   sensory deficit present    Ortho: +Motor EHL/FHL/TA/GA   No TTP  Compartments soft/compressible. No pain on passive stretch of big toe. No calf  Pain.    Derm:  skin intact, skin warm and dry, skin without ulcers or lesions, skin without induration, nails abnormal, nails discolored and nails dystrophic, mycotic x all nails    Imaging / Labs:      No results found.      Note: This was dictated using a computer transcription program. Although proofread, it may contain computer transcription errors and phonetic errors. Other human proofreading errors may also exist. Corrections may be performed at a later time. Please contact us for any clarification if needed.    Connor Clark DPM  [facility]

## 2023-10-09 NOTE — PATIENT INSTRUCTIONS
Diabetes: Inspecting Your Feet      Diabetes increases your chances of developing foot problems. So inspect your feet every day. This helps you find small skin irritations before they become serious ulcers or infections. If you have trouble seeing the bottoms of your feet, use a mirror or ask a family member or friend to help.  How to check your feet  Below are tips to help you look for foot problems. Try to check your feet at the same time each day, such as when you get out of bed in the morning:  Check the top of each foot. The tops of toes, back of the heel, and outer edge of the foot can get a lot of rubbing from poor-fitting shoes.  Check the bottom of each foot. Daily wear and tear often leads to problems at pressure spots.  Check the toes and nails. Fungal infections often occur between toes. Toenail problems can also be a sign of fungal infections or lead to breaks in the skin.  Check your shoes, too. Loose objects inside a shoe can injure the foot. Use your hand to feel inside your shoes for things like jeremy, loose stitching, or rough areas that could irritate your skin.  Warning signs  Look for any color changes in the foot. Redness with streaks can signal a severe infection, which needs immediate medical attention. Tell your healthcare provider right away if you have any of these problems:  Swelling, sometimes with color changes, may be a sign of poor blood flow or infection. Symptoms include tenderness and an increase in the size of your foot.  Warm or hot areas on your feet may be signs of infection. A foot that is cold may not be getting enough blood.  Sensations such as burning, tingling, or pins and needles can be signs of a problem. Also check for areas that may be numb.  Hot spots are caused by friction or pressure. Look for hot spots in areas that get a lot of rubbing. Hot spots can turn into blisters, calluses, or sores.  Cracks and sores are caused by dry or irritated skin. They are a sign  that the skin is breaking down, which can lead to infection.  Toenail problems to watch for include nails growing into the skin (ingrown toenail) and causing redness or pain. Thick, yellow, or discolored nails can signal a fungal infection.  Drainage and odor can develop from untreated sores and ulcers. Call your healthcare provider right away if you notice white or yellow drainage, bleeding, or unpleasant odor.   Date Last Reviewed: 6/1/2016 © 2000-2017 [employer]. [address] [place] [id number]. All rights reserved. This information is not intended as a substitute for professional medical care. Always follow your healthcare professional's instructions.    Long-Term Complications of Diabetes    Diabetes can cause health problems over time. These are called complications. They are more likely to happen if your blood sugar is often too high. Over time, high blood sugar can damage blood vessels in your body. It is important to keep your blood sugar in your target range. This can help prevent or delay complications from diabetes.  Possible complications  Complications of diabetes include:    Eye problems, including damage to the blood vessels in the eyes (retinopathy), pressure in the eye (glaucoma), and clouding of the eye's lens (a cataract). Eye problems can eventually lead to irreversible blindness.   Tooth and gum problems (periodontal disease), causing loss of teeth and bone  Blood vessel (vascular) disease leading to circulation problems, heart attack or stroke, or a need for amputation of a limb   Problems with sexual function leading to erectile dysfunction in men and sexual discomfort in women   Kidney disease (nephropathy) can eventually lead to kidney failure, which may require dialysis or kidney transplant   Nerve problems (neuropathy), causing pain or loss of feeling in your feet and other parts of your body, potentially leading to an amputation of a limb   High blood pressure  (hypertension), putting strain on your heart and blood vessels  Serious infections, possibly leading to loss of toes, feet, or limbs    How to avoid complications  The serious consequences of these complications may be avoidable for most people with diabetes by managing your blood glucose, blood pressure, and cholesterol levels. This can help you feel better and stay healthy. You can manage diabetes by tracking your blood sugar. You can also eat healthy and exercise to avoid gaining weight. And you should take medicine if directed by your healthcare provider.

## 2023-10-10 ENCOUNTER — OFFICE VISIT (OUTPATIENT)
Dept: ENDOCRINOLOGY | Facility: CLINIC | Age: 43
End: 2023-10-10
Payer: COMMERCIAL

## 2023-10-10 DIAGNOSIS — E10.40 TYPE 1 DIABETES MELLITUS WITH DIABETIC NEUROPATHY: Primary | ICD-10-CM

## 2023-10-10 DIAGNOSIS — R73.9 HYPERGLYCEMIA: ICD-10-CM

## 2023-10-10 DIAGNOSIS — E11.42 TYPE 2 DIABETES MELLITUS WITH DIABETIC POLYNEUROPATHY, WITH LONG-TERM CURRENT USE OF INSULIN: ICD-10-CM

## 2023-10-10 DIAGNOSIS — Z79.4 TYPE 2 DIABETES MELLITUS WITH DIABETIC POLYNEUROPATHY, WITH LONG-TERM CURRENT USE OF INSULIN: ICD-10-CM

## 2023-10-10 DIAGNOSIS — Z79.4 TYPE 2 DIABETES MELLITUS WITH HYPERGLYCEMIA, WITH LONG-TERM CURRENT USE OF INSULIN: ICD-10-CM

## 2023-10-10 DIAGNOSIS — E11.65 TYPE 2 DIABETES MELLITUS WITH HYPERGLYCEMIA, WITH LONG-TERM CURRENT USE OF INSULIN: ICD-10-CM

## 2023-10-10 PROCEDURE — 3072F LOW RISK FOR RETINOPATHY: CPT | Mod: CPTII,95,, | Performed by: NURSE PRACTITIONER

## 2023-10-10 PROCEDURE — 1160F RVW MEDS BY RX/DR IN RCRD: CPT | Mod: CPTII,95,, | Performed by: NURSE PRACTITIONER

## 2023-10-10 PROCEDURE — 99214 OFFICE O/P EST MOD 30 MIN: CPT | Mod: 95,,, | Performed by: NURSE PRACTITIONER

## 2023-10-10 PROCEDURE — 3060F POS MICROALBUMINURIA REV: CPT | Mod: CPTII,95,, | Performed by: NURSE PRACTITIONER

## 2023-10-10 PROCEDURE — 3066F PR DOCUMENTATION OF TREATMENT FOR NEPHROPATHY: ICD-10-PCS | Mod: CPTII,95,, | Performed by: NURSE PRACTITIONER

## 2023-10-10 PROCEDURE — 4010F ACE/ARB THERAPY RXD/TAKEN: CPT | Mod: CPTII,95,, | Performed by: NURSE PRACTITIONER

## 2023-10-10 PROCEDURE — 1160F PR REVIEW ALL MEDS BY PRESCRIBER/CLIN PHARMACIST DOCUMENTED: ICD-10-PCS | Mod: CPTII,95,, | Performed by: NURSE PRACTITIONER

## 2023-10-10 PROCEDURE — 1159F PR MEDICATION LIST DOCUMENTED IN MEDICAL RECORD: ICD-10-PCS | Mod: CPTII,95,, | Performed by: NURSE PRACTITIONER

## 2023-10-10 PROCEDURE — 3046F HEMOGLOBIN A1C LEVEL >9.0%: CPT | Mod: CPTII,95,, | Performed by: NURSE PRACTITIONER

## 2023-10-10 PROCEDURE — 3072F PR LOW RISK FOR RETINOPATHY: ICD-10-PCS | Mod: CPTII,95,, | Performed by: NURSE PRACTITIONER

## 2023-10-10 PROCEDURE — 3066F NEPHROPATHY DOC TX: CPT | Mod: CPTII,95,, | Performed by: NURSE PRACTITIONER

## 2023-10-10 PROCEDURE — 3060F PR POS MICROALBUMINURIA RESULT DOCUMENTED/REVIEW: ICD-10-PCS | Mod: CPTII,95,, | Performed by: NURSE PRACTITIONER

## 2023-10-10 PROCEDURE — 3046F PR MOST RECENT HEMOGLOBIN A1C LEVEL > 9.0%: ICD-10-PCS | Mod: CPTII,95,, | Performed by: NURSE PRACTITIONER

## 2023-10-10 PROCEDURE — 99214 PR OFFICE/OUTPT VISIT, EST, LEVL IV, 30-39 MIN: ICD-10-PCS | Mod: 95,,, | Performed by: NURSE PRACTITIONER

## 2023-10-10 PROCEDURE — 4010F PR ACE/ARB THEARPY RXD/TAKEN: ICD-10-PCS | Mod: CPTII,95,, | Performed by: NURSE PRACTITIONER

## 2023-10-10 PROCEDURE — 1159F MED LIST DOCD IN RCRD: CPT | Mod: CPTII,95,, | Performed by: NURSE PRACTITIONER

## 2023-10-10 RX ORDER — INSULIN GLARGINE 100 [IU]/ML
60 INJECTION, SOLUTION SUBCUTANEOUS 2 TIMES DAILY
Qty: 100 ML | Refills: 3 | Status: ON HOLD | OUTPATIENT
Start: 2023-10-10 | End: 2024-02-18

## 2023-10-10 RX ORDER — BLOOD-GLUCOSE SENSOR
EACH MISCELLANEOUS
Qty: 6 EACH | Refills: 3 | Status: SHIPPED | OUTPATIENT
Start: 2023-10-10 | End: 2024-01-04

## 2023-10-10 RX ORDER — INSULIN ASPART 100 [IU]/ML
INJECTION, SOLUTION INTRAVENOUS; SUBCUTANEOUS
Qty: 70 ML | Refills: 6 | Status: ON HOLD | OUTPATIENT
Start: 2023-10-10 | End: 2024-02-18

## 2023-10-10 RX ORDER — PREGABALIN 150 MG/1
150 CAPSULE ORAL 2 TIMES DAILY
Qty: 60 CAPSULE | Refills: 5 | Status: SHIPPED | OUTPATIENT
Start: 2023-10-10

## 2023-10-10 RX ORDER — ATORVASTATIN CALCIUM 40 MG/1
40 TABLET, FILM COATED ORAL DAILY
Qty: 90 TABLET | Refills: 3 | Status: SHIPPED | OUTPATIENT
Start: 2023-10-10

## 2023-10-10 NOTE — ASSESSMENT & PLAN NOTE
-- Diabetic ketoacidosis is a medical emergency that usually requires IV insulin and IV fluids. This can develop when your body can't produce enough insulin. Without enough insulin, your body breaks down fat for fuel which causes high levels of acid called ketones.   If you notice abdominal pain, diarrhea, nausea, vomiting, lethargy, difficulty breathing, confusion, and unusual fatigue or sleepiness, please check your urine ketones using the strips. If your urine ketones are positive, please go to ER immediately.

## 2023-10-10 NOTE — ASSESSMENT & PLAN NOTE
-- Labs in 3 months.  -- A1c goal <7.0%.  -- Medications discussed:  MFM  GLP1/DPP4i - pancreatitis  SGLT2i - DKA  SFU  Insulin   -- Reviewed logs/CGM:  Global hyperglycemia with A1c well above goal.  Denies hypoglycemia.   Instructed to send glucose logs in 7-14 days.    Reach out to me sooner for any glucose <70 or consistently >200.  -- Issues with Dexcom G7 falling off - G6 was a bit more expensive. He will price Celia 3.   -- Very high doses of insulin and limited on options due to co morbidities. DE to discuss U500. Will wait to transition pending DE visit and back on CGM.  -- Medication Changes:   Metformin 1000mg twice daily   Lantus 60 units twice daily  Novolog 34units plus correction scale before meals for breakfast and lunch AND Novolog 36units plus correction scale for dinner  Add correction scale as needed.   Blood sugar 150 to 200 add 2 unit   Blood sugar 201 to 250 add 4 units   Blood sugar 251 to 300 add 6 units   Blood sugar 301 to 350 add 9 units   Blood sugar greater than 350 add 10 units  -- Reviewed goals of therapy are to get the best control we can without hypoglycemia.  -- Reviewed patient's current insulin regimen. Clarified proper insulin dose and timing in relation to meals, etc. Insulin injection sites and proper rotation instructed.    -- Advised frequent self blood glucose monitoring.  Patient encouraged to document glucose results and bring them to every clinic visit.  -- Hypoglycemia precautions discussed. Instructed on precautions before driving.    -- Call for Bg repeatedly < 90 or > 180.   -- Close adherence to lifestyle changes recommended.   -- Periodic follow ups for eye evaluations, foot care and dental care suggested.

## 2023-10-11 ENCOUNTER — PATIENT OUTREACH (OUTPATIENT)
Dept: ADMINISTRATIVE | Facility: HOSPITAL | Age: 43
End: 2023-10-11
Payer: COMMERCIAL

## 2023-10-11 ENCOUNTER — CLINICAL SUPPORT (OUTPATIENT)
Dept: DIABETES | Facility: CLINIC | Age: 43
End: 2023-10-11
Payer: COMMERCIAL

## 2023-10-11 DIAGNOSIS — E10.40 TYPE 1 DIABETES MELLITUS WITH DIABETIC NEUROPATHY: ICD-10-CM

## 2023-10-11 PROCEDURE — G0108 PR DIAB MANAGE TRN  PER INDIV: ICD-10-PCS | Mod: 95,,, | Performed by: NURSE PRACTITIONER

## 2023-10-11 PROCEDURE — G0108 DIAB MANAGE TRN  PER INDIV: HCPCS | Mod: 95,,, | Performed by: NURSE PRACTITIONER

## 2023-10-11 NOTE — PROGRESS NOTES
Care Everywhere updates requested and reviewed.  Immunizations reconciled. Media reports reviewed.  Duplicate HM overrides and  orders removed.  Overdue HM topic chart audit and/or requested.  Overdue lab testing linked to upcoming lab appointments if applies.        Health Maintenance Due   Topic Date Due    Eye Exam  2023    Influenza Vaccine (1) 2023    COVID-19 Vaccine (2 - -24 season) 2023    Lipid Panel  10/13/2023

## 2023-10-12 ENCOUNTER — PATIENT MESSAGE (OUTPATIENT)
Dept: DIABETES | Facility: CLINIC | Age: 43
End: 2023-10-12
Payer: COMMERCIAL

## 2023-10-12 NOTE — PROGRESS NOTES
Diabetes Care Specialist Virtual Visit Note   The patient location is: Home in [place]  The chief complaint leading to consultation is: Diabetes  Visit type: audiovisual  Total time spent with patient: 60 min   Each patient to whom he or she provides medical services by telemedicine is:  (1) informed of the relationship between the physician and patient and the respective role of any other health care provider with respect to management of the patient; and (2) notified that he or she may decline to receive medical services by telemedicine and may withdraw from such care at any time.       Diabetes Care Specialist Progress Note  Author: Ruma Scruggs RN, CDE  Date: 10/11/2023    Program Intake  Reason for Diabetes Program Visit:: Initial Diabetes Assessment  Current diabetes risk level:: high  In the last 12 months, have you:: none  Permission to speak with others about care:: no    Lab Results   Component Value Date    LABA1C 8.8 (H) 01/12/2018    HGBA1C 11.1 (H) 10/09/2023       Clinical        There is no height or weight on file to calculate BMI.         Problem Review  Reviewed Problem List with Patient: yes  Active comorbidities affecting diabetes self-care.: yes  Comorbidities: Hypertension  Reviewed health maintenance: yes    Clinical Assessment  Current Diabetes Treatment: Insulin (Lantus  60 units in am; 60 units in pm; Novolog 34/34/36 plus s/s with meals)  Have you ever experienced hypoglycemia (low blood sugar)?: no  Have you ever experienced hyperglycemia (high blood sugar)?: yes  In the last month, how often have you experienced high blood sugar?: more than once a day  Are you able to tell when your blood sugar is high?: Yes  Have you ever been hospitalized because your blood sugar was high?: no    Medication Information  How do you obtain your medications?: Patient drives  How many days a week do you miss your medications?: 2  Do you sometimes have difficulty refilling your medications?: No          Nutritional Status  Diet: Regular  Meal Plan 24 Hour Recall: Breakfast, Lunch, Dinner, Snack  Meal Plan 24 Hour Recall - Breakfast: 3 egg omelet with ham and cheese  Meal Plan 24 Hour Recall - Lunch: leftovers today stuffed bell pepper  Meal Plan 24 Hour Recall - Dinner: sandwich or beans and rice  Meal Plan 24 Hour Recall - Snack: rarely - nuts or cheeze its  Change in appetite?: No  Dentation:: Intact  Recent Changes in Weight: No Recent Weight Change  Current nutritional status an area of need that is impacting patient's ability to self-manage diabetes?: No    Additional Social History    Support  Who takes you to your medical appointments?: self  Does the current support meet the patient's needs?: Yes  Is Support an area impacting ability to self-manage diabetes?: No    Access to Mass Media & Technology  Does the patient have access to any of the following devices or technologies?: Smart phone, Home computer, Internet Access  Media or technology needs impacting ability to self-manage diabetes?: No    Cognitive/Behavioral Health  Alert and Oriented: Yes  Difficulty Thinking: No  Requires Prompting: No  Requires assistance for routine expression?: No  Cognitive or behavioral barriers impacting ability to self-manage diabetes?: No         Communication  Language preference: English  Hearing Problems: No  Vision Problems: No  Communication needs impacting ability to self-manage diabetes?: No    Health Literacy  Preferred Learning Method: Face to Face, Reading Materials, Demonstration  How often do you need to have someone help you read instructions, pamphlets, or written material from your doctor or pharmacy?: Never  Health literacy needs impacting ability to self-manage diabetes?: No      Diabetes Self-Management Skills Assessment    Diabetes Disease Process/Treatment Options  Patient/caregiver able to state what happens when someone has diabetes.: yes  Patient/caregiver knows what type of diabetes they have.:  yes  Diabetes Type : Type II  Diabetes Disease Process/Treatment Options: Skills Assessment Completed: Yes  Assessment indicates:: Knowledge deficit  Area of need?: Yes    Nutrition/Healthy Eating  Challenges to healthy eating:: portion control  Method of carbohydrate measurement:: no method  Patient can identify foods that impact blood sugar.: yes  Patient-identified foods:: starchy vegetables (corn, peas, beans), sweets, starches (bread, pasta, rice, cereal), fruit/fruit juice  Nutrition/Healthy Eating Skills Assessment Completed:: Yes  Assessment indicates:: Instruction Needed, Knowledge deficit  Area of need?: Yes    Physical Activity/Exercise  Patient's daily activity level:: moderately active  Patient formally exercises outside of work.: no  Reasons for not exercising:: work schedule  Patient can identify forms of physical activity.: yes  Stated forms of physical activity:: any movement performed by muscles that uses energy, manual activity at work  Physical Activity/Exercise Skills Assessment Completed: : Yes  Assessment indicates:: Adequate understanding  Area of need?: No    Medications  Patient is able to describe current diabetes management routine.: yes  Diabetes management routine:: insulin  Patient is able to identify current diabetes medications, dosages, and appropriate timing of medications.: yes  Patient understands the purpose of the medications taken for diabetes.: yes  Patient reports problems or concerns with current medication regimen.: yes  Medication regimen problems/concerns:: other (see comments) (missed some Lantus doses because of illness)  Medication Skills Assessment Completed:: Yes  Assessment indicates:: Instruction Needed, Knowledge deficit  Area of need?: Yes    Home Blood Glucose Monitoring  Patient states that blood sugar is checked at home daily.: yes  Monitoring Method:: personal continuous glucose monitor  Personal CGM type:: Dexcom G7  Patient is able to use personal CGM  appropriately.: yes  CGM Report reviewed?: yes  Home Blood Glucose Monitoring Skills Assessment Completed: : Yes  Assessment indicates:: Adequate understanding  Area of need?: No    Acute Complications  Patient is able to identify types of acute complications: Yes  Patient Identified:: Hypoglycemia, Hyperglycemia  Patient is able to state the basic meaning of hypoglycemia?: Yes  Able to state the blood sugar range for hypoglycemia?: yes  Patient stated range:: under 70  Patient can identify general symptoms of hypoglycemia: yes  Patient identified:: shakiness  Able to state proper treatment of hypoglycemia?: yes  Patient identified:: 1/2 can soda/fruit juice, 5-6 pieces of hard candy  Acute Complications Skills Assessment Completed: : Yes  Assessment indicates:: Knowledge deficit  Area of need?: Yes    Chronic Complications  Chronic Complications Skills Assessment Completed: : No  Deferred due to:: Time    Psychosocial/Coping  Psychosocial/Coping Skills Assessment Completed: : No  Deffered due to:: Time      Assessment Summary and Plan    Based on today's diabetes care assessment, the following areas of need were identified:          10/11/2023    12:01 AM   Social   Support No   Access to Mass Media/Tech No   Cognitive/Behavioral Health No   Communication No   Health Literacy No            10/11/2023    12:01 AM   Clinical   Nutritional Status No            10/11/2023    12:01 AM   Diabetes Self-Management Skills   Diabetes Disease Process/Treatment Options Yes, Provided DM management guide and provided instruction regarding signs and symptoms, risk factors of diabetes, increased risk for cardio and cerebral vascular events.  Instructed patient on what is diabetes and the progression of the disease. Discussed significance of current A1c and blood glucose goals.   Nutrition/Healthy Eating Yes, see care planning   Physical Activity/Exercise No   Medication Yes, see care planning   Home Blood Glucose Monitoring No    Acute Complications Yes - Reviewed blood glucose goals, prevention, detection, signs and symptoms, and treatment of hypoglycemia following rule of 15 and hyperglycemia, and when to contact the clinic. Advised to carry a source of fast acting carbs.           Today's interventions were provided through individual discussion, instruction, and written materials were provided.      Patient verbalized understanding of instruction and written materials.  Pt was able to return back demonstration of instructions today. Patient understood key points, needs reinforcement and further instruction.     Diabetes Self-Management Care Plan:    Today's Diabetes Self-Management Care Plan was developed with Bay's input. Bay has agreed to work toward the following goal(s) to improve his/her overall diabetes control.      Care Plan: Diabetes Management   Updates made since 9/12/2023 12:00 AM        Problem: Healthy Eating         Goal: Eat 2-3 meals daily with 30-45g/2-3 servings of Carbohydrate per meal. Limit snacking in between meal to 1 serving (15 grams).    Start Date: 10/11/2023   Expected End Date: 10/26/2023   Priority: Medium   Barriers: Knowledge deficit   Note:    Reviewed meal planning and general nutritional counseling with regards to diabetes management. Typical food intake obtained from patient. Patient currently is not measuring foods or carb counting. Admits the problem is portion sizes of meals.    We concentrated on portion sizes at today's session.  Encouraged increased consumption of non-starchy veggies to diet.  Overall meal planning and making better choices for meals. Encouraged to avoid sources of sugar in diet.  Written education information provided to patient for use at home.        Task: Reviewed the sources and role of Carbohydrate, Protein, and Fat and how each nutrient impacts blood sugar. Completed 10/11/2023        Task: Provided visual examples using dry measuring cups, food models, and other  familiar objects such as computer mouse, deck or cards, tennis ball etc. to help with visualization of portions. Completed 10/11/2023        Task: Explained how to count carbohydrates using the food label and the use of dry measuring cups for accurate carb counting. Completed 10/11/2023        Task: Discussed strategies for choosing healthier menu options when dining out. Completed 10/11/2023     Task: Review the importance of balancing carbohydrates with each meal using portion control techniques to count servings of carbohydrate and label reading to identify serving size and amount of total carbs per serving. Completed 10/11/2023        Task: Provided Sample plate method and reviewed the use of the plate to estimate amounts of carbohydrate per meal. Completed 10/11/2023        Problem: Medications         Goal: Patient Agrees to take Diabetes Medication(s) as prescribed.    Start Date: 10/11/2023   Expected End Date: 10/26/2023   Priority: High   Barriers: No Barriers Identified   Note:    Reviewed current insulin regime.  Educated on the use of U500 insulin.  Explained it is a concentrated insulin which requires less volume.  Typically given 3 times a day.  MOA explained.  Patient is willing to try.  Sending note to provider to order.  Reviewed side effects of all insulin particularly hypoglycemia.        Task: Reviewed with patient all current diabetes medications and provided basic review of the purpose, dosage, frequency, side effects, and storage of both oral and injectable diabetes medications. Completed 10/11/2023       Task: Discussed guidelines for preventing, detecting and treating hypoglycemia and hyperglycemia and reviewed the importance of meal and medication timing with diabetes mediations for prevention of hypoglycemia and maximum drug benefit. Completed 10/11/2023          Follow Up Plan     F/u in 2 weeks to review Dexcom and U500 start    Today's care plan and follow up schedule was discussed  with patient.  Bay verbalized understanding of the care plan, goals, and agrees to follow up plan.        The patient was encouraged to communicate with his/her health care provider/physician and care team regarding his/her condition(s) and treatment.  I provided the patient with my contact information today and encouraged to contact me via phone or [facility] Portal as needed.     Length of Visit   Total Time: 60 Minutes

## 2023-10-23 ENCOUNTER — PATIENT MESSAGE (OUTPATIENT)
Dept: FAMILY MEDICINE | Facility: CLINIC | Age: 43
End: 2023-10-23
Payer: COMMERCIAL

## 2023-10-23 DIAGNOSIS — E78.1 HYPERTRIGLYCERIDEMIA: ICD-10-CM

## 2023-10-23 RX ORDER — ICOSAPENT ETHYL 1000 MG/1
2 CAPSULE ORAL 2 TIMES DAILY
Qty: 360 CAPSULE | Refills: 3 | Status: SHIPPED | OUTPATIENT
Start: 2023-10-23 | End: 2024-01-24 | Stop reason: SDUPTHER

## 2023-10-23 NOTE — TELEPHONE ENCOUNTER
Refill Routing Note   Medication(s) are not appropriate for processing by [facility] for the following reason(s):      Required labs outdated    [facility] action(s):  Defer Care Due:  Labs due            Appointments  past 12m or future 3m with PCP    Date Provider   Last Visit   7/25/2023 Funmilayo Taylor MD   Next Visit   10/25/2023 Funmilayo Taylor MD   ED visits in past 90 days: 0        Note composed:12:18 PM 10/23/2023

## 2023-10-23 NOTE — TELEPHONE ENCOUNTER
Care Due:                  Date            Visit Type   Department     Provider  --------------------------------------------------------------------------------                                EP -                              [facility]  Last Visit: 07-      CARE ([facility])   [facility] -                              [facility]  Next Visit: 10-      [facility] ([facility]                                                            Last  Test          Frequency    Reason                     Performed    Due Date  --------------------------------------------------------------------------------    CBC.........  12 months..  allopurinoL, fenofibrate.  01- 01-    Lipid Panel.  12 months..  atorvastatin,              01-   10-                             fenofibrate, icosapent...    Uric Acid...  12 months..  allopurinoL..............  Not Found    Overdue    [employer] Embedded Care Due Messages. Reference number: [id number].   10/23/2023 11:20:22 AM CDT

## 2023-10-25 ENCOUNTER — PATIENT MESSAGE (OUTPATIENT)
Dept: ENDOCRINOLOGY | Facility: CLINIC | Age: 43
End: 2023-10-25
Payer: COMMERCIAL

## 2023-10-25 ENCOUNTER — PATIENT MESSAGE (OUTPATIENT)
Dept: FAMILY MEDICINE | Facility: CLINIC | Age: 43
End: 2023-10-25

## 2023-10-25 ENCOUNTER — CLINICAL SUPPORT (OUTPATIENT)
Dept: DIABETES | Facility: CLINIC | Age: 43
End: 2023-10-25
Payer: COMMERCIAL

## 2023-10-25 ENCOUNTER — OFFICE VISIT (OUTPATIENT)
Dept: FAMILY MEDICINE | Facility: CLINIC | Age: 43
End: 2023-10-25
Payer: COMMERCIAL

## 2023-10-25 VITALS
OXYGEN SATURATION: 96 % | HEIGHT: 68 IN | BODY MASS INDEX: 34.74 KG/M2 | HEART RATE: 91 BPM | DIASTOLIC BLOOD PRESSURE: 80 MMHG | SYSTOLIC BLOOD PRESSURE: 148 MMHG | WEIGHT: 229.19 LBS

## 2023-10-25 DIAGNOSIS — Z79.4 TYPE 2 DIABETES MELLITUS WITH DIABETIC POLYNEUROPATHY, WITH LONG-TERM CURRENT USE OF INSULIN: Chronic | ICD-10-CM

## 2023-10-25 DIAGNOSIS — E78.2 MIXED HYPERLIPIDEMIA: Primary | Chronic | ICD-10-CM

## 2023-10-25 DIAGNOSIS — Z79.4 TYPE 2 DIABETES MELLITUS WITH HYPERGLYCEMIA, WITH LONG-TERM CURRENT USE OF INSULIN: ICD-10-CM

## 2023-10-25 DIAGNOSIS — Z79.4 TYPE 2 DIABETES MELLITUS WITH DIABETIC POLYNEUROPATHY, WITH LONG-TERM CURRENT USE OF INSULIN: Primary | Chronic | ICD-10-CM

## 2023-10-25 DIAGNOSIS — E11.42 TYPE 2 DIABETES MELLITUS WITH DIABETIC POLYNEUROPATHY, WITH LONG-TERM CURRENT USE OF INSULIN: Primary | Chronic | ICD-10-CM

## 2023-10-25 DIAGNOSIS — E11.42 TYPE 2 DIABETES MELLITUS WITH DIABETIC POLYNEUROPATHY, WITH LONG-TERM CURRENT USE OF INSULIN: Chronic | ICD-10-CM

## 2023-10-25 DIAGNOSIS — I10 ESSENTIAL HYPERTENSION: Chronic | ICD-10-CM

## 2023-10-25 DIAGNOSIS — E11.65 TYPE 2 DIABETES MELLITUS WITH HYPERGLYCEMIA, WITH LONG-TERM CURRENT USE OF INSULIN: ICD-10-CM

## 2023-10-25 PROCEDURE — 4010F ACE/ARB THERAPY RXD/TAKEN: CPT | Mod: CPTII,S$GLB,, | Performed by: FAMILY MEDICINE

## 2023-10-25 PROCEDURE — 3046F HEMOGLOBIN A1C LEVEL >9.0%: CPT | Mod: CPTII,S$GLB,, | Performed by: FAMILY MEDICINE

## 2023-10-25 PROCEDURE — 99214 PR OFFICE/OUTPT VISIT, EST, LEVL IV, 30-39 MIN: ICD-10-PCS | Mod: S$GLB,,, | Performed by: FAMILY MEDICINE

## 2023-10-25 PROCEDURE — 3066F PR DOCUMENTATION OF TREATMENT FOR NEPHROPATHY: ICD-10-PCS | Mod: CPTII,S$GLB,, | Performed by: FAMILY MEDICINE

## 2023-10-25 PROCEDURE — 3060F POS MICROALBUMINURIA REV: CPT | Mod: CPTII,S$GLB,, | Performed by: FAMILY MEDICINE

## 2023-10-25 PROCEDURE — G0108 DIAB MANAGE TRN  PER INDIV: HCPCS | Mod: 95,,, | Performed by: NURSE PRACTITIONER

## 2023-10-25 PROCEDURE — 1159F MED LIST DOCD IN RCRD: CPT | Mod: CPTII,S$GLB,, | Performed by: FAMILY MEDICINE

## 2023-10-25 PROCEDURE — 3077F PR MOST RECENT SYSTOLIC BLOOD PRESSURE >= 140 MM HG: ICD-10-PCS | Mod: CPTII,S$GLB,, | Performed by: FAMILY MEDICINE

## 2023-10-25 PROCEDURE — 3066F NEPHROPATHY DOC TX: CPT | Mod: CPTII,S$GLB,, | Performed by: FAMILY MEDICINE

## 2023-10-25 PROCEDURE — 99999 PR PBB SHADOW E&M-EST. PATIENT-LVL V: CPT | Mod: PBBFAC,,, | Performed by: FAMILY MEDICINE

## 2023-10-25 PROCEDURE — 3060F PR POS MICROALBUMINURIA RESULT DOCUMENTED/REVIEW: ICD-10-PCS | Mod: CPTII,S$GLB,, | Performed by: FAMILY MEDICINE

## 2023-10-25 PROCEDURE — 3079F DIAST BP 80-89 MM HG: CPT | Mod: CPTII,S$GLB,, | Performed by: FAMILY MEDICINE

## 2023-10-25 PROCEDURE — 3077F SYST BP >= 140 MM HG: CPT | Mod: CPTII,S$GLB,, | Performed by: FAMILY MEDICINE

## 2023-10-25 PROCEDURE — 3046F PR MOST RECENT HEMOGLOBIN A1C LEVEL > 9.0%: ICD-10-PCS | Mod: CPTII,S$GLB,, | Performed by: FAMILY MEDICINE

## 2023-10-25 PROCEDURE — 1160F PR REVIEW ALL MEDS BY PRESCRIBER/CLIN PHARMACIST DOCUMENTED: ICD-10-PCS | Mod: CPTII,S$GLB,, | Performed by: FAMILY MEDICINE

## 2023-10-25 PROCEDURE — 1160F RVW MEDS BY RX/DR IN RCRD: CPT | Mod: CPTII,S$GLB,, | Performed by: FAMILY MEDICINE

## 2023-10-25 PROCEDURE — 1159F PR MEDICATION LIST DOCUMENTED IN MEDICAL RECORD: ICD-10-PCS | Mod: CPTII,S$GLB,, | Performed by: FAMILY MEDICINE

## 2023-10-25 PROCEDURE — 3072F PR LOW RISK FOR RETINOPATHY: ICD-10-PCS | Mod: CPTII,S$GLB,, | Performed by: FAMILY MEDICINE

## 2023-10-25 PROCEDURE — 99999 PR PBB SHADOW E&M-EST. PATIENT-LVL V: ICD-10-PCS | Mod: PBBFAC,,, | Performed by: FAMILY MEDICINE

## 2023-10-25 PROCEDURE — 3008F PR BODY MASS INDEX (BMI) DOCUMENTED: ICD-10-PCS | Mod: CPTII,S$GLB,, | Performed by: FAMILY MEDICINE

## 2023-10-25 PROCEDURE — G0108 PR DIAB MANAGE TRN  PER INDIV: ICD-10-PCS | Mod: 95,,, | Performed by: NURSE PRACTITIONER

## 2023-10-25 PROCEDURE — 3008F BODY MASS INDEX DOCD: CPT | Mod: CPTII,S$GLB,, | Performed by: FAMILY MEDICINE

## 2023-10-25 PROCEDURE — 99214 OFFICE O/P EST MOD 30 MIN: CPT | Mod: S$GLB,,, | Performed by: FAMILY MEDICINE

## 2023-10-25 PROCEDURE — 3079F PR MOST RECENT DIASTOLIC BLOOD PRESSURE 80-89 MM HG: ICD-10-PCS | Mod: CPTII,S$GLB,, | Performed by: FAMILY MEDICINE

## 2023-10-25 PROCEDURE — 4010F PR ACE/ARB THEARPY RXD/TAKEN: ICD-10-PCS | Mod: CPTII,S$GLB,, | Performed by: FAMILY MEDICINE

## 2023-10-25 PROCEDURE — 3072F LOW RISK FOR RETINOPATHY: CPT | Mod: CPTII,S$GLB,, | Performed by: FAMILY MEDICINE

## 2023-10-25 NOTE — PATIENT INSTRUCTIONS
Keep a blood pressure log over the next 4 weeks and send me your home readings   -check it around the same time each day   -make sure you are resting for 5 minutes prior to checking   -be seated with your legs uncrossed   -I prefer a upper arm cuff instead of a wrist cuff because it tends to be more accurate

## 2023-10-25 NOTE — PROGRESS NOTES
PATIENT VISIT FAMILY MEDICINE    CC:   Chief Complaint   Patient presents with    Follow-up    Diabetes       HPI: Bay Ibarra  is a 43 y.o. male:    Patient is known to me.  Patient presents routine follow up on chronic conditions    Patient doing well overall, taking medications as prescribed and with no acute concerns.      Patient recently had adjustments to his insulin with endocrinology (see note). Patient reports doing overall well since this visit. He did have one episode since last encounter of N/V, diarrhea, malaise that lasted 2-3 days over a weekend. He reported fasting and occasional insulin usage leading to normalization of blood sugars.    Denies CP, SOB, N/V diarrhea since last visit. He has had poorly healing sores on dorsal hands (red, non-itchy) bilaterally that he attributes to his cat or being outdoors in the woods.    Pt has had difficulty maintaining his T2D. He had a onychomycosis and ulcer procedure at the podiatrist 4/29/23. Since then, he has found it difficult to exercise and keep up with his usual routine. He has, however, lost 26 pounds since 10/9/23.      PMHX:   Past Medical History:   Diagnosis Date    Acute pancreatitis     Diverticulosis of colon     Essential hypertension     Fatty liver     Gastric varices without bleeding 1/16/2020    Glaucomatous optic atrophy, right     Gout     Hx of acute pancreatitis 3/3/2017    Hypertriglyceridemia 8/2/2017    Obesity (BMI 30-39.9) 6/5/2015    Obesity (BMI 35.0-39.9 without comorbidity) 6/5/2015    Obstructive sleep apnea syndrome 12/18/2015    Type 2 diabetes mellitus with diabetic polyneuropathy, with long-term current use of insulin 10/16/2017    Type 2 diabetes mellitus with hyperglycemia, with long-term current use of insulin 8/3/2017       PSHX:   Past Surgical History:   Procedure Laterality Date    DEBRIDEMENT OF FOOT Right 4/21/2023    Procedure: DEBRIDEMENT, FOOT;  Surgeon: Anju Melvin DPM;  [facility];   Service: Podiatry;  Laterality: Right;    ENDOSCOPIC ULTRASOUND OF UPPER GASTROINTESTINAL TRACT N/A 5/27/2019    Procedure: ULTRASOUND, UPPER GI TRACT, ENDOSCOPIC;  Surgeon: Zafar Velazquez MD;  Location: [facility] ENDO (2ND FLR);  Service: Endoscopy;  Laterality: N/A;    ERCP N/A 5/27/2019    Procedure: ERCP (ENDOSCOPIC RETROGRADE CHOLANGIOPANCREATOGRAPHY);  Surgeon: Zafar Velazquez MD;  [facility]  Service: Endoscopy;  Laterality: N/A;    ESOPHAGOGASTRODUODENOSCOPY      ESOPHAGOGASTRODUODENOSCOPY N/A 1/31/2020    Procedure: EGD (ESOPHAGOGASTRODUODENOSCOPY);  Surgeon: Zafar Velazquez MD;  Location: [facility];  Service: Endoscopy;  Laterality: N/A;    PLANTAR FASCIA RELEASE Right 4/21/2023    Procedure: RELEASE, FASCIA, PLANTAR;  Surgeon: Anju Melvin DPM;  Location: [facility] OR;  Service: Podiatry;  Laterality: Right;    RESECTION OF GASTROCNEMIUS MUSCLE Right 4/21/2023    Procedure: RESECTION, MUSCLE, GASTROCNEMIUS;  Surgeon: Anju Melvin DPM;  Location: [facility] OR;  Service: Podiatry;  Laterality: Right;       FAMHX:   Family History   Problem Relation Age of Onset    Aneurysm Mother         AAA    Coronary artery disease Father         4 vessel bypass at 40, possible stent at 36    Diabetes type II Father     No Known Problems Sister     No Known Problems Brother     Aneurysm Maternal Grandmother         Possible AAA    Diabetes type II Paternal Grandmother        SOCHX:   Social History     Socioeconomic History    Marital status:    Tobacco Use    Smoking status: Never     Passive exposure: Never    Smokeless tobacco: Never   Substance and Sexual Activity    Alcohol use: No    Drug use: Never    Sexual activity: Not Currently     Partners: Female     Social Determinants of Health     Financial Resource Strain: Unknown (10/25/2023)    Overall Financial Resource Strain (CARDIA)     Difficulty of Paying Living Expenses: Patient refused   Food Insecurity: Unknown (10/25/2023)    Hunger Vital Sign      Worried About Running Out of Food in the Last Year: Patient refused     Ran Out of Food in the Last Year: Patient refused   Transportation Needs: Unknown (10/25/2023)    PRAPARE - Transportation     Lack of Transportation (Medical): Patient refused     Lack of Transportation (Non-Medical): Patient refused   Physical Activity: Unknown (10/25/2023)    Exercise Vital Sign     Days of Exercise per Week: Patient refused     Minutes of Exercise per Session: 40 min   Recent Concern: Physical Activity - Insufficiently Active (10/10/2023)    Exercise Vital Sign     Days of Exercise per Week: 2 days     Minutes of Exercise per Session: 40 min   Stress: Unknown (10/25/2023)    [employer] - Occupational Stress Questionnaire     Feeling of Stress : Patient refused   Social Connections: Unknown (10/25/2023)    Social Connection and Isolation Panel [NHANES]     Frequency of Communication with Friends and Family: Patient refused     Frequency of Social Gatherings with Friends and Family: Patient refused     Active Member of Clubs or Organizations: Patient refused     Attends Club or Organization Meetings: Patient refused     Marital Status: Patient refused   Housing Stability: Unknown (10/25/2023)    Housing Stability Vital Sign     Unable to Pay for Housing in the Last Year: Patient refused     Number of Places Lived in the Last Year: 1     Unstable Housing in the Last Year: Patient refused   Recent Concern: Housing Stability - High Risk (10/10/2023)    Housing Stability Vital Sign     Unable to Pay for Housing in the Last Year: Yes     Number of Places Lived in the Last Year: 1       ALLERGIES: Review of patient's allergies indicates:  No Known Allergies    MEDS:   Current Outpatient Medications:     allopurinoL (ZYLOPRIM) 300 MG tablet, Take 1 tablet (300 mg total) by mouth once daily., Disp: 90 tablet, Rfl: 3    aspirin (ECOTRIN) 81 MG EC tablet, Take 81 mg by mouth once daily., Disp: , Rfl:      atorvastatin (LIPITOR) 40 MG tablet, Take 1 tablet (40 mg total) by mouth once daily., Disp: 90 tablet, Rfl: 3    ciclopirox (PENLAC) 8 % Soln, Apply topically nightly., Disp: 6.6 mL, Rfl: 6    clotrimazole (LOTRIMIN) 1 % Soln, Apply topically to affected area 2 (two) times daily., Disp: 15 mL, Rfl: 0    fenofibrate 160 MG Tab, Take 1 tablet (160 mg total) by mouth once daily., Disp: 90 tablet, Rfl: 3    icosapent ethyL (VASCEPA) 1 gram Cap, Take 2 capsules (2 g total) by mouth 2 (two) times daily., Disp: 360 capsule, Rfl: 3    insulin (LANTUS SOLOSTAR U-100 INSULIN) glargine 100 units/mL SubQ pen, Inject 60 Units into the skin 2 (two) times a day., Disp: 100 mL, Rfl: 3    insulin aspart U-100 (NOVOLOG FLEXPEN U-100 INSULIN) 100 unit/mL (3 mL) InPn pen, Inject 34 Units into the skin before breakfast AND 34 Units daily with lunch AND 36 Units before dinner. Plus correction scale. Max TDD 100units.., Disp: 70 mL, Rfl: 6    losartan (COZAAR) 100 MG tablet, Take ½ tablet (50 mg total) by mouth once daily., Disp: 45 tablet, Rfl: 1    metFORMIN (GLUCOPHAGE) 500 MG tablet, Take 2 tablets (1,000 mg total) by mouth 2 (two) times daily with meals., Disp: 360 tablet, Rfl: 3    mupirocin (BACTROBAN) 2 % ointment, Apply topically 3 (three) times daily., Disp: 15 g, Rfl: 1    niacin 100 MG Tab, Take 2 tablets (200 mg total) by mouth every evening., Disp: 90 tablet, Rfl: 1    ondansetron (ZOFRAN) 4 MG tablet, Take 1 tablet (4 mg total) by mouth every 6 (six) hours as needed for Nausea., Disp: 30 tablet, Rfl: 1    pantoprazole (PROTONIX) 40 MG tablet, Take 1 tablet (40 mg total) by mouth once daily., Disp: 90 tablet, Rfl: 1    pregabalin (LYRICA) 150 MG capsule, Take 1 capsule (150 mg total) by mouth 2 (two) times daily., Disp: 60 capsule, Rfl: 5    blood sugar diagnostic (CONTOUR NEXT TEST STRIPS) Strp, To test glucose 4 times a day., Disp: 200 each, Rfl: 11    blood-glucose sensor (DEXCOM G7 SENSOR) Isha, Change sensor every 10  "days., Disp: 3 each, Rfl: 3    blood-glucose sensor (FREESTYLE DEBBIE 3 SENSOR) Isha, Change every 14 days., Disp: 6 each, Rfl: 3    HYDROcodone-acetaminophen (NORCO) 5-325 mg per tablet, Take 1 tablet by mouth every 6 (six) hours as needed for Pain. (Patient not taking: Reported on 10/25/2023), Disp: 20 tablet, Rfl: 0    OBJECTIVE:   Vitals:    10/25/23 0834 10/25/23 0916   BP: (!) 158/70 (!) 148/80   BP Location: Left arm    Patient Position: Sitting    BP Method: Large (Manual)    Pulse: 91    SpO2: 96%    Weight: 103.9 kg (229 lb 2.7 oz)    Height: 5' 8" (1.727 m)      Body mass index is 34.84 kg/m².      Physical Exam  Vitals and nursing note reviewed.   Constitutional:       Appearance: Normal appearance.   HENT:      Head: Normocephalic and atraumatic.   Eyes:      General: No scleral icterus.     Extraocular Movements: Extraocular movements intact.   Pulmonary:      Effort: Pulmonary effort is normal.   Neurological:      Mental Status: He is alert.           LABS:   A1C:  Recent Labs   Lab 10/09/23  0706   Hemoglobin A1C 11.1 H     CBC:  Recent Labs   Lab 01/16/23  0551   WBC 8.58   RBC 5.04   Hemoglobin 13.7 L   Hematocrit 40.7   Platelets 226   MCV 81 L   MCH 27.2   MCHC 33.7     CMP:  Recent Labs   Lab 10/09/23  0706   Glucose 370 H   Calcium 9.7   Albumin 4.3   Total Protein 8.4   Sodium 137   Potassium 4.4   CO2 30 H   Chloride 97   BUN 20   Creatinine 0.96   Alkaline Phosphatase 98   ALT 32   AST 30   Total Bilirubin 0.7     LIPIDS:  Recent Labs   Lab 01/19/22  1622 06/02/23  0649   TSH  --  2.380   HDL 27 L  --    Cholesterol 120  --    Triglycerides 283 H  --    LDL Cholesterol 36.4 L  --    HDL/Cholesterol Ratio 22.5  --    Non-HDL Cholesterol 93  --    Total Cholesterol/HDL Ratio 4.4  --      TSH:  Recent Labs   Lab 06/02/23  0649   TSH 2.380         ASSESSMENT & PLAN:    Bay Ibarra is a 42 YO with PMH of poorly controlled T2D in addition to HTN and HLD.    HTN  - 148/80 today  - Continue " regimen  - Goal <130/80    HLD  - Continue regimen  - Lipid panel overdue, today    T2D  - Continue regimen  - Last A1c 11.1  - A1c < 7% goal  - A1c today    Preventives  - Flu today  - Eye exam overdue, please schedule at your convenience  - Lipid panel today    Problem List Items Addressed This Visit          Cardiac/Vascular    Essential hypertension (Chronic)    Overview     Elevated in office. Discussed home monitoring. Will f/u via portal          Mixed hyperlipidemia - Primary (Chronic)    Overview     Stable. Continue statin         Relevant Orders    Lipid Panel       Endocrine    Type 2 diabetes mellitus with diabetic polyneuropathy, with long-term current use of insulin (Chronic)    Overview     Uncontrolled   Continue current regimen   Followed by Endocrine  Advised that he will need regular and close f/u for his diabetes         Type 2 diabetes mellitus with hyperglycemia, with long-term current use of insulin    Relevant Orders    Hemoglobin A1C    Lipid Panel    Ambulatory referral/consult to Optometry         Follow up in about 3 months (around 1/25/2024) for diabetes, blood pressure.    Note prepared by Nadege Harding, MS4    RTC/ED precautions discussed where applicable.   Encouraged patient to let me know if there are any further questions/concerns.     Advise patient/caretaker to check with insurance regarding orders to avoid unexpected fees/costs.     The patient/caretaker indicates understanding of these issues and agrees with the plan.    I hereby acknowledge that I am relying upon documentation authored by a medical student working under my supervision and further I hereby attest that I have verified the student documentation or findings by personally re-performing the physical exam and medical decision making activities of the Evaluation and Management service to be billed.    Dr. Funmilayo Taylor MD  Family Medicine

## 2023-10-27 NOTE — PROGRESS NOTES
Diabetes Care Specialist Virtual Visit Note   The patient location is: Home in [place]  The chief complaint leading to consultation is: Diabetes  Visit type: audiovisual  Total time spent with patient: 30 min   Each patient to whom he or she provides medical services by telemedicine is:  (1) informed of the relationship between the physician and patient and the respective role of any other health care provider with respect to management of the patient; and (2) notified that he or she may decline to receive medical services by telemedicine and may withdraw from such care at any time.     Diabetes Care Specialist Follow-up Note  Author: Ruma Scruggs RN, CDE  Date: 10/25/2023    Program Intake  Reason for Diabetes Program Visit:: Intervention  Type of Intervention:: Individual  Individual: Education  Education: Nutrition and Meal Planning, Pattern Management    Lab Results   Component Value Date    LABA1C 8.8 (H) 01/12/2018    HGBA1C 11.1 (H) 10/09/2023       Clinical    Problem Review  Reviewed Problem List with Patient: yes  Active comorbidities affecting diabetes self-care.: yes  Comorbidities: Hypertension  Reviewed health maintenance: yes    Clinical Assessment  Have you ever experienced hypoglycemia (low blood sugar)?: no  Have you ever experienced hyperglycemia (high blood sugar)?: yes  In the last month, how often have you experienced high blood sugar?: more than once a day  Are you able to tell when your blood sugar is high?: Yes  Have you ever been hospitalized because your blood sugar was high?: no    Nutritional Status  Meal Plan 24 Hour Recall: Breakfast, Lunch, Dinner, Snack  Meal Plan 24 Hour Recall - Breakfast: 3 egg omelet with ham and cheese  Meal Plan 24 Hour Recall - Lunch: leftovers today stuffed bell pepper  Meal Plan 24 Hour Recall - Dinner: sandwich or beans and rice  Meal Plan 24 Hour Recall - Snack: rarely - nuts or cheeze its    Additional Social History    Access to [employer]  Technology  Does the patient have access to any of the following devices or technologies?: Smart phone, Home computer, Internet Access    Health Literacy  Preferred Learning Method: Face to Face, Reading Materials, Demonstration      Diabetes Self-Management Skills Assessment     Diabetes Disease Process/Treatment Options  Patient/caregiver able to state what happens when someone has diabetes.: yes  Patient/caregiver knows what type of diabetes they have.: yes  Diabetes Type : Type II  Assessment indicates:: Knowledge deficit  Area of need?: Yes    Nutrition/Healthy Eating  Challenges to healthy eating:: portion control  Method of carbohydrate measurement:: no method  Patient can identify foods that impact blood sugar.: yes  Patient-identified foods:: starchy vegetables (corn, peas, beans), sweets, starches (bread, pasta, rice, cereal), fruit/fruit juice  Assessment indicates:: Instruction Needed, Knowledge deficit  Area of need?: Yes    Physical Activity/Exercise  Patient's daily activity level:: moderately active  Patient formally exercises outside of work.: no  Reasons for not exercising:: work schedule  Patient can identify forms of physical activity.: yes  Stated forms of physical activity:: any movement performed by muscles that uses energy, manual activity at work  Assessment indicates:: Adequate understanding  Area of need?: No    Medications  Patient is able to describe current diabetes management routine.: yes  Diabetes management routine:: insulin  Patient is able to identify current diabetes medications, dosages, and appropriate timing of medications.: yes  Patient understands the purpose of the medications taken for diabetes.: yes  Patient reports problems or concerns with current medication regimen.: yes  Medication regimen problems/concerns:: other (see comments) (missed some Lantus doses because of illness)  Assessment indicates:: Instruction Needed, Knowledge deficit  Area of need?: Yes    Home  Blood Glucose Monitoring  Patient states that blood sugar is checked at home daily.: yes  Monitoring Method:: personal continuous glucose monitor  Personal CGM type:: Dexcom G7  Patient is able to use personal CGM appropriately.: yes  Assessment indicates:: Adequate understanding  Area of need?: No    Acute Complications  Able to state the blood sugar range for hypoglycemia?: yes  Patient can identify general symptoms of hypoglycemia: yes  Patient identified:: shakiness  Able to state proper treatment of hypoglycemia?: yes  Patient identified:: 1/2 can soda/fruit juice, 5-6 pieces of hard candy  Assessment indicates:: Knowledge deficit  Area of need?: Yes    Chronic Complications  Deferred due to:: Time    Psychosocial/Coping  Deffered due to:: Time      During today's follow-up visit,  the following areas required further assessment and content was provided/reviewed.    Based on today's diabetes care assessment, the following areas of need were identified:          10/11/2023    12:01 AM   Social   Support No   Access to Mass Media/Tech No   Cognitive/Behavioral Health No   Communication No   Health Literacy No            10/11/2023    12:01 AM   Clinical   Nutritional Status No            10/25/2023    12:02 AM   Diabetes Self-Management Skills   Nutrition/Healthy Eating Yes, see care planning   Medication Yes, He is taking his insulin as instructed        Today's interventions were provided through individual discussion, instruction, and written materials were provided.    Patient verbalized understanding of instruction and written materials.  Pt was able to return back demonstration of instructions today. Patient understood key points, needs reinforcement and further instruction.     Diabetes Self-Management Care Plan Review and Evaluation of Progress:    During today's follow-up Bay's Diabetes Self-Management Care Plan progress was reviewed and progress was evaluated including his/her input. Bay has agreed  to continue his/her journey to improve/maintain overall diabetes control by continuing to set health goals. See care plan progress below.      Care Plan: Diabetes Management   Updates made since 9/27/2023 12:00 AM        Problem: Healthy Eating         Goal: Eat 2-3 meals daily with 30-45g/2-3 servings of Carbohydrate per meal. Limit snacking in between meal to 1 serving (15 grams).    Start Date: 10/11/2023   Expected End Date: 11/15/2023   This Visit's Progress: Not met   Priority: Medium   Barriers: Knowledge deficit   Note:    Reviewed meal planning and general nutritional counseling with regards to diabetes management. Typical food intake obtained from patient. Patient currently is not measuring foods or carb counting. Admits the problem is portion sizes of meals.    We concentrated on portion sizes at today's session.  Encouraged increased consumption of non-starchy veggies to diet.  Overall meal planning and making better choices for meals. Encouraged to avoid sources of sugar in diet.  Written education information provided to patient for use at home.     Update to care planning 10/25/23:  Pt seen for f/u regarding nutrition.  Still not measuring portion sizes.  Struggles with stress eating.  Per Dexcom elevated noted most notably after meals.  Discussed the importance of portion control with carbs and suggested staying away from fast foods.  Went to [employer] and consumed about 10 soft tacos.  Explained that in the absence of food his numbers are pretty stable.  Advised that MD will need to change or increase his insulin if changes to diet do not help.  States will work on this.        Task: Reviewed the sources and role of Carbohydrate, Protein, and Fat and how each nutrient impacts blood sugar. Completed 10/12/2023        Task: Provided visual examples using dry measuring cups, food models, and other familiar objects such as computer mouse, deck or cards, tennis ball etc. to help with visualization of  portions. Completed 10/12/2023        Task: Explained how to count carbohydrates using the food label and the use of dry measuring cups for accurate carb counting. Completed 10/12/2023        Task: Discussed strategies for choosing healthier menu options when dining out. Completed 10/12/2023       Task: Review the importance of balancing carbohydrates with each meal using portion control techniques to count servings of carbohydrate and label reading to identify serving size and amount of total carbs per serving. Completed 10/12/2023        Task: Provided Sample plate method and reviewed the use of the plate to estimate amounts of carbohydrate per meal. Completed 10/12/2023        Problem: Medications         Goal: Patient Agrees to take Diabetes Medication(s) as prescribed.    Start Date: 10/11/2023   Expected End Date: 10/26/2023   Priority: High   Barriers: No Barriers Identified   Note:    Reviewed current insulin regime.  Educated on the use of U500 insulin.  Explained it is a concentrated insulin which requires less volume.  Typically given 3 times a day.  MOA explained.  Patient is willing to try.  Sending note to provider to order.  Reviewed side effects of all insulin particularly hypoglycemia.        Task: Reviewed with patient all current diabetes medications and provided basic review of the purpose, dosage, frequency, side effects, and storage of both oral and injectable diabetes medications. Completed 10/12/2023       Task: Discussed guidelines for preventing, detecting and treating hypoglycemia and hyperglycemia and reviewed the importance of meal and medication timing with diabetes mediations for prevention of hypoglycemia and maximum drug benefit. Completed 10/12/2023          Follow Up Plan     F/u on 11/15 to review dietary changes.     Today's care plan and follow up schedule was discussed with patient.  Bay verbalized understanding of the care plan, goals, and agrees to follow up plan.         The patient was encouraged to communicate with his/her health care provider/physician and care team regarding his/her condition(s) and treatment.  I provided the patient with my contact information today and encouraged to contact me via phone or [facility] Patient Portal as needed.     Length of Visit   Total Time: 30 Minutes

## 2023-10-30 ENCOUNTER — PATIENT MESSAGE (OUTPATIENT)
Dept: ENDOCRINOLOGY | Facility: CLINIC | Age: 43
End: 2023-10-30
Payer: COMMERCIAL

## 2023-11-13 DIAGNOSIS — E11.42 TYPE 2 DIABETES MELLITUS WITH DIABETIC POLYNEUROPATHY, WITH LONG-TERM CURRENT USE OF INSULIN: Chronic | ICD-10-CM

## 2023-11-13 DIAGNOSIS — Z79.4 TYPE 2 DIABETES MELLITUS WITH DIABETIC POLYNEUROPATHY, WITH LONG-TERM CURRENT USE OF INSULIN: Chronic | ICD-10-CM

## 2023-11-13 RX ORDER — METFORMIN HYDROCHLORIDE 500 MG/1
1000 TABLET ORAL 2 TIMES DAILY WITH MEALS
Qty: 360 TABLET | Refills: 3 | Status: SHIPPED | OUTPATIENT
Start: 2023-11-13 | End: 2024-11-12

## 2023-12-07 DIAGNOSIS — K29.00 ACUTE SUPERFICIAL GASTRITIS WITHOUT HEMORRHAGE: ICD-10-CM

## 2023-12-07 NOTE — TELEPHONE ENCOUNTER
No care due was identified.  [facility] Embedded Care Due Messages. Reference number: [id number].   12/07/2023 1:44:30 PM CST

## 2023-12-08 RX ORDER — PANTOPRAZOLE SODIUM 40 MG/1
40 TABLET, DELAYED RELEASE ORAL DAILY
Qty: 90 TABLET | Refills: 3 | Status: ON HOLD | OUTPATIENT
Start: 2023-12-08 | End: 2024-02-18

## 2023-12-08 NOTE — TELEPHONE ENCOUNTER
Refill Decision Note   Bay Stacey  is requesting a refill authorization.  Brief Assessment and Rationale for Refill:  Approve     Medication Therapy Plan:       Medication Reconciliation Completed: No   Comments:     No Care Gaps recommended.     Note composed:12:29 PM 12/08/2023

## 2023-12-25 DIAGNOSIS — I10 ESSENTIAL HYPERTENSION: ICD-10-CM

## 2023-12-26 RX ORDER — LOSARTAN POTASSIUM 100 MG/1
50 TABLET ORAL DAILY
Qty: 45 TABLET | Refills: 1 | Status: SHIPPED | OUTPATIENT
Start: 2023-12-26

## 2023-12-26 NOTE — TELEPHONE ENCOUNTER
Care Due:                  Date            Visit Type   Department     Provider  --------------------------------------------------------------------------------                                EP -                              PRIMARY      [facility]  Last Visit: 10-      [facility]  Next Visit: None Scheduled  None         None Found                                                            Last  Test          Frequency    Reason                     Performed    Due Date  --------------------------------------------------------------------------------    CBC.........  12 months..  allopurinoL, fenofibrate.  01- 01-    Lipid Panel.  12 months..  atorvastatin,              01-   10-                             fenofibrate, icosapent...    Uric Acid...  12 months..  allopurinoL..............  Not Found    Overdue    [employer] Embedded Care Due Messages. Reference number: [id number].   12/25/2023 11:36:01 PM CST

## 2023-12-29 NOTE — PROGRESS NOTES
Subjective:      Patient ID: Bay Ibarra is a 43 y.o. male.    Chief Complaint:  No chief complaint on file.    History of Present Illness  Bay Ibarra is here for follow up of DM.  Previously seen by me on 10/2023.   This is a MyChart video visit.    The patient location is: [place]  The chief complaint leading to consultation is: DM  Visit type: Virtual visit with synchronous audio and video  Total time spent with patient: see below  Each patient to whom he or she provides medical services by telemedicine is:  (1) informed of the relationship between the physician and patient and the respective role of any other health care provider with respect to management of the patient; and (2) notified that he or she may decline to receive medical services by telemedicine and may withdraw from such care at any time.    With regards to diabetes:    3/2021  Cpeptide 1.25 with glucose 315    Diagnosed: 2009  Education - last visit: 10/2023  FH: father, mother, paternal grandfather   2 siblings - denies DM   Known complications:  DKA + 11/2020, 1/2023  RN -  Eye Exam: 7/2022 - needs to make an appointment    PN +  Podiatry: 10/2023  Nephropathy -  CAD -  Hx pancreatitis - 2013 and 2x in 2017.   Diabetic foot ulcer R greater toe - following with Podiatry     Diet/Exercise: admits that his diet had been poor   Eats 3 meals a day.   Snacks : yes before bed (2 hours prior) - crackers   Drinks : coffee, water, unsweet tea, has cut back on diet soft drinks   Exercise - tries to stay active.   Recent illness, injury, steroids: FLU within the last 3 months      Current Regimen:  Metformin 1000mg twice daily   Lantus 60 units twice daily  Novolog 34units plus correction scale before meals for breakfast and lunch AND Novolog 36units plus correction scale for dinner  Add correction scale as needed.   Blood sugar 150 to 200 add 2 unit   Blood sugar 201 to 250 add 4 units   Blood sugar 251 to 300 add 6 units   Blood sugar 301  "to 350 add 9 units   Blood sugar greater than 350 add 10 units    Reports compliance.     Other medications tried:  Jardiance - stopped in February 2020 due to DKA   MFM  DPP4  "Other orals"    Glucose Monitor: Dexcom G7 - issues with it falling off - most recent data is from October.   6 times a day testing  Log reviewed: has not used CGM in ~ 2 months   Resumed SMBG checks but only checking 1 time a day.  Lunch: 190-210    Hypoglycemia:  Reports one episode of symptoms overnight.   Knows how to correct with 15 grams of carbs- juice, coke, or a peppermint.      Diabetes Management Status    Hemoglobin A1C   Date Value Ref Range Status   01/03/2024 9.8 (H) 4.0 - 5.6 % Final     Comment:     ADA Screening Guidelines:  5.7-6.4%  Consistent with prediabetes  >or=6.5%  Consistent with diabetes    High levels of fetal hemoglobin interfere with the HbA1C  assay. Heterozygous hemoglobin variants (HbS, HgC, etc)do  not significantly interfere with this assay.   However, presence of multiple variants may affect accuracy.     10/09/2023 11.1 (H) 4.0 - 5.6 % Final     Comment:     ADA Screening Guidelines:  5.7-6.4%  Consistent with prediabetes  >or=6.5%  Consistent with diabetes    High levels of fetal hemoglobin interfere with the HbA1C  assay. Heterozygous hemoglobin variants (HbS, HgC, etc)do  not significantly interfere with this assay.   However, presence of multiple variants may affect accuracy.     10/09/2023 11.2 (H) 4.0 - 5.6 % Final     Comment:     ADA Screening Guidelines:  5.7-6.4%  Consistent with prediabetes  >or=6.5%  Consistent with diabetes    High levels of fetal hemoglobin interfere with the HbA1C  assay. Heterozygous hemoglobin variants (HbS, HgC, etc)do  not significantly interfere with this assay.   However, presence of multiple variants may affect accuracy.         Statin: Taking  ACE/ARB: Taking  Screening or Prevention Patient's value Goal Complete/Controlled?   HgA1C Testing and Control   Lab " "Results   Component Value Date    HGBA1C 9.8 (H) 01/03/2024      Annually/Less than 8% No   Lipid profile : 01/03/2024 Annually Yes   LDL control Lab Results   Component Value Date    LDLCALC Invalid, Trig>400.0 01/03/2024    Annually/Less than 100 mg/dl  Yes   Nephropathy screening Lab Results   Component Value Date    LABMICR 70.0 10/09/2023    LABMICR 70.0 10/09/2023    LABMICR 72.0 10/09/2023     Lab Results   Component Value Date    PROTEINUA 1+ (A) 01/15/2023    Annually Yes   Blood pressure BP Readings from Last 1 Encounters:   10/25/23 (!) 148/80    Less than 140/90 Yes   Dilated retinal exam : 07/27/2022 Annually Yes   Foot exam   : 01/25/2023 Annually Yes     Review of Systems  As above     There were no vitals taken for this visit.      There is no height or weight on file to calculate BMI.    Lab Review:   Lab Results   Component Value Date    HGBA1C 9.8 (H) 01/03/2024    HGBA1C 11.1 (H) 10/09/2023    HGBA1C 11.2 (H) 10/09/2023       Lab Results   Component Value Date    CHOL 108 (L) 01/03/2024    HDL 17 (L) 01/03/2024    LDLCALC Invalid, Trig>400.0 01/03/2024    TRIG 789 (H) 01/03/2024    CHOLHDL 15.7 (L) 01/03/2024     Lab Results   Component Value Date     10/09/2023    K 4.4 10/09/2023    CL 97 10/09/2023    CO2 30 (H) 10/09/2023     (H) 10/09/2023    BUN 20 10/09/2023    CREATININE 0.96 10/09/2023    CALCIUM 9.7 10/09/2023    PROT 8.4 10/09/2023    ALBUMIN 4.3 10/09/2023    BILITOT 0.7 10/09/2023    ALKPHOS 98 10/09/2023    AST 30 10/09/2023    ALT 32 10/09/2023    ANIONGAP 10 10/09/2023    ESTGFRAFRICA >60.0 03/01/2022    EGFRNONAA >60.0 03/01/2022    TSH 2.380 06/02/2023     No results found for: "[id number]"  Assessment and Plan     1. Type 1 diabetes mellitus with diabetic neuropathy  Hemoglobin A1C    Comprehensive Metabolic Panel            Type 1 diabetes mellitus with diabetic neuropathy  -- Labs in 3 months.  -- A1c goal <7.0%.  -- Medications discussed:  MFM  GLP1/DPP4i - " pancreatitis  SGLT2i - DKA  SFU  Insulin   -- Reviewed logs/CGM:  Only checking glucose 1 time a day.   Instructed to send glucose logs in 7 days.    Reach out to me sooner for any glucose <70 or consistently >200.  -- Issues with Dexcom G7 falling off. Recommended Skin Tac and to resume CGM.   -- Very high doses of insulin and limited on options due to co morbidities. Met with DE to discuss U500.   -- Encouraged dietary/ lifestyle changes.   -- Medication Changes:   Metformin 1000mg twice daily   Lantus 60 units twice daily  Novolog 34units plus correction scale before meals for breakfast and lunch AND Novolog 36units plus correction scale for dinner  Add correction scale as needed.   Blood sugar 150 to 200 add 2 unit   Blood sugar 201 to 250 add 4 units   Blood sugar 251 to 300 add 6 units   Blood sugar 301 to 350 add 9 units   Blood sugar greater than 350 add 10 units  -- Reviewed goals of therapy are to get the best control we can without hypoglycemia.  -- Reviewed patient's current insulin regimen. Clarified proper insulin dose and timing in relation to meals, etc. Insulin injection sites and proper rotation instructed.    -- Advised frequent self blood glucose monitoring.  Patient encouraged to document glucose results and bring them to every clinic visit.  -- Hypoglycemia precautions discussed. Instructed on precautions before driving.    -- Call for Bg repeatedly < 90 or > 180.   -- Close adherence to lifestyle changes recommended.   -- Periodic follow ups for eye evaluations, foot care and dental care suggested.          Follow up in about 3 months (around 4/4/2024).    I spent 30 minutes face-to-face with the patient, over half of the visit was spent on counseling and/or coordinating the care of the patient.    Counseling includes:  Diagnostic results, impressions, recommendations   Prognosis   Risk and benefits of management/treatment options   Instructions for management treatment and or follow-up    Importance of compliance with management   Risk factor reduction   Patient education

## 2024-01-04 ENCOUNTER — OFFICE VISIT (OUTPATIENT)
Dept: ENDOCRINOLOGY | Facility: CLINIC | Age: 44
End: 2024-01-04
Payer: COMMERCIAL

## 2024-01-04 ENCOUNTER — PATIENT MESSAGE (OUTPATIENT)
Dept: ENDOCRINOLOGY | Facility: CLINIC | Age: 44
End: 2024-01-04

## 2024-01-04 DIAGNOSIS — E10.40 TYPE 1 DIABETES MELLITUS WITH DIABETIC NEUROPATHY: Primary | ICD-10-CM

## 2024-01-04 PROCEDURE — 99214 OFFICE O/P EST MOD 30 MIN: CPT | Mod: 95,,, | Performed by: NURSE PRACTITIONER

## 2024-01-04 PROCEDURE — 1159F MED LIST DOCD IN RCRD: CPT | Mod: CPTII,95,, | Performed by: NURSE PRACTITIONER

## 2024-01-04 PROCEDURE — 1160F RVW MEDS BY RX/DR IN RCRD: CPT | Mod: CPTII,95,, | Performed by: NURSE PRACTITIONER

## 2024-01-04 PROCEDURE — 3046F HEMOGLOBIN A1C LEVEL >9.0%: CPT | Mod: CPTII,95,, | Performed by: NURSE PRACTITIONER

## 2024-01-04 NOTE — Clinical Note
VV 3 months with labs prior Orders Placed This Encounter     Hemoglobin A1C     Comprehensive Metabolic Panel

## 2024-01-04 NOTE — ASSESSMENT & PLAN NOTE
-- Labs in 3 months.  -- A1c goal <7.0%.  -- Medications discussed:  MFM  GLP1/DPP4i - pancreatitis  SGLT2i - DKA  SFU  Insulin   -- Reviewed logs/CGM:  Only checking glucose 1 time a day.   Instructed to send glucose logs in 7 days.    Reach out to me sooner for any glucose <70 or consistently >200.  -- Issues with Dexcom G7 falling off. Recommended Skin Tac and to resume CGM.   -- Very high doses of insulin and limited on options due to co morbidities. Met with DE to discuss U500.   -- Encouraged dietary/ lifestyle changes.   -- Medication Changes:   Metformin 1000mg twice daily   Lantus 60 units twice daily  Novolog 34units plus correction scale before meals for breakfast and lunch AND Novolog 36units plus correction scale for dinner  Add correction scale as needed.   Blood sugar 150 to 200 add 2 unit   Blood sugar 201 to 250 add 4 units   Blood sugar 251 to 300 add 6 units   Blood sugar 301 to 350 add 9 units   Blood sugar greater than 350 add 10 units  -- Reviewed goals of therapy are to get the best control we can without hypoglycemia.  -- Reviewed patient's current insulin regimen. Clarified proper insulin dose and timing in relation to meals, etc. Insulin injection sites and proper rotation instructed.    -- Advised frequent self blood glucose monitoring.  Patient encouraged to document glucose results and bring them to every clinic visit.  -- Hypoglycemia precautions discussed. Instructed on precautions before driving.    -- Call for Bg repeatedly < 90 or > 180.   -- Close adherence to lifestyle changes recommended.   -- Periodic follow ups for eye evaluations, foot care and dental care suggested.

## 2024-01-10 ENCOUNTER — PATIENT MESSAGE (OUTPATIENT)
Dept: FAMILY MEDICINE | Facility: CLINIC | Age: 44
End: 2024-01-10

## 2024-01-10 ENCOUNTER — PATIENT OUTREACH (OUTPATIENT)
Dept: ADMINISTRATIVE | Facility: HOSPITAL | Age: 44
End: 2024-01-10
Payer: COMMERCIAL

## 2024-01-10 ENCOUNTER — E-VISIT (OUTPATIENT)
Dept: FAMILY MEDICINE | Facility: CLINIC | Age: 44
End: 2024-01-10
Payer: COMMERCIAL

## 2024-01-10 DIAGNOSIS — M79.89 LEFT LEG SWELLING: ICD-10-CM

## 2024-01-10 DIAGNOSIS — R21 RASH: Primary | ICD-10-CM

## 2024-01-10 PROCEDURE — 99422 OL DIG E/M SVC 11-20 MIN: CPT | Mod: ,,, | Performed by: FAMILY MEDICINE

## 2024-01-10 RX ORDER — MUPIROCIN 20 MG/G
OINTMENT TOPICAL 3 TIMES DAILY
Qty: 66 G | Refills: 0 | Status: SHIPPED | OUTPATIENT
Start: 2024-01-10 | End: 2024-02-05

## 2024-01-10 NOTE — PROGRESS NOTES
Care Everywhere updates requested and reviewed.  Immunizations reconciled. Media reports reviewed.  Duplicate HM overrides and  orders removed.  Overdue HM topic chart audit and/or requested.  Overdue lab testing linked to upcoming lab appointments if applies.           Health Maintenance Due   Topic Date Due    Pneumococcal Vaccines (Age 0-64) (1 - PCV) Never done    Eye Exam  2023    Influenza Vaccine (1) 2023    COVID-19 Vaccine (2 - -24 season) 2023    Foot Exam  2024

## 2024-01-10 NOTE — PROGRESS NOTES
Patient ID: Bay Ibarra is a 43 y.o. male.    Chief Complaint: Rash (Entered automatically based on patient selection in Patient Portal.)    The patient initiated a request through [employer] on 1/10/2024 for evaluation and management with a chief complaint of Rash (Entered automatically based on patient selection in Patient Portal.)     I evaluated the questionnaire submission on 1/10/2024.    Ohs Peq Evisit Rash    1/10/2024  1:08 PM CST - Filed by Patient   Do you agree to participate in an E-Visit? Yes   If you have any of the following symptoms, please present to your local ER or call 911:  I acknowledge   What is the main issue that you would like for your doctor to address today? What i thought was a rash may be underskin bleeding   Are you able to take your vital signs? No   How would you describe your skin problem? Rash   When did your symptoms first appear? 1/9/2024   Where is it located?  Leg(s)   Does it itch? No   Does it hurt? Yes   Where is the pain located? Where the skin change is noted   The pain came on: Suddenly   The pain has the character of: Burning   Frequency of the pain (How often does it appear)? It is always there   Please select the face that most closely captures your pain level: 6   Is there discharge or drainage? No   Is there bleeding? No   Describe the character Flat   Describe the color Red   Has it changed over time? No change   Frequency of skin problem Seasonally   Duration of the skin problem (how long does it stay when it is present) Never goes away   I have had a new exposure to No new exposures   I have had a new exposure to No new exposures   What have you used to treat the skin problem? Nothing   If you have used anything for treatment, has it helped the symptoms? No   Other generalized symptoms that you associate with the rash No other symptoms   Provide any information you feel is important to your history not asked above    At least one photo is required for  treatment to be provided. You can upload a maximum of three photos of the affected area.           Recent Labs Obtained:  No visits with results within 7 Day(s) from this visit.   Latest known visit with results is:   Lab Visit on 01/03/2024   Component Date Value Ref Range Status    Hemoglobin A1C 01/03/2024 9.8 (H)  4.0 - 5.6 % Final    Comment: ADA Screening Guidelines:  5.7-6.4%  Consistent with prediabetes  >or=6.5%  Consistent with diabetes    High levels of fetal hemoglobin interfere with the HbA1C  assay. Heterozygous hemoglobin variants (HbS, HgC, etc)do  not significantly interfere with this assay.   However, presence of multiple variants may affect accuracy.      Estimated Avg Glucose 01/03/2024 235 (H)  68 - 131 mg/dL Final    Cholesterol 01/03/2024 108 (L)  120 - 199 mg/dL Final    Comment: The National Cholesterol Education Program (NCEP) has set the  following guidelines (reference ranges) for Cholesterol:  Optimal.....................<200 mg/dL  Borderline High.............200-239 mg/dL  High........................> or = 240 mg/dL      Triglycerides 01/03/2024 789 (H)  30 - 150 mg/dL Final    Comment: The National Cholesterol Education Program (NCEP) has set the  following guidelines (reference values) for triglycerides:  Normal......................<150 mg/dL  Borderline High.............150-199 mg/dL  High........................200-499 mg/dL      HDL 01/03/2024 17 (L)  40 - 75 mg/dL Final    Comment: The National Cholesterol Education Program (NCEP) has set the  following guidelines (reference values) for HDL Cholesterol:  Low...............<40 mg/dL  Optimal...........>60 mg/dL      LDL Cholesterol 01/03/2024 Invalid, Trig>400.0  63.0 - 159.0 mg/dL Final    Comment: The National Cholesterol Education Program (NCEP) has set the  following guidelines (reference values) for LDL Cholesterol:  Optimal.......................<130 mg/dL  Borderline High...............130-159  mg/dL  High..........................160-189 mg/dL  Very High.....................>190 mg/dL      HDL/Cholesterol Ratio 01/03/2024 15.7 (L)  20.0 - 50.0 % Final    Total Cholesterol/HDL Ratio 01/03/2024 6.4 (H)  2.0 - 5.0 Final    Non-HDL Cholesterol 01/03/2024 91  mg/dL Final    Comment: Risk category and Non-HDL cholesterol goals:  Coronary heart disease (CHD)or equivalent (10-year risk of CHD >20%):  Non-HDL cholesterol goal     <130 mg/dL  Two or more CHD risk factors and 10-year risk of CHD <= 20%:  Non-HDL cholesterol goal     <160 mg/dL  0 to 1 CHD risk factor:  Non-HDL cholesterol goal     <190 mg/dL         Encounter Diagnoses   Name Primary?    Rash Yes    Left leg swelling       Initially prescribed topical mupirocin for rash. Patient then noted increased swelling of leg. Attempted to call patient. Unable to reach him. Message sent via portal to advise he go to the ED to rule out DVT and evaluate in leg for worsening infection which may require oral or IV antibiotics.     No orders of the defined types were placed in this encounter.     Medications Ordered This Encounter   Medications    mupirocin (BACTROBAN) 2 % ointment     Sig: Apply topically 3 (three) times daily for 5 days     Dispense:  66 g     Refill:  0        Follow up if symptoms worsen or fail to improve.      E-Visit Time Tracking:    Day 1 Time (in minutes): 5  Day 2 Time (in minutes): 10     Total Time (in minutes): 15

## 2024-01-11 PROBLEM — L03.116 CELLULITIS OF LEFT LOWER EXTREMITY: Status: ACTIVE | Noted: 2024-01-11

## 2024-01-11 PROBLEM — N17.9 AKI (ACUTE KIDNEY INJURY): Status: RESOLVED | Noted: 2017-07-28 | Resolved: 2024-01-11

## 2024-01-17 ENCOUNTER — PATIENT MESSAGE (OUTPATIENT)
Dept: ADMINISTRATIVE | Facility: CLINIC | Age: 44
End: 2024-01-17
Payer: COMMERCIAL

## 2024-01-17 ENCOUNTER — PATIENT OUTREACH (OUTPATIENT)
Dept: ADMINISTRATIVE | Facility: CLINIC | Age: 44
End: 2024-01-17
Payer: COMMERCIAL

## 2024-01-17 NOTE — PROGRESS NOTES
C3 nurse attempted to contact Bay Ibarra for a TCC post hospital discharge follow up call. No answer or room to leave a voicemail on the pts phone or his father's phone. The patient has a scheduled followup with his PCP, Funmilayo Taylor MD on 1/24/24 at 0830. No messages routed at this time.

## 2024-01-24 ENCOUNTER — OFFICE VISIT (OUTPATIENT)
Dept: FAMILY MEDICINE | Facility: CLINIC | Age: 44
End: 2024-01-24
Payer: COMMERCIAL

## 2024-01-24 VITALS
OXYGEN SATURATION: 97 % | SYSTOLIC BLOOD PRESSURE: 148 MMHG | HEIGHT: 68 IN | BODY MASS INDEX: 35.02 KG/M2 | HEART RATE: 90 BPM | DIASTOLIC BLOOD PRESSURE: 62 MMHG | WEIGHT: 231.06 LBS

## 2024-01-24 DIAGNOSIS — E66.01 SEVERE OBESITY (BMI 35.0-39.9) WITH COMORBIDITY: ICD-10-CM

## 2024-01-24 DIAGNOSIS — E79.0 HYPERURICEMIA: ICD-10-CM

## 2024-01-24 DIAGNOSIS — E78.2 MIXED HYPERLIPIDEMIA: ICD-10-CM

## 2024-01-24 DIAGNOSIS — E11.42 TYPE 2 DIABETES MELLITUS WITH DIABETIC POLYNEUROPATHY, WITH LONG-TERM CURRENT USE OF INSULIN: Primary | Chronic | ICD-10-CM

## 2024-01-24 DIAGNOSIS — I10 ESSENTIAL HYPERTENSION: Chronic | ICD-10-CM

## 2024-01-24 DIAGNOSIS — Z79.4 TYPE 2 DIABETES MELLITUS WITH DIABETIC POLYNEUROPATHY, WITH LONG-TERM CURRENT USE OF INSULIN: Primary | Chronic | ICD-10-CM

## 2024-01-24 PROBLEM — L03.116 CELLULITIS OF LEFT LOWER EXTREMITY: Status: RESOLVED | Noted: 2024-01-11 | Resolved: 2024-01-24

## 2024-01-24 PROBLEM — E11.65 TYPE 2 DIABETES MELLITUS WITH HYPERGLYCEMIA, WITH LONG-TERM CURRENT USE OF INSULIN: Chronic | Status: ACTIVE | Noted: 2020-02-11

## 2024-01-24 PROBLEM — L97.522 DIABETIC ULCER OF TOE OF LEFT FOOT ASSOCIATED WITH TYPE 1 DIABETES MELLITUS, WITH FAT LAYER EXPOSED: Status: RESOLVED | Noted: 2021-01-04 | Resolved: 2024-01-24

## 2024-01-24 PROBLEM — K86.3 PANCREATIC PSEUDOCYST: Chronic | Status: ACTIVE | Noted: 2019-08-29

## 2024-01-24 PROBLEM — E11.621 DIABETIC ULCER OF LEFT GREAT TOE: Status: RESOLVED | Noted: 2020-11-27 | Resolved: 2024-01-24

## 2024-01-24 PROBLEM — E10.621 DIABETIC ULCER OF TOE OF LEFT FOOT ASSOCIATED WITH TYPE 1 DIABETES MELLITUS, WITH FAT LAYER EXPOSED: Status: RESOLVED | Noted: 2021-01-04 | Resolved: 2024-01-24

## 2024-01-24 PROBLEM — L97.529 DIABETIC ULCER OF LEFT GREAT TOE: Status: RESOLVED | Noted: 2020-11-27 | Resolved: 2024-01-24

## 2024-01-24 PROCEDURE — 3008F BODY MASS INDEX DOCD: CPT | Mod: CPTII,S$GLB,, | Performed by: FAMILY MEDICINE

## 2024-01-24 PROCEDURE — 1160F RVW MEDS BY RX/DR IN RCRD: CPT | Mod: CPTII,S$GLB,, | Performed by: FAMILY MEDICINE

## 2024-01-24 PROCEDURE — 1159F MED LIST DOCD IN RCRD: CPT | Mod: CPTII,S$GLB,, | Performed by: FAMILY MEDICINE

## 2024-01-24 PROCEDURE — 90471 IMMUNIZATION ADMIN: CPT | Mod: S$GLB,,, | Performed by: FAMILY MEDICINE

## 2024-01-24 PROCEDURE — 3078F DIAST BP <80 MM HG: CPT | Mod: CPTII,S$GLB,, | Performed by: FAMILY MEDICINE

## 2024-01-24 PROCEDURE — 3077F SYST BP >= 140 MM HG: CPT | Mod: CPTII,S$GLB,, | Performed by: FAMILY MEDICINE

## 2024-01-24 PROCEDURE — 99999 PR PBB SHADOW E&M-EST. PATIENT-LVL V: CPT | Mod: PBBFAC,,, | Performed by: FAMILY MEDICINE

## 2024-01-24 PROCEDURE — 99214 OFFICE O/P EST MOD 30 MIN: CPT | Mod: 25,S$GLB,, | Performed by: FAMILY MEDICINE

## 2024-01-24 PROCEDURE — 90677 PCV20 VACCINE IM: CPT | Mod: S$GLB,,, | Performed by: FAMILY MEDICINE

## 2024-01-24 PROCEDURE — 3046F HEMOGLOBIN A1C LEVEL >9.0%: CPT | Mod: CPTII,S$GLB,, | Performed by: FAMILY MEDICINE

## 2024-01-24 RX ORDER — FENOFIBRATE 160 MG/1
160 TABLET ORAL DAILY
Qty: 90 TABLET | Refills: 3 | Status: ON HOLD | OUTPATIENT
Start: 2024-01-24 | End: 2024-05-04

## 2024-01-24 RX ORDER — ICOSAPENT ETHYL 1 G/1
2 CAPSULE ORAL 2 TIMES DAILY
Qty: 360 CAPSULE | Refills: 3 | Status: SHIPPED | OUTPATIENT
Start: 2024-01-24

## 2024-01-24 RX ORDER — ALLOPURINOL 300 MG/1
300 TABLET ORAL DAILY
Qty: 90 TABLET | Refills: 3 | Status: SHIPPED | OUTPATIENT
Start: 2024-01-24

## 2024-01-24 NOTE — PROGRESS NOTES
PATIENT VISIT FAMILY MEDICINE    CC:   Chief Complaint   Patient presents with    Follow-up    Hypertension    Diabetes       HPI: Bay Ibarra  is a 43 y.o. male:    Patient is known to me.  Patient presents routine follow up on chronic conditions    Hospitalized last month for LLE cellulitis. Pain is improved. Still somewhat red. No fever since discharge. Last A1c was 9.8 earlier this month. He is using dexcom to monitor BG.     Novolog  34 U breakfast and lunch  36 U dinner    Lantus   60U BID    PMHX:   Past Medical History:   Diagnosis Date    Acute pancreatitis     Cellulitis of left lower extremity 01/11/2024    Diverticulosis of colon     Essential hypertension     Fatty liver     Gastric varices without bleeding 01/16/2020    Glaucomatous optic atrophy, right     Gout     Hx of acute pancreatitis 03/03/2017    Hypertriglyceridemia 08/02/2017    Obesity (BMI 30-39.9) 06/05/2015    Obesity (BMI 35.0-39.9 without comorbidity) 06/05/2015    Obstructive sleep apnea syndrome 12/18/2015    Type 2 diabetes mellitus with diabetic polyneuropathy, with long-term current use of insulin 10/16/2017    Type 2 diabetes mellitus with hyperglycemia, with long-term current use of insulin 08/03/2017       PSHX:   Past Surgical History:   Procedure Laterality Date    DEBRIDEMENT OF FOOT Right 4/21/2023    Procedure: DEBRIDEMENT, FOOT;  Surgeon: Anju Melvin DPM;  Location: [facility];  Service: Podiatry;  Laterality: Right;    ENDOSCOPIC ULTRASOUND OF UPPER GASTROINTESTINAL TRACT N/A 5/27/2019    Procedure: ULTRASOUND, UPPER GI TRACT, ENDOSCOPIC;  Surgeon: Zafar Velazquez MD;  Location: [address]);  Service: Endoscopy;  Laterality: N/A;    ERCP N/A 5/27/2019    Procedure: ERCP (ENDOSCOPIC RETROGRADE CHOLANGIOPANCREATOGRAPHY);  Surgeon: Zafar Velazquez MD;  Location: [facility] ([address] [place] [address]);  Service: Endoscopy;  Laterality: N/A;    ESOPHAGOGASTRODUODENOSCOPY      ESOPHAGOGASTRODUODENOSCOPY N/A 1/31/2020    Procedure:  EGD (ESOPHAGOGASTRODUODENOSCOPY);  Surgeon: Zafar Velazquez MD;  Location: [facility];  Service: Endoscopy;  Laterality: N/A;    PLANTAR FASCIA RELEASE Right 4/21/2023    Procedure: RELEASE, FASCIA, PLANTAR;  Surgeon: Anju Melvin DPM;  Location: [facility];  Service: Podiatry;  Laterality: Right;    RESECTION OF GASTROCNEMIUS MUSCLE Right 4/21/2023    Procedure: RESECTION, MUSCLE, GASTROCNEMIUS;  Surgeon: Anju Melvin DPM;  Location: [facility] OR;  Service: Podiatry;  Laterality: Right;       FAMHX:   Family History   Problem Relation Age of Onset    Aneurysm Mother         AAA    Coronary artery disease Father         4 vessel bypass at 40, possible stent at 36    Diabetes type II Father     No Known Problems Sister     No Known Problems Brother     Aneurysm Maternal Grandmother         Possible AAA    Diabetes type II Paternal Grandmother        SOCHX:   Social History     Socioeconomic History    Marital status:    Tobacco Use    Smoking status: Never     Passive exposure: Never    Smokeless tobacco: Never   Substance and Sexual Activity    Alcohol use: No    Drug use: Never    Sexual activity: Not Currently     Partners: Female     Social Determinants of Health     Financial Resource Strain: Low Risk  (1/13/2024)    Overall Financial Resource Strain (CARDIA)     Difficulty of Paying Living Expenses: Not very hard   Food Insecurity: Food Insecurity Present (1/13/2024)    Hunger Vital Sign     Worried About Running Out of Food in the Last Year: Sometimes true     Ran Out of Food in the Last Year: Sometimes true   Transportation Needs: No Transportation Needs (1/13/2024)    PRAPARE - Transportation     Lack of Transportation (Medical): No     Lack of Transportation (Non-Medical): No   Physical Activity: Insufficiently Active (1/4/2024)    Exercise Vital Sign     Days of Exercise per Week: 2 days     Minutes of Exercise per Session: 40 min   Stress: No Stress Concern Present (1/13/2024)    [employer] - Occupational Stress Questionnaire     Feeling of Stress : Only a little   Social Connections: Unknown (1/4/2024)    Social Connection and Isolation Panel [NHANES]     Frequency of Communication with Friends and Family: Once a week     Frequency of Social Gatherings with Friends and Family: Once a week     Active Member of Clubs or Organizations: No     Attends Club or Organization Meetings: Patient declined     Marital Status:    Housing Stability: Low Risk  (1/13/2024)    Housing Stability Vital Sign     Unable to Pay for Housing in the Last Year: No     Number of Places Lived in the Last Year: 1     Unstable Housing in the Last Year: No       ALLERGIES: Review of patient's allergies indicates:  No Known Allergies    MEDS:   Current Outpatient Medications:     aspirin (ECOTRIN) 81 MG EC tablet, Take 81 mg by mouth once daily., Disp: , Rfl:     atorvastatin (LIPITOR) 40 MG tablet, Take 1 tablet (40 mg total) by mouth once daily., Disp: 90 tablet, Rfl: 3    ciclopirox (PENLAC) 8 % Soln, Apply topically nightly., Disp: 6.6 mL, Rfl: 6    insulin (LANTUS SOLOSTAR U-100 INSULIN) glargine 100 units/mL SubQ pen, Inject 60 Units into the skin 2 (two) times a day., Disp: 100 mL, Rfl: 3    insulin aspart U-100 (NOVOLOG FLEXPEN U-100 INSULIN) 100 unit/mL (3 mL) InPn pen, Inject 34 Units into the skin before breakfast AND 34 Units daily with lunch AND 36 Units before dinner. Plus correction scale. Max TDD 100units.., Disp: 70 mL, Rfl: 6    losartan (COZAAR) 100 MG tablet, Take ½ tablet (50 mg total) by mouth once daily., Disp: 45 tablet, Rfl: 1    metFORMIN (GLUCOPHAGE) 500 MG tablet, Take 2 tablets (1,000 mg total) by mouth 2 (two) times daily with meals., Disp: 360 tablet, Rfl: 3    niacin 100 MG Tab, Take 2 tablets (200 mg total) by mouth every evening., Disp: 90 tablet, Rfl: 1    ondansetron (ZOFRAN) 4 MG tablet, Take 1 tablet (4 mg total) by mouth every 6 (six) hours as needed for Nausea.,  "Disp: 30 tablet, Rfl: 1    pantoprazole (PROTONIX) 40 MG tablet, Take 1 tablet (40 mg total) by mouth once daily., Disp: 90 tablet, Rfl: 3    pregabalin (LYRICA) 150 MG capsule, Take 1 capsule (150 mg total) by mouth 2 (two) times daily., Disp: 60 capsule, Rfl: 5    allopurinoL (ZYLOPRIM) 300 MG tablet, Take 1 tablet (300 mg total) by mouth once daily., Disp: 90 tablet, Rfl: 3    blood sugar diagnostic (CONTOUR NEXT TEST STRIPS) Strp, To test glucose 4 times a day., Disp: 200 each, Rfl: 11    blood-glucose sensor (DEXCOM G7 SENSOR) Isha, Change sensor every 10 days., Disp: 3 each, Rfl: 3    fenofibrate 160 MG Tab, Take 1 tablet (160 mg total) by mouth once daily., Disp: 90 tablet, Rfl: 3    icosapent ethyL (VASCEPA) 1 gram Cap, Take 2 capsules (2 g total) by mouth 2 (two) times daily., Disp: 360 capsule, Rfl: 3    OBJECTIVE:   Vitals:    01/24/24 0835   BP: (!) 148/62   BP Location: Right arm   Patient Position: Sitting   BP Method: Large (Manual)   Pulse: 90   SpO2: 97%   Weight: 104.8 kg (231 lb 0.7 oz)   Height: 5' 7.99" (1.727 m)     Body mass index is 35.14 kg/m².      Physical Exam  Vitals and nursing note reviewed.   Constitutional:       Appearance: Normal appearance.   HENT:      Head: Normocephalic.   Eyes:      General:         Right eye: No discharge.         Left eye: No discharge.      Extraocular Movements: Extraocular movements intact.   Cardiovascular:      Rate and Rhythm: Normal rate and regular rhythm.      Heart sounds: Normal heart sounds.   Pulmonary:      Effort: Pulmonary effort is normal.      Breath sounds: Normal breath sounds.   Skin:     Comments: No obvious rash on exposed skin   Neurological:      Mental Status: He is alert.   Psychiatric:         Behavior: Behavior normal.           LABS:   A1C:  Recent Labs   Lab 01/03/24  0629   Hemoglobin A1C 9.8 H     CBC:  Recent Labs   Lab 01/13/24  0532   WBC 4.49   RBC 4.48 L   Hemoglobin 11.8 L   Hematocrit 36.1 L   Platelets 167   MCV 81 L "   MCH 26.3 L   MCHC 32.7     CMP:  Recent Labs   Lab 01/11/24  1729 01/13/24  0532   Glucose 284 H 239 H   Calcium 9.4 8.8   Albumin 4.3  --    Total Protein 8.2  --    Sodium 141 138   Potassium 3.7 3.7   CO2 27 27   Chloride 99 101   BUN 16 14   Creatinine 1.16 1.02   Alkaline Phosphatase 114  --    ALT 68 H  --    AST 46  --    Total Bilirubin 0.9  --      LIPIDS:  Recent Labs   Lab 06/02/23  0649 01/03/24  0629   TSH 2.380  --    HDL  --  17 L   Cholesterol  --  108 L   Triglycerides  --  789 H   LDL Cholesterol  --  Invalid, Trig>400.0   HDL/Cholesterol Ratio  --  15.7 L   Non-HDL Cholesterol  --  91   Total Cholesterol/HDL Ratio  --  6.4 H     TSH:  Recent Labs   Lab 06/02/23  0649   TSH 2.380         ASSESSMENT & PLAN:    Problem List Items Addressed This Visit          Cardiac/Vascular    Mixed hyperlipidemia (Chronic)    Overview     TG high. Will repeat fasting with next labs. Continue current medications.          Relevant Medications    icosapent ethyL (VASCEPA) 1 gram Cap    fenofibrate 160 MG Tab       Endocrine    Type 2 diabetes mellitus with diabetic polyneuropathy, with long-term current use of insulin - Primary (Chronic)    Overview     Improved. Followed by endocrine.   Increase novolog to 36U TIDAC  Increase lantus to 63U BID         Severe obesity (BMI 35.0-39.9) with comorbidity    Overview     Body mass index is 35.14 kg/m².  The patient is asked to make an attempt to improve diet and exercise patterns to aid in medical management of this problem.              Orthopedic    Hyperuricemia- Stable. Continue current medical therapy      Relevant Medications    allopurinoL (ZYLOPRIM) 300 MG tablet   Essential hypertension - uncontrolled. F/u with home BP log and home cuff    Follow up in about 1 month (around 2/24/2024) for blood pressure, diabetes.      RTC/ED precautions discussed where applicable.   Encouraged patient to let me know if there are any further questions/concerns.     Advise  patient/caretaker to check with insurance regarding orders to avoid unexpected fees/costs.     The patient/caretaker indicates understanding of these issues and agrees with the plan.    Dr. Funmilayo Taylor MD  Family Medicine

## 2024-01-24 NOTE — PATIENT INSTRUCTIONS
INCREASE NOVOLO UNITS WITH ALL MEALS    INCREASE LANTUS  63 UNITS TWICE DAILY    Constipation:  Try docusate sodium (plain colace) twice daily  You can take Miralax 2-3 times a day  Also try a daily fiber supplement (benefiber or sugar free metamucil)

## 2024-02-12 ENCOUNTER — NURSE TRIAGE (OUTPATIENT)
Dept: ADMINISTRATIVE | Facility: CLINIC | Age: 44
End: 2024-02-12
Payer: COMMERCIAL

## 2024-02-12 ENCOUNTER — TELEPHONE (OUTPATIENT)
Dept: ADMINISTRATIVE | Facility: CLINIC | Age: 44
End: 2024-02-12
Payer: COMMERCIAL

## 2024-02-12 NOTE — PROGRESS NOTES
Symptoms: Vomiting, Abdominal Pain - Male  Outcome: Schedule an urgent appointment (within 4 hours) or talk to a nurse or provider within 30 minutes.  Reason: Vomited at least once in the past 8 hours. Transferred to triage nurse. Offered to schedule a F/U with PCP and patient declined.

## 2024-02-12 NOTE — TELEPHONE ENCOUNTER
Seen at ED, gastritis, using Zofran but not helping. Concerned as he is still vomiting. States vomit is pink. Cannot keep down any solid food. Triage done- dispo ED. Verb understanding.   Reason for Disposition   Vomiting red blood or black (coffee ground) material    Additional Information   Negative: Shock suspected (e.g., cold/pale/clammy skin, too weak to stand, low BP, rapid pulse)   Negative: Difficult to awaken or acting confused (e.g., disoriented, slurred speech)   Negative: Sounds like a life-threatening emergency to the triager    Protocols used: Vomiting-A-OH

## 2024-02-14 PROBLEM — E10.65 TYPE 1 DIABETES MELLITUS WITH HYPERGLYCEMIA: Status: ACTIVE | Noted: 2020-02-11

## 2024-02-14 PROBLEM — E11.43 DIABETIC GASTROPARESIS ASSOCIATED WITH TYPE 2 DIABETES MELLITUS: Status: ACTIVE | Noted: 2020-03-12

## 2024-02-14 PROBLEM — E10.43 DIABETIC GASTROPARESIS ASSOCIATED WITH TYPE 1 DIABETES MELLITUS: Status: ACTIVE | Noted: 2020-03-12

## 2024-02-14 PROBLEM — R10.10 UPPER ABDOMINAL PAIN: Status: ACTIVE | Noted: 2024-02-14

## 2024-02-14 PROBLEM — R73.9 HYPERGLYCEMIA: Status: ACTIVE | Noted: 2024-02-14

## 2024-02-14 PROBLEM — E66.2 CLASS 1 OBESITY WITH ALVEOLAR HYPOVENTILATION, SERIOUS COMORBIDITY, AND BODY MASS INDEX (BMI) OF 31.0 TO 31.9 IN ADULT: Status: ACTIVE | Noted: 2023-08-09

## 2024-02-14 PROBLEM — R11.2 NAUSEA AND VOMITING: Status: ACTIVE | Noted: 2024-02-14

## 2024-02-14 PROBLEM — E66.811 CLASS 1 OBESITY WITH ALVEOLAR HYPOVENTILATION, SERIOUS COMORBIDITY, AND BODY MASS INDEX (BMI) OF 31.0 TO 31.9 IN ADULT: Status: ACTIVE | Noted: 2023-08-09

## 2024-02-14 PROBLEM — R73.9 HYPERGLYCEMIA: Status: RESOLVED | Noted: 2024-02-14 | Resolved: 2024-02-14

## 2024-02-14 PROBLEM — K86.1 IDIOPATHIC CHRONIC PANCREATITIS: Status: ACTIVE | Noted: 2017-04-16

## 2024-02-18 PROBLEM — R11.2 NAUSEA AND VOMITING: Status: RESOLVED | Noted: 2024-02-14 | Resolved: 2024-02-18

## 2024-02-18 PROBLEM — R10.10 UPPER ABDOMINAL PAIN: Status: RESOLVED | Noted: 2024-02-14 | Resolved: 2024-02-18

## 2024-02-19 NOTE — SUBJECTIVE & OBJECTIVE
Body Location Override (Optional - Billing Will Still Be Based On Selected Body Map Location If Applicable): left distal dorsal forearm Interval History: NAEON. Denies pain or nausea. Continues to have loose BMs. Complaining of hunger, no other issues. NG clamped this AM, tolerating well so far.    Review of Systems  Objective:     Vital Signs (Most Recent):  Temp: 98.2 °F (36.8 °C) (08/03/17 0715)  Pulse: 95 (08/03/17 0715)  Resp: 17 (08/03/17 0715)  BP: (!) 145/70 (08/03/17 0715)  SpO2: (!) 93 % (08/03/17 0715) Vital Signs (24h Range):  Temp:  [98.2 °F (36.8 °C)-98.7 °F (37.1 °C)] 98.2 °F (36.8 °C)  Pulse:  [] 95  Resp:  [16-20] 17  SpO2:  [93 %-98 %] 93 %  BP: (134-160)/(70-83) 145/70     Weight: 112.1 kg (247 lb 2.2 oz)  Body mass index is 35.46 kg/m².    Intake/Output Summary (Last 24 hours) at 08/03/17 0751  Last data filed at 08/03/17 0511   Gross per 24 hour   Intake             2978 ml   Output             2350 ml   Net              628 ml      Physical Exam   Constitutional: He is oriented to person, place, and time. No distress.   Eyes: EOM are normal. Right eye exhibits no discharge. Left eye exhibits no discharge.   Neck: Normal range of motion.   Cardiovascular: Normal rate, regular rhythm, normal heart sounds and intact distal pulses.    No murmur heard.  Pulmonary/Chest: Effort normal. He has no wheezes. He has no rales.   Abdominal: He exhibits no distension. There is no tenderness. There is no guarding.   Hypoactive bowel sounds   Musculoskeletal: He exhibits no edema or deformity.   Neurological: He is alert and oriented to person, place, and time. No cranial nerve deficit.   Skin: Skin is warm and dry. He is not diaphoretic.       Significant Labs:   CBC:   Recent Labs  Lab 08/02/17  0401 08/03/17  0400   WBC 11.73 10.98   HGB 12.6* 12.7*   HCT 40.7 41.5    177     CMP:   Recent Labs  Lab 08/02/17  0401 08/02/17  1632 08/03/17  0400    140 145   K 3.3* 4.5 3.4*   CL 99 103 100   CO2 31* 22* 36*   * 208* 146*   BUN 17 17 16   CREATININE 0.7 0.8 0.7   CALCIUM 8.5* 8.6* 8.4*   PROT 5.7* 6.1 5.8*   ALBUMIN  1.9* 2.0* 1.9*   BILITOT 0.5 0.6 0.5   ALKPHOS 93 86 100   AST 42* 45* 32   * 106* 81*   ANIONGAP 12 15 9   EGFRNONAA >60.0 >60.0 >60.0     POCT Glucose:   Recent Labs  Lab 08/02/17  2116 08/02/17  2356 08/03/17  0511   POCTGLUCOSE 208* 165* 136*     All pertinent labs within the past 24 hours have been reviewed.    Significant Imaging: I have reviewed all pertinent imaging results/findings within the past 24 hours.   Previous Accession (Optional): [id number] Biopsy Photograph Reviewed: Yes Consent Type: Consent 1 (Standard) Eye Shield Used: No Surgeon Performing Repair (Optional): Ekaterina Initial Size Of Lesion: 2.2 X Size Of Lesion In Cm (Optional): 1.7 Number Of Stages: 1 Primary Defect Length In Cm (Final Defect Size - Required For Flaps/Grafts): 2.5 Primary Defect Width In Cm (Final Defect Size - Required For Flaps/Grafts): 2 Repair Type: Intermediate Layered Repair Which Eyelid Repair Cpt Are You Using?: 46888 Oculoplastic Surgeon Procedure Text (A): After obtaining clear surgical margins the patient was sent to oculoplastics for surgical repair.  The patient understands they will receive post-surgical care and follow-up from the referring physician's office. Otolaryngologist Procedure Text (A): After obtaining clear surgical margins the patient was sent to otolaryngology for surgical repair.  The patient understands they will receive post-surgical care and follow-up from the referring physician's office. Plastic Surgeon Procedure Text (A): After obtaining clear surgical margins the patient was sent to plastics for surgical repair.  The patient understands they will receive post-surgical care and follow-up from the referring physician's office. Mid-Level Procedure Text (A): After obtaining clear surgical margins the patient was sent to a mid-level provider for surgical repair.  The patient understands they will receive post-surgical care and follow-up from the mid-level provider. Provider Procedure Text (A): After obtaining clear surgical margins the defect was repaired by another provider. Asc Procedure Text (A): After obtaining clear surgical margins the patient was sent to an ASC for surgical repair.  The patient understands they will receive post-surgical care and follow-up from the ASC physician. Simple / Intermediate / Complex Repair - Final Wound Length In Cm: 6.5 Suturegard Retention Suture: 2-0 Nylon Retention Suture Bite Size: 3 mm Length To Time In Minutes Device Was In Place: 10 Undermining Type: Entire Wound Debridement Text: The wound edges were debrided prior to proceeding with the closure to facilitate wound healing. Helical Rim Text: The closure involved the helical rim. Vermilion Border Text: The closure involved the vermilion border. Nostril Rim Text: The closure involved the nostril rim. Retention Suture Text: Retention sutures were placed to support the closure and prevent dehiscence. Secondary Defect Length In Cm (Required For Flaps): 0 Area H Indication Text: Tumors in this location are included in Area H (eyelids, eyebrows, nose, lips, chin, ear, pre-auricular, post-auricular, temple, genitalia, hands, feet, ankles and areola).  Tissue conservation is critical in these anatomic locations. Area M Indication Text: Tumors in this location are included in Area M (cheek, forehead, scalp, neck, jawline and pretibial skin).  Mohs surgery is indicated for tumors in these anatomic locations. Area L Indication Text: Tumors in this location are included in Area L (trunk and extremities).  Mohs surgery is indicated for larger tumors, or tumors with aggressive histologic features, in these anatomic locations. Tumor Debulked?: dermablade Depth Of Tumor Invasion (For Histology): tumor not visualized Perineural Invasion (For Histology - Be Specific If Possible): absent Special Stains Stage 1 - Results: Base On Clearance Noted Above Stage 2: Additional Anesthesia Type: 1% lidocaine with epinephrine Staging Info: By selecting yes to the question above you will include information on AJCC 8 tumor staging in your Mohs note. Information on tumor staging will be automatically added for SCCs on the head and neck. AJCC 8 includes tumor size, tumor depth, perineural involvement and bone invasion. Tumor Depth: Less than 6mm from granular layer and no invasion beyond the subcutaneous fat Was The Patient On Physician Recommended Anticoagulation Therapy?: Please Select the Appropriate Response Medical Necessity Statement: Based on my medical judgement, Mohs surgery is the most appropriate treatment for this cancer compared to other treatments. Alternatives Discussed Intro (Do Not Add Period): I discussed alternative treatments to Mohs surgery and specifically discussed the risks and benefits of Consent 1/Introductory Paragraph: The rationale for Mohs was explained to the patient and consent was obtained. The risks, benefits and alternatives to therapy were discussed in detail. Specifically, the risks of infection, scarring, bleeding, prolonged wound healing, incomplete removal, allergy to anesthesia, nerve injury and recurrence were addressed. Prior to the procedure, the treatment site was clearly identified and confirmed by the patient. All components of Universal Protocol/PAUSE Rule completed. Consent 2/Introductory Paragraph: Mohs surgery was explained to the patient and consent was obtained. The risks, benefits and alternatives to therapy were discussed in detail. Specifically, the risks of infection, scarring, bleeding, prolonged wound healing, incomplete removal, allergy to anesthesia, nerve injury and recurrence were addressed. Prior to the procedure, the treatment site was clearly identified and confirmed by the patient. All components of Universal Protocol/PAUSE Rule completed. Consent 3/Introductory Paragraph: I gave the patient a chance to ask questions they had about the procedure.  Following this I explained the Mohs procedure and consent was obtained. The risks, benefits and alternatives to therapy were discussed in detail. Specifically, the risks of infection, scarring, bleeding, prolonged wound healing, incomplete removal, allergy to anesthesia, nerve injury and recurrence were addressed. Prior to the procedure, the treatment site was clearly identified and confirmed by the patient. All components of Universal Protocol/PAUSE Rule completed. Consent (Temporal Branch)/Introductory Paragraph: The rationale for Mohs was explained to the patient and consent was obtained. The risks, benefits and alternatives to therapy were discussed in detail. Specifically, the risks of damage to the temporal branch of the facial nerve, infection, scarring, bleeding, prolonged wound healing, incomplete removal, allergy to anesthesia, and recurrence were addressed. Prior to the procedure, the treatment site was clearly identified and confirmed by the patient. All components of Universal Protocol/PAUSE Rule completed. Consent (Marginal Mandibular)/Introductory Paragraph: The rationale for Mohs was explained to the patient and consent was obtained. The risks, benefits and alternatives to therapy were discussed in detail. Specifically, the risks of damage to the marginal mandibular branch of the facial nerve, infection, scarring, bleeding, prolonged wound healing, incomplete removal, allergy to anesthesia, and recurrence were addressed. Prior to the procedure, the treatment site was clearly identified and confirmed by the patient. All components of Universal Protocol/PAUSE Rule completed. Consent (Spinal Accessory)/Introductory Paragraph: The rationale for Mohs was explained to the patient and consent was obtained. The risks, benefits and alternatives to therapy were discussed in detail. Specifically, the risks of damage to the spinal accessory nerve, infection, scarring, bleeding, prolonged wound healing, incomplete removal, allergy to anesthesia, and recurrence were addressed. Prior to the procedure, the treatment site was clearly identified and confirmed by the patient. All components of Universal Protocol/PAUSE Rule completed. Consent (Near Eyelid Margin)/Introductory Paragraph: The rationale for Mohs was explained to the patient and consent was obtained. The risks, benefits and alternatives to therapy were discussed in detail. Specifically, the risks of ectropion or eyelid deformity, infection, scarring, bleeding, prolonged wound healing, incomplete removal, allergy to anesthesia, nerve injury and recurrence were addressed. Prior to the procedure, the treatment site was clearly identified and confirmed by the patient. All components of Universal Protocol/PAUSE Rule completed. Consent (Ear)/Introductory Paragraph: The rationale for Mohs was explained to the patient and consent was obtained. The risks, benefits and alternatives to therapy were discussed in detail. Specifically, the risks of ear deformity, infection, scarring, bleeding, prolonged wound healing, incomplete removal, allergy to anesthesia, nerve injury and recurrence were addressed. Prior to the procedure, the treatment site was clearly identified and confirmed by the patient. All components of Universal Protocol/PAUSE Rule completed. Consent (Nose)/Introductory Paragraph: The rationale for Mohs was explained to the patient and consent was obtained. The risks, benefits and alternatives to therapy were discussed in detail. Specifically, the risks of nasal deformity, changes in the flow of air through the nose, infection, scarring, bleeding, prolonged wound healing, incomplete removal, allergy to anesthesia, nerve injury and recurrence were addressed. Prior to the procedure, the treatment site was clearly identified and confirmed by the patient. All components of Universal Protocol/PAUSE Rule completed. Consent (Lip)/Introductory Paragraph: The rationale for Mohs was explained to the patient and consent was obtained. The risks, benefits and alternatives to therapy were discussed in detail. Specifically, the risks of lip deformity, changes in the oral aperture, infection, scarring, bleeding, prolonged wound healing, incomplete removal, allergy to anesthesia, nerve injury and recurrence were addressed. Prior to the procedure, the treatment site was clearly identified and confirmed by the patient. All components of Universal Protocol/PAUSE Rule completed. Consent (Scalp)/Introductory Paragraph: The rationale for Mohs was explained to the patient and consent was obtained. The risks, benefits and alternatives to therapy were discussed in detail. Specifically, the risks of changes in hair growth pattern secondary to repair, infection, scarring, bleeding, prolonged wound healing, incomplete removal, allergy to anesthesia, nerve injury and recurrence were addressed. Prior to the procedure, the treatment site was clearly identified and confirmed by the patient. All components of Universal Protocol/PAUSE Rule completed. Detail Level: Detailed Postop Diagnosis: same Anesthesia Type: 1% lidocaine with 1:100,000 epinephrine and 408mcg clindamycin/ml and a 1:10 solution of 8.4% sodium bicarbonate Anesthesia Volume In Cc: 6 Additional Anesthesia Volume In Cc: 3 Hemostasis: Electrocautery Estimated Blood Loss (Cc): minimal Repair Hemostasis (Optional): Pinpoint electrocautery Brow Lift Text: A midfrontal incision was made medially to the defect to allow access to the tissues just superior to the left eyebrow. Following careful dissection inferiorly in a supraperiosteal plane to the level of the left eyebrow, several 3-0 monocryl sutures were used to resuspend the eyebrow orbicularis oculi muscular unit to the superior frontal bone periosteum. This resulted in an appropriate reapproximation of static eyebrow symmetry and correction of the left brow ptosis. Deep Sutures: 3-0 Monocryl Epidermal Sutures: 3-0 Prolene Epidermal Closure: running Suturegard Intro: Intraoperative tissue expansion was performed, utilizing the SUTUREGARD device, in order to reduce wound tension. Suturegard Body: The suture ends were repeatedly re-tightened and re-clamped to achieve the desired tissue expansion. Hemigard Intro: Due to skin fragility and wound tension, it was decided to use HEMIGARD adhesive retention suture devices to permit a linear closure. The skin was cleaned and dried for a 6cm distance away from the wound. Excessive hair, if present, was removed to allow for adhesion. Hemigard Postcare Instructions: The HEMIGARD strips are to remain completely dry for at least 5-7 days. Donor Site Anesthesia Type: same as repair anesthesia Graft Donor Site Bandage (Optional-Leave Blank If You Don't Want In Note): Steri-strips and a pressure bandage were applied to the donor site. Closure 2 Information: This tab is for additional flaps and grafts, including complex repair and grafts and complex repair and flaps. You can also specify a different location for the additional defect, if the location is the same you do not need to select a new one. We will insert the automated text for the repair you select below just as we do for solitary flaps and grafts. Please note that at this time if you select a location with a different insurance zone you will need to override the ICD10 and CPT if appropriate. Closure 3 Information: This tab is for additional flaps and grafts above and beyond our usual structured repairs.  Please note if you enter information here it will not currently bill and you will need to add the billing information manually. Wound Care: Petrolatum Dressing: dry sterile dressing Suture Removal: 7 days Unna Boot Text: An Unna boot was placed to help immobilize the limb and facilitate more rapid healing. Home Suture Removal Text: Patient was provided instructions on removing sutures and will remove their sutures at home.  If they have any questions or difficulties they will call the office. Post-Care Instructions: I reviewed with the patient in detail post-care instructions. Patient is not to engage in any heavy lifting, exercise, or swimming for the next 14 days. Should the patient develop any fevers, chills, bleeding, severe pain patient will contact the office immediately. Pain Refusal Text: I offered to prescribe pain medication but the patient refused to take this medication. Mauc Instructions: By selecting yes to the question below the MAUC number will be added into the note.  This will be calculated automatically based on the diagnosis chosen, the size entered, the body zone selected (H,M,L) and the specific indications you chose. You will also have the option to override the Mohs AUC if you disagree with the automatically calculated number and this option is found in the Case Summary tab. Where Do You Want The Question To Include Opioid Counseling Located?: Case Summary Tab Eye Protection Verbiage: Before proceeding with the stage, a plastic scleral shield was inserted. The globe was anesthetized with a few drops of 1% lidocaine with 1:100,000 epinephrine. Then, an appropriate sized scleral shield was chosen and coated with lacrilube ointment. The shield was gently inserted and left in place for the duration of each stage. After the stage was completed, the shield was gently removed. Mohs Method Verbiage: An incision at a 45 degree angle following the standard Mohs approach was done and the specimen was harvested as a microscopic controlled layer. Surgeon/Pathologist Verbiage (Will Incorporate Name Of Surgeon From Intro If Not Blank): operated in two distinct and integrated capacities as the surgeon and pathologist. Mohs Histo Method Verbiage: Each section was then chromacoded and processed in the Mohs lab using the Mohs protocol and submitted for frozen section. Subsequent Stages Histo Method Verbiage: Using a similar technique to that described above, a thin layer of tissue was removed from all areas where tumor was visible on the previous stage.  The tissue was again oriented, mapped, dyed, and processed as above. Mohs Rapid Report Verbiage: The area of clinically evident tumor was marked with skin marking ink and appropriately hatched.  The initial incision was made following the Mohs approach through the skin.  The specimen was taken to the lab, divided into the necessary number of pieces, chromacoded and processed according to the Mohs protocol.  This was repeated in successive stages until a tumor free defect was achieved. Complex Repair Preamble Text (Leave Blank If You Do Not Want): Extensive wide undermining was performed. Intermediate Repair Preamble Text (Leave Blank If You Do Not Want): Undermining was performed with blunt dissection. Non-Graft Cartilage Fenestration Text: The cartilage was fenestrated with a 2mm punch biopsy to help facilitate healing. Graft Cartilage Fenestration Text: The cartilage was fenestrated with a 2mm punch biopsy to help facilitate graft survival and healing. Secondary Intention Text (Leave Blank If You Do Not Want): The defect will heal with secondary intention. No Repair - Repaired With Adjacent Surgical Defect Text (Leave Blank If You Do Not Want): After obtaining clear surgical margins the defect was repaired concurrently with another surgical defect which was in close approximation. Adjacent Tissue Transfer Text: The defect edges were debeveled with a #15 scalpel blade.  Given the location of the defect and the proximity to free margins an adjacent tissue transfer was deemed most appropriate.  Using a sterile surgical marker, an appropriate flap was drawn incorporating the defect and placing the expected incisions within the relaxed skin tension lines where possible.    The area thus outlined was incised deep to adipose tissue with a #15 scalpel blade.  The skin margins were undermined to an appropriate distance in all directions utilizing iris scissors. Advancement Flap (Single) Text: The defect edges were debeveled with a #15 scalpel blade.  Given the location of the defect and the proximity to free margins a single advancement flap was deemed most appropriate.  Using a sterile surgical marker, an appropriate advancement flap was drawn incorporating the defect and placing the expected incisions within the relaxed skin tension lines where possible.    The area thus outlined was incised deep to adipose tissue with a #15 scalpel blade.  The skin margins were undermined to an appropriate distance in all directions utilizing iris scissors. Advancement Flap (Double) Text: The defect edges were debeveled with a #15 scalpel blade.  Given the location of the defect and the proximity to free margins a double advancement flap was deemed most appropriate.  Using a sterile surgical marker, the appropriate advancement flaps were drawn incorporating the defect and placing the expected incisions within the relaxed skin tension lines where possible.    The area thus outlined was incised deep to adipose tissue with a #15 scalpel blade.  The skin margins were undermined to an appropriate distance in all directions utilizing iris scissors. Burow's Advancement Flap Text: The defect edges were debeveled with a #15 scalpel blade.  Given the location of the defect and the proximity to free margins a Burow's advancement flap was deemed most appropriate.  Using a sterile surgical marker, the appropriate advancement flap was drawn incorporating the defect and placing the expected incisions within the relaxed skin tension lines where possible.    The area thus outlined was incised deep to adipose tissue with a #15 scalpel blade.  The skin margins were undermined to an appropriate distance in all directions utilizing iris scissors. Chonodrocutaneous Helical Advancement Flap Text: The defect edges were debeveled with a #15 scalpel blade.  Given the location of the defect and the proximity to free margins a chondrocutaneous helical advancement flap was deemed most appropriate.  Using a sterile surgical marker, the appropriate advancement flap was drawn incorporating the defect and placing the expected incisions within the relaxed skin tension lines where possible.    The area thus outlined was incised deep to adipose tissue with a #15 scalpel blade.  The skin margins were undermined to an appropriate distance in all directions utilizing iris scissors. Crescentic Advancement Flap Text: The defect edges were debeveled with a #15 scalpel blade.  Given the location of the defect and the proximity to free margins a crescentic advancement flap was deemed most appropriate.  Using a sterile surgical marker, the appropriate advancement flap was drawn incorporating the defect and placing the expected incisions within the relaxed skin tension lines where possible.    The area thus outlined was incised deep to adipose tissue with a #15 scalpel blade.  The skin margins were undermined to an appropriate distance in all directions utilizing iris scissors. A-T Advancement Flap Text: The defect edges were debeveled with a #15 scalpel blade.  Given the location of the defect, shape of the defect and the proximity to free margins an A-T advancement flap was deemed most appropriate.  Using a sterile surgical marker, an appropriate advancement flap was drawn incorporating the defect and placing the expected incisions within the relaxed skin tension lines where possible.    The area thus outlined was incised deep to adipose tissue with a #15 scalpel blade.  The skin margins were undermined to an appropriate distance in all directions utilizing iris scissors. O-T Advancement Flap Text: The defect edges were debeveled with a #15 scalpel blade.  Given the location of the defect, shape of the defect and the proximity to free margins an O-T advancement flap was deemed most appropriate.  Using a sterile surgical marker, an appropriate advancement flap was drawn incorporating the defect and placing the expected incisions within the relaxed skin tension lines where possible.    The area thus outlined was incised deep to adipose tissue with a #15 scalpel blade.  The skin margins were undermined to an appropriate distance in all directions utilizing iris scissors. O-L Flap Text: The defect edges were debeveled with a #15 scalpel blade.  Given the location of the defect, shape of the defect and the proximity to free margins an O-L flap was deemed most appropriate.  Using a sterile surgical marker, an appropriate advancement flap was drawn incorporating the defect and placing the expected incisions within the relaxed skin tension lines where possible.    The area thus outlined was incised deep to adipose tissue with a #15 scalpel blade.  The skin margins were undermined to an appropriate distance in all directions utilizing iris scissors. O-Z Flap Text: The defect edges were debeveled with a #15 scalpel blade.  Given the location of the defect, shape of the defect and the proximity to free margins an O-Z flap was deemed most appropriate.  Using a sterile surgical marker, an appropriate transposition flap was drawn incorporating the defect and placing the expected incisions within the relaxed skin tension lines where possible. The area thus outlined was incised deep to adipose tissue with a #15 scalpel blade.  The skin margins were undermined to an appropriate distance in all directions utilizing iris scissors. Double O-Z Flap Text: The defect edges were debeveled with a #15 scalpel blade.  Given the location of the defect, shape of the defect and the proximity to free margins a Double O-Z flap was deemed most appropriate.  Using a sterile surgical marker, an appropriate transposition flap was drawn incorporating the defect and placing the expected incisions within the relaxed skin tension lines where possible. The area thus outlined was incised deep to adipose tissue with a #15 scalpel blade.  The skin margins were undermined to an appropriate distance in all directions utilizing iris scissors. V-Y Flap Text: The defect edges were debeveled with a #15 scalpel blade.  Given the location of the defect, shape of the defect and the proximity to free margins a V-Y flap was deemed most appropriate.  Using a sterile surgical marker, an appropriate advancement flap was drawn incorporating the defect and placing the expected incisions within the relaxed skin tension lines where possible.    The area thus outlined was incised deep to adipose tissue with a #15 scalpel blade.  The skin margins were undermined to an appropriate distance in all directions utilizing iris scissors. Advancement-Rotation Flap Text: The defect edges were debeveled with a #15 scalpel blade.  Given the location of the defect, shape of the defect and the proximity to free margins an advancement-rotation flap was deemed most appropriate.  Using a sterile surgical marker, an appropriate flap was drawn incorporating the defect and placing the expected incisions within the relaxed skin tension lines where possible. The area thus outlined was incised deep to adipose tissue with a #15 scalpel blade.  The skin margins were undermined to an appropriate distance in all directions utilizing iris scissors. Mercedes Flap Text: The defect edges were debeveled with a #15 scalpel blade.  Given the location of the defect, shape of the defect and the proximity to free margins a Mercedes flap was deemed most appropriate.  Using a sterile surgical marker, an appropriate advancement flap was drawn incorporating the defect and placing the expected incisions within the relaxed skin tension lines where possible. The area thus outlined was incised deep to adipose tissue with a #15 scalpel blade.  The skin margins were undermined to an appropriate distance in all directions utilizing iris scissors. Modified Advancement Flap Text: The defect edges were debeveled with a #15 scalpel blade.  Given the location of the defect, shape of the defect and the proximity to free margins a modified advancement flap was deemed most appropriate.  Using a sterile surgical marker, an appropriate advancement flap was drawn incorporating the defect and placing the expected incisions within the relaxed skin tension lines where possible.    The area thus outlined was incised deep to adipose tissue with a #15 scalpel blade.  The skin margins were undermined to an appropriate distance in all directions utilizing iris scissors. Mucosal Advancement Flap Text: Given the location of the defect, shape of the defect and the proximity to free margins a mucosal advancement flap was deemed most appropriate. Incisions were made with a 15 blade scalpel in the appropriate fashion along the cutaneous vermilion border and the mucosal lip. The remaining actinically damaged mucosal tissue was excised.  The mucosal advancement flap was then elevated to the gingival sulcus with care taken to preserve the neurovascular structures and advanced into the primary defect. Care was taken to ensure that precise realignment of the vermilion border was achieved. Peng Advancement Flap Text: The defect edges were debeveled with a #15 scalpel blade.  Given the location of the defect, shape of the defect and the proximity to free margins a Peng advancement flap was deemed most appropriate.  Using a sterile surgical marker, an appropriate advancement flap was drawn incorporating the defect and placing the expected incisions within the relaxed skin tension lines where possible. The area thus outlined was incised deep to adipose tissue with a #15 scalpel blade.  The skin margins were undermined to an appropriate distance in all directions utilizing iris scissors. Hatchet Flap Text: The defect edges were debeveled with a #15 scalpel blade.  Given the location of the defect, shape of the defect and the proximity to free margins a hatchet flap was deemed most appropriate.  Using a sterile surgical marker, an appropriate hatchet flap was drawn incorporating the defect and placing the expected incisions within the relaxed skin tension lines where possible.    The area thus outlined was incised deep to adipose tissue with a #15 scalpel blade.  The skin margins were undermined to an appropriate distance in all directions utilizing iris scissors. Rotation Flap Text: The defect edges were debeveled with a #15 scalpel blade.  Given the location of the defect, shape of the defect and the proximity to free margins a rotation flap was deemed most appropriate.  Using a sterile surgical marker, an appropriate rotation flap was drawn incorporating the defect and placing the expected incisions within the relaxed skin tension lines where possible.    The area thus outlined was incised deep to adipose tissue with a #15 scalpel blade.  The skin margins were undermined to an appropriate distance in all directions utilizing iris scissors. Bilateral Rotation Flap Text: The defect edges were debeveled with a #15 scalpel blade. Given the location of the defect, shape of the defect and the proximity to free margins a bilateral rotation flap was deemed most appropriate. Using a sterile surgical marker, an appropriate rotation flap was drawn incorporating the defect and placing the expected incisions within the relaxed skin tension lines where possible. The area thus outlined was incised deep to adipose tissue with a #15 scalpel blade. The skin margins were undermined to an appropriate distance in all directions utilizing iris scissors. Following this, the designed flap was carried over into the primary defect and sutured into place. Spiral Flap Text: The defect edges were debeveled with a #15 scalpel blade.  Given the location of the defect, shape of the defect and the proximity to free margins a spiral flap was deemed most appropriate.  Using a sterile surgical marker, an appropriate rotation flap was drawn incorporating the defect and placing the expected incisions within the relaxed skin tension lines where possible. The area thus outlined was incised deep to adipose tissue with a #15 scalpel blade.  The skin margins were undermined to an appropriate distance in all directions utilizing iris scissors. Staged Advancement Flap Text: The defect edges were debeveled with a #15 scalpel blade.  Given the location of the defect, shape of the defect and the proximity to free margins a staged advancement flap was deemed most appropriate.  Using a sterile surgical marker, an appropriate advancement flap was drawn incorporating the defect and placing the expected incisions within the relaxed skin tension lines where possible. The area thus outlined was incised deep to adipose tissue with a #15 scalpel blade.  The skin margins were undermined to an appropriate distance in all directions utilizing iris scissors. Star Wedge Flap Text: The defect edges were debeveled with a #15 scalpel blade.  Given the location of the defect, shape of the defect and the proximity to free margins a star wedge flap was deemed most appropriate.  Using a sterile surgical marker, an appropriate rotation flap was drawn incorporating the defect and placing the expected incisions within the relaxed skin tension lines where possible. The area thus outlined was incised deep to adipose tissue with a #15 scalpel blade.  The skin margins were undermined to an appropriate distance in all directions utilizing iris scissors. Transposition Flap Text: The defect edges were debeveled with a #15 scalpel blade.  Given the location of the defect and the proximity to free margins a transposition flap was deemed most appropriate.  Using a sterile surgical marker, an appropriate transposition flap was drawn incorporating the defect.    The area thus outlined was incised deep to adipose tissue with a #15 scalpel blade.  The skin margins were undermined to an appropriate distance in all directions utilizing iris scissors. Muscle Hinge Flap Text: The defect edges were debeveled with a #15 scalpel blade.  Given the size, depth and location of the defect and the proximity to free margins a muscle hinge flap was deemed most appropriate.  Using a sterile surgical marker, an appropriate hinge flap was drawn incorporating the defect. The area thus outlined was incised with a #15 scalpel blade.  The skin margins were undermined to an appropriate distance in all directions utilizing iris scissors. Mustarde Flap Text: The defect edges were debeveled with a #15 scalpel blade.  Given the size, depth and location of the defect and the proximity to free margins a Mustarde flap was deemed most appropriate.  Using a sterile surgical marker, an appropriate flap was drawn incorporating the defect. The area thus outlined was incised with a #15 scalpel blade.  The skin margins were undermined to an appropriate distance in all directions utilizing iris scissors. Nasal Turnover Hinge Flap Text: The defect edges were debeveled with a #15 scalpel blade.  Given the size, depth, location of the defect and the defect being full thickness a nasal turnover hinge flap was deemed most appropriate.  Using a sterile surgical marker, an appropriate hinge flap was drawn incorporating the defect. The area thus outlined was incised with a #15 scalpel blade. The flap was designed to recreate the nasal mucosal lining and the alar rim. The skin margins were undermined to an appropriate distance in all directions utilizing iris scissors. Nasalis-Muscle-Based Myocutaneous Island Pedicle Flap Text: Using a #15 blade, an incision was made around the donor flap to the level of the nasalis muscle. Wide lateral undermining was then performed in both the subcutaneous plane above the nasalis muscle, and in a submuscular plane just above periosteum. This allowed the formation of a free nasalis muscle axial pedicle (based on the angular artery) which was still attached to the actual cutaneous flap, increasing its mobility and vascular viability. Hemostasis was obtained with pinpoint electrocoagulation. The flap was mobilized into position and the pivotal anchor points positioned and stabilized with buried interrupted sutures. Subcutaneous and dermal tissues were closed in a multilayered fashion with sutures. Tissue redundancies were excised, and the epidermal edges were apposed without significant tension and sutured with sutures. Orbicularis Oris Muscle Flap Text: The defect edges were debeveled with a #15 scalpel blade.  Given that the defect affected the competency of the oral sphincter an orbicularis oris muscle flap was deemed most appropriate to restore this competency and normal muscle function.  Using a sterile surgical marker, an appropriate flap was drawn incorporating the defect. The area thus outlined was incised with a #15 scalpel blade. Melolabial Transposition Flap Text: The defect edges were debeveled with a #15 scalpel blade.  Given the location of the defect and the proximity to free margins a melolabial flap was deemed most appropriate.  Using a sterile surgical marker, an appropriate melolabial transposition flap was drawn incorporating the defect.    The area thus outlined was incised deep to adipose tissue with a #15 scalpel blade.  The skin margins were undermined to an appropriate distance in all directions utilizing iris scissors. Rhombic Flap Text: The defect edges were debeveled with a #15 scalpel blade.  Given the location of the defect and the proximity to free margins a rhombic flap was deemed most appropriate.  Using a sterile surgical marker, an appropriate rhombic flap was drawn incorporating the defect.    The area thus outlined was incised deep to adipose tissue with a #15 scalpel blade.  The skin margins were undermined to an appropriate distance in all directions utilizing iris scissors. Rhomboid Transposition Flap Text: The defect edges were debeveled with a #15 scalpel blade.  Given the location of the defect and the proximity to free margins a rhomboid transposition flap was deemed most appropriate.  Using a sterile surgical marker, an appropriate rhomboid flap was drawn incorporating the defect.    The area thus outlined was incised deep to adipose tissue with a #15 scalpel blade.  The skin margins were undermined to an appropriate distance in all directions utilizing iris scissors. Bi-Rhombic Flap Text: The defect edges were debeveled with a #15 scalpel blade.  Given the location of the defect and the proximity to free margins a bi-rhombic flap was deemed most appropriate.  Using a sterile surgical marker, an appropriate rhombic flap was drawn incorporating the defect. The area thus outlined was incised deep to adipose tissue with a #15 scalpel blade.  The skin margins were undermined to an appropriate distance in all directions utilizing iris scissors. Helical Rim Advancement Flap Text: The defect edges were debeveled with a #15 blade scalpel.  Given the location of the defect and the proximity to free margins (helical rim) a double helical rim advancement flap was deemed most appropriate.  Using a sterile surgical marker, the appropriate advancement flaps were drawn incorporating the defect and placing the expected incisions between the helical rim and antihelix where possible.  The area thus outlined was incised through and through with a #15 scalpel blade.  With a skin hook and iris scissors, the flaps were gently and sharply undermined and freed up. Bilateral Helical Rim Advancement Flap Text: The defect edges were debeveled with a #15 blade scalpel.  Given the location of the defect and the proximity to free margins (helical rim) a bilateral helical rim advancement flap was deemed most appropriate.  Using a sterile surgical marker, the appropriate advancement flaps were drawn incorporating the defect and placing the expected incisions between the helical rim and antihelix where possible.  The area thus outlined was incised through and through with a #15 scalpel blade.  With a skin hook and iris scissors, the flaps were gently and sharply undermined and freed up. Ear Star Wedge Flap Text: The defect edges were debeveled with a #15 blade scalpel.  Given the location of the defect and the proximity to free margins (helical rim) an ear star wedge flap was deemed most appropriate.  Using a sterile surgical marker, the appropriate flap was drawn incorporating the defect and placing the expected incisions between the helical rim and antihelix where possible.  The area thus outlined was incised through and through with a #15 scalpel blade. Banner Transposition Flap Text: The defect edges were debeveled with a #15 scalpel blade.  Given the location of the defect and the proximity to free margins a Banner transposition flap was deemed most appropriate.  Using a sterile surgical marker, an appropriate flap drawn around the defect. The area thus outlined was incised deep to adipose tissue with a #15 scalpel blade.  The skin margins were undermined to an appropriate distance in all directions utilizing iris scissors. Bilobed Flap Text: The defect edges were debeveled with a #15 scalpel blade.  Given the location of the defect and the proximity to free margins a bilobe flap was deemed most appropriate.  Using a sterile surgical marker, an appropriate bilobe flap drawn around the defect.    The area thus outlined was incised deep to adipose tissue with a #15 scalpel blade.  The skin margins were undermined to an appropriate distance in all directions utilizing iris scissors. Bilobed Transposition Flap Text: The defect edges were debeveled with a #15 scalpel blade.  Given the location of the defect and the proximity to free margins a bilobed transposition flap was deemed most appropriate.  Using a sterile surgical marker, an appropriate bilobe flap drawn around the defect.    The area thus outlined was incised deep to adipose tissue with a #15 scalpel blade.  The skin margins were undermined to an appropriate distance in all directions utilizing iris scissors. Trilobed Flap Text: The defect edges were debeveled with a #15 scalpel blade.  Given the location of the defect and the proximity to free margins a trilobed flap was deemed most appropriate.  Using a sterile surgical marker, an appropriate trilobed flap drawn around the defect.    The area thus outlined was incised deep to adipose tissue with a #15 scalpel blade.  The skin margins were undermined to an appropriate distance in all directions utilizing iris scissors. Dorsal Nasal Flap Text: The defect edges were debeveled with a #15 scalpel blade.  Given the location of the defect and the proximity to free margins a dorsal nasal flap was deemed most appropriate.  Using a sterile surgical marker, an appropriate dorsal nasal flap was drawn around the defect.    The area thus outlined was incised deep to adipose tissue with a #15 scalpel blade.  The skin margins were undermined to an appropriate distance in all directions utilizing iris scissors. Island Pedicle Flap Text: The defect edges were debeveled with a #15 scalpel blade.  Given the location of the defect, shape of the defect and the proximity to free margins an island pedicle advancement flap was deemed most appropriate.  Using a sterile surgical marker, an appropriate advancement flap was drawn incorporating the defect, outlining the appropriate donor tissue and placing the expected incisions within the relaxed skin tension lines where possible.    The area thus outlined was incised deep to adipose tissue with a #15 scalpel blade.  The skin margins were undermined to an appropriate distance in all directions around the primary defect and laterally outward around the island pedicle utilizing iris scissors.  There was minimal undermining beneath the pedicle flap. Island Pedicle Flap With Canthal Suspension Text: The defect edges were debeveled with a #15 scalpel blade.  Given the location of the defect, shape of the defect and the proximity to free margins an island pedicle advancement flap was deemed most appropriate.  Using a sterile surgical marker, an appropriate advancement flap was drawn incorporating the defect, outlining the appropriate donor tissue and placing the expected incisions within the relaxed skin tension lines where possible. The area thus outlined was incised deep to adipose tissue with a #15 scalpel blade.  The skin margins were undermined to an appropriate distance in all directions around the primary defect and laterally outward around the island pedicle utilizing iris scissors.  There was minimal undermining beneath the pedicle flap. A suspension suture was placed in the canthal tendon to prevent tension and prevent ectropion. Alar Island Pedicle Flap Text: The defect edges were debeveled with a #15 scalpel blade.  Given the location of the defect, shape of the defect and the proximity to the alar rim an island pedicle advancement flap was deemed most appropriate.  Using a sterile surgical marker, an appropriate advancement flap was drawn incorporating the defect, outlining the appropriate donor tissue and placing the expected incisions within the nasal ala running parallel to the alar rim. The area thus outlined was incised with a #15 scalpel blade.  The skin margins were undermined minimally to an appropriate distance in all directions around the primary defect and laterally outward around the island pedicle utilizing iris scissors.  There was minimal undermining beneath the pedicle flap. Double Island Pedicle Flap Text: The defect edges were debeveled with a #15 scalpel blade.  Given the location of the defect, shape of the defect and the proximity to free margins a double island pedicle advancement flap was deemed most appropriate.  Using a sterile surgical marker, an appropriate advancement flap was drawn incorporating the defect, outlining the appropriate donor tissue and placing the expected incisions within the relaxed skin tension lines where possible.    The area thus outlined was incised deep to adipose tissue with a #15 scalpel blade.  The skin margins were undermined to an appropriate distance in all directions around the primary defect and laterally outward around the island pedicle utilizing iris scissors.  There was minimal undermining beneath the pedicle flap. Island Pedicle Flap-Requiring Vessel Identification Text: The defect edges were debeveled with a #15 scalpel blade.  Given the location of the defect, shape of the defect and the proximity to free margins an island pedicle advancement flap was deemed most appropriate.  Using a sterile surgical marker, an appropriate advancement flap was drawn, based on the axial vessel mentioned above, incorporating the defect, outlining the appropriate donor tissue and placing the expected incisions within the relaxed skin tension lines where possible.    The area thus outlined was incised deep to adipose tissue with a #15 scalpel blade.  The skin margins were undermined to an appropriate distance in all directions around the primary defect and laterally outward around the island pedicle utilizing iris scissors.  There was minimal undermining beneath the pedicle flap. Keystone Flap Text: The defect edges were debeveled with a #15 scalpel blade.  Given the location of the defect, shape of the defect a keystone flap was deemed most appropriate.  Using a sterile surgical marker, an appropriate keystone flap was drawn incorporating the defect, outlining the appropriate donor tissue and placing the expected incisions within the relaxed skin tension lines where possible. The area thus outlined was incised deep to adipose tissue with a #15 scalpel blade.  The skin margins were undermined to an appropriate distance in all directions around the primary defect and laterally outward around the flap utilizing iris scissors. O-T Plasty Text: The defect edges were debeveled with a #15 scalpel blade.  Given the location of the defect, shape of the defect and the proximity to free margins an O-T plasty was deemed most appropriate.  Using a sterile surgical marker, an appropriate O-T plasty was drawn incorporating the defect and placing the expected incisions within the relaxed skin tension lines where possible.    The area thus outlined was incised deep to adipose tissue with a #15 scalpel blade.  The skin margins were undermined to an appropriate distance in all directions utilizing iris scissors. O-Z Plasty Text: The defect edges were debeveled with a #15 scalpel blade.  Given the location of the defect, shape of the defect and the proximity to free margins an O-Z plasty (double transposition flap) was deemed most appropriate.  Using a sterile surgical marker, the appropriate transposition flaps were drawn incorporating the defect and placing the expected incisions within the relaxed skin tension lines where possible.    The area thus outlined was incised deep to adipose tissue with a #15 scalpel blade.  The skin margins were undermined to an appropriate distance in all directions utilizing iris scissors.  Hemostasis was achieved with electrocautery.  The flaps were then transposed into place, one clockwise and the other counterclockwise, and anchored with interrupted buried subcutaneous sutures. Double O-Z Plasty Text: The defect edges were debeveled with a #15 scalpel blade.  Given the location of the defect, shape of the defect and the proximity to free margins a Double O-Z plasty (double transposition flap) was deemed most appropriate.  Using a sterile surgical marker, the appropriate transposition flaps were drawn incorporating the defect and placing the expected incisions within the relaxed skin tension lines where possible. The area thus outlined was incised deep to adipose tissue with a #15 scalpel blade.  The skin margins were undermined to an appropriate distance in all directions utilizing iris scissors.  Hemostasis was achieved with electrocautery.  The flaps were then transposed into place, one clockwise and the other counterclockwise, and anchored with interrupted buried subcutaneous sutures. V-Y Plasty Text: The defect edges were debeveled with a #15 scalpel blade.  Given the location of the defect, shape of the defect and the proximity to free margins an V-Y advancement flap was deemed most appropriate.  Using a sterile surgical marker, an appropriate advancement flap was drawn incorporating the defect and placing the expected incisions within the relaxed skin tension lines where possible.    The area thus outlined was incised deep to adipose tissue with a #15 scalpel blade.  The skin margins were undermined to an appropriate distance in all directions utilizing iris scissors. H Plasty Text: Given the location of the defect, shape of the defect and the proximity to free margins a H-plasty was deemed most appropriate for repair.  Using a sterile surgical marker, the appropriate advancement arms of the H-plasty were drawn incorporating the defect and placing the expected incisions within the relaxed skin tension lines where possible. The area thus outlined was incised deep to adipose tissue with a #15 scalpel blade. The skin margins were undermined to an appropriate distance in all directions utilizing iris scissors.  The opposing advancement arms were then advanced into place in opposite direction and anchored with interrupted buried subcutaneous sutures. W Plasty Text: The lesion was extirpated to the level of the fat with a #15 scalpel blade.  Given the location of the defect, shape of the defect and the proximity to free margins a W-plasty was deemed most appropriate for repair.  Using a sterile surgical marker, the appropriate transposition arms of the W-plasty were drawn incorporating the defect and placing the expected incisions within the relaxed skin tension lines where possible.    The area thus outlined was incised deep to adipose tissue with a #15 scalpel blade.  The skin margins were undermined to an appropriate distance in all directions utilizing iris scissors.  The opposing transposition arms were then transposed into place in opposite direction and anchored with interrupted buried subcutaneous sutures. Z Plasty Text: The lesion was extirpated to the level of the fat with a #15 scalpel blade.  Given the location of the defect, shape of the defect and the proximity to free margins a Z-plasty was deemed most appropriate for repair.  Using a sterile surgical marker, the appropriate transposition arms of the Z-plasty were drawn incorporating the defect and placing the expected incisions within the relaxed skin tension lines where possible.    The area thus outlined was incised deep to adipose tissue with a #15 scalpel blade.  The skin margins were undermined to an appropriate distance in all directions utilizing iris scissors.  The opposing transposition arms were then transposed into place in opposite direction and anchored with interrupted buried subcutaneous sutures. Double Z Plasty Text: The lesion was extirpated to the level of the fat with a #15 scalpel blade. Given the location of the defect, shape of the defect and the proximity to free margins a double Z-plasty was deemed most appropriate for repair. Using a sterile surgical marker, the appropriate transposition arms of the double Z-plasty were drawn incorporating the defect and placing the expected incisions within the relaxed skin tension lines where possible. The area thus outlined was incised deep to adipose tissue with a #15 scalpel blade. The skin margins were undermined to an appropriate distance in all directions utilizing iris scissors. The opposing transposition arms were then transposed and carried over into place in opposite direction and anchored with interrupted buried subcutaneous sutures. Zygomaticofacial Flap Text: Given the location of the defect, shape of the defect and the proximity to free margins a zygomaticofacial flap was deemed most appropriate for repair.  Using a sterile surgical marker, the appropriate flap was drawn incorporating the defect and placing the expected incisions within the relaxed skin tension lines where possible. The area thus outlined was incised deep to adipose tissue with a #15 scalpel blade with preservation of a vascular pedicle.  The skin margins were undermined to an appropriate distance in all directions utilizing iris scissors.  The flap was then placed into the defect and anchored with interrupted buried subcutaneous sutures. Cheek Interpolation Flap Text: A decision was made to reconstruct the defect utilizing an interpolation axial flap and a staged reconstruction.  A telfa template was made of the defect.  This telfa template was then used to outline the Cheek Interpolation flap.  The donor area for the pedicle flap was then injected with anesthesia.  The flap was excised through the skin and subcutaneous tissue down to the layer of the underlying musculature.  The interpolation flap was carefully excised within this deep plane to maintain its blood supply.  The edges of the donor site were undermined.   The donor site was closed in a primary fashion.  The pedicle was then rotated into position and sutured.  Once the tube was sutured into place, adequate blood supply was confirmed with blanching and refill.  The pedicle was then wrapped with xeroform gauze and dressed appropriately with a telfa and gauze bandage to ensure continued blood supply and protect the attached pedicle. Cheek-To-Nose Interpolation Flap Text: A decision was made to reconstruct the defect utilizing an interpolation axial flap and a staged reconstruction.  A telfa template was made of the defect.  This telfa template was then used to outline the Cheek-To-Nose Interpolation flap.  The donor area for the pedicle flap was then injected with anesthesia.  The flap was excised through the skin and subcutaneous tissue down to the layer of the underlying musculature.  The interpolation flap was carefully excised within this deep plane to maintain its blood supply.  The edges of the donor site were undermined.   The donor site was closed in a primary fashion.  The pedicle was then rotated into position and sutured.  Once the tube was sutured into place, adequate blood supply was confirmed with blanching and refill.  The pedicle was then wrapped with xeroform gauze and dressed appropriately with a telfa and gauze bandage to ensure continued blood supply and protect the attached pedicle. Interpolation Flap Text: A decision was made to reconstruct the defect utilizing an interpolation axial flap and a staged reconstruction.  A telfa template was made of the defect.  This telfa template was then used to outline the interpolation flap.  The donor area for the pedicle flap was then injected with anesthesia.  The flap was excised through the skin and subcutaneous tissue down to the layer of the underlying musculature.  The interpolation flap was carefully excised within this deep plane to maintain its blood supply.  The edges of the donor site were undermined.   The donor site was closed in a primary fashion.  The pedicle was then rotated into position and sutured.  Once the tube was sutured into place, adequate blood supply was confirmed with blanching and refill.  The pedicle was then wrapped with xeroform gauze and dressed appropriately with a telfa and gauze bandage to ensure continued blood supply and protect the attached pedicle. Melolabial Interpolation Flap Text: A decision was made to reconstruct the defect utilizing an interpolation axial flap and a staged reconstruction.  A telfa template was made of the defect.  This telfa template was then used to outline the melolabial interpolation flap.  The donor area for the pedicle flap was then injected with anesthesia.  The flap was excised through the skin and subcutaneous tissue down to the layer of the underlying musculature.  The pedicle flap was carefully excised within this deep plane to maintain its blood supply.  The edges of the donor site were undermined.   The donor site was closed in a primary fashion.  The pedicle was then rotated into position and sutured.  Once the tube was sutured into place, adequate blood supply was confirmed with blanching and refill.  The pedicle was then wrapped with xeroform gauze and dressed appropriately with a telfa and gauze bandage to ensure continued blood supply and protect the attached pedicle. Mastoid Interpolation Flap Text: A decision was made to reconstruct the defect utilizing an interpolation axial flap and a staged reconstruction.  A telfa template was made of the defect.  This telfa template was then used to outline the mastoid interpolation flap.  The donor area for the pedicle flap was then injected with anesthesia.  The flap was excised through the skin and subcutaneous tissue down to the layer of the underlying musculature.  The pedicle flap was carefully excised within this deep plane to maintain its blood supply.  The edges of the donor site were undermined.   The donor site was closed in a primary fashion.  The pedicle was then rotated into position and sutured.  Once the tube was sutured into place, adequate blood supply was confirmed with blanching and refill.  The pedicle was then wrapped with xeroform gauze and dressed appropriately with a telfa and gauze bandage to ensure continued blood supply and protect the attached pedicle. Posterior Auricular Interpolation Flap Text: A decision was made to reconstruct the defect utilizing an interpolation axial flap and a staged reconstruction.  A telfa template was made of the defect.  This telfa template was then used to outline the posterior auricular interpolation flap.  The donor area for the pedicle flap was then injected with anesthesia.  The flap was excised through the skin and subcutaneous tissue down to the layer of the underlying musculature.  The pedicle flap was carefully excised within this deep plane to maintain its blood supply.  The edges of the donor site were undermined.   The donor site was closed in a primary fashion.  The pedicle was then rotated into position and sutured.  Once the tube was sutured into place, adequate blood supply was confirmed with blanching and refill.  The pedicle was then wrapped with xeroform gauze and dressed appropriately with a telfa and gauze bandage to ensure continued blood supply and protect the attached pedicle. Paramedian Forehead Flap Text: A decision was made to reconstruct the defect utilizing an interpolation axial flap and a staged reconstruction.  A telfa template was made of the defect.  This telfa template was then used to outline the paramedian forehead pedicle flap.  The donor area for the pedicle flap was then injected with anesthesia.  The flap was excised through the skin and subcutaneous tissue down to the layer of the underlying musculature.  The pedicle flap was carefully excised within this deep plane to maintain its blood supply.  The edges of the donor site were undermined.   The donor site was closed in a primary fashion.  The pedicle was then rotated into position and sutured.  Once the tube was sutured into place, adequate blood supply was confirmed with blanching and refill.  The pedicle was then wrapped with xeroform gauze and dressed appropriately with a telfa and gauze bandage to ensure continued blood supply and protect the attached pedicle. Abbe Flap (Upper To Lower Lip) Text: The defect of the lower lip was assessed and measured.  Given the location and size of the defect, an Abbe flap was deemed most appropriate.  Using a sterile surgical marker, an appropriate Abbe flap was measured and drawn on the upper lip. Local anesthesia was then infiltrated.  A scalpel was then used to incise the upper lip through and through the skin, vermilion, muscle and mucosa, leaving the flap pedicled on the opposite side.  The flap was then rotated and transferred to the lower lip defect.  The flap was then sutured into place with a three layer technique, closing the orbicularis oris muscle layer with subcutaneous buried sutures, followed by a mucosal layer and an epidermal layer. Abbe Flap (Lower To Upper Lip) Text: The defect of the upper lip was assessed and measured.  Given the location and size of the defect, an Abbe flap was deemed most appropriate.  Using a sterile surgical marker, an appropriate Abbe flap was measured and drawn on the lower lip. Local anesthesia was then infiltrated. A scalpel was then used to incise the upper lip through and through the skin, vermilion, muscle and mucosa, leaving the flap pedicled on the opposite side.  The flap was then rotated and transferred to the lower lip defect.  The flap was then sutured into place with a three layer technique, closing the orbicularis oris muscle layer with subcutaneous buried sutures, followed by a mucosal layer and an epidermal layer. Estlander Flap (Upper To Lower Lip) Text: The defect of the lower lip was assessed and measured.  Given the location and size of the defect, an Estlander flap was deemed most appropriate.  Using a sterile surgical marker, an appropriate Estlander flap was measured and drawn on the upper lip. Local anesthesia was then infiltrated. A scalpel was then used to incise the lateral aspect of the flap, through skin, muscle and mucosa, leaving the flap pedicled medially.  The flap was then rotated and positioned to fill the lower lip defect.  The flap was then sutured into place with a three layer technique, closing the orbicularis oris muscle layer with subcutaneous buried sutures, followed by a mucosal layer and an epidermal layer. Cheiloplasty (Less Than 50%) Text: A decision was made to reconstruct the defect with a  cheiloplasty.  The defect was undermined extensively.  Additional orbicularis oris muscle was excised with a 15 blade scalpel.  The defect was converted into a full thickness wedge, of less than 50% of the vertical height of the lip, to facilite a better cosmetic result.  Small vessels were then tied off with 5-0 monocyrl. The orbicularis oris, superficial fascia, adipose and dermis were then reapproximated.  After the deeper layers were approximated the epidermis was reapproximated with particular care given to realign the vermilion border. Cheiloplasty (Complex) Text: A decision was made to reconstruct the defect with a  cheiloplasty.  The defect was undermined extensively.  Additional orbicularis oris muscle was excised with a 15 blade scalpel.  The defect was converted into a full thickness wedge to facilite a better cosmetic result.  Small vessels were then tied off with 5-0 monocyrl. The orbicularis oris, superficial fascia, adipose and dermis were then reapproximated.  After the deeper layers were approximated the epidermis was reapproximated with particular care given to realign the vermilion border. Ear Wedge Repair Text: A wedge excision was completed by carrying down an excision through the full thickness of the ear and cartilage with an inward facing Burow's triangle. The wound was then closed in a layered fashion. Full Thickness Lip Wedge Repair (Flap) Text: Given the location of the defect and the proximity to free margins a full thickness wedge repair was deemed most appropriate.  Using a sterile surgical marker, the appropriate repair was drawn incorporating the defect and placing the expected incisions perpendicular to the vermilion border.  The vermilion border was also meticulously outlined to ensure appropriate reapproximation during the repair.  The area thus outlined was incised through and through with a #15 scalpel blade.  The muscularis and dermis were reaproximated with deep sutures following hemostasis. Care was taken to realign the vermilion border before proceeding with the superficial closure.  Once the vermilion was realigned the superfical and mucosal closure was finished. Ftsg Text: The defect edges were debeveled with a #15 scalpel blade.  Given the location of the defect, shape of the defect and the proximity to free margins a full thickness skin graft was deemed most appropriate.  Using a sterile surgical marker, the primary defect shape was transferred to the donor site. The area thus outlined was incised deep to adipose tissue with a #15 scalpel blade.  The harvested graft was then trimmed of adipose tissue until only dermis and epidermis was left.  The skin margins of the secondary defect were undermined to an appropriate distance in all directions utilizing iris scissors.  The secondary defect was closed with interrupted buried subcutaneous sutures.  The skin edges were then re-apposed with running  sutures.  The skin graft was then placed in the primary defect and oriented appropriately. Split-Thickness Skin Graft Text: The defect edges were debeveled with a #15 scalpel blade.  Given the location of the defect, shape of the defect and the proximity to free margins a split thickness skin graft was deemed most appropriate.  Using a sterile surgical marker, the primary defect shape was transferred to the donor site. The split thickness graft was then harvested.  The skin graft was then placed in the primary defect and oriented appropriately. Pinch Graft Text: The defect edges were debeveled with a #15 scalpel blade. Given the location of the defect, shape of the defect and the proximity to free margins a pinch graft was deemed most appropriate. Using a sterile surgical marker, the primary defect shape was transferred to the donor site. The area thus outlined was incised deep to adipose tissue with a #15 scalpel blade.  The harvested graft was then trimmed of adipose tissue until only dermis and epidermis was left. The skin margins of the secondary defect were undermined to an appropriate distance in all directions utilizing iris scissors.  The secondary defect was closed with interrupted buried subcutaneous sutures.  The skin edges were then re-apposed with running  sutures.  The skin graft was then placed in the primary defect and oriented appropriately. Burow's Graft Text: The defect edges were debeveled with a #15 scalpel blade.  Given the location of the defect, shape of the defect, the proximity to free margins and the presence of a standing cone deformity a Burow's skin graft was deemed most appropriate. The standing cone was removed and this tissue was then trimmed to the shape of the primary defect. The adipose tissue was also removed until only dermis and epidermis were left.  The skin margins of the secondary defect were undermined to an appropriate distance in all directions utilizing iris scissors.  The secondary defect was closed with interrupted buried subcutaneous sutures.  The skin edges were then re-apposed with running  sutures.  The skin graft was then placed in the primary defect and oriented appropriately. Cartilage Graft Text: The defect edges were debeveled with a #15 scalpel blade.  Given the location of the defect, shape of the defect, the fact the defect involved a full thickness cartilage defect a cartilage graft was deemed most appropriate.  An appropriate donor site was identified, cleansed, and anesthetized. The cartilage graft was then harvested and transferred to the recipient site, oriented appropriately and then sutured into place.  The secondary defect was then repaired using a primary closure. Composite Graft Text: The defect edges were debeveled with a #15 scalpel blade.  Given the location of the defect, shape of the defect, the proximity to free margins and the fact the defect was full thickness a composite graft was deemed most appropriate.  The defect was outline and then transferred to the donor site.  A full thickness graft was then excised from the donor site. The graft was then placed in the primary defect, oriented appropriately and then sutured into place.  The secondary defect was then repaired using a primary closure. Epidermal Autograft Text: The defect edges were debeveled with a #15 scalpel blade.  Given the location of the defect, shape of the defect and the proximity to free margins an epidermal autograft was deemed most appropriate.  Using a sterile surgical marker, the primary defect shape was transferred to the donor site. The epidermal graft was then harvested.  The skin graft was then placed in the primary defect and oriented appropriately. Dermal Autograft Text: The defect edges were debeveled with a #15 scalpel blade.  Given the location of the defect, shape of the defect and the proximity to free margins a dermal autograft was deemed most appropriate.  Using a sterile surgical marker, the primary defect shape was transferred to the donor site. The area thus outlined was incised deep to adipose tissue with a #15 scalpel blade.  The harvested graft was then trimmed of adipose and epidermal tissue until only dermis was left.  The skin graft was then placed in the primary defect and oriented appropriately. Skin Substitute Text: The defect edges were debeveled with a #15 scalpel blade.  Given the location of the defect, shape of the defect and the proximity to free margins a skin substitute graft was deemed most appropriate.  The graft material was trimmed to fit the size of the defect. The graft was then placed in the primary defect and oriented appropriately. Tissue Cultured Epidermal Autograft Text: The defect edges were debeveled with a #15 scalpel blade.  Given the location of the defect, shape of the defect and the proximity to free margins a tissue cultured epidermal autograft was deemed most appropriate.  The graft was then trimmed to fit the size of the defect.  The graft was then placed in the primary defect and oriented appropriately. Xenograft Text: The defect edges were debeveled with a #15 scalpel blade.  Given the location of the defect, shape of the defect and the proximity to free margins a xenograft was deemed most appropriate.  The graft was then trimmed to fit the size of the defect.  The graft was then placed in the primary defect and oriented appropriately. Purse String (Simple) Text: Given the location of the defect and the characteristics of the surrounding skin a purse string closure was deemed most appropriate.  Undermining was performed circumferentially around the surgical defect.  A purse string suture was then placed and tightened. Purse String (Intermediate) Text: Given the location of the defect and the characteristics of the surrounding skin a purse string intermediate closure was deemed most appropriate.  Undermining was performed circumferentially around the surgical defect.  A purse string suture was then placed and tightened. Partial Purse String (Simple) Text: Given the location of the defect and the characteristics of the surrounding skin a simple purse string closure was deemed most appropriate.  Undermining was performed circumferentially around the surgical defect.  A purse string suture was then placed and tightened. Wound tension only allowed a partial closure of the circular defect. Partial Purse String (Intermediate) Text: Given the location of the defect and the characteristics of the surrounding skin an intermediate purse string closure was deemed most appropriate.  Undermining was performed circumferentially around the surgical defect.  A purse string suture was then placed and tightened. Wound tension only allowed a partial closure of the circular defect. Localized Dermabrasion With Wire Brush Text: The patient was draped in routine manner.  Localized dermabrasion using 3 x 17 mm wire brush was performed in routine manner to papillary dermis. This spot dermabrasion is being performed to complete skin cancer reconstruction. It also will eliminate the other sun damaged precancerous cells that are known to be part of the regional effect of a lifetime's worth of sun exposure. This localized dermabrasion is therapeutic and should not be considered cosmetic in any regard. Tarsorrhaphy Text: A tarsorrhaphy was performed using Frost sutures. Intermediate Repair And Flap Additional Text (Will Appearing After The Standard Complex Repair Text): The intermediate repair was not sufficient to completely close the primary defect. The remaining additional defect was repaired with the flap mentioned below. Intermediate Repair And Graft Additional Text (Will Appearing After The Standard Complex Repair Text): The intermediate repair was not sufficient to completely close the primary defect. The remaining additional defect was repaired with the graft mentioned below. Complex Repair And Flap Additional Text (Will Appearing After The Standard Complex Repair Text): The complex repair was not sufficient to completely close the primary defect. The remaining additional defect was repaired with the flap mentioned below. Complex Repair And Graft Additional Text (Will Appearing After The Standard Complex Repair Text): The complex repair was not sufficient to completely close the primary defect. The remaining additional defect was repaired with the graft mentioned below. Eyelid Full Thickness Repair - 87355: The eyelid defect was full thickness which required a wedge repair of the eyelid. Special care was taken to ensure that the eyelid margin was realligned when placing sutures. Eyelid Partial Thickness Repair - [id number]: The eyelid defect was partial thickness which required a wedge repair of the eyelid. Special care was taken to ensure that the eyelid margin was realligned when placing sutures. Manual Repair Warning Statement: We plan on removing the manually selected variable below in favor of our much easier automatic structured text blocks found in the previous tab. We decided to do this to help make the flow better and give you the full power of structured data. Manual selection is never going to be ideal in our platform and I would encourage you to avoid using manual selection from this point on, especially since I will be sunsetting this feature. It is important that you do one of two things with the customized text below. First, you can save all of the text in a word file so you can have it for future reference. Second, transfer the text to the appropriate area in the Library tab. Lastly, if there is a flap or graft type which we do not have you need to let us know right away so I can add it in before the variable is hidden. No need to panic, we plan to give you roughly 6 months to make the change. Same Histology In Subsequent Stages Text: The pattern and morphology of the tumor is as described in the first stage. No Residual Tumor Seen Histology Text: There were no malignant cells seen in the sections examined. Inflammation Suggestive Of Cancer Camouflage Histology Text: There was a dense lymphocytic infiltrate which prevented adequate histologic evaluation of adjacent structures. Bcc Histology Text: There were numerous aggregates of basaloid cells. Bcc Infiltrative Histology Text: There were numerous aggregates of basaloid cells demonstrating an infiltrative pattern. Mart-1 - Positive Histology Text: MART-1 staining demonstrates areas of higher density and clustering of melanocytes with Pagetoid spread upwards within the epidermis. The surgical margins are positive for tumor cells. Mart-1 - Negative Histology Text: MART-1 staining demonstrates a normal density and pattern of melanocytes along the dermal-epidermal junction. The surgical margins are negative for tumor cells. Information: Selecting Yes will display possible errors in your note based on the variables you have selected. This validation is only offered as a suggestion for you. PLEASE NOTE THAT THE VALIDATION TEXT WILL BE REMOVED WHEN YOU FINALIZE YOUR NOTE. IF YOU WANT TO FAX A PRELIMINARY NOTE YOU WILL NEED TO TOGGLE THIS TO 'NO' IF YOU DO NOT WANT IT IN YOUR FAXED NOTE.

## 2024-02-20 ENCOUNTER — TELEPHONE (OUTPATIENT)
Dept: GASTROENTEROLOGY | Facility: CLINIC | Age: 44
End: 2024-02-20
Payer: COMMERCIAL

## 2024-02-20 ENCOUNTER — PATIENT MESSAGE (OUTPATIENT)
Dept: ENDOSCOPY | Facility: HOSPITAL | Age: 44
End: 2024-02-20
Payer: COMMERCIAL

## 2024-02-20 NOTE — TELEPHONE ENCOUNTER
----- Message from Christa Austin sent at 2/20/2024  1:28 PM CST -----  Good afternoon,    The patient have a referral from med surg with a diagnosis of Acute pancreatitis, unspecified complication status, unspecified pancreatitis ty...Nausea and vomiting, unspecified vomiting type/ Acute superficial gastritis without hemorrhage. The patient was scheduled with Dr. Messer, but her MA said he need to be scheduled with an AEP doctor. Please assist with scheduling the patient.    Thank You

## 2024-02-21 ENCOUNTER — PATIENT OUTREACH (OUTPATIENT)
Dept: ADMINISTRATIVE | Facility: CLINIC | Age: 44
End: 2024-02-21
Payer: COMMERCIAL

## 2024-02-21 ENCOUNTER — PATIENT MESSAGE (OUTPATIENT)
Dept: ADMINISTRATIVE | Facility: CLINIC | Age: 44
End: 2024-02-21
Payer: COMMERCIAL

## 2024-02-21 NOTE — PROGRESS NOTES
C3 nurse attempted to contact Bay Ibarra for a TCC post hospital discharge follow up call. No answer, left voicemail with callback information. The patient has a scheduled appt with Britney Jimenez NP ([facility]) on 2/23/24 at 0830. No messages routed at this time.

## 2024-02-22 NOTE — PROGRESS NOTES
C3 nurse attempted to contact Bay Ibarra for a TCC post hospital discharge follow up call. No answer, left voicemail with callback information. The patient has a scheduled appt with Britney Jimenez NP ([facility]) on 2/23/24 at 0830. Message routed to Britney Jimenez NP ([facility]) requesting appt visit type change to 'HOSFU.'

## 2024-02-23 ENCOUNTER — OFFICE VISIT (OUTPATIENT)
Dept: FAMILY MEDICINE | Facility: CLINIC | Age: 44
End: 2024-02-23
Payer: COMMERCIAL

## 2024-02-23 VITALS
DIASTOLIC BLOOD PRESSURE: 68 MMHG | SYSTOLIC BLOOD PRESSURE: 138 MMHG | HEART RATE: 100 BPM | OXYGEN SATURATION: 96 % | WEIGHT: 215.81 LBS | BODY MASS INDEX: 32.71 KG/M2 | HEIGHT: 68 IN

## 2024-02-23 DIAGNOSIS — I10 ESSENTIAL HYPERTENSION: Chronic | ICD-10-CM

## 2024-02-23 DIAGNOSIS — E10.65 TYPE 1 DIABETES MELLITUS WITH HYPERGLYCEMIA: ICD-10-CM

## 2024-02-23 DIAGNOSIS — R11.2 NAUSEA AND VOMITING, UNSPECIFIED VOMITING TYPE: ICD-10-CM

## 2024-02-23 DIAGNOSIS — E10.43 DIABETIC GASTROPARESIS ASSOCIATED WITH TYPE 1 DIABETES MELLITUS: ICD-10-CM

## 2024-02-23 DIAGNOSIS — K31.84 DIABETIC GASTROPARESIS ASSOCIATED WITH TYPE 1 DIABETES MELLITUS: ICD-10-CM

## 2024-02-23 DIAGNOSIS — R10.10 UPPER ABDOMINAL PAIN: ICD-10-CM

## 2024-02-23 DIAGNOSIS — Z09 HOSPITAL DISCHARGE FOLLOW-UP: Primary | ICD-10-CM

## 2024-02-23 PROCEDURE — 1111F DSCHRG MED/CURRENT MED MERGE: CPT | Mod: CPTII,S$GLB,,

## 2024-02-23 PROCEDURE — 1159F MED LIST DOCD IN RCRD: CPT | Mod: CPTII,S$GLB,,

## 2024-02-23 PROCEDURE — 3075F SYST BP GE 130 - 139MM HG: CPT | Mod: CPTII,S$GLB,,

## 2024-02-23 PROCEDURE — 3046F HEMOGLOBIN A1C LEVEL >9.0%: CPT | Mod: CPTII,S$GLB,,

## 2024-02-23 PROCEDURE — 99999 PR PBB SHADOW E&M-EST. PATIENT-LVL V: CPT | Mod: PBBFAC,,,

## 2024-02-23 PROCEDURE — 3078F DIAST BP <80 MM HG: CPT | Mod: CPTII,S$GLB,,

## 2024-02-23 PROCEDURE — 99214 OFFICE O/P EST MOD 30 MIN: CPT | Mod: S$GLB,,,

## 2024-02-23 NOTE — PROGRESS NOTES
Subjective:            Chief Complaint: Follow-up and Hypertension  HPI     Bay Ibarra is a 43 y.o. male, patient of Funmilayo Taylor MD with  diabetic  polyneuropathy associated with type 1 diabetes mellitus, hypertension, hyperlipidemia, type II diabetes, obesity, gastroparesis, JAZMYN, history of diabetic foot ulcer, known to me, presents today for a 1 month diabetes Follow-up and Hypertension    Reports he has not been checking BP at home as he was hospitalized for acute pancreatitis and gastroparesis. Reports he is feeling better, still with a little bit of pain, nausea and vomiting has resolved.  Scheduled to see GI in May.    Diabetes: taking 25 units of Novolog, Lantus 30 BID. Checks sugars TID, runs around 190s, mornings are between . Scheduled with Endo on 04/04.     Currently not taking any medication for bowels, on a low fiber diet for now. Reports compliance with carafate as prescribed. BM 3-4 times per day in smaller amounts. Scheduled with GI 05/09.    Transitional Care Note    Family and/or Caretaker present at visit?  Yes.  Diagnostic tests reviewed/disposition: I have reviewed all completed as well as pending diagnostic tests at the time of discharge.  Disease/illness education: DM, HTN  Home health/community services discussion/referrals: Patient does not have home health established from hospital visit.  They do not need home health.  If needed, we will set up home health for the patient.   Establishment or re-establishment of referral orders for community resources: No other necessary community resources.   Discussion with other health care providers: No discussion with other health care providers necessary.         Past Medical History:   Diagnosis Date    Acute pancreatitis     Cellulitis of left lower extremity 01/11/2024    Diverticulosis of colon     Essential hypertension     Fatty liver     Gastric varices without bleeding 01/16/2020    Glaucomatous optic atrophy, right      Gout     Hx of acute pancreatitis 03/03/2017    Hypertriglyceridemia 08/02/2017    Obesity (BMI 30-39.9) 06/05/2015    Obesity (BMI 35.0-39.9 without comorbidity) 06/05/2015    Obstructive sleep apnea syndrome 12/18/2015    Type 2 diabetes mellitus with diabetic polyneuropathy, with long-term current use of insulin 10/16/2017    Type 2 diabetes mellitus with hyperglycemia, with long-term current use of insulin 08/03/2017       Past Surgical History:   Procedure Laterality Date    DEBRIDEMENT OF FOOT Right 4/21/2023    Procedure: DEBRIDEMENT, FOOT;  Surgeon: Anju Melvin DPM;  Location: [facility];  Service: Podiatry;  Laterality: Right;    ENDOSCOPIC ULTRASOUND OF UPPER GASTROINTESTINAL TRACT N/A 5/27/2019    Procedure: ULTRASOUND, UPPER GI TRACT, ENDOSCOPIC;  Surgeon: Zafar Velazquez MD;  Location: [address]);  Service: Endoscopy;  Laterality: N/A;    ERCP N/A 5/27/2019    Procedure: ERCP (ENDOSCOPIC RETROGRADE CHOLANGIOPANCREATOGRAPHY);  Surgeon: Zafar Velazquez MD;  Location: [address]);  Service: Endoscopy;  Laterality: N/A;    ESOPHAGOGASTRODUODENOSCOPY      ESOPHAGOGASTRODUODENOSCOPY N/A 1/31/2020    Procedure: EGD (ESOPHAGOGASTRODUODENOSCOPY);  Surgeon: Zafar Velazquez MD;  Location: [facility];  Service: Endoscopy;  Laterality: N/A;    ESOPHAGOGASTRODUODENOSCOPY N/A 2/16/2024    Procedure: EGD (ESOPHAGOGASTRODUODENOSCOPY);  Surgeon: Rashida Messer MD;  Location: [facility];  Service: Endoscopy;  Laterality: N/A;    PLANTAR FASCIA RELEASE Right 4/21/2023    Procedure: RELEASE, FASCIA, PLANTAR;  Surgeon: Anju Melvin DPM;  Location: [facility] OR;  Service: Podiatry;  Laterality: Right;    RESECTION OF GASTROCNEMIUS MUSCLE Right 4/21/2023    Procedure: RESECTION, MUSCLE, GASTROCNEMIUS;  Surgeon: Anju Melvin DPM;  Location: [facility] OR;  Service: Podiatry;  Laterality: Right;       Family History   Problem Relation Age of Onset    Aneurysm Mother         AAA    Coronary artery disease Father         4  vessel bypass at 40, possible stent at 36    Diabetes type II Father     No Known Problems Sister     No Known Problems Brother     Aneurysm Maternal Grandmother         Possible AAA    Diabetes type II Paternal Grandmother        Social History     Socioeconomic History    Marital status:    Tobacco Use    Smoking status: Never     Passive exposure: Never    Smokeless tobacco: Never   Substance and Sexual Activity    Alcohol use: No    Drug use: Never    Sexual activity: Not Currently     Partners: Female     Social Determinants of Health     Financial Resource Strain: Low Risk  (1/13/2024)    Overall Financial Resource Strain (CARDIA)     Difficulty of Paying Living Expenses: Not very hard   Food Insecurity: Food Insecurity Present (1/13/2024)    Hunger Vital Sign     Worried About Running Out of Food in the Last Year: Sometimes true     Ran Out of Food in the Last Year: Sometimes true   Transportation Needs: No Transportation Needs (1/13/2024)    PRAPARE - Transportation     Lack of Transportation (Medical): No     Lack of Transportation (Non-Medical): No   Physical Activity: Insufficiently Active (1/4/2024)    Exercise Vital Sign     Days of Exercise per Week: 2 days     Minutes of Exercise per Session: 40 min   Stress: No Stress Concern Present (1/13/2024)    [employer] - Occupational Stress Questionnaire     Feeling of Stress : Only a little   Social Connections: Unknown (1/4/2024)    Social Connection and Isolation Panel [NHANES]     Frequency of Communication with Friends and Family: Once a week     Frequency of Social Gatherings with Friends and Family: Once a week     Active Member of Clubs or Organizations: No     Attends Club or Organization Meetings: Patient declined     Marital Status:    Housing Stability: Low Risk  (1/13/2024)    Housing Stability Vital Sign     Unable to Pay for Housing in the Last Year: No     Number of Places Lived in the Last Year: 1  "    Unstable Housing in the Last Year: No       Review of Systems   Gastrointestinal:  Positive for abdominal pain. Negative for constipation, diarrhea, nausea and vomiting.         Objective:     Vitals:    02/23/24 0830   BP: 138/68   BP Location: Left arm   Patient Position: Sitting   BP Method: Large (Manual)   Pulse: 100   SpO2: 96%   Weight: 97.9 kg (215 lb 13.3 oz)   Height: 5' 8" (1.727 m)          Physical Exam  Constitutional:       Appearance: Normal appearance.   HENT:      Head: Normocephalic and atraumatic.   Cardiovascular:      Rate and Rhythm: Normal rate and regular rhythm.      Heart sounds: Normal heart sounds.   Pulmonary:      Effort: Pulmonary effort is normal.      Breath sounds: Normal breath sounds.   Abdominal:      General: Bowel sounds are normal.      Palpations: Abdomen is soft.      Tenderness: There is abdominal tenderness in the epigastric area.   Musculoskeletal:      Right lower leg: Edema present.      Left lower leg: Edema present.   Skin:     General: Skin is warm and dry.      Comments: Mild erythema noted to LLE. No warmth or drainage noted.    Neurological:      General: No focal deficit present.      Mental Status: He is alert and oriented to person, place, and time.   Psychiatric:         Mood and Affect: Mood normal.         Speech: Speech normal.           Laboratory:  CBC:  Recent Labs   Lab Result Units 02/15/24  0507 02/16/24  0607 02/17/24  0542   WBC K/uL 4.07 3.17* 2.67*   RBC M/uL 4.49* 4.51* 4.49*   Hemoglobin g/dL 11.7* 11.7* 11.7*   Hematocrit % 36.4* 36.3* 35.9*   Platelets K/uL 147* 124* 128*   MCV fL 81* 81* 80*   MCH pg 26.1* 25.9* 26.1*   MCHC g/dL 32.1 32.2 32.6     CMP:  Recent Labs   Lab Result Units 02/15/24  0507 02/16/24  0607 02/17/24  0542   Glucose mg/dL 186* 181* 207*   Calcium mg/dL 8.0* 8.4* 8.6*   Albumin g/dL 3.5 3.4* 3.5   Total Protein g/dL 6.4 6.4 6.4   Sodium mmol/L 140 141 139   Potassium mmol/L 3.6 3.6 3.6   CO2 mmol/L 32* 31* 29 " "  Chloride mmol/L 104 107 108   BUN mg/dL 15 11 9   Alkaline Phosphatase U/L 70 69 75   ALT U/L 41 40 45*   AST U/L 40 37 42   Total Bilirubin mg/dL 0.7 0.7 0.7     URINALYSIS:  Recent Labs   Lab Result Units 02/09/24  1416   Color, UA  Doreen   Specific Gravity, UA  1.020   pH, UA  8.0   Protein, UA  2+*   Bacteria /hpf Many*   Nitrite, UA  Negative   Leukocytes, UA  Negative   Urobilinogen, UA EU/dL Negative   Hyaline Casts, UA /lpf 0      LIPIDS:  Recent Labs   Lab Result Units 01/03/24  0629 02/14/24  0626   HDL mg/dL 17* 18*   Cholesterol mg/dL 108* 99*   Triglycerides mg/dL 789* 158*   LDL Cholesterol mg/dL Invalid, Trig>400.0 49.4*   HDL/Cholesterol Ratio % 15.7* 18.2*   Non-HDL Cholesterol mg/dL 91 81   Total Cholesterol/HDL Ratio  6.4* 5.5*     TSH:  No results for input(s): "TSH" in the last 2160 hours.  A1C:  Recent Labs   Lab Result Units 01/03/24  0629   Hemoglobin A1C % 9.8*       Assessment:         ICD-10-CM ICD-9-CM   1. Hospital discharge follow-up  Z09 V67.59   2. Upper abdominal pain  R10.10 789.09   3. Nausea and vomiting, unspecified vomiting type  R11.2 787.01   4. Diabetic gastroparesis associated with type 1 diabetes mellitus  E10.43 250.61    K31.84 536.3   5. Essential hypertension  I10 401.9   6. Type 1 diabetes mellitus with hyperglycemia  E10.65 250.01       Plan:       Hospital discharge follow-up  No pending testing to be completed.     Upper abdominal pain  Improved since hospital stay. Continue with Carafate as prescribed. Keep f/u appt with GI.     Nausea and vomiting, unspecified vomiting type  Resolved. Continue with zofran prn.     Diabetic gastroparesis associated with type 1 diabetes mellitus  Gastric empty study on 02/15/24 showed delayed gastric emptying time. Symptoms improved.   Scheduled with GI in May.     Essential hypertension  Controlled with current regimen.     Type 1 diabetes mellitus with hyperglycemia  Last A1C 9.8. Instructed to continue with his current doses of " insulin. Continue to monitor glucose levels TID, increase insulin if needed and communicate this with Endocrinology.       If symptoms worsen, go to ER.  If symptoms do not improve, return to clinic.   Keep appointments with all specialists.     Patient verbalizes understanding and agrees with current treatment plan.      Follow up in about 3 months (around 5/23/2024).     Patient's Medications   New Prescriptions    No medications on file   Previous Medications    ALLOPURINOL (ZYLOPRIM) 300 MG TABLET    Take 1 tablet (300 mg total) by mouth once daily.    ASPIRIN (ECOTRIN) 81 MG EC TABLET    Take 81 mg by mouth once daily.    ATORVASTATIN (LIPITOR) 40 MG TABLET    Take 1 tablet (40 mg total) by mouth once daily.    BLOOD SUGAR DIAGNOSTIC (CONTOUR NEXT TEST STRIPS) STRP    To test glucose 4 times a day.    BLOOD-GLUCOSE SENSOR (DEXCOM G7 SENSOR) MARANDA    Change sensor every 10 days.    CICLOPIROX (PENLAC) 8 % SOLN    Apply topically nightly.    FENOFIBRATE 160 MG TAB    Take 1 tablet (160 mg total) by mouth once daily.    ICOSAPENT ETHYL (VASCEPA) 1 GRAM CAP    Take 2 capsules (2 g total) by mouth 2 (two) times daily.    INSULIN (LANTUS SOLOSTAR U-100 INSULIN) GLARGINE 100 UNITS/ML SUBQ PEN    Inject 30 Units into the skin 2 (two) times a day.    INSULIN ASPART U-100 (NOVOLOG FLEXPEN U-100 INSULIN) 100 UNIT/ML (3 ML) INPN PEN    Inject 15 Units into the skin 3 (three) times daily with meals. Plus correction scale. Max TDD 100units.    LOSARTAN (COZAAR) 100 MG TABLET    Take ½ tablet (50 mg total) by mouth once daily.    METFORMIN (GLUCOPHAGE) 500 MG TABLET    Take 2 tablets (1,000 mg total) by mouth 2 (two) times daily with meals.    METOCLOPRAMIDE HCL (REGLAN) 10 MG TABLET    Take 1 tablet (10 mg total) by mouth 4 (four) times daily.    NIACIN 100 MG TAB    Take 2 tablets (200 mg total) by mouth every evening.    ONDANSETRON (ZOFRAN) 4 MG TABLET    Take 1 tablet (4 mg total) by mouth every 6 (six) hours as needed  for Nausea.    ONDANSETRON (ZOFRAN) 4 MG TABLET    Take 1 tablet (4 mg total) by mouth every 6 (six) hours.    PANTOPRAZOLE (PROTONIX) 40 MG TABLET    Take 1 tablet (40 mg total) by mouth 2 (two) times daily.    PREGABALIN (LYRICA) 150 MG CAPSULE    Take 1 capsule (150 mg total) by mouth 2 (two) times daily.    SUCRALFATE (CARAFATE) 100 MG/ML SUSPENSION    Take 10 mLs (1 g total) by mouth 4 (four) times daily.   Modified Medications    No medications on file   Discontinued Medications    No medications on file         Britney Jimenez NP

## 2024-02-23 NOTE — PATIENT INSTRUCTIONS
Continue to check yoru sugar levels three times per day. Ok to stay where you are at 25 units Novolog and 30 Lantus BID. If you notice your sugar is going up, slowly increase and communicate this via the portal with endocrinology.     Follow up in 3 months or sooner if needed.   Keep appointment with all specialists.

## 2024-02-27 ENCOUNTER — CLINICAL SUPPORT (OUTPATIENT)
Dept: OTHER | Facility: CLINIC | Age: 44
End: 2024-02-27
Payer: COMMERCIAL

## 2024-02-27 DIAGNOSIS — Z00.8 ENCOUNTER FOR OTHER GENERAL EXAMINATION: ICD-10-CM

## 2024-02-27 PROCEDURE — 99401 PREV MED CNSL INDIV APPRX 15: CPT | Mod: S$GLB,,, | Performed by: INTERNAL MEDICINE

## 2024-02-27 PROCEDURE — 80061 LIPID PANEL: CPT | Mod: QW,S$GLB,, | Performed by: INTERNAL MEDICINE

## 2024-02-27 PROCEDURE — 83036 HEMOGLOBIN GLYCOSYLATED A1C: CPT | Mod: QW,S$GLB,, | Performed by: INTERNAL MEDICINE

## 2024-02-27 PROCEDURE — 82947 ASSAY GLUCOSE BLOOD QUANT: CPT | Mod: QW,S$GLB,, | Performed by: INTERNAL MEDICINE

## 2024-02-28 VITALS
DIASTOLIC BLOOD PRESSURE: 81 MMHG | BODY MASS INDEX: 31.37 KG/M2 | WEIGHT: 207 LBS | SYSTOLIC BLOOD PRESSURE: 142 MMHG | HEIGHT: 68 IN

## 2024-02-28 LAB
HBA1C MFR BLD: 7.5 %
HDLC SERPL-MCNC: 16 MG/DL
POC CHOLESTEROL, TOTAL: 99 MG/DL
POC GLUCOSE, FASTING: 325 MG/DL (ref 60–110)
TRIGL SERPL-MCNC: 141 MG/DL

## 2024-03-07 ENCOUNTER — PATIENT MESSAGE (OUTPATIENT)
Dept: FAMILY MEDICINE | Facility: CLINIC | Age: 44
End: 2024-03-07
Payer: COMMERCIAL

## 2024-03-07 RX ORDER — CYCLOBENZAPRINE HCL 5 MG
5 TABLET ORAL 3 TIMES DAILY PRN
Qty: 30 TABLET | Refills: 2 | Status: SHIPPED | OUTPATIENT
Start: 2024-03-07

## 2024-03-07 RX ORDER — CYCLOBENZAPRINE HCL 5 MG
5 TABLET ORAL 3 TIMES DAILY PRN
COMMUNITY
End: 2024-03-07 | Stop reason: SDUPTHER

## 2024-03-07 NOTE — TELEPHONE ENCOUNTER
Care Due:                  Date            Visit Type   Department     Provider  --------------------------------------------------------------------------------                                EP -                              PRIMARY [facility]  Last Visit: 01-      [facility]                               -                              PRIMARY      [facility]  Next Visit: 05-      [facility]                                                            Last  Test          Frequency    Reason                     Performed    Due Date  --------------------------------------------------------------------------------    Uric Acid...  12 months..  allopurinoL..............  Not Found    Overdue    [employer] Embedded Care Due Messages. Reference number: [id number].   3/07/2024 11:27:21 AM CST

## 2024-04-01 NOTE — PROGRESS NOTES
Subjective:      Patient ID: Bay Ibarra is a 43 y.o. male.    Chief Complaint:  No chief complaint on file.    History of Present Illness  Bay Ibarra is here for follow up of DM.  Previously seen by me on 1/2024.   This is a MyChart video visit.    The patient location is: [place]  The chief complaint leading to consultation is: DM  Visit type: Virtual visit with synchronous audio and video  Total time spent with patient: see below  Each patient to whom he or she provides medical services by telemedicine is:  (1) informed of the relationship between the physician and patient and the respective role of any other health care provider with respect to management of the patient; and (2) notified that he or she may decline to receive medical services by telemedicine and may withdraw from such care at any time.    With regards to Diabetes:    3/2021  Cpeptide 1.25 with glucose 315    Diagnosed: 2009  Education - last visit: 10/2023  FH: father, mother, paternal grandfather   2 siblings - denies DM   Known complications:  DKA + 11/2020, 1/2023  RN -  Eye Exam: 7/2022 - needs to make an appointment    PN +  Podiatry: 10/2023  Nephropathy -  CAD -  Hx pancreatitis - 2013, 2x in 2017, 2024  Diabetic foot ulcer R greater toe - following with Podiatry     Diet/Exercise:   Eats 3 meals a day.   Snacks : Denies   Drinks : coffee, water, unsweet tea, has cut back on diet soft drinks   Exercise - tries to stay active.   Recent illness, injury, steroids: since LOV - hospital admission for cellulitis and pancreatitis     Current Regimen:  Metformin 1000mg twice daily   Lantus 50 units twice daily  Novolog 36 units plus correction scale before meals   Add correction scale as needed.   Blood sugar 150 to 200 add 2 unit   Blood sugar 201 to 250 add 4 units   Blood sugar 251 to 300 add 6 units   Blood sugar 301 to 350 add 9 units   Blood sugar greater than 350 add 10 units    Reports compliance.     Other medications  "tried:  Jardiance - stopped in 2020 due to DKA   MFM  DPP4  "Other orals"    Glucose Monitor: Dexcom G7   6 times a day testing  Log reviewed: oral recall  Fastin-250  Throughout the day 100-260    Hypoglycemia:  Rare - before lunch   Knows how to correct with 15 grams of carbs- juice, coke, or a peppermint.      Diabetes Management Status    Hemoglobin A1C   Date Value Ref Range Status   2024 9.5 (H) 4.0 - 5.6 % Final     Comment:     ADA Screening Guidelines:  5.7-6.4%  Consistent with prediabetes  >or=6.5%  Consistent with diabetes    High levels of fetal hemoglobin interfere with the HbA1C  assay. Heterozygous hemoglobin variants (HbS, HgC, etc)do  not significantly interfere with this assay.   However, presence of multiple variants may affect accuracy.     2024 9.8 (H) 4.0 - 5.6 % Final     Comment:     ADA Screening Guidelines:  5.7-6.4%  Consistent with prediabetes  >or=6.5%  Consistent with diabetes    High levels of fetal hemoglobin interfere with the HbA1C  assay. Heterozygous hemoglobin variants (HbS, HgC, etc)do  not significantly interfere with this assay.   However, presence of multiple variants may affect accuracy.     10/09/2023 11.1 (H) 4.0 - 5.6 % Final     Comment:     ADA Screening Guidelines:  5.7-6.4%  Consistent with prediabetes  >or=6.5%  Consistent with diabetes    High levels of fetal hemoglobin interfere with the HbA1C  assay. Heterozygous hemoglobin variants (HbS, HgC, etc)do  not significantly interfere with this assay.   However, presence of multiple variants may affect accuracy.         Statin: Taking  ACE/ARB: Taking  Screening or Prevention Patient's value Goal Complete/Controlled?   HgA1C Testing and Control   Lab Results   Component Value Date    HGBA1C 9.5 (H) 2024      Annually/Less than 8% No   Lipid profile : 2024 Annually Yes   LDL control Lab Results   Component Value Date    LDLCALC 49.4 (L) 2024    Annually/Less than 100 mg/dl  " "Yes   Nephropathy screening Lab Results   Component Value Date    LABMICR 70.0 10/09/2023    LABMICR 70.0 10/09/2023    LABMICR 72.0 10/09/2023     Lab Results   Component Value Date    PROTEINUA 2+ (A) 02/09/2024    Annually Yes   Blood pressure BP Readings from Last 1 Encounters:   02/27/24 (!) 142/81    Less than 140/90 Yes   Dilated retinal exam : 07/27/2022 Annually Yes   Foot exam   : 01/25/2023 Annually Yes     Review of Systems  As above     There were no vitals taken for this visit.      There is no height or weight on file to calculate BMI.    Lab Review:   Lab Results   Component Value Date    HGBA1C 9.5 (H) 03/28/2024    HGBA1C 9.8 (H) 01/03/2024    HGBA1C 11.1 (H) 10/09/2023       Lab Results   Component Value Date    CHOL 99 (L) 02/14/2024    HDL 18 (L) 02/14/2024    LDLCALC 49.4 (L) 02/14/2024    TRIG 158 (H) 02/14/2024    CHOLHDL 18.2 (L) 02/14/2024     Lab Results   Component Value Date     03/28/2024    K 4.5 03/28/2024    CL 98 03/28/2024    CO2 30 (H) 03/28/2024     (H) 03/28/2024    BUN 19 03/28/2024    CREATININE 1.15 03/28/2024    CALCIUM 9.4 03/28/2024    PROT 7.5 03/28/2024    ALBUMIN 4.2 03/28/2024    BILITOT 0.7 03/28/2024    ALKPHOS 108 03/28/2024    AST 51 (H) 03/28/2024    ALT 55 (H) 03/28/2024    ANIONGAP 15 03/28/2024    ESTGFRAFRICA >60.0 03/01/2022    EGFRNONAA >60.0 03/01/2022    TSH 2.380 06/02/2023     No results found for: "[id number]"  Assessment and Plan     1. Type 1 diabetes mellitus with hyperglycemia  Hemoglobin A1C    Comprehensive Metabolic Panel      2. Type 1 diabetes mellitus with diabetic neuropathy  insulin (LANTUS SOLOSTAR U-100 INSULIN) glargine 100 units/mL SubQ pen    insulin aspart U-100 (NOVOLOG FLEXPEN U-100 INSULIN) 100 unit/mL (3 mL) InPn pen            Type 1 diabetes mellitus with hyperglycemia  -- Labs in 3 months.  -- A1c goal <7.0%.  -- Medications discussed:  MFM  GLP1/DPP4i - pancreatitis  SGLT2i - DKA  SFU  Insulin   -- Reviewed " logs/CGM:  Fasting hyperglycemia.  Occasional hypoglycemia before lunch.  Instructed to send glucose logs in 7-14 days.    Reach out to me sooner for any glucose <70 or consistently >200.  -- Very high doses of insulin and limited on options due to co morbidities. Met with DE to discuss U500.   -- Encouraged dietary/ lifestyle changes.   -- Medication Changes:   Metformin 1000mg twice daily   Lantus 55 units twice daily  Novolog 32units plus correction scale before meals for breakfast AND Novolog 36units plus correction scale before lunch and dinner  Add correction scale as needed.   Blood sugar 150 to 200 add 2 unit   Blood sugar 201 to 250 add 4 units   Blood sugar 251 to 300 add 6 units   Blood sugar 301 to 350 add 9 units   Blood sugar greater than 350 add 10 units  -- Reviewed goals of therapy are to get the best control we can without hypoglycemia.  -- Reviewed patient's current insulin regimen. Clarified proper insulin dose and timing in relation to meals, etc. Insulin injection sites and proper rotation instructed.    -- Advised frequent self blood glucose monitoring.  Patient encouraged to document glucose results and bring them to every clinic visit.  -- Hypoglycemia precautions discussed. Instructed on precautions before driving.    -- Call for Bg repeatedly < 90 or > 180.   -- Close adherence to lifestyle changes recommended.   -- Periodic follow ups for eye evaluations, foot care and dental care suggested.            Follow up in about 3 months (around 7/4/2024).    I spent 30 minutes face-to-face with the patient, over half of the visit was spent on counseling and/or coordinating the care of the patient.    Counseling includes:  Diagnostic results, impressions, recommendations   Prognosis   Risk and benefits of management/treatment options   Instructions for management treatment and or follow-up   Importance of compliance with management   Risk factor reduction   Patient education    Visit today  included increased complexity associated with the care of the problems addressed and managing the longitudinal care of the patient due to the serious and/or complex managed problems.

## 2024-04-03 ENCOUNTER — PATIENT MESSAGE (OUTPATIENT)
Dept: FAMILY MEDICINE | Facility: CLINIC | Age: 44
End: 2024-04-03
Payer: COMMERCIAL

## 2024-04-04 ENCOUNTER — OFFICE VISIT (OUTPATIENT)
Dept: ENDOCRINOLOGY | Facility: CLINIC | Age: 44
End: 2024-04-04
Payer: COMMERCIAL

## 2024-04-04 ENCOUNTER — OFFICE VISIT (OUTPATIENT)
Dept: FAMILY MEDICINE | Facility: CLINIC | Age: 44
End: 2024-04-04
Payer: COMMERCIAL

## 2024-04-04 ENCOUNTER — PATIENT MESSAGE (OUTPATIENT)
Dept: ENDOCRINOLOGY | Facility: CLINIC | Age: 44
End: 2024-04-04

## 2024-04-04 VITALS
BODY MASS INDEX: 33.8 KG/M2 | HEIGHT: 68 IN | DIASTOLIC BLOOD PRESSURE: 70 MMHG | SYSTOLIC BLOOD PRESSURE: 136 MMHG | WEIGHT: 223 LBS | HEART RATE: 112 BPM | OXYGEN SATURATION: 97 %

## 2024-04-04 DIAGNOSIS — E78.2 MIXED HYPERLIPIDEMIA: Chronic | ICD-10-CM

## 2024-04-04 DIAGNOSIS — G44.52 NEW DAILY PERSISTENT HEADACHE: ICD-10-CM

## 2024-04-04 DIAGNOSIS — E11.42 TYPE 2 DIABETES MELLITUS WITH DIABETIC POLYNEUROPATHY, WITH LONG-TERM CURRENT USE OF INSULIN: Chronic | ICD-10-CM

## 2024-04-04 DIAGNOSIS — E10.65 TYPE 1 DIABETES MELLITUS WITH HYPERGLYCEMIA: Primary | ICD-10-CM

## 2024-04-04 DIAGNOSIS — E10.40 TYPE 1 DIABETES MELLITUS WITH DIABETIC NEUROPATHY: ICD-10-CM

## 2024-04-04 DIAGNOSIS — I10 ESSENTIAL HYPERTENSION: Chronic | ICD-10-CM

## 2024-04-04 DIAGNOSIS — E10.65 TYPE 1 DIABETES MELLITUS WITH HYPERGLYCEMIA: ICD-10-CM

## 2024-04-04 DIAGNOSIS — Z79.4 TYPE 2 DIABETES MELLITUS WITH DIABETIC POLYNEUROPATHY, WITH LONG-TERM CURRENT USE OF INSULIN: Chronic | ICD-10-CM

## 2024-04-04 DIAGNOSIS — D61.818 PANCYTOPENIA: ICD-10-CM

## 2024-04-04 DIAGNOSIS — G47.33 OSA (OBSTRUCTIVE SLEEP APNEA): Primary | Chronic | ICD-10-CM

## 2024-04-04 PROCEDURE — 1160F RVW MEDS BY RX/DR IN RCRD: CPT | Mod: CPTII,95,, | Performed by: NURSE PRACTITIONER

## 2024-04-04 PROCEDURE — 1159F MED LIST DOCD IN RCRD: CPT | Mod: CPTII,S$GLB,, | Performed by: FAMILY MEDICINE

## 2024-04-04 PROCEDURE — 99214 OFFICE O/P EST MOD 30 MIN: CPT | Mod: S$GLB,,, | Performed by: FAMILY MEDICINE

## 2024-04-04 PROCEDURE — 3008F BODY MASS INDEX DOCD: CPT | Mod: CPTII,S$GLB,, | Performed by: FAMILY MEDICINE

## 2024-04-04 PROCEDURE — 3075F SYST BP GE 130 - 139MM HG: CPT | Mod: CPTII,S$GLB,, | Performed by: FAMILY MEDICINE

## 2024-04-04 PROCEDURE — 1160F RVW MEDS BY RX/DR IN RCRD: CPT | Mod: CPTII,S$GLB,, | Performed by: FAMILY MEDICINE

## 2024-04-04 PROCEDURE — 3046F HEMOGLOBIN A1C LEVEL >9.0%: CPT | Mod: CPTII,S$GLB,, | Performed by: FAMILY MEDICINE

## 2024-04-04 PROCEDURE — 3046F HEMOGLOBIN A1C LEVEL >9.0%: CPT | Mod: CPTII,95,, | Performed by: NURSE PRACTITIONER

## 2024-04-04 PROCEDURE — 3078F DIAST BP <80 MM HG: CPT | Mod: CPTII,S$GLB,, | Performed by: FAMILY MEDICINE

## 2024-04-04 PROCEDURE — 99214 OFFICE O/P EST MOD 30 MIN: CPT | Mod: 95,,, | Performed by: NURSE PRACTITIONER

## 2024-04-04 PROCEDURE — 1159F MED LIST DOCD IN RCRD: CPT | Mod: CPTII,95,, | Performed by: NURSE PRACTITIONER

## 2024-04-04 PROCEDURE — 99999 PR PBB SHADOW E&M-EST. PATIENT-LVL V: CPT | Mod: PBBFAC,,, | Performed by: FAMILY MEDICINE

## 2024-04-04 PROCEDURE — G2211 COMPLEX E/M VISIT ADD ON: HCPCS | Mod: 95,,, | Performed by: NURSE PRACTITIONER

## 2024-04-04 RX ORDER — INSULIN ASPART 100 [IU]/ML
36 INJECTION, SOLUTION INTRAVENOUS; SUBCUTANEOUS
Qty: 100 ML | Refills: 3 | Status: ON HOLD | OUTPATIENT
Start: 2024-04-04 | End: 2024-05-04

## 2024-04-04 RX ORDER — NAPROXEN 500 MG/1
500 TABLET ORAL 2 TIMES DAILY PRN
Qty: 30 TABLET | Refills: 0 | Status: ON HOLD | OUTPATIENT
Start: 2024-04-04 | End: 2024-05-02

## 2024-04-04 RX ORDER — INSULIN GLARGINE 100 [IU]/ML
55 INJECTION, SOLUTION SUBCUTANEOUS 2 TIMES DAILY
Qty: 100 ML | Refills: 3 | Status: SHIPPED | OUTPATIENT
Start: 2024-04-04 | End: 2024-04-22 | Stop reason: SDUPTHER

## 2024-04-04 RX ORDER — BUTALBITAL, ACETAMINOPHEN AND CAFFEINE 50; 325; 40 MG/1; MG/1; MG/1
1 TABLET ORAL EVERY 6 HOURS PRN
Qty: 20 TABLET | Refills: 0 | Status: ON HOLD | OUTPATIENT
Start: 2024-04-04 | End: 2024-05-02

## 2024-04-04 NOTE — ASSESSMENT & PLAN NOTE
-- Labs in 3 months.  -- A1c goal <7.0%.  -- Medications discussed:  MFM  GLP1/DPP4i - pancreatitis  SGLT2i - DKA  SFU  Insulin   -- Reviewed logs/CGM:  Fasting hyperglycemia.  Occasional hypoglycemia before lunch.  Instructed to send glucose logs in 7-14 days.    Reach out to me sooner for any glucose <70 or consistently >200.  -- Very high doses of insulin and limited on options due to co morbidities. Met with DE to discuss U500.   -- Encouraged dietary/ lifestyle changes.   -- Medication Changes:   Metformin 1000mg twice daily   Lantus 55 units twice daily  Novolog 32units plus correction scale before meals for breakfast AND Novolog 36units plus correction scale before lunch and dinner  Add correction scale as needed.   Blood sugar 150 to 200 add 2 unit   Blood sugar 201 to 250 add 4 units   Blood sugar 251 to 300 add 6 units   Blood sugar 301 to 350 add 9 units   Blood sugar greater than 350 add 10 units  -- Reviewed goals of therapy are to get the best control we can without hypoglycemia.  -- Reviewed patient's current insulin regimen. Clarified proper insulin dose and timing in relation to meals, etc. Insulin injection sites and proper rotation instructed.    -- Advised frequent self blood glucose monitoring.  Patient encouraged to document glucose results and bring them to every clinic visit.  -- Hypoglycemia precautions discussed. Instructed on precautions before driving.    -- Call for Bg repeatedly < 90 or > 180.   -- Close adherence to lifestyle changes recommended.   -- Periodic follow ups for eye evaluations, foot care and dental care suggested.

## 2024-04-04 NOTE — PROGRESS NOTES
PATIENT VISIT FAMILY MEDICINE    CC:   Chief Complaint   Patient presents with    Follow-up    Headache     Everyday for the last week       HPI: Bay Ibarra  is a 43 y.o. male:    Patient is known to me.  Patient presents routine follow up on chronic conditions    Headache - daily for past 1 week. Was severe. Took ibuprofen. It took time for it get better. No vision changes. No vomiting. Feels like tightness/squeezing on left side of head/temple. No changes to routine. Still did not get CPAP. Dx with sleep apnea 2022 was last sleep study.     PMHX:   Past Medical History:   Diagnosis Date    Acute pancreatitis     Cellulitis of left lower extremity 01/11/2024    Diverticulosis of colon     Essential hypertension     Fatty liver     Gastric varices without bleeding 01/16/2020    Glaucomatous optic atrophy, right     Gout     Hx of acute pancreatitis 03/03/2017    Hypertriglyceridemia 08/02/2017    Obesity (BMI 30-39.9) 06/05/2015    Obesity (BMI 35.0-39.9 without comorbidity) 06/05/2015    Obstructive sleep apnea syndrome 12/18/2015    Type 2 diabetes mellitus with diabetic polyneuropathy, with long-term current use of insulin 10/16/2017    Type 2 diabetes mellitus with hyperglycemia, with long-term current use of insulin 08/03/2017       PSHX:   Past Surgical History:   Procedure Laterality Date    DEBRIDEMENT OF FOOT Right 4/21/2023    Procedure: DEBRIDEMENT, FOOT;  Surgeon: Anju Melvin DPM;  Location: [facility];  Service: Podiatry;  Laterality: Right;    ENDOSCOPIC ULTRASOUND OF UPPER GASTROINTESTINAL TRACT N/A 5/27/2019    Procedure: ULTRASOUND, UPPER GI TRACT, ENDOSCOPIC;  Surgeon: Zafar Velazquez MD;  Location: [address]);  Service: Endoscopy;  Laterality: N/A;    ERCP N/A 5/27/2019    Procedure: ERCP (ENDOSCOPIC RETROGRADE CHOLANGIOPANCREATOGRAPHY);  Surgeon: Zafar Velazquez MD;  Location: [facility] ([address] [place] [address]);  Service: Endoscopy;  Laterality: N/A;    ESOPHAGOGASTRODUODENOSCOPY       ESOPHAGOGASTRODUODENOSCOPY N/A 1/31/2020    Procedure: EGD (ESOPHAGOGASTRODUODENOSCOPY);  Surgeon: Zafar Velazquez MD;  Location: [facility] ENDO;  Service: Endoscopy;  Laterality: N/A;    ESOPHAGOGASTRODUODENOSCOPY N/A 2/16/2024    Procedure: EGD (ESOPHAGOGASTRODUODENOSCOPY);  Surgeon: Rashida Messer MD;  Location: [facility] ENDO;  Service: Endoscopy;  Laterality: N/A;    PLANTAR FASCIA RELEASE Right 4/21/2023    Procedure: RELEASE, FASCIA, PLANTAR;  Surgeon: Anju Melvin DPM;  Location: [facility] OR;  Service: Podiatry;  Laterality: Right;    RESECTION OF GASTROCNEMIUS MUSCLE Right 4/21/2023    Procedure: RESECTION, MUSCLE, GASTROCNEMIUS;  Surgeon: Anju Melvin DPM;  Location: [facility] OR;  Service: Podiatry;  Laterality: Right;       FAMHX:   Family History   Problem Relation Age of Onset    Aneurysm Mother         AAA    Coronary artery disease Father         4 vessel bypass at 40, possible stent at 36    Diabetes type II Father     No Known Problems Sister     No Known Problems Brother     Aneurysm Maternal Grandmother         Possible AAA    Diabetes type II Paternal Grandmother        SOCHX:   Social History     Socioeconomic History    Marital status:    Tobacco Use    Smoking status: Never     Passive exposure: Never    Smokeless tobacco: Never   Substance and Sexual Activity    Alcohol use: No    Drug use: Never    Sexual activity: Not Currently     Partners: Female     Social Determinants of Health     Financial Resource Strain: Low Risk  (1/13/2024)    Overall Financial Resource Strain (CARDIA)     Difficulty of Paying Living Expenses: Not very hard   Food Insecurity: Food Insecurity Present (1/13/2024)    Hunger Vital Sign     Worried About Running Out of Food in the Last Year: Sometimes true     Ran Out of Food in the Last Year: Sometimes true   Transportation Needs: No Transportation Needs (1/13/2024)    PRAPARE - Transportation     Lack of Transportation (Medical): No     Lack of Transportation  (Non-Medical): No   Physical Activity: Insufficiently Active (1/4/2024)    Exercise Vital Sign     Days of Exercise per Week: 2 days     Minutes of Exercise per Session: 40 min   Stress: No Stress Concern Present (1/13/2024)    [place] [employer] - Occupational Stress Questionnaire     Feeling of Stress : Only a little   Social Connections: Unknown (1/4/2024)    Social Connection and Isolation Panel [NHANES]     Frequency of Communication with Friends and Family: Once a week     Frequency of Social Gatherings with Friends and Family: Once a week     Active Member of Clubs or Organizations: No     Attends Club or Organization Meetings: Patient declined     Marital Status:    Housing Stability: Low Risk  (1/13/2024)    Housing Stability Vital Sign     Unable to Pay for Housing in the Last Year: No     Number of Places Lived in the Last Year: 1     Unstable Housing in the Last Year: No       ALLERGIES: Review of patient's allergies indicates:  No Known Allergies    MEDS:   Current Outpatient Medications:     allopurinoL (ZYLOPRIM) 300 MG tablet, Take 1 tablet (300 mg total) by mouth once daily., Disp: 90 tablet, Rfl: 3    aspirin (ECOTRIN) 81 MG EC tablet, Take 81 mg by mouth once daily., Disp: , Rfl:     atorvastatin (LIPITOR) 40 MG tablet, Take 1 tablet (40 mg total) by mouth once daily., Disp: 90 tablet, Rfl: 3    ciclopirox (PENLAC) 8 % Soln, Apply topically nightly., Disp: 6.6 mL, Rfl: 6    cyclobenzaprine (FLEXERIL) 5 MG tablet, Take 1 tablet (5 mg total) by mouth 3 (three) times daily as needed for Muscle spasms., Disp: 30 tablet, Rfl: 2    fenofibrate 160 MG Tab, Take 1 tablet (160 mg total) by mouth once daily., Disp: 90 tablet, Rfl: 3    insulin aspart U-100 (NOVOLOG FLEXPEN U-100 INSULIN) 100 unit/mL (3 mL) InPn pen, Inject 36 Units into the skin 3 (three) times daily with meals. Plus correction scale. Max TDD 140units., Disp: 100 mL, Rfl: 3    losartan (COZAAR) 100 MG tablet,  Take ½ tablet (50 mg total) by mouth once daily., Disp: 45 tablet, Rfl: 1    metFORMIN (GLUCOPHAGE) 500 MG tablet, Take 2 tablets (1,000 mg total) by mouth 2 (two) times daily with meals., Disp: 360 tablet, Rfl: 3    metoclopramide HCl (REGLAN) 10 MG tablet, Take 1 tablet (10 mg total) by mouth 4 (four) times daily., Disp: 120 tablet, Rfl: 2    niacin 100 MG Tab, Take 2 tablets (200 mg total) by mouth every evening., Disp: 90 tablet, Rfl: 1    ondansetron (ZOFRAN) 4 MG tablet, Take 1 tablet (4 mg total) by mouth every 6 (six) hours., Disp: 12 tablet, Rfl: 0    pantoprazole (PROTONIX) 40 MG tablet, Take 1 tablet (40 mg total) by mouth 2 (two) times daily., Disp: 60 tablet, Rfl: 2    pregabalin (LYRICA) 150 MG capsule, Take 1 capsule (150 mg total) by mouth 2 (two) times daily., Disp: 60 capsule, Rfl: 5    sucralfate (CARAFATE) 100 mg/mL suspension, Take 10 mLs (1 g total) by mouth 4 (four) times daily., Disp: 1200 mL, Rfl: 2    blood sugar diagnostic (CONTOUR NEXT TEST STRIPS) Strp, To test glucose 4 times a day., Disp: 200 each, Rfl: 11    blood-glucose sensor (DEXCOM G7 SENSOR) Isha, Change sensor every 10 days., Disp: 3 each, Rfl: 3    butalbital-acetaminophen-caffeine -40 mg (FIORICET, ESGIC) -40 mg per tablet, Take 1 tablet by mouth every 6 (six) hours as needed for Headaches., Disp: 20 tablet, Rfl: 0    icosapent ethyL (VASCEPA) 1 gram Cap, Take 2 capsules (2 g total) by mouth 2 (two) times daily., Disp: 360 capsule, Rfl: 3    insulin (LANTUS SOLOSTAR U-100 INSULIN) glargine 100 units/mL SubQ pen, Inject 55 Units into the skin 2 (two) times a day., Disp: 100 mL, Rfl: 3    naproxen (NAPROSYN) 500 MG tablet, Take 1 tablet (500 mg total) by mouth 2 (two) times daily as needed (headache)., Disp: 30 tablet, Rfl: 0    ondansetron (ZOFRAN) 4 MG tablet, Take 1 tablet (4 mg total) by mouth every 6 (six) hours as needed for Nausea. (Patient not taking: Reported on 2/23/2024), Disp: 30 tablet, Rfl:  "1    OBJECTIVE:   Vitals:    04/04/24 0946   BP: 136/70   BP Location: Left arm   Patient Position: Sitting   BP Method: Large (Manual)   Pulse: (!) 112   SpO2: 97%   Weight: 101.1 kg (222 lb 15.9 oz)   Height: 5' 8" (1.727 m)     Body mass index is 33.91 kg/m².      Physical Exam  Vitals and nursing note reviewed.   Constitutional:       Appearance: Normal appearance.   HENT:      Head: Normocephalic.   Eyes:      General:         Right eye: No discharge.         Left eye: No discharge.      Extraocular Movements: Extraocular movements intact.   Cardiovascular:      Rate and Rhythm: Regular rhythm. Tachycardia present.      Heart sounds: Normal heart sounds.   Pulmonary:      Effort: Pulmonary effort is normal.      Breath sounds: Normal breath sounds.   Skin:     Comments: No obvious rash on exposed skin   Neurological:      General: No focal deficit present.      Mental Status: He is alert.      Cranial Nerves: No cranial nerve deficit.      Gait: Gait normal.   Psychiatric:         Behavior: Behavior normal.           LABS:   A1C:  Recent Labs   Lab 03/28/24  0628   Hemoglobin A1C 9.5 H     CBC:  Recent Labs   Lab 04/04/24  1021   WBC 5.14   RBC 5.33   Hemoglobin 13.5 L   Hematocrit 41.9   Platelets 192   MCV 79 L   MCH 25.3 L   MCHC 32.2     CMP:  Recent Labs   Lab 03/28/24  0628   Glucose 323 H   Calcium 9.4   Albumin 4.2   Total Protein 7.5   Sodium 143   Potassium 4.5   CO2 30 H   Chloride 98   BUN 19   Creatinine 1.15   Alkaline Phosphatase 108   ALT 55 H   AST 51 H   Total Bilirubin 0.7     LIPIDS:  Recent Labs   Lab 06/02/23  0649 01/03/24  0629 02/14/24  0626   TSH 2.380  --   --    HDL  --    < > 18 L   Cholesterol  --    < > 99 L   Triglycerides  --    < > 158 H   LDL Cholesterol  --    < > 49.4 L   HDL/Cholesterol Ratio  --    < > 18.2 L   Non-HDL Cholesterol  --    < > 81   Total Cholesterol/HDL Ratio  --    < > 5.5 H    < > = values in this interval not displayed.     TSH:  Recent Labs   Lab " 06/02/23  0649   TSH 2.380         ASSESSMENT & PLAN:    Problem List Items Addressed This Visit          Neuro    New daily persistent headache    Overview     Improved today. Advised he see sleep medicine to figure out why getting CPAP has been so delayed. Advised to take fioricet today then can take naproxen PRN. Advised not to take these medications scheduled to avoid medication overuse headache. If headaches persist/more frequent, can consider preventive medication.          Relevant Medications    naproxen (NAPROSYN) 500 MG tablet    butalbital-acetaminophen-caffeine -40 mg (FIORICET, ESGIC) -40 mg per tablet       Cardiac/Vascular    Essential hypertension (Chronic)    Overview     Uncontrolled. F/u with home BG log and home monitor.          Mixed hyperlipidemia (Chronic)    Overview     TG high. Will repeat fasting with next labs. Continue current medications.             Oncology    Pancytopenia (Chronic)    Overview     Likely 2/2 to fatty liver. Will recheck. Refer to hepatology if worsening/persistent.          Relevant Orders    CBC Auto Differential (Completed)       Endocrine    Type 2 diabetes mellitus with diabetic polyneuropathy, with long-term current use of insulin (Chronic)    Overview     Uncontrolled   Continue current regimen   Followed by Endocrine  Advised that he will need regular and close f/u for his diabetes         Type 1 diabetes mellitus with hyperglycemia    Overview     Uncontrolled   Continue current regimen   Followed by Endocrine  Advised that he will need regular and close f/u for his diabetes              Other    JAZMYN (obstructive sleep apnea) - Primary (Chronic)    Overview     Waiting for replacement on CPAP         Relevant Orders    Ambulatory referral/consult to Sleep Disorders         Follow up in about 1 month (around 5/4/2024) for headaches.      RTC/ED precautions discussed where applicable.   Encouraged patient to let me know if there are any further  questions/concerns.     Advise patient/caretaker to check with insurance regarding orders to avoid unexpected fees/costs.     The patient/caretaker indicates understanding of these issues and agrees with the plan.    Dr. Funmilayo Taylor MD  Family Medicine

## 2024-04-04 NOTE — Clinical Note
VV in 3 months with labs prior Orders Placed This Encounter     Hemoglobin A1C     Comprehensive Metabolic Panel

## 2024-04-10 ENCOUNTER — TELEPHONE (OUTPATIENT)
Dept: ENDOCRINOLOGY | Facility: CLINIC | Age: 44
End: 2024-04-10
Payer: COMMERCIAL

## 2024-04-10 DIAGNOSIS — E10.40 TYPE 1 DIABETES MELLITUS WITH DIABETIC NEUROPATHY: ICD-10-CM

## 2024-04-10 RX ORDER — PREGABALIN 150 MG/1
150 CAPSULE ORAL 2 TIMES DAILY
Qty: 60 CAPSULE | Refills: 5 | Status: SHIPPED | OUTPATIENT
Start: 2024-04-10

## 2024-04-10 RX ORDER — BLOOD-GLUCOSE SENSOR
EACH MISCELLANEOUS
Qty: 3 EACH | Refills: 3 | Status: SHIPPED | OUTPATIENT
Start: 2024-04-10

## 2024-04-10 NOTE — TELEPHONE ENCOUNTER
Refill Routing Note   Medication(s) are not appropriate for processing by [facility] for the following reason(s):        Outside of protocol    [facility] action(s):  Route               Appointments  past 12m or future 3m with PCP    Date Provider   Last Visit   4/4/2024 Funmilayo Taylor MD   Next Visit   5/28/2024 Funmilayo Taylor MD   ED visits in past 90 days: 1        Note composed:8:26 AM 04/10/2024

## 2024-04-10 NOTE — TELEPHONE ENCOUNTER
No care due was identified.  [facility] Embedded Care Due Messages. Reference number: [id number].   4/10/2024 7:42:11 AM CDT

## 2024-04-22 DIAGNOSIS — E10.40 TYPE 1 DIABETES MELLITUS WITH DIABETIC NEUROPATHY: ICD-10-CM

## 2024-04-22 RX ORDER — INSULIN GLARGINE 100 [IU]/ML
55 INJECTION, SOLUTION SUBCUTANEOUS 2 TIMES DAILY
Qty: 100 ML | Refills: 3 | Status: SHIPPED | OUTPATIENT
Start: 2024-04-22

## 2024-04-29 ENCOUNTER — HOSPITAL ENCOUNTER (INPATIENT)
Facility: HOSPITAL | Age: 44
LOS: 5 days | Discharge: HOME | DRG: 439 | End: 2024-05-04
Attending: STUDENT IN AN ORGANIZED HEALTH CARE EDUCATION/TRAINING PROGRAM | Admitting: HOSPITALIST
Payer: COMMERCIAL

## 2024-04-29 DIAGNOSIS — E10.40 TYPE 1 DIABETES MELLITUS WITH DIABETIC NEUROPATHY: ICD-10-CM

## 2024-04-29 DIAGNOSIS — R74.01 TRANSAMINITIS: ICD-10-CM

## 2024-04-29 DIAGNOSIS — R73.9 HYPERGLYCEMIA: Primary | ICD-10-CM

## 2024-04-29 DIAGNOSIS — E10.65 TYPE 1 DIABETES MELLITUS WITH HYPERGLYCEMIA: ICD-10-CM

## 2024-04-29 DIAGNOSIS — E78.1 HYPERTRIGLYCERIDEMIA: ICD-10-CM

## 2024-04-29 DIAGNOSIS — R10.9 ABDOMINAL PAIN: ICD-10-CM

## 2024-04-29 DIAGNOSIS — K85.90 ACUTE PANCREATITIS, UNSPECIFIED COMPLICATION STATUS, UNSPECIFIED PANCREATITIS TYPE: ICD-10-CM

## 2024-04-29 DIAGNOSIS — K85.90 PANCREATITIS: ICD-10-CM

## 2024-04-29 DIAGNOSIS — R07.9 CHEST PAIN: ICD-10-CM

## 2024-04-29 DIAGNOSIS — E78.2 MIXED HYPERLIPIDEMIA: ICD-10-CM

## 2024-04-29 LAB
ALBUMIN SERPL BCP-MCNC: 3.6 G/DL (ref 3.5–5.2)
ALBUMIN SERPL BCP-MCNC: 3.7 G/DL (ref 3.5–5.2)
ALP SERPL-CCNC: 109 U/L (ref 55–135)
ALP SERPL-CCNC: 110 U/L (ref 55–135)
ALT SERPL W/O P-5'-P-CCNC: 106 U/L (ref 10–44)
ALT SERPL W/O P-5'-P-CCNC: 77 U/L (ref 10–44)
ANION GAP SERPL CALC-SCNC: 13 MMOL/L (ref 8–16)
AST SERPL-CCNC: 150 U/L (ref 10–40)
AST SERPL-CCNC: 196 U/L (ref 10–40)
B-OH-BUTYR BLD STRIP-SCNC: 0.3 MMOL/L (ref 0–0.5)
BASOPHILS # BLD AUTO: 0.04 K/UL (ref 0–0.2)
BASOPHILS # BLD AUTO: 0.04 K/UL (ref 0–0.2)
BASOPHILS NFR BLD: 0.4 % (ref 0–1.9)
BASOPHILS NFR BLD: 0.5 % (ref 0–1.9)
BILIRUB SERPL-MCNC: 1.2 MG/DL (ref 0.1–1)
BILIRUB SERPL-MCNC: 1.6 MG/DL (ref 0.1–1)
BUN SERPL-MCNC: 16 MG/DL (ref 6–20)
BUN SERPL-MCNC: 19 MG/DL (ref 6–20)
BUN SERPL-MCNC: 20 MG/DL (ref 6–20)
CALCIUM SERPL-MCNC: 9.2 MG/DL (ref 8.7–10.5)
CALCIUM SERPL-MCNC: 9.5 MG/DL (ref 8.7–10.5)
CALCIUM SERPL-MCNC: 9.5 MG/DL (ref 8.7–10.5)
CHLORIDE SERPL-SCNC: 101 MMOL/L (ref 95–110)
CHLORIDE SERPL-SCNC: 101 MMOL/L (ref 95–110)
CHLORIDE SERPL-SCNC: 104 MMOL/L (ref 95–110)
CHOLEST SERPL-MCNC: 147 MG/DL (ref 120–199)
CHOLEST/HDLC SERPL: 8.6 {RATIO} (ref 2–5)
CO2 SERPL-SCNC: 22 MMOL/L (ref 23–29)
CO2 SERPL-SCNC: 23 MMOL/L (ref 23–29)
CO2 SERPL-SCNC: 23 MMOL/L (ref 23–29)
CREAT SERPL-MCNC: 1 MG/DL (ref 0.5–1.4)
CREAT SERPL-MCNC: 1.2 MG/DL (ref 0.5–1.4)
CREAT SERPL-MCNC: 1.2 MG/DL (ref 0.5–1.4)
DIFFERENTIAL METHOD BLD: ABNORMAL
DIFFERENTIAL METHOD BLD: ABNORMAL
EOSINOPHIL # BLD AUTO: 0 K/UL (ref 0–0.5)
EOSINOPHIL # BLD AUTO: 0 K/UL (ref 0–0.5)
EOSINOPHIL NFR BLD: 0.2 % (ref 0–8)
EOSINOPHIL NFR BLD: 0.2 % (ref 0–8)
ERYTHROCYTE [DISTWIDTH] IN BLOOD BY AUTOMATED COUNT: 15.7 % (ref 11.5–14.5)
ERYTHROCYTE [DISTWIDTH] IN BLOOD BY AUTOMATED COUNT: 15.8 % (ref 11.5–14.5)
EST. GFR  (NO RACE VARIABLE): >60 ML/MIN/1.73 M^2
FIO2: 28 %
GLUCOSE SERPL-MCNC: 239 MG/DL (ref 70–110)
GLUCOSE SERPL-MCNC: 404 MG/DL (ref 70–110)
GLUCOSE SERPL-MCNC: 420 MG/DL (ref 70–110)
HCT VFR BLD AUTO: 38 % (ref 40–54)
HCT VFR BLD AUTO: 41.1 % (ref 40–54)
HDLC SERPL-MCNC: 17 MG/DL (ref 40–75)
HDLC SERPL: 11.6 % (ref 20–50)
HGB BLD-MCNC: 12.9 G/DL (ref 14–18)
HGB BLD-MCNC: 13.4 G/DL (ref 14–18)
IMM GRANULOCYTES # BLD AUTO: 0.04 K/UL (ref 0–0.04)
IMM GRANULOCYTES # BLD AUTO: 0.05 K/UL (ref 0–0.04)
IMM GRANULOCYTES NFR BLD AUTO: 0.5 % (ref 0–0.5)
IMM GRANULOCYTES NFR BLD AUTO: 0.5 % (ref 0–0.5)
LACTATE SERPL-SCNC: 1.8 MMOL/L (ref 0.5–2.2)
LDLC SERPL CALC-MCNC: ABNORMAL MG/DL (ref 63–159)
LIPASE SERPL-CCNC: >1000 U/L (ref 4–60)
LIPASE SERPL-CCNC: >1000 U/L (ref 4–60)
LPM: 2
LYMPHOCYTES # BLD AUTO: 0.4 K/UL (ref 1–4.8)
LYMPHOCYTES # BLD AUTO: 0.4 K/UL (ref 1–4.8)
LYMPHOCYTES NFR BLD: 3.8 % (ref 18–48)
LYMPHOCYTES NFR BLD: 4.1 % (ref 18–48)
MAGNESIUM SERPL-MCNC: 1.7 MG/DL (ref 1.6–2.6)
MCH RBC QN AUTO: 25.2 PG (ref 27–31)
MCH RBC QN AUTO: 26 PG (ref 27–31)
MCHC RBC AUTO-ENTMCNC: 32.6 G/DL (ref 32–36)
MCHC RBC AUTO-ENTMCNC: 33.9 G/DL (ref 32–36)
MCV RBC AUTO: 77 FL (ref 82–98)
MCV RBC AUTO: 77 FL (ref 82–98)
MONOCYTES # BLD AUTO: 0.8 K/UL (ref 0.3–1)
MONOCYTES # BLD AUTO: 0.8 K/UL (ref 0.3–1)
MONOCYTES NFR BLD: 8.3 % (ref 4–15)
MONOCYTES NFR BLD: 8.8 % (ref 4–15)
NEUTROPHILS # BLD AUTO: 7.6 K/UL (ref 1.8–7.7)
NEUTROPHILS # BLD AUTO: 7.9 K/UL (ref 1.8–7.7)
NEUTROPHILS NFR BLD: 85.9 % (ref 38–73)
NEUTROPHILS NFR BLD: 86.8 % (ref 38–73)
NONHDLC SERPL-MCNC: 130 MG/DL
NRBC BLD-RTO: 0 /100 WBC
NRBC BLD-RTO: 0 /100 WBC
PCO2 BLDA: 54 MMHG (ref 35–45)
PH SMN: 7.36 [PH] (ref 7.35–7.45)
PHOSPHATE SERPL-MCNC: 3.4 MG/DL (ref 2.7–4.5)
PLATELET # BLD AUTO: 146 K/UL (ref 150–450)
PLATELET # BLD AUTO: 157 K/UL (ref 150–450)
PMV BLD AUTO: 11.3 FL (ref 9.2–12.9)
PMV BLD AUTO: 11.8 FL (ref 9.2–12.9)
PO2 BLDA: 39.4 MMHG (ref 40–60)
POC BASE DEFICIT: 3.4 MMOL/L (ref -2–2)
POC HCO3: 30.2 MMOL/L (ref 24–28)
POC PERFORMED BY: ABNORMAL
POC SATURATED O2: 67.7 % (ref 95–100)
POCT GLUCOSE: 106 MG/DL (ref 70–110)
POCT GLUCOSE: 133 MG/DL (ref 70–110)
POCT GLUCOSE: 171 MG/DL (ref 70–110)
POCT GLUCOSE: 180 MG/DL (ref 70–110)
POCT GLUCOSE: 211 MG/DL (ref 70–110)
POCT GLUCOSE: 221 MG/DL (ref 70–110)
POCT GLUCOSE: 227 MG/DL (ref 70–110)
POCT GLUCOSE: 237 MG/DL (ref 70–110)
POCT GLUCOSE: 244 MG/DL (ref 70–110)
POCT GLUCOSE: 248 MG/DL (ref 70–110)
POCT GLUCOSE: 298 MG/DL (ref 70–110)
POCT GLUCOSE: 330 MG/DL (ref 70–110)
POCT GLUCOSE: 344 MG/DL (ref 70–110)
POCT GLUCOSE: 351 MG/DL (ref 70–110)
POCT GLUCOSE: 351 MG/DL (ref 70–110)
POCT GLUCOSE: 357 MG/DL (ref 70–110)
POCT GLUCOSE: 376 MG/DL (ref 70–110)
POTASSIUM SERPL-SCNC: 3.7 MMOL/L (ref 3.5–5.1)
POTASSIUM SERPL-SCNC: 3.8 MMOL/L (ref 3.5–5.1)
POTASSIUM SERPL-SCNC: 4.3 MMOL/L (ref 3.5–5.1)
PROT SERPL-MCNC: 7.5 G/DL (ref 6–8.4)
PROT SERPL-MCNC: 7.6 G/DL (ref 6–8.4)
RBC # BLD AUTO: 4.96 M/UL (ref 4.6–6.2)
RBC # BLD AUTO: 5.31 M/UL (ref 4.6–6.2)
SODIUM SERPL-SCNC: 136 MMOL/L (ref 136–145)
SODIUM SERPL-SCNC: 137 MMOL/L (ref 136–145)
SODIUM SERPL-SCNC: 140 MMOL/L (ref 136–145)
SPECIMEN SOURCE: ABNORMAL
TRIGL SERPL-MCNC: 694 MG/DL (ref 30–150)
TRIGL SERPL-MCNC: 890 MG/DL (ref 30–150)
WBC # BLD AUTO: 8.85 K/UL (ref 3.9–12.7)
WBC # BLD AUTO: 9.13 K/UL (ref 3.9–12.7)

## 2024-04-29 PROCEDURE — 63600175 PHARM REV CODE 636 W HCPCS: Performed by: HOSPITALIST

## 2024-04-29 PROCEDURE — 36415 COLL VENOUS BLD VENIPUNCTURE: CPT | Performed by: HOSPITALIST

## 2024-04-29 PROCEDURE — 80053 COMPREHEN METABOLIC PANEL: CPT | Mod: 91 | Performed by: NURSE PRACTITIONER

## 2024-04-29 PROCEDURE — 84100 ASSAY OF PHOSPHORUS: CPT | Performed by: STUDENT IN AN ORGANIZED HEALTH CARE EDUCATION/TRAINING PROGRAM

## 2024-04-29 PROCEDURE — 63600175 PHARM REV CODE 636 W HCPCS: Performed by: STUDENT IN AN ORGANIZED HEALTH CARE EDUCATION/TRAINING PROGRAM

## 2024-04-29 PROCEDURE — 85025 COMPLETE CBC W/AUTO DIFF WBC: CPT | Mod: 91 | Performed by: NURSE PRACTITIONER

## 2024-04-29 PROCEDURE — 82962 GLUCOSE BLOOD TEST: CPT

## 2024-04-29 PROCEDURE — 83690 ASSAY OF LIPASE: CPT | Performed by: STUDENT IN AN ORGANIZED HEALTH CARE EDUCATION/TRAINING PROGRAM

## 2024-04-29 PROCEDURE — 25500020 PHARM REV CODE 255: Performed by: STUDENT IN AN ORGANIZED HEALTH CARE EDUCATION/TRAINING PROGRAM

## 2024-04-29 PROCEDURE — 99900035 HC TECH TIME PER 15 MIN (STAT)

## 2024-04-29 PROCEDURE — 63600175 PHARM REV CODE 636 W HCPCS: Performed by: NURSE PRACTITIONER

## 2024-04-29 PROCEDURE — 82803 BLOOD GASES ANY COMBINATION: CPT

## 2024-04-29 PROCEDURE — 80048 BASIC METABOLIC PNL TOTAL CA: CPT | Mod: XB | Performed by: HOSPITALIST

## 2024-04-29 PROCEDURE — 99285 EMERGENCY DEPT VISIT HI MDM: CPT | Mod: 25

## 2024-04-29 PROCEDURE — 83735 ASSAY OF MAGNESIUM: CPT | Performed by: STUDENT IN AN ORGANIZED HEALTH CARE EDUCATION/TRAINING PROGRAM

## 2024-04-29 PROCEDURE — 25000003 PHARM REV CODE 250

## 2024-04-29 PROCEDURE — 25000003 PHARM REV CODE 250: Performed by: HOSPITALIST

## 2024-04-29 PROCEDURE — 25000003 PHARM REV CODE 250: Performed by: INTERNAL MEDICINE

## 2024-04-29 PROCEDURE — 25000003 PHARM REV CODE 250: Performed by: STUDENT IN AN ORGANIZED HEALTH CARE EDUCATION/TRAINING PROGRAM

## 2024-04-29 PROCEDURE — 96376 TX/PRO/DX INJ SAME DRUG ADON: CPT

## 2024-04-29 PROCEDURE — 80061 LIPID PANEL: CPT | Performed by: NURSE PRACTITIONER

## 2024-04-29 PROCEDURE — 83690 ASSAY OF LIPASE: CPT | Mod: 91 | Performed by: NURSE PRACTITIONER

## 2024-04-29 PROCEDURE — 96374 THER/PROPH/DIAG INJ IV PUSH: CPT

## 2024-04-29 PROCEDURE — 84478 ASSAY OF TRIGLYCERIDES: CPT | Performed by: HOSPITALIST

## 2024-04-29 PROCEDURE — 93005 ELECTROCARDIOGRAM TRACING: CPT

## 2024-04-29 PROCEDURE — 85025 COMPLETE CBC W/AUTO DIFF WBC: CPT | Performed by: STUDENT IN AN ORGANIZED HEALTH CARE EDUCATION/TRAINING PROGRAM

## 2024-04-29 PROCEDURE — 82010 KETONE BODYS QUAN: CPT | Performed by: STUDENT IN AN ORGANIZED HEALTH CARE EDUCATION/TRAINING PROGRAM

## 2024-04-29 PROCEDURE — 27000221 HC OXYGEN, UP TO 24 HOURS

## 2024-04-29 PROCEDURE — 83605 ASSAY OF LACTIC ACID: CPT | Performed by: STUDENT IN AN ORGANIZED HEALTH CARE EDUCATION/TRAINING PROGRAM

## 2024-04-29 PROCEDURE — 80053 COMPREHEN METABOLIC PANEL: CPT | Performed by: STUDENT IN AN ORGANIZED HEALTH CARE EDUCATION/TRAINING PROGRAM

## 2024-04-29 PROCEDURE — 93010 ELECTROCARDIOGRAM REPORT: CPT | Mod: ,,, | Performed by: INTERNAL MEDICINE

## 2024-04-29 PROCEDURE — 20000000 HC ICU ROOM

## 2024-04-29 PROCEDURE — 25000003 PHARM REV CODE 250: Performed by: NURSE PRACTITIONER

## 2024-04-29 PROCEDURE — 96372 THER/PROPH/DIAG INJ SC/IM: CPT | Performed by: NURSE PRACTITIONER

## 2024-04-29 PROCEDURE — 96361 HYDRATE IV INFUSION ADD-ON: CPT

## 2024-04-29 RX ORDER — INSULIN ASPART 100 [IU]/ML
0-5 INJECTION, SOLUTION INTRAVENOUS; SUBCUTANEOUS
Status: DISCONTINUED | OUTPATIENT
Start: 2024-04-29 | End: 2024-05-01

## 2024-04-29 RX ORDER — SODIUM CHLORIDE 0.9 % (FLUSH) 0.9 %
3 SYRINGE (ML) INJECTION EVERY 12 HOURS PRN
Status: DISCONTINUED | OUTPATIENT
Start: 2024-04-29 | End: 2024-05-04 | Stop reason: HOSPADM

## 2024-04-29 RX ORDER — IBUPROFEN 200 MG
24 TABLET ORAL
Status: DISCONTINUED | OUTPATIENT
Start: 2024-04-29 | End: 2024-05-04 | Stop reason: HOSPADM

## 2024-04-29 RX ORDER — GLUCAGON 1 MG
1 KIT INJECTION
Status: DISCONTINUED | OUTPATIENT
Start: 2024-04-29 | End: 2024-05-04 | Stop reason: HOSPADM

## 2024-04-29 RX ORDER — FENOFIBRATE 160 MG/1
160 TABLET ORAL DAILY
Status: DISCONTINUED | OUTPATIENT
Start: 2024-04-29 | End: 2024-04-29

## 2024-04-29 RX ORDER — MORPHINE SULFATE 4 MG/ML
4 INJECTION, SOLUTION INTRAMUSCULAR; INTRAVENOUS EVERY 4 HOURS PRN
Status: DISCONTINUED | OUTPATIENT
Start: 2024-04-29 | End: 2024-04-30

## 2024-04-29 RX ORDER — SODIUM CHLORIDE, SODIUM LACTATE, POTASSIUM CHLORIDE, CALCIUM CHLORIDE 600; 310; 30; 20 MG/100ML; MG/100ML; MG/100ML; MG/100ML
INJECTION, SOLUTION INTRAVENOUS CONTINUOUS
Status: DISCONTINUED | OUTPATIENT
Start: 2024-04-29 | End: 2024-04-29

## 2024-04-29 RX ORDER — MORPHINE SULFATE 4 MG/ML
8 INJECTION, SOLUTION INTRAMUSCULAR; INTRAVENOUS
Status: COMPLETED | OUTPATIENT
Start: 2024-04-29 | End: 2024-04-29

## 2024-04-29 RX ORDER — NALOXONE HCL 0.4 MG/ML
0.02 VIAL (ML) INJECTION
Status: DISCONTINUED | OUTPATIENT
Start: 2024-04-29 | End: 2024-05-04 | Stop reason: HOSPADM

## 2024-04-29 RX ORDER — ENOXAPARIN SODIUM 100 MG/ML
40 INJECTION SUBCUTANEOUS EVERY 24 HOURS
Status: DISCONTINUED | OUTPATIENT
Start: 2024-04-29 | End: 2024-05-04 | Stop reason: HOSPADM

## 2024-04-29 RX ORDER — METOCLOPRAMIDE 10 MG/1
10 TABLET ORAL
Status: DISCONTINUED | OUTPATIENT
Start: 2024-04-29 | End: 2024-04-30

## 2024-04-29 RX ORDER — PREGABALIN 75 MG/1
150 CAPSULE ORAL 2 TIMES DAILY PRN
Status: DISCONTINUED | OUTPATIENT
Start: 2024-04-29 | End: 2024-05-04 | Stop reason: HOSPADM

## 2024-04-29 RX ORDER — MORPHINE SULFATE 2 MG/ML
2 INJECTION, SOLUTION INTRAMUSCULAR; INTRAVENOUS EVERY 4 HOURS PRN
Status: DISCONTINUED | OUTPATIENT
Start: 2024-04-29 | End: 2024-04-30

## 2024-04-29 RX ORDER — MORPHINE SULFATE 2 MG/ML
6 INJECTION, SOLUTION INTRAMUSCULAR; INTRAVENOUS
Status: COMPLETED | OUTPATIENT
Start: 2024-04-29 | End: 2024-04-29

## 2024-04-29 RX ORDER — OMEGA-3-ACID ETHYL ESTERS 1 G/1
2 CAPSULE, LIQUID FILLED ORAL 2 TIMES DAILY
Status: DISCONTINUED | OUTPATIENT
Start: 2024-04-29 | End: 2024-04-29 | Stop reason: RX

## 2024-04-29 RX ORDER — MUPIROCIN 20 MG/G
OINTMENT TOPICAL 2 TIMES DAILY
Status: COMPLETED | OUTPATIENT
Start: 2024-04-29 | End: 2024-05-04

## 2024-04-29 RX ORDER — ATORVASTATIN CALCIUM 40 MG/1
40 TABLET, FILM COATED ORAL DAILY
Status: DISCONTINUED | OUTPATIENT
Start: 2024-04-29 | End: 2024-04-30

## 2024-04-29 RX ORDER — IBUPROFEN 200 MG
16 TABLET ORAL
Status: DISCONTINUED | OUTPATIENT
Start: 2024-04-29 | End: 2024-05-04 | Stop reason: HOSPADM

## 2024-04-29 RX ORDER — OMEGA-3/DHA/EPA/FISH OIL 300-1000MG
2 CAPSULE,DELAYED RELEASE (ENTERIC COATED) ORAL 2 TIMES DAILY
Status: DISCONTINUED | OUTPATIENT
Start: 2024-04-29 | End: 2024-05-04 | Stop reason: HOSPADM

## 2024-04-29 RX ORDER — TALC
6 POWDER (GRAM) TOPICAL NIGHTLY PRN
Status: DISCONTINUED | OUTPATIENT
Start: 2024-04-29 | End: 2024-05-04 | Stop reason: HOSPADM

## 2024-04-29 RX ORDER — PANTOPRAZOLE SODIUM 40 MG/1
40 TABLET, DELAYED RELEASE ORAL 2 TIMES DAILY
Status: DISCONTINUED | OUTPATIENT
Start: 2024-04-29 | End: 2024-05-04 | Stop reason: HOSPADM

## 2024-04-29 RX ORDER — LOSARTAN POTASSIUM 50 MG/1
50 TABLET ORAL DAILY
Status: DISCONTINUED | OUTPATIENT
Start: 2024-04-29 | End: 2024-05-04 | Stop reason: HOSPADM

## 2024-04-29 RX ORDER — ACETAMINOPHEN 325 MG/1
650 TABLET ORAL EVERY 4 HOURS PRN
Status: DISCONTINUED | OUTPATIENT
Start: 2024-04-29 | End: 2024-04-30

## 2024-04-29 RX ORDER — ONDANSETRON HYDROCHLORIDE 2 MG/ML
4 INJECTION, SOLUTION INTRAVENOUS ONCE AS NEEDED
Status: COMPLETED | OUTPATIENT
Start: 2024-04-29 | End: 2024-04-29

## 2024-04-29 RX ORDER — NAPROXEN SODIUM 220 MG/1
81 TABLET, FILM COATED ORAL DAILY
Status: DISCONTINUED | OUTPATIENT
Start: 2024-04-29 | End: 2024-05-04 | Stop reason: HOSPADM

## 2024-04-29 RX ORDER — DEXTROSE MONOHYDRATE 50 MG/ML
INJECTION, SOLUTION INTRAVENOUS CONTINUOUS
Status: DISCONTINUED | OUTPATIENT
Start: 2024-04-29 | End: 2024-04-30

## 2024-04-29 RX ORDER — ALLOPURINOL 100 MG/1
300 TABLET ORAL DAILY
Status: DISCONTINUED | OUTPATIENT
Start: 2024-04-29 | End: 2024-05-04 | Stop reason: HOSPADM

## 2024-04-29 RX ORDER — SODIUM CHLORIDE 9 MG/ML
INJECTION, SOLUTION INTRAVENOUS CONTINUOUS
Status: DISCONTINUED | OUTPATIENT
Start: 2024-04-29 | End: 2024-04-29

## 2024-04-29 RX ADMIN — INSULIN ASPART 5 UNITS: 100 INJECTION, SOLUTION INTRAVENOUS; SUBCUTANEOUS at 05:04

## 2024-04-29 RX ADMIN — DEXTROSE MONOHYDRATE: 50 INJECTION, SOLUTION INTRAVENOUS at 11:04

## 2024-04-29 RX ADMIN — LOSARTAN POTASSIUM 50 MG: 50 TABLET, FILM COATED ORAL at 12:04

## 2024-04-29 RX ADMIN — SODIUM CHLORIDE 1000 ML: 9 INJECTION, SOLUTION INTRAVENOUS at 01:04

## 2024-04-29 RX ADMIN — Medication 2 CAPSULE: at 10:04

## 2024-04-29 RX ADMIN — METOCLOPRAMIDE 10 MG: 10 TABLET ORAL at 04:04

## 2024-04-29 RX ADMIN — MUPIROCIN: 20 OINTMENT TOPICAL at 08:04

## 2024-04-29 RX ADMIN — SODIUM CHLORIDE, POTASSIUM CHLORIDE, SODIUM LACTATE AND CALCIUM CHLORIDE: 600; 310; 30; 20 INJECTION, SOLUTION INTRAVENOUS at 09:04

## 2024-04-29 RX ADMIN — MORPHINE SULFATE 2 MG: 2 INJECTION, SOLUTION INTRAMUSCULAR; INTRAVENOUS at 02:04

## 2024-04-29 RX ADMIN — Medication 2 CAPSULE: at 11:04

## 2024-04-29 RX ADMIN — SODIUM CHLORIDE, POTASSIUM CHLORIDE, SODIUM LACTATE AND CALCIUM CHLORIDE: 600; 310; 30; 20 INJECTION, SOLUTION INTRAVENOUS at 07:04

## 2024-04-29 RX ADMIN — SODIUM CHLORIDE 0.2 UNITS/KG/HR: 9 INJECTION, SOLUTION INTRAVENOUS at 08:04

## 2024-04-29 RX ADMIN — SODIUM CHLORIDE 0.1 UNITS/KG/HR: 9 INJECTION, SOLUTION INTRAVENOUS at 09:04

## 2024-04-29 RX ADMIN — INSULIN ASPART 5 UNITS: 100 INJECTION, SOLUTION INTRAVENOUS; SUBCUTANEOUS at 08:04

## 2024-04-29 RX ADMIN — METOCLOPRAMIDE 10 MG: 10 TABLET ORAL at 08:04

## 2024-04-29 RX ADMIN — MORPHINE SULFATE 2 MG: 2 INJECTION, SOLUTION INTRAMUSCULAR; INTRAVENOUS at 08:04

## 2024-04-29 RX ADMIN — IOHEXOL 100 ML: 350 INJECTION, SOLUTION INTRAVENOUS at 02:04

## 2024-04-29 RX ADMIN — SODIUM CHLORIDE 0.2 UNITS/KG/HR: 9 INJECTION, SOLUTION INTRAVENOUS at 04:04

## 2024-04-29 RX ADMIN — ENOXAPARIN SODIUM 40 MG: 40 INJECTION SUBCUTANEOUS at 04:04

## 2024-04-29 RX ADMIN — MORPHINE SULFATE 8 MG: 4 INJECTION INTRAVENOUS at 02:04

## 2024-04-29 RX ADMIN — SODIUM CHLORIDE: 9 INJECTION, SOLUTION INTRAVENOUS at 05:04

## 2024-04-29 RX ADMIN — MORPHINE SULFATE 6 MG: 2 INJECTION, SOLUTION INTRAMUSCULAR; INTRAVENOUS at 01:04

## 2024-04-29 RX ADMIN — ATORVASTATIN CALCIUM 40 MG: 40 TABLET, FILM COATED ORAL at 10:04

## 2024-04-29 RX ADMIN — ASPIRIN 81 MG CHEWABLE TABLET 81 MG: 81 TABLET CHEWABLE at 02:04

## 2024-04-29 RX ADMIN — PANTOPRAZOLE SODIUM 40 MG: 40 TABLET, DELAYED RELEASE ORAL at 08:04

## 2024-04-29 RX ADMIN — ONDANSETRON 4 MG: 2 INJECTION INTRAMUSCULAR; INTRAVENOUS at 08:04

## 2024-04-29 RX ADMIN — ALLOPURINOL 300 MG: 100 TABLET ORAL at 02:04

## 2024-04-29 RX ADMIN — MORPHINE SULFATE 4 MG: 4 INJECTION INTRAVENOUS at 08:04

## 2024-04-29 RX ADMIN — INSULIN DETEMIR 25 UNITS: 100 INJECTION, SOLUTION SUBCUTANEOUS at 08:04

## 2024-04-29 NOTE — ASSESSMENT & PLAN NOTE
Chronic, controlled. Latest blood pressure and vitals reviewed-     Temp:  [98.1 °F (36.7 °C)-98.4 °F (36.9 °C)]   Pulse:  []   Resp:  [16-26]   BP: (143-158)/(65-87)   SpO2:  [90 %-98 %] .   Home meds for hypertension were reviewed and noted below.   Hypertension Medications               losartan (COZAAR) 100 MG tablet Take ½ tablet (50 mg total) by mouth once daily.            While in the hospital, will manage blood pressure as follows; Adjust home antihypertensive regimen as follows- hold for now while NPO     Will utilize p.r.n. blood pressure medication only if patient's blood pressure greater than 180/110 and he develops symptoms such as worsening chest pain or shortness of breath.

## 2024-04-29 NOTE — ASSESSMENT & PLAN NOTE
A/w abd pain, lipase >1000  Likely 2/2 hyperTG  PRN analgesics  IVF  S/p Insulin infusion given hyperTG

## 2024-04-29 NOTE — ASSESSMENT & PLAN NOTE
Patient's FSGs are uncontrolled on current medication regimen.  Last A1c reviewed-   Lab Results   Component Value Date    LABA1C 8.8 (H) 01/12/2018    HGBA1C 9.5 (H) 03/28/2024     Most recent fingerstick glucose reviewed-   Recent Labs   Lab 04/29/24  0143   POCTGLUCOSE 376*     Current correctional scale  Low  Maintain anti-hyperglycemic dose as follows-   Antihyperglycemics (From admission, onward)      Start     Stop Route Frequency Ordered    04/29/24 0900  insulin detemir U-100 (Levemir) pen 25 Units         -- SubQ 2 times daily 04/29/24 0353    04/29/24 0447  insulin aspart U-100 pen 0-5 Units         -- SubQ Before meals & nightly PRN 04/29/24 0353          Hold Oral hypoglycemics while patient is in the hospital.    DMT2 documented, but patient follows with endocrinology who documented DMT1  Patient confirmed he is type 1

## 2024-04-29 NOTE — H&P
[facility]  History & Physical    Patient Name: Bay Ibarra  MRN: [id number]  Patient Class: OP- Observation  Admission Date: 4/29/2024  Attending Physician: Que Vickers,*   Primary Care Provider: Funmilayo Taylor MD         Patient information was obtained from patient, past medical records, and ER records.     Subjective:     Principal Problem:Acute on chronic pancreatitis    Chief Complaint:   Chief Complaint   Patient presents with    Abdominal Pain     Patient reports right abdominal pain that started at 2100 tonight.  Constant but increasing in intensity.  No nausea.  No vomiting.  No fever.  Denies UTI symptoms.  EMS reports FSBG 418.  States LBM today normal brown and formed.          HPI: Bay Ibarra is a 43 y.o. 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 He presented to the emergency department w/ c/o acute onset right lower quadrant abdominal pain that began 3 hours prior to ED presentation.  Patient reports pain is sharp in nature and at times radiates towards the back.  Denies any associated nausea or vomiting.  He reports having a BM prior to EMS arrival to his residence reports it was normal.  Denies any nausea or vomiting.  He reports for the past 4 days his glucose level has been higher than normal.  Patient states he was given medication (fentanyl) EN route with EMS with significant improvement of his symptoms.  Denies any dysuria or hematuria.  Denies any prior surgical history.    Past Medical History:   Diagnosis Date    Acute pancreatitis     Cellulitis of left lower extremity 01/11/2024     Diverticulosis of colon     Essential hypertension     Fatty liver     Gastric varices without bleeding 01/16/2020    Glaucomatous optic atrophy, right     Gout     Hx of acute pancreatitis 03/03/2017    Hypertriglyceridemia 08/02/2017    Obesity (BMI 30-39.9) 06/05/2015    Obesity (BMI 35.0-39.9 without comorbidity) 06/05/2015    Obstructive sleep apnea syndrome 12/18/2015    Type 2 diabetes mellitus with diabetic polyneuropathy, with long-term current use of insulin 10/16/2017    Type 2 diabetes mellitus with hyperglycemia, with long-term current use of insulin 08/03/2017       Past Surgical History:   Procedure Laterality Date    DEBRIDEMENT OF FOOT Right 4/21/2023    Procedure: DEBRIDEMENT, FOOT;  Surgeon: Anju Melvin DPM;  Location: [facility];  Service: Podiatry;  Laterality: Right;    ENDOSCOPIC ULTRASOUND OF UPPER GASTROINTESTINAL TRACT N/A 5/27/2019    Procedure: ULTRASOUND, UPPER GI TRACT, ENDOSCOPIC;  Surgeon: Zafar Velazquez MD;  Location: [address]);  Service: Endoscopy;  Laterality: N/A;    ERCP N/A 5/27/2019    Procedure: ERCP (ENDOSCOPIC RETROGRADE CHOLANGIOPANCREATOGRAPHY);  Surgeon: Zafar Velazquez MD;  Location: [facility] ([address] [place] [address]);  Service: Endoscopy;  Laterality: N/A;    ESOPHAGOGASTRODUODENOSCOPY      ESOPHAGOGASTRODUODENOSCOPY N/A 1/31/2020    Procedure: EGD (ESOPHAGOGASTRODUODENOSCOPY);  Surgeon: Zafar Velazquez MD;  Location: [facility];  Service: Endoscopy;  Laterality: N/A;    ESOPHAGOGASTRODUODENOSCOPY N/A 2/16/2024    Procedure: EGD (ESOPHAGOGASTRODUODENOSCOPY);  Surgeon: Rashida Messer MD;  Location: [facility];  Service: Endoscopy;  Laterality: N/A;    PLANTAR FASCIA RELEASE Right 4/21/2023    Procedure: RELEASE, FASCIA, PLANTAR;  Surgeon: Anju Melvin DPM;  Location: [facility];  Service: Podiatry;  Laterality: Right;    RESECTION OF GASTROCNEMIUS MUSCLE Right 4/21/2023    Procedure: RESECTION, MUSCLE, GASTROCNEMIUS;  Surgeon: Anju Melvin DPM;  Location: [facility];  Service:  Podiatry;  Laterality: Right;       Review of patient's allergies indicates:  No Known Allergies    Current Facility-Administered Medications   Medication Dose Route Frequency Provider Last Rate Last Admin    acetaminophen tablet 650 mg  650 mg Oral Q4H PRN Annabelle Zhang NP        dextrose 10% bolus 125 mL 125 mL  12.5 g Intravenous PRN Annabelle Zhang NP        dextrose 10% bolus 250 mL 250 mL  25 g Intravenous PRN Annabelle Zhang NP        enoxaparin injection 40 mg  40 mg Subcutaneous Daily Annabelle Zhang NP        glucagon (human recombinant) injection 1 mg  1 mg Intramuscular PRN Annabelle Zhang NP        glucagon (human recombinant) injection 1 mg  1 mg Intramuscular PRN Annabelle Zhang NP        glucose chewable tablet 16 g  16 g Oral PRN Annabelle Zhang NP        glucose chewable tablet 24 g  24 g Oral PRN Annabelle Zhang NP        insulin aspart U-100 pen 0-5 Units  0-5 Units Subcutaneous QID (AC + HS) PRN Annabelle Zhang NP        insulin detemir U-100 (Levemir) pen 25 Units  25 Units Subcutaneous BID Annabelle Zhang NP        melatonin tablet 6 mg  6 mg Oral Nightly PRN Annabelle Zhang NP        morphine injection 2 mg  2 mg Intravenous Q4H PRN Annabelle Zhang NP        morphine injection 4 mg  4 mg Intravenous Q4H PRN Annabelle Zhang NP        naloxone 0.4 mg/mL injection 0.02 mg  0.02 mg Intravenous PRN Annabelle Zhang NP        ondansetron injection 4 mg  4 mg Intravenous Once PRN Everardo Gonzalez MD        sodium chloride 0.9% flush 3 mL  3 mL Intravenous Q12H PRN Annabelle Zhang NP         Current Outpatient Medications   Medication Sig Dispense Refill    allopurinoL (ZYLOPRIM) 300 MG tablet Take 1 tablet (300 mg total) by mouth once daily. 90 tablet 3    aspirin (ECOTRIN) 81 MG EC tablet Take 81 mg by mouth once daily.      atorvastatin (LIPITOR) 40 MG tablet Take 1 tablet (40 mg total) by mouth once daily. 90 tablet 3    blood sugar diagnostic (CONTOUR NEXT TEST STRIPS) Strp To test glucose 4 times a day. 200  each 11    blood-glucose sensor (DEXCOM G7 SENSOR) Isha Change sensor every 10 days. 3 each 3    butalbital-acetaminophen-caffeine -40 mg (FIORICET, ESGIC) -40 mg per tablet Take 1 tablet by mouth every 6 (six) hours as needed for Headaches. 20 tablet 0    ciclopirox (PENLAC) 8 % Soln Apply topically nightly. 6.6 mL 6    cyclobenzaprine (FLEXERIL) 5 MG tablet Take 1 tablet (5 mg total) by mouth 3 (three) times daily as needed for Muscle spasms. 30 tablet 2    fenofibrate 160 MG Tab Take 1 tablet (160 mg total) by mouth once daily. 90 tablet 3    icosapent ethyL (VASCEPA) 1 gram Cap Take 2 capsules (2 g total) by mouth 2 (two) times daily. 360 capsule 3    insulin (LANTUS SOLOSTAR U-100 INSULIN) glargine 100 units/mL SubQ pen Inject 55 Units into the skin 2 (two) times a day. 100 mL 3    insulin aspart U-100 (NOVOLOG FLEXPEN U-100 INSULIN) 100 unit/mL (3 mL) InPn pen Inject 36 Units into the skin 3 (three) times daily with meals. Plus correction scale. Max TDD 140units. 100 mL 3    losartan (COZAAR) 100 MG tablet Take ½ tablet (50 mg total) by mouth once daily. 45 tablet 1    metFORMIN (GLUCOPHAGE) 500 MG tablet Take 2 tablets (1,000 mg total) by mouth 2 (two) times daily with meals. 360 tablet 3    metoclopramide HCl (REGLAN) 10 MG tablet Take 1 tablet (10 mg total) by mouth 4 (four) times daily. 120 tablet 2    naproxen (NAPROSYN) 500 MG tablet Take 1 tablet (500 mg total) by mouth 2 (two) times daily as needed (headache). 30 tablet 0    niacin 100 MG Tab Take 2 tablets (200 mg total) by mouth every evening. 90 tablet 1    ondansetron (ZOFRAN) 4 MG tablet Take 1 tablet (4 mg total) by mouth every 6 (six) hours as needed for Nausea. (Patient not taking: Reported on 2/23/2024) 30 tablet 1    ondansetron (ZOFRAN) 4 MG tablet Take 1 tablet (4 mg total) by mouth every 6 (six) hours. 12 tablet 0    pantoprazole (PROTONIX) 40 MG tablet Take 1 tablet (40 mg total) by mouth 2 (two) times daily. 60 tablet 2     pregabalin (LYRICA) 150 MG capsule Take 1 capsule (150 mg total) by mouth 2 (two) times daily. 60 capsule 5    sucralfate (CARAFATE) 100 mg/mL suspension Take 10 mLs (1 g total) by mouth 4 (four) times daily. 1200 mL 2     Family History       Problem Relation (Age of Onset)    Aneurysm Mother, Maternal Grandmother    Coronary artery disease Father    Diabetes type II Father, Paternal Grandmother    No Known Problems Sister, Brother          Tobacco Use    Smoking status: Never     Passive exposure: Never    Smokeless tobacco: Never   Substance and Sexual Activity    Alcohol use: No    Drug use: Never    Sexual activity: Not Currently     Partners: Female     Review of Systems   Constitutional:  Negative for chills and fever.   HENT:  Negative for congestion.    Eyes:  Negative for visual disturbance.   Respiratory:  Negative for shortness of breath.    Cardiovascular:  Negative for chest pain.   Gastrointestinal:  Positive for abdominal pain. Negative for nausea and vomiting.   Genitourinary:  Negative for dysuria.   Musculoskeletal:  Negative for myalgias.   Skin:  Negative for color change.   Neurological:  Negative for dizziness, syncope, facial asymmetry, speech difficulty and light-headedness.   Psychiatric/Behavioral:  Negative for agitation and confusion. The patient is not nervous/anxious.      Objective:     Vital Signs (Most Recent):  Temp: 98.1 °F (36.7 °C) (04/29/24 0300)  Pulse: 101 (04/29/24 0300)  Resp: (!) 23 (04/29/24 0300)  BP: (!) 158/70 (04/29/24 0300)  SpO2: 98 % (04/29/24 0300) Vital Signs (24h Range):  Temp:  [98.1 °F (36.7 °C)-98.4 °F (36.9 °C)] 98.1 °F (36.7 °C)  Pulse:  [] 101  Resp:  [16-26] 23  SpO2:  [90 %-98 %] 98 %  BP: (143-158)/(65-87) 158/70     Weight: 99.8 kg (220 lb)  Body mass index is 33.45 kg/m².     Physical Exam  Constitutional:       General: He is sleeping. He is not in acute distress.     Appearance: He is ill-appearing.   HENT:      Head: Normocephalic and  atraumatic.      Mouth/Throat:      Mouth: Mucous membranes are moist.   Eyes:      Extraocular Movements: Extraocular movements intact.      Pupils: Pupils are equal, round, and reactive to light.   Cardiovascular:      Rate and Rhythm: Normal rate and regular rhythm.      Pulses: Normal pulses.      Heart sounds: Normal heart sounds.   Pulmonary:      Effort: Pulmonary effort is normal. No respiratory distress.      Breath sounds: Normal breath sounds. No wheezing, rhonchi or rales.   Abdominal:      General: Bowel sounds are normal. There is no distension.      Palpations: Abdomen is soft.      Tenderness: There is abdominal tenderness. There is no guarding or rebound.   Musculoskeletal:      Cervical back: No rigidity.      Right lower leg: No edema.      Left lower leg: No edema.   Skin:     General: Skin is warm.   Neurological:      Mental Status: He is oriented to person, place, and time and easily aroused.   Psychiatric:         Mood and Affect: Mood normal.         Behavior: Behavior normal.         Thought Content: Thought content normal.         Judgment: Judgment normal.              CRANIAL NERVES     CN III, IV, VI   Pupils are equal, round, and reactive to light.       Significant Labs: All pertinent labs within the past 24 hours have been reviewed.    Significant Imaging: I have reviewed all pertinent imaging results/findings within the past 24 hours.  Assessment/Plan:     * Acute on chronic pancreatitis  A/w abd pain, lipase >1000  PRN analgesics  IVF  NPO      Class 1 obesity with alveolar hypoventilation, serious comorbidity, and body mass index (BMI) of 31.0 to 31.9 in adult  Body mass index is 33.45 kg/m². Morbid obesity complicates all aspects of disease management from diagnostic modalities to treatment. Weight loss encouraged and health benefits explained to patient.         Type 1 diabetes mellitus with hyperglycemia  Patient's FSGs are uncontrolled on current medication regimen.  Last A1c  reviewed-   Lab Results   Component Value Date    LABA1C 8.8 (H) 01/12/2018    HGBA1C 9.5 (H) 03/28/2024     Most recent fingerstick glucose reviewed-   Recent Labs   Lab 04/29/24  0143   POCTGLUCOSE 376*     Current correctional scale  Low  Maintain anti-hyperglycemic dose as follows-   Antihyperglycemics (From admission, onward)      Start     Stop Route Frequency Ordered    04/29/24 0900  insulin detemir U-100 (Levemir) pen 25 Units         -- SubQ 2 times daily 04/29/24 0353    04/29/24 0447  insulin aspart U-100 pen 0-5 Units         -- SubQ Before meals & nightly PRN 04/29/24 0353          Hold Oral hypoglycemics while patient is in the hospital.    DMT2 documented, but patient follows with endocrinology who documented DMT1  Patient confirmed he is type 1    Mixed hyperlipidemia  Hold statin for now while NPO and with elevated LFTs      Essential hypertension  Chronic, controlled. Latest blood pressure and vitals reviewed-     Temp:  [98.1 °F (36.7 °C)-98.4 °F (36.9 °C)]   Pulse:  []   Resp:  [16-26]   BP: (143-158)/(65-87)   SpO2:  [90 %-98 %] .   Home meds for hypertension were reviewed and noted below.   Hypertension Medications               losartan (COZAAR) 100 MG tablet Take ½ tablet (50 mg total) by mouth once daily.            While in the hospital, will manage blood pressure as follows; Adjust home antihypertensive regimen as follows- hold for now while NPO     Will utilize p.r.n. blood pressure medication only if patient's blood pressure greater than 180/110 and he develops symptoms such as worsening chest pain or shortness of breath.      VTE Risk Mitigation (From admission, onward)           Ordered     enoxaparin injection 40 mg  Daily         04/29/24 0353     IP VTE HIGH RISK PATIENT  Once         04/29/24 0353     Place sequential compression device  Until discontinued         04/29/24 0353                         On 04/29/2024, patient should be placed in hospital observation services  under my care in collaboration with Que Vickers MD.           Annabelle Zhang NP  Department of Hospital Medicine  [place] - Emergency Dept

## 2024-04-29 NOTE — ED NOTES
APPEARANCE: Alert, oriented x 4, +uncomfortable  NEURO: 5/5 strength major flexors/extensors bilaterally. Sensory intact to light touch bilaterally. Bernadine coma scale: eyes open spontaneously-4, oriented & converses-5, obeys commands-6. No neurological abnormalities. Denies HA, dizziness, photophobia.  CARDIAC: +tachycardic normal rhythm through apical/radial pulse, S1 and S2 noted, denies CP.   RESPIRATORY:Normal rate and effort, respirations are equal and unlabored, no obvious signs of distress. No SOB reported.  PERIPHERAL VASCULAR: +2 peripheral pulses present. Normal cap refill <3sec. No edema. Warm to touch.   GASTRO: +distended +taut +painful Denies NVD.  MUSC: Full ROM. No bony tenderness or soft tissue tenderness. No obvious deformity.  SKIN: +diaphoretic, afebrile  GENITOURINARY: Voids spontaneously, denies itching, burning, hematuria.    Patient hospital gowned. Side rails x 2, bed low and locked position, call light in reach and instructed how to use.

## 2024-04-29 NOTE — SUBJECTIVE & OBJECTIVE
Past Medical History:   Diagnosis Date    Acute pancreatitis     Cellulitis of left lower extremity 01/11/2024    Diverticulosis of colon     Essential hypertension     Fatty liver     Gastric varices without bleeding 01/16/2020    Glaucomatous optic atrophy, right     Gout     Hx of acute pancreatitis 03/03/2017    Hypertriglyceridemia 08/02/2017    Obesity (BMI 30-39.9) 06/05/2015    Obesity (BMI 35.0-39.9 without comorbidity) 06/05/2015    Obstructive sleep apnea syndrome 12/18/2015    Type 2 diabetes mellitus with diabetic polyneuropathy, with long-term current use of insulin 10/16/2017    Type 2 diabetes mellitus with hyperglycemia, with long-term current use of insulin 08/03/2017       Past Surgical History:   Procedure Laterality Date    DEBRIDEMENT OF FOOT Right 4/21/2023    Procedure: DEBRIDEMENT, FOOT;  Surgeon: Anju Melvin DPM;  Location: [facility];  Service: Podiatry;  Laterality: Right;    ENDOSCOPIC ULTRASOUND OF UPPER GASTROINTESTINAL TRACT N/A 5/27/2019    Procedure: ULTRASOUND, UPPER GI TRACT, ENDOSCOPIC;  Surgeon: Zafar Velazquez MD;  Location: [address]);  Service: Endoscopy;  Laterality: N/A;    ERCP N/A 5/27/2019    Procedure: ERCP (ENDOSCOPIC RETROGRADE CHOLANGIOPANCREATOGRAPHY);  Surgeon: Zafar Velazquez MD;  Location: [address]);  Service: Endoscopy;  Laterality: N/A;    ESOPHAGOGASTRODUODENOSCOPY      ESOPHAGOGASTRODUODENOSCOPY N/A 1/31/2020    Procedure: EGD (ESOPHAGOGASTRODUODENOSCOPY);  Surgeon: Zafar Velazquez MD;  Location: [facility];  Service: Endoscopy;  Laterality: N/A;    ESOPHAGOGASTRODUODENOSCOPY N/A 2/16/2024    Procedure: EGD (ESOPHAGOGASTRODUODENOSCOPY);  Surgeon: Rashida Messer MD;  Location: [facility];  Service: Endoscopy;  Laterality: N/A;    PLANTAR FASCIA RELEASE Right 4/21/2023    Procedure: RELEASE, FASCIA, PLANTAR;  Surgeon: Anju Melvin DPM;  Location: [facility];  Service: Podiatry;  Laterality: Right;    RESECTION OF GASTROCNEMIUS MUSCLE Right  4/21/2023    Procedure: RESECTION, MUSCLE, GASTROCNEMIUS;  Surgeon: Anju Melvin DPM;  Location: [facility];  Service: Podiatry;  Laterality: Right;       Review of patient's allergies indicates:  No Known Allergies    Current Facility-Administered Medications   Medication Dose Route Frequency Provider Last Rate Last Admin    acetaminophen tablet 650 mg  650 mg Oral Q4H PRN Annabelle Zhang NP        dextrose 10% bolus 125 mL 125 mL  12.5 g Intravenous PRN Annabelle Zhang NP        dextrose 10% bolus 250 mL 250 mL  25 g Intravenous PRN Annabelle Zhang NP        enoxaparin injection 40 mg  40 mg Subcutaneous Daily Annabelle Zhang NP        glucagon (human recombinant) injection 1 mg  1 mg Intramuscular PRN Annabelle Zhang NP        glucagon (human recombinant) injection 1 mg  1 mg Intramuscular PRN Annabelle Zhang NP        glucose chewable tablet 16 g  16 g Oral PRN Annabelle Zhang NP        glucose chewable tablet 24 g  24 g Oral PRN Annabelle Zhang NP        insulin aspart U-100 pen 0-5 Units  0-5 Units Subcutaneous QID (AC + HS) PRN Annabelle Zhang NP        insulin detemir U-100 (Levemir) pen 25 Units  25 Units Subcutaneous BID Annabelle Zhang NP        melatonin tablet 6 mg  6 mg Oral Nightly PRN Annabelle Zhang NP        morphine injection 2 mg  2 mg Intravenous Q4H PRN Annabelle Zhang NP        morphine injection 4 mg  4 mg Intravenous Q4H PRN Annabelle Zhang NP        naloxone 0.4 mg/mL injection 0.02 mg  0.02 mg Intravenous PRN Annabelle Zhang NP        ondansetron injection 4 mg  4 mg Intravenous Once PRN Everardo Gonzalez MD        sodium chloride 0.9% flush 3 mL  3 mL Intravenous Q12H PRN Annabelle Zhang NP         Current Outpatient Medications   Medication Sig Dispense Refill    allopurinoL (ZYLOPRIM) 300 MG tablet Take 1 tablet (300 mg total) by mouth once daily. 90 tablet 3    aspirin (ECOTRIN) 81 MG EC tablet Take 81 mg by mouth once daily.      atorvastatin (LIPITOR) 40 MG tablet Take 1 tablet (40 mg total) by mouth  once daily. 90 tablet 3    blood sugar diagnostic (CONTOUR NEXT TEST STRIPS) Strp To test glucose 4 times a day. 200 each 11    blood-glucose sensor (DEXCOM G7 SENSOR) Isha Change sensor every 10 days. 3 each 3    butalbital-acetaminophen-caffeine -40 mg (FIORICET, ESGIC) -40 mg per tablet Take 1 tablet by mouth every 6 (six) hours as needed for Headaches. 20 tablet 0    ciclopirox (PENLAC) 8 % Soln Apply topically nightly. 6.6 mL 6    cyclobenzaprine (FLEXERIL) 5 MG tablet Take 1 tablet (5 mg total) by mouth 3 (three) times daily as needed for Muscle spasms. 30 tablet 2    fenofibrate 160 MG Tab Take 1 tablet (160 mg total) by mouth once daily. 90 tablet 3    icosapent ethyL (VASCEPA) 1 gram Cap Take 2 capsules (2 g total) by mouth 2 (two) times daily. 360 capsule 3    insulin (LANTUS SOLOSTAR U-100 INSULIN) glargine 100 units/mL SubQ pen Inject 55 Units into the skin 2 (two) times a day. 100 mL 3    insulin aspart U-100 (NOVOLOG FLEXPEN U-100 INSULIN) 100 unit/mL (3 mL) InPn pen Inject 36 Units into the skin 3 (three) times daily with meals. Plus correction scale. Max TDD 140units. 100 mL 3    losartan (COZAAR) 100 MG tablet Take ½ tablet (50 mg total) by mouth once daily. 45 tablet 1    metFORMIN (GLUCOPHAGE) 500 MG tablet Take 2 tablets (1,000 mg total) by mouth 2 (two) times daily with meals. 360 tablet 3    metoclopramide HCl (REGLAN) 10 MG tablet Take 1 tablet (10 mg total) by mouth 4 (four) times daily. 120 tablet 2    naproxen (NAPROSYN) 500 MG tablet Take 1 tablet (500 mg total) by mouth 2 (two) times daily as needed (headache). 30 tablet 0    niacin 100 MG Tab Take 2 tablets (200 mg total) by mouth every evening. 90 tablet 1    ondansetron (ZOFRAN) 4 MG tablet Take 1 tablet (4 mg total) by mouth every 6 (six) hours as needed for Nausea. (Patient not taking: Reported on 2/23/2024) 30 tablet 1    ondansetron (ZOFRAN) 4 MG tablet Take 1 tablet (4 mg total) by mouth every 6 (six) hours. 12 tablet  0    pantoprazole (PROTONIX) 40 MG tablet Take 1 tablet (40 mg total) by mouth 2 (two) times daily. 60 tablet 2    pregabalin (LYRICA) 150 MG capsule Take 1 capsule (150 mg total) by mouth 2 (two) times daily. 60 capsule 5    sucralfate (CARAFATE) 100 mg/mL suspension Take 10 mLs (1 g total) by mouth 4 (four) times daily. 1200 mL 2     Family History       Problem Relation (Age of Onset)    Aneurysm Mother, Maternal Grandmother    Coronary artery disease Father    Diabetes type II Father, Paternal Grandmother    No Known Problems Sister, Brother          Tobacco Use    Smoking status: Never     Passive exposure: Never    Smokeless tobacco: Never   Substance and Sexual Activity    Alcohol use: No    Drug use: Never    Sexual activity: Not Currently     Partners: Female     Review of Systems   Constitutional:  Negative for chills and fever.   HENT:  Negative for congestion.    Eyes:  Negative for visual disturbance.   Respiratory:  Negative for shortness of breath.    Cardiovascular:  Negative for chest pain.   Gastrointestinal:  Positive for abdominal pain. Negative for nausea and vomiting.   Genitourinary:  Negative for dysuria.   Musculoskeletal:  Negative for myalgias.   Skin:  Negative for color change.   Neurological:  Negative for dizziness, syncope, facial asymmetry, speech difficulty and light-headedness.   Psychiatric/Behavioral:  Negative for agitation and confusion. The patient is not nervous/anxious.      Objective:     Vital Signs (Most Recent):  Temp: 98.1 °F (36.7 °C) (04/29/24 0300)  Pulse: 101 (04/29/24 0300)  Resp: (!) 23 (04/29/24 0300)  BP: (!) 158/70 (04/29/24 0300)  SpO2: 98 % (04/29/24 0300) Vital Signs (24h Range):  Temp:  [98.1 °F (36.7 °C)-98.4 °F (36.9 °C)] 98.1 °F (36.7 °C)  Pulse:  [] 101  Resp:  [16-26] 23  SpO2:  [90 %-98 %] 98 %  BP: (143-158)/(65-87) 158/70     Weight: 99.8 kg (220 lb)  Body mass index is 33.45 kg/m².     Physical Exam  Constitutional:       General: He is  sleeping. He is not in acute distress.     Appearance: He is ill-appearing.   HENT:      Head: Normocephalic and atraumatic.      Mouth/Throat:      Mouth: Mucous membranes are moist.   Eyes:      Extraocular Movements: Extraocular movements intact.      Pupils: Pupils are equal, round, and reactive to light.   Cardiovascular:      Rate and Rhythm: Normal rate and regular rhythm.      Pulses: Normal pulses.      Heart sounds: Normal heart sounds.   Pulmonary:      Effort: Pulmonary effort is normal. No respiratory distress.      Breath sounds: Normal breath sounds. No wheezing, rhonchi or rales.   Abdominal:      General: Bowel sounds are normal. There is no distension.      Palpations: Abdomen is soft.      Tenderness: There is abdominal tenderness. There is no guarding or rebound.   Musculoskeletal:      Cervical back: No rigidity.      Right lower leg: No edema.      Left lower leg: No edema.   Skin:     General: Skin is warm.   Neurological:      Mental Status: He is oriented to person, place, and time and easily aroused.   Psychiatric:         Mood and Affect: Mood normal.         Behavior: Behavior normal.         Thought Content: Thought content normal.         Judgment: Judgment normal.              CRANIAL NERVES     CN III, IV, VI   Pupils are equal, round, and reactive to light.       Significant Labs: All pertinent labs within the past 24 hours have been reviewed.    Significant Imaging: I have reviewed all pertinent imaging results/findings within the past 24 hours.

## 2024-04-29 NOTE — ED TRIAGE NOTES
Bay Ibarra, a 43 y.o. male presents to the ED w/ complaint of abdominal pain.  Pt reports right lower quadrant abdominal pain that radiates to his right flank and back.  Pt denies any urinary symptoms and pt has been having normal Bms.      Triage note:  Chief Complaint   Patient presents with    Abdominal Pain     Patient reports right abdominal pain that started at 2100 tonight.  Constant but increasing in intensity.  No nausea.  No vomiting.  No fever.  Denies UTI symptoms.  EMS reports FSBG 418.  States LBM today normal brown and formed.       Review of patient's allergies indicates:  No Known Allergies  Past Medical History:   Diagnosis Date    Acute pancreatitis     Cellulitis of left lower extremity 01/11/2024    Diverticulosis of colon     Essential hypertension     Fatty liver     Gastric varices without bleeding 01/16/2020    Glaucomatous optic atrophy, right     Gout     Hx of acute pancreatitis 03/03/2017    Hypertriglyceridemia 08/02/2017    Obesity (BMI 30-39.9) 06/05/2015    Obesity (BMI 35.0-39.9 without comorbidity) 06/05/2015    Obstructive sleep apnea syndrome 12/18/2015    Type 2 diabetes mellitus with diabetic polyneuropathy, with long-term current use of insulin 10/16/2017    Type 2 diabetes mellitus with hyperglycemia, with long-term current use of insulin 08/03/2017

## 2024-04-29 NOTE — CONSULTS
Consult Note  Pulmonary & Critical Care Medicine    Attending: Rosalba Granados   Fellow: Andra Reyes  Admit Date: 4/29/2024  Today's Date: 04/29/2024  Reason for Consult:  Hypertriglyceridemia Pancreatitis     SUBJECTIVE:     HPI: Mr. Ibarra is a 42yo CM with a PMH of recurrent hyperTG pancreatitis, HLD, DM, HTN, and JAZMYN who presented to the ED with abdominal pain that radiated to his back. Denies any N/V, diarrhea, or constipation. Stated that the type of pain reminded him of his prior pancreatitis episodes except this one radiated to the back. In ED, he was found to have hypertriglyceride pancreatitis. He was started on insulin gtt and admitted to the ICU.     Review of patient's allergies indicates:  No Known Allergies    Past Medical History:   Diagnosis Date    Acute pancreatitis     Cellulitis of left lower extremity 01/11/2024    Diverticulosis of colon     Essential hypertension     Fatty liver     Gastric varices without bleeding 01/16/2020    Glaucomatous optic atrophy, right     Gout     Hx of acute pancreatitis 03/03/2017    Hypertriglyceridemia 08/02/2017    Obesity (BMI 30-39.9) 06/05/2015    Obesity (BMI 35.0-39.9 without comorbidity) 06/05/2015    Obstructive sleep apnea syndrome 12/18/2015    Type 2 diabetes mellitus with diabetic polyneuropathy, with long-term current use of insulin 10/16/2017    Type 2 diabetes mellitus with hyperglycemia, with long-term current use of insulin 08/03/2017     Past Surgical History:   Procedure Laterality Date    DEBRIDEMENT OF FOOT Right 4/21/2023    Procedure: DEBRIDEMENT, FOOT;  Surgeon: Anju Melvin DPM;  Location: [facility];  Service: Podiatry;  Laterality: Right;    ENDOSCOPIC ULTRASOUND OF UPPER GASTROINTESTINAL TRACT N/A 5/27/2019    Procedure: ULTRASOUND, UPPER GI TRACT, ENDOSCOPIC;  Surgeon: Zafar Velazquez MD;  Location: [facility] ENDO [address]);  Service: Endoscopy;  Laterality: N/A;    ERCP N/A 5/27/2019    Procedure: ERCP (ENDOSCOPIC RETROGRADE  CHOLANGIOPANCREATOGRAPHY);  Surgeon: Zafar Velazquez MD;  [facility]  Service: Endoscopy;  Laterality: N/A;    ESOPHAGOGASTRODUODENOSCOPY      ESOPHAGOGASTRODUODENOSCOPY N/A 1/31/2020    Procedure: EGD (ESOPHAGOGASTRODUODENOSCOPY);  Surgeon: Zafar Velazquez MD;  Location: [facility] ENDO;  Service: Endoscopy;  Laterality: N/A;    ESOPHAGOGASTRODUODENOSCOPY N/A 2/16/2024    Procedure: EGD (ESOPHAGOGASTRODUODENOSCOPY);  Surgeon: Rashida Messer MD;  Location: [facility] ENDO;  Service: Endoscopy;  Laterality: N/A;    PLANTAR FASCIA RELEASE Right 4/21/2023    Procedure: RELEASE, FASCIA, PLANTAR;  Surgeon: Anju Melvin DPM;  Location: [facility] OR;  Service: Podiatry;  Laterality: Right;    RESECTION OF GASTROCNEMIUS MUSCLE Right 4/21/2023    Procedure: RESECTION, MUSCLE, GASTROCNEMIUS;  Surgeon: Anju Melvin DPM;  Location: [facility] OR;  Service: Podiatry;  Laterality: Right;     Family History   Problem Relation Name Age of Onset    Aneurysm Mother          AAA    Coronary artery disease Father          4 vessel bypass at 40, possible stent at 36    Diabetes type II Father      No Known Problems Sister      No Known Problems Brother      Aneurysm Maternal Grandmother          Possible AAA    Diabetes type II Paternal Grandmother       Social History     Tobacco Use    Smoking status: Never     Passive exposure: Never    Smokeless tobacco: Never   Substance Use Topics    Alcohol use: No    Drug use: Never       All medications reviewed.    Review of Systems   Constitutional:  Negative for chills, fever, malaise/fatigue and weight loss.   HENT:  Negative for congestion, sinus pain and sore throat.    Eyes:  Negative for blurred vision, double vision and photophobia.   Respiratory:  Negative for cough, sputum production and shortness of breath.    Cardiovascular:  Positive for leg swelling. Negative for chest pain, palpitations, orthopnea, claudication and PND.   Gastrointestinal:  Positive for abdominal pain.  Negative for constipation, diarrhea, heartburn, nausea and vomiting.   Genitourinary:  Negative for dysuria, frequency and urgency.   Musculoskeletal:  Negative for falls, joint pain and myalgias.   Skin:  Negative for itching and rash.   Neurological:  Negative for dizziness, weakness and headaches.   All other systems reviewed and are negative.      OBJECTIVE:     Vital Signs Trends/Hx Reviewed  Vitals:    04/29/24 1100 04/29/24 1115 04/29/24 1130 04/29/24 1227   BP: (!) 159/74 (!) 159/74  (!) 140/67   BP Location:  Right arm     Patient Position:  Lying     Pulse: (!) 117 (!) 118 (!) 112    Resp: 18 (!) 24 (!) 26    Temp:  98.2 °F (36.8 °C)     TempSrc:  Oral     SpO2: 95%  (!) 94%    Weight:       Height:           Physical Exam:  General: CM laying in bed in NAD, cooperative & interactive.  HEENT: AT/NC, PERRL, EOMI, oral and nasal mucosa moist.   Neck: Supple without JVD or palpable LAD.   Cardiac: normal rate, regular rhythm, with no MRG with brisk cap refill and symmetric pulses in distal extremities.  Respiratory: Normal inspection. Symmetric chest rise. Normal palpation and percussion. Auscultation clear bilaterally. No increased work of breathing noted.   Abdomen: Soft, tender to palpation. Obese abdomen. +BS. No hepatosplenomegaly.   Extremities: No edema.   Neuro: Grossly intact to brief exam. Oriented x3 with appropriate mood/affect to situation.       Laboratory:  Recent Labs   Lab 04/29/24  0205   PH 7.356   PCO2 54.0*   PO2 39.4*   HCO3 30.2*   POCSATURATED 67.7*     Recent Labs   Lab 04/29/24  0459   WBC 9.13   RBC 5.31   HGB 13.4*   HCT 41.1      MCV 77*   MCH 25.2*   MCHC 32.6     Recent Labs   Lab 04/29/24  0143 04/29/24  0459    136   K 3.8 4.3    101   CO2 23 22*   BUN 20 19   CREATININE 1.2 1.2   CALCIUM 9.5 9.2   MG 1.7  --        Microbiology Data:   Microbiology Results (last 7 days)       ** No results found for the last 168 hours. **             Chest Imaging:   No  acute chest findings.     Infusions:    Current Facility-Administered Medications   Medication Dose Route Frequency Last Rate Last Admin    insulin regular 100 Units in sodium chloride 0.9% 100 mL infusion (conc: 1 unit/mL)  0.2 Units/kg/hr Intravenous Continuous 19.96 mL/hr at 04/29/24 1144 0.2 Units/kg/hr at 04/29/24 1144    lactated ringers   Intravenous Continuous 125 mL/hr at 04/29/24 1051 Rate Verify at 04/29/24 1051       Scheduled Medications:   Current Facility-Administered Medications   Medication Dose Route Frequency    atorvastatin  40 mg Oral Daily    enoxparin  40 mg Subcutaneous Daily    losartan  50 mg Oral Daily    mupirocin   Nasal BID    omega 3-dha-epa-fish oil  2 capsule Oral BID       PRN Medications:     Current Facility-Administered Medications:     acetaminophen, 650 mg, Oral, Q4H PRN    dextrose 10%, 12.5 g, Intravenous, PRN    dextrose 10%, 25 g, Intravenous, PRN    glucagon (human recombinant), 1 mg, Intramuscular, PRN    glucagon (human recombinant), 1 mg, Intramuscular, PRN    glucose, 16 g, Oral, PRN    glucose, 24 g, Oral, PRN    insulin aspart U-100, 0-5 Units, Subcutaneous, QID (AC + HS) PRN    melatonin, 6 mg, Oral, Nightly PRN    morphine, 2 mg, Intravenous, Q4H PRN    morphine, 4 mg, Intravenous, Q4H PRN    naloxone, 0.02 mg, Intravenous, PRN    ondansetron, 4 mg, Intravenous, Once PRN    sodium chloride 0.9%, 3 mL, Intravenous, Q12H PRN    Assessment & Plan:   Patient Active Problem List   Diagnosis    Essential hypertension    JAZMYN (obstructive sleep apnea)    Other chronic pancreatitis    Mixed hyperlipidemia    Type 2 diabetes mellitus with diabetic polyneuropathy, with long-term current use of insulin    Acute on chronic pancreatitis    Pancreatic pseudocyst    Type 1 diabetes mellitus with hyperglycemia    Diabetic gastroparesis associated with type 1 diabetes mellitus    Diabetic polyneuropathy associated with type 1 diabetes mellitus    Acute cystitis without hematuria     Class 1 obesity with alveolar hypoventilation, serious comorbidity, and body mass index (BMI) of 31.0 to 31.9 in adult    Hyperuricemia    Pancytopenia    New daily persistent headache    Hypertriglyceridemia       ASSESSMENT & RECOMMENDATIONS     Neuro  #Diabetic neuropathy  -On Lyrica at home. Renal function at baseline. Will continue as needed.     Cardiology  #HTN  -On Losartan at home. Will restart.     #HLD  -Continue home Atorvastatin and ASA.  -Will hold fenofibrate 2/2 elevated liver enzymes. Restart on improvement.  -Start fish oil supplement    Pulmonology  #JAZMYN  -Prior diagnosis. Does not wear CPAP at home.   -Monitor    Renal   -No acute issues at this time    GI  #Acute Hypertriglyceride Pancreatitis  -TG- 890, Lipase- >1000  -he has had multiple episodes of this. Last episode being February 2024.   -On Insulin gtt @ 0.2units/kg/hr. Can increase to 0.3 units/kg/hr if unable to lower in TG.  -Continue IVF while on insulin gtt.   -Continue home Atorvastatin.  -Will hold fenofibrate 2/2 elevated liver enzymes. Restart on improvement.  -Start fish oil supplement    #Gastroparesis  -2/2 his diabetes.   -Continue home reglan     #GERD  -Continue home protonix BID    Heme/Onc  #Mild Microcytic Anemia  -Iron studies ordered    ID  -No acute issues at this time    Endo  #Type 1 Diabetes Mellitus  -On Lantus 55 units BID and aspart 36 units WMTID at home   -Will hold off on further subQ insulin while he is on the insulin gtt.     Rheum/MSK  #Gout  -Continue home allopurinol    Thank you for allowing us to participate in the care of this patient. We will continue to follow. Please call with questions.    Andra Reyes M.D., PGY-VI  [employer] Pulmonary/Critical Care Fellow

## 2024-04-29 NOTE — HPI
Bay Ibarra is a 43 y.o. 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 He presented to the emergency department w/ c/o acute onset right lower quadrant abdominal pain that began 3 hours prior to ED presentation.  Patient reports pain is sharp in nature and at times radiates towards the back.  Denies any associated nausea or vomiting.  He reports having a BM prior to EMS arrival to his residence reports it was normal.  Denies any nausea or vomiting.  He reports for the past 4 days his glucose level has been higher than normal.  Patient states he was given medication (fentanyl) EN route with EMS with significant improvement of his symptoms.  Denies any dysuria or hematuria.  Denies any prior surgical history.

## 2024-04-29 NOTE — NURSING
Pt arrived to  on 2L NC. Pt sinus tach on monitor. Pt able to ambulate to bed. Insulin gtt started and LR gtt restarted at 125mL/hr. Pt Aox4 and endorses some abdominal pain, but is tolerable. Pt given 2mg morphine in ED an hour prior to arriving to ICU. Pt states family is aware of hospitalization.

## 2024-04-29 NOTE — ED NOTES
Received report:    Pt sleeping with chest rise and fall, easily aroused by voice; CM/BP/POX cont'd, SR x 2, care ongoing.

## 2024-04-29 NOTE — ED PROVIDER NOTES
Encounter Date: 4/29/2024       History     Chief Complaint   Patient presents with    Abdominal Pain     Patient reports right abdominal pain that started at 2100 tonight.  Constant but increasing in intensity.  No nausea.  No vomiting.  No fever.  Denies UTI symptoms.  EMS reports FSBG 418.  States LBM today normal brown and formed.       43 y.o. malewho has a past medical history of Acute pancreatitis, Cellulitis of left lower extremity, Diverticulosis of colon, Essential hypertension, Fatty liver, Gastric varices without bleeding, Glaucomatous optic atrophy, right, Gout, acute pancreatitis, Hypertriglyceridemia, Obesity (BMI 30-39.9), Obesity (BMI 35.0-39.9 without comorbidity), Obstructive sleep apnea syndrome, Type 2 diabetes mellitus with diabetic polyneuropathy, with long-term current use of insulin, and Type 2 diabetes mellitus with hyperglycemia, with long-term current use of insulin presents to emergency department secondary to acute onset right lower quadrant abdominal pain that began 3 hours prior to ED presentation.  Patient reports pain is sharp in nature and at times radiates towards the back.  Denies any associated nausea or vomiting.  He reports having a mild movement prior to EMS arrival to his residence reports it was normal.  Denies any nausea or vomiting.  He reports for the past 4 days his glucose level has been higher than normal.  Patient states he was given medication (fentanyl) EN route with EMS with significant improvement of his symptoms.  Denies any dysuria or hematuria.  Denies any prior surgical history.      The history is provided by the patient and the EMS personnel.     Review of patient's allergies indicates:  No Known Allergies  Past Medical History:   Diagnosis Date    Acute pancreatitis     Cellulitis of left lower extremity 01/11/2024    Diverticulosis of colon     Essential hypertension     Fatty liver     Gastric varices without bleeding 01/16/2020    Glaucomatous optic  atrophy, right     Gout     Hx of acute pancreatitis 03/03/2017    Hypertriglyceridemia 08/02/2017    Obesity (BMI 30-39.9) 06/05/2015    Obesity (BMI 35.0-39.9 without comorbidity) 06/05/2015    Obstructive sleep apnea syndrome 12/18/2015    Type 2 diabetes mellitus with diabetic polyneuropathy, with long-term current use of insulin 10/16/2017    Type 2 diabetes mellitus with hyperglycemia, with long-term current use of insulin 08/03/2017     Past Surgical History:   Procedure Laterality Date    DEBRIDEMENT OF FOOT Right 4/21/2023    Procedure: DEBRIDEMENT, FOOT;  Surgeon: Anju Melvin DPM;  Location: [facility];  Service: Podiatry;  Laterality: Right;    ENDOSCOPIC ULTRASOUND OF UPPER GASTROINTESTINAL TRACT N/A 5/27/2019    Procedure: ULTRASOUND, UPPER GI TRACT, ENDOSCOPIC;  Surgeon: Zafar Velazquez MD;  Location: [address]);  Service: Endoscopy;  Laterality: N/A;    ERCP N/A 5/27/2019    Procedure: ERCP (ENDOSCOPIC RETROGRADE CHOLANGIOPANCREATOGRAPHY);  Surgeon: Zafar Velazquez MD;  Location: [address]);  Service: Endoscopy;  Laterality: N/A;    ESOPHAGOGASTRODUODENOSCOPY      ESOPHAGOGASTRODUODENOSCOPY N/A 1/31/2020    Procedure: EGD (ESOPHAGOGASTRODUODENOSCOPY);  Surgeon: Zafar Velazquez MD;  [facility];  Service: Endoscopy;  Laterality: N/A;    ESOPHAGOGASTRODUODENOSCOPY N/A 2/16/2024    Procedure: EGD (ESOPHAGOGASTRODUODENOSCOPY);  Surgeon: Rashida Messer MD;  Location: [facility];  Service: Endoscopy;  Laterality: N/A;    PLANTAR FASCIA RELEASE Right 4/21/2023    Procedure: RELEASE, FASCIA, PLANTAR;  Surgeon: Anju Melvin DPM;  Location: [facility] OR;  Service: Podiatry;  Laterality: Right;    RESECTION OF GASTROCNEMIUS MUSCLE Right 4/21/2023    Procedure: RESECTION, MUSCLE, GASTROCNEMIUS;  Surgeon: Anju Melvin DPM;  Location: [facility] OR;  Service: Podiatry;  Laterality: Right;     Family History   Problem Relation Name Age of Onset    Aneurysm Mother          AAA    Coronary artery  disease Father          4 vessel bypass at 40, possible stent at 36    Diabetes type II Father      No Known Problems Sister      No Known Problems Brother      Aneurysm Maternal Grandmother          Possible AAA    Diabetes type II Paternal Grandmother       Social History     Tobacco Use    Smoking status: Never     Passive exposure: Never    Smokeless tobacco: Never   Substance Use Topics    Alcohol use: No    Drug use: Never     Review of Systems   Constitutional:  Negative for chills and fever.   HENT:  Negative for congestion and rhinorrhea.    Eyes:  Negative for pain.   Respiratory:  Negative for cough and shortness of breath.    Cardiovascular:  Negative for chest pain and leg swelling.   Gastrointestinal:  Positive for abdominal pain. Negative for nausea and vomiting.   Genitourinary:  Negative for dysuria and hematuria.   Musculoskeletal:  Negative for joint swelling and neck pain.   Skin:  Negative for rash.   Neurological:  Negative for weakness and headaches.       Physical Exam     Initial Vitals [04/29/24 0109]   BP Pulse Resp Temp SpO2   (!) 152/87 80 16 98.4 °F (36.9 °C) 98 %      MAP       --         Physical Exam    Constitutional: He appears well-developed and well-nourished. He is not diaphoretic. No distress.   HENT:   Head: Normocephalic and atraumatic.   Eyes: Conjunctivae and EOM are normal. Pupils are equal, round, and reactive to light.   Neck:   Normal range of motion.  Cardiovascular:  Regular rhythm.           Pulses:       Radial pulses are 2+ on the right side and 2+ on the left side.        Posterior tibial pulses are 2+ on the right side and 2+ on the left side.   Pulmonary/Chest: Breath sounds normal. No respiratory distress.   Abdominal: Abdomen is soft. Bowel sounds are normal. He exhibits no distension. There is abdominal tenderness in the right lower quadrant. There is no rebound and no guarding. positive obturator sign and positive psoas sign  Musculoskeletal:          General: No tenderness. Normal range of motion.      Cervical back: Normal range of motion.     Lymphadenopathy:     He has no cervical adenopathy.   Neurological: He is alert and oriented to person, place, and time.   Skin: Skin is warm. Capillary refill takes less than 2 seconds.   Psychiatric: His behavior is normal.         ED Course   Procedures  Labs Reviewed   CBC W/ AUTO DIFFERENTIAL - Abnormal; Notable for the following components:       Result Value    Hemoglobin 12.9 (*)     Hematocrit 38.0 (*)     MCV 77 (*)     MCH 26.0 (*)     RDW 15.7 (*)     Platelets 146 (*)     Lymph # 0.4 (*)     Gran % 85.9 (*)     Lymph % 4.1 (*)     All other components within normal limits   COMPREHENSIVE METABOLIC PANEL - Abnormal; Notable for the following components:    Glucose 420 (*)     Total Bilirubin 1.2 (*)      (*)     ALT 77 (*)     All other components within normal limits   LIPASE - Abnormal; Notable for the following components:    Lipase >1000 (*)     All other components within normal limits   POCT GLUCOSE - Abnormal; Notable for the following components:    POCT Glucose 376 (*)     All other components within normal limits   MAGNESIUM   PHOSPHORUS   BETA - HYDROXYBUTYRATE, SERUM   LACTIC ACID, PLASMA   POCT GLUCOSE MONITORING CONTINUOUS          Imaging Results              CT Abdomen Pelvis With IV Contrast NO Oral Contrast (Final result)  Result time 04/29/24 03:11:46      Final result by Dayron Booker MD (04/29/24 03:11:46)                   Impression:      Enlarged pancreatic head with surrounding edema and fat stranding consistent with history of acute pancreatitis. There is increased narrowing of the portal vein through the pancreatic head region compared to prior.  Cavernous transformation of the portal vein and numerous gastroesophageal collaterals, similar to prior.    Gastric distention is decreased compared to prior.  Duodenal wall thickening adjacent to the head of the pancreas  presumed to represent contiguous inflammatory change.  Duodenitis could have a similar appearance.    Hepatosplenomegaly and hepatic steatosis, similar to prior.    Additional findings as above.      Electronically signed by: Dayron Booker MD  Date:    04/29/2024  Time:    03:11               Narrative:    EXAMINATION:  CT ABDOMEN PELVIS WITH IV CONTRAST    CLINICAL HISTORY:  Pancreatitis, acute, severe;RLQ abdominal pain (Age >= 14y);    TECHNIQUE:  Low dose axial images, sagittal and coronal reformations were obtained from the lung bases to the pubic symphysis following the IV administration of 100 mL of Omnipaque 350 .  Oral contrast was not administered.    COMPARISON:  02/14/2024.    FINDINGS:  Abdomen:    - Lower thorax:Gynecomastia.    - Lung bases: Minimal dependent changes at the lung bases.    - Liver: Hepatomegaly with diffuse decreased hepatic attenuation suggesting steatosis.  Focal fatty infiltration along the falciform fissure, similar compared to prior.    - Gallbladder: No calcified gallstones.    - Bile Ducts: No evidence of intra or extra hepatic biliary ductal dilation.    - Spleen: Splenomegaly, similar to prior.    - Kidneys: No mass or hydronephrosis.    - Adrenals: Unremarkable.    - Pancreas: Enlarged pancreatic head with surrounding edema and fat stranding consistent with history of acute pancreatitis.  There is increased narrowing of the portal vein through the pancreatic head region compared to prior.  Cavernous transformation of the portal vein and numerous gastroesophageal collaterals, similar to prior.  No peripancreatic fluid collections.  Chronic atrophy of the pancreatic body and tail with pancreatic duct dilatation, similar to prior.    - Retroperitoneum:  Prominent lymph nodes, similar to prior.    - Vascular: No abdominal aortic aneurysm.    - Abdominal wall:  Unremarkable.    Pelvis:    No pelvic mass, adenopathy, or free fluid.    Bowel/Mesentery:    No bowel obstruction.   Gastric distention is decreased compared to prior.  Duodenal wall thickening adjacent to the head of the pancreas presumed to represent contiguous inflammatory change.  Duodenitis could have a similar appearance.    Bones:  No acute osseous abnormality and no suspicious lytic or blastic lesion.                                       Medications   ondansetron injection 4 mg (has no administration in time range)   sodium chloride 0.9% bolus 1,000 mL 1,000 mL (1,000 mLs Intravenous New Bag 4/29/24 0143)   morphine injection 6 mg (6 mg Intravenous Given 4/29/24 0133)   iohexoL (OMNIPAQUE 350) injection 100 mL (100 mLs Intravenous Given 4/29/24 0233)   morphine injection 8 mg (8 mg Intravenous Given 4/29/24 0252)     Medical Decision Making:   History:   Old Medical Records: I decided to obtain old medical records.  Initial Assessment:   43 y.o. malewho has a past medical history of Acute pancreatitis, Cellulitis of left lower extremity, Diverticulosis of colon, Essential hypertension, Fatty liver, Gastric varices without bleeding, Glaucomatous optic atrophy, right, Gout, acute pancreatitis, Hypertriglyceridemia, Obesity (BMI 30-39.9), Obesity (BMI 35.0-39.9 without comorbidity), Obstructive sleep apnea syndrome, Type 2 diabetes mellitus with diabetic polyneuropathy, with long-term current use of insulin, and Type 2 diabetes mellitus with hyperglycemia, with long-term current use of insulin presents to emergency department secondary to acute onset right lower quadrant abdominal pain that began 3 hours prior to ED presentation.  In no acute distress, vitals notable for elevated blood pressure.  Exam notable for right lower quadrant tenderness to palpation with voluntary guarding.  Positive psoas and obturator sign.  No evidence of acute abdomen.  Given patient's presentation concern for possible appendicitis so will obtain CT abdomen and pelvis with contrast.  Given patient's history of pancreatitis although does not fit  with his corn location of pain will obtain a lipase for assessment.  Will give morphine for pain control give IV fluids rule out DKA given patient is hyperglycemic.  Differential Diagnosis:   Differential Diagnosis includes, but is not limited to:  AAA, aortic dissection, mesenteric ischemia, perforated viscous, MI/ACS, SBO/volvulus, incarcerated/strangulated hernia, intussusception, ileus, appendicitis, cholecystitis, cholangitis, diverticulitis, esophagitis, hepatitis, nephrolithiasis, pancreatitis, gastroenteritis, colitis, IBD/IBS, biliary colic, GERD, PUD, constipation, UTI/pyelonephritis,  disorder.     Clinical Tests:   Lab Tests: Ordered and Reviewed  Radiological Study: Ordered and Reviewed             ED Course as of 04/29/24 0342   Mon Apr 29, 2024   0147 Independent Interpretation of EKG:  Rhythm: Sinus tachycardia   Rate: 98  QTC: 446  No STEMI  [AS]   0208 POCT Venous Blood Gas(!!) [AS]   0208 CBC auto differential(!)  CBC without significant leukocytosis, anemia (at baseline), or platelet abnormalities.    [AS]   0208 Beta - Hydroxybutyrate, Serum [AS]   0220 Lipase(!): >1000 [AS]   0220 AST(!): 150  Chemistry without any acute sodium or potassium abnormalities, glucose 420 consistent with hyperglycemia, anion gap within normal limits, mild transaminitis. [AS]   0220 ALT(!): 77 [AS]   0327 CT Abdomen Pelvis With IV Contrast NO Oral Contrast  enlarged pancreatic head with surrounding edema and fat stranding consistent with history of acute pancreatitis [AS]   0328 After review of the patient's physical exam, ED testing, and history/symptoms, the patient requires additional care in the hospital for the treatment of pancreatitis . Discussed patients case with [facility] who will accept the patient and any pending labs/imaging/interventions.   I discussed the objective findings with the patient including laboratory studies, diagnostic imaging, and any consultant involvement. The patient/ family was educated on  their clinical presentation and all questions were answered. They acknowledged and verbally agreed to the treatment plan. The patient will be admitted to the hospital for further management.    [AS]      ED Course User Index  [AS] Everardo Gonzalez MD          Medical Decision Making  Amount and/or Complexity of Data Reviewed  Labs: ordered. Decision-making details documented in ED Course.  Radiology: ordered. Decision-making details documented in ED Course.    Risk  Prescription drug management.  Decision regarding hospitalization.         Critical Care Procedure Note  Authorized and Performed by: Everardo Gonzalez MD  Total critical care time: 65 minutes  Due to a high probability of clinically significant, life threatening deterioration, the patient required my highest level of preparedness to intervene emergently and I personally spent this critical care time directly and personally managing the patient. This critical care time included obtaining a history; examining the patient; pulse oximetry; ordering and review of studies; arranging urgent treatment with development of a management plan; evaluation of patient's response to treatment; frequent reassessment; and, discussions with other providers.  This critical care time was performed to assess and manage the high probability of imminent, life-threatening deterioration that could result in multi-organ failure. It was exclusive of separately billable procedures and treating other patients and teaching time.  Please see MDM section and the rest of the note for further information on patient assessment and treatment.    Clinical Impression:   Final diagnoses:  [R10.9] Abdominal pain  [R73.9] Hyperglycemia (Primary)  [K85.90] Acute pancreatitis, unspecified complication status, unspecified pancreatitis type  [R74.01] Transaminitis          ED Disposition Condition    Admit Stable               DISCLAIMER: This note was prepared with MModal voice recognition  transcription software. Garbled syntax, mangled pronouns, and other bizarre constructions may be attributed to that software system.     Everardo Gonzalez MD  04/29/24 3027

## 2024-04-29 NOTE — PLAN OF CARE
The sw met with the pt to complete the assessment. The pt wasn't feeling well but managed to answer most of the questions asked. The pt lives alone in [place]. The pt list his father Guero Ibarra [phone] as his emergency contact. The pt's independent with his ADL's and has the dme listed below. The pt's employed as a  for [employer]. The pt still drives but his father will transport him home at d/c. The sw completed the white board in the pt's room with her name and contact info. The sw left the pt a d/c brochure at bedside with her contact info on it. The sw encouraged the pt to call if he has any further questions or concerns. The sw will continue to follow the pt throughout his transitions of care and will assist with any d/c needs.     [facility] - Intensive Care  Initial Discharge Assessment       Primary Care Provider: Funmilayo Taylor MD    Admission Diagnosis: Pancreatitis [K85.90]  Transaminitis [R74.01]  Hyperglycemia [R73.9]  Abdominal pain [R10.9]  Chest pain [R07.9]  Acute pancreatitis, unspecified complication status, unspecified pancreatitis type [K85.90]    Admission Date: 4/29/2024  Expected Discharge Date:     Transition of Care Barriers: (P) None    Payor: BLUE CROSS BLUE SHIELD / Plan: [place] HMO / Product Type: HMO /     Extended Emergency Contact Information  Primary Emergency Contact: Guero Ibarra   [place]  Mobile Phone: [phone]  Relation: Father    Discharge Plan A: (P) Home with family  Discharge Plan B: (P) Home Health      [facility]  [address]  [place] [address]  Phone: [phone] Fax: [phone]    [id number] - [place], [place] - [address]  [address]  [address]   [facility] [address]  Phone: [phone] Fax: [phone]    [facility] - [place], [place] - [address]  [address]  [place] [address]  Phone: [phone] Fax: [phone]      Initial Assessment (most recent)       Adult  Discharge Assessment - 04/29/24 1504          Discharge Assessment    Assessment Type Discharge Planning Assessment (P)      Confirmed/corrected address, phone number and insurance Yes (P)      Confirmed Demographics Correct on Facesheet (P)      Source of Information patient;health record (P)      When was your last doctors appointment? 04/04/24 (P)      Communicated JOE with patient/caregiver Date not available/Unable to determine (P)      Reason For Admission Acute on chronic pancreatitis (P)      People in Home alone (P)      Do you expect to return to your current living situation? Yes (P)      Do you have help at home or someone to help you manage your care at home? Yes (P)      Who are your caregiver(s) and their phone number(s)? Guero Ibarra(father)[phone] (P)      Prior to hospitilization cognitive status: Alert/Oriented (P)      Current cognitive status: Alert/Oriented (P)      Walking or Climbing Stairs Difficulty no (P)      Dressing/Bathing Difficulty no (P)      Home Accessibility wheelchair accessible (P)      Home Layout Able to live on 1st floor (P)      Equipment Currently Used at Home glucometer (P)      Readmission within 30 days? No (P)      Patient currently being followed by outpatient case management? No (P)      Do you currently have service(s) that help you manage your care at home? No (P)      Do you take prescription medications? Yes (P)      Do you have prescription coverage? Yes (P)      Coverage BCBS (P)      Do you have any problems affording any of your prescribed medications? No (P)      Is the patient taking medications as prescribed? yes (P)      Who is going to help you get home at discharge? Guero Ibarra(father)[phone] (P)      How do you get to doctors appointments? car, drives self (P)      Are you on dialysis? No (P)      Discharge Plan A Home with family (P)      Discharge Plan B Home Health (P)      DME Needed Upon Discharge  other (see comments) (P)    TBD     Discharge Plan discussed with: Patient (P)      Transition of Care Barriers None (P)         Physical Activity    On average, how many days per week do you engage in moderate to strenuous exercise (like a brisk walk)? 0 days (P)      On average, how many minutes do you engage in exercise at this level? 0 min (P)         Financial Resource Strain    How hard is it for you to pay for the very basics like food, housing, medical care, and heating? Not hard at all (P)         Housing Stability    In the last 12 months, was there a time when you were not able to pay the mortgage or rent on time? No (P)      In the past 12 months, how many times have you moved where you were living? 0 (P)      At any time in the past 12 months, were you homeless or living in a shelter (including now)? No (P)         Transportation Needs    In the past 12 months, has lack of transportation kept you from medical appointments or from getting medications? No (P)      In the past 12 months, has lack of transportation kept you from meetings, work, or from getting things needed for daily living? No (P)         Food Insecurity    Within the past 12 months, you worried that your food would run out before you got the money to buy more. Never true (P)      Within the past 12 months, the food you bought just didn't last and you didn't have money to get more. Never true (P)         Stress    Do you feel stress - tense, restless, nervous, or anxious, or unable to sleep at night because your mind is troubled all the time - these days? Only a little (P)         Social Connections    In a typical week, how many times do you talk on the phone with family, friends, or neighbors? More than three times a week (P)      How often do you attend Baptist or Baptism services? Never (P)      Do you belong to any clubs or organizations such as Baptist groups, unions, fraternal or athletic groups, or school groups? No (P)      How often do you attend meetings of the  clubs or organizations you belong to? Never (P)      Are you , , , , never , or living with a partner?  (P)         Alcohol Use    Q1: How often do you have a drink containing alcohol? Never (P)      Q2: How many drinks containing alcohol do you have on a typical day when you are drinking? Patient does not drink (P)      Q3: How often do you have six or more drinks on one occasion? Never (P)

## 2024-04-29 NOTE — ASSESSMENT & PLAN NOTE
Patient's FSGs are uncontrolled on current medication regimen.  Last A1c reviewed-   Lab Results   Component Value Date    LABA1C 8.8 (H) 01/12/2018    HGBA1C 9.5 (H) 03/28/2024     Most recent fingerstick glucose reviewed-   Recent Labs   Lab 04/30/24  0542 04/30/24  0650 04/30/24  0801 04/30/24  1103   POCTGLUCOSE 97 115* 132* 208*     Current correctional scale  Low  Maintain anti-hyperglycemic dose as follows-   Antihyperglycemics (From admission, onward)      Start     Stop Route Frequency Ordered    04/30/24 0830  insulin detemir U-100 (Levemir) pen 25 Units         -- SubQ 2 times daily 04/30/24 0818    04/29/24 0447  insulin aspart U-100 pen 0-5 Units         -- SubQ Before meals & nightly PRN 04/29/24 0353          Hold Oral hypoglycemics while patient is in the hospital.    DMT2 documented, but patient follows with endocrinology who documented DMT1  Patient confirmed he is type 1

## 2024-04-30 PROBLEM — R79.89 ELEVATED LIVER FUNCTION TESTS: Status: ACTIVE | Noted: 2024-04-30

## 2024-04-30 LAB
ALBUMIN SERPL BCP-MCNC: 3 G/DL (ref 3.5–5.2)
ALP SERPL-CCNC: 115 U/L (ref 55–135)
ALT SERPL W/O P-5'-P-CCNC: 193 U/L (ref 10–44)
ANION GAP SERPL CALC-SCNC: 9 MMOL/L (ref 8–16)
AST SERPL-CCNC: 250 U/L (ref 10–40)
BASOPHILS # BLD AUTO: 0.05 K/UL (ref 0–0.2)
BASOPHILS NFR BLD: 0.4 % (ref 0–1.9)
BILIRUB SERPL-MCNC: 3.9 MG/DL (ref 0.1–1)
BUN SERPL-MCNC: 15 MG/DL (ref 6–20)
CALCIUM SERPL-MCNC: 9.1 MG/DL (ref 8.7–10.5)
CHLORIDE SERPL-SCNC: 106 MMOL/L (ref 95–110)
CK SERPL-CCNC: 18 U/L (ref 20–200)
CO2 SERPL-SCNC: 26 MMOL/L (ref 23–29)
CREAT SERPL-MCNC: 1 MG/DL (ref 0.5–1.4)
DIFFERENTIAL METHOD BLD: ABNORMAL
EOSINOPHIL # BLD AUTO: 0.1 K/UL (ref 0–0.5)
EOSINOPHIL NFR BLD: 0.4 % (ref 0–8)
ERYTHROCYTE [DISTWIDTH] IN BLOOD BY AUTOMATED COUNT: 16.8 % (ref 11.5–14.5)
EST. GFR  (NO RACE VARIABLE): >60 ML/MIN/1.73 M^2
GLUCOSE SERPL-MCNC: 117 MG/DL (ref 70–110)
HCT VFR BLD AUTO: 42.5 % (ref 40–54)
HGB BLD-MCNC: 13.5 G/DL (ref 14–18)
IMM GRANULOCYTES # BLD AUTO: 0.05 K/UL (ref 0–0.04)
IMM GRANULOCYTES NFR BLD AUTO: 0.4 % (ref 0–0.5)
LYMPHOCYTES # BLD AUTO: 0.6 K/UL (ref 1–4.8)
LYMPHOCYTES NFR BLD: 5.2 % (ref 18–48)
MCH RBC QN AUTO: 25.3 PG (ref 27–31)
MCHC RBC AUTO-ENTMCNC: 31.8 G/DL (ref 32–36)
MCV RBC AUTO: 80 FL (ref 82–98)
MONOCYTES # BLD AUTO: 1.1 K/UL (ref 0.3–1)
MONOCYTES NFR BLD: 9.7 % (ref 4–15)
NEUTROPHILS # BLD AUTO: 9.6 K/UL (ref 1.8–7.7)
NEUTROPHILS NFR BLD: 83.9 % (ref 38–73)
NRBC BLD-RTO: 0 /100 WBC
OHS QRS DURATION: 88 MS
OHS QTC CALCULATION: 446 MS
PLATELET # BLD AUTO: 129 K/UL (ref 150–450)
PMV BLD AUTO: 11.1 FL (ref 9.2–12.9)
POCT GLUCOSE: 113 MG/DL (ref 70–110)
POCT GLUCOSE: 115 MG/DL (ref 70–110)
POCT GLUCOSE: 132 MG/DL (ref 70–110)
POCT GLUCOSE: 144 MG/DL (ref 70–110)
POCT GLUCOSE: 159 MG/DL (ref 70–110)
POCT GLUCOSE: 208 MG/DL (ref 70–110)
POCT GLUCOSE: 237 MG/DL (ref 70–110)
POCT GLUCOSE: 240 MG/DL (ref 70–110)
POCT GLUCOSE: 97 MG/DL (ref 70–110)
POCT GLUCOSE: 98 MG/DL (ref 70–110)
POTASSIUM SERPL-SCNC: 3.2 MMOL/L (ref 3.5–5.1)
PROT SERPL-MCNC: 6.7 G/DL (ref 6–8.4)
RBC # BLD AUTO: 5.33 M/UL (ref 4.6–6.2)
SODIUM SERPL-SCNC: 141 MMOL/L (ref 136–145)
TRIGL SERPL-MCNC: 422 MG/DL (ref 30–150)
WBC # BLD AUTO: 11.43 K/UL (ref 3.9–12.7)

## 2024-04-30 PROCEDURE — 80074 ACUTE HEPATITIS PANEL: CPT

## 2024-04-30 PROCEDURE — 85025 COMPLETE CBC W/AUTO DIFF WBC: CPT | Performed by: NURSE PRACTITIONER

## 2024-04-30 PROCEDURE — 25000003 PHARM REV CODE 250: Performed by: HOSPITALIST

## 2024-04-30 PROCEDURE — 25000003 PHARM REV CODE 250

## 2024-04-30 PROCEDURE — 21400001 HC TELEMETRY ROOM

## 2024-04-30 PROCEDURE — 27000221 HC OXYGEN, UP TO 24 HOURS

## 2024-04-30 PROCEDURE — 25000003 PHARM REV CODE 250: Performed by: STUDENT IN AN ORGANIZED HEALTH CARE EDUCATION/TRAINING PROGRAM

## 2024-04-30 PROCEDURE — 63600175 PHARM REV CODE 636 W HCPCS

## 2024-04-30 PROCEDURE — 80053 COMPREHEN METABOLIC PANEL: CPT | Performed by: NURSE PRACTITIONER

## 2024-04-30 PROCEDURE — 99900035 HC TECH TIME PER 15 MIN (STAT)

## 2024-04-30 PROCEDURE — 63600175 PHARM REV CODE 636 W HCPCS: Performed by: NURSE PRACTITIONER

## 2024-04-30 PROCEDURE — 94761 N-INVAS EAR/PLS OXIMETRY MLT: CPT

## 2024-04-30 PROCEDURE — 36415 COLL VENOUS BLD VENIPUNCTURE: CPT | Performed by: NURSE PRACTITIONER

## 2024-04-30 PROCEDURE — 84478 ASSAY OF TRIGLYCERIDES: CPT | Performed by: STUDENT IN AN ORGANIZED HEALTH CARE EDUCATION/TRAINING PROGRAM

## 2024-04-30 PROCEDURE — 82550 ASSAY OF CK (CPK): CPT

## 2024-04-30 PROCEDURE — 36415 COLL VENOUS BLD VENIPUNCTURE: CPT

## 2024-04-30 PROCEDURE — 63600175 PHARM REV CODE 636 W HCPCS: Performed by: STUDENT IN AN ORGANIZED HEALTH CARE EDUCATION/TRAINING PROGRAM

## 2024-04-30 RX ORDER — OXYCODONE HYDROCHLORIDE 5 MG/1
5 TABLET ORAL EVERY 6 HOURS PRN
Status: DISCONTINUED | OUTPATIENT
Start: 2024-04-30 | End: 2024-05-04 | Stop reason: HOSPADM

## 2024-04-30 RX ORDER — POTASSIUM CHLORIDE 20 MEQ/1
40 TABLET, EXTENDED RELEASE ORAL
Status: DISCONTINUED | OUTPATIENT
Start: 2024-04-30 | End: 2024-04-30

## 2024-04-30 RX ORDER — MORPHINE SULFATE 2 MG/ML
2 INJECTION, SOLUTION INTRAMUSCULAR; INTRAVENOUS EVERY 4 HOURS PRN
Status: DISCONTINUED | OUTPATIENT
Start: 2024-04-30 | End: 2024-05-04 | Stop reason: HOSPADM

## 2024-04-30 RX ORDER — LANOLIN ALCOHOL/MO/W.PET/CERES
800 CREAM (GRAM) TOPICAL
Status: DISCONTINUED | OUTPATIENT
Start: 2024-04-30 | End: 2024-04-30

## 2024-04-30 RX ORDER — SODIUM,POTASSIUM PHOSPHATES 280-250MG
2 POWDER IN PACKET (EA) ORAL
Status: DISCONTINUED | OUTPATIENT
Start: 2024-04-30 | End: 2024-04-30

## 2024-04-30 RX ORDER — METOCLOPRAMIDE 5 MG/1
5 TABLET ORAL
Status: DISCONTINUED | OUTPATIENT
Start: 2024-04-30 | End: 2024-05-04 | Stop reason: HOSPADM

## 2024-04-30 RX ADMIN — PANTOPRAZOLE SODIUM 40 MG: 40 TABLET, DELAYED RELEASE ORAL at 08:04

## 2024-04-30 RX ADMIN — MORPHINE SULFATE 2 MG: 2 INJECTION, SOLUTION INTRAMUSCULAR; INTRAVENOUS at 05:04

## 2024-04-30 RX ADMIN — INSULIN DETEMIR 25 UNITS: 100 INJECTION, SOLUTION SUBCUTANEOUS at 08:04

## 2024-04-30 RX ADMIN — MUPIROCIN: 20 OINTMENT TOPICAL at 08:04

## 2024-04-30 RX ADMIN — LOSARTAN POTASSIUM 50 MG: 50 TABLET, FILM COATED ORAL at 08:04

## 2024-04-30 RX ADMIN — SODIUM CHLORIDE 0.2 UNITS/KG/HR: 9 INJECTION, SOLUTION INTRAVENOUS at 05:04

## 2024-04-30 RX ADMIN — METOCLOPRAMIDE 10 MG: 10 TABLET ORAL at 10:04

## 2024-04-30 RX ADMIN — METOCLOPRAMIDE 5 MG: 5 TABLET ORAL at 08:04

## 2024-04-30 RX ADMIN — METOCLOPRAMIDE 10 MG: 10 TABLET ORAL at 04:04

## 2024-04-30 RX ADMIN — ASPIRIN 81 MG CHEWABLE TABLET 81 MG: 81 TABLET CHEWABLE at 08:04

## 2024-04-30 RX ADMIN — Medication 2 CAPSULE: at 08:04

## 2024-04-30 RX ADMIN — ALLOPURINOL 300 MG: 100 TABLET ORAL at 08:04

## 2024-04-30 RX ADMIN — ATORVASTATIN CALCIUM 40 MG: 40 TABLET, FILM COATED ORAL at 08:04

## 2024-04-30 RX ADMIN — ENOXAPARIN SODIUM 40 MG: 40 INJECTION SUBCUTANEOUS at 04:04

## 2024-04-30 RX ADMIN — SODIUM CHLORIDE 0.2 UNITS/KG/HR: 9 INJECTION, SOLUTION INTRAVENOUS at 01:04

## 2024-04-30 RX ADMIN — MORPHINE SULFATE 2 MG: 2 INJECTION, SOLUTION INTRAMUSCULAR; INTRAVENOUS at 11:04

## 2024-04-30 RX ADMIN — INSULIN ASPART 2 UNITS: 100 INJECTION, SOLUTION INTRAVENOUS; SUBCUTANEOUS at 12:04

## 2024-04-30 RX ADMIN — MORPHINE SULFATE 4 MG: 4 INJECTION INTRAVENOUS at 01:04

## 2024-04-30 RX ADMIN — MORPHINE SULFATE 4 MG: 4 INJECTION INTRAVENOUS at 10:04

## 2024-04-30 RX ADMIN — INSULIN ASPART 1 UNITS: 100 INJECTION, SOLUTION INTRAVENOUS; SUBCUTANEOUS at 09:04

## 2024-04-30 RX ADMIN — INSULIN ASPART 2 UNITS: 100 INJECTION, SOLUTION INTRAVENOUS; SUBCUTANEOUS at 04:04

## 2024-04-30 RX ADMIN — OXYCODONE HYDROCHLORIDE 5 MG: 5 TABLET ORAL at 07:04

## 2024-04-30 RX ADMIN — OXYCODONE HYDROCHLORIDE 5 MG: 5 TABLET ORAL at 12:04

## 2024-04-30 RX ADMIN — METOCLOPRAMIDE 10 MG: 10 TABLET ORAL at 05:04

## 2024-04-30 NOTE — ASSESSMENT & PLAN NOTE
Body mass index is 34.53 kg/m². Morbid obesity complicates all aspects of disease management from diagnostic modalities to treatment. Weight loss encouraged and health benefits explained to patient.

## 2024-04-30 NOTE — ASSESSMENT & PLAN NOTE
AST/ ALT, aurea uptrending   Unclear etiology   Abd US read as - Patchy, heterogeneous liver parenchyma. Splenomegaly.   CT A/P read as - 'Enlarged pancreatic head with surrounding edema and fat stranding consistent with history of acute pancreatitis. There is increased narrowing of the portal vein through the pancreatic head region compared to prior. '    CK low, hepatitis panel pending   Consider GI consult

## 2024-04-30 NOTE — PLAN OF CARE
Problem: Adult Inpatient Plan of Care  Goal: Plan of Care Review  Outcome: Progressing  Goal: Patient-Specific Goal (Individualized)  Outcome: Progressing  Goal: Absence of Hospital-Acquired Illness or Injury  Outcome: Progressing  Goal: Optimal Comfort and Wellbeing  Outcome: Progressing  Goal: Readiness for Transition of Care  Outcome: Progressing     Problem: Diabetes Comorbidity  Goal: Blood Glucose Level Within Targeted Range  Outcome: Progressing     Problem: Pain Acute  Goal: Optimal Pain Control and Function  Reactivated     Problem: Fall Injury Risk  Goal: Absence of Fall and Fall-Related Injury  Reactivated

## 2024-04-30 NOTE — PROGRESS NOTES
[facility] Medicine  Progress Note    Patient Name: Bay Ibarra  MRN: [id number]  Patient Class: IP- Inpatient   Admission Date: 4/29/2024  Length of Stay: 1 days  Attending Physician: Juana Pnea MD  Primary Care Provider: Funmilayo Taylor MD        Subjective:     Principal Problem:Acute on chronic pancreatitis        HPI:  Bay Ibarra is a 43 y.o. 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 He presented to the emergency department w/ c/o acute onset right lower quadrant abdominal pain that began 3 hours prior to ED presentation.  Patient reports pain is sharp in nature and at times radiates towards the back.  Denies any associated nausea or vomiting.  He reports having a BM prior to EMS arrival to his residence reports it was normal.  Denies any nausea or vomiting.  He reports for the past 4 days his glucose level has been higher than normal.  Patient states he was given medication (fentanyl) EN route with EMS with significant improvement of his symptoms.  Denies any dysuria or hematuria.  Denies any prior surgical history.    Overview/Hospital Course:  No notes on file    Interval History: transitioned off insulin drip. On liquid diet. Poor po intake. C/o R mid quadrant abdominal pain.     Review of Systems  Objective:     Vital Signs (Most Recent):  Temp: 98.5 °F (36.9 °C) (04/30/24 1600)  Pulse: 104 (04/30/24 1600)  Resp: 20 (04/30/24 1600)  BP: 131/65 (04/30/24 1600)  SpO2: 100 % (04/30/24 1600) Vital Signs (24h Range):  Temp:  [98.2 °F (36.8 °C)-99.3 °F (37.4 °C)] 98.5 °F (36.9 °C)  Pulse:  [] 104  Resp:  [14-30] 20  SpO2:   [93 %-100 %] 100 %  BP: (128-162)/(59-88) 131/65     Weight: 103 kg (227 lb 1.2 oz)  Body mass index is 34.53 kg/m².    Intake/Output Summary (Last 24 hours) at 4/30/2024 1618  Last data filed at 4/30/2024 1455  Gross per 24 hour   Intake 2330.72 ml   Output 1576 ml   Net 754.72 ml         Physical Exam  Vitals and nursing note reviewed.   Constitutional:       General: He is not in acute distress.     Appearance: He is well-developed. He is obese. He is ill-appearing.   Cardiovascular:      Rate and Rhythm: Regular rhythm. Tachycardia present.   Pulmonary:      Effort: Pulmonary effort is normal. No respiratory distress.   Abdominal:      Palpations: Abdomen is soft.      Tenderness: There is abdominal tenderness.   Musculoskeletal:      Right lower leg: No edema.      Left lower leg: No edema.   Skin:     General: Skin is warm and dry.      Findings: No rash.   Neurological:      Mental Status: He is alert and oriented to person, place, and time.             Significant Labs: All pertinent labs within the past 24 hours have been reviewed.    Significant Imaging: I have reviewed all pertinent imaging results/findings within the past 24 hours.    Assessment/Plan:      * Acute on chronic pancreatitis  A/w abd pain, lipase >1000  Likely 2/2 hyperTG  PRN analgesics  IVF  S/p Insulin infusion given hyperTG      Elevated liver function tests  AST/ ALT, aurea uptrending   Unclear etiology   Abd US read as - Patchy, heterogeneous liver parenchyma. Splenomegaly.   CT A/P read as - 'Enlarged pancreatic head with surrounding edema and fat stranding consistent with history of acute pancreatitis. There is increased narrowing of the portal vein through the pancreatic head region compared to prior. '    CK low, hepatitis panel pending   Consider GI consult     Hypertriglyceridemia  - treatment for pancreatitis  - s/p IV insulin  - hold statin, fenofibrate until LFTs improve      Class 1 obesity with alveolar hypoventilation,  serious comorbidity, and body mass index (BMI) of 31.0 to 31.9 in adult  Body mass index is 34.53 kg/m². Morbid obesity complicates all aspects of disease management from diagnostic modalities to treatment. Weight loss encouraged and health benefits explained to patient.         Type 1 diabetes mellitus with hyperglycemia  Patient's FSGs are uncontrolled on current medication regimen.  Last A1c reviewed-   Lab Results   Component Value Date    LABA1C 8.8 (H) 01/12/2018    HGBA1C 9.5 (H) 03/28/2024     Most recent fingerstick glucose reviewed-   Recent Labs   Lab 04/30/24  0542 04/30/24  0650 04/30/24  0801 04/30/24  1103   POCTGLUCOSE 97 115* 132* 208*     Current correctional scale  Low  Maintain anti-hyperglycemic dose as follows-   Antihyperglycemics (From admission, onward)      Start     Stop Route Frequency Ordered    04/30/24 0830  insulin detemir U-100 (Levemir) pen 25 Units         -- SubQ 2 times daily 04/30/24 0818    04/29/24 0447  insulin aspart U-100 pen 0-5 Units         -- SubQ Before meals & nightly PRN 04/29/24 0353          Hold Oral hypoglycemics while patient is in the hospital.    DMT2 documented, but patient follows with endocrinology who documented DMT1  Patient confirmed he is type 1    Hypokalemia  Patient has hypokalemia which is Acute and currently controlled. Most recent potassium levels reviewed-   Lab Results   Component Value Date    K 3.2 (L) 04/30/2024   . Will continue potassium replacement per protocol and recheck repeat levels after replacement completed.     Mixed hyperlipidemia  Hold statin given worsening LFTs      Essential hypertension  Chronic, controlled. Latest blood pressure and vitals reviewed-     Temp:  [98.2 °F (36.8 °C)-99.3 °F (37.4 °C)]   Pulse:  []   Resp:  [14-30]   BP: (128-162)/(59-88)   SpO2:  [93 %-100 %] .   Home meds for hypertension were reviewed and noted below.   Hypertension Medications               losartan (COZAAR) 100 MG tablet Take ½ tablet  (50 mg total) by mouth once daily.            While in the hospital, will manage blood pressure as follows; Continue home antihypertensive regimen    Will utilize p.r.n. blood pressure medication only if patient's blood pressure greater than 180/110 and he develops symptoms such as worsening chest pain or shortness of breath.      VTE Risk Mitigation (From admission, onward)           Ordered     enoxaparin injection 40 mg  Daily         04/29/24 0353     IP VTE HIGH RISK PATIENT  Once         04/29/24 0353     Place sequential compression device  Until discontinued         04/29/24 0353                    Discharge Planning   JOE:      Code Status: Full Code   Is the patient medically ready for discharge?:     Reason for patient still in hospital (select all that apply): Patient trending condition and Treatment  Discharge Plan A: Home with family        Per ICU team stable for transfer to the floor. Transfer orders placed.       Juana Pena MD  Department of [facility] Medicine   [place] - Intensive Care

## 2024-04-30 NOTE — PROGRESS NOTES
"[facility] Pulmonary / Critical Care Progress Note     Primary Attending:  Juana Pena MD   Consultant Attending: Rosalba Granados MD   Consultant Fellow: Consultant Resident: MD Clint Mason MD     Date of Admit: 4/29/2024  Hospital day: 1    Brief Summary of Hospitalization:       HPI: Mr. Ibarra is a 42yo CM with a PMH of recurrent hyperTG pancreatitis, HLD, DM, HTN, and JAZMYN who presented to the ED with abdominal pain that radiated to his back. Denies any N/V, diarrhea, or constipation. Stated that the type of pain reminded him of his prior pancreatitis episodes except this one radiated to the back. In ED, he was found to have hypertriglyceride pancreatitis. He was started on insulin gtt and admitted to the ICU.     24h Events:      On insulin gtt at 0.2 mg/kg/hour overnight as well as D5 infusion. Insulin and D5 stopped this morning as TG now < 500. Pain continues to improve. Tolerating some CLD w/o nausea or vomiting.    ROS:  A 10 point ROS was negative except as listed above.    OBJECTIVE DATA:      Vital Signs:  Temp:  [98.2 °F (36.8 °C)-99.3 °F (37.4 °C)]   Pulse:  []   Resp:  [14-31]   BP: (132-181)/(62-92)   SpO2:  [93 %-100 %]     No results found for: "HTIN", "WEIGHT"    Intake / Output:    Intake/Output Summary (Last 24 hours) at 4/30/2024 1051  Last data filed at 4/30/2024 0901  Gross per 24 hour   Intake 2330.72 ml   Output 2196 ml   Net 134.72 ml         General: awake, cooperative, no acute distress  Head: Normocephalic, atraumatic  Lungs: clear to auscultation bilaterally, respirations unlabored  Heart: regular rate and rhythm, normal S1 and S2, no murmur appreciated  Abdomen: soft, tender to palpation diffusely, normal bowel sounds  Extremities: extremities normal, no joint deformity or tenderness noted  Skin: warm and dry, no rashes appreciated  Neuro: alert and oriented     Laboratory, Microbiology, ECG(s), and Imaging:  Reviewed.     Active Medications:    Current " Facility-Administered Medications   Medication Dose Route Frequency    allopurinoL  300 mg Oral Daily    aspirin  81 mg Oral Daily    atorvastatin  40 mg Oral Daily    enoxparin  40 mg Subcutaneous Daily    insulin detemir U-100  25 Units Subcutaneous BID    losartan  50 mg Oral Daily    metoclopramide HCl  10 mg Oral QID (AC & HS)    mupirocin   Nasal BID    omega 3-dha-epa-fish oil  2 capsule Oral BID    pantoprazole  40 mg Oral BID        Current Facility-Administered Medications   Medication Dose Route Frequency Last Rate Last Admin          Current Facility-Administered Medications:     acetaminophen, 650 mg, Oral, Q4H PRN    dextrose 10%, 12.5 g, Intravenous, PRN    dextrose 10%, 12.5 g, Intravenous, PRN    dextrose 10%, 25 g, Intravenous, PRN    dextrose 10%, 25 g, Intravenous, PRN    glucagon (human recombinant), 1 mg, Intramuscular, PRN    glucagon (human recombinant), 1 mg, Intramuscular, PRN    glucose, 16 g, Oral, PRN    glucose, 24 g, Oral, PRN    insulin aspart U-100, 0-5 Units, Subcutaneous, QID (AC + HS) PRN    melatonin, 6 mg, Oral, Nightly PRN    morphine, 2 mg, Intravenous, Q4H PRN    morphine, 4 mg, Intravenous, Q4H PRN    naloxone, 0.02 mg, Intravenous, PRN    pregabalin, 150 mg, Oral, BID PRN    sodium chloride 0.9%, 3 mL, Intravenous, Q12H PRN     Assessment/Plan:     Neuro  #Diabetic neuropathy  -On Lyrica at home. Renal function at baseline. Will continue as needed.      Cardiology  #HTN  -Restarted home losartan     #HLD  -Continue home Atorvastatin and ASA.  -Holding fenofibrate 2/2 elevated liver enzymes. Restart on improvement.  -Continue fish oil supplement     Pulmonology  #JAZMYN  -Prior diagnosis. Does not wear CPAP at home.   -Monitor     Renal   -No acute issues at this time     GI  #Acute Hypertriglyceride Pancreatitis  -TG- 890, Lipase- >1000  -he has had multiple episodes of this. Last episode being February 2024.   -Holding fenofibrate 2/2 elevated liver enzymes. Restart on  improvement.  -Continue fish oil supplement  - insulin gtt and D5 stopped  - hold statin given rising LFTs  - transition to PO pain meds    #Transaminitis  - holding fibrate  - hold statin  - check hep panel and CK     #Gastroparesis  -2/2 his diabetes.   -Continue home reglan      #GERD  -Continue home protonix BID     Heme/Onc  #Mild Microcytic Anemia  -Iron studies ordered     ID  -No acute issues at this time     Endo  #Type 1 Diabetes Mellitus  -On Lantus 55 units BID and aspart 36 units WMTID at home   - restart levemir 25u BID as patient off insulin gtt now     Rheum/MSK  #Gout  -Continue home allopurinol      Feeding: tolerating clears  Analgesia: Multimodal pain control with oxycodone PO and morphine IV for breakthrough pain  Sedation: n/a  VTE Ppx:   Anticoagulants       Ordered     Route Frequency Start Stop    04/29/24 0353  enoxaparin  (VTE Pharmacological Prophylaxis Orders - High Risk)         SubQ Daily 04/29/24 1700 --           Head of Bed: 30 degrees  Ulcer PPX: PPI renally dosed  Glucose: Hypoglycemic precautions, lantus 25 units bid  SBT/SAT: Daily  Bowels: n/a  Indwelling Lines: PIV x2  Deescalation Abx: n/a    Code Status: Full    Dispo: stable for step down today     Rounded with Dr. Granados. Attestation to follow.       Clint Perez MD  [facility] Internal Medicine -2  [facility] - Pulmonary and Critical Care Service

## 2024-04-30 NOTE — ASSESSMENT & PLAN NOTE
Patient has hypokalemia which is Acute and currently controlled. Most recent potassium levels reviewed-   Lab Results   Component Value Date    K 3.2 (L) 04/30/2024   . Will continue potassium replacement per protocol and recheck repeat levels after replacement completed.

## 2024-04-30 NOTE — EICU
Glucose 180<-171<-221<-211<-237<-227    On insulin infusion at 20 units per hour    Less nauseated now  Allowed to have clear liquids    Plan:  Advised to allow fluid intake  Monitor glucose hourly and if less than 140 mg/dl will initiated D5W infusion

## 2024-04-30 NOTE — SUBJECTIVE & OBJECTIVE
Interval History: transitioned off insulin drip. On liquid diet. Poor po intake. C/o R mid quadrant abdominal pain.     Review of Systems  Objective:     Vital Signs (Most Recent):  Temp: 98.5 °F (36.9 °C) (04/30/24 1600)  Pulse: 104 (04/30/24 1600)  Resp: 20 (04/30/24 1600)  BP: 131/65 (04/30/24 1600)  SpO2: 100 % (04/30/24 1600) Vital Signs (24h Range):  Temp:  [98.2 °F (36.8 °C)-99.3 °F (37.4 °C)] 98.5 °F (36.9 °C)  Pulse:  [] 104  Resp:  [14-30] 20  SpO2:  [93 %-100 %] 100 %  BP: (128-162)/(59-88) 131/65     Weight: 103 kg (227 lb 1.2 oz)  Body mass index is 34.53 kg/m².    Intake/Output Summary (Last 24 hours) at 4/30/2024 1618  Last data filed at 4/30/2024 1455  Gross per 24 hour   Intake 2330.72 ml   Output 1576 ml   Net 754.72 ml         Physical Exam  Vitals and nursing note reviewed.   Constitutional:       General: He is not in acute distress.     Appearance: He is well-developed. He is obese. He is ill-appearing.   Cardiovascular:      Rate and Rhythm: Regular rhythm. Tachycardia present.   Pulmonary:      Effort: Pulmonary effort is normal. No respiratory distress.   Abdominal:      Palpations: Abdomen is soft.      Tenderness: There is abdominal tenderness.   Musculoskeletal:      Right lower leg: No edema.      Left lower leg: No edema.   Skin:     General: Skin is warm and dry.      Findings: No rash.   Neurological:      Mental Status: He is alert and oriented to person, place, and time.             Significant Labs: All pertinent labs within the past 24 hours have been reviewed.    Significant Imaging: I have reviewed all pertinent imaging results/findings within the past 24 hours.

## 2024-04-30 NOTE — ASSESSMENT & PLAN NOTE
Chronic, controlled. Latest blood pressure and vitals reviewed-     Temp:  [98.2 °F (36.8 °C)-99.3 °F (37.4 °C)]   Pulse:  []   Resp:  [14-30]   BP: (128-162)/(59-88)   SpO2:  [93 %-100 %] .   Home meds for hypertension were reviewed and noted below.   Hypertension Medications               losartan (COZAAR) 100 MG tablet Take ½ tablet (50 mg total) by mouth once daily.            While in the hospital, will manage blood pressure as follows; Continue home antihypertensive regimen    Will utilize p.r.n. blood pressure medication only if patient's blood pressure greater than 180/110 and he develops symptoms such as worsening chest pain or shortness of breath.

## 2024-05-01 LAB
ALBUMIN SERPL BCP-MCNC: 3 G/DL (ref 3.5–5.2)
ALP SERPL-CCNC: 161 U/L (ref 55–135)
ALT SERPL W/O P-5'-P-CCNC: 189 U/L (ref 10–44)
ANION GAP SERPL CALC-SCNC: 14 MMOL/L (ref 8–16)
ANION GAP SERPL CALC-SCNC: 20 MMOL/L (ref 8–16)
AST SERPL-CCNC: 149 U/L (ref 10–40)
BASOPHILS # BLD AUTO: 0.05 K/UL (ref 0–0.2)
BASOPHILS NFR BLD: 0.4 % (ref 0–1.9)
BILIRUB SERPL-MCNC: 5.2 MG/DL (ref 0.1–1)
BUN SERPL-MCNC: 12 MG/DL (ref 6–20)
BUN SERPL-MCNC: 13 MG/DL (ref 6–20)
CALCIUM SERPL-MCNC: 9.1 MG/DL (ref 8.7–10.5)
CALCIUM SERPL-MCNC: 9.3 MG/DL (ref 8.7–10.5)
CHLORIDE SERPL-SCNC: 97 MMOL/L (ref 95–110)
CHLORIDE SERPL-SCNC: 97 MMOL/L (ref 95–110)
CO2 SERPL-SCNC: 20 MMOL/L (ref 23–29)
CO2 SERPL-SCNC: 25 MMOL/L (ref 23–29)
CREAT SERPL-MCNC: 1 MG/DL (ref 0.5–1.4)
CREAT SERPL-MCNC: 1 MG/DL (ref 0.5–1.4)
DIFFERENTIAL METHOD BLD: ABNORMAL
EOSINOPHIL # BLD AUTO: 0.1 K/UL (ref 0–0.5)
EOSINOPHIL NFR BLD: 0.4 % (ref 0–8)
ERYTHROCYTE [DISTWIDTH] IN BLOOD BY AUTOMATED COUNT: 17 % (ref 11.5–14.5)
EST. GFR  (NO RACE VARIABLE): >60 ML/MIN/1.73 M^2
EST. GFR  (NO RACE VARIABLE): >60 ML/MIN/1.73 M^2
GLUCOSE SERPL-MCNC: 248 MG/DL (ref 70–110)
GLUCOSE SERPL-MCNC: 255 MG/DL (ref 70–110)
HAV IGM SERPL QL IA: NORMAL
HBV CORE IGM SERPL QL IA: NORMAL
HBV SURFACE AG SERPL QL IA: NORMAL
HCT VFR BLD AUTO: 42.1 % (ref 40–54)
HCV AB SERPL QL IA: NORMAL
HGB BLD-MCNC: 13.1 G/DL (ref 14–18)
IMM GRANULOCYTES # BLD AUTO: 0.08 K/UL (ref 0–0.04)
IMM GRANULOCYTES NFR BLD AUTO: 0.6 % (ref 0–0.5)
LYMPHOCYTES # BLD AUTO: 0.4 K/UL (ref 1–4.8)
LYMPHOCYTES NFR BLD: 3.1 % (ref 18–48)
MAGNESIUM SERPL-MCNC: 1.9 MG/DL (ref 1.6–2.6)
MCH RBC QN AUTO: 25.3 PG (ref 27–31)
MCHC RBC AUTO-ENTMCNC: 31.1 G/DL (ref 32–36)
MCV RBC AUTO: 81 FL (ref 82–98)
MONOCYTES # BLD AUTO: 1.1 K/UL (ref 0.3–1)
MONOCYTES NFR BLD: 7.9 % (ref 4–15)
NEUTROPHILS # BLD AUTO: 12.2 K/UL (ref 1.8–7.7)
NEUTROPHILS NFR BLD: 87.6 % (ref 38–73)
NRBC BLD-RTO: 0 /100 WBC
PHOSPHATE SERPL-MCNC: 1.3 MG/DL (ref 2.7–4.5)
PLATELET # BLD AUTO: 153 K/UL (ref 150–450)
PMV BLD AUTO: 11.9 FL (ref 9.2–12.9)
POCT GLUCOSE: 226 MG/DL (ref 70–110)
POCT GLUCOSE: 241 MG/DL (ref 70–110)
POCT GLUCOSE: 252 MG/DL (ref 70–110)
POCT GLUCOSE: 263 MG/DL (ref 70–110)
POTASSIUM SERPL-SCNC: 3.5 MMOL/L (ref 3.5–5.1)
POTASSIUM SERPL-SCNC: 4.3 MMOL/L (ref 3.5–5.1)
PROT SERPL-MCNC: 7.4 G/DL (ref 6–8.4)
RBC # BLD AUTO: 5.17 M/UL (ref 4.6–6.2)
SODIUM SERPL-SCNC: 136 MMOL/L (ref 136–145)
SODIUM SERPL-SCNC: 137 MMOL/L (ref 136–145)
WBC # BLD AUTO: 13.88 K/UL (ref 3.9–12.7)

## 2024-05-01 PROCEDURE — 11000001 HC ACUTE MED/SURG PRIVATE ROOM

## 2024-05-01 PROCEDURE — 25000003 PHARM REV CODE 250: Performed by: STUDENT IN AN ORGANIZED HEALTH CARE EDUCATION/TRAINING PROGRAM

## 2024-05-01 PROCEDURE — 84100 ASSAY OF PHOSPHORUS: CPT | Performed by: STUDENT IN AN ORGANIZED HEALTH CARE EDUCATION/TRAINING PROGRAM

## 2024-05-01 PROCEDURE — 94761 N-INVAS EAR/PLS OXIMETRY MLT: CPT

## 2024-05-01 PROCEDURE — 80048 BASIC METABOLIC PNL TOTAL CA: CPT | Mod: XB | Performed by: STUDENT IN AN ORGANIZED HEALTH CARE EDUCATION/TRAINING PROGRAM

## 2024-05-01 PROCEDURE — 25000003 PHARM REV CODE 250: Performed by: HOSPITALIST

## 2024-05-01 PROCEDURE — 63600175 PHARM REV CODE 636 W HCPCS

## 2024-05-01 PROCEDURE — 36415 COLL VENOUS BLD VENIPUNCTURE: CPT | Performed by: NURSE PRACTITIONER

## 2024-05-01 PROCEDURE — 83735 ASSAY OF MAGNESIUM: CPT | Performed by: STUDENT IN AN ORGANIZED HEALTH CARE EDUCATION/TRAINING PROGRAM

## 2024-05-01 PROCEDURE — 63600175 PHARM REV CODE 636 W HCPCS: Performed by: NURSE PRACTITIONER

## 2024-05-01 PROCEDURE — 85025 COMPLETE CBC W/AUTO DIFF WBC: CPT | Performed by: NURSE PRACTITIONER

## 2024-05-01 PROCEDURE — 63600175 PHARM REV CODE 636 W HCPCS: Performed by: STUDENT IN AN ORGANIZED HEALTH CARE EDUCATION/TRAINING PROGRAM

## 2024-05-01 PROCEDURE — 25000003 PHARM REV CODE 250

## 2024-05-01 PROCEDURE — 36415 COLL VENOUS BLD VENIPUNCTURE: CPT | Performed by: STUDENT IN AN ORGANIZED HEALTH CARE EDUCATION/TRAINING PROGRAM

## 2024-05-01 PROCEDURE — 80053 COMPREHEN METABOLIC PANEL: CPT | Performed by: NURSE PRACTITIONER

## 2024-05-01 RX ORDER — AMOXICILLIN 250 MG
1 CAPSULE ORAL 2 TIMES DAILY
Status: DISCONTINUED | OUTPATIENT
Start: 2024-05-01 | End: 2024-05-04 | Stop reason: HOSPADM

## 2024-05-01 RX ORDER — INSULIN ASPART 100 [IU]/ML
5 INJECTION, SOLUTION INTRAVENOUS; SUBCUTANEOUS
Status: DISCONTINUED | OUTPATIENT
Start: 2024-05-01 | End: 2024-05-01

## 2024-05-01 RX ORDER — INSULIN ASPART 100 [IU]/ML
10 INJECTION, SOLUTION INTRAVENOUS; SUBCUTANEOUS
Status: DISCONTINUED | OUTPATIENT
Start: 2024-05-01 | End: 2024-05-04 | Stop reason: HOSPADM

## 2024-05-01 RX ORDER — INSULIN ASPART 100 [IU]/ML
0-10 INJECTION, SOLUTION INTRAVENOUS; SUBCUTANEOUS
Status: DISCONTINUED | OUTPATIENT
Start: 2024-05-01 | End: 2024-05-04 | Stop reason: HOSPADM

## 2024-05-01 RX ADMIN — Medication 2 CAPSULE: at 10:05

## 2024-05-01 RX ADMIN — OXYCODONE HYDROCHLORIDE 5 MG: 5 TABLET ORAL at 01:05

## 2024-05-01 RX ADMIN — LOSARTAN POTASSIUM 50 MG: 50 TABLET, FILM COATED ORAL at 10:05

## 2024-05-01 RX ADMIN — METOCLOPRAMIDE 5 MG: 5 TABLET ORAL at 05:05

## 2024-05-01 RX ADMIN — MUPIROCIN: 20 OINTMENT TOPICAL at 10:05

## 2024-05-01 RX ADMIN — METOCLOPRAMIDE 5 MG: 5 TABLET ORAL at 08:05

## 2024-05-01 RX ADMIN — DOCUSATE SODIUM AND SENNOSIDES 1 TABLET: 8.6; 5 TABLET, FILM COATED ORAL at 08:05

## 2024-05-01 RX ADMIN — INSULIN ASPART 3 UNITS: 100 INJECTION, SOLUTION INTRAVENOUS; SUBCUTANEOUS at 05:05

## 2024-05-01 RX ADMIN — INSULIN DETEMIR 45 UNITS: 100 INJECTION, SOLUTION SUBCUTANEOUS at 08:05

## 2024-05-01 RX ADMIN — INSULIN ASPART 4 UNITS: 100 INJECTION, SOLUTION INTRAVENOUS; SUBCUTANEOUS at 05:05

## 2024-05-01 RX ADMIN — SODIUM PHOSPHATE, MONOBASIC, MONOHYDRATE AND SODIUM PHOSPHATE, DIBASIC, ANHYDROUS 30 MMOL: 142; 276 INJECTION, SOLUTION INTRAVENOUS at 05:05

## 2024-05-01 RX ADMIN — POTASSIUM BICARBONATE 40 MEQ: 391 TABLET, EFFERVESCENT ORAL at 10:05

## 2024-05-01 RX ADMIN — INSULIN ASPART 5 UNITS: 100 INJECTION, SOLUTION INTRAVENOUS; SUBCUTANEOUS at 12:05

## 2024-05-01 RX ADMIN — OXYCODONE HYDROCHLORIDE 5 MG: 5 TABLET ORAL at 12:05

## 2024-05-01 RX ADMIN — ASPIRIN 81 MG CHEWABLE TABLET 81 MG: 81 TABLET CHEWABLE at 10:05

## 2024-05-01 RX ADMIN — Medication 2 CAPSULE: at 08:05

## 2024-05-01 RX ADMIN — INSULIN ASPART 6 UNITS: 100 INJECTION, SOLUTION INTRAVENOUS; SUBCUTANEOUS at 12:05

## 2024-05-01 RX ADMIN — PANTOPRAZOLE SODIUM 40 MG: 40 TABLET, DELAYED RELEASE ORAL at 08:05

## 2024-05-01 RX ADMIN — MORPHINE SULFATE 2 MG: 2 INJECTION, SOLUTION INTRAMUSCULAR; INTRAVENOUS at 05:05

## 2024-05-01 RX ADMIN — MUPIROCIN: 20 OINTMENT TOPICAL at 08:05

## 2024-05-01 RX ADMIN — ENOXAPARIN SODIUM 40 MG: 40 INJECTION SUBCUTANEOUS at 04:05

## 2024-05-01 RX ADMIN — METOCLOPRAMIDE 5 MG: 5 TABLET ORAL at 12:05

## 2024-05-01 RX ADMIN — MORPHINE SULFATE 2 MG: 2 INJECTION, SOLUTION INTRAMUSCULAR; INTRAVENOUS at 06:05

## 2024-05-01 RX ADMIN — SODIUM CHLORIDE, POTASSIUM CHLORIDE, SODIUM LACTATE AND CALCIUM CHLORIDE 1000 ML: 600; 310; 30; 20 INJECTION, SOLUTION INTRAVENOUS at 10:05

## 2024-05-01 RX ADMIN — INSULIN ASPART 10 UNITS: 100 INJECTION, SOLUTION INTRAVENOUS; SUBCUTANEOUS at 05:05

## 2024-05-01 RX ADMIN — DOCUSATE SODIUM AND SENNOSIDES 1 TABLET: 8.6; 5 TABLET, FILM COATED ORAL at 12:05

## 2024-05-01 RX ADMIN — PANTOPRAZOLE SODIUM 40 MG: 40 TABLET, DELAYED RELEASE ORAL at 10:05

## 2024-05-01 RX ADMIN — ALLOPURINOL 300 MG: 100 TABLET ORAL at 10:05

## 2024-05-01 RX ADMIN — OXYCODONE HYDROCHLORIDE 5 MG: 5 TABLET ORAL at 08:05

## 2024-05-01 RX ADMIN — INSULIN ASPART 2 UNITS: 100 INJECTION, SOLUTION INTRAVENOUS; SUBCUTANEOUS at 09:05

## 2024-05-01 NOTE — PLAN OF CARE
Problem: Adult Inpatient Plan of Care  Goal: Plan of Care Review  Outcome: Progressing   Updated care plan.  Chart reviewed.

## 2024-05-01 NOTE — NURSING TRANSFER
Nursing Transfer Note      4/30/2024   11:53 PM    Nurse giving handoff:ASIA Bishop  Nurse receiving handoff:ASIA Anderson    Reason patient is being transferred: Stepdown    Transfer To:     Transfer via stretcher    Transfer with O2, cardiac monitoring    Transported by ASIA Bishop    Transfer Vital Signs:  Blood Pressure:142/71  Heart Rate:114  O2:99  Temperature:98.4  Respirations:25    Telemetry: Box Number [id number]  Order for Tele Monitor? Yes    Additional Lines: Oxygen    4eyes on Skin: yes    Medicines sent: Insulin-Levemir, Insulin-Aspart, Mupirocin, Omega 3 Fish Oil and Metoclopramide    Patient belongings transferred with patient: Yes    Chart send with patient: Yes    Notified: Patient will notify his father, Guero Ibarra    Upon arrival to floor: cardiac monitor applied, patient oriented to room, call bell in reach, and bed in lowest position

## 2024-05-01 NOTE — PLAN OF CARE
"  Problem: Adult Inpatient Plan of Care  Goal: Plan of Care Review  Outcome: Progressing     Problem: Adult Inpatient Plan of Care  Goal: Optimal Comfort and Wellbeing  Outcome: Progressing     Problem: Diabetes Comorbidity  Goal: Blood Glucose Level Within Targeted Range  Outcome: Progressing     Problem: Pain Acute  Goal: Optimal Pain Control and Function  Outcome: Progressing     Problem: Pancreatitis  Goal: Fluid and Electrolyte Balance  Outcome: Progressing     Problem: Comorbidity Management  Goal: Blood Pressure in Desired Range  Outcome: Progressing     Patient arrived from the ICU @ 0030 hrs. Oriented the patient to the unit and the nurse call button. Vital signs assessed and charted as in the flow chart. Plan of care reviewed with the patient. Scheduled medicines given and the patient tolerated well. Given Oxycodone 5 mg and Morphine 2 mg Iv for acute abdominal pain. Fall and safety precautions taken and the standard interventions are in place. On Telemetry monitoring with Sinus Tachycardia, no true "red alarms' noted, no acute distress reported either on the shift. Patient's Accu Check was 252 mg/dl,covered with 3 units Insulin Aspart. On Oxygen 2 L and satting well. Advised the patient to call for assistance. Continued monitoring the patient.   "

## 2024-05-01 NOTE — PLAN OF CARE
SW met with pt at bedside during rounding with MD. No dc today. Upon dc pts father will provide transport home. SW will continue to follow pt throughout his transitions of care and assist with any dc needs.     Future Appointments   Date Time Provider Department Center   5/9/2024  9:00 AM Jorge A Holland MD [facility] [address]   5/28/2024  3:00 PM [facility]   6/20/2024  4:20 PM Hansa Bhakta, [facility] SLEEP [facility]   7/16/2024  8:00 AM HOSPITAL LAB, [facility]   7/23/2024  8:00 AM Shana Stapleton NP [facility] [address]        05/01/24 1027   Rounds   Attendance Provider;;Pharmacist   Discharge Plan A Home with family   Why the patient remains in the hospital Requires continued medical care   Transition of Care Barriers None

## 2024-05-01 NOTE — ASSESSMENT & PLAN NOTE
Patient has Abnormal Phosphorus: hypophosphatemia. Will continue to monitor electrolytes closely. Will replace the affected electrolytes and repeat labs to be done after interventions completed. The patient's phosphorus results have been reviewed and are listed below.  Recent Labs   Lab 05/01/24  1333   PHOS 1.3*

## 2024-05-01 NOTE — PROGRESS NOTES
[facility] Medicine  Progress Note    Patient Name: Bay Ibarra  MRN: [id number]  Patient Class: IP- Inpatient   Admission Date: 4/29/2024  Length of Stay: 2 days  Attending Physician: Juana Pena MD  Primary Care Provider: Funmilayo Taylor MD        Subjective:     Principal Problem:Acute on chronic pancreatitis    HPI:  Bay Ibarra is a 43 y.o. 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 He presented to the emergency department w/ c/o acute onset right lower quadrant abdominal pain that began 3 hours prior to ED presentation.  Patient reports pain is sharp in nature and at times radiates towards the back.  Denies any associated nausea or vomiting.  He reports having a BM prior to EMS arrival to his residence reports it was normal.  Denies any nausea or vomiting.  He reports for the past 4 days his glucose level has been higher than normal.  Patient states he was given medication (fentanyl) EN route with EMS with significant improvement of his symptoms.  Denies any dysuria or hematuria.  Denies any prior surgical history.    Overview/Hospital Course:  No notes on file    Interval History: continues to have abdominal pain, somewhat improved. He typically has similar pain during his pancreatitis attacks & it gradually gets better. Tolerating some po intake.     Review of Systems  Objective:     Vital Signs (Most Recent):  Temp: 97.9 °F (36.6 °C) (05/01/24 1151)  Pulse: 103 (05/01/24 1210)  Resp: 18 (05/01/24 1213)  BP: (!) 158/80 (05/01/24 1151)  SpO2: 95 % (05/01/24 1151) Vital Signs (24h Range):  Temp:  [97.9 °F (36.6 °C)-98.9 °F (37.2 °C)]  97.9 °F (36.6 °C)  Pulse:  [100-114] 103  Resp:  [14-25] 18  SpO2:  [93 %-100 %] 95 %  BP: (128-167)/(66-80) 158/80     Weight: 100.9 kg (222 lb 7.1 oz)  Body mass index is 33.82 kg/m².    Intake/Output Summary (Last 24 hours) at 5/1/2024 1611  Last data filed at 5/1/2024 1158  Gross per 24 hour   Intake 120 ml   Output 1520 ml   Net -1400 ml         Physical Exam  Vitals and nursing note reviewed.   Constitutional:       General: He is not in acute distress.     Appearance: He is well-developed. He is obese. He is ill-appearing.   Cardiovascular:      Rate and Rhythm: Regular rhythm. Tachycardia present.   Pulmonary:      Effort: Pulmonary effort is normal. No respiratory distress.   Abdominal:      Palpations: Abdomen is soft.      Tenderness: There is abdominal tenderness.   Musculoskeletal:      Right lower leg: No edema.      Left lower leg: No edema.   Skin:     General: Skin is warm and dry.      Findings: No rash.   Neurological:      Mental Status: He is alert and oriented to person, place, and time.             Significant Labs: All pertinent labs within the past 24 hours have been reviewed.    Significant Imaging: I have reviewed all pertinent imaging results/findings within the past 24 hours.    Assessment/Plan:      * Acute on chronic pancreatitis  A/w abd pain, lipase >1000  Likely 2/2 hyperTG  PRN analgesics  IVF  S/p Insulin infusion given hyperTG      Elevated liver function tests  AST/ ALT, aurea uptrending   Unclear etiology   Abd US read as - Patchy, heterogeneous liver parenchyma. Splenomegaly. The common duct area is mostly obscured. No intrahepatic biliary dilatation.   CT A/P read as - 'Enlarged pancreatic head with surrounding edema and fat stranding consistent with history of acute pancreatitis. There is increased narrowing of the portal vein through the pancreatic head region compared to prior. '    CK low, hepatitis panel negative  GI consult given uptrending bilirubin      Hypertriglyceridemia  - treatment for pancreatitis  - s/p IV insulin  - hold statin, fenofibrate until LFTs improve      Class 1 obesity with alveolar hypoventilation, serious comorbidity, and body mass index (BMI) of 31.0 to 31.9 in adult  Body mass index is 34.53 kg/m². Morbid obesity complicates all aspects of disease management from diagnostic modalities to treatment. Weight loss encouraged and health benefits explained to patient.         Hypophosphatemia  Patient has Abnormal Phosphorus: hypophosphatemia. Will continue to monitor electrolytes closely. Will replace the affected electrolytes and repeat labs to be done after interventions completed. The patient's phosphorus results have been reviewed and are listed below.  Recent Labs   Lab 05/01/24  1333   PHOS 1.3*        Type 1 diabetes mellitus with hyperglycemia  Patient's FSGs are uncontrolled on current medication regimen.  Last A1c reviewed-   Lab Results   Component Value Date    LABA1C 8.8 (H) 01/12/2018    HGBA1C 9.5 (H) 03/28/2024     Most recent fingerstick glucose reviewed-   Recent Labs   Lab 04/30/24  1648 04/30/24  2035 05/01/24  0529 05/01/24  1154   POCTGLUCOSE 237* 240* 252* 263*       Current correctional scale  Low  Increase anti-hyperglycemic dose as follows-   Antihyperglycemics (From admission, onward)      Start     Stop Route Frequency Ordered    05/01/24 2100  insulin detemir U-100 (Levemir) pen 45 Units         -- SubQ 2 times daily 05/01/24 1610    05/01/24 1130  insulin aspart U-100 pen 5 Units         -- SubQ 3 times daily with meals 05/01/24 0859    05/01/24 1119  insulin aspart U-100 pen 0-10 Units         -- SubQ Before meals & nightly PRN 05/01/24 1026          Hold Oral hypoglycemics while patient is in the hospital.    DMT2 documented, but patient follows with endocrinology who documented DMT1  Patient confirmed he is type 1  AG elevated on AM BMP, long acting insulin dose increased & 1 L fluid bolus ordered, repeat BMP  shows AG has resolved   Will keep on liquid diet    Hypokalemia  Patient has hypokalemia which is Acute and currently controlled. Most recent potassium levels reviewed-   Lab Results   Component Value Date    K 4.3 05/01/2024   . Will continue potassium replacement per protocol and recheck repeat levels after replacement completed.     Mixed hyperlipidemia  Hold statin given worsening LFTs      Essential hypertension  Chronic, controlled. Latest blood pressure and vitals reviewed-     Temp:  [97.8 °F (36.6 °C)-98.9 °F (37.2 °C)]   Pulse:  [100-114]   Resp:  [14-25]   BP: (128-167)/(66-80)   SpO2:  [93 %-100 %] .   Home meds for hypertension were reviewed and noted below.   Hypertension Medications               losartan (COZAAR) 100 MG tablet Take ½ tablet (50 mg total) by mouth once daily.            While in the hospital, will manage blood pressure as follows; Continue home antihypertensive regimen    Will utilize p.r.n. blood pressure medication only if patient's blood pressure greater than 180/110 and he develops symptoms such as worsening chest pain or shortness of breath.      VTE Risk Mitigation (From admission, onward)           Ordered     enoxaparin injection 40 mg  Daily         04/29/24 0353     IP VTE HIGH RISK PATIENT  Once         04/29/24 0353     Place sequential compression device  Until discontinued         04/29/24 0353                    Discharge Planning   JOE:      Code Status: Full Code   Is the patient medically ready for discharge?:     Reason for patient still in hospital (select all that apply): Patient new problem, Patient trending condition, and Consult recommendations  Discharge Plan A: Home with family          Juana Pena MD  Department of Hospital Medicine   [facility]

## 2024-05-01 NOTE — SUBJECTIVE & OBJECTIVE
Interval History: continues to have abdominal pain, somewhat improved. He typically has similar pain during his pancreatitis attacks & it gradually gets better. Tolerating some po intake.     Review of Systems  Objective:     Vital Signs (Most Recent):  Temp: 97.9 °F (36.6 °C) (05/01/24 1151)  Pulse: 103 (05/01/24 1210)  Resp: 18 (05/01/24 1213)  BP: (!) 158/80 (05/01/24 1151)  SpO2: 95 % (05/01/24 1151) Vital Signs (24h Range):  Temp:  [97.9 °F (36.6 °C)-98.9 °F (37.2 °C)] 97.9 °F (36.6 °C)  Pulse:  [100-114] 103  Resp:  [14-25] 18  SpO2:  [93 %-100 %] 95 %  BP: (128-167)/(66-80) 158/80     Weight: 100.9 kg (222 lb 7.1 oz)  Body mass index is 33.82 kg/m².    Intake/Output Summary (Last 24 hours) at 5/1/2024 1611  Last data filed at 5/1/2024 1158  Gross per 24 hour   Intake 120 ml   Output 1520 ml   Net -1400 ml         Physical Exam  Vitals and nursing note reviewed.   Constitutional:       General: He is not in acute distress.     Appearance: He is well-developed. He is obese. He is ill-appearing.   Cardiovascular:      Rate and Rhythm: Regular rhythm. Tachycardia present.   Pulmonary:      Effort: Pulmonary effort is normal. No respiratory distress.   Abdominal:      Palpations: Abdomen is soft.      Tenderness: There is abdominal tenderness.   Musculoskeletal:      Right lower leg: No edema.      Left lower leg: No edema.   Skin:     General: Skin is warm and dry.      Findings: No rash.   Neurological:      Mental Status: He is alert and oriented to person, place, and time.             Significant Labs: All pertinent labs within the past 24 hours have been reviewed.    Significant Imaging: I have reviewed all pertinent imaging results/findings within the past 24 hours.

## 2024-05-01 NOTE — ASSESSMENT & PLAN NOTE
AST/ ALT, aurea uptrending   Unclear etiology   Abd US read as - Patchy, heterogeneous liver parenchyma. Splenomegaly. The common duct area is mostly obscured. No intrahepatic biliary dilatation.   CT A/P read as - 'Enlarged pancreatic head with surrounding edema and fat stranding consistent with history of acute pancreatitis. There is increased narrowing of the portal vein through the pancreatic head region compared to prior. '    CK low, hepatitis panel negative  GI consult given uptrending bilirubin

## 2024-05-01 NOTE — PLAN OF CARE
Pt on documented O2. Decreased to 2lpm. No apparent respiratory distress noted. Will continue to monitor.

## 2024-05-01 NOTE — ASSESSMENT & PLAN NOTE
Patient's FSGs are uncontrolled on current medication regimen.  Last A1c reviewed-   Lab Results   Component Value Date    LABA1C 8.8 (H) 01/12/2018    HGBA1C 9.5 (H) 03/28/2024     Most recent fingerstick glucose reviewed-   Recent Labs   Lab 04/30/24  1648 04/30/24  2035 05/01/24  0529 05/01/24  1154   POCTGLUCOSE 237* 240* 252* 263*       Current correctional scale  Low  Increase anti-hyperglycemic dose as follows-   Antihyperglycemics (From admission, onward)      Start     Stop Route Frequency Ordered    05/01/24 2100  insulin detemir U-100 (Levemir) pen 45 Units         -- SubQ 2 times daily 05/01/24 1610    05/01/24 1130  insulin aspart U-100 pen 5 Units         -- SubQ 3 times daily with meals 05/01/24 0859    05/01/24 1119  insulin aspart U-100 pen 0-10 Units         -- SubQ Before meals & nightly PRN 05/01/24 1026          Hold Oral hypoglycemics while patient is in the hospital.    DMT2 documented, but patient follows with endocrinology who documented DMT1  Patient confirmed he is type 1  AG elevated on AM BMP, long acting insulin dose increased & 1 L fluid bolus ordered, repeat BMP shows AG has resolved   Will keep on liquid diet

## 2024-05-01 NOTE — ASSESSMENT & PLAN NOTE
Chronic, controlled. Latest blood pressure and vitals reviewed-     Temp:  [97.8 °F (36.6 °C)-98.9 °F (37.2 °C)]   Pulse:  [100-114]   Resp:  [14-25]   BP: (128-167)/(66-80)   SpO2:  [93 %-100 %] .   Home meds for hypertension were reviewed and noted below.   Hypertension Medications               losartan (COZAAR) 100 MG tablet Take ½ tablet (50 mg total) by mouth once daily.            While in the hospital, will manage blood pressure as follows; Continue home antihypertensive regimen    Will utilize p.r.n. blood pressure medication only if patient's blood pressure greater than 180/110 and he develops symptoms such as worsening chest pain or shortness of breath.

## 2024-05-01 NOTE — ASSESSMENT & PLAN NOTE
Patient has hypokalemia which is Acute and currently controlled. Most recent potassium levels reviewed-   Lab Results   Component Value Date    K 4.3 05/01/2024   . Will continue potassium replacement per protocol and recheck repeat levels after replacement completed.

## 2024-05-02 LAB
ALBUMIN SERPL BCP-MCNC: 2.9 G/DL (ref 3.5–5.2)
ALP SERPL-CCNC: 223 U/L (ref 55–135)
ALT SERPL W/O P-5'-P-CCNC: 160 U/L (ref 10–44)
ANION GAP SERPL CALC-SCNC: 12 MMOL/L (ref 8–16)
AST SERPL-CCNC: 141 U/L (ref 10–40)
BASOPHILS # BLD AUTO: 0.04 K/UL (ref 0–0.2)
BASOPHILS NFR BLD: 0.6 % (ref 0–1.9)
BILIRUB SERPL-MCNC: 4.1 MG/DL (ref 0.1–1)
BUN SERPL-MCNC: 11 MG/DL (ref 6–20)
CALCIUM SERPL-MCNC: 9.2 MG/DL (ref 8.7–10.5)
CHLORIDE SERPL-SCNC: 98 MMOL/L (ref 95–110)
CO2 SERPL-SCNC: 27 MMOL/L (ref 23–29)
CREAT SERPL-MCNC: 0.9 MG/DL (ref 0.5–1.4)
DIFFERENTIAL METHOD BLD: ABNORMAL
EOSINOPHIL # BLD AUTO: 0.1 K/UL (ref 0–0.5)
EOSINOPHIL NFR BLD: 0.7 % (ref 0–8)
ERYTHROCYTE [DISTWIDTH] IN BLOOD BY AUTOMATED COUNT: 16.7 % (ref 11.5–14.5)
EST. GFR  (NO RACE VARIABLE): >60 ML/MIN/1.73 M^2
GLUCOSE SERPL-MCNC: 185 MG/DL (ref 70–110)
HCT VFR BLD AUTO: 37.7 % (ref 40–54)
HGB BLD-MCNC: 12.1 G/DL (ref 14–18)
IMM GRANULOCYTES # BLD AUTO: 0.04 K/UL (ref 0–0.04)
IMM GRANULOCYTES NFR BLD AUTO: 0.6 % (ref 0–0.5)
LYMPHOCYTES # BLD AUTO: 0.3 K/UL (ref 1–4.8)
LYMPHOCYTES NFR BLD: 4.3 % (ref 18–48)
MCH RBC QN AUTO: 25.4 PG (ref 27–31)
MCHC RBC AUTO-ENTMCNC: 32.1 G/DL (ref 32–36)
MCV RBC AUTO: 79 FL (ref 82–98)
MONOCYTES # BLD AUTO: 0.7 K/UL (ref 0.3–1)
MONOCYTES NFR BLD: 9.5 % (ref 4–15)
NEUTROPHILS # BLD AUTO: 6 K/UL (ref 1.8–7.7)
NEUTROPHILS NFR BLD: 84.3 % (ref 38–73)
NRBC BLD-RTO: 0 /100 WBC
PLATELET # BLD AUTO: 123 K/UL (ref 150–450)
PMV BLD AUTO: 10.8 FL (ref 9.2–12.9)
POCT GLUCOSE: 138 MG/DL (ref 70–110)
POCT GLUCOSE: 140 MG/DL (ref 70–110)
POCT GLUCOSE: 179 MG/DL (ref 70–110)
POCT GLUCOSE: 184 MG/DL (ref 70–110)
POTASSIUM SERPL-SCNC: 3.4 MMOL/L (ref 3.5–5.1)
PROT SERPL-MCNC: 7.4 G/DL (ref 6–8.4)
RBC # BLD AUTO: 4.77 M/UL (ref 4.6–6.2)
SODIUM SERPL-SCNC: 137 MMOL/L (ref 136–145)
WBC # BLD AUTO: 7.14 K/UL (ref 3.9–12.7)

## 2024-05-02 PROCEDURE — 11000001 HC ACUTE MED/SURG PRIVATE ROOM

## 2024-05-02 PROCEDURE — 63600175 PHARM REV CODE 636 W HCPCS

## 2024-05-02 PROCEDURE — 25000003 PHARM REV CODE 250

## 2024-05-02 PROCEDURE — 25000003 PHARM REV CODE 250: Performed by: HOSPITALIST

## 2024-05-02 PROCEDURE — 25000003 PHARM REV CODE 250: Performed by: NURSE PRACTITIONER

## 2024-05-02 PROCEDURE — 99222 1ST HOSP IP/OBS MODERATE 55: CPT | Mod: ,,, | Performed by: INTERNAL MEDICINE

## 2024-05-02 PROCEDURE — 63600175 PHARM REV CODE 636 W HCPCS: Performed by: NURSE PRACTITIONER

## 2024-05-02 PROCEDURE — 85025 COMPLETE CBC W/AUTO DIFF WBC: CPT | Performed by: STUDENT IN AN ORGANIZED HEALTH CARE EDUCATION/TRAINING PROGRAM

## 2024-05-02 PROCEDURE — 94761 N-INVAS EAR/PLS OXIMETRY MLT: CPT

## 2024-05-02 PROCEDURE — 25000003 PHARM REV CODE 250: Performed by: STUDENT IN AN ORGANIZED HEALTH CARE EDUCATION/TRAINING PROGRAM

## 2024-05-02 PROCEDURE — 36415 COLL VENOUS BLD VENIPUNCTURE: CPT | Performed by: STUDENT IN AN ORGANIZED HEALTH CARE EDUCATION/TRAINING PROGRAM

## 2024-05-02 PROCEDURE — 80053 COMPREHEN METABOLIC PANEL: CPT | Performed by: STUDENT IN AN ORGANIZED HEALTH CARE EDUCATION/TRAINING PROGRAM

## 2024-05-02 RX ORDER — POTASSIUM CHLORIDE 20 MEQ/1
40 TABLET, EXTENDED RELEASE ORAL
Status: COMPLETED | OUTPATIENT
Start: 2024-05-02 | End: 2024-05-02

## 2024-05-02 RX ADMIN — ENOXAPARIN SODIUM 40 MG: 40 INJECTION SUBCUTANEOUS at 04:05

## 2024-05-02 RX ADMIN — OXYCODONE HYDROCHLORIDE 5 MG: 5 TABLET ORAL at 04:05

## 2024-05-02 RX ADMIN — INSULIN ASPART 2 UNITS: 100 INJECTION, SOLUTION INTRAVENOUS; SUBCUTANEOUS at 06:05

## 2024-05-02 RX ADMIN — METOCLOPRAMIDE 5 MG: 5 TABLET ORAL at 10:05

## 2024-05-02 RX ADMIN — DOCUSATE SODIUM AND SENNOSIDES 1 TABLET: 8.6; 5 TABLET, FILM COATED ORAL at 09:05

## 2024-05-02 RX ADMIN — INSULIN ASPART 2 UNITS: 100 INJECTION, SOLUTION INTRAVENOUS; SUBCUTANEOUS at 11:05

## 2024-05-02 RX ADMIN — INSULIN ASPART 10 UNITS: 100 INJECTION, SOLUTION INTRAVENOUS; SUBCUTANEOUS at 05:05

## 2024-05-02 RX ADMIN — MUPIROCIN: 20 OINTMENT TOPICAL at 08:05

## 2024-05-02 RX ADMIN — ALLOPURINOL 300 MG: 100 TABLET ORAL at 08:05

## 2024-05-02 RX ADMIN — MORPHINE SULFATE 2 MG: 2 INJECTION, SOLUTION INTRAMUSCULAR; INTRAVENOUS at 07:05

## 2024-05-02 RX ADMIN — Medication 2 CAPSULE: at 09:05

## 2024-05-02 RX ADMIN — INSULIN DETEMIR 45 UNITS: 100 INJECTION, SOLUTION SUBCUTANEOUS at 09:05

## 2024-05-02 RX ADMIN — DOCUSATE SODIUM AND SENNOSIDES 1 TABLET: 8.6; 5 TABLET, FILM COATED ORAL at 08:05

## 2024-05-02 RX ADMIN — POTASSIUM CHLORIDE 40 MEQ: 1500 TABLET, EXTENDED RELEASE ORAL at 08:05

## 2024-05-02 RX ADMIN — INSULIN DETEMIR 45 UNITS: 100 INJECTION, SOLUTION SUBCUTANEOUS at 08:05

## 2024-05-02 RX ADMIN — POTASSIUM CHLORIDE 40 MEQ: 1500 TABLET, EXTENDED RELEASE ORAL at 11:05

## 2024-05-02 RX ADMIN — PANTOPRAZOLE SODIUM 40 MG: 40 TABLET, DELAYED RELEASE ORAL at 08:05

## 2024-05-02 RX ADMIN — INSULIN ASPART 10 UNITS: 100 INJECTION, SOLUTION INTRAVENOUS; SUBCUTANEOUS at 11:05

## 2024-05-02 RX ADMIN — ASPIRIN 81 MG CHEWABLE TABLET 81 MG: 81 TABLET CHEWABLE at 08:05

## 2024-05-02 RX ADMIN — LOSARTAN POTASSIUM 50 MG: 50 TABLET, FILM COATED ORAL at 08:05

## 2024-05-02 RX ADMIN — MUPIROCIN: 20 OINTMENT TOPICAL at 09:05

## 2024-05-02 RX ADMIN — INSULIN ASPART 10 UNITS: 100 INJECTION, SOLUTION INTRAVENOUS; SUBCUTANEOUS at 08:05

## 2024-05-02 RX ADMIN — METOCLOPRAMIDE 5 MG: 5 TABLET ORAL at 06:05

## 2024-05-02 RX ADMIN — METOCLOPRAMIDE 5 MG: 5 TABLET ORAL at 09:05

## 2024-05-02 RX ADMIN — MORPHINE SULFATE 2 MG: 2 INJECTION, SOLUTION INTRAMUSCULAR; INTRAVENOUS at 12:05

## 2024-05-02 RX ADMIN — PANTOPRAZOLE SODIUM 40 MG: 40 TABLET, DELAYED RELEASE ORAL at 09:05

## 2024-05-02 RX ADMIN — OXYCODONE HYDROCHLORIDE 5 MG: 5 TABLET ORAL at 10:05

## 2024-05-02 RX ADMIN — Medication 2 CAPSULE: at 08:05

## 2024-05-02 RX ADMIN — Medication 6 MG: at 09:05

## 2024-05-02 RX ADMIN — METOCLOPRAMIDE 5 MG: 5 TABLET ORAL at 04:05

## 2024-05-02 NOTE — PLAN OF CARE
VIRTUAL NURSE:  Labs, notes, orders, and careplan reviewed. VN to be available as needed.    Problem: Adult Inpatient Plan of Care  Goal: Plan of Care Review  Outcome: Progressing

## 2024-05-02 NOTE — PROGRESS NOTES
"[facility] Medicine  Progress Note    Patient Name: Bay Ibarra  MRN: [id number]  Patient Class: IP- Inpatient   Admission Date: 7/25/2017  Length of Stay: 9 days  Attending Physician: Connor Nobles MD  Primary Care Provider: José Luis Singleton MD    [facility] Medicine Team: [facility]P MED 1 Marco Medel MD    Subjective:     Principal Problem:Acute pancreatitis with uninfected necrosis    HPI:  Mr. Ibarra is a 38 y/o gentleman with PMHx HTN, HLD, Hypertriglyceridemia, DM I, gout, and pancreatitis (apirl 2017) who was transferred from [place] with necrotizing pancreatitis and DKA. The patient states he started feeling "ill and weak" 2 days ago, and yesterday morning he woke up with severe epigastric abdominal pain and had multiple episodes of nausea and vomiting with poor PO intake. He endorsed starting gemfibrozil recently for HLD and regular compliance with his insulin glargine BID. He presented to [place] with Lipase 4000, Triglycerides 1500, lactate 10 and elevated anion gap. He received 3 L of ns and IV insulin at [place] before being transported to [facility] for higher level of care. Upon initial assessment in the ED, the patient was lethargic, but arousable, , /63, and SpO2 98% on 2L nasal cannula. He denied having any fevers, chills, CP, cough, diarrhea or dysuria.  He was started on 2L of lactated ringers, IV insulin and admitted to the MICU.     Hospital Course:  7/27: Having pain and nausea without vomiting overnight, controlled with morphine 4 x 4 IV. Patient also developed acute respiratory failure requiring intubation. Elevated bladder pressures overnight improving with treatment of ileus  7/28: NG tube for elevated bladder pressures.   7/29: Extubated. Bladder pressures resolved with NG. NG removed. Patient with continued NPO but dry heaving and nauseous.   7/30: Dry heaving is much improved. Got some NG decompression last night but " Send specimen for MM from 4/10/24 Also, please call him and give him number to call for pricing.  now removed. Small BMs overnight but not substantial. Did not sleep last night due to abdominal discomfort.  7/31: Pt continued to have abdominal pain and bloating, so NG tube was replaced. US abd showed hepatosplenomegaly with hepatic steatosis, edematous pancreas, and no evidence of gallstones.    8/1: slowly improving with significant output per NGT. C diff negative  8/2: Continues to improve    Interval History: NAEON. Denies pain or nausea. Continues to have loose BMs. Complaining of hunger, no other issues. NG clamped this AM, tolerating well so far.    Review of Systems  Objective:     Vital Signs (Most Recent):  Temp: 98.2 °F (36.8 °C) (08/03/17 0715)  Pulse: 95 (08/03/17 0715)  Resp: 17 (08/03/17 0715)  BP: (!) 145/70 (08/03/17 0715)  SpO2: (!) 93 % (08/03/17 0715) Vital Signs (24h Range):  Temp:  [98.2 °F (36.8 °C)-98.7 °F (37.1 °C)] 98.2 °F (36.8 °C)  Pulse:  [] 95  Resp:  [16-20] 17  SpO2:  [93 %-98 %] 93 %  BP: (134-160)/(70-83) 145/70     Weight: 112.1 kg (247 lb 2.2 oz)  Body mass index is 35.46 kg/m².    Intake/Output Summary (Last 24 hours) at 08/03/17 0751  Last data filed at 08/03/17 0511   Gross per 24 hour   Intake             2978 ml   Output             2350 ml   Net              628 ml      Physical Exam   Constitutional: He is oriented to person, place, and time. No distress.   Eyes: EOM are normal. Right eye exhibits no discharge. Left eye exhibits no discharge.   Neck: Normal range of motion.   Cardiovascular: Normal rate, regular rhythm, normal heart sounds and intact distal pulses.    No murmur heard.  Pulmonary/Chest: Effort normal. He has no wheezes. He has no rales.   Abdominal: He exhibits no distension. There is no tenderness. There is no guarding.   Hypoactive bowel sounds   Musculoskeletal: He exhibits no edema or deformity.   Neurological: He is alert and oriented to person, place, and time. No cranial nerve deficit.   Skin: Skin is warm and dry. He is not diaphoretic.        Significant Labs:   CBC:   Recent Labs  Lab 08/02/17  0401 08/03/17  0400   WBC 11.73 10.98   HGB 12.6* 12.7*   HCT 40.7 41.5    177     CMP:   Recent Labs  Lab 08/02/17  0401 08/02/17  1632 08/03/17  0400    140 145   K 3.3* 4.5 3.4*   CL 99 103 100   CO2 31* 22* 36*   * 208* 146*   BUN 17 17 16   CREATININE 0.7 0.8 0.7   CALCIUM 8.5* 8.6* 8.4*   PROT 5.7* 6.1 5.8*   ALBUMIN 1.9* 2.0* 1.9*   BILITOT 0.5 0.6 0.5   ALKPHOS 93 86 100   AST 42* 45* 32   * 106* 81*   ANIONGAP 12 15 9   EGFRNONAA >60.0 >60.0 >60.0     POCT Glucose:   Recent Labs  Lab 08/02/17  2116 08/02/17  2356 08/03/17  0511   POCTGLUCOSE 208* 165* 136*     All pertinent labs within the past 24 hours have been reviewed.    Significant Imaging: I have reviewed all pertinent imaging results/findings within the past 24 hours.    Assessment/Plan:      * Acute pancreatitis with uninfected necrosis    - 2/2 hypertriglyceridemia, labs on admission: Lipase 4000 (trended down to 200 now), triglycerides 1500, lactate 10.4 (all improved since admission)  - CT showed extensive acute pancreatitis. The pancreas is hypoperfused which may be due to an edematous state however cannot exclude vascular compromise  - currently on cefepime and flagyl- day 8/10 for presumed superimposed infection   - repeat US abdomen shows hepatosplenomegaly with hepatic steatosis, edematous pancreas, no e/o gallstones  - replaced NG tube for decompression --> clamped today, if pt tolerates will remove and advance diet  - continuing supportive care, famotidine, lyte replacement, pain and nausea control        Ileus    - hospital course complicated by ileus, reportedly improved after receiving enemas  - replaced NG for significant abdominal distention and discomfort  - now improving, will clamp NG today and remove if patient tolerates and advance diet as tolerated        Diabetes mellitus type I    - detemir to 20 BID for hyperglycemia  - SSI  - POCT glucose  checks  - will consider advancing diet today        Hypertriglyceridemia    - TG >1500 on admit, causing acute pancreatitis. Concern for familial hyperlipidemia possibly  - TGs trended down to 216  - will require outpatient eval by a lipid specialist -- metabolic d/o specialist vs endocrine           VTE Risk Mitigation         Ordered     enoxaparin injection 40 mg  Daily     Route:  Subcutaneous        07/28/17 0805     Medium Risk of VTE  Once      07/25/17 2244     Place LEI hose  Until discontinued      07/25/17 2244     Place sequential compression device  Until discontinued      07/25/17 2244              Marco Medel MD  Department of Hospital Medicine   [facility]

## 2024-05-02 NOTE — HPI
42 y/o male with hx of recurrent pancreatitis 2/2 hyperTG here for pancreatitis.  Pt states pain is similar to prior episodes, severe epigastric, radiating to back, has slolwy been improved over past couple day, tolerating jello,  + nausea, no fever or chills.      CT on admit  Impression:  Enlarged pancreatic head with surrounding edema and fat stranding consistent with history of acute pancreatitis. There is increased narrowing of the portal vein through the pancreatic head region compared to prior.  Cavernous transformation of the portal vein and numerous gastroesophageal collaterals, similar to prior.  Gastric distention is decreased compared to prior.  Duodenal wall thickening adjacent to the head of the pancreas presumed to represent contiguous inflammatory change.  Duodenitis could have a similar appearance.  Hepatosplenomegaly and hepatic steatosis, similar to prior.      EGD 2/2024 [facility]  Impression:            - Grade II esophageal varices. He has h/o IGV1                          varices but I was not able to visualize the fundus                          due to food.                          - A large amount of food (residue) in the fundus.                          - Extrinsic compression in the gastric antrum with                          diffuse edema. No mass noted, and all mucosa is                          soft and spongy. Biopsied. This is causing a                          gastric outlet obstruction.                          - Congested duodenal bulb mucosa. Biopsied.                          - Normal second portion of the duodenum and third                          portion of the duodenum.                          - Normal upper third of esophagus.   Recommendation:        - Return patient to hospital [facility] for ongoing care.                          - NPO.                          - Continue present medications.                          - Refer to a [facility] gastroenterologist at appointment                           to be scheduled. He has had EUS in the past but                          was done while having active pancreatitis and it                          showed acute on chronic pancreatitis, attributed                          to TG. But he also has portal hypertension which                          has not been fully worked up.

## 2024-05-02 NOTE — CONSULTS
[facility] - Telemetry  Gastroenterology  Consult Note    Patient Name: Bay Ibarra  MRN: [id number]  Admission Date: 4/29/2024  Hospital Length of Stay: 3 days  Code Status: Full Code   Attending Provider: Juana Pena MD   Consulting Provider: José Luis Grossman MD  Primary Care Physician: Funmilayo Taylor MD  Principal Problem:Acute on chronic pancreatitis    Consults  Subjective:     HPI:  42 y/o male with hx of recurrent pancreatitis 2/2 hyperTG here for pancreatitis.  Pt states pain is similar to prior episodes, severe epigastric, radiating to back, has slolwy been improved over past couple day, tolerating jello,  + nausea, no fever or chills.      CT on admit  Impression:  Enlarged pancreatic head with surrounding edema and fat stranding consistent with history of acute pancreatitis. There is increased narrowing of the portal vein through the pancreatic head region compared to prior.  Cavernous transformation of the portal vein and numerous gastroesophageal collaterals, similar to prior.  Gastric distention is decreased compared to prior.  Duodenal wall thickening adjacent to the head of the pancreas presumed to represent contiguous inflammatory change.  Duodenitis could have a similar appearance.  Hepatosplenomegaly and hepatic steatosis, similar to prior.      EGD 2/2024 [facility]  Impression:            - Grade II esophageal varices. He has h/o IGV1                          varices but I was not able to visualize the fundus                          due to food.                          - A large amount of food (residue) in the fundus.                          - Extrinsic compression in the gastric antrum with                          diffuse edema. No mass noted, and all mucosa is                          soft and spongy. Biopsied. This is causing a                          gastric outlet obstruction.                          - Congested duodenal bulb mucosa. Biopsied.                          - Normal  second portion of the duodenum and third                          portion of the duodenum.                          - Normal upper third of esophagus.   Recommendation:        - Return patient to hospital [facility] for ongoing care.                          - NPO.                          - Continue present medications.                          - Refer to a [facility] gastroenterologist at appointment                          to be scheduled. He has had EUS in the past but                          was done while having active pancreatitis and it                          showed acute on chronic pancreatitis, attributed                          to TG. But he also has portal hypertension which                          has not been fully worked up.     Past Medical History:   Diagnosis Date    Acute pancreatitis     Cellulitis of left lower extremity 01/11/2024    Diverticulosis of colon     Essential hypertension     Fatty liver     Gastric varices without bleeding 01/16/2020    Glaucomatous optic atrophy, right     Gout     Hx of acute pancreatitis 03/03/2017    Hypertriglyceridemia 08/02/2017    Obesity (BMI 30-39.9) 06/05/2015    Obesity (BMI 35.0-39.9 without comorbidity) 06/05/2015    Obstructive sleep apnea syndrome 12/18/2015    Type 2 diabetes mellitus with diabetic polyneuropathy, with long-term current use of insulin 10/16/2017    Type 2 diabetes mellitus with hyperglycemia, with long-term current use of insulin 08/03/2017       Past Surgical History:   Procedure Laterality Date    DEBRIDEMENT OF FOOT Right 4/21/2023    Procedure: DEBRIDEMENT, FOOT;  Surgeon: Anju Melvin DPM;  Location: [facility] OR;  Service: Podiatry;  Laterality: Right;    ENDOSCOPIC ULTRASOUND OF UPPER GASTROINTESTINAL TRACT N/A 5/27/2019    Procedure: ULTRASOUND, UPPER GI TRACT, ENDOSCOPIC;  Surgeon: Zafar Velazquez MD;  Location: [address]);  Service: Endoscopy;  Laterality: N/A;    ERCP N/A 5/27/2019    Procedure: ERCP (ENDOSCOPIC RETROGRADE  CHOLANGIOPANCREATOGRAPHY);  Surgeon: Zafar Velazquez MD;  [facility]  Service: Endoscopy;  Laterality: N/A;    ESOPHAGOGASTRODUODENOSCOPY      ESOPHAGOGASTRODUODENOSCOPY N/A 1/31/2020    Procedure: EGD (ESOPHAGOGASTRODUODENOSCOPY);  Surgeon: Zafar Velazquez MD;  Location: [facility] ENDO;  Service: Endoscopy;  Laterality: N/A;    ESOPHAGOGASTRODUODENOSCOPY N/A 2/16/2024    Procedure: EGD (ESOPHAGOGASTRODUODENOSCOPY);  Surgeon: Rashida Messer MD;  Location: [facility] ENDO;  Service: Endoscopy;  Laterality: N/A;    PLANTAR FASCIA RELEASE Right 4/21/2023    Procedure: RELEASE, FASCIA, PLANTAR;  Surgeon: Anju Melvin DPM;  Location: [facility] OR;  Service: Podiatry;  Laterality: Right;    RESECTION OF GASTROCNEMIUS MUSCLE Right 4/21/2023    Procedure: RESECTION, MUSCLE, GASTROCNEMIUS;  Surgeon: Anju Melvin DPM;  Location: [facility] OR;  Service: Podiatry;  Laterality: Right;       Review of patient's allergies indicates:  No Known Allergies  Family History       Problem Relation (Age of Onset)    Aneurysm Mother, Maternal Grandmother    Coronary artery disease Father    Diabetes type II Father, Paternal Grandmother    No Known Problems Sister, Brother          Tobacco Use    Smoking status: Never     Passive exposure: Never    Smokeless tobacco: Never   Substance and Sexual Activity    Alcohol use: No    Drug use: Never    Sexual activity: Not Currently     Partners: Female     Review of Systems   Constitutional:  Negative for chills and fever.   HENT:  Negative for facial swelling, mouth sores and trouble swallowing.    Eyes:  Negative for pain and redness.   Respiratory:  Negative for cough and shortness of breath.    Cardiovascular:  Negative for chest pain and leg swelling.   Gastrointestinal:  Positive for abdominal distention and abdominal pain.   Genitourinary:  Negative for dysuria and hematuria.   Musculoskeletal:  Negative for gait problem and neck stiffness.   Skin:  Negative for rash and wound.    Neurological:  Negative for seizures and headaches.   Psychiatric/Behavioral:  Negative for confusion and hallucinations.      Objective:     Vital Signs (Most Recent):  Temp: 98.4 °F (36.9 °C) (05/02/24 0732)  Pulse: 101 (05/02/24 0732)  Resp: 18 (05/02/24 0732)  BP: (!) 174/81 (05/02/24 0732)  SpO2: 97 % (05/02/24 0805) Vital Signs (24h Range):  Temp:  [97.8 °F (36.6 °C)-99.3 °F (37.4 °C)] 98.4 °F (36.9 °C)  Pulse:  [] 101  Resp:  [17-20] 18  SpO2:  [94 %-97 %] 97 %  BP: (141-174)/(77-92) 174/81     Weight: 97.5 kg (214 lb 15.2 oz) (05/01/24 2320)  Body mass index is 32.68 kg/m².      Intake/Output Summary (Last 24 hours) at 5/2/2024 0837  Last data filed at 5/2/2024 0642  Gross per 24 hour   Intake 368 ml   Output 1095 ml   Net -727 ml       Lines/Drains/Airways       Peripheral Intravenous Line  Duration                  Peripheral IV - Single Lumen 04/29/24 0041 20 G Left Antecubital 3 days         Peripheral IV - Single Lumen 04/29/24 0150 18 G Anterior;Right Forearm 3 days                     Physical Exam  Vitals reviewed.   Constitutional:       General: He is not in acute distress.     Appearance: He is well-developed.   HENT:      Head: Normocephalic and atraumatic.   Eyes:      General: No scleral icterus.     Conjunctiva/sclera: Conjunctivae normal.   Neck:      Thyroid: No thyromegaly.   Cardiovascular:      Rate and Rhythm: Normal rate and regular rhythm.   Pulmonary:      Effort: Pulmonary effort is normal. No respiratory distress.      Breath sounds: Normal breath sounds.   Abdominal:      General: There is distension.      Tenderness: There is abdominal tenderness (central and epigastric).   Musculoskeletal:         General: No tenderness. Normal range of motion.      Cervical back: Neck supple.   Lymphadenopathy:      Cervical: No cervical adenopathy.   Skin:     General: Skin is warm and dry.      Findings: No rash.   Neurological:      Mental Status: He is alert and oriented to person,  "place, and time.      Gait: Gait normal.   Psychiatric:         Mood and Affect: Mood normal.         Behavior: Behavior normal.          Significant Labs:  CBC:   Recent Labs   Lab 05/01/24  0333 05/02/24  0329   WBC 13.88* 7.14   HGB 13.1* 12.1*   HCT 42.1 37.7*    123*     BMP:   Recent Labs   Lab 05/01/24  1333 05/02/24  0329   * 185*    137   K 4.3 3.4*   CL 97 98   CO2 25 27   BUN 12 11   CREATININE 1.0 0.9   CALCIUM 9.1 9.2   MG 1.9  --      CMP:   Recent Labs   Lab 05/02/24  0329   *   CALCIUM 9.2   ALBUMIN 2.9*   PROT 7.4      K 3.4*   CO2 27   CL 98   BUN 11   CREATININE 0.9   ALKPHOS 223*   *   *   BILITOT 4.1*     Coagulation: No results for input(s): "PT", "INR", "APTT" in the last 48 hours.  Lipase: No results for input(s): "LIPASE" in the last 48 hours.    Significant Imaging:  Imaging results within the past 24 hours have been reviewed.  Assessment/Plan:     GI  * Acute on chronic pancreatitis  Secondary to hyperTG  With chronic pancreatitis and significant pancreatic calcifications and atrophy    Asked to evaluate LFTs, which are now downtrending.  Suspect related to ongoing inflammatory process in the head of pancreas.    Recs  Supportive care   Trend LFTs  Pain control  Advance diet as tolerating.  Needs fibrate therapy longterm to manage the hyperTG    He needs to follow up with [facility] at [facility], will send message to their schedulers.    Will sign off,  Call us back for any worsening of symptoms or if LFT worsening.        Thank you for your consult. I will sign off. Please contact us if you have any additional questions.    José Luis Grossman MD  Gastroenterology  [place] - Telemetry  "

## 2024-05-02 NOTE — PLAN OF CARE
"  Problem: Adult Inpatient Plan of Care  Goal: Plan of Care Review  Outcome: Progressing     Problem: Adult Inpatient Plan of Care  Goal: Optimal Comfort and Wellbeing  Outcome: Progressing     Problem: Pain Acute  Goal: Optimal Pain Control and Function  Outcome: Progressing     Problem: Pancreatitis  Goal: Fluid and Electrolyte Balance  Outcome: Progressing     Problem: Comorbidity Management  Goal: Blood Pressure in Desired Range  Outcome: Progressing     .Plan of care reviewed with the patient. Scheduled medicines given and the patient tolerated well. Given Oxycodone 5 mg and Morphine 2 mg IV for acute abdominal pain. Fall and safety precautions taken and the standard interventions are in place. On Telemetry monitoring with NSR, no true "red alarms' noted on the shift. Patient's Accu Check was 226 mg/dl, covered with 2 Units Insulin Aspart. Advised the patient to call for assistance. Continued monitoring the patient.   "

## 2024-05-02 NOTE — PHARMACY MED REC
"[facility]           Pharmacy  Admission Medication History     The home medication history was taken by Vladimir Cerna PharmD.      Medication history obtained from Medications listed below were obtained from: Patient/family and Analytic software- [employer]    Based on information gathered for medication list, you may go to "Admission" then "Reconcile Home Medications" tabs to review and/or act upon those items.     The home medication list has been updated by the Pharmacy department.   Please read ALL comments highlighted in yellow.   Please address this information as you see fit.    Feel free to contact us if you have any questions or require assistance.    The medications listed below were removed from the home medication list.  Please reorder if appropriate:    Addressed with provider    Potential issues to be addressed PRIOR TO DISCHARGE      No current facility-administered medications on file prior to encounter.     Current Outpatient Medications on File Prior to Encounter   Medication Sig Dispense Refill    allopurinoL (ZYLOPRIM) 300 MG tablet Take 1 tablet (300 mg total) by mouth once daily. 90 tablet 3    aspirin (ECOTRIN) 81 MG EC tablet Take 81 mg by mouth once daily.      atorvastatin (LIPITOR) 40 MG tablet Take 1 tablet (40 mg total) by mouth once daily. 90 tablet 3    blood sugar diagnostic (CONTOUR NEXT TEST STRIPS) Strp To test glucose 4 times a day. 200 each 11    blood-glucose sensor (DEXCOM G7 SENSOR) Isha Change sensor every 10 days. 3 each 3    cyclobenzaprine (FLEXERIL) 5 MG tablet Take 1 tablet (5 mg total) by mouth 3 (three) times daily as needed for Muscle spasms. 30 tablet 2    fenofibrate 160 MG Tab Take 1 tablet (160 mg total) by mouth once daily. 90 tablet 3    icosapent ethyL (VASCEPA) 1 gram Cap Take 2 capsules (2 g total) by mouth 2 (two) times daily. 360 capsule 3    insulin (LANTUS SOLOSTAR U-100 INSULIN) glargine 100 units/mL SubQ pen Inject 55 Units into the " skin 2 (two) times a day. 100 mL 3    insulin aspart U-100 (NOVOLOG FLEXPEN U-100 INSULIN) 100 unit/mL (3 mL) InPn pen Inject 36 Units into the skin 3 (three) times daily with meals. Plus correction scale. Max TDD 140units. 100 mL 3    losartan (COZAAR) 100 MG tablet Take ½ tablet (50 mg total) by mouth once daily. 45 tablet 1    metFORMIN (GLUCOPHAGE) 500 MG tablet Take 2 tablets (1,000 mg total) by mouth 2 (two) times daily with meals. 360 tablet 3    metoclopramide HCl (REGLAN) 10 MG tablet Take 1 tablet (10 mg total) by mouth 4 (four) times daily. 120 tablet 2    niacin 100 MG Tab Take 2 tablets (200 mg total) by mouth every evening. (Patient taking differently: Take 200 mg by mouth Daily.) 90 tablet 1    ondansetron (ZOFRAN) 4 MG tablet Take 1 tablet (4 mg total) by mouth every 6 (six) hours as needed for Nausea. 30 tablet 1    pantoprazole (PROTONIX) 40 MG tablet Take 1 tablet (40 mg total) by mouth 2 (two) times daily. 60 tablet 2    pregabalin (LYRICA) 150 MG capsule Take 1 capsule (150 mg total) by mouth 2 (two) times daily. 60 capsule 5    [DISCONTINUED] naproxen (NAPROSYN) 500 MG tablet Take 1 tablet (500 mg total) by mouth 2 (two) times daily as needed (headache). 30 tablet 0    [DISCONTINUED] butalbital-acetaminophen-caffeine -40 mg (FIORICET, ESGIC) -40 mg per tablet Take 1 tablet by mouth every 6 (six) hours as needed for Headaches. 20 tablet 0    [DISCONTINUED] ciclopirox (PENLAC) 8 % Soln Apply topically nightly. 6.6 mL 6    [DISCONTINUED] ondansetron (ZOFRAN) 4 MG tablet Take 1 tablet (4 mg total) by mouth every 6 (six) hours. 12 tablet 0    [DISCONTINUED] sucralfate (CARAFATE) 100 mg/mL suspension Take 10 mLs (1 g total) by mouth 4 (four) times daily. 1200 mL 2       Please address this information as you see fit.  Feel free to contact us if you have any questions or require assistance.    Vladimir Cerna, PharmD  [phone]                 .

## 2024-05-02 NOTE — SUBJECTIVE & OBJECTIVE
Interval History: abdominal pain is better. Able to tolerate clear liquid diet. Advance to full liquid diet.     Review of Systems  Objective:     Vital Signs (Most Recent):  Temp: 98.2 °F (36.8 °C) (05/02/24 1136)  Pulse: 104 (05/02/24 1159)  Resp: 18 (05/02/24 1607)  BP: (!) 161/77 (05/02/24 1136)  SpO2: 96 % (05/02/24 1136) Vital Signs (24h Range):  Temp:  [98.1 °F (36.7 °C)-99.3 °F (37.4 °C)] 98.2 °F (36.8 °C)  Pulse:  [] 104  Resp:  [18-20] 18  SpO2:  [94 %-97 %] 96 %  BP: (160-174)/(77-92) 161/77     Weight: 97.5 kg (214 lb 15.2 oz)  Body mass index is 32.68 kg/m².    Intake/Output Summary (Last 24 hours) at 5/2/2024 1617  Last data filed at 5/2/2024 0956  Gross per 24 hour   Intake 443 ml   Output 775 ml   Net -332 ml         Physical Exam  Vitals and nursing note reviewed.   Constitutional:       General: He is not in acute distress.     Appearance: He is well-developed. He is obese. He is ill-appearing.   Cardiovascular:      Rate and Rhythm: Regular rhythm. Tachycardia present.   Pulmonary:      Effort: Pulmonary effort is normal. No respiratory distress.   Abdominal:      Palpations: Abdomen is soft.      Tenderness: There is abdominal tenderness.   Musculoskeletal:      Right lower leg: No edema.      Left lower leg: No edema.   Skin:     General: Skin is warm and dry.      Findings: No rash.   Neurological:      Mental Status: He is alert and oriented to person, place, and time.             Significant Labs: All pertinent labs within the past 24 hours have been reviewed.    Significant Imaging: I have reviewed all pertinent imaging results/findings within the past 24 hours.

## 2024-05-02 NOTE — ASSESSMENT & PLAN NOTE
Secondary to hyperTG  With chronic pancreatitis and significant pancreatic calcifications and atrophy    Asked to evaluate LFTs, which are now downtrending.  Suspect related to ongoing inflammatory process in the head of pancreas.    Recs  Supportive care   Trend LFTs  Pain control  Advance diet as tolerating.  Needs fibrate therapy longterm to manage the hyperTG    He needs to follow up with [facility] at [facility], will send message to their schedulers.    Will sign off,  Call us back for any worsening of symptoms or if LFT worsening.

## 2024-05-02 NOTE — SUBJECTIVE & OBJECTIVE
Past Medical History:   Diagnosis Date    Acute pancreatitis     Cellulitis of left lower extremity 01/11/2024    Diverticulosis of colon     Essential hypertension     Fatty liver     Gastric varices without bleeding 01/16/2020    Glaucomatous optic atrophy, right     Gout     Hx of acute pancreatitis 03/03/2017    Hypertriglyceridemia 08/02/2017    Obesity (BMI 30-39.9) 06/05/2015    Obesity (BMI 35.0-39.9 without comorbidity) 06/05/2015    Obstructive sleep apnea syndrome 12/18/2015    Type 2 diabetes mellitus with diabetic polyneuropathy, with long-term current use of insulin 10/16/2017    Type 2 diabetes mellitus with hyperglycemia, with long-term current use of insulin 08/03/2017       Past Surgical History:   Procedure Laterality Date    DEBRIDEMENT OF FOOT Right 4/21/2023    Procedure: DEBRIDEMENT, FOOT;  Surgeon: Anju Melvin DPM;  Location: [facility];  Service: Podiatry;  Laterality: Right;    ENDOSCOPIC ULTRASOUND OF UPPER GASTROINTESTINAL TRACT N/A 5/27/2019    Procedure: ULTRASOUND, UPPER GI TRACT, ENDOSCOPIC;  Surgeon: Zafar Velazquez MD;  Location: [address]);  Service: Endoscopy;  Laterality: N/A;    ERCP N/A 5/27/2019    Procedure: ERCP (ENDOSCOPIC RETROGRADE CHOLANGIOPANCREATOGRAPHY);  Surgeon: Zafar Velazquez MD;  Location: [address]);  Service: Endoscopy;  Laterality: N/A;    ESOPHAGOGASTRODUODENOSCOPY      ESOPHAGOGASTRODUODENOSCOPY N/A 1/31/2020    Procedure: EGD (ESOPHAGOGASTRODUODENOSCOPY);  Surgeon: Zafar Velazquez MD;  Location: [facility];  Service: Endoscopy;  Laterality: N/A;    ESOPHAGOGASTRODUODENOSCOPY N/A 2/16/2024    Procedure: EGD (ESOPHAGOGASTRODUODENOSCOPY);  Surgeon: Rashida Messer MD;  Location: [facility];  Service: Endoscopy;  Laterality: N/A;    PLANTAR FASCIA RELEASE Right 4/21/2023    Procedure: RELEASE, FASCIA, PLANTAR;  Surgeon: Anju Melvin DPM;  Location: [facility];  Service: Podiatry;  Laterality: Right;    RESECTION OF GASTROCNEMIUS MUSCLE Right  4/21/2023    Procedure: RESECTION, MUSCLE, GASTROCNEMIUS;  Surgeon: Anju Melvin DPM;  Location: [facility];  Service: Podiatry;  Laterality: Right;       Review of patient's allergies indicates:  No Known Allergies  Family History       Problem Relation (Age of Onset)    Aneurysm Mother, Maternal Grandmother    Coronary artery disease Father    Diabetes type II Father, Paternal Grandmother    No Known Problems Sister, Brother          Tobacco Use    Smoking status: Never     Passive exposure: Never    Smokeless tobacco: Never   Substance and Sexual Activity    Alcohol use: No    Drug use: Never    Sexual activity: Not Currently     Partners: Female     Review of Systems   Constitutional:  Negative for chills and fever.   HENT:  Negative for facial swelling, mouth sores and trouble swallowing.    Eyes:  Negative for pain and redness.   Respiratory:  Negative for cough and shortness of breath.    Cardiovascular:  Negative for chest pain and leg swelling.   Gastrointestinal:  Positive for abdominal distention and abdominal pain.   Genitourinary:  Negative for dysuria and hematuria.   Musculoskeletal:  Negative for gait problem and neck stiffness.   Skin:  Negative for rash and wound.   Neurological:  Negative for seizures and headaches.   Psychiatric/Behavioral:  Negative for confusion and hallucinations.      Objective:     Vital Signs (Most Recent):  Temp: 98.4 °F (36.9 °C) (05/02/24 0732)  Pulse: 101 (05/02/24 0732)  Resp: 18 (05/02/24 0732)  BP: (!) 174/81 (05/02/24 0732)  SpO2: 97 % (05/02/24 0805) Vital Signs (24h Range):  Temp:  [97.8 °F (36.6 °C)-99.3 °F (37.4 °C)] 98.4 °F (36.9 °C)  Pulse:  [] 101  Resp:  [17-20] 18  SpO2:  [94 %-97 %] 97 %  BP: (141-174)/(77-92) 174/81     Weight: 97.5 kg (214 lb 15.2 oz) (05/01/24 2320)  Body mass index is 32.68 kg/m².      Intake/Output Summary (Last 24 hours) at 5/2/2024 0809  Last data filed at 5/2/2024 0642  Gross per 24 hour   Intake 368 ml   Output 1095 ml   Net  "-727 ml       Lines/Drains/Airways       Peripheral Intravenous Line  Duration                  Peripheral IV - Single Lumen 04/29/24 0041 20 G Left Antecubital 3 days         Peripheral IV - Single Lumen 04/29/24 0150 18 G Anterior;Right Forearm 3 days                     Physical Exam  Vitals reviewed.   Constitutional:       General: He is not in acute distress.     Appearance: He is well-developed.   HENT:      Head: Normocephalic and atraumatic.   Eyes:      General: No scleral icterus.     Conjunctiva/sclera: Conjunctivae normal.   Neck:      Thyroid: No thyromegaly.   Cardiovascular:      Rate and Rhythm: Normal rate and regular rhythm.   Pulmonary:      Effort: Pulmonary effort is normal. No respiratory distress.      Breath sounds: Normal breath sounds.   Abdominal:      General: There is distension.      Tenderness: There is abdominal tenderness (central and epigastric).   Musculoskeletal:         General: No tenderness. Normal range of motion.      Cervical back: Neck supple.   Lymphadenopathy:      Cervical: No cervical adenopathy.   Skin:     General: Skin is warm and dry.      Findings: No rash.   Neurological:      Mental Status: He is alert and oriented to person, place, and time.      Gait: Gait normal.   Psychiatric:         Mood and Affect: Mood normal.         Behavior: Behavior normal.          Significant Labs:  CBC:   Recent Labs   Lab 05/01/24  0333 05/02/24  0329   WBC 13.88* 7.14   HGB 13.1* 12.1*   HCT 42.1 37.7*    123*     BMP:   Recent Labs   Lab 05/01/24  1333 05/02/24  0329   * 185*    137   K 4.3 3.4*   CL 97 98   CO2 25 27   BUN 12 11   CREATININE 1.0 0.9   CALCIUM 9.1 9.2   MG 1.9  --      CMP:   Recent Labs   Lab 05/02/24  0329   *   CALCIUM 9.2   ALBUMIN 2.9*   PROT 7.4      K 3.4*   CO2 27   CL 98   BUN 11   CREATININE 0.9   ALKPHOS 223*   *   *   BILITOT 4.1*     Coagulation: No results for input(s): "PT", "INR", "APTT" in the last " "48 hours.  Lipase: No results for input(s): "LIPASE" in the last 48 hours.    Significant Imaging:  Imaging results within the past 24 hours have been reviewed.  "

## 2024-05-02 NOTE — ASSESSMENT & PLAN NOTE
Patient has hypokalemia which is Acute and currently controlled. Most recent potassium levels reviewed-   Lab Results   Component Value Date    K 3.4 (L) 05/02/2024   . Will continue potassium replacement per protocol and recheck repeat levels after replacement completed.

## 2024-05-02 NOTE — ASSESSMENT & PLAN NOTE
A/w abd pain, lipase >1000  Likely 2/2 hyperTG  PRN analgesics  S/p IVF  S/p Insulin infusion given hyperTG    Advance diet as tolerated

## 2024-05-02 NOTE — ASSESSMENT & PLAN NOTE
AST/ ALT, aurea peaked, now improving  Abd US read as - Patchy, heterogeneous liver parenchyma. Splenomegaly. The common duct area is mostly obscured. No intrahepatic biliary dilatation.   CT A/P read as - 'Enlarged pancreatic head with surrounding edema and fat stranding consistent with history of acute pancreatitis. There is increased narrowing of the portal vein through the pancreatic head region compared to prior. '    CK low, hepatitis panel negative  GI consult - recommendations noted

## 2024-05-02 NOTE — PLAN OF CARE
SW met with pt at bedside during rounding with MD. No dc today. Upon dc pts father will provide transport home. SW will continue to follow pt throughout his transitions of care and assist with any dc needs.           Future Appointments   Date Time Provider Department Center   5/9/2024  9:00 AM Jorge A Holland MD [facility] [address]   5/28/2024  3:00 PM [facility]   6/20/2024  4:20 PM Hansa Bhakta, [facility]   7/16/2024  8:00 AM HOSPITAL LAB, [facility] LAB [facility] LAB [facility]   7/23/2024  8:00 AM Shana Stapleton NP [facility] [address]        05/02/24 1004   Rounds   Attendance Provider;;Pharmacist   Discharge Plan A Home with family   Why the patient remains in the hospital Requires continued medical care   Transition of Care Barriers None

## 2024-05-02 NOTE — PROGRESS NOTES
[facility] Medicine  Progress Note    Patient Name: Bay Ibarra  MRN: [id number]  Patient Class: IP- Inpatient   Admission Date: 4/29/2024  Length of Stay: 3 days  Attending Physician: Juana Pena MD  Primary Care Provider: Funmilayo Taylor MD        Subjective:     Principal Problem:Acute on chronic pancreatitis        HPI:  Bay Ibarra is a 43 y.o. 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 He presented to the emergency department w/ c/o acute onset right lower quadrant abdominal pain that began 3 hours prior to ED presentation.  Patient reports pain is sharp in nature and at times radiates towards the back.  Denies any associated nausea or vomiting.  He reports having a BM prior to EMS arrival to his residence reports it was normal.  Denies any nausea or vomiting.  He reports for the past 4 days his glucose level has been higher than normal.  Patient states he was given medication (fentanyl) EN route with EMS with significant improvement of his symptoms.  Denies any dysuria or hematuria.  Denies any prior surgical history.    Overview/Hospital Course:  No notes on file    Interval History: abdominal pain is better. Able to tolerate clear liquid diet. Advance to full liquid diet.     Review of Systems  Objective:     Vital Signs (Most Recent):  Temp: 98.2 °F (36.8 °C) (05/02/24 1136)  Pulse: 104 (05/02/24 1159)  Resp: 18 (05/02/24 1607)  BP: (!) 161/77 (05/02/24 1136)  SpO2: 96 % (05/02/24 1136) Vital Signs (24h Range):  Temp:  [98.1 °F (36.7 °C)-99.3 °F (37.4 °C)] 98.2 °F (36.8 °C)  Pulse:  [] 104  Resp:  [18-20] 18  SpO2:  [94 %-97  %] 96 %  BP: (160-174)/(77-92) 161/77     Weight: 97.5 kg (214 lb 15.2 oz)  Body mass index is 32.68 kg/m².    Intake/Output Summary (Last 24 hours) at 5/2/2024 1617  Last data filed at 5/2/2024 0956  Gross per 24 hour   Intake 443 ml   Output 775 ml   Net -332 ml         Physical Exam  Vitals and nursing note reviewed.   Constitutional:       General: He is not in acute distress.     Appearance: He is well-developed. He is obese. He is ill-appearing.   Cardiovascular:      Rate and Rhythm: Regular rhythm. Tachycardia present.   Pulmonary:      Effort: Pulmonary effort is normal. No respiratory distress.   Abdominal:      Palpations: Abdomen is soft.      Tenderness: There is abdominal tenderness.   Musculoskeletal:      Right lower leg: No edema.      Left lower leg: No edema.   Skin:     General: Skin is warm and dry.      Findings: No rash.   Neurological:      Mental Status: He is alert and oriented to person, place, and time.             Significant Labs: All pertinent labs within the past 24 hours have been reviewed.    Significant Imaging: I have reviewed all pertinent imaging results/findings within the past 24 hours.    Assessment/Plan:      * Acute on chronic pancreatitis  A/w abd pain, lipase >1000  Likely 2/2 hyperTG  PRN analgesics  S/p IVF  S/p Insulin infusion given hyperTG    Advance diet as tolerated         Elevated liver function tests  AST/ ALT, aurea peaked, now improving  Abd US read as - Patchy, heterogeneous liver parenchyma. Splenomegaly. The common duct area is mostly obscured. No intrahepatic biliary dilatation.   CT A/P read as - 'Enlarged pancreatic head with surrounding edema and fat stranding consistent with history of acute pancreatitis. There is increased narrowing of the portal vein through the pancreatic head region compared to prior. '    CK low, hepatitis panel negative  GI consult - recommendations noted        Hypertriglyceridemia  - treatment for pancreatitis  - s/p IV  "insulin  - hold statin, fenofibrate until LFTs improve      Class 1 obesity with alveolar hypoventilation, serious comorbidity, and body mass index (BMI) of 31.0 to 31.9 in adult  Body mass index is 34.53 kg/m². Morbid obesity complicates all aspects of disease management from diagnostic modalities to treatment. Weight loss encouraged and health benefits explained to patient.         Hypophosphatemia  Patient has Abnormal Phosphorus: hypophosphatemia. Will continue to monitor electrolytes closely. Will replace the affected electrolytes and repeat labs to be done after interventions completed. The patient's phosphorus results have been reviewed and are listed below.  No results for input(s): "PHOS" in the last 24 hours.       Type 1 diabetes mellitus with hyperglycemia  Patient's FSGs are controlled on current medication regimen.  Last A1c reviewed-   Lab Results   Component Value Date    LABA1C 8.8 (H) 01/12/2018    HGBA1C 9.5 (H) 03/28/2024     Most recent fingerstick glucose reviewed-   Recent Labs   Lab 05/01/24 2027 05/02/24  0636 05/02/24  1133   POCTGLUCOSE 226* 179* 184*       Current correctional scale  Medium  Increase anti-hyperglycemic dose as follows-   Antihyperglycemics (From admission, onward)      Start     Stop Route Frequency Ordered    05/01/24 2100  insulin detemir U-100 (Levemir) pen 45 Units         -- SubQ 2 times daily 05/01/24 1610    05/01/24 1645  insulin aspart U-100 pen 10 Units         -- SubQ 3 times daily with meals 05/01/24 1631    05/01/24 1119  insulin aspart U-100 pen 0-10 Units         -- SubQ Before meals & nightly PRN 05/01/24 1026          Hold Oral hypoglycemics while patient is in the hospital.    DMT2 documented, but patient follows with endocrinology who documented DMT1  Patient confirmed he is type 1      Hypokalemia  Patient has hypokalemia which is Acute and currently controlled. Most recent potassium levels reviewed-   Lab Results   Component Value Date    K 3.4 (L) " 05/02/2024   . Will continue potassium replacement per protocol and recheck repeat levels after replacement completed.     Mixed hyperlipidemia  Hold statin given worsening LFTs      Essential hypertension  Chronic, controlled. Latest blood pressure and vitals reviewed-     Temp:  [97.8 °F (36.6 °C)-98.9 °F (37.2 °C)]   Pulse:  [100-114]   Resp:  [14-25]   BP: (128-167)/(66-80)   SpO2:  [93 %-100 %] .   Home meds for hypertension were reviewed and noted below.   Hypertension Medications               losartan (COZAAR) 100 MG tablet Take ½ tablet (50 mg total) by mouth once daily.            While in the hospital, will manage blood pressure as follows; Continue home antihypertensive regimen    Will utilize p.r.n. blood pressure medication only if patient's blood pressure greater than 180/110 and he develops symptoms such as worsening chest pain or shortness of breath.      VTE Risk Mitigation (From admission, onward)           Ordered     enoxaparin injection 40 mg  Daily         04/29/24 0353     IP VTE HIGH RISK PATIENT  Once         04/29/24 0353     Place sequential compression device  Until discontinued         04/29/24 0353                    Discharge Planning   JOE:      Code Status: Full Code   Is the patient medically ready for discharge?:     Reason for patient still in hospital (select all that apply): Patient trending condition and Treatment  Discharge Plan A: Home with family        Juana Pena MD  Department of Hospital Medicine   [facility]

## 2024-05-02 NOTE — ASSESSMENT & PLAN NOTE
Patient's FSGs are controlled on current medication regimen.  Last A1c reviewed-   Lab Results   Component Value Date    LABA1C 8.8 (H) 01/12/2018    HGBA1C 9.5 (H) 03/28/2024     Most recent fingerstick glucose reviewed-   Recent Labs   Lab 05/01/24 2027 05/02/24  0636 05/02/24  1133   POCTGLUCOSE 226* 179* 184*       Current correctional scale  Medium  Increase anti-hyperglycemic dose as follows-   Antihyperglycemics (From admission, onward)      Start     Stop Route Frequency Ordered    05/01/24 2100  insulin detemir U-100 (Levemir) pen 45 Units         -- SubQ 2 times daily 05/01/24 1610    05/01/24 1645  insulin aspart U-100 pen 10 Units         -- SubQ 3 times daily with meals 05/01/24 1631    05/01/24 1119  insulin aspart U-100 pen 0-10 Units         -- SubQ Before meals & nightly PRN 05/01/24 1026          Hold Oral hypoglycemics while patient is in the hospital.    DMT2 documented, but patient follows with endocrinology who documented DMT1  Patient confirmed he is type 1

## 2024-05-03 LAB
ALBUMIN SERPL BCP-MCNC: 2.7 G/DL (ref 3.5–5.2)
ALP SERPL-CCNC: 318 U/L (ref 55–135)
ALT SERPL W/O P-5'-P-CCNC: 175 U/L (ref 10–44)
ANION GAP SERPL CALC-SCNC: 13 MMOL/L (ref 8–16)
AST SERPL-CCNC: 193 U/L (ref 10–40)
BASOPHILS # BLD AUTO: 0.03 K/UL (ref 0–0.2)
BASOPHILS NFR BLD: 0.6 % (ref 0–1.9)
BILIRUB SERPL-MCNC: 3.5 MG/DL (ref 0.1–1)
BUN SERPL-MCNC: 10 MG/DL (ref 6–20)
CALCIUM SERPL-MCNC: 9.3 MG/DL (ref 8.7–10.5)
CHLORIDE SERPL-SCNC: 98 MMOL/L (ref 95–110)
CO2 SERPL-SCNC: 25 MMOL/L (ref 23–29)
CREAT SERPL-MCNC: 0.8 MG/DL (ref 0.5–1.4)
DIFFERENTIAL METHOD BLD: ABNORMAL
EOSINOPHIL # BLD AUTO: 0.1 K/UL (ref 0–0.5)
EOSINOPHIL NFR BLD: 1.6 % (ref 0–8)
ERYTHROCYTE [DISTWIDTH] IN BLOOD BY AUTOMATED COUNT: 17.2 % (ref 11.5–14.5)
EST. GFR  (NO RACE VARIABLE): >60 ML/MIN/1.73 M^2
GLUCOSE SERPL-MCNC: 131 MG/DL (ref 70–110)
HCT VFR BLD AUTO: 39 % (ref 40–54)
HGB BLD-MCNC: 12.4 G/DL (ref 14–18)
IMM GRANULOCYTES # BLD AUTO: 0.03 K/UL (ref 0–0.04)
IMM GRANULOCYTES NFR BLD AUTO: 0.6 % (ref 0–0.5)
LYMPHOCYTES # BLD AUTO: 0.4 K/UL (ref 1–4.8)
LYMPHOCYTES NFR BLD: 7 % (ref 18–48)
MAGNESIUM SERPL-MCNC: 1.9 MG/DL (ref 1.6–2.6)
MCH RBC QN AUTO: 25.5 PG (ref 27–31)
MCHC RBC AUTO-ENTMCNC: 31.8 G/DL (ref 32–36)
MCV RBC AUTO: 80 FL (ref 82–98)
MONOCYTES # BLD AUTO: 0.7 K/UL (ref 0.3–1)
MONOCYTES NFR BLD: 13.2 % (ref 4–15)
NEUTROPHILS # BLD AUTO: 4 K/UL (ref 1.8–7.7)
NEUTROPHILS NFR BLD: 77 % (ref 38–73)
NRBC BLD-RTO: 0 /100 WBC
PHOSPHATE SERPL-MCNC: 2.3 MG/DL (ref 2.7–4.5)
PLATELET # BLD AUTO: 138 K/UL (ref 150–450)
PMV BLD AUTO: 11 FL (ref 9.2–12.9)
POCT GLUCOSE: 146 MG/DL (ref 70–110)
POCT GLUCOSE: 146 MG/DL (ref 70–110)
POCT GLUCOSE: 148 MG/DL (ref 70–110)
POCT GLUCOSE: 161 MG/DL (ref 70–110)
POTASSIUM SERPL-SCNC: 3.9 MMOL/L (ref 3.5–5.1)
PROT SERPL-MCNC: 7.5 G/DL (ref 6–8.4)
RBC # BLD AUTO: 4.87 M/UL (ref 4.6–6.2)
SODIUM SERPL-SCNC: 136 MMOL/L (ref 136–145)
WBC # BLD AUTO: 5.16 K/UL (ref 3.9–12.7)

## 2024-05-03 PROCEDURE — 63600175 PHARM REV CODE 636 W HCPCS

## 2024-05-03 PROCEDURE — 25000003 PHARM REV CODE 250: Performed by: STUDENT IN AN ORGANIZED HEALTH CARE EDUCATION/TRAINING PROGRAM

## 2024-05-03 PROCEDURE — 25000003 PHARM REV CODE 250

## 2024-05-03 PROCEDURE — 11000001 HC ACUTE MED/SURG PRIVATE ROOM

## 2024-05-03 PROCEDURE — 94761 N-INVAS EAR/PLS OXIMETRY MLT: CPT

## 2024-05-03 PROCEDURE — 63600175 PHARM REV CODE 636 W HCPCS: Performed by: NURSE PRACTITIONER

## 2024-05-03 PROCEDURE — 36415 COLL VENOUS BLD VENIPUNCTURE: CPT | Performed by: STUDENT IN AN ORGANIZED HEALTH CARE EDUCATION/TRAINING PROGRAM

## 2024-05-03 PROCEDURE — 83735 ASSAY OF MAGNESIUM: CPT | Performed by: STUDENT IN AN ORGANIZED HEALTH CARE EDUCATION/TRAINING PROGRAM

## 2024-05-03 PROCEDURE — 25000003 PHARM REV CODE 250: Performed by: HOSPITALIST

## 2024-05-03 PROCEDURE — 84100 ASSAY OF PHOSPHORUS: CPT | Performed by: STUDENT IN AN ORGANIZED HEALTH CARE EDUCATION/TRAINING PROGRAM

## 2024-05-03 PROCEDURE — 80053 COMPREHEN METABOLIC PANEL: CPT | Performed by: STUDENT IN AN ORGANIZED HEALTH CARE EDUCATION/TRAINING PROGRAM

## 2024-05-03 PROCEDURE — 85025 COMPLETE CBC W/AUTO DIFF WBC: CPT | Performed by: STUDENT IN AN ORGANIZED HEALTH CARE EDUCATION/TRAINING PROGRAM

## 2024-05-03 RX ORDER — SODIUM,POTASSIUM PHOSPHATES 280-250MG
2 POWDER IN PACKET (EA) ORAL ONCE
Status: COMPLETED | OUTPATIENT
Start: 2024-05-03 | End: 2024-05-03

## 2024-05-03 RX ADMIN — OXYCODONE HYDROCHLORIDE 5 MG: 5 TABLET ORAL at 12:05

## 2024-05-03 RX ADMIN — MUPIROCIN: 20 OINTMENT TOPICAL at 08:05

## 2024-05-03 RX ADMIN — METOCLOPRAMIDE 5 MG: 5 TABLET ORAL at 06:05

## 2024-05-03 RX ADMIN — INSULIN DETEMIR 45 UNITS: 100 INJECTION, SOLUTION SUBCUTANEOUS at 08:05

## 2024-05-03 RX ADMIN — ASPIRIN 81 MG CHEWABLE TABLET 81 MG: 81 TABLET CHEWABLE at 08:05

## 2024-05-03 RX ADMIN — MORPHINE SULFATE 2 MG: 2 INJECTION, SOLUTION INTRAMUSCULAR; INTRAVENOUS at 06:05

## 2024-05-03 RX ADMIN — INSULIN ASPART 10 UNITS: 100 INJECTION, SOLUTION INTRAVENOUS; SUBCUTANEOUS at 11:05

## 2024-05-03 RX ADMIN — METOCLOPRAMIDE 5 MG: 5 TABLET ORAL at 04:05

## 2024-05-03 RX ADMIN — MORPHINE SULFATE 2 MG: 2 INJECTION, SOLUTION INTRAMUSCULAR; INTRAVENOUS at 12:05

## 2024-05-03 RX ADMIN — Medication 2 CAPSULE: at 08:05

## 2024-05-03 RX ADMIN — LOSARTAN POTASSIUM 50 MG: 50 TABLET, FILM COATED ORAL at 08:05

## 2024-05-03 RX ADMIN — METOCLOPRAMIDE 5 MG: 5 TABLET ORAL at 08:05

## 2024-05-03 RX ADMIN — MORPHINE SULFATE 2 MG: 2 INJECTION, SOLUTION INTRAMUSCULAR; INTRAVENOUS at 04:05

## 2024-05-03 RX ADMIN — MORPHINE SULFATE 2 MG: 2 INJECTION, SOLUTION INTRAMUSCULAR; INTRAVENOUS at 11:05

## 2024-05-03 RX ADMIN — INSULIN ASPART 5 UNITS: 100 INJECTION, SOLUTION INTRAVENOUS; SUBCUTANEOUS at 11:05

## 2024-05-03 RX ADMIN — ENOXAPARIN SODIUM 40 MG: 40 INJECTION SUBCUTANEOUS at 04:05

## 2024-05-03 RX ADMIN — DOCUSATE SODIUM AND SENNOSIDES 1 TABLET: 8.6; 5 TABLET, FILM COATED ORAL at 08:05

## 2024-05-03 RX ADMIN — INSULIN ASPART 10 UNITS: 100 INJECTION, SOLUTION INTRAVENOUS; SUBCUTANEOUS at 08:05

## 2024-05-03 RX ADMIN — INSULIN ASPART 10 UNITS: 100 INJECTION, SOLUTION INTRAVENOUS; SUBCUTANEOUS at 04:05

## 2024-05-03 RX ADMIN — METOCLOPRAMIDE 5 MG: 5 TABLET ORAL at 11:05

## 2024-05-03 RX ADMIN — OXYCODONE HYDROCHLORIDE 5 MG: 5 TABLET ORAL at 03:05

## 2024-05-03 RX ADMIN — PANTOPRAZOLE SODIUM 40 MG: 40 TABLET, DELAYED RELEASE ORAL at 08:05

## 2024-05-03 RX ADMIN — ALLOPURINOL 300 MG: 100 TABLET ORAL at 08:05

## 2024-05-03 RX ADMIN — OXYCODONE HYDROCHLORIDE 5 MG: 5 TABLET ORAL at 08:05

## 2024-05-03 RX ADMIN — POTASSIUM & SODIUM PHOSPHATES POWDER PACK 280-160-250 MG 2 PACKET: 280-160-250 PACK at 01:05

## 2024-05-03 NOTE — ASSESSMENT & PLAN NOTE
Patient has Abnormal Phosphorus: hypophosphatemia. Will continue to monitor electrolytes closely. Will replace the affected electrolytes and repeat labs to be done after interventions completed. The patient's phosphorus results have been reviewed and are listed below.  Recent Labs   Lab 05/03/24  0219   PHOS 2.3*

## 2024-05-03 NOTE — PROGRESS NOTES
[facility] Medicine  Progress Note    Patient Name: Bay Ibarra  MRN: [id number]  Patient Class: IP- Inpatient   Admission Date: 4/29/2024  Length of Stay: 4 days  Attending Physician: Juana Pena MD  Primary Care Provider: Funmilayo Taylor MD        Subjective:     Principal Problem:Acute on chronic pancreatitis        HPI:  Bay Ibarra is a 43 y.o. 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 He presented to the emergency department w/ c/o acute onset right lower quadrant abdominal pain that began 3 hours prior to ED presentation.  Patient reports pain is sharp in nature and at times radiates towards the back.  Denies any associated nausea or vomiting.  He reports having a BM prior to EMS arrival to his residence reports it was normal.  Denies any nausea or vomiting.  He reports for the past 4 days his glucose level has been higher than normal.  Patient states he was given medication (fentanyl) EN route with EMS with significant improvement of his symptoms.  Denies any dysuria or hematuria.  Denies any prior surgical history.    Overview/Hospital Course:  No notes on file    Interval History: abdominal pain has improved. Tolerating full liquid diet. Diet advanced to regular.     Review of Systems  Objective:     Vital Signs (Most Recent):  Temp: 98.6 °F (37 °C) (05/03/24 1126)  Pulse: 97 (05/03/24 1210)  Resp: 18 (05/03/24 1200)  BP: (!) 161/80 (05/03/24 1126)  SpO2: 97 % (05/03/24 1126) Vital Signs (24h Range):  Temp:  [98 °F (36.7 °C)-99.7 °F (37.6 °C)] 98.6 °F (37 °C)  Pulse:  [87-98] 97  Resp:  [18-20] 18  SpO2:  [95 %-98 %] 97 %  BP:  (147-181)/(79-86) 161/80     Weight: 97.5 kg (214 lb 15.2 oz)  Body mass index is 32.68 kg/m².    Intake/Output Summary (Last 24 hours) at 5/3/2024 1430  Last data filed at 5/3/2024 0625  Gross per 24 hour   Intake --   Output 1 ml   Net -1 ml         Physical Exam  Vitals and nursing note reviewed.   Constitutional:       General: He is not in acute distress.     Appearance: He is well-developed. He is obese. He is not ill-appearing.   Cardiovascular:      Rate and Rhythm: Normal rate and regular rhythm.   Pulmonary:      Effort: Pulmonary effort is normal. No respiratory distress.   Abdominal:      Palpations: Abdomen is soft.      Tenderness: There is abdominal tenderness.   Musculoskeletal:      Right lower leg: No edema.      Left lower leg: No edema.   Skin:     General: Skin is warm and dry.      Findings: No rash.   Neurological:      Mental Status: He is alert and oriented to person, place, and time.             Significant Labs: All pertinent labs within the past 24 hours have been reviewed.    Significant Imaging: I have reviewed all pertinent imaging results/findings within the past 24 hours.    Assessment/Plan:      * Acute on chronic pancreatitis  A/w abd pain, lipase >1000  Likely 2/2 hyperTG  PRN analgesics  S/p IVF  S/p Insulin infusion given hyperTG    Advance diet as tolerated         Elevated liver function tests  AST/ ALT, aurea peaked, now improving  Abd US read as - Patchy, heterogeneous liver parenchyma. Splenomegaly. The common duct area is mostly obscured. No intrahepatic biliary dilatation.   CT A/P read as - 'Enlarged pancreatic head with surrounding edema and fat stranding consistent with history of acute pancreatitis. There is increased narrowing of the portal vein through the pancreatic head region compared to prior. '    CK low, hepatitis panel negative  GI consult - recommendations noted        Hypertriglyceridemia  - treatment for pancreatitis  - s/p IV insulin  - hold statin,  fenofibrate until LFTs improve      Class 1 obesity with alveolar hypoventilation, serious comorbidity, and body mass index (BMI) of 31.0 to 31.9 in adult  Body mass index is 34.53 kg/m². Morbid obesity complicates all aspects of disease management from diagnostic modalities to treatment. Weight loss encouraged and health benefits explained to patient.         Hypophosphatemia  Patient has Abnormal Phosphorus: hypophosphatemia. Will continue to monitor electrolytes closely. Will replace the affected electrolytes and repeat labs to be done after interventions completed. The patient's phosphorus results have been reviewed and are listed below.  Recent Labs   Lab 05/03/24  0219   PHOS 2.3*          Type 1 diabetes mellitus with hyperglycemia  Patient's FSGs are controlled on current medication regimen.  Last A1c reviewed-   Lab Results   Component Value Date    LABA1C 8.8 (H) 01/12/2018    HGBA1C 9.5 (H) 03/28/2024     Most recent fingerstick glucose reviewed-   Recent Labs   Lab 05/02/24  1658 05/02/24 2029 05/03/24  0607 05/03/24  1149   POCTGLUCOSE 140* 138* 146* 161*       Current correctional scale  Medium  Increase anti-hyperglycemic dose as follows-   Antihyperglycemics (From admission, onward)      Start     Stop Route Frequency Ordered    05/01/24 2100  insulin detemir U-100 (Levemir) pen 45 Units         -- SubQ 2 times daily 05/01/24 1610    05/01/24 1645  insulin aspart U-100 pen 10 Units         -- SubQ 3 times daily with meals 05/01/24 1631    05/01/24 1119  insulin aspart U-100 pen 0-10 Units         -- SubQ Before meals & nightly PRN 05/01/24 1026          Hold Oral hypoglycemics while patient is in the hospital.    DMT2 documented, but patient follows with endocrinology who documented DMT1  Patient confirmed he is type 1      Hypokalemia  Patient has hypokalemia which is Acute and currently controlled. Most recent potassium levels reviewed-   Lab Results   Component Value Date    K 3.9 05/03/2024   .  Will continue potassium replacement per protocol and recheck repeat levels after replacement completed.     Mixed hyperlipidemia  Hold statin given worsening LFTs      Essential hypertension  Chronic, controlled. Latest blood pressure and vitals reviewed-     Temp:  [97.8 °F (36.6 °C)-98.9 °F (37.2 °C)]   Pulse:  [100-114]   Resp:  [14-25]   BP: (128-167)/(66-80)   SpO2:  [93 %-100 %] .   Home meds for hypertension were reviewed and noted below.   Hypertension Medications               losartan (COZAAR) 100 MG tablet Take ½ tablet (50 mg total) by mouth once daily.            While in the hospital, will manage blood pressure as follows; Continue home antihypertensive regimen    Will utilize p.r.n. blood pressure medication only if patient's blood pressure greater than 180/110 and he develops symptoms such as worsening chest pain or shortness of breath.      VTE Risk Mitigation (From admission, onward)           Ordered     enoxaparin injection 40 mg  Daily         04/29/24 0353     IP VTE HIGH RISK PATIENT  Once         04/29/24 0353     Place sequential compression device  Until discontinued         04/29/24 0353                    Discharge Planning   JOE:      Code Status: Full Code   Is the patient medically ready for discharge?:     Reason for patient still in hospital (select all that apply): Patient trending condition and Treatment  Discharge Plan A: Home with family        Juana Pena MD  Department of Hospital Medicine   [facility]

## 2024-05-03 NOTE — ASSESSMENT & PLAN NOTE
Chronic, controlled. Latest blood pressure and vitals reviewed-     Temp:  [98 °F (36.7 °C)-99.7 °F (37.6 °C)]   Pulse:  [87-98]   Resp:  [18-20]   BP: (147-181)/(79-86)   SpO2:  [95 %-98 %] .   Home meds for hypertension were reviewed and noted below.   Hypertension Medications               losartan (COZAAR) 100 MG tablet Take ½ tablet (50 mg total) by mouth once daily.            While in the hospital, will manage blood pressure as follows; Continue home antihypertensive regimen    Will utilize p.r.n. blood pressure medication only if patient's blood pressure greater than 180/110 and he develops symptoms such as worsening chest pain or shortness of breath.

## 2024-05-03 NOTE — SUBJECTIVE & OBJECTIVE
Interval History: abdominal pain has improved. Tolerating full liquid diet. Diet advanced to regular.     Review of Systems  Objective:     Vital Signs (Most Recent):  Temp: 98.6 °F (37 °C) (05/03/24 1126)  Pulse: 97 (05/03/24 1210)  Resp: 18 (05/03/24 1200)  BP: (!) 161/80 (05/03/24 1126)  SpO2: 97 % (05/03/24 1126) Vital Signs (24h Range):  Temp:  [98 °F (36.7 °C)-99.7 °F (37.6 °C)] 98.6 °F (37 °C)  Pulse:  [87-98] 97  Resp:  [18-20] 18  SpO2:  [95 %-98 %] 97 %  BP: (147-181)/(79-86) 161/80     Weight: 97.5 kg (214 lb 15.2 oz)  Body mass index is 32.68 kg/m².    Intake/Output Summary (Last 24 hours) at 5/3/2024 1430  Last data filed at 5/3/2024 0625  Gross per 24 hour   Intake --   Output 1 ml   Net -1 ml         Physical Exam  Vitals and nursing note reviewed.   Constitutional:       General: He is not in acute distress.     Appearance: He is well-developed. He is obese. He is not ill-appearing.   Cardiovascular:      Rate and Rhythm: Normal rate and regular rhythm.   Pulmonary:      Effort: Pulmonary effort is normal. No respiratory distress.   Abdominal:      Palpations: Abdomen is soft.      Tenderness: There is abdominal tenderness.   Musculoskeletal:      Right lower leg: No edema.      Left lower leg: No edema.   Skin:     General: Skin is warm and dry.      Findings: No rash.   Neurological:      Mental Status: He is alert and oriented to person, place, and time.             Significant Labs: All pertinent labs within the past 24 hours have been reviewed.    Significant Imaging: I have reviewed all pertinent imaging results/findings within the past 24 hours.

## 2024-05-03 NOTE — PLAN OF CARE
"  Problem: Adult Inpatient Plan of Care  Goal: Plan of Care Review  Outcome: Progressing     Problem: Adult Inpatient Plan of Care  Goal: Optimal Comfort and Wellbeing  Outcome: Progressing     Problem: Diabetes Comorbidity  Goal: Blood Glucose Level Within Targeted Range  Outcome: Progressing     Problem: Pain Acute  Goal: Optimal Pain Control and Function  Outcome: Progressing     Problem: Pancreatitis  Goal: Fluid and Electrolyte Balance  Outcome: Progressing     .Plan of care reviewed with the patient. Scheduled medicines given and the patient tolerated well. Given Oxycodone 5 mg twice and Morphine 2 mg IV two doses for abdominal pain. Fall and safety precautions taken and the standard interventions are in place. On Telemetry monitoring with NSR, no true "red alarms' noted. Patient's Accu Check was 138 mg/dl. Advised the patient to call for assistance. Continued monitoring the patient.   "

## 2024-05-03 NOTE — ASSESSMENT & PLAN NOTE
Patient's FSGs are controlled on current medication regimen.  Last A1c reviewed-   Lab Results   Component Value Date    LABA1C 8.8 (H) 01/12/2018    HGBA1C 9.5 (H) 03/28/2024     Most recent fingerstick glucose reviewed-   Recent Labs   Lab 05/02/24  1658 05/02/24  2029 05/03/24  0607 05/03/24  1149   POCTGLUCOSE 140* 138* 146* 161*       Current correctional scale  Medium  Increase anti-hyperglycemic dose as follows-   Antihyperglycemics (From admission, onward)    Start     Stop Route Frequency Ordered    05/01/24 2100  insulin detemir U-100 (Levemir) pen 45 Units         -- SubQ 2 times daily 05/01/24 1610    05/01/24 1645  insulin aspart U-100 pen 10 Units         -- SubQ 3 times daily with meals 05/01/24 1631    05/01/24 1119  insulin aspart U-100 pen 0-10 Units         -- SubQ Before meals & nightly PRN 05/01/24 1026        Hold Oral hypoglycemics while patient is in the hospital.    DMT2 documented, but patient follows with endocrinology who documented DMT1  Patient confirmed he is type 1

## 2024-05-03 NOTE — ASSESSMENT & PLAN NOTE
Patient has hypokalemia which is Acute and currently controlled. Most recent potassium levels reviewed-   Lab Results   Component Value Date    K 3.9 05/03/2024   . Will continue potassium replacement per protocol and recheck repeat levels after replacement completed.

## 2024-05-04 VITALS
BODY MASS INDEX: 32.58 KG/M2 | HEIGHT: 68 IN | DIASTOLIC BLOOD PRESSURE: 93 MMHG | HEART RATE: 97 BPM | RESPIRATION RATE: 20 BRPM | TEMPERATURE: 99 F | SYSTOLIC BLOOD PRESSURE: 140 MMHG | OXYGEN SATURATION: 96 % | WEIGHT: 214.94 LBS

## 2024-05-04 LAB
ALBUMIN SERPL BCP-MCNC: 2.7 G/DL (ref 3.5–5.2)
ALP SERPL-CCNC: 389 U/L (ref 55–135)
ALT SERPL W/O P-5'-P-CCNC: 192 U/L (ref 10–44)
ANION GAP SERPL CALC-SCNC: 14 MMOL/L (ref 8–16)
AST SERPL-CCNC: 178 U/L (ref 10–40)
BILIRUB SERPL-MCNC: 2.5 MG/DL (ref 0.1–1)
BUN SERPL-MCNC: 10 MG/DL (ref 6–20)
CALCIUM SERPL-MCNC: 9.4 MG/DL (ref 8.7–10.5)
CHLORIDE SERPL-SCNC: 98 MMOL/L (ref 95–110)
CO2 SERPL-SCNC: 23 MMOL/L (ref 23–29)
CREAT SERPL-MCNC: 0.8 MG/DL (ref 0.5–1.4)
EST. GFR  (NO RACE VARIABLE): >60 ML/MIN/1.73 M^2
GLUCOSE SERPL-MCNC: 164 MG/DL (ref 70–110)
POCT GLUCOSE: 213 MG/DL (ref 70–110)
POCT GLUCOSE: 214 MG/DL (ref 70–110)
POTASSIUM SERPL-SCNC: 3.3 MMOL/L (ref 3.5–5.1)
PROT SERPL-MCNC: 7.5 G/DL (ref 6–8.4)
SODIUM SERPL-SCNC: 135 MMOL/L (ref 136–145)

## 2024-05-04 PROCEDURE — 25000003 PHARM REV CODE 250: Performed by: HOSPITALIST

## 2024-05-04 PROCEDURE — 25000003 PHARM REV CODE 250: Performed by: STUDENT IN AN ORGANIZED HEALTH CARE EDUCATION/TRAINING PROGRAM

## 2024-05-04 PROCEDURE — 63600175 PHARM REV CODE 636 W HCPCS

## 2024-05-04 PROCEDURE — 25000003 PHARM REV CODE 250

## 2024-05-04 PROCEDURE — 36415 COLL VENOUS BLD VENIPUNCTURE: CPT | Performed by: STUDENT IN AN ORGANIZED HEALTH CARE EDUCATION/TRAINING PROGRAM

## 2024-05-04 PROCEDURE — 80053 COMPREHEN METABOLIC PANEL: CPT | Performed by: STUDENT IN AN ORGANIZED HEALTH CARE EDUCATION/TRAINING PROGRAM

## 2024-05-04 PROCEDURE — 94761 N-INVAS EAR/PLS OXIMETRY MLT: CPT

## 2024-05-04 RX ORDER — ATORVASTATIN CALCIUM 40 MG/1
40 TABLET, FILM COATED ORAL DAILY
Qty: 90 TABLET | Refills: 3 | Status: SHIPPED | OUTPATIENT
Start: 2024-05-04

## 2024-05-04 RX ORDER — POTASSIUM CHLORIDE 20 MEQ/1
40 TABLET, EXTENDED RELEASE ORAL
Status: COMPLETED | OUTPATIENT
Start: 2024-05-04 | End: 2024-05-04

## 2024-05-04 RX ORDER — INSULIN ASPART 100 [IU]/ML
15 INJECTION, SOLUTION INTRAVENOUS; SUBCUTANEOUS
Qty: 100 ML | Refills: 3 | Status: SHIPPED | OUTPATIENT
Start: 2024-05-04

## 2024-05-04 RX ORDER — FENOFIBRATE 160 MG/1
160 TABLET ORAL DAILY
Qty: 90 TABLET | Refills: 3 | Status: SHIPPED | OUTPATIENT
Start: 2024-05-04

## 2024-05-04 RX ADMIN — MUPIROCIN: 20 OINTMENT TOPICAL at 09:05

## 2024-05-04 RX ADMIN — OXYCODONE HYDROCHLORIDE 5 MG: 5 TABLET ORAL at 03:05

## 2024-05-04 RX ADMIN — ASPIRIN 81 MG CHEWABLE TABLET 81 MG: 81 TABLET CHEWABLE at 09:05

## 2024-05-04 RX ADMIN — INSULIN ASPART 10 UNITS: 100 INJECTION, SOLUTION INTRAVENOUS; SUBCUTANEOUS at 09:05

## 2024-05-04 RX ADMIN — INSULIN ASPART 4 UNITS: 100 INJECTION, SOLUTION INTRAVENOUS; SUBCUTANEOUS at 11:05

## 2024-05-04 RX ADMIN — PANTOPRAZOLE SODIUM 40 MG: 40 TABLET, DELAYED RELEASE ORAL at 09:05

## 2024-05-04 RX ADMIN — INSULIN DETEMIR 45 UNITS: 100 INJECTION, SOLUTION SUBCUTANEOUS at 09:05

## 2024-05-04 RX ADMIN — METOCLOPRAMIDE 5 MG: 5 TABLET ORAL at 06:05

## 2024-05-04 RX ADMIN — Medication 2 CAPSULE: at 09:05

## 2024-05-04 RX ADMIN — LOSARTAN POTASSIUM 50 MG: 50 TABLET, FILM COATED ORAL at 09:05

## 2024-05-04 RX ADMIN — MORPHINE SULFATE 2 MG: 2 INJECTION, SOLUTION INTRAMUSCULAR; INTRAVENOUS at 06:05

## 2024-05-04 RX ADMIN — INSULIN ASPART 4 UNITS: 100 INJECTION, SOLUTION INTRAVENOUS; SUBCUTANEOUS at 06:05

## 2024-05-04 RX ADMIN — INSULIN ASPART 10 UNITS: 100 INJECTION, SOLUTION INTRAVENOUS; SUBCUTANEOUS at 11:05

## 2024-05-04 RX ADMIN — POTASSIUM CHLORIDE 40 MEQ: 1500 TABLET, EXTENDED RELEASE ORAL at 12:05

## 2024-05-04 RX ADMIN — ALLOPURINOL 300 MG: 100 TABLET ORAL at 09:05

## 2024-05-04 RX ADMIN — POTASSIUM CHLORIDE 40 MEQ: 1500 TABLET, EXTENDED RELEASE ORAL at 09:05

## 2024-05-04 RX ADMIN — DOCUSATE SODIUM AND SENNOSIDES 1 TABLET: 8.6; 5 TABLET, FILM COATED ORAL at 09:05

## 2024-05-04 RX ADMIN — OXYCODONE HYDROCHLORIDE 5 MG: 5 TABLET ORAL at 09:05

## 2024-05-04 RX ADMIN — METOCLOPRAMIDE 5 MG: 5 TABLET ORAL at 12:05

## 2024-05-04 NOTE — PLAN OF CARE
0915  Patient resting quietly in bed when CM rounded via [employer]. No family present. Patient in agreement with plan to discharge home today, denied the need for assistance with transportation at time of discharge, & verbalized understanding regarding the hospital follow up appointments with Dr Jorge A Holland (GI) & Dr Funmilayo Taylor (PCP).     Message sent to nurse Juan & virtual nurse Mary informing that the pt is cleared to discharge after Dr Pena rounds.     4964  CM was informed by Dr Pena that the pt will also need an endo hospfu appt. Message sent to the schedulers requesting a hospfu appt with Dr Stapleton (endo). Patient will be notified of appt date & time. Information added to the pt's discharge paperwork.       Will continue to follow.

## 2024-05-04 NOTE — PLAN OF CARE
VN note: VN completed AVS and attachments and notified bedside nurse, Juan. Will cont to be available and intervene prn.

## 2024-05-04 NOTE — PLAN OF CARE
[facility] - Telemetry  Discharge Final Note    Primary Care Provider: Funmilayo Taylor MD    Expected Discharge Date: 5/4/2024    Final Discharge Note (most recent)       Final Note - 05/04/24 1443          Final Note    Assessment Type Final Discharge Note (P)      Anticipated Discharge Disposition Home or Self Care (P)      Hospital Resources/Appts/Education Provided Appointments scheduled and added to AVS (P)                        Contact Info       [facility] [address]   Specialty: Gastroenterology    [address]  [facility] [address]   Phone: [phone]       Next Steps: Follow up on 5/9/2024    Instructions: at 9:00 AM; GI appointment    Funmilayo Taylor MD   Specialty: Family Medicine   Relationship: PCP - General    [address]  [facility] [address]   Phone: [phone]       Next Steps: Follow up on 5/28/2024    Instructions: at 3:00 pm; PCP hospital follow up appointment    Shana Stapleton NP   Specialty: Endocrinology    [address]  [facility] [address]   Phone: [phone]       Next Steps: Follow up    Instructions: Patient will be notified of an endocrine hospital follow up appointment

## 2024-05-04 NOTE — PLAN OF CARE
VN note: Care plan, orders and labs reviewed.  AVS and educational attachments shared with patient and brother via [employer]. Discussed thoroughly. Patient and brother verbalized clear understanding using teach back method. Notified bedside nurse of completion of education. At present no distress noted. Patient to be discharged via w/c with escort service and family with all of patient's belonings. Will cont to be available to patient and family and intervene prn.

## 2024-05-06 ENCOUNTER — PATIENT MESSAGE (OUTPATIENT)
Dept: ENDOCRINOLOGY | Facility: CLINIC | Age: 44
End: 2024-05-06
Payer: COMMERCIAL

## 2024-05-06 ENCOUNTER — PATIENT MESSAGE (OUTPATIENT)
Dept: ADMINISTRATIVE | Facility: CLINIC | Age: 44
End: 2024-05-06
Payer: COMMERCIAL

## 2024-05-06 ENCOUNTER — PATIENT OUTREACH (OUTPATIENT)
Dept: ADMINISTRATIVE | Facility: CLINIC | Age: 44
End: 2024-05-06
Payer: COMMERCIAL

## 2024-05-06 NOTE — PROGRESS NOTES
C3 nurse attempted to contact Bay Ibarra  for a TCC post hospital discharge follow up call. No answer. Left voicemail with callback information. The patient does not have a scheduled HOSFU appointment.    Please contact patient and schedule follow up appointment using HOSFU visit type on or before 05/11/2024.  Message sen to PCP staff.

## 2024-05-07 DIAGNOSIS — K86.1 ACUTE ON CHRONIC PANCREATITIS: Primary | ICD-10-CM

## 2024-05-07 DIAGNOSIS — K85.90 ACUTE ON CHRONIC PANCREATITIS: Primary | ICD-10-CM

## 2024-05-07 LAB
OHS QRS DURATION: 86 MS
OHS QRS DURATION: 98 MS
OHS QTC CALCULATION: 425 MS
OHS QTC CALCULATION: 435 MS

## 2024-05-08 ENCOUNTER — PATIENT MESSAGE (OUTPATIENT)
Dept: FAMILY MEDICINE | Facility: CLINIC | Age: 44
End: 2024-05-08

## 2024-05-09 ENCOUNTER — OFFICE VISIT (OUTPATIENT)
Dept: GASTROENTEROLOGY | Facility: CLINIC | Age: 44
End: 2024-05-09
Payer: COMMERCIAL

## 2024-05-09 VITALS
HEIGHT: 68 IN | WEIGHT: 222.69 LBS | HEART RATE: 85 BPM | DIASTOLIC BLOOD PRESSURE: 84 MMHG | SYSTOLIC BLOOD PRESSURE: 156 MMHG | BODY MASS INDEX: 33.75 KG/M2

## 2024-05-09 DIAGNOSIS — R11.2 NAUSEA AND VOMITING, UNSPECIFIED VOMITING TYPE: ICD-10-CM

## 2024-05-09 DIAGNOSIS — K29.00 ACUTE SUPERFICIAL GASTRITIS WITHOUT HEMORRHAGE: ICD-10-CM

## 2024-05-09 DIAGNOSIS — K85.90 ACUTE PANCREATITIS, UNSPECIFIED COMPLICATION STATUS, UNSPECIFIED PANCREATITIS TYPE: ICD-10-CM

## 2024-05-09 PROCEDURE — 3008F BODY MASS INDEX DOCD: CPT | Mod: CPTII,S$GLB,, | Performed by: INTERNAL MEDICINE

## 2024-05-09 PROCEDURE — 3077F SYST BP >= 140 MM HG: CPT | Mod: CPTII,S$GLB,, | Performed by: INTERNAL MEDICINE

## 2024-05-09 PROCEDURE — 1111F DSCHRG MED/CURRENT MED MERGE: CPT | Mod: CPTII,S$GLB,, | Performed by: INTERNAL MEDICINE

## 2024-05-09 PROCEDURE — 3079F DIAST BP 80-89 MM HG: CPT | Mod: CPTII,S$GLB,, | Performed by: INTERNAL MEDICINE

## 2024-05-09 PROCEDURE — 99999 PR PBB SHADOW E&M-EST. PATIENT-LVL IV: CPT | Mod: PBBFAC,,, | Performed by: INTERNAL MEDICINE

## 2024-05-09 PROCEDURE — 1159F MED LIST DOCD IN RCRD: CPT | Mod: CPTII,S$GLB,, | Performed by: INTERNAL MEDICINE

## 2024-05-09 PROCEDURE — 99214 OFFICE O/P EST MOD 30 MIN: CPT | Mod: S$GLB,,, | Performed by: INTERNAL MEDICINE

## 2024-05-09 PROCEDURE — 1160F RVW MEDS BY RX/DR IN RCRD: CPT | Mod: CPTII,S$GLB,, | Performed by: INTERNAL MEDICINE

## 2024-05-09 PROCEDURE — 3046F HEMOGLOBIN A1C LEVEL >9.0%: CPT | Mod: CPTII,S$GLB,, | Performed by: INTERNAL MEDICINE

## 2024-05-09 PROCEDURE — 4010F ACE/ARB THERAPY RXD/TAKEN: CPT | Mod: CPTII,S$GLB,, | Performed by: INTERNAL MEDICINE

## 2024-05-09 NOTE — PROGRESS NOTES
SUBJECTIVE:         Chief Complaint:   Chief Complaint   Patient presents with    GI Problem     pancreatitis       History of Present Illness:  Patient is a 43 y.o. male presents with acute on chronic pancreatitis.  He has had a long history pancreatitis related to hypertriglyceridemia.  Had gone few years without a significant exacerbation however recently over Ana gras and more recently a few weeks ago he had another exacerbation.  This required you day hospitalization.  During this time.  A CT scan was done that showed enlargement of the pancreatic head with some peripancreatic stranding.  Today he feels well he has no pain he does not have steatorrhea.  He feels as if his recent exacerbations related were related to dietary indiscretion.  He follows up routinely with an endocrinologist for his hypertriglyceridemia.      OBJECTIVE:     Vital Signs (Most Recent)  Pulse: 85 (05/09/24 0854)  BP: (!) 156/84 (05/09/24 0854)    General: well developed, well nourished    Diagnostic Results:  CT: Reviewed      ASSESSMENT/PLAN:     Given the recent exacerbations of his pancreatitis in the subtle changes to his pancreas on CT imaging I feel it would be beneficial to proceed with an endoscopic ultrasound to better evaluate the pancreatic head.  He is in agreement we will schedule this procedure in the next 4-6 weeks.

## 2024-05-09 NOTE — PROGRESS NOTES
"GENERAL GI PATIENT INTAKE:    COVID symptoms in the last 7 days (runny nose, sore throat, congestion, cough, fever): No  PCP: Funmilayo Taylor  If not PCP-  number given to establish [phone]: N/A    ALLERGIES REVIEWED:  Yes    CHIEF COMPLAINT:    Chief Complaint   Patient presents with    GI Problem     pancreatitis       VITAL SIGNS:  BP (!) 156/84   Pulse 85   Ht 5' 8" (1.727 m)   Wt 101 kg (222 lb 10.6 oz)   BMI 33.86 kg/m²      Change in medical, surgical, family or social history: No      REVIEWED MEDICATION LIST RECONCILED INCLUDING ABOVE MEDS:  Yes     "

## 2024-05-10 ENCOUNTER — PATIENT MESSAGE (OUTPATIENT)
Dept: GASTROENTEROLOGY | Facility: CLINIC | Age: 44
End: 2024-05-10
Payer: COMMERCIAL

## 2024-05-12 NOTE — DISCHARGE SUMMARY
[facility] Medicine  Discharge Summary      Patient Name: Bay Ibarra  MRN: [id number]  KAMILA: [id number]  Patient Class: IP- Inpatient  Admission Date: 4/29/2024  Hospital Length of Stay: 5 days  Discharge Date and Time: 5/4/2024  2:20 PM  Attending Physician: No att. providers found   Discharging Provider: Juana Pena MD  Primary Care Provider: Funmilayo Taylor MD    Primary Care Team: Networked reference to record PCT     HPI:   Bay Ibarra is a 43 y.o. 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 He presented to the emergency department w/ c/o acute onset right lower quadrant abdominal pain that began 3 hours prior to ED presentation.  Patient reports pain is sharp in nature and at times radiates towards the back.  Denies any associated nausea or vomiting.  He reports having a BM prior to EMS arrival to his residence reports it was normal.  Denies any nausea or vomiting.  He reports for the past 4 days his glucose level has been higher than normal.  Patient states he was given medication (fentanyl) EN route with EMS with significant improvement of his symptoms.  Denies any dysuria or hematuria.  Denies any prior surgical history.    * No surgery found *      Hospital Course:   43-year-old male with PMH recurrent acute pancreatitis hypertension, gastric varices, hypertriglyceridemia, morbid obesity, JZAMYN, type 1 diabetes mellitus presented with abdominal pain found to be in DKA and have acute on chronic pancreatitis with lipase more than 1000 suspected secondary to hypertriglyceridemia (per patient in setting of dietary noncompliance) which  "improved with IV insulin.  CTA abdomen pelvis read as- Enlarged pancreatic head with surrounding edema and fat stranding consistent with history of acute pancreatitis. There is increased narrowing of the portal vein through the pancreatic head region compared to prior.  Diet gradually advanced as tolerated.  Due to worsening liver function tests, statin and fenofibrate it is were held during the hospital stay and at discharge, to be restarted in a week as LFTs improve.  Vital signs stable.  Patient was able to ambulate without difficulty and tolerate p.o..  Requested to be discharged with outpatient endocrinology follow-up.  Patient was seen by GI and recommended outpatient pancreas AES follow-up for which referral was sent prior to discharge.    BP (!) 140/93 (BP Location: Left arm, Patient Position: Sitting)   Pulse 97   Temp 98.8 °F (37.1 °C) (Oral)   Resp 20   Ht 5' 8" (1.727 m)   Wt 97.5 kg (214 lb 15.2 oz)   SpO2 96%   BMI 32.68 kg/m²     Physical Exam  Vitals and nursing note reviewed.   Constitutional:       General: He is not in acute distress.     Appearance: He is well-developed. He is obese. He is not ill-appearing.   Cardiovascular:      Rate and Rhythm: Normal rate and regular rhythm.   Pulmonary:      Effort: Pulmonary effort is normal. No respiratory distress.   Abdominal:      Palpations: Abdomen is soft.      Tenderness: There is no abdominal tenderness.   Musculoskeletal:      Right lower leg: No edema.      Left lower leg: No edema.   Skin:     General: Skin is warm and dry.      Findings: No rash.   Neurological:      Mental Status: He is alert and oriented to person, place, and time.      Goals of Care Treatment Preferences:  Code Status: Full Code          What is most important right now is to focus on avoiding the hospital.  Accordingly, we have decided that the best plan to meet the patient's goals includes continuing with treatment.      Consults:   Consults (From admission, onward) " "         Status Ordering Provider     Inpatient consult to [facility]  Once        Provider:  (Not yet assigned)    Completed ALEXANDRE LAZARO            Final Active Diagnoses:    Diagnosis Date Noted POA    PRINCIPAL PROBLEM:  Acute on chronic pancreatitis [K85.90, K86.1] 08/29/2019 Yes    Elevated liver function tests [R79.89] 04/30/2024 Yes    Hypertriglyceridemia [E78.1] 04/29/2024 Yes    Class 1 obesity with alveolar hypoventilation, serious comorbidity, and body mass index (BMI) of 31.0 to 31.9 in adult [E66.2, Z68.31] 08/09/2023 Not Applicable    Hypophosphatemia [E83.39] 02/25/2020 Yes    Type 1 diabetes mellitus with hyperglycemia [E10.65] 02/11/2020 Yes    Hypokalemia [E87.6] 09/01/2019 No    Mixed hyperlipidemia [E78.2] 08/02/2017 Yes     Chronic    Essential hypertension [I10]  Yes     Chronic      Problems Resolved During this Admission:       Discharged Condition: stable    Disposition: Home or Self Care    Follow Up:   Follow-up Information       [facility] [address] Follow up on 5/9/2024.    Specialty: Gastroenterology  Why: at 9:00 AM; GI appointment  Contact information:  [address] [facility] [address] [phone]  Additional information:  [facility] - Main Building, 4th Floor   Please park in [facility] and use [facility] elevator             Funmilayo Taylor MD Follow up on 5/28/2024.    Specialty: Family Medicine  Why: at 3:00 pm; PCP hospital follow up appointment  Contact information:  [address] [place] [address]  [phone]               Pieter, Shana, NP Follow up.    Specialty: Endocrinology  Why: Patient will be notified of an endocrine hospital follow up appointment  Contact information:  [address]  [facility] [address] [phone]                           Patient Instructions:   No discharge procedures on file.    Significant Diagnostic Studies: Labs: BMP: No results for input(s): "GLU", "NA", "K", "CL", " ""CO2", "BUN", "CREATININE", "CALCIUM", "MG" in the last 48 hours. and CBC No results for input(s): "WBC", "HGB", "HCT", "PLT" in the last 48 hours.    Pending Diagnostic Studies:       None           Medications:  Reconciled Home Medications:      Medication List        CHANGE how you take these medications      atorvastatin 40 MG tablet  Commonly known as: LIPITOR  Take 1 tablet (40 mg total) by mouth once daily. Hold at DC, start next week 5/9  What changed: additional instructions     fenofibrate 160 MG Tab  Take 1 tablet (160 mg total) by mouth once daily. Hold on DC, start next week 5/9  What changed: additional instructions     insulin aspart U-100 100 unit/mL (3 mL) Inpn pen  Commonly known as: NovoLOG Flexpen U-100 Insulin  Inject 15 Units into the skin 3 (three) times daily with meals. Plus correction scale. Max TDD 140units.  What changed: how much to take     niacin 100 MG Tab  Take 2 tablets (200 mg total) by mouth every evening.  What changed: when to take this            CONTINUE taking these medications      allopurinoL 300 MG tablet  Commonly known as: ZYLOPRIM  Take 1 tablet (300 mg total) by mouth once daily.     aspirin 81 MG EC tablet  Commonly known as: ECOTRIN  Take 81 mg by mouth once daily.     CONTOUR NEXT TEST STRIPS Strp  Generic drug: blood sugar diagnostic  To test glucose 4 times a day.     cyclobenzaprine 5 MG tablet  Commonly known as: FLEXERIL  Take 1 tablet (5 mg total) by mouth 3 (three) times daily as needed for Muscle spasms.     DEXCOM G7 SENSOR Isha  Generic drug: blood-glucose sensor  Change sensor every 10 days.     LANTUS SOLOSTAR U-100 INSULIN 100 unit/mL (3 mL) Inpn pen  Generic drug: insulin glargine U-100 (Lantus)  Inject 55 Units into the skin 2 (two) times a day.     losartan 100 MG tablet  Commonly known as: COZAAR  Take ½ tablet (50 mg total) by mouth once daily.     metFORMIN 500 MG tablet  Commonly known as: GLUCOPHAGE  Take 2 tablets (1,000 mg total) by mouth 2 " (two) times daily with meals.     metoclopramide HCl 10 MG tablet  Commonly known as: REGLAN  Take 1 tablet (10 mg total) by mouth 4 (four) times daily.     ondansetron 4 MG tablet  Commonly known as: ZOFRAN  Take 1 tablet (4 mg total) by mouth every 6 (six) hours as needed for Nausea.     pantoprazole 40 MG tablet  Commonly known as: PROTONIX  Take 1 tablet (40 mg total) by mouth 2 (two) times daily.     pregabalin 150 MG capsule  Commonly known as: LYRICA  Take 1 capsule (150 mg total) by mouth 2 (two) times daily.     VASCEPA 1 gram Cap  Generic drug: icosapent ethyL  Take 2 capsules (2 g total) by mouth 2 (two) times daily.              Indwelling Lines/Drains at time of discharge:   Lines/Drains/Airways       None                   Time spent on the discharge of patient: 38 minutes         Juana Pena MD  Department of Hospital Medicine  [facility]

## 2024-05-12 NOTE — HOSPITAL COURSE
"43-year-old male with PMH recurrent acute pancreatitis hypertension, gastric varices, hypertriglyceridemia, morbid obesity, JAZMYN, type 1 diabetes mellitus presented with abdominal pain found to be in DKA and have acute on chronic pancreatitis with lipase more than 1000 suspected secondary to hypertriglyceridemia (per patient in setting of dietary noncompliance) which improved with IV insulin.  CTA abdomen pelvis read as- Enlarged pancreatic head with surrounding edema and fat stranding consistent with history of acute pancreatitis. There is increased narrowing of the portal vein through the pancreatic head region compared to prior.  Diet gradually advanced as tolerated.  Due to worsening liver function tests, statin and fenofibrate it is were held during the hospital stay and at discharge, to be restarted in a week as LFTs improve.  Vital signs stable.  Patient was able to ambulate without difficulty and tolerate p.o..  Requested to be discharged with outpatient endocrinology follow-up.  Patient was seen by GI and recommended outpatient pancreas AES follow-up for which referral was sent prior to discharge.    BP (!) 140/93 (BP Location: Left arm, Patient Position: Sitting)   Pulse 97   Temp 98.8 °F (37.1 °C) (Oral)   Resp 20   Ht 5' 8" (1.727 m)   Wt 97.5 kg (214 lb 15.2 oz)   SpO2 96%   BMI 32.68 kg/m²     Physical Exam  Vitals and nursing note reviewed.   Constitutional:       General: He is not in acute distress.     Appearance: He is well-developed. He is obese. He is not ill-appearing.   Cardiovascular:      Rate and Rhythm: Normal rate and regular rhythm.   Pulmonary:      Effort: Pulmonary effort is normal. No respiratory distress.   Abdominal:      Palpations: Abdomen is soft.      Tenderness: There is no abdominal tenderness.   Musculoskeletal:      Right lower leg: No edema.      Left lower leg: No edema.   Skin:     General: Skin is warm and dry.      Findings: No rash.   Neurological:      Mental " Status: He is alert and oriented to person, place, and time.

## 2024-05-15 ENCOUNTER — PATIENT MESSAGE (OUTPATIENT)
Dept: ENDOSCOPY | Facility: HOSPITAL | Age: 44
End: 2024-05-15
Payer: COMMERCIAL

## 2024-05-15 ENCOUNTER — TELEPHONE (OUTPATIENT)
Dept: ENDOSCOPY | Facility: HOSPITAL | Age: 44
End: 2024-05-15
Payer: COMMERCIAL

## 2024-05-15 DIAGNOSIS — K85.90 ACUTE ON CHRONIC PANCREATITIS: Primary | ICD-10-CM

## 2024-05-15 DIAGNOSIS — K86.1 ACUTE ON CHRONIC PANCREATITIS: Primary | ICD-10-CM

## 2024-05-15 NOTE — TELEPHONE ENCOUNTER
Spoke to patient to schedule procedure(s) Upper Endoscopy Ultrasound (EUS)       Physician to perform procedure(s) Dr. ZHOU Holland  Date of Procedure (s) 06/17/2024  Arrival Time 9:00 AM  Time of Procedure(s) 10:00 AM   Location of Procedure(s) [address]  Type of Rx Prep sent to patient: Other  Instructions provided to patient via [facility]    Patient was informed on the following information and verbalized understanding. Screening questionnaire reviewed with patient and complete. If procedure requires anesthesia, a responsible adult needs to be present to accompany the patient home, patient cannot drive after receiving anesthesia. Appointment details are tentative, especially check-in time. Patient will receive a prep-op call 7 days prior to confirm check-in time for procedure. If applicable the patient should contact their pharmacy to verify Rx for procedure prep is ready for pick-up. Patient was advised to call the scheduling department at [phone] if pharmacy states no Rx is available. Patient was advised to call the endoscopy scheduling department if any questions or concerns arise.      [facility] Scheduling Department

## 2024-05-28 ENCOUNTER — PATIENT MESSAGE (OUTPATIENT)
Dept: FAMILY MEDICINE | Facility: CLINIC | Age: 44
End: 2024-05-28

## 2024-05-28 ENCOUNTER — OFFICE VISIT (OUTPATIENT)
Dept: FAMILY MEDICINE | Facility: CLINIC | Age: 44
End: 2024-05-28
Payer: COMMERCIAL

## 2024-05-28 VITALS
SYSTOLIC BLOOD PRESSURE: 132 MMHG | BODY MASS INDEX: 34 KG/M2 | OXYGEN SATURATION: 99 % | HEIGHT: 68 IN | WEIGHT: 224.31 LBS | HEART RATE: 97 BPM | DIASTOLIC BLOOD PRESSURE: 78 MMHG

## 2024-05-28 DIAGNOSIS — E10.65 TYPE 1 DIABETES MELLITUS WITH HYPERGLYCEMIA: Chronic | ICD-10-CM

## 2024-05-28 DIAGNOSIS — I10 ESSENTIAL HYPERTENSION: Chronic | ICD-10-CM

## 2024-05-28 DIAGNOSIS — G47.33 OSA (OBSTRUCTIVE SLEEP APNEA): Chronic | ICD-10-CM

## 2024-05-28 DIAGNOSIS — D69.6 THROMBOCYTOPENIA, UNSPECIFIED: ICD-10-CM

## 2024-05-28 DIAGNOSIS — K86.3 PANCREATIC PSEUDOCYST: Primary | Chronic | ICD-10-CM

## 2024-05-28 DIAGNOSIS — E78.1 HYPERTRIGLYCERIDEMIA: ICD-10-CM

## 2024-05-28 PROBLEM — D61.818 PANCYTOPENIA: Chronic | Status: RESOLVED | Noted: 2024-04-04 | Resolved: 2024-05-28

## 2024-05-28 PROCEDURE — 4010F ACE/ARB THERAPY RXD/TAKEN: CPT | Mod: CPTII,S$GLB,, | Performed by: FAMILY MEDICINE

## 2024-05-28 PROCEDURE — 3075F SYST BP GE 130 - 139MM HG: CPT | Mod: CPTII,S$GLB,, | Performed by: FAMILY MEDICINE

## 2024-05-28 PROCEDURE — 1111F DSCHRG MED/CURRENT MED MERGE: CPT | Mod: CPTII,S$GLB,, | Performed by: FAMILY MEDICINE

## 2024-05-28 PROCEDURE — 3078F DIAST BP <80 MM HG: CPT | Mod: CPTII,S$GLB,, | Performed by: FAMILY MEDICINE

## 2024-05-28 PROCEDURE — 1160F RVW MEDS BY RX/DR IN RCRD: CPT | Mod: CPTII,S$GLB,, | Performed by: FAMILY MEDICINE

## 2024-05-28 PROCEDURE — 3046F HEMOGLOBIN A1C LEVEL >9.0%: CPT | Mod: CPTII,S$GLB,, | Performed by: FAMILY MEDICINE

## 2024-05-28 PROCEDURE — 99214 OFFICE O/P EST MOD 30 MIN: CPT | Mod: S$GLB,,, | Performed by: FAMILY MEDICINE

## 2024-05-28 PROCEDURE — 99999 PR PBB SHADOW E&M-EST. PATIENT-LVL IV: CPT | Mod: PBBFAC,,, | Performed by: FAMILY MEDICINE

## 2024-05-28 PROCEDURE — 3008F BODY MASS INDEX DOCD: CPT | Mod: CPTII,S$GLB,, | Performed by: FAMILY MEDICINE

## 2024-05-28 PROCEDURE — 1159F MED LIST DOCD IN RCRD: CPT | Mod: CPTII,S$GLB,, | Performed by: FAMILY MEDICINE

## 2024-05-28 NOTE — PROGRESS NOTES
PATIENT VISIT FAMILY MEDICINE    CC:   Chief Complaint   Patient presents with    Follow-up    Hypertension       HPI: Bay Ibarra  is a 43 y.o. male:    Patient is known to me.  Patient presents routine follow up on chronic conditions and for hospital f/u    Admitted for pancreatitis 2/2 to hypertriglyceridemia.   Abdominal pain much improved. Still gets very low grade pain 1/10 intermittently but nothing like what it was when he went to the hospital.     Otherwise doing well, taking meds as prescribed         PMHX:   Past Medical History:   Diagnosis Date    Acute pancreatitis     Cellulitis of left lower extremity 01/11/2024    Diverticulosis of colon     Essential hypertension     Fatty liver     Gastric varices without bleeding 01/16/2020    Glaucomatous optic atrophy, right     Gout     Hx of acute pancreatitis 03/03/2017    Hypertriglyceridemia 08/02/2017    Obesity (BMI 30-39.9) 06/05/2015    Obesity (BMI 35.0-39.9 without comorbidity) 06/05/2015    Obstructive sleep apnea syndrome 12/18/2015    Pancytopenia 04/04/2024    Likely 2/2 to fatty liver. Will recheck. Refer to hepatology if worsening/persistent.       Type 2 diabetes mellitus with diabetic polyneuropathy, with long-term current use of insulin 10/16/2017    Type 2 diabetes mellitus with hyperglycemia, with long-term current use of insulin 08/03/2017       PSHX:   Past Surgical History:   Procedure Laterality Date    DEBRIDEMENT OF FOOT Right 4/21/2023    Procedure: DEBRIDEMENT, FOOT;  Surgeon: Anju Melvin DPM;  Location: [facility];  Service: Podiatry;  Laterality: Right;    ENDOSCOPIC ULTRASOUND OF UPPER GASTROINTESTINAL TRACT N/A 5/27/2019    Procedure: ULTRASOUND, UPPER GI TRACT, ENDOSCOPIC;  Surgeon: Zafar Velazquez MD;  Location: [facility] (2ND [facility]);  Service: Endoscopy;  Laterality: N/A;    ERCP N/A 5/27/2019    Procedure: ERCP (ENDOSCOPIC RETROGRADE CHOLANGIOPANCREATOGRAPHY);  Surgeon: Zafar Velazquez MD;  Location: [facility] ENDO (2ND  [facility]);  Service: Endoscopy;  Laterality: N/A;    ESOPHAGOGASTRODUODENOSCOPY      ESOPHAGOGASTRODUODENOSCOPY N/A 1/31/2020    Procedure: EGD (ESOPHAGOGASTRODUODENOSCOPY);  Surgeon: Zafar Velazquez MD;  Location: [facility];  Service: Endoscopy;  Laterality: N/A;    ESOPHAGOGASTRODUODENOSCOPY N/A 2/16/2024    Procedure: EGD (ESOPHAGOGASTRODUODENOSCOPY);  Surgeon: Rashida Messer MD;  Location: [facility] ENDO;  Service: Endoscopy;  Laterality: N/A;    PLANTAR FASCIA RELEASE Right 4/21/2023    Procedure: RELEASE, FASCIA, PLANTAR;  Surgeon: Anju Melvin DPM;  Location: [facility] OR;  Service: Podiatry;  Laterality: Right;    RESECTION OF GASTROCNEMIUS MUSCLE Right 4/21/2023    Procedure: RESECTION, MUSCLE, GASTROCNEMIUS;  Surgeon: Anju Melvin DPM;  Location: [facility] OR;  Service: Podiatry;  Laterality: Right;       FAMHX:   Family History   Problem Relation Name Age of Onset    Aneurysm Mother          AAA    Coronary artery disease Father          4 vessel bypass at 40, possible stent at 36    Diabetes type II Father      No Known Problems Sister      No Known Problems Brother      Aneurysm Maternal Grandmother          Possible AAA    Diabetes type II Paternal Grandmother         SOCHX:   Social History     Socioeconomic History    Marital status:    Tobacco Use    Smoking status: Never     Passive exposure: Never    Smokeless tobacco: Never   Substance and Sexual Activity    Alcohol use: No    Drug use: Never    Sexual activity: Not Currently     Partners: Female     Social Determinants of Health     Financial Resource Strain: Low Risk  (4/29/2024)    Overall Financial Resource Strain (CARDIA)     Difficulty of Paying Living Expenses: Not hard at all   Food Insecurity: No Food Insecurity (4/29/2024)    Hunger Vital Sign     Worried About Running Out of Food in the Last Year: Never true     Ran Out of Food in the Last Year: Never true   Transportation Needs: No Transportation Needs (4/29/2024)    PRAPARE -  Transportation     Lack of Transportation (Medical): No     Lack of Transportation (Non-Medical): No   Physical Activity: Inactive (4/29/2024)    Exercise Vital Sign     Days of Exercise per Week: 0 days     Minutes of Exercise per Session: 0 min   Stress: No Stress Concern Present (4/29/2024)    [employer] - Occupational Stress Questionnaire     Feeling of Stress : Only a little   Housing Stability: Low Risk  (4/29/2024)    Housing Stability Vital Sign     Unable to Pay for Housing in the Last Year: No     Homeless in the Last Year: No       ALLERGIES: Review of patient's allergies indicates:  No Known Allergies    MEDS:   Current Outpatient Medications:     allopurinoL (ZYLOPRIM) 300 MG tablet, Take 1 tablet (300 mg total) by mouth once daily., Disp: 90 tablet, Rfl: 3    aspirin (ECOTRIN) 81 MG EC tablet, Take 81 mg by mouth once daily., Disp: , Rfl:     atorvastatin (LIPITOR) 40 MG tablet, Take 1 tablet (40 mg total) by mouth once daily. Hold at DC, start next week 5/9, Disp: 90 tablet, Rfl: 3    blood sugar diagnostic (CONTOUR NEXT TEST STRIPS) Strp, To test glucose 4 times a day., Disp: 200 each, Rfl: 11    blood-glucose sensor (DEXCOM G7 SENSOR) Isha, Change sensor every 10 days., Disp: 3 each, Rfl: 3    cyclobenzaprine (FLEXERIL) 5 MG tablet, Take 1 tablet (5 mg total) by mouth 3 (three) times daily as needed for Muscle spasms., Disp: 30 tablet, Rfl: 2    fenofibrate 160 MG Tab, Take 1 tablet (160 mg total) by mouth once daily. Hold on DC, start next week 5/9, Disp: 90 tablet, Rfl: 3    icosapent ethyL (VASCEPA) 1 gram Cap, Take 2 capsules (2 g total) by mouth 2 (two) times daily., Disp: 360 capsule, Rfl: 3    insulin aspart U-100 (NOVOLOG FLEXPEN U-100 INSULIN) 100 unit/mL (3 mL) InPn pen, Inject 15 Units into the skin 3 (three) times daily with meals. Plus correction scale. Max TDD 140units., Disp: 100 mL, Rfl: 3    insulin glargine U-100, Lantus, (LANTUS SOLOSTAR U-100  "INSULIN) 100 unit/mL (3 mL) InPn pen, Inject 55 Units into the skin 2 (two) times a day., Disp: 100 mL, Rfl: 3    losartan (COZAAR) 100 MG tablet, Take ½ tablet (50 mg total) by mouth once daily., Disp: 45 tablet, Rfl: 1    metFORMIN (GLUCOPHAGE) 500 MG tablet, Take 2 tablets (1,000 mg total) by mouth 2 (two) times daily with meals., Disp: 360 tablet, Rfl: 3    niacin 100 MG Tab, Take 2 tablets (200 mg total) by mouth every evening. (Patient taking differently: Take 200 mg by mouth Daily.), Disp: 90 tablet, Rfl: 1    ondansetron (ZOFRAN) 4 MG tablet, Take 1 tablet (4 mg total) by mouth every 6 (six) hours as needed for Nausea., Disp: 30 tablet, Rfl: 1    pantoprazole (PROTONIX) 40 MG tablet, Take 1 tablet (40 mg total) by mouth 2 (two) times daily., Disp: 60 tablet, Rfl: 2    pregabalin (LYRICA) 150 MG capsule, Take 1 capsule (150 mg total) by mouth 2 (two) times daily., Disp: 60 capsule, Rfl: 5    OBJECTIVE:   Vitals:    05/28/24 1453   BP: 132/78   BP Location: Right arm   Patient Position: Sitting   BP Method: Large (Manual)   Pulse: 97   SpO2: 99%   Weight: 101.8 kg (224 lb 5.1 oz)   Height: 5' 8" (1.727 m)     Body mass index is 34.11 kg/m².      Physical Exam  Vitals and nursing note reviewed.   Constitutional:       Appearance: Normal appearance.   HENT:      Head: Normocephalic.   Eyes:      General:         Right eye: No discharge.         Left eye: No discharge.      Extraocular Movements: Extraocular movements intact.   Cardiovascular:      Rate and Rhythm: Normal rate and regular rhythm.      Heart sounds: Normal heart sounds.   Pulmonary:      Effort: Pulmonary effort is normal.      Breath sounds: Normal breath sounds.   Abdominal:      General: Abdomen is flat. Bowel sounds are normal. There is no distension.      Palpations: Abdomen is soft. There is no mass.      Tenderness: There is no abdominal tenderness. There is no guarding or rebound.      Hernia: No hernia is present.   Skin:     Comments: No " obvious rash on exposed skin   Neurological:      Mental Status: He is alert.   Psychiatric:         Behavior: Behavior normal.           LABS:   A1C:  Recent Labs   Lab 03/28/24  0628   Hemoglobin A1C 9.5 H     CBC:  Recent Labs   Lab 05/03/24  0219   WBC 5.16   RBC 4.87   Hemoglobin 12.4 L   Hematocrit 39.0 L   Platelets 138 L   MCV 80 L   MCH 25.5 L   MCHC 31.8 L     CMP:  Recent Labs   Lab 05/04/24  0155   Glucose 164 H   Calcium 9.4   Albumin 2.7 L   Total Protein 7.5   Sodium 135 L   Potassium 3.3 L   CO2 23   Chloride 98   BUN 10   Creatinine 0.8   Alkaline Phosphatase 389 H    H    H   Total Bilirubin 2.5 H     LIPIDS:  Recent Labs   Lab 06/02/23  0649 01/03/24  0629 04/29/24  0459 04/29/24  1505 04/30/24  0357   TSH 2.380  --   --   --   --    HDL  --    < > 17 L  --   --    Cholesterol  --    < > 147  --   --    Triglycerides  --    < > 890 H   < > 422 H   LDL Cholesterol  --    < > Invalid, Trig>400.0  --   --    HDL/Cholesterol Ratio  --    < > 11.6 L  --   --    Non-HDL Cholesterol  --    < > 130  --   --    Total Cholesterol/HDL Ratio  --    < > 8.6 H  --   --     < > = values in this interval not displayed.     TSH:  Recent Labs   Lab 06/02/23  0649   TSH 2.380         ASSESSMENT & PLAN:    Problem List Items Addressed This Visit          Cardiac/Vascular    Essential hypertension (Chronic)    Overview     Well controlled. Continue current regimen           Hypertriglyceridemia (Chronic)    Overview     Improved. Recent admission for pancreatitis 2024. Continue current medications.          Relevant Orders    Lipid Panel    Lipase       Hematology    Thrombocytopenia, unspecified (Chronic)    Overview     Platelets: 138 at 5/28/2024  9:00 PM  Mild. Continue to monitor            Endocrine    Type 1 diabetes mellitus with hyperglycemia (Chronic)    Overview     Last A1c is 9.5 on 03/28/2024  Novolog 36U TIDAC  Lantus 55U BID    Advised that he will need regular and close f/u for his  diabetes         Relevant Orders    CBC Auto Differential    Comprehensive Metabolic Panel    Lipid Panel    Lipase       GI    Pancreatic pseudocyst - Primary (Chronic)    Overview     Previously seen in 2019. Not noted on recent imaging.             Other    JAZMYN (obstructive sleep apnea) (Chronic)    Overview     Waiting for replacement on CPAP              Follow up in about 3 months (around 8/28/2024) for diabetes, blood pressure.      RTC/ED precautions discussed where applicable.   Encouraged patient to let me know if there are any further questions/concerns.     Advise patient/caretaker to check with insurance regarding orders to avoid unexpected fees/costs.     The patient/caretaker indicates understanding of these issues and agrees with the plan.    Dr. Funmilayo Taylor MD  Family Medicine

## 2024-06-10 ENCOUNTER — TELEPHONE (OUTPATIENT)
Dept: ENDOSCOPY | Facility: HOSPITAL | Age: 44
End: 2024-06-10
Payer: COMMERCIAL

## 2024-06-12 ENCOUNTER — PATIENT MESSAGE (OUTPATIENT)
Dept: FAMILY MEDICINE | Facility: CLINIC | Age: 44
End: 2024-06-12
Payer: COMMERCIAL

## 2024-06-12 DIAGNOSIS — K29.00 ACUTE SUPERFICIAL GASTRITIS WITHOUT HEMORRHAGE: ICD-10-CM

## 2024-06-12 RX ORDER — PANTOPRAZOLE SODIUM 40 MG/1
40 TABLET, DELAYED RELEASE ORAL 2 TIMES DAILY
Qty: 180 TABLET | Refills: 3 | Status: SHIPPED | OUTPATIENT
Start: 2024-06-12

## 2024-06-12 NOTE — TELEPHONE ENCOUNTER
Care Due:                  Date            Visit Type   Department     Provider  --------------------------------------------------------------------------------                                EP -                              PRIMARY [facility]  Last Visit: 05-      [facility] ([facility])   [facility]                               -                              PRIMARY      [facility]  Next Visit: 08-      [facility] ([facility])   [facility]  Test          Frequency    Reason                     Performed    Due Date  --------------------------------------------------------------------------------    Uric Acid...  12 months..  allopurinoL..............  Not Found    Overdue    [employer] Embedded Care Due Messages. Reference number: [id number].   6/12/2024 10:40:25 AM CDT

## 2024-06-17 ENCOUNTER — ANESTHESIA (OUTPATIENT)
Dept: ENDOSCOPY | Facility: HOSPITAL | Age: 44
End: 2024-06-17
Payer: COMMERCIAL

## 2024-06-17 ENCOUNTER — HOSPITAL ENCOUNTER (OUTPATIENT)
Facility: HOSPITAL | Age: 44
Discharge: HOME | End: 2024-06-17
Attending: INTERNAL MEDICINE | Admitting: INTERNAL MEDICINE
Payer: COMMERCIAL

## 2024-06-17 ENCOUNTER — ANESTHESIA EVENT (OUTPATIENT)
Dept: ENDOSCOPY | Facility: HOSPITAL | Age: 44
End: 2024-06-17
Payer: COMMERCIAL

## 2024-06-17 VITALS
HEIGHT: 68 IN | OXYGEN SATURATION: 94 % | RESPIRATION RATE: 18 BRPM | WEIGHT: 224 LBS | DIASTOLIC BLOOD PRESSURE: 62 MMHG | BODY MASS INDEX: 33.95 KG/M2 | SYSTOLIC BLOOD PRESSURE: 139 MMHG | TEMPERATURE: 98 F | HEART RATE: 86 BPM

## 2024-06-17 DIAGNOSIS — K86.1 CHRONIC PANCREATITIS: ICD-10-CM

## 2024-06-17 LAB
POCT GLUCOSE: 187 MG/DL (ref 70–110)
POCT GLUCOSE: 228 MG/DL (ref 70–110)

## 2024-06-17 PROCEDURE — 37000009 HC ANESTHESIA EA ADD 15 MINS: Performed by: INTERNAL MEDICINE

## 2024-06-17 PROCEDURE — 25000003 PHARM REV CODE 250: Performed by: NURSE ANESTHETIST, CERTIFIED REGISTERED

## 2024-06-17 PROCEDURE — 63600175 PHARM REV CODE 636 W HCPCS: Performed by: NURSE ANESTHETIST, CERTIFIED REGISTERED

## 2024-06-17 PROCEDURE — D9220A PRA ANESTHESIA: Mod: CRNA,,, | Performed by: NURSE ANESTHETIST, CERTIFIED REGISTERED

## 2024-06-17 PROCEDURE — D9220A PRA ANESTHESIA: Mod: ANES,,, | Performed by: STUDENT IN AN ORGANIZED HEALTH CARE EDUCATION/TRAINING PROGRAM

## 2024-06-17 PROCEDURE — 25000003 PHARM REV CODE 250: Performed by: INTERNAL MEDICINE

## 2024-06-17 PROCEDURE — 82962 GLUCOSE BLOOD TEST: CPT | Performed by: INTERNAL MEDICINE

## 2024-06-17 PROCEDURE — 37000008 HC ANESTHESIA 1ST 15 MINUTES: Performed by: INTERNAL MEDICINE

## 2024-06-17 PROCEDURE — 43235 EGD DIAGNOSTIC BRUSH WASH: CPT | Mod: 52,,, | Performed by: INTERNAL MEDICINE

## 2024-06-17 PROCEDURE — 43235 EGD DIAGNOSTIC BRUSH WASH: CPT | Mod: 74 | Performed by: INTERNAL MEDICINE

## 2024-06-17 RX ORDER — SODIUM CHLORIDE 9 MG/ML
INJECTION, SOLUTION INTRAVENOUS CONTINUOUS
Status: DISCONTINUED | OUTPATIENT
Start: 2024-06-17 | End: 2024-06-17 | Stop reason: HOSPADM

## 2024-06-17 RX ORDER — ONDANSETRON HYDROCHLORIDE 2 MG/ML
INJECTION, SOLUTION INTRAVENOUS
Status: DISCONTINUED | OUTPATIENT
Start: 2024-06-17 | End: 2024-06-17

## 2024-06-17 RX ORDER — SUCCINYLCHOLINE CHLORIDE 20 MG/ML
INJECTION INTRAMUSCULAR; INTRAVENOUS
Status: DISCONTINUED | OUTPATIENT
Start: 2024-06-17 | End: 2024-06-17

## 2024-06-17 RX ORDER — LIDOCAINE HYDROCHLORIDE 10 MG/ML
INJECTION, SOLUTION INTRAVENOUS
Status: DISCONTINUED | OUTPATIENT
Start: 2024-06-17 | End: 2024-06-17

## 2024-06-17 RX ORDER — PROPOFOL 10 MG/ML
VIAL (ML) INTRAVENOUS
Status: DISCONTINUED | OUTPATIENT
Start: 2024-06-17 | End: 2024-06-17

## 2024-06-17 RX ADMIN — ONDANSETRON 4 MG: 2 INJECTION INTRAMUSCULAR; INTRAVENOUS at 10:06

## 2024-06-17 RX ADMIN — SODIUM CHLORIDE: 0.9 INJECTION, SOLUTION INTRAVENOUS at 10:06

## 2024-06-17 RX ADMIN — PROPOFOL 200 MG: 10 INJECTION, EMULSION INTRAVENOUS at 10:06

## 2024-06-17 RX ADMIN — LIDOCAINE HYDROCHLORIDE 100 MG: 10 INJECTION, SOLUTION INTRAVENOUS at 10:06

## 2024-06-17 RX ADMIN — SUCCINYLCHOLINE CHLORIDE 180 MG: 20 INJECTION, SOLUTION INTRAMUSCULAR; INTRAVENOUS at 10:06

## 2024-06-17 NOTE — ANESTHESIA PREPROCEDURE EVALUATION
Pre-operative evaluation for Procedure(s) (LRB):  ULTRASOUND, UPPER GI TRACT, ENDOSCOPIC (N/A)    Baystephen Ibarra is a 43 y.o. male w/ poorly controlled type I DM (last HgbA1c in March 2024 9.5%), HTN, HLD, JAZMYN (on CPAP), obesity (BMI 34), and recurrent pancreatitis. He presents for EUS to better evaluate pancreatic head.  Of note, during his prior EGD in Feb 2024, food was seen in stomach after procedure start. He was subsequently intubated - right molar lost during intubation. Will plan to electively RSI him today given hx of gastroparesis.     Prev airway (2/16/24):     Induction:  Rapid sequence induction    Intubated:  Postinduction    Mask Ventilation:  Not attempted (RSI)    Attempts:  1    Attempted By:  CRNA    Method of Intubation:  Video laryngoscopy    Blade:  Chen 3    Laryngeal View Grade: Grade I - full view of cords      Difficult Airway Encountered?: No      Complications:  Dental trauma (poor dentition. rt molar detached with exposure. very loose and decayed)    Airway Device:  Oral endotracheal tube    Airway Device Size:  7.5    Style/Cuff Inflation:  Cuffed (inflated to minimal occlusive pressure)    Tube secured:  23    Secured at:  The lips    Placement Verified By:  Capnometry    Complicating Factors:  None    Findings Post-Intubation:  BS equal bilateral  Notes:      Poor dentition. Rt molar detached with exposure. Dr. Ibarra present.      2D Echo (7/27/2017):    1 - Normal left ventricular systolic function (EF 60-65%).     2 - Mildly depressed right ventricular systolic function .     3 - Normal left ventricular diastolic function.       EKG (4/30/2024):   Vent. Rate : 101 BPM     Atrial Rate : 101 BPM      P-R Int : 150 ms          QRS Dur : 098 ms       QT Int : 328 ms       P-R-T Axes : 050 -19 007 degrees      QTc Int : 425 ms   Sinus tachycardia   Possible Anterolateral infarct (cited on or before 25-FEB-2020)   Abnormal ECG     Patient Active Problem List   Diagnosis     Essential hypertension    JAZMYN (obstructive sleep apnea)    Other chronic pancreatitis    Mixed hyperlipidemia    Acute on chronic pancreatitis    Pancreatic pseudocyst    Hypokalemia    Type 1 diabetes mellitus with hyperglycemia    Hypophosphatemia    Diabetic gastroparesis associated with type 1 diabetes mellitus    Diabetic polyneuropathy associated with type 1 diabetes mellitus    Acute cystitis without hematuria    Class 1 obesity with alveolar hypoventilation, serious comorbidity, and body mass index (BMI) of 31.0 to 31.9 in adult    Hyperuricemia    New daily persistent headache    Hypertriglyceridemia    Elevated liver function tests    Thrombocytopenia, unspecified       Review of patient's allergies indicates:  No Known Allergies    Past Surgical History:   Procedure Laterality Date    DEBRIDEMENT OF FOOT Right 4/21/2023    Procedure: DEBRIDEMENT, FOOT;  Surgeon: Anju Melvin DPM;  Location: [facility];  Service: Podiatry;  Laterality: Right;    ENDOSCOPIC ULTRASOUND OF UPPER GASTROINTESTINAL TRACT N/A 5/27/2019    Procedure: ULTRASOUND, UPPER GI TRACT, ENDOSCOPIC;  Surgeon: Zafar Velazquez MD;  Location: [address]);  Service: Endoscopy;  Laterality: N/A;    ERCP N/A 5/27/2019    Procedure: ERCP (ENDOSCOPIC RETROGRADE CHOLANGIOPANCREATOGRAPHY);  Surgeon: Zafar Velazquez MD;  Location: [address]);  Service: Endoscopy;  Laterality: N/A;    ESOPHAGOGASTRODUODENOSCOPY      ESOPHAGOGASTRODUODENOSCOPY N/A 1/31/2020    Procedure: EGD (ESOPHAGOGASTRODUODENOSCOPY);  Surgeon: Zafar Velazquez MD;  Location: [facility];  Service: Endoscopy;  Laterality: N/A;    ESOPHAGOGASTRODUODENOSCOPY N/A 2/16/2024    Procedure: EGD (ESOPHAGOGASTRODUODENOSCOPY);  Surgeon: Rashida Messer MD;  Location: [facility];  Service: Endoscopy;  Laterality: N/A;    PLANTAR FASCIA RELEASE Right 4/21/2023    Procedure: RELEASE, FASCIA, PLANTAR;  Surgeon: Anju Melvin DPM;  Location: [facility];  Service: Podiatry;  Laterality:  "Right;    RESECTION OF GASTROCNEMIUS MUSCLE Right 4/21/2023    Procedure: RESECTION, MUSCLE, GASTROCNEMIUS;  Surgeon: Anju Melvin DPM;  Location: [facility];  Service: Podiatry;  Laterality: Right;         Vital Signs:  Temp:  [36.1 °C (97 °F)]   Pulse:  [97]   Resp:  [16]   BP: (150)/(68)   SpO2:  [97 %]       CBC: No results for input(s): "WBC", "RBC", "HGB", "HCT", "PLT", "MCV", "MCH", "MCHC" in the last 72 hours.    CMP: No results for input(s): "NA", "K", "CL", "CO2", "BUN", "CREATININE", "GLU", "MG", "PHOS", "CALCIUM", "ALBUMIN", "PROT", "ALKPHOS", "ALT", "AST", "BILITOT" in the last 72 hours.    INR  No results for input(s): "PT", "INR", "PROTIME", "APTT" in the last 72 hours.        Pre-op Assessment    I have reviewed the Patient Summary Reports.     I have reviewed the Nursing Notes. I have reviewed the NPO Status.   I have reviewed the Medications.     Review of Systems  Anesthesia Hx:  No problems with previous Anesthesia                Hematology/Oncology:    Oncology Normal                                   Cardiovascular:     Denies Pacemaker. Hypertension    Denies CABG/stent.           ECG has been reviewed.                          Pulmonary:        Sleep Apnea, CPAP                Renal/:  Renal/ Normal                 Hepatic/GI:      Liver Disease,            Neurological:    Denies CVA. Neuromuscular Disease,  Headaches                                 Endocrine:  Diabetes, poorly controlled, type 1, using insulin               Physical Exam  General: Alert and Oriented    Airway:  Mallampati: II   Mouth Opening: Normal  TM Distance: Normal  Tongue: Normal    Dental:  Periodontal disease    Chest/Lungs:  Clear to auscultation, Normal Respiratory Rate    Heart:  Rate: Normal  Rhythm: Regular Rhythm        Anesthesia Plan  Type of Anesthesia, risks & benefits discussed:    Anesthesia Type: Gen ETT, Gen Natural Airway  Intra-op Monitoring Plan: Standard ASA Monitors  Post Op Pain Control Plan: " IV/PO Opioids PRN  Induction:  IV  Airway Plan: Direct and Video  Informed Consent: Informed consent signed with the Patient and all parties understand the risks and agree with anesthesia plan.  All questions answered.   ASA Score: 3  Day of Surgery Review of History & Physical: H&P Update referred to the surgeon/provider.    Ready For Surgery From Anesthesia Perspective.     .

## 2024-06-17 NOTE — H&P
Short Stay Endoscopy History and Physical    PCP - Funmilayo Taylor MD  Referring Physician - Jorge A Holland MD  [address]  [address]  [place] [address]    Procedure - eus  ASA - per anesthesia  Mallampati - per anesthesia  History of Anesthesia problems - no  Family history Anesthesia problems -  no   Plan of anesthesia - General    HPI:  This is a 43 y.o. male here for evaluation of: pancreatitis    Reflux - no  Dysphagia - no  Abdominal pain - no  Diarrhea - no    ROS:  Constitutional: No fevers, chills, No weight loss  CV: No chest pain  Pulm: No cough, No shortness of breath  Ophtho: No vision changes  GI: see HPI  Derm: No rash    Medical History:  has a past medical history of Acute pancreatitis, Cellulitis of left lower extremity (01/11/2024), Diverticulosis of colon, Essential hypertension, Fatty liver, Gastric varices without bleeding (01/16/2020), Glaucomatous optic atrophy, right, Gout, acute pancreatitis (03/03/2017), Hypertriglyceridemia (08/02/2017), Obesity (BMI 30-39.9) (06/05/2015), Obesity (BMI 35.0-39.9 without comorbidity) (06/05/2015), Obstructive sleep apnea syndrome (12/18/2015), Pancytopenia (04/04/2024), Type 2 diabetes mellitus with diabetic polyneuropathy, with long-term current use of insulin (10/16/2017), and Type 2 diabetes mellitus with hyperglycemia, with long-term current use of insulin (08/03/2017).    Surgical History:  has a past surgical history that includes Endoscopic ultrasound of upper gastrointestinal tract (N/A, 5/27/2019); ERCP (N/A, 5/27/2019); Esophagogastroduodenoscopy; Esophagogastroduodenoscopy (N/A, 1/31/2020); Debridement of foot (Right, 4/21/2023); Plantar fascia release (Right, 4/21/2023); Resection of gastrocnemius muscle (Right, 4/21/2023); and Esophagogastroduodenoscopy (N/A, 2/16/2024).    Family History: family history includes Aneurysm in his maternal grandmother and mother; Coronary artery disease in his father; Diabetes type II in his  father and paternal grandmother; No Known Problems in his brother and sister..    Social History:  reports that he has never smoked. He has never been exposed to tobacco smoke. He has never used smokeless tobacco. He reports that he does not drink alcohol and does not use drugs.    Review of patient's allergies indicates:  No Known Allergies    Medications:   Medications Prior to Admission   Medication Sig Dispense Refill Last Dose    allopurinoL (ZYLOPRIM) 300 MG tablet Take 1 tablet (300 mg total) by mouth once daily. 90 tablet 3 6/17/2024    aspirin (ECOTRIN) 81 MG EC tablet Take 81 mg by mouth once daily.   6/17/2024    atorvastatin (LIPITOR) 40 MG tablet Take 1 tablet (40 mg total) by mouth once daily. Hold at MA, start next week 5/9 90 tablet 3 6/17/2024    fenofibrate 160 MG Tab Take 1 tablet (160 mg total) by mouth once daily. Hold on MA, start next week 5/9 90 tablet 3 6/17/2024    icosapent ethyL (VASCEPA) 1 gram Cap Take 2 capsules (2 g total) by mouth 2 (two) times daily. 360 capsule 3 6/17/2024    insulin aspart U-100 (NOVOLOG FLEXPEN U-100 INSULIN) 100 unit/mL (3 mL) InPn pen Inject 15 Units into the skin 3 (three) times daily with meals. Plus correction scale. Max TDD 140units. 100 mL 3 6/16/2024    insulin glargine U-100, Lantus, (LANTUS SOLOSTAR U-100 INSULIN) 100 unit/mL (3 mL) InPn pen Inject 55 Units into the skin 2 (two) times a day. 100 mL 3 6/17/2024    losartan (COZAAR) 100 MG tablet Take ½ tablet (50 mg total) by mouth once daily. 45 tablet 1 6/17/2024    metFORMIN (GLUCOPHAGE) 500 MG tablet Take 2 tablets (1,000 mg total) by mouth 2 (two) times daily with meals. 360 tablet 3 6/17/2024    niacin 100 MG Tab Take 2 tablets (200 mg total) by mouth every evening. (Patient taking differently: Take 200 mg by mouth Daily.) 90 tablet 1 6/17/2024    pantoprazole (PROTONIX) 40 MG tablet Take 1 tablet (40 mg total) by mouth 2 (two) times daily. 180 tablet 3 6/17/2024    pregabalin (LYRICA) 150 MG  capsule Take 1 capsule (150 mg total) by mouth 2 (two) times daily. 60 capsule 5 6/17/2024    blood sugar diagnostic (CONTOUR NEXT TEST STRIPS) Strp To test glucose 4 times a day. 200 each 11     blood-glucose sensor (DEXCOM G7 SENSOR) Isha Change sensor every 10 days. 3 each 3     cyclobenzaprine (FLEXERIL) 5 MG tablet Take 1 tablet (5 mg total) by mouth 3 (three) times daily as needed for Muscle spasms. 30 tablet 2     ondansetron (ZOFRAN) 4 MG tablet Take 1 tablet (4 mg total) by mouth every 6 (six) hours as needed for Nausea. 30 tablet 1        Physical Exam:    Vital Signs:   Vitals:    06/17/24 0913   BP: (!) 150/68   Pulse: 97   Resp: 16   Temp: 97 °F (36.1 °C)       General Appearance: Well appearing in no acute distress    Labs:  Lab Results   Component Value Date    WBC 2.84 (L) 05/29/2024    HGB 12.2 (L) 05/29/2024    HCT 39.6 (L) 05/29/2024     (L) 05/29/2024    CHOL 99 (L) 05/29/2024    TRIG 656 (H) 05/29/2024    HDL 17 (L) 05/29/2024    ALT 53 (H) 05/29/2024    AST 38 05/29/2024     05/29/2024    K 4.4 05/29/2024     05/29/2024    CREATININE 1.3 05/29/2024    BUN 17 05/29/2024    CO2 27 05/29/2024    TSH 2.380 06/02/2023    INR 1.1 07/25/2017    HGBA1C 9.5 (H) 03/28/2024    MICROALBUR 0.2 01/11/2018       I have explained the risks and benefits of this endoscopic procedure to the patient including but not limited to bleeding, inflammation, infection, perforation, and death.      Jorge A Holland MD

## 2024-06-17 NOTE — H&P
Short Stay Endoscopy History and Physical    PCP - Funmilayo Taylor MD  Referring Physician - Jorge A Holland MD  [address]  [address]  [place] [id number]    Procedure - eus  ASA - per anesthesia  Mallampati - per anesthesia  History of Anesthesia problems - no  Family history Anesthesia problems -  no   Plan of anesthesia - General    HPI:  This is a 43 y.o. male here for evaluation of: chronic pancreatitis    Reflux - no  Dysphagia - no  Abdominal pain - no  Diarrhea - no    ROS:  Constitutional: No fevers, chills, No weight loss  CV: No chest pain  Pulm: No cough, No shortness of breath  Ophtho: No vision changes  GI: see HPI  Derm: No rash    Medical History:  has a past medical history of Acute pancreatitis, Cellulitis of left lower extremity (01/11/2024), Diverticulosis of colon, Essential hypertension, Fatty liver, Gastric varices without bleeding (01/16/2020), Glaucomatous optic atrophy, right, Gout, acute pancreatitis (03/03/2017), Hypertriglyceridemia (08/02/2017), Obesity (BMI 30-39.9) (06/05/2015), Obesity (BMI 35.0-39.9 without comorbidity) (06/05/2015), Obstructive sleep apnea syndrome (12/18/2015), Pancytopenia (04/04/2024), Type 2 diabetes mellitus with diabetic polyneuropathy, with long-term current use of insulin (10/16/2017), and Type 2 diabetes mellitus with hyperglycemia, with long-term current use of insulin (08/03/2017).    Surgical History:  has a past surgical history that includes Endoscopic ultrasound of upper gastrointestinal tract (N/A, 5/27/2019); ERCP (N/A, 5/27/2019); Esophagogastroduodenoscopy; Esophagogastroduodenoscopy (N/A, 1/31/2020); Debridement of foot (Right, 4/21/2023); Plantar fascia release (Right, 4/21/2023); Resection of gastrocnemius muscle (Right, 4/21/2023); and Esophagogastroduodenoscopy (N/A, 2/16/2024).    Family History: family history includes Aneurysm in his maternal grandmother and mother; Coronary artery disease in his father; Diabetes type II in  his father and paternal grandmother; No Known Problems in his brother and sister..    Social History:  reports that he has never smoked. He has never been exposed to tobacco smoke. He has never used smokeless tobacco. He reports that he does not drink alcohol and does not use drugs.    Review of patient's allergies indicates:  No Known Allergies    Medications:   No medications prior to admission.       Physical Exam:    Vital Signs: There were no vitals filed for this visit.    General Appearance: Well appearing in no acute distress    Labs:  Lab Results   Component Value Date    WBC 2.84 (L) 05/29/2024    HGB 12.2 (L) 05/29/2024    HCT 39.6 (L) 05/29/2024     (L) 05/29/2024    CHOL 99 (L) 05/29/2024    TRIG 656 (H) 05/29/2024    HDL 17 (L) 05/29/2024    ALT 53 (H) 05/29/2024    AST 38 05/29/2024     05/29/2024    K 4.4 05/29/2024     05/29/2024    CREATININE 1.3 05/29/2024    BUN 17 05/29/2024    CO2 27 05/29/2024    TSH 2.380 06/02/2023    INR 1.1 07/25/2017    HGBA1C 9.5 (H) 03/28/2024    MICROALBUR 0.2 01/11/2018       I have explained the risks and benefits of this endoscopic procedure to the patient including but not limited to bleeding, inflammation, infection, perforation, and death.      Jorge A Holland MD

## 2024-06-17 NOTE — ANESTHESIA PROCEDURE NOTES
Intubation    Date/Time: 6/17/2024 10:11 AM    Performed by: Hailey Lubin CRNA  Authorized by: Jaimee Martin MD    Intubation:     Induction:  Rapid sequence induction    Intubated:  Postinduction    Mask Ventilation:  Not attempted    Attempts:  1    Attempted By:  CRNA    Method of Intubation:  Video laryngoscopy    Blade:  Chen 3    Laryngeal View Grade: Grade I - full view of cords      Difficult Airway Encountered?: No      Complications:  None    Airway Device:  Oral endotracheal tube    Airway Device Size:  7.5    Style/Cuff Inflation:  Cuffed    Inflation Amount (mL):  6    Tube secured:  22    Placement Verified By:  Capnometry and Revisualization with laryngoscopy    Complicating Factors:  Small mouth, obesity, short neck and oropharyngeal edema or fat    Findings Post-Intubation:  BS equal bilateral and atraumatic/condition of teeth unchanged  Notes:      Pt has poor dentition

## 2024-06-17 NOTE — PROVATION PATIENT INSTRUCTIONS
Discharge Summary/Instructions after an Endoscopic Procedure  Patient Name: Bay Ibarra  Patient MRN: [id number]  Patient YOB: 1980 Monday, June 17, 2024  Jorge A Holland MD  Dear patient,  As a result of recent federal legislation (The Federal Cures Act), you may   receive lab or pathology results from your procedure in your [facility]   account before your physician is able to contact you. Your physician or   their representative will relay the results to you with their   recommendations at their soonest availability.  Thank you,  RESTRICTIONS:  During your procedure today, you received medications for sedation.  These   medications may affect your judgment, balance and coordination.  Therefore,   for 24 hours, you have the following restrictions:   - DO NOT drive a car, operate machinery, make legal/financial decisions,   sign important papers or drink alcohol.    ACTIVITY:  Today: no heavy lifting, straining or running due to procedural   sedation/anesthesia.  The following day: return to full activity including work.  DIET:  Eat and drink normally unless instructed otherwise.     TREATMENT FOR COMMON SIDE EFFECTS:  - Mild abdominal pain, nausea, belching, bloating or excessive gas:  rest,   eat lightly and use a heating pad.  - Sore Throat: treat with throat lozenges and/or gargle with warm salt   water.  - Because air was used during the procedure, expelling large amounts of air   from your rectum or belching is normal.  - If a bowel prep was taken, you may not have a bowel movement for 1-3 days.    This is normal.  SYMPTOMS TO WATCH FOR AND REPORT TO YOUR PHYSICIAN:  1. Abdominal pain or bloating, other than gas cramps.  2. Chest pain.  3. Back pain.  4. Signs of infection such as: chills or fever occurring within 24 hours   after the procedure.  5. Rectal bleeding, which would show as bright red, maroon, or black stools.   (A tablespoon of blood from the rectum is not serious, especially if    hemorrhoids are present.)  6. Vomiting.  7. Weakness or dizziness.  GO DIRECTLY TO THE NEAREST EMERGENCY ROOM IF YOU HAVE ANY OF THE FOLLOWING:      Difficulty breathing              Chills and/or fever over 101 F   Persistent vomiting and/or vomiting blood   Severe abdominal pain   Severe chest pain   Black, tarry stools   Bleeding- more than one tablespoon   Any other symptom or condition that you feel may need urgent attention  Your doctor recommends these additional instructions:  If any biopsies were taken, your doctors clinic will contact you in 1 to 2   weeks with any results.  - Discharge patient to home.   - Resume previous diet.   - Repeat the upper endoscopic ultrasound at appointment to be scheduled for   retreatment.   - Will need 36 hours of liquids prior to next procedure  For questions, problems or results please call your physician - Jorge A Holland MD at Work:  [phone].  [facility], EMERGENCY ROOM PHONE NUMBER: [phone]  IF A COMPLICATION OR EMERGENCY SITUATION ARISES AND YOU ARE UNABLE TO REACH   YOUR PHYSICIAN - GO DIRECTLY TO THE EMERGENCY ROOM.  Jorge A Holland MD  6/17/2024 10:31:35 AM  This report has been verified and signed electronically.  Dear patient,  As a result of recent federal legislation (The Federal Cures Act), you may   receive lab or pathology results from your procedure in your [facility]   account before your physician is able to contact you. Your physician or   their representative will relay the results to you with their   recommendations at their soonest availability.  Thank you,  [employer]ATION

## 2024-06-17 NOTE — TRANSFER OF CARE
"Anesthesia Transfer of Care Note    Patient: Bay Ibarra    Procedure(s) Performed: Procedure(s) (LRB):  ULTRASOUND, UPPER GI TRACT, ENDOSCOPIC (N/A)    Patient location: [facility]    Anesthesia Type: general    Transport from OR: Transported from OR on 6-10 L/min O2 by face mask with adequate spontaneous ventilation    Post pain: adequate analgesia    Post assessment: no apparent anesthetic complications and tolerated procedure well    Post vital signs: stable    Level of consciousness: awake, alert and oriented    Nausea/Vomiting: no nausea/vomiting    Complications: none    Transfer of care protocol was followed      Last vitals: Visit Vitals  BP (!) 150/68 (Patient Position: Lying)   Pulse 97   Temp 36.1 °C (97 °F) (Temporal)   Resp 16   Ht 5' 8" (1.727 m)   Wt 101.6 kg (224 lb)   SpO2 97%   BMI 34.06 kg/m²     "

## 2024-06-18 ENCOUNTER — TELEPHONE (OUTPATIENT)
Dept: ENDOSCOPY | Facility: HOSPITAL | Age: 44
End: 2024-06-18
Payer: COMMERCIAL

## 2024-06-18 NOTE — TELEPHONE ENCOUNTER
Telephone patient to schedule EUS  with no answer. Direct contact given to call back to schedule procedure.

## 2024-06-18 NOTE — ANESTHESIA POSTPROCEDURE EVALUATION
Anesthesia Post Evaluation    Patient: Bay Ibarra    Procedure(s) Performed: Procedure(s) (LRB):  ULTRASOUND, UPPER GI TRACT, ENDOSCOPIC (N/A)    Final Anesthesia Type: general      Patient location during evaluation: PACU  Patient participation: Yes- Able to Participate  Level of consciousness: awake  Post-procedure vital signs: reviewed and stable  Pain management: adequate  Airway patency: patent    PONV status at discharge: No PONV  Anesthetic complications: no      Cardiovascular status: blood pressure returned to baseline  Respiratory status: unassisted, spontaneous ventilation and room air                Vitals Value Taken Time   /62 06/17/24 1115   Temp 36.6 °C (97.9 °F) 06/17/24 1115   Pulse 86 06/17/24 1115   Resp 18 06/17/24 1115   SpO2 94 % 06/17/24 1115         No case tracking events are documented in the log.      Pain/Sincere Score: Sincere Score: 10 (6/17/2024 10:45 AM)

## 2024-06-20 ENCOUNTER — OFFICE VISIT (OUTPATIENT)
Dept: SLEEP MEDICINE | Facility: CLINIC | Age: 44
End: 2024-06-20
Attending: PSYCHIATRY & NEUROLOGY
Payer: COMMERCIAL

## 2024-06-20 VITALS
WEIGHT: 228.31 LBS | HEART RATE: 103 BPM | SYSTOLIC BLOOD PRESSURE: 149 MMHG | DIASTOLIC BLOOD PRESSURE: 81 MMHG | HEIGHT: 68 IN | BODY MASS INDEX: 34.6 KG/M2

## 2024-06-20 DIAGNOSIS — G47.33 OSA (OBSTRUCTIVE SLEEP APNEA): Chronic | ICD-10-CM

## 2024-06-20 PROCEDURE — 3079F DIAST BP 80-89 MM HG: CPT | Mod: CPTII,S$GLB,, | Performed by: NURSE PRACTITIONER

## 2024-06-20 PROCEDURE — 4010F ACE/ARB THERAPY RXD/TAKEN: CPT | Mod: CPTII,S$GLB,, | Performed by: NURSE PRACTITIONER

## 2024-06-20 PROCEDURE — 99214 OFFICE O/P EST MOD 30 MIN: CPT | Mod: S$GLB,,, | Performed by: NURSE PRACTITIONER

## 2024-06-20 PROCEDURE — 3008F BODY MASS INDEX DOCD: CPT | Mod: CPTII,S$GLB,, | Performed by: NURSE PRACTITIONER

## 2024-06-20 PROCEDURE — 3046F HEMOGLOBIN A1C LEVEL >9.0%: CPT | Mod: CPTII,S$GLB,, | Performed by: NURSE PRACTITIONER

## 2024-06-20 PROCEDURE — 99999 PR PBB SHADOW E&M-EST. PATIENT-LVL III: CPT | Mod: PBBFAC,,, | Performed by: NURSE PRACTITIONER

## 2024-06-20 PROCEDURE — 3077F SYST BP >= 140 MM HG: CPT | Mod: CPTII,S$GLB,, | Performed by: NURSE PRACTITIONER

## 2024-06-20 NOTE — PROGRESS NOTES
Cc: JAZMYN    Using cpap has helped resolve snoring, and helped consolidate his sleep. ESS=7. Snores more on back. Nose mask used. Felt better overall. He lost weight during covid then gained some back. Machine old/outdated and not usable anymore. Got machine 2016.     Remote 6.5h/n AHI 3.7, 90% tile 11.3cm        HST SNAP (250#) 2015 AHI 44.2/low sat 55%  2022 HST AHI 26(RDI 36)                     ASSESSMENT:    1. JAZMYN, mod-severe. Adherent with PAP, benefits with use. AHI<5. Eligible new machine  He has medical co-morbidities of diabetes, hyperlipidemia and hypertension,  which can be worsened by JAZMYN.        PLAN:  Apap new machine apap 6-14cm. NO THS   Discussed control of JAZMYN with use

## 2024-07-15 NOTE — PROGRESS NOTES
Subjective:      Patient ID: Bay Ibarra is a 44 y.o. male.    Chief Complaint:  No chief complaint on file.    History of Present Illness  Bay Ibarra is here for follow up of DM.  Previously seen by me on 4/2024.   This is a MyChart video visit.    The patient location is: [place]  The chief complaint leading to consultation is: DM  Visit type: Virtual visit with synchronous audio and video  Total time spent with patient: see below  Each patient to whom he or she provides medical services by telemedicine is:  (1) informed of the relationship between the physician and patient and the respective role of any other health care provider with respect to management of the patient; and (2) notified that he or she may decline to receive medical services by telemedicine and may withdraw from such care at any time.    With regards to Diabetes:    3/2021  Cpeptide 1.25 with glucose 315    Diagnosed: 2009  Education - last visit: 10/2023  FH: father, mother, paternal grandfather   2 siblings - denies DM   Known complications:  DKA + 11/2020, 1/2023  RN -  Eye Exam: 7/2022 - needs to make an appointment    PN +  Podiatry: 10/2023  Nephropathy -  CAD -  Hx pancreatitis - 2013, 2x in 2017, 2024  Diabetic foot ulcer R greater toe - following with Podiatry     Diet/Exercise:   Eats 3 meals a day.   Snacks : Denies   Drinks : coffee, water, unsweet tea, has cut back on diet soft drinks   Exercise - tries to stay active.   Recent illness, injury, steroids: since LOV - hospital admission for pancreatitis. COVID 6/2024.     Current Regimen:  Metformin 1000mg twice daily   Lantus 50 units twice daily  Novolog 36 units plus correction scale before meals   Add correction scale as needed.   Blood sugar 150 to 200 add 2 unit   Blood sugar 201 to 250 add 4 units   Blood sugar 251 to 300 add 6 units   Blood sugar 301 to 350 add 9 units   Blood sugar greater than 350 add 10 units    Reports compliance.     Other medications  "tried:  Jardiance - stopped in February 2020 due to DKA   MFM  DPP4  "Other orals"    Glucose Monitor: Dexcom G7 - took a break from it due to   6 times a day testing  Log reviewed: oral recall  Reports global hyperglycemias last week due to diet.    7/22-- 150-160  7/23 -- 115, 120    Hypoglycemia:  Denies   Knows how to correct with 15 grams of carbs- juice, coke, or a peppermint.      Diabetes Management Status    Hemoglobin A1C   Date Value Ref Range Status   03/28/2024 9.5 (H) 4.0 - 5.6 % Final     Comment:     ADA Screening Guidelines:  5.7-6.4%  Consistent with prediabetes  >or=6.5%  Consistent with diabetes    High levels of fetal hemoglobin interfere with the HbA1C  assay. Heterozygous hemoglobin variants (HbS, HgC, etc)do  not significantly interfere with this assay.   However, presence of multiple variants may affect accuracy.     01/03/2024 9.8 (H) 4.0 - 5.6 % Final     Comment:     ADA Screening Guidelines:  5.7-6.4%  Consistent with prediabetes  >or=6.5%  Consistent with diabetes    High levels of fetal hemoglobin interfere with the HbA1C  assay. Heterozygous hemoglobin variants (HbS, HgC, etc)do  not significantly interfere with this assay.   However, presence of multiple variants may affect accuracy.     10/09/2023 11.1 (H) 4.0 - 5.6 % Final     Comment:     ADA Screening Guidelines:  5.7-6.4%  Consistent with prediabetes  >or=6.5%  Consistent with diabetes    High levels of fetal hemoglobin interfere with the HbA1C  assay. Heterozygous hemoglobin variants (HbS, HgC, etc)do  not significantly interfere with this assay.   However, presence of multiple variants may affect accuracy.         Statin: Taking  ACE/ARB: Taking  Screening or Prevention Patient's value Goal Complete/Controlled?   HgA1C Testing and Control   Lab Results   Component Value Date    HGBA1C 9.5 (H) 03/28/2024      Annually/Less than 8% No   Lipid profile : 05/29/2024 Annually Yes   LDL control Lab Results   Component Value Date    " "LDLCALC Invalid, Trig>400.0 05/29/2024    Annually/Less than 100 mg/dl  Yes   Nephropathy screening Lab Results   Component Value Date    LABMICR 70.0 10/09/2023    LABMICR 70.0 10/09/2023    LABMICR 72.0 10/09/2023     Lab Results   Component Value Date    PROTEINUA 2+ (A) 02/09/2024    Annually Yes   Blood pressure BP Readings from Last 1 Encounters:   06/20/24 (!) 149/81    Less than 140/90 Yes   Dilated retinal exam : 07/27/2022 Annually Yes   Foot exam   : 01/25/2023 Annually Yes     Review of Systems  As above     There were no vitals taken for this visit.      There is no height or weight on file to calculate BMI.    Lab Review:   Lab Results   Component Value Date    HGBA1C 9.5 (H) 03/28/2024    HGBA1C 9.8 (H) 01/03/2024    HGBA1C 11.1 (H) 10/09/2023       Lab Results   Component Value Date    CHOL 99 (L) 05/29/2024    HDL 17 (L) 05/29/2024    LDLCALC Invalid, Trig>400.0 05/29/2024    TRIG 656 (H) 05/29/2024    CHOLHDL 17.2 (L) 05/29/2024     Lab Results   Component Value Date     05/29/2024    K 4.4 05/29/2024     05/29/2024    CO2 27 05/29/2024     (HH) 05/29/2024    BUN 17 05/29/2024    CREATININE 1.3 05/29/2024    CALCIUM 9.9 05/29/2024    PROT 7.3 05/29/2024    ALBUMIN 3.8 05/29/2024    BILITOT 0.5 05/29/2024    ALKPHOS 141 (H) 05/29/2024    AST 38 05/29/2024    ALT 53 (H) 05/29/2024    ANIONGAP 10 05/29/2024    ESTGFRAFRICA >60.0 03/01/2022    EGFRNONAA >60.0 03/01/2022    TSH 2.380 06/02/2023     No results found for: "[id number]"  Assessment and Plan     1. Type 1 diabetes mellitus with hyperglycemia  Hemoglobin A1C    Basic Metabolic Panel    insulin aspart U-100 (NOVOLOG FLEXPEN U-100 INSULIN) 100 unit/mL (3 mL) InPn pen      2. Type 1 diabetes mellitus with diabetic neuropathy            Type 1 diabetes mellitus with hyperglycemia  -- Labs now.  -- A1c goal <7.0%.  -- Medications discussed:  MFM  GLP1/DPP4i - pancreatitis  SGLT2i - DKA  SFU  Insulin   -- Reviewed " logs/CGM:  Glucose well controlled when following a diabetic diet.   Reach out to me sooner for any glucose <70 or consistently >200.  -- Resume CGM - discussed SkinTac.  -- Encouraged dietary/ lifestyle changes.   -- Medication Changes:   Metformin 1000mg twice daily   Lantus 50 units twice daily  Novolog 36units plus correction scale before meals   Add correction scale as needed.   Blood sugar 150 to 200 add 2 unit   Blood sugar 201 to 250 add 4 units   Blood sugar 251 to 300 add 6 units   Blood sugar 301 to 350 add 9 units   Blood sugar greater than 350 add 10 units  -- Reviewed goals of therapy are to get the best control we can without hypoglycemia.  -- Reviewed patient's current insulin regimen. Clarified proper insulin dose and timing in relation to meals, etc. Insulin injection sites and proper rotation instructed.    -- Advised frequent self blood glucose monitoring.  Patient encouraged to document glucose results and bring them to every clinic visit.  -- Hypoglycemia precautions discussed. Instructed on precautions before driving.    -- Call for Bg repeatedly < 90 or > 180.   -- Close adherence to lifestyle changes recommended.   -- Periodic follow ups for eye evaluations, foot care and dental care suggested.      Follow up in about 4 months (around 11/23/2024).    I spent 30 minutes face-to-face with the patient, over half of the visit was spent on counseling and/or coordinating the care of the patient.    Counseling includes:  Diagnostic results, impressions, recommendations   Prognosis   Risk and benefits of management/treatment options   Instructions for management treatment and or follow-up   Importance of compliance with management   Risk factor reduction   Patient education    Visit today included increased complexity associated with the care of the problems addressed and managing the longitudinal care of the patient due to the serious and/or complex managed problems.

## 2024-07-16 ENCOUNTER — PATIENT MESSAGE (OUTPATIENT)
Dept: FAMILY MEDICINE | Facility: CLINIC | Age: 44
End: 2024-07-16
Payer: COMMERCIAL

## 2024-07-16 DIAGNOSIS — I10 ESSENTIAL HYPERTENSION: ICD-10-CM

## 2024-07-16 RX ORDER — LOSARTAN POTASSIUM 50 MG/1
50 TABLET ORAL DAILY
Qty: 90 TABLET | Refills: 3 | Status: SHIPPED | OUTPATIENT
Start: 2024-07-16 | End: 2025-07-16

## 2024-07-16 RX ORDER — LOSARTAN POTASSIUM 100 MG/1
50 TABLET ORAL DAILY
Qty: 45 TABLET | Refills: 3 | Status: CANCELLED | OUTPATIENT
Start: 2024-07-16

## 2024-07-16 NOTE — TELEPHONE ENCOUNTER
No care due was identified.  [facility] Embedded Care Due Messages. Reference number: [id number].   7/16/2024 10:51:17 AM CDT

## 2024-07-23 ENCOUNTER — OFFICE VISIT (OUTPATIENT)
Dept: ENDOCRINOLOGY | Facility: CLINIC | Age: 44
End: 2024-07-23
Payer: COMMERCIAL

## 2024-07-23 DIAGNOSIS — E10.65 TYPE 1 DIABETES MELLITUS WITH HYPERGLYCEMIA: Primary | Chronic | ICD-10-CM

## 2024-07-23 DIAGNOSIS — E10.40 TYPE 1 DIABETES MELLITUS WITH DIABETIC NEUROPATHY: ICD-10-CM

## 2024-07-23 PROCEDURE — 3046F HEMOGLOBIN A1C LEVEL >9.0%: CPT | Mod: CPTII,95,, | Performed by: NURSE PRACTITIONER

## 2024-07-23 PROCEDURE — 1160F RVW MEDS BY RX/DR IN RCRD: CPT | Mod: CPTII,95,, | Performed by: NURSE PRACTITIONER

## 2024-07-23 PROCEDURE — 1159F MED LIST DOCD IN RCRD: CPT | Mod: CPTII,95,, | Performed by: NURSE PRACTITIONER

## 2024-07-23 PROCEDURE — G2211 COMPLEX E/M VISIT ADD ON: HCPCS | Mod: 95,,, | Performed by: NURSE PRACTITIONER

## 2024-07-23 PROCEDURE — 4010F ACE/ARB THERAPY RXD/TAKEN: CPT | Mod: CPTII,95,, | Performed by: NURSE PRACTITIONER

## 2024-07-23 PROCEDURE — 99214 OFFICE O/P EST MOD 30 MIN: CPT | Mod: 95,,, | Performed by: NURSE PRACTITIONER

## 2024-07-23 RX ORDER — INSULIN ASPART 100 [IU]/ML
36 INJECTION, SOLUTION INTRAVENOUS; SUBCUTANEOUS
Qty: 140 ML | Refills: 3 | Status: SHIPPED | OUTPATIENT
Start: 2024-07-23

## 2024-07-23 NOTE — ASSESSMENT & PLAN NOTE
-- Labs now.  -- A1c goal <7.0%.  -- Medications discussed:  MFM  GLP1/DPP4i - pancreatitis  SGLT2i - DKA  SFU  Insulin   -- Reviewed logs/CGM:  Glucose well controlled when following a diabetic diet.   Reach out to me sooner for any glucose <70 or consistently >200.  -- Resume CGM - discussed SkinTac.  -- Encouraged dietary/ lifestyle changes.   -- Medication Changes:   Metformin 1000mg twice daily   Lantus 50 units twice daily  Novolog 36units plus correction scale before meals   Add correction scale as needed.   Blood sugar 150 to 200 add 2 unit   Blood sugar 201 to 250 add 4 units   Blood sugar 251 to 300 add 6 units   Blood sugar 301 to 350 add 9 units   Blood sugar greater than 350 add 10 units  -- Reviewed goals of therapy are to get the best control we can without hypoglycemia.  -- Reviewed patient's current insulin regimen. Clarified proper insulin dose and timing in relation to meals, etc. Insulin injection sites and proper rotation instructed.    -- Advised frequent self blood glucose monitoring.  Patient encouraged to document glucose results and bring them to every clinic visit.  -- Hypoglycemia precautions discussed. Instructed on precautions before driving.    -- Call for Bg repeatedly < 90 or > 180.   -- Close adherence to lifestyle changes recommended.   -- Periodic follow ups for eye evaluations, foot care and dental care suggested.

## 2024-07-23 NOTE — Clinical Note
Hello,  I just had a follow up with Mr Ibarra. He mentioned you wanted him to talk to me about appetite suppressants. Unfortunately, the ones I can prescribe (GLP1) are contraindicated given his chronic pancreatitis. He may benefit from Bariatric Medicine. Please let me know if you have any questions.   Shana

## 2024-07-25 ENCOUNTER — TELEPHONE (OUTPATIENT)
Dept: ENDOSCOPY | Facility: HOSPITAL | Age: 44
End: 2024-07-25
Payer: COMMERCIAL

## 2024-07-26 ENCOUNTER — PATIENT OUTREACH (OUTPATIENT)
Dept: ADMINISTRATIVE | Facility: HOSPITAL | Age: 44
End: 2024-07-26
Payer: COMMERCIAL

## 2024-07-26 NOTE — PROGRESS NOTES
07/26/2024  VB chart audit performed. Care Everywhere updates requested and reviewed  Overdue HM topic chart audit and/or requested. LINKS triggered and reconciled. Media reviewed Lvm/portal sent regarding overdue health topics

## 2024-08-05 ENCOUNTER — TELEPHONE (OUTPATIENT)
Dept: ENDOCRINOLOGY | Facility: CLINIC | Age: 44
End: 2024-08-05
Payer: COMMERCIAL

## 2024-08-05 ENCOUNTER — PATIENT OUTREACH (OUTPATIENT)
Dept: ADMINISTRATIVE | Facility: HOSPITAL | Age: 44
End: 2024-08-05
Payer: COMMERCIAL

## 2024-08-15 ENCOUNTER — TELEPHONE (OUTPATIENT)
Dept: ENDOSCOPY | Facility: HOSPITAL | Age: 44
End: 2024-08-15
Payer: COMMERCIAL

## 2024-08-15 NOTE — TELEPHONE ENCOUNTER
Called pt to schedule EUS with no answer. Second attempt made to reach pt with no return call. Letter sent to address on file. Direct phone number left on voicemail for future scheduling. Order cancelled at this time.

## 2024-09-03 ENCOUNTER — PATIENT MESSAGE (OUTPATIENT)
Dept: FAMILY MEDICINE | Facility: CLINIC | Age: 44
End: 2024-09-03
Payer: COMMERCIAL

## 2024-09-07 DIAGNOSIS — E10.40 TYPE 1 DIABETES MELLITUS WITH DIABETIC NEUROPATHY: ICD-10-CM

## 2024-09-09 RX ORDER — BLOOD-GLUCOSE SENSOR
EACH MISCELLANEOUS
Qty: 3 EACH | Refills: 3 | Status: SHIPPED | OUTPATIENT
Start: 2024-09-09

## 2024-10-17 ENCOUNTER — PATIENT OUTREACH (OUTPATIENT)
Dept: ADMINISTRATIVE | Facility: HOSPITAL | Age: 44
End: 2024-10-17
Payer: COMMERCIAL

## 2024-10-21 ENCOUNTER — PATIENT OUTREACH (OUTPATIENT)
Dept: ADMINISTRATIVE | Facility: HOSPITAL | Age: 44
End: 2024-10-21
Payer: COMMERCIAL

## 2024-10-21 DIAGNOSIS — E10.65 TYPE 1 DIABETES MELLITUS WITH HYPERGLYCEMIA: Primary | Chronic | ICD-10-CM

## 2024-10-21 NOTE — PROGRESS NOTES
Health Maintenance Topic(s) Outreach Outcomes & Actions Taken:    Lab(s) - Outreach Outcomes & Actions Taken  : Overdue Lab(s) Scheduled and lab scheduled     Eye Exam - Outreach Outcomes & Actions Taken  : Pt Will Schedule with External Provider / Order Routed & Care Team Updated if Applicable

## 2024-10-22 ENCOUNTER — PATIENT MESSAGE (OUTPATIENT)
Dept: FAMILY MEDICINE | Facility: CLINIC | Age: 44
End: 2024-10-22
Payer: COMMERCIAL

## 2024-10-22 DIAGNOSIS — E10.40 TYPE 1 DIABETES MELLITUS WITH DIABETIC NEUROPATHY: ICD-10-CM

## 2024-10-22 RX ORDER — PREGABALIN 150 MG/1
150 CAPSULE ORAL 2 TIMES DAILY
Qty: 60 CAPSULE | Refills: 5 | Status: SHIPPED | OUTPATIENT
Start: 2024-10-22

## 2024-10-22 NOTE — TELEPHONE ENCOUNTER
Care Due:                  Date            Visit Type   Department     Provider  --------------------------------------------------------------------------------                                EP -                              PRIMARY      [facility]  Last Visit: 05-      [facility]  Next Visit: None Scheduled  None         None Found                                                            Last  Test          Frequency    Reason                     Performed    Due Date  --------------------------------------------------------------------------------    Uric Acid...  12 months..  allopurinoL..............  Not Found    Overdue    [employer] Embedded Care Due Messages. Reference number: [id number].   10/22/2024 8:33:59 AM CDT

## 2024-10-23 ENCOUNTER — PATIENT OUTREACH (OUTPATIENT)
Dept: ADMINISTRATIVE | Facility: HOSPITAL | Age: 44
End: 2024-10-23
Payer: COMMERCIAL

## 2024-10-23 NOTE — PROGRESS NOTES
Health Maintenance Topic(s) Outreach Outcomes & Actions Taken:    Eye Exam - Outreach Outcomes & Actions Taken  : Pt Will Schedule with External Provider / Order Routed & Care Team Updated if Applicable

## 2024-10-28 ENCOUNTER — OFFICE VISIT (OUTPATIENT)
Dept: FAMILY MEDICINE | Facility: CLINIC | Age: 44
End: 2024-10-28
Payer: COMMERCIAL

## 2024-10-28 VITALS
BODY MASS INDEX: 34.04 KG/M2 | SYSTOLIC BLOOD PRESSURE: 144 MMHG | OXYGEN SATURATION: 98 % | HEART RATE: 98 BPM | WEIGHT: 224.63 LBS | HEIGHT: 68 IN | DIASTOLIC BLOOD PRESSURE: 70 MMHG

## 2024-10-28 DIAGNOSIS — E10.42 DIABETIC POLYNEUROPATHY ASSOCIATED WITH TYPE 1 DIABETES MELLITUS: ICD-10-CM

## 2024-10-28 DIAGNOSIS — F32.A DEPRESSION, UNSPECIFIED DEPRESSION TYPE: ICD-10-CM

## 2024-10-28 DIAGNOSIS — E10.65 TYPE 1 DIABETES MELLITUS WITH HYPERGLYCEMIA: Primary | Chronic | ICD-10-CM

## 2024-10-28 DIAGNOSIS — I10 ESSENTIAL HYPERTENSION: Chronic | ICD-10-CM

## 2024-10-28 DIAGNOSIS — E66.811 CLASS 1 OBESITY WITH ALVEOLAR HYPOVENTILATION, SERIOUS COMORBIDITY, AND BODY MASS INDEX (BMI) OF 34.0 TO 34.9 IN ADULT: ICD-10-CM

## 2024-10-28 DIAGNOSIS — E79.0 HYPERURICEMIA: ICD-10-CM

## 2024-10-28 DIAGNOSIS — E66.2 CLASS 1 OBESITY WITH ALVEOLAR HYPOVENTILATION, SERIOUS COMORBIDITY, AND BODY MASS INDEX (BMI) OF 34.0 TO 34.9 IN ADULT: ICD-10-CM

## 2024-10-28 DIAGNOSIS — Z23 NEED FOR INFLUENZA VACCINATION: ICD-10-CM

## 2024-10-28 DIAGNOSIS — E78.2 MIXED HYPERLIPIDEMIA: Chronic | ICD-10-CM

## 2024-10-28 DIAGNOSIS — G47.33 OSA (OBSTRUCTIVE SLEEP APNEA): Chronic | ICD-10-CM

## 2024-10-28 PROCEDURE — 90656 IIV3 VACC NO PRSV 0.5 ML IM: CPT | Mod: S$GLB,,,

## 2024-10-28 PROCEDURE — 1160F RVW MEDS BY RX/DR IN RCRD: CPT | Mod: CPTII,S$GLB,,

## 2024-10-28 PROCEDURE — 1159F MED LIST DOCD IN RCRD: CPT | Mod: CPTII,S$GLB,,

## 2024-10-28 PROCEDURE — 99214 OFFICE O/P EST MOD 30 MIN: CPT | Mod: 25,S$GLB,,

## 2024-10-28 PROCEDURE — 3078F DIAST BP <80 MM HG: CPT | Mod: CPTII,S$GLB,,

## 2024-10-28 PROCEDURE — 3046F HEMOGLOBIN A1C LEVEL >9.0%: CPT | Mod: CPTII,S$GLB,,

## 2024-10-28 PROCEDURE — 3008F BODY MASS INDEX DOCD: CPT | Mod: CPTII,S$GLB,,

## 2024-10-28 PROCEDURE — 90471 IMMUNIZATION ADMIN: CPT | Mod: S$GLB,,,

## 2024-10-28 PROCEDURE — 3077F SYST BP >= 140 MM HG: CPT | Mod: CPTII,S$GLB,,

## 2024-10-28 PROCEDURE — 4010F ACE/ARB THERAPY RXD/TAKEN: CPT | Mod: CPTII,S$GLB,,

## 2024-10-28 PROCEDURE — 99999 PR PBB SHADOW E&M-EST. PATIENT-LVL V: CPT | Mod: PBBFAC,,,

## 2024-10-28 RX ORDER — ALLOPURINOL 300 MG/1
300 TABLET ORAL DAILY
Qty: 90 TABLET | Refills: 3 | Status: SHIPPED | OUTPATIENT
Start: 2024-10-28

## 2024-10-28 RX ORDER — METFORMIN HYDROCHLORIDE 500 MG/1
1000 TABLET ORAL 2 TIMES DAILY WITH MEALS
Qty: 360 TABLET | Refills: 3 | Status: SHIPPED | OUTPATIENT
Start: 2024-10-28 | End: 2025-10-28

## 2024-11-06 ENCOUNTER — PATIENT MESSAGE (OUTPATIENT)
Dept: FAMILY MEDICINE | Facility: CLINIC | Age: 44
End: 2024-11-06
Payer: COMMERCIAL

## 2024-11-12 ENCOUNTER — PATIENT MESSAGE (OUTPATIENT)
Dept: ADMINISTRATIVE | Facility: HOSPITAL | Age: 44
End: 2024-11-12
Payer: COMMERCIAL

## 2024-11-20 NOTE — PROGRESS NOTES
Subjective:      Patient ID: Bay Ibarra is a 44 y.o. male.    Chief Complaint:  No chief complaint on file.    History of Present Illness  Bay Ibarra is here for follow up of DM.  Previously seen by me on 7/2024.   This is a MyChart video visit.    The patient location is: [place]  The chief complaint leading to consultation is: DM  Visit type: Virtual visit with synchronous audio and video  Total time spent with patient: see below  Each patient to whom he or she provides medical services by telemedicine is:  (1) informed of the relationship between the physician and patient and the respective role of any other health care provider with respect to management of the patient; and (2) notified that he or she may decline to receive medical services by telemedicine and may withdraw from such care at any time.    With regards to Diabetes:    3/2021  Cpeptide 1.25 with glucose 315    Diagnosed: 2009  Education - last visit: 10/2023  FH: father, mother, paternal grandfather   2 siblings - denies DM   Known complications:  DKA + 11/2020, 1/2023  RN -  Eye Exam: 7/2022 - needs to make an appointment    PN +  Podiatry: 10/2023  Nephropathy -  CAD -  Hx pancreatitis - 2013, 2x in 2017, 2024  Diabetic foot ulcer R greater toe - following with Podiatry     Diet/Exercise: reports he has not been following a diabetic diet   Eats 3 meals a day.   Snacks : Denies   Drinks : coffee, water, unsweet tea, has cut back on diet soft drinks (down to 1 12oz can a day)  Exercise - tries to stay active.   Recent illness, injury, steroids: Denies     Current Regimen:  Metformin 1000mg twice daily   Lantus 50 units twice daily  Novolog 36units plus correction scale before meals   Add correction scale as needed.   Blood sugar 150 to 200 add 2 unit   Blood sugar 201 to 250 add 4 units   Blood sugar 251 to 300 add 6 units   Blood sugar 301 to 350 add 9 units   Blood sugar greater than 350 add 10 units    Reports compliance.  "    Other medications tried:  Jardiance - stopped in February 2020 due to DKA   MFM  DPP4  "Other orals"    Glucose Monitor: Dexcom G7  6 times a day testing  Log reviewed: sensor reviewed    Hypoglycemia:  Denies   Knows how to correct with 15 grams of carbs- juice, coke, or a peppermint.      Diabetes Management Status    Hemoglobin A1C   Date Value Ref Range Status   11/22/2024 9.9 (H) 4.0 - 5.6 % Final     Comment:     ADA Screening Guidelines:  5.7-6.4%  Consistent with prediabetes  >or=6.5%  Consistent with diabetes    High levels of fetal hemoglobin interfere with the HbA1C  assay. Heterozygous hemoglobin variants (HbS, HgC, etc)do  not significantly interfere with this assay.   However, presence of multiple variants may affect accuracy.     08/23/2024 10.2 (H) 4.0 - 5.6 % Final     Comment:     ADA Screening Guidelines:  5.7-6.4%  Consistent with prediabetes  >or=6.5%  Consistent with diabetes    High levels of fetal hemoglobin interfere with the HbA1C  assay. Heterozygous hemoglobin variants (HbS, HgC, etc)do  not significantly interfere with this assay.   However, presence of multiple variants may affect accuracy.     03/28/2024 9.5 (H) 4.0 - 5.6 % Final     Comment:     ADA Screening Guidelines:  5.7-6.4%  Consistent with prediabetes  >or=6.5%  Consistent with diabetes    High levels of fetal hemoglobin interfere with the HbA1C  assay. Heterozygous hemoglobin variants (HbS, HgC, etc)do  not significantly interfere with this assay.   However, presence of multiple variants may affect accuracy.         Statin: Taking  ACE/ARB: Taking  Screening or Prevention Patient's value Goal Complete/Controlled?   HgA1C Testing and Control   Lab Results   Component Value Date    HGBA1C 9.9 (H) 11/22/2024      Annually/Less than 8% No   Lipid profile : 05/29/2024 Annually Yes   LDL control Lab Results   Component Value Date    LDLCALC Invalid, Trig>400.0 05/29/2024    Annually/Less than 100 mg/dl  Yes   Nephropathy " "screening Lab Results   Component Value Date    LABMICR 33.0 10/28/2024     Lab Results   Component Value Date    PROTEINUA 2+ (A) 02/09/2024    Annually Yes   Blood pressure BP Readings from Last 1 Encounters:   10/28/24 (!) 144/70    Less than 140/90 Yes   Dilated retinal exam : 07/27/2022 Annually Yes   Foot exam   : 01/25/2023 Annually Yes     Review of Systems  As above     There were no vitals taken for this visit.      There is no height or weight on file to calculate BMI.    Lab Review:   Lab Results   Component Value Date    HGBA1C 9.9 (H) 11/22/2024    HGBA1C 10.2 (H) 08/23/2024    HGBA1C 9.5 (H) 03/28/2024       Lab Results   Component Value Date    CHOL 99 (L) 05/29/2024    HDL 17 (L) 05/29/2024    LDLCALC Invalid, Trig>400.0 05/29/2024    TRIG 656 (H) 05/29/2024    CHOLHDL 17.2 (L) 05/29/2024     Lab Results   Component Value Date     08/23/2024    K 4.0 08/23/2024     08/23/2024    CO2 26 08/23/2024     (H) 08/23/2024    BUN 14 08/23/2024    CREATININE 1.2 08/23/2024    CALCIUM 9.4 08/23/2024    PROT 7.3 05/29/2024    ALBUMIN 3.8 05/29/2024    BILITOT 0.5 05/29/2024    ALKPHOS 141 (H) 05/29/2024    AST 38 05/29/2024    ALT 53 (H) 05/29/2024    ANIONGAP 10 08/23/2024    ESTGFRAFRICA >60.0 03/01/2022    EGFRNONAA >60.0 03/01/2022    TSH 2.380 06/02/2023     No results found for: "LYOTRONZ94PF"  Assessment and Plan     1. Type 1 diabetes mellitus with hyperglycemia  insulin aspart U-100 (NOVOLOG FLEXPEN U-100 INSULIN) 100 unit/mL (3 mL) InPn pen    Hemoglobin A1C    Basic Metabolic Panel    TSH          Type 1 diabetes mellitus with hyperglycemia  -- Labs prior to follow up.  -- A1c goal <7.0%.  -- Medications discussed:  MFM  GLP1/DPP4i - avoid given recurrent pancreatitis  SGLT2i - DKA  SFU  Insulin   -- Reviewed logs/CGM:  Global hyperglycemia.  Reach out to me sooner for any glucose <70 or consistently >200.  -- Encouraged dietary/ lifestyle changes.   -- Medication Changes: "   Metformin 1000mg twice daily   Lantus 50 units twice daily  Novolog 40units plus correction scale before meals   Add correction scale as needed.   Blood sugar 150 to 200 add 2 unit   Blood sugar 201 to 250 add 4 units   Blood sugar 251 to 300 add 6 units   Blood sugar 301 to 350 add 9 units   Blood sugar greater than 350 add 10 units  -- Reviewed goals of therapy are to get the best control we can without hypoglycemia.  -- Reviewed patient's current insulin regimen. Clarified proper insulin dose and timing in relation to meals, etc. Insulin injection sites and proper rotation instructed.    -- Advised frequent self blood glucose monitoring.  Patient encouraged to document glucose results and bring them to every clinic visit.  -- Hypoglycemia precautions discussed. Instructed on precautions before driving.    -- Call for Bg repeatedly < 90 or > 180.   -- Close adherence to lifestyle changes recommended.   -- Periodic follow ups for eye evaluations, foot care and dental care suggested.        Follow up in about 3 months (around 2/25/2025).    I spent 30 minutes face-to-face with the patient, over half of the visit was spent on counseling and/or coordinating the care of the patient.    Counseling includes:  Diagnostic results, impressions, recommendations   Prognosis   Risk and benefits of management/treatment options   Instructions for management treatment and or follow-up   Importance of compliance with management   Risk factor reduction   Patient education    Visit today included increased complexity associated with the care of the problems addressed and managing the longitudinal care of the patient due to the serious and/or complex managed problems.

## 2024-11-25 ENCOUNTER — OFFICE VISIT (OUTPATIENT)
Dept: ENDOCRINOLOGY | Facility: CLINIC | Age: 44
End: 2024-11-25
Payer: COMMERCIAL

## 2024-11-25 DIAGNOSIS — E10.65 TYPE 1 DIABETES MELLITUS WITH HYPERGLYCEMIA: Primary | Chronic | ICD-10-CM

## 2024-11-25 PROCEDURE — 3046F HEMOGLOBIN A1C LEVEL >9.0%: CPT | Mod: CPTII,95,, | Performed by: NURSE PRACTITIONER

## 2024-11-25 PROCEDURE — 1159F MED LIST DOCD IN RCRD: CPT | Mod: CPTII,95,, | Performed by: NURSE PRACTITIONER

## 2024-11-25 PROCEDURE — 99214 OFFICE O/P EST MOD 30 MIN: CPT | Mod: 95,,, | Performed by: NURSE PRACTITIONER

## 2024-11-25 PROCEDURE — 3060F POS MICROALBUMINURIA REV: CPT | Mod: CPTII,95,, | Performed by: NURSE PRACTITIONER

## 2024-11-25 PROCEDURE — 1160F RVW MEDS BY RX/DR IN RCRD: CPT | Mod: CPTII,95,, | Performed by: NURSE PRACTITIONER

## 2024-11-25 PROCEDURE — 4010F ACE/ARB THERAPY RXD/TAKEN: CPT | Mod: CPTII,95,, | Performed by: NURSE PRACTITIONER

## 2024-11-25 PROCEDURE — 3066F NEPHROPATHY DOC TX: CPT | Mod: CPTII,95,, | Performed by: NURSE PRACTITIONER

## 2024-11-25 PROCEDURE — 95251 CONT GLUC MNTR ANALYSIS I&R: CPT | Mod: NDTC,,, | Performed by: NURSE PRACTITIONER

## 2024-11-25 RX ORDER — INSULIN ASPART 100 [IU]/ML
40 INJECTION, SOLUTION INTRAVENOUS; SUBCUTANEOUS
Qty: 140 ML | Refills: 3 | Status: SHIPPED | OUTPATIENT
Start: 2024-11-25

## 2024-11-25 NOTE — ASSESSMENT & PLAN NOTE
-- Labs prior to follow up.  -- A1c goal <7.0%.  -- Medications discussed:  MFM  GLP1/DPP4i - avoid given recurrent pancreatitis  SGLT2i - DKA  SFU  Insulin   -- Reviewed logs/CGM:  Global hyperglycemia.  Reach out to me sooner for any glucose <70 or consistently >200.  -- Encouraged dietary/ lifestyle changes.   -- Medication Changes:   Metformin 1000mg twice daily   Lantus 50 units twice daily  Novolog 40units plus correction scale before meals   Add correction scale as needed.   Blood sugar 150 to 200 add 2 unit   Blood sugar 201 to 250 add 4 units   Blood sugar 251 to 300 add 6 units   Blood sugar 301 to 350 add 9 units   Blood sugar greater than 350 add 10 units  -- Reviewed goals of therapy are to get the best control we can without hypoglycemia.  -- Reviewed patient's current insulin regimen. Clarified proper insulin dose and timing in relation to meals, etc. Insulin injection sites and proper rotation instructed.    -- Advised frequent self blood glucose monitoring.  Patient encouraged to document glucose results and bring them to every clinic visit.  -- Hypoglycemia precautions discussed. Instructed on precautions before driving.    -- Call for Bg repeatedly < 90 or > 180.   -- Close adherence to lifestyle changes recommended.   -- Periodic follow ups for eye evaluations, foot care and dental care suggested.

## 2024-11-25 NOTE — Clinical Note
Upload dexcom VV in 3 months with labs prior Orders Placed This Encounter     Hemoglobin A1C     Basic Metabolic Panel     TSH

## 2024-12-02 ENCOUNTER — PATIENT OUTREACH (OUTPATIENT)
Dept: ADMINISTRATIVE | Facility: HOSPITAL | Age: 44
End: 2024-12-02
Payer: COMMERCIAL

## 2024-12-02 DIAGNOSIS — E10.65 TYPE 1 DIABETES MELLITUS WITH HYPERGLYCEMIA: Primary | Chronic | ICD-10-CM

## 2025-01-24 DIAGNOSIS — R10.84 GENERALIZED ABDOMINAL PAIN: ICD-10-CM

## 2025-01-24 DIAGNOSIS — E10.40 TYPE 1 DIABETES MELLITUS WITH DIABETIC NEUROPATHY: ICD-10-CM

## 2025-01-24 DIAGNOSIS — R11.0 NAUSEA: ICD-10-CM

## 2025-01-24 NOTE — TELEPHONE ENCOUNTER
No care due was identified.  [facility] Embedded Care Due Messages. Reference number: [id number].   1/24/2025 1:54:05 PM CST

## 2025-01-25 RX ORDER — BLOOD-GLUCOSE SENSOR
EACH MISCELLANEOUS
Qty: 3 EACH | Refills: 3 | Status: ON HOLD | OUTPATIENT
Start: 2025-01-25

## 2025-01-25 NOTE — TELEPHONE ENCOUNTER
Refill Routing Note   Medication(s) are not appropriate for processing by [facility] for the following reason(s):        Outside of protocol    [facility] action(s):  Route             Appointments  past 12m or future 3m with PCP    Date Provider   Last Visit   5/28/2024 Funmilayo Taylor MD   Next Visit   1/29/2025 Funmilayo Taylor MD   ED visits in past 90 days: 0        Note composed:7:52 PM 01/24/2025

## 2025-01-28 RX ORDER — ONDANSETRON 4 MG/1
4 TABLET, FILM COATED ORAL EVERY 6 HOURS PRN
Qty: 30 TABLET | Refills: 1 | Status: ON HOLD | OUTPATIENT
Start: 2025-01-28

## 2025-01-29 ENCOUNTER — OFFICE VISIT (OUTPATIENT)
Dept: FAMILY MEDICINE | Facility: CLINIC | Age: 45
End: 2025-01-29
Payer: COMMERCIAL

## 2025-01-29 ENCOUNTER — PATIENT MESSAGE (OUTPATIENT)
Dept: FAMILY MEDICINE | Facility: CLINIC | Age: 45
End: 2025-01-29

## 2025-01-29 ENCOUNTER — CLINICAL SUPPORT (OUTPATIENT)
Dept: FAMILY MEDICINE | Facility: CLINIC | Age: 45
End: 2025-01-29
Attending: FAMILY MEDICINE
Payer: COMMERCIAL

## 2025-01-29 VITALS
DIASTOLIC BLOOD PRESSURE: 70 MMHG | WEIGHT: 231.38 LBS | BODY MASS INDEX: 35.07 KG/M2 | HEART RATE: 97 BPM | HEIGHT: 68 IN | SYSTOLIC BLOOD PRESSURE: 150 MMHG | OXYGEN SATURATION: 97 %

## 2025-01-29 DIAGNOSIS — I10 ESSENTIAL HYPERTENSION: Chronic | ICD-10-CM

## 2025-01-29 DIAGNOSIS — E78.1 HYPERTRIGLYCERIDEMIA: Chronic | ICD-10-CM

## 2025-01-29 DIAGNOSIS — E78.2 MIXED HYPERLIPIDEMIA: Chronic | ICD-10-CM

## 2025-01-29 DIAGNOSIS — E66.811 CLASS 1 OBESITY WITH ALVEOLAR HYPOVENTILATION, SERIOUS COMORBIDITY, AND BODY MASS INDEX (BMI) OF 33.0 TO 33.9 IN ADULT: Primary | ICD-10-CM

## 2025-01-29 DIAGNOSIS — K86.1 OTHER CHRONIC PANCREATITIS: ICD-10-CM

## 2025-01-29 DIAGNOSIS — K31.84 DIABETIC GASTROPARESIS ASSOCIATED WITH TYPE 1 DIABETES MELLITUS: ICD-10-CM

## 2025-01-29 DIAGNOSIS — D69.6 THROMBOCYTOPENIA, UNSPECIFIED: Chronic | ICD-10-CM

## 2025-01-29 DIAGNOSIS — G47.33 OSA (OBSTRUCTIVE SLEEP APNEA): Chronic | ICD-10-CM

## 2025-01-29 DIAGNOSIS — E10.65 TYPE 1 DIABETES MELLITUS WITH HYPERGLYCEMIA: ICD-10-CM

## 2025-01-29 DIAGNOSIS — E10.43 DIABETIC GASTROPARESIS ASSOCIATED WITH TYPE 1 DIABETES MELLITUS: ICD-10-CM

## 2025-01-29 DIAGNOSIS — E10.42 DIABETIC POLYNEUROPATHY ASSOCIATED WITH TYPE 1 DIABETES MELLITUS: ICD-10-CM

## 2025-01-29 DIAGNOSIS — I85.00 ESOPHAGEAL VARICES WITHOUT BLEEDING, UNSPECIFIED ESOPHAGEAL VARICES TYPE: ICD-10-CM

## 2025-01-29 DIAGNOSIS — E66.2 CLASS 1 OBESITY WITH ALVEOLAR HYPOVENTILATION, SERIOUS COMORBIDITY, AND BODY MASS INDEX (BMI) OF 33.0 TO 33.9 IN ADULT: Primary | ICD-10-CM

## 2025-01-29 DIAGNOSIS — K86.3 PANCREATIC PSEUDOCYST: Chronic | ICD-10-CM

## 2025-01-29 DIAGNOSIS — E10.65 TYPE 1 DIABETES MELLITUS WITH HYPERGLYCEMIA: Chronic | ICD-10-CM

## 2025-01-29 PROBLEM — E66.812 CLASS 2 OBESITY WITH ALVEOLAR HYPOVENTILATION, SERIOUS COMORBIDITY, AND BODY MASS INDEX (BMI) OF 35.0 TO 35.9 IN ADULT: Status: ACTIVE | Noted: 2023-08-09

## 2025-01-29 PROBLEM — E66.812 CLASS 2 OBESITY WITH ALVEOLAR HYPOVENTILATION, SERIOUS COMORBIDITY, AND BODY MASS INDEX (BMI) OF 35.0 TO 35.9 IN ADULT: Chronic | Status: ACTIVE | Noted: 2023-08-09

## 2025-01-29 PROCEDURE — 4010F ACE/ARB THERAPY RXD/TAKEN: CPT | Mod: CPTII,S$GLB,, | Performed by: FAMILY MEDICINE

## 2025-01-29 PROCEDURE — 3052F HG A1C>EQUAL 8.0%<EQUAL 9.0%: CPT | Mod: CPTII,S$GLB,, | Performed by: FAMILY MEDICINE

## 2025-01-29 PROCEDURE — 3078F DIAST BP <80 MM HG: CPT | Mod: CPTII,S$GLB,, | Performed by: FAMILY MEDICINE

## 2025-01-29 PROCEDURE — 3077F SYST BP >= 140 MM HG: CPT | Mod: CPTII,S$GLB,, | Performed by: FAMILY MEDICINE

## 2025-01-29 PROCEDURE — 99214 OFFICE O/P EST MOD 30 MIN: CPT | Mod: S$GLB,,, | Performed by: FAMILY MEDICINE

## 2025-01-29 PROCEDURE — 99999 PR PBB SHADOW E&M-EST. PATIENT-LVL V: CPT | Mod: PBBFAC,,, | Performed by: FAMILY MEDICINE

## 2025-01-29 PROCEDURE — 3008F BODY MASS INDEX DOCD: CPT | Mod: CPTII,S$GLB,, | Performed by: FAMILY MEDICINE

## 2025-01-29 RX ORDER — CYCLOBENZAPRINE HCL 5 MG
5 TABLET ORAL 3 TIMES DAILY PRN
Qty: 90 TABLET | Refills: 2 | Status: SHIPPED | OUTPATIENT
Start: 2025-01-29

## 2025-01-29 NOTE — PROGRESS NOTES
Airway  Performed by: Shama Larose CRNA  Authorized by: Shama Larose CRNA     Final Airway Type:  Supraglottic airway  Final Supraglottic Airway:  Unique  SGA Size*:  5  Attempts*:  1   Patient Identified, Procedure confirmed, Emergency equipment available and Safety protocols followed  Location:  OR  Urgency:  Elective  Indications for Airway Management:  Anesthesia  Sedation Level:  Anesthetized  Mask Difficulty Assessment:  1 - vent by mask  Performed By:  CRNA  CRNA:  Shama Larose CRNA   Leak at 20 Bay Ibarra is a 44 y.o. male here for a diabetic eye screening with non-dilated fundus photos per Dr Taylor.    Patient cooperative?: Yes  Small pupils?: Yes  Last eye exam: N/A    For exam results, see Encounter Report.

## 2025-01-30 ENCOUNTER — CLINICAL SUPPORT (OUTPATIENT)
Dept: OTHER | Facility: CLINIC | Age: 45
End: 2025-01-30
Payer: COMMERCIAL

## 2025-01-30 DIAGNOSIS — Z00.8 ENCOUNTER FOR OTHER GENERAL EXAMINATION: ICD-10-CM

## 2025-01-30 PROCEDURE — 80061 LIPID PANEL: CPT | Mod: QW,S$GLB,, | Performed by: INTERNAL MEDICINE

## 2025-01-30 PROCEDURE — 82947 ASSAY GLUCOSE BLOOD QUANT: CPT | Mod: QW,S$GLB,, | Performed by: INTERNAL MEDICINE

## 2025-01-30 PROCEDURE — 83036 HEMOGLOBIN GLYCOSYLATED A1C: CPT | Mod: QW,S$GLB,, | Performed by: INTERNAL MEDICINE

## 2025-01-30 PROCEDURE — 99401 PREV MED CNSL INDIV APPRX 15: CPT | Mod: S$GLB,,, | Performed by: INTERNAL MEDICINE

## 2025-01-31 VITALS
SYSTOLIC BLOOD PRESSURE: 130 MMHG | BODY MASS INDEX: 33.49 KG/M2 | HEIGHT: 68 IN | DIASTOLIC BLOOD PRESSURE: 71 MMHG | WEIGHT: 221 LBS

## 2025-01-31 LAB
HBA1C MFR BLD: 8.2 %
HDLC SERPL-MCNC: 14 MG/DL
POC CHOLESTEROL, TOTAL: 99 MG/DL
POC GLUCOSE, FASTING: 169 MG/DL (ref 60–110)
TRIGL SERPL-MCNC: 183 MG/DL

## 2025-02-03 PROBLEM — K31.1 GASTRIC OUTLET OBSTRUCTION: Status: ACTIVE | Noted: 2025-02-03

## 2025-02-05 PROBLEM — K86.1 OTHER CHRONIC PANCREATITIS: Chronic | Status: ACTIVE | Noted: 2017-04-16

## 2025-02-05 PROBLEM — E10.42 DIABETIC POLYNEUROPATHY ASSOCIATED WITH TYPE 1 DIABETES MELLITUS: Chronic | Status: ACTIVE | Noted: 2020-04-23

## 2025-02-05 PROBLEM — K31.84 DIABETIC GASTROPARESIS ASSOCIATED WITH TYPE 1 DIABETES MELLITUS: Chronic | Status: ACTIVE | Noted: 2020-03-12

## 2025-02-05 PROBLEM — E10.43 DIABETIC GASTROPARESIS ASSOCIATED WITH TYPE 1 DIABETES MELLITUS: Chronic | Status: ACTIVE | Noted: 2020-03-12

## 2025-02-05 NOTE — PROGRESS NOTES
PATIENT VISIT FAMILY MEDICINE    CC:   Chief Complaint   Patient presents with    Follow-up     3 month f/u    Bloated    Weight Loss     Not injectable        HPI:    GASTROPARESIS:  He has experienced nausea and abdominal pain since Friday. He has a history of gastroparesis, diagnosed during hospitalization last year and confirmed by gastric emptying study. He previously experienced an episode where he vomited undigested food from two weeks prior, consistent with delayed gastric emptying. He has discontinued Reglan.    HYPERTENSION:  He reports not checking blood pressure at home due to dead batteries in his monitor. He continues Losartan for blood pressure management.    SLEEP APNEA:  He obtained a replacement part for his CPAP machine but is not currently using it due to needing additional filters and supplies.    EXERCISE:  He has walked 2.5 miles approximately six times in the past month, decreased from previous daily walks [place] with minimum distance of 1 mile. He reports no enjoyment from recent exercise activities, describing it as a chore.    MEDICATIONS:  He requests refills for Flexeril (taken as needed for muscle relaxation) and Zofran.          MEDS: No current facility-administered medications for this visit.  No current outpatient medications on file.    Facility-Administered Medications Ordered in Other Visits:     0.9% NaCl infusion, , Intravenous, Continuous, Magno Billings MD, Last Rate: 150 mL/hr at 02/05/25 0900, New Bag at 02/05/25 0900    acetaminophen tablet 650 mg, 650 mg, Oral, Q4H PRN, Josef Llanos MD    dextrose 50% injection 12.5 g, 12.5 g, Intravenous, PRN, Josef Llanos MD    dextrose 50% injection 12.5 g, 12.5 g, Intravenous, PRN, Josef Llanos MD    dextrose 50% injection 25 g, 25 g, Intravenous, PRN, Josef Llanos MD    enoxaparin injection 40 mg, 40 mg, Subcutaneous, Daily, Josef Llanos MD, 40 mg at 02/04/25 1716    glucagon (human  "recombinant) injection 1 mg, 1 mg, Intramuscular, PRN, Josef Llanos MD    glucagon (human recombinant) injection 1 mg, 1 mg, Intramuscular, PRN, Josef Llanos MD    glucose chewable tablet 16 g, 16 g, Oral, PRN, Josef Llanos MD    glucose chewable tablet 24 g, 24 g, Oral, PRN, Josef Llanos MD    HYDROmorphone (PF) injection 1 mg, 1 mg, Intravenous, Q6H PRN, Josef Llanos MD, 1 mg at 02/05/25 0937    insulin aspart U-100 pen 0-10 Units, 0-10 Units, Subcutaneous, Q6H PRN, Josef Llanos MD, 4 Units at 02/05/25 0619    insulin glargine U-100 (Lantus) pen 5 Units, 5 Units, Subcutaneous, Daily, Josef Llanos MD, 5 Units at 02/05/25 0856    melatonin tablet 6 mg, 6 mg, Oral, Nightly PRN, Josef Llanos MD    metoclopramide injection 10 mg, 10 mg, Intravenous, Q6H, Joon Mullins MD, 10 mg at 02/05/25 0856    naloxone 0.4 mg/mL injection 0.02 mg, 0.02 mg, Intravenous, PRN, Josef Llanos MD    ondansetron injection 4 mg, 4 mg, Intravenous, Q6H PRN, Pamella Duvall MD    promethazine (PHENERGAN) 12.5 mg in 0.9% NaCl 50 mL IVPB, 12.5 mg, Intravenous, Q6H PRN, Pamella Duvall MD, Stopped at 02/04/25 0412    senna-docusate 8.6-50 mg per tablet 1 tablet, 1 tablet, Oral, BID PRN, Josef Llanos MD    sodium chloride 0.9% flush 10 mL, 10 mL, Intravenous, Q8H PRN, Josef Llanos MD    OBJECTIVE:   Vitals:    01/29/25 1337   BP: (!) 150/70   BP Location: Right arm   Patient Position: Sitting   Pulse: 97   SpO2: 97%   Weight: 104.9 kg (231 lb 6 oz)   Height: 5' 8" (1.727 m)     Body mass index is 35.18 kg/m².      Physical Exam  Vitals and nurse note reviewed  Constitutional:   Appearance: Normal appearance.   HENT:   Head: Normocephalic and atraumatic.   Eyes:   General: No scleral icterus.  Cardiovascular:   Rate and Rhythm: Normal rate and regular rhythm.   Pulmonary:   Effort: Pulmonary effort is normal.   Breath sounds: Normal breath sounds.   Neurological: "   Mental Status: He is alert.            LABS:   A1C:  Recent Labs   Lab 11/22/24  0720   Hemoglobin A1C 9.9 H     CBC:  Recent Labs   Lab 02/05/25  0522   WBC 5.49   RBC 4.49 L   Hemoglobin 11.7 L   Hematocrit 37.2 L   Platelets 130 L   MCV 83   MCH 26.1 L   MCHC 31.5 L     CMP:  Recent Labs   Lab 02/05/25  0522   Glucose 216 H   Calcium 8.9   Albumin 3.2 L   Total Protein 6.8   Sodium 142   Potassium 3.7   CO2 25   Chloride 107   BUN 13   Creatinine 0.8   Alkaline Phosphatase 67   ALT 22   AST 17   Total Bilirubin 0.6     LIPIDS:  Recent Labs   Lab 06/02/23  0649 01/03/24  0629 05/29/24  0658   TSH 2.380  --   --    HDL  --    < > 17 L   Cholesterol  --    < > 99 L   Triglycerides  --    < > 656 H   LDL Cholesterol  --    < > Invalid, Trig>400.0   HDL/Cholesterol Ratio  --    < > 17.2 L   Non-HDL Cholesterol  --    < > 82   Total Cholesterol/HDL Ratio  --    < > 5.8 H    < > = values in this interval not displayed.     TSH:  Recent Labs   Lab 06/02/23  0649   TSH 2.380         ASSESSMENT & PLAN:    Problem List Items Addressed This Visit          Neuro    Diabetic gastroparesis associated with type 1 diabetes mellitus (Chronic)    Overview     Uncontrolled. He will see diabetes dietician to discuss further         Relevant Orders    Ambulatory referral/consult to Diabetes Education    Diabetic polyneuropathy associated with type 1 diabetes mellitus (Chronic)    Overview     Stable. Continue current medical therapy              Cardiac/Vascular    Essential hypertension (Chronic)    Overview     Elevated in office. Will f/u via portal.            Hypertriglyceridemia (Chronic)    Overview     Improved. Recent admission for pancreatitis 2024. Continue current medications.          Relevant Orders    Lipid Panel    Mixed hyperlipidemia (Chronic)    Overview     Stable. Continue current medications.             Hematology    Thrombocytopenia, unspecified (Chronic)    Overview     Plt 130  Mild. Continue to monitor             Endocrine    Obesity with alveolar hypoventilation and serious comorbidity - Primary (Chronic)    Overview     Body mass index is 35.18 kg/m².  The patient is asked to make an attempt to improve diet and exercise patterns to aid in medical management of this problem.             Type 1 diabetes mellitus with hyperglycemia (Chronic)    Overview     Last A1c is 8.2 on 01/30/2025  Followed by Endocrine         Relevant Orders    Ambulatory referral/consult to Diabetes Education    Ambulatory referral/consult to Podiatry       GI    Esophageal varices without bleeding, unspecified esophageal varices type (Chronic)    Other chronic pancreatitis (Chronic)    Overview     2/2 to hyperTG. Continue current regimen         Pancreatic pseudocyst (Chronic)    Overview     Previously seen in 2019. Not noted on recent imaging.             Other    JAZMYN (obstructive sleep apnea) (Chronic)    Overview     Recommend regular CPAP use           Follow up in 3 months for DM      RTC/ED precautions discussed where applicable.   Encouraged patient to let me know if there are any further questions/concerns.     Advise patient/caretaker to check with insurance regarding orders to avoid unexpected fees/costs.     The patient/caretaker indicates understanding of these issues and agrees with the plan.    This note was generated with the assistance of ambient listening technology. I attest to having reviewed and edited the generated note for accuracy, though some syntax or spelling errors may persist. Please contact the author of this note for any clarification.      Dr. Funmilayo Taylor MD  Family Medicine

## 2025-02-06 ENCOUNTER — TELEPHONE (OUTPATIENT)
Dept: FAMILY MEDICINE | Facility: CLINIC | Age: 45
End: 2025-02-06
Payer: COMMERCIAL

## 2025-02-06 PROBLEM — I85.00 ESOPHAGEAL VARICES WITHOUT BLEEDING, UNSPECIFIED ESOPHAGEAL VARICES TYPE: Chronic | Status: RESOLVED | Noted: 2025-01-29 | Resolved: 2025-02-06

## 2025-02-06 PROBLEM — K31.1 GASTRIC OUTLET OBSTRUCTION: Status: RESOLVED | Noted: 2025-02-03 | Resolved: 2025-02-06

## 2025-02-06 NOTE — TELEPHONE ENCOUNTER
----- Message from Andrade sent at 2/6/2025  9:38 AM CST -----  Contact: case management  Type:  Sooner Apoointment Request    Caller is requesting a sooner appointment.  Caller declined first available appointment listed below.  Caller will not accept being placed on the waitlist and is requesting a message be sent to doctor.  Name of Caller:Case Management   When is the first available appointment? N/a in epic   Symptoms: Hosp f/u   Would the patient rather a call back or a response via [facility]? Call   Best Call Back Number:[phone]   Additional Information:

## 2025-02-07 ENCOUNTER — PATIENT OUTREACH (OUTPATIENT)
Dept: ADMINISTRATIVE | Facility: CLINIC | Age: 45
End: 2025-02-07
Payer: COMMERCIAL

## 2025-02-07 NOTE — PROGRESS NOTES
C3 nurse attempted to contact Bay Ibarra for a TCC post hospital discharge follow up call. No answer, left voicemail with callback information. The patient has a scheduled HOSFU with Britney Jimenez NP ([facility]) on 2/12/25 at 1100. No messages routed at this time.

## 2025-02-10 DIAGNOSIS — E78.2 MIXED HYPERLIPIDEMIA: ICD-10-CM

## 2025-02-10 NOTE — TELEPHONE ENCOUNTER
No care due was identified.  [facility] Embedded Care Due Messages. Reference number: [id number].   2/10/2025 12:48:47 PM CST

## 2025-02-11 RX ORDER — ICOSAPENT ETHYL 1 G/1
2 CAPSULE ORAL 2 TIMES DAILY
Qty: 360 CAPSULE | Refills: 3 | Status: SHIPPED | OUTPATIENT
Start: 2025-02-11

## 2025-02-12 ENCOUNTER — TELEPHONE (OUTPATIENT)
Dept: GASTROENTEROLOGY | Facility: CLINIC | Age: 45
End: 2025-02-12
Payer: COMMERCIAL

## 2025-02-12 ENCOUNTER — OFFICE VISIT (OUTPATIENT)
Dept: FAMILY MEDICINE | Facility: CLINIC | Age: 45
End: 2025-02-12
Payer: COMMERCIAL

## 2025-02-12 VITALS
HEIGHT: 68 IN | BODY MASS INDEX: 31.96 KG/M2 | HEART RATE: 97 BPM | WEIGHT: 210.88 LBS | SYSTOLIC BLOOD PRESSURE: 110 MMHG | OXYGEN SATURATION: 95 % | DIASTOLIC BLOOD PRESSURE: 60 MMHG

## 2025-02-12 DIAGNOSIS — E10.43 DIABETIC GASTROPARESIS ASSOCIATED WITH TYPE 1 DIABETES MELLITUS: Chronic | ICD-10-CM

## 2025-02-12 DIAGNOSIS — E10.65 TYPE 1 DIABETES MELLITUS WITH HYPERGLYCEMIA: ICD-10-CM

## 2025-02-12 DIAGNOSIS — Z09 HOSPITAL DISCHARGE FOLLOW-UP: Primary | ICD-10-CM

## 2025-02-12 DIAGNOSIS — K31.84 DIABETIC GASTROPARESIS ASSOCIATED WITH TYPE 1 DIABETES MELLITUS: Chronic | ICD-10-CM

## 2025-02-12 DIAGNOSIS — E78.1 HYPERTRIGLYCERIDEMIA: ICD-10-CM

## 2025-02-12 DIAGNOSIS — I10 ESSENTIAL HYPERTENSION: ICD-10-CM

## 2025-02-12 DIAGNOSIS — E78.2 MIXED HYPERLIPIDEMIA: ICD-10-CM

## 2025-02-12 PROCEDURE — 99999 PR PBB SHADOW E&M-EST. PATIENT-LVL V: CPT | Mod: PBBFAC,,,

## 2025-02-12 NOTE — PROGRESS NOTES
Subjective:              Chief Complaint: Hospital Follow Up     Bay Ibarra is a 44 y.o. male, patient of Funmilayo Taylor MD with diabetic  polyneuropathy associated with type 1 diabetes mellitus, hypertension, hyperlipidemia, type II diabetes, obesity, gastroparesis, JAZMYN, history of diabetic foot ulcer, known to me.    Transitional Care Note    History of Present Illness    CHIEF COMPLAINT:  Bay presents today alone for hospital follow-up after admission for abdominal pain and vomiting.    HISTORY OF PRESENT ILLNESS:  He was hospitalized from February 2nd to 6th for abdominal pain and vomiting. Since discharge, his abdominal pain has improved from 7/10 to 1/10, but is currently at 3/10. He experienced one episode of vomiting this morning, consisting of liquid and food consumed over the past two days. He reports eating smaller meals and focusing on easily digestible foods.    GASTROINTESTINAL HISTORY:  He has experienced loose stools since Thanksgiving, with only one solid bowel movement occurring on Sunday. He has been under the care of 4-5 different gastroenterologists since 2013 when his GI issues began. His most recent gastroenterology appointment was with Dr. Jorge A Holland at [facility].    DIABETES MANAGEMENT:  His blood sugar are running around 150 before meals. He is not currently wearing his Dexcom continuous glucose monitor since hospital discharge. He denies testing blood sugar after meals, focusing on pre-meal testing to guide insulin dosing.    CURRENT MEDICATIONS:  He is taking Reglan (recently restarted for 15 days), Novolog 40 units 3 times daily, Lantus 50 units twice daily (prescribed for 55 units), Losartan 50mg, Metformin 1000mg twice daily with meals, Pantoprazole, and Zofran as needed for nausea.      ROS:  General: -fever, -chills, -fatigue, -weight gain, -weight loss  Eyes: -vision changes, -redness, -discharge  ENT: -ear pain, -nasal congestion, -sore throat  Cardiovascular: -chest  "pain, -palpitations, -lower extremity edema  Respiratory: -cough, -shortness of breath  Gastrointestinal: +abdominal pain, -nausea, +vomiting, +diarrhea, -constipation, -blood in stool  Genitourinary: -dysuria, -hematuria, -frequency  Musculoskeletal: -joint pain, -muscle pain  Skin: -rash, -lesion  Neurological: -headache, -dizziness, -numbness, -tingling  Psychiatric: -anxiety, -depression, -sleep difficulty              Objective:     Vitals:    02/12/25 1048 02/12/25 1110   BP: 110/60    BP Location: Left arm    Patient Position: Sitting    Pulse: 102 97   SpO2: 95% 95%   Weight: 95.7 kg (210 lb 13.9 oz)    Height: 5' 8" (1.727 m)           Physical Exam    General: No acute distress. Well-developed. Well-nourished.  Eyes: EOMI. Sclerae anicteric.  HENT: Normocephalic. Atraumatic.  Cardiovascular: Regular rate. Regular rhythm. No murmurs. No rubs. No gallops. Normal S1, S2.  Respiratory: Normal respiratory effort. Clear to auscultation bilaterally. No rales. No rhonchi. No wheezing.  Abdomen: Soft. Non-tender. Non-distended. Normoactive bowel sounds.  Musculoskeletal: No  obvious deformity.  Extremities: No lower extremity edema.  Neurological: Alert & oriented x3. No slurred speech. Normal gait.  Psychiatric: Normal mood. Normal affect. Good insight. Good judgment.  Skin: Warm. Dry. No rash.            Laboratory:  CBC:  Recent Labs   Lab Result Units 02/04/25 0515 02/05/25  0522 02/06/25  0603   WBC K/uL 6.64 5.49 6.54   RBC M/uL 4.82 4.49* 4.62   Hemoglobin g/dL 12.5* 11.7* 12.0*   Hematocrit % 40.1 37.2* 37.5*   Platelets K/uL 174 130* 122*   MCV fL 83 83 81*   MCH pg 25.9* 26.1* 26.0*   MCHC g/dL 31.2* 31.5* 32.0     CMP:  Recent Labs   Lab Result Units 02/04/25  0515 02/05/25  0522 02/06/25  0603   Glucose mg/dL 225* 216* 224*   Calcium mg/dL 9.0 8.9 9.1   Albumin g/dL 3.5 3.2* 3.2*   Total Protein g/dL 7.3 6.8 7.0   Sodium mmol/L 142 142 140   Potassium mmol/L 3.9 3.7 3.9   CO2 mmol/L 26 25 25   Chloride " "mmol/L 105 107 104   BUN mg/dL 16 13 10   Alkaline Phosphatase U/L 75 67 75   ALT U/L 23 22 17   AST U/L 21 17 15   Total Bilirubin mg/dL 0.5 0.6 0.7     URINALYSIS:  Recent Labs   Lab Result Units 02/02/25  1943   Color, UA  Yellow   Specific Gravity, UA  1.025   pH, UA  7.0   Protein, UA  1+*   Bacteria /hpf Moderate*   Nitrite, UA  Negative   Leukocytes, UA  Negative   Urobilinogen, UA EU/dL 1.0   Hyaline Casts, UA /lpf 0      LIPIDS:  No results for input(s): "TSH", "HDL", "CHOL", "TRIG", "LDLCALC", "CHOLHDL", "NONHDLCHOL", "TOTALCHOLEST" in the last 2160 hours.  TSH:  No results for input(s): "TSH" in the last 2160 hours.  A1C:  Recent Labs   Lab Result Units 11/22/24  0720   Hemoglobin A1C % 9.9*       Assessment:         ICD-10-CM ICD-9-CM   1. Hospital discharge follow-up  Z09 V67.59   2. Essential hypertension  I10 401.9   3. Mixed hyperlipidemia  E78.2 272.2   4. Type 1 diabetes mellitus with hyperglycemia  E10.65 250.01   5. Diabetic gastroparesis associated with type 1 diabetes mellitus  E10.43 250.61    K31.84 536.3   6. Hypertriglyceridemia  E78.1 272.1       Plan:       Hospital discharge follow-up  Symptoms improved.   Home health is not needed at this time.     Essential hypertension  See plan below.     Mixed hyperlipidemia  Continue statin therapy.     Type 1 diabetes mellitus with hyperglycemia  See plan below.     Diabetic gastroparesis associated with type 1 diabetes mellitus  See plan below.     Hypertriglyceridemia  Continue statin therapy.     Assessment & Plan    DIABETES:  -Last A1C 8.2.   - Assessed diabetes management, noting improved glucose control with fasting levels around 150 mg/dL.  - Advised the patient to test blood sugar after eating as well.  - Discussed Dexcom continuous glucose monitor usage and placement with the patient.  - Continued Novolog 40 units 3 times daily.  - Continued Lantus 50 units twice daily.  - Continued Metformin 1,000 mg twice daily with meals.  - Keep " Scheduled upcoming appointments with diabetes education and endocrinologist.    ABDOMINAL PAIN/GASTROINTESTINAL ISSUES/DIABETIC GASTROPARESIS:  Egd 02/04/2025 showed the following:  Normal esophagus, congestive gastropathy. Findings suggestive of gastroparesis.  -symptoms improved.   - Continued Reglan (metoclopramide) as prescribed for 15-day course.  -continue pantoprazole BID  - Advised the patient to schedule follow-up with gastroenterologist.  - Emphasized the importance of eating slowly and chewing food thoroughly due to delayed gastric emptying.  - Bay to eat smaller, more frequent meals.  - Recommend avoiding lying down for 2-3 hours after eating.  - Bay to elevate head of bed or use pillows to prop up while sleeping.    VOMITING:  - Assessed post-hospitalization status for vomiting.  - Noted one episode of vomiting since hospital discharge, occurring this morning due to nausea while sleeping.  - Continued Zofran as needed for nausea.  - Advised the patient to sip liquids instead of gulping.  - Instructed the patient to contact the office if symptoms worsen, vomiting becomes continuous, or if more Zofran is needed.  - Recommend returning to the emergency room if vomiting becomes severe or continuous.    GASTROESOPHAGEAL REFLUX DISEASE (GERD):  - Continued pantoprazole.  - Advised the patient to elevate head while sleeping and wait 2-3 hours after eating before lying down.    HYPERTENSION:  -controlled with current regimen.   - Continued Losartan 50 mg daily.     FOLLOW UP:  - Follow up on March 1st with Dr. Aldana.  - Follow up on March 17th with Dr. Clark.  - Follow up on April 29th with Dr. Taylor for 3-month follow-up.          If symptoms worsen, go to ER.  If symptoms do not improve, return to clinic.   Keep appointments with all specialists.     Patient verbalizes understanding and agrees with current treatment plan.    Follow up in about 2 months (around 4/29/2025).     Patient's Medications    New Prescriptions    No medications on file   Previous Medications    ALLOPURINOL (ZYLOPRIM) 300 MG TABLET    Take 1 tablet (300 mg total) by mouth once daily.    ASPIRIN (ECOTRIN) 81 MG EC TABLET    Take 81 mg by mouth once daily.    ATORVASTATIN (LIPITOR) 40 MG TABLET    Take 1 tablet (40 mg total) by mouth once daily. Hold at GA, start next week 5/9    BLOOD SUGAR DIAGNOSTIC (CONTOUR NEXT TEST STRIPS) STRP    To test glucose 4 times a day.    BLOOD-GLUCOSE SENSOR (DEXCOM G7 SENSOR) MARANDA    Change sensor every 10 days.    CYCLOBENZAPRINE (FLEXERIL) 5 MG TABLET    Take 1 tablet (5 mg total) by mouth 3 (three) times daily as needed for Muscle spasms.    FENOFIBRATE 160 MG TAB    Take 1 tablet (160 mg total) by mouth once daily. Hold on GA, start next week: 5/9/2024.    ICOSAPENT ETHYL (VASCEPA) 1 GRAM CAP    Take 2 capsules (2 g total) by mouth 2 (two) times daily.    INSULIN ASPART U-100 (NOVOLOG FLEXPEN U-100 INSULIN) 100 UNIT/ML (3 ML) INPN PEN    Inject 40 Units into the skin 3 (three) times daily with meals. Plus correction scale. Max TDD 140units.    INSULIN GLARGINE U-100, LANTUS, (LANTUS SOLOSTAR U-100 INSULIN) 100 UNIT/ML (3 ML) INPN PEN    Inject 55 Units into the skin 2 (two) times a day.    LACTOBACILLUS RHAMNOSUS GG (CULTURELLE) 10 BILLION CELL CAPSULE    Take 1 capsule by mouth once daily.    LOSARTAN (COZAAR) 50 MG TABLET    Take 1 tablet (50 mg total) by mouth once daily.    METFORMIN (GLUCOPHAGE) 500 MG TABLET    Take 2 tablets (1,000 mg total) by mouth 2 (two) times daily with meals.    METOCLOPRAMIDE HCL (REGLAN) 5 MG TABLET    Take 1 tablet (5 mg total) by mouth 4 (four) times daily. for 15 days    NIACIN 100 MG TAB    Take 2 tablets (200 mg total) by mouth every evening.    ONDANSETRON (ZOFRAN) 4 MG TABLET    Take 1 tablet (4 mg total) by mouth every 6 (six) hours as needed for Nausea.    PANTOPRAZOLE (PROTONIX) 40 MG TABLET    Take 1 tablet (40 mg total) by mouth 2 (two) times daily.     PREGABALIN (LYRICA) 150 MG CAPSULE    Take 1 capsule (150 mg total) by mouth 2 (two) times daily.   Modified Medications    No medications on file   Discontinued Medications    No medications on file       This note was generated with the assistance of ambient listening technology. Verbal consent was obtained by the patient and accompanying visitor(s) for the recording of patient appointment to facilitate this note. I attest to having reviewed and edited the generated note for accuracy, though some syntax or spelling errors may persist. Please contact the author of this note for any clarification.         Britney Jimenez NP

## 2025-02-13 ENCOUNTER — TELEPHONE (OUTPATIENT)
Dept: ENDOSCOPY | Facility: HOSPITAL | Age: 45
End: 2025-02-13
Payer: COMMERCIAL

## 2025-02-13 NOTE — TELEPHONE ENCOUNTER
Return patient call to schedule EUS with no answer. Direct contact given to call back to schedule procedure.

## 2025-02-13 NOTE — TELEPHONE ENCOUNTER
----- Message from Laura sent at 2/12/2025 11:42 AM CST -----  Patient would like to schedule a follow up appt with Dr. Holland. Please call patient to schedule. Thanks

## 2025-02-26 NOTE — PROGRESS NOTES
Subjective:      Patient ID: Bay Ibarra is a 44 y.o. male.    Chief Complaint:  Diabetes    History of Present Illness  Bay Ibarra is here for follow up of DM.  Previously seen by me on 11/2024.   This is a MyChart video visit.    The patient location is: [place]  The chief complaint leading to consultation is: DM  Visit type: Virtual visit with synchronous audio and video  Total time spent with patient: see below  Each patient to whom he or she provides medical services by telemedicine is:  (1) informed of the relationship between the physician and patient and the respective role of any other health care provider with respect to management of the patient; and (2) notified that he or she may decline to receive medical services by telemedicine and may withdraw from such care at any time.    With regards to Diabetes:    3/2021  Cpeptide 1.25 with glucose 315    Diagnosed: 2009  Education - last visit: 2/2025  FH: father, mother, paternal grandfather   2 siblings - denies DM   Known complications:  DKA + 11/2020, 1/2023  RN -  Eye Exam: 7/2022 - needs to make an appointment    PN +  Podiatry: 10/2023  Nephropathy -  CAD -  Hx pancreatitis - 2013, 2x in 2017, 2024  Diabetic foot ulcer R greater toe - following with Podiatry     Diet/Exercise: reports to be eating smaller portions   Eats 3 meals a day.   Snacks : occasionally    Drinks : coffee, water, unsweet tea, has cut back on diet soft drinks (down to 1 20 oz can a day)  Exercise - tries to stay active.   Recent illness, injury, steroids: 2/2025 hospitalization for gastric outlet obstruction.      Current Regimen:  Metformin 1000mg twice daily   Lantus 50 units twice daily  Novolog 40units plus correction scale before meals - he does report he has reduced to 30 units (if glucose < 160) due to hypoglycemia if giving full amount   Add correction scale as needed.   Blood sugar 150 to 200 add 2 unit   Blood sugar 201 to 250 add 4 units   Blood sugar 251  "to 300 add 6 units   Blood sugar 301 to 350 add 9 units   Blood sugar greater than 350 add 10 units    Reports compliance.     Other medications tried:  Jardiance - stopped in February 2020 due to DKA   MFM  DPP4  "Other orals"    Glucose Monitor: Dexcom G7  6 times a day testing  Log reviewed: sensor reviewed    Hypoglycemia:  Yes - timing varies    Knows how to correct with 15 grams of carbs- juice, coke, or a peppermint.      Diabetes Management Status    Hemoglobin A1C   Date Value Ref Range Status   02/28/2025 7.6 (H) 4.0 - 5.6 % Final     Comment:     ADA Screening Guidelines:  5.7-6.4%  Consistent with prediabetes  >or=6.5%  Consistent with diabetes    High levels of fetal hemoglobin interfere with the HbA1C  assay. Heterozygous hemoglobin variants (HbS, HgC, etc)do  not significantly interfere with this assay.   However, presence of multiple variants may affect accuracy.     11/22/2024 9.9 (H) 4.0 - 5.6 % Final     Comment:     ADA Screening Guidelines:  5.7-6.4%  Consistent with prediabetes  >or=6.5%  Consistent with diabetes    High levels of fetal hemoglobin interfere with the HbA1C  assay. Heterozygous hemoglobin variants (HbS, HgC, etc)do  not significantly interfere with this assay.   However, presence of multiple variants may affect accuracy.     08/23/2024 10.2 (H) 4.0 - 5.6 % Final     Comment:     ADA Screening Guidelines:  5.7-6.4%  Consistent with prediabetes  >or=6.5%  Consistent with diabetes    High levels of fetal hemoglobin interfere with the HbA1C  assay. Heterozygous hemoglobin variants (HbS, HgC, etc)do  not significantly interfere with this assay.   However, presence of multiple variants may affect accuracy.         Statin: Taking  ACE/ARB: Taking  Screening or Prevention Patient's value Goal Complete/Controlled?   HgA1C Testing and Control   Lab Results   Component Value Date    HGBA1C 7.6 (H) 02/28/2025      Annually/Less than 8% No   Lipid profile : 02/28/2025 Annually Yes   LDL " "control Lab Results   Component Value Date    LDLCALC 23.2 (L) 02/28/2025    Annually/Less than 100 mg/dl  Yes   Nephropathy screening Lab Results   Component Value Date    LABMICR 33.0 10/28/2024     Lab Results   Component Value Date    PROTEINUA 1+ (A) 02/02/2025    Annually Yes   Blood pressure BP Readings from Last 1 Encounters:   02/12/25 110/60    Less than 140/90 Yes   Dilated retinal exam : 01/29/2025 Annually Yes   Foot exam   : 01/25/2023 Annually Yes     Review of Systems  As above     There were no vitals taken for this visit.      There is no height or weight on file to calculate BMI.    Lab Review:   Lab Results   Component Value Date    HGBA1C 7.6 (H) 02/28/2025    HGBA1C 9.9 (H) 11/22/2024    HGBA1C 10.2 (H) 08/23/2024       Lab Results   Component Value Date    CHOL 77 (L) 02/28/2025    HDL 20 (L) 02/28/2025    LDLCALC 23.2 (L) 02/28/2025    TRIG 169 (H) 02/28/2025    CHOLHDL 26.0 02/28/2025     Lab Results   Component Value Date     02/28/2025    K 4.2 02/28/2025     02/28/2025    CO2 25 02/28/2025     02/28/2025    BUN 12 02/28/2025    CREATININE 1.0 02/28/2025    CALCIUM 9.9 02/28/2025    PROT 7.0 02/06/2025    ALBUMIN 3.2 (L) 02/06/2025    BILITOT 0.7 02/06/2025    ALKPHOS 75 02/06/2025    AST 15 02/06/2025    ALT 17 02/06/2025    ANIONGAP 12 02/28/2025    ESTGFRAFRICA >60.0 03/01/2022    EGFRNONAA >60.0 03/01/2022    TSH 1.509 02/28/2025     No results found for: "[id number]"  Assessment and Plan     1. Type 1 diabetes mellitus with hyperglycemia  Hemoglobin A1C    Basic Metabolic Panel      2. Type 1 diabetes mellitus with diabetic neuropathy  insulin glargine U-100, Lantus, (LANTUS SOLOSTAR U-100 INSULIN) 100 unit/mL (3 mL) InPn pen      3. Diabetic gastroparesis associated with type 1 diabetes mellitus            Type 1 diabetes mellitus with hyperglycemia  -- Labs prior to follow up.  -- A1c goal <7.0%.  -- Medications discussed:  MFM  GLP1/DPP4i - avoid given " recurrent pancreatitis  SGLT2i - DKA  SFU  Insulin   -- Reviewed logs/CGM:  Glucose improving - some episodes of hypoglycemia.  Reach out to me sooner for any glucose <70 or consistently >200.  -- Encouraged continued dietary/ lifestyle changes.   -- Medication Changes:   Metformin 1000mg twice daily   Lantus 45 units twice daily  Novolog 35 units plus correction scale before meals --- if glucose < 150 reduce to 30 units before the meal  Add correction scale as needed.   Blood sugar 200 to 250 add 2 units   Blood sugar greater than 251 add 4 units     -- Reviewed goals of therapy are to get the best control we can without hypoglycemia.  -- Reviewed patient's current insulin regimen. Clarified proper insulin dose and timing in relation to meals, etc. Insulin injection sites and proper rotation instructed.    -- Advised frequent self blood glucose monitoring.  Patient encouraged to document glucose results and bring them to every clinic visit.  -- Hypoglycemia precautions discussed. Instructed on precautions before driving.    -- Call for Bg repeatedly < 90 or > 180.   -- Close adherence to lifestyle changes recommended.   -- Periodic follow ups for eye evaluations, foot care and dental care suggested.    Diabetic gastroparesis associated with type 1 diabetes mellitus  -- Optimize glucose control.     Follow up in about 4 months (around 7/7/2025).    I spent 30 minutes face-to-face with the patient, over half of the visit was spent on counseling and/or coordinating the care of the patient.    Counseling includes:  Diagnostic results, impressions, recommendations   Prognosis   Risk and benefits of management/treatment options   Instructions for management treatment and or follow-up   Importance of compliance with management   Risk factor reduction   Patient education    Visit today included increased complexity associated with the care of the problems addressed and managing the longitudinal care of the patient due to  the serious and/or complex managed problems.

## 2025-02-28 ENCOUNTER — CLINICAL SUPPORT (OUTPATIENT)
Dept: DIABETES | Facility: CLINIC | Age: 45
End: 2025-02-28
Payer: COMMERCIAL

## 2025-02-28 VITALS — BODY MASS INDEX: 32.23 KG/M2 | HEIGHT: 68 IN | WEIGHT: 212.63 LBS

## 2025-02-28 DIAGNOSIS — K31.84 DIABETIC GASTROPARESIS ASSOCIATED WITH TYPE 1 DIABETES MELLITUS: ICD-10-CM

## 2025-02-28 DIAGNOSIS — E10.65 TYPE 1 DIABETES MELLITUS WITH HYPERGLYCEMIA: ICD-10-CM

## 2025-02-28 DIAGNOSIS — E10.43 DIABETIC GASTROPARESIS ASSOCIATED WITH TYPE 1 DIABETES MELLITUS: ICD-10-CM

## 2025-02-28 PROCEDURE — 99999 PR PBB SHADOW E&M-EST. PATIENT-LVL II: CPT | Mod: PBBFAC,,, | Performed by: DIETITIAN, REGISTERED

## 2025-02-28 NOTE — PROGRESS NOTES
"Diabetes Care Specialist Progress Note  Author: Altagracia Lai RD  Date: 2/28/2025    Intake    Program Intake  Reason for Diabetes Program Visit:: Initial Diabetes Assessment  Current diabetes risk level:: high  In the last month, have you used the ER or been admitted to the hospital: Yes  Was the ER or hospital admission related to diabetes?: Yes  Permission to speak with others about care:: no    Current Diabetes Treatment: Insulin  Method of insulin delivery?: Injections  Injection Type: Pens  Pen Type/Dose: Aspart 40u ac; Lantus 55u BID    Continuous Glucose Monitoring  Patient has CGM: Yes  Personal CGM type:: Dexcom G7  GMI Date: 02/28/25  GMI Value: 7.8 %    Lab Results   Component Value Date    LABA1C 8.8 (H) 01/12/2018    HGBA1C 9.9 (H) 11/22/2024       Weight: 96.5 kg (212 lb 10.1 oz)   Height: 5' 8" (172.7 cm)   Body mass index is 32.33 kg/m².    Lifestyle Coping Support & Clinical    Lifestyle/Coping/Support  Learning Barriers:: None  Culture or Congregational beliefs that may impact ability to access healthcare: No  Psychosocial/Coping Skills Assessment Completed: : No  Deffered due to:: Time  Area of need?: Deferred    Problem Review  Active Comorbidities: Hypertension, Obesity, Hyperlipidemia/Dyslipidemia, Gastrointestinal Disorder    Diabetes Self-Management Skills Assessment    Medication Skills Assessment  Patient is able to identify current diabetes medications, dosages, and appropriate timing of medications.: yes  Patient reports problems or concerns with current medication regimen.: no  Patient is  aware that some diabetes medications can cause low blood sugar?: Yes  Medication Skills Assessment Completed:: Yes  Assessment indicates:: Adequate understanding  Area of need?: No    Diabetes Disease Process/Treatment Options  Diabetes Type?: Type I  When were you diagnosed?: 2009  If previous diabetes education, when/where:: [facility] and elsewhere  What are your goals for this education session?: no " expectations  Is patient aware of what causes diabetes?: Yes  Does patient understand the pathophysiology of diabetes?: Yes  Diabetes Disease Process/Treatment Options: Skills Assessment Completed: Yes  Assessment indicates:: Adequate understanding  Area of need?: No    Nutrition/Healthy Eating  Meal Plan 24 Hour Recall - Breakfast: 3 eggs sometime with grits  Meal Plan 24 Hour Recall - Lunch: leftovers, ravioli, spaghettios  Meal Plan 24 Hour Recall - Dinner: spaghetti and meat sauce, or chicken with green beans and brown rice or taco bell  Meal Plan 24 Hour Recall - Snack: no real snacking  Meal Plan 24 Hour Recall - Beverage: water  Who shops/cooks?: patient  Patient can identify foods that impact blood sugar.: yes  Challenges to healthy eating:: portion control, other (see comments) (gastroparesis)  Nutrition/Healthy Eating Skills Assessment Completed:: Yes  Assessment indicates:: Instruction Needed  Area of need?: Yes    Physical Activity/Exercise  Patient's daily activity level:: lightly active  Patient formally exercises outside of work.: no  Reasons for not exercising:: work schedule  Patient can identify forms of physical activity.: yes  Physical Activity/Exercise Skills Assessment Completed: : Yes  Assessment indicates:: Adequate understanding  Area of need?: No    Home Blood Glucose Monitoring  Patient states that blood sugar is checked at home daily.: yes  Monitoring Method:: personal continuous glucose monitor  Personal CGM type:: Dexcom G7   What is your current Time in Range?: 47%  What is your A1c Target?: 7% or less  Home Blood Glucose Monitoring Skills Assessment Completed: : Yes  Assessment indicates:: Adequate understanding  Area of need?: No    Acute Complications  Have you ever had hypoglycemia (low BG 70 or less)?: yes  How often and what are your symptoms?: twice in the last few weeks at night  How do you treat hypoglycemia?: suck on candy  Have you ever had hyperglycemia (high  or  more)?: yes  Acute Complications Skills Assessment Completed: : No  Deffered due to:: Time  Area of need?: Yes    Chronic Complications  Chronic Complications Skills Assessment Completed: : No  Deferred due to:: Time      Assessment Summary and Plan    Based on today's diabetes care assessment, the following areas of need were identified:      Identified Areas of Need      Medication/Current Diabetes Treatment: No   Lifestyle Coping/Support: Deferred   Diabetes Disease Process/Treatment Options: No   Nutrition/Healthy Eating: Yes, see care plan below    Physical Activity/Exercise: No    Home Blood Glucose Monitoring: No    Acute Complications: Yes, reviewed s/s, treatment and prevention of hypoglycemia.    Chronic Complications: Deferred        Today's interventions were provided through individual discussion, instruction, and written materials were provided.      Patient verbalized understanding of instruction and written materials.  Pt was able to return back demonstration of instructions today. Patient understood key points, needs reinforcement and further instruction.     Diabetes Self-Management Care Plan:    Today's Diabetes Self-Management Care Plan was developed with Bay's input. Bay has agreed to work toward the following goal(s) to improve his/her overall diabetes control.      Care Plan: Diabetes Management   Updates made since 2/29/2024 12:00 AM        Problem: Healthy Eating         Goal: Eat 3 meals daily with 45g/3 servings of Carbohydrate per meal.    Start Date: 2/28/2025   Expected End Date: 2/28/2026   Priority: Medium   Barriers: No Barriers Identified   Note:    2/28/25: Pt with T1DM, dx in 2009. He eats 3 meals/day. Has gastroparesis. Has lows during the night; occurred twice in the last few weeks. He sucks on candy; we reviewed s/s, prevention and treatment of hypoglycemia. He does not always eat carbs with breakfast, so recommended that he add some in. We discussed high fiber carbs and  low fiber carbs. With gastroparesis, he may need adjustments in his insulin regimen at mealtimes to prevent hypoglycemia. He has an appt with endo next week and will discuss.           Task: Reviewed the sources and role of Carbohydrate, Protein, and Fat and how each nutrient impacts blood sugar. Completed 2/28/2025        Task: Provided visual examples using dry measuring cups, food models, and other familiar objects such as computer mouse, deck or cards, tennis ball etc. to help with visualization of portions. Completed 2/28/2025        Task: Explained how to count carbohydrates using the food label and the use of dry measuring cups for accurate carb counting. Completed 2/28/2025     Task: Review the importance of balancing carbohydrates with each meal using portion control techniques to count servings of carbohydrate and label reading to identify serving size and amount of total carbs per serving. Completed 2/28/2025        Task: Provided Sample plate method and reviewed the use of the plate to estimate amounts of carbohydrate per meal. Completed 2/28/2025          Follow Up Plan     Follow up in about 2 months (around 4/28/2025). Review A1c, assess dietary changes, assess for hypoglycemia, weight. Eye/foot care.    Today's care plan and follow up schedule was discussed with patient.  Bay verbalized understanding of the care plan, goals, and agrees to follow up plan.        The patient was encouraged to communicate with his/her health care provider/physician and care team regarding his/her condition(s) and treatment.  I provided the patient with my contact information today and encouraged to contact me via phone or [facility]'s Patient Portal as needed.     Length of Visit   Total Time: 60 Minutes

## 2025-03-03 ENCOUNTER — RESULTS FOLLOW-UP (OUTPATIENT)
Dept: ENDOCRINOLOGY | Facility: CLINIC | Age: 45
End: 2025-03-03

## 2025-03-07 ENCOUNTER — PATIENT MESSAGE (OUTPATIENT)
Dept: ENDOCRINOLOGY | Facility: CLINIC | Age: 45
End: 2025-03-07

## 2025-03-07 ENCOUNTER — OFFICE VISIT (OUTPATIENT)
Dept: ENDOCRINOLOGY | Facility: CLINIC | Age: 45
End: 2025-03-07
Payer: COMMERCIAL

## 2025-03-07 DIAGNOSIS — E10.43 DIABETIC GASTROPARESIS ASSOCIATED WITH TYPE 1 DIABETES MELLITUS: Chronic | ICD-10-CM

## 2025-03-07 DIAGNOSIS — E10.40 TYPE 1 DIABETES MELLITUS WITH DIABETIC NEUROPATHY: ICD-10-CM

## 2025-03-07 DIAGNOSIS — K31.84 DIABETIC GASTROPARESIS ASSOCIATED WITH TYPE 1 DIABETES MELLITUS: Chronic | ICD-10-CM

## 2025-03-07 DIAGNOSIS — E10.65 TYPE 1 DIABETES MELLITUS WITH HYPERGLYCEMIA: Primary | Chronic | ICD-10-CM

## 2025-03-07 RX ORDER — INSULIN GLARGINE 100 [IU]/ML
55 INJECTION, SOLUTION SUBCUTANEOUS 2 TIMES DAILY
Qty: 100 ML | Refills: 3 | Status: SHIPPED | OUTPATIENT
Start: 2025-03-07

## 2025-03-07 NOTE — ASSESSMENT & PLAN NOTE
-- Labs prior to follow up.  -- A1c goal <7.0%.  -- Medications discussed:  MFM  GLP1/DPP4i - avoid given recurrent pancreatitis  SGLT2i - DKA  SFU  Insulin   -- Reviewed logs/CGM:  Glucose improving - some episodes of hypoglycemia.  Reach out to me sooner for any glucose <70 or consistently >200.  -- Encouraged continued dietary/ lifestyle changes.   -- Medication Changes:   Metformin 1000mg twice daily   Lantus 45 units twice daily  Novolog 35 units plus correction scale before meals --- if glucose < 150 reduce to 30 units before the meal  Add correction scale as needed.   Blood sugar 200 to 250 add 2 units   Blood sugar greater than 251 add 4 units     -- Reviewed goals of therapy are to get the best control we can without hypoglycemia.  -- Reviewed patient's current insulin regimen. Clarified proper insulin dose and timing in relation to meals, etc. Insulin injection sites and proper rotation instructed.    -- Advised frequent self blood glucose monitoring.  Patient encouraged to document glucose results and bring them to every clinic visit.  -- Hypoglycemia precautions discussed. Instructed on precautions before driving.    -- Call for Bg repeatedly < 90 or > 180.   -- Close adherence to lifestyle changes recommended.   -- Periodic follow ups for eye evaluations, foot care and dental care suggested.

## 2025-03-10 ENCOUNTER — RESULTS FOLLOW-UP (OUTPATIENT)
Dept: FAMILY MEDICINE | Facility: CLINIC | Age: 45
End: 2025-03-10

## 2025-03-21 ENCOUNTER — TELEPHONE (OUTPATIENT)
Dept: ENDOCRINOLOGY | Facility: CLINIC | Age: 45
End: 2025-03-21
Payer: COMMERCIAL

## 2025-04-19 PROBLEM — M10.9 GOUT: Status: ACTIVE | Noted: 2025-04-19

## 2025-04-22 ENCOUNTER — PATIENT MESSAGE (OUTPATIENT)
Dept: FAMILY MEDICINE | Facility: CLINIC | Age: 45
End: 2025-04-22
Payer: COMMERCIAL

## 2025-04-22 DIAGNOSIS — R11.0 NAUSEA: ICD-10-CM

## 2025-04-22 DIAGNOSIS — R10.84 GENERALIZED ABDOMINAL PAIN: ICD-10-CM

## 2025-04-22 RX ORDER — CYCLOBENZAPRINE HCL 5 MG
5 TABLET ORAL 3 TIMES DAILY PRN
Qty: 90 TABLET | Refills: 2 | Status: SHIPPED | OUTPATIENT
Start: 2025-04-22

## 2025-04-22 RX ORDER — ONDANSETRON 4 MG/1
4 TABLET, FILM COATED ORAL EVERY 6 HOURS PRN
Qty: 30 TABLET | Refills: 1 | Status: SHIPPED | OUTPATIENT
Start: 2025-04-22

## 2025-04-22 NOTE — TELEPHONE ENCOUNTER
No care due was identified.  [facility] Embedded Care Due Messages. Reference number: [id number].   4/22/2025 1:33:08 PM CDT

## 2025-04-23 ENCOUNTER — PATIENT OUTREACH (OUTPATIENT)
Dept: ADMINISTRATIVE | Facility: CLINIC | Age: 45
End: 2025-04-23
Payer: COMMERCIAL

## 2025-04-23 NOTE — PROGRESS NOTES
C3 nurse attempted to contact Bay Ibarra for a TCC post hospital discharge follow up call. LVM. The patient has a scheduled HOSFU appointment with Funmilayo Taylor MD on 04/29/25 @ 1100.

## 2025-04-25 ENCOUNTER — CLINICAL SUPPORT (OUTPATIENT)
Dept: DIABETES | Facility: CLINIC | Age: 45
End: 2025-04-25
Payer: COMMERCIAL

## 2025-04-25 VITALS — WEIGHT: 215.19 LBS | HEIGHT: 68 IN | BODY MASS INDEX: 32.61 KG/M2

## 2025-04-25 DIAGNOSIS — E10.65 TYPE 1 DIABETES MELLITUS WITH HYPERGLYCEMIA: Primary | ICD-10-CM

## 2025-04-25 PROCEDURE — 99999 PR PBB SHADOW E&M-EST. PATIENT-LVL I: CPT | Mod: PBBFAC,,, | Performed by: DIETITIAN, REGISTERED

## 2025-04-25 NOTE — PROGRESS NOTES
"Diabetes Care Specialist Progress Note  Author: Altagracia Lai RD  Date: 4/25/2025    Intake    Program Intake  Reason for Diabetes Program Visit:: Intervention  Type of Intervention:: Individual  Individual: Education  Current diabetes risk level:: low  In the last month, have you used the ER or been admitted to the hospital: Yes  Was the ER or hospital admission related to diabetes?: Yes  Permission to speak with others about care:: no    Current Diabetes Treatment: Insulin, Oral Medications  Oral Medication Type/Dose: Metformin 1000mg BID  Method of insulin delivery?: Injections  Injection Type: Pens  Pen Type/Dose: Novolog 35u ac + SSI; Lantus 45u BID    Continuous Glucose Monitoring  Patient has CGM: Yes  Personal CGM type:: Dexcom G7  GMI Date: 04/25/25  GMI Value: 8 %    Lab Results   Component Value Date    LABA1C 8.8 (H) 01/12/2018    HGBA1C 7.9 (H) 04/19/2025       Weight: 97.6 kg (215 lb 2.7 oz)   Height: 5' 8" (172.7 cm)   Body mass index is 32.72 kg/m².    Lifestyle Coping Support & Clinical    Lifestyle/Coping/Support  Does anyone in your family have diabetes or does anyone in your family support you in your diabetes care?: gets some support at home  List anything about Diabetes that causes you stress?: trying to figure out gastroparesis  How do you deal with stress/distress?: try to figure out to handle  Learning Barriers:: None  Culture or Restoration beliefs that may impact ability to access healthcare: No  Psychosocial/Coping Skills Assessment Completed: : Yes  Assessment indicates:: Adequate understanding  Area of need?: No    Problem Review  Active Comorbidities: Hypertension, Obesity, Hyperlipidemia/Dyslipidemia, Gastrointestinal Disorder    Diabetes Self-Management Skills Assessment         Diabetes Disease Process/Treatment Options  Diabetes Type?: Type I  Assessment indicates:: Adequate understanding  Area of need?: No    Nutrition/Healthy Eating  Meal Plan 24 Hour Recall - Breakfast: 3 eggs " sometime with grits  Meal Plan 24 Hour Recall - Lunch: leftovers, ravioli, spaghettios  Meal Plan 24 Hour Recall - Dinner: spaghetti and meat sauce, or chicken with green beans and brown rice or taco bell  Meal Plan 24 Hour Recall - Snack: no real snacking  Patient can identify foods that impact blood sugar.: yes  Challenges to healthy eating:: portion control, other (see comments) (gastroparesis)  Assessment indicates:: Instruction Needed  Area of need?: Yes    Physical Activity/Exercise  Patient's daily activity level:: lightly active  Patient formally exercises outside of work.: no  Reasons for not exercising:: work schedule  Patient can identify forms of physical activity.: yes  Assessment indicates:: Adequate understanding  Area of need?: No    Home Blood Glucose Monitoring  Patient states that blood sugar is checked at home daily.: yes  Monitoring Method:: personal continuous glucose monitor  Personal CGM type:: Dexcom G7   What is your current Time in Range?: 45%  What is your A1c Target?: 7% or less  Assessment indicates:: Adequate understanding  Area of need?: No    Acute Complications  Have you ever had hypoglycemia (low BG 70 or less)?: yes  How often and what are your symptoms?: once in the last week  How do you treat hypoglycemia?: suck on candy  Have you ever had hyperglycemia (high  or more)?: yes  How often and what are your symptoms?: it comes and goes  How do you treat hyperglycemia? : just take novolog when i eat  Have you ever had DKA?: yes  When:: last wekend and early February this year  Do you ever test for ketones?: no  Do you have a sick day plan?: no  Acute Complications Skills Assessment Completed: : Yes  Assessment indicates:: Instruction Needed  Area of need?: Yes    Chronic Complications  Deferred due to:: Time      Assessment Summary and Plan    Based on today's diabetes care assessment, the following areas of need were identified:      Identified Areas of Need       Medication/Current Diabetes Treatment: No   Lifestyle Coping/Support: No   Diabetes Disease Process/Treatment Options: No   Nutrition/Healthy Eating: Yes, see care plan below    Physical Activity/Exercise: No    Home Blood Glucose Monitoring: Yes, see care plan below    Acute Complications: Yes, reviewed s/s, prevention and treatment of hypo/hyperglycemia    Chronic Complications: Yes, reviewed prevention of CKD, also discussed eye/teeth/foot care.        Today's interventions were provided through individual discussion, instruction, and written materials were provided.      Patient verbalized understanding of instruction and written materials.  Pt was able to return back demonstration of instructions today. Patient understood key points, needs reinforcement and further instruction.     Diabetes Self-Management Care Plan:    Today's Diabetes Self-Management Care Plan was developed with Bay's input. Bay has agreed to work toward the following goal(s) to improve his/her overall diabetes control.      Care Plan: Diabetes Management   Updates made since 4/25/2024 12:00 AM        Problem: Healthy Eating         Goal: Eat 3 meals daily with 45g/3 servings of Carbohydrate per meal.    Start Date: 2/28/2025   Expected End Date: 2/28/2026   Priority: Medium   Barriers: No Barriers Identified   Note:    4/25/25: Pt in hospital over the weekend with DKA. Attributes it to overindulging. Pt has gastroparesis and ate a lot of high fat food. Most of today's discussion centered on managing gastroparesis. Discussed impact of high fat meals and recommend he avoid eating too much fat at one meal; try to eat small amount of simple carbs with some fat to help maintain BG. 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 Recommend he eat something small before he gets started (pb sandwich). He has started keeping a food journal so he will have notes on  which foods work well for him.    2/28/25: Pt with T1DM, dx in 2009. He eats 3 meals/day. Has gastroparesis. Has lows during the night; occurred twice in the last few weeks. He sucks on candy; we reviewed s/s, prevention and treatment of hypoglycemia. He does not always eat carbs with breakfast, so recommended that he add some in. We discussed high fiber carbs and low fiber carbs. With gastroparesis, he may need adjustments in his insulin regimen at mealtimes to prevent hypoglycemia. He has an appt with endo next week and will discuss.           Task: Reviewed the sources and role of Carbohydrate, Protein, and Fat and how each nutrient impacts blood sugar. Completed 2/28/2025        Task: Provided visual examples using dry measuring cups, food models, and other familiar objects such as computer mouse, deck or cards, tennis ball etc. to help with visualization of portions. Completed 2/28/2025        Task: Explained how to count carbohydrates using the food label and the use of dry measuring cups for accurate carb counting. Completed 2/28/2025     Task: Review the importance of balancing carbohydrates with each meal using portion control techniques to count servings of carbohydrate and label reading to identify serving size and amount of total carbs per serving. Completed 2/28/2025        Task: Provided Sample plate method and reviewed the use of the plate to estimate amounts of carbohydrate per meal. Completed 2/28/2025        Problem: Blood Glucose Self-Monitoring         Goal: Patient agrees to check blood sugars using Dexcom G7    Start Date: 4/25/2025   Expected End Date: 8/31/2026   Priority: High   Barriers: No Barriers Identified   Note:    4/25/25: Recent Dexcom data below               Follow Up Plan     Follow up in about 2 months (around 6/25/2025). Review Dexcom data, weight, physical activity level, meal plan service?; stress mgmt.    Today's care plan and follow up schedule was discussed with patient.   Bay verbalized understanding of the care plan, goals, and agrees to follow up plan.        The patient was encouraged to communicate with his/her health care provider/physician and care team regarding his/her condition(s) and treatment.  I provided the patient with my contact information today and encouraged to contact me via phone or [facility]'s Patient Portal as needed.     Length of Visit   Total Time: 60 Minutes

## 2025-04-25 NOTE — PROGRESS NOTES
C3 nurse attempted to contact Bay Ibarra for a TCC post hospital discharge follow up call. No answer

## 2025-04-29 ENCOUNTER — OFFICE VISIT (OUTPATIENT)
Dept: FAMILY MEDICINE | Facility: CLINIC | Age: 45
End: 2025-04-29
Payer: COMMERCIAL

## 2025-04-29 VITALS
WEIGHT: 208.31 LBS | DIASTOLIC BLOOD PRESSURE: 68 MMHG | BODY MASS INDEX: 31.57 KG/M2 | SYSTOLIC BLOOD PRESSURE: 120 MMHG | OXYGEN SATURATION: 97 % | HEART RATE: 99 BPM | HEIGHT: 68 IN

## 2025-04-29 DIAGNOSIS — E10.43 DIABETIC GASTROPARESIS ASSOCIATED WITH TYPE 1 DIABETES MELLITUS: Primary | Chronic | ICD-10-CM

## 2025-04-29 DIAGNOSIS — E66.811 CLASS 1 OBESITY DUE TO EXCESS CALORIES WITH SERIOUS COMORBIDITY AND BODY MASS INDEX (BMI) OF 31.0 TO 31.9 IN ADULT: ICD-10-CM

## 2025-04-29 DIAGNOSIS — E78.2 MIXED HYPERLIPIDEMIA: Chronic | ICD-10-CM

## 2025-04-29 DIAGNOSIS — I10 ESSENTIAL HYPERTENSION: Chronic | ICD-10-CM

## 2025-04-29 DIAGNOSIS — G47.33 OSA (OBSTRUCTIVE SLEEP APNEA): Chronic | ICD-10-CM

## 2025-04-29 DIAGNOSIS — E10.65 TYPE 1 DIABETES MELLITUS WITH HYPERGLYCEMIA: Chronic | ICD-10-CM

## 2025-04-29 DIAGNOSIS — E66.09 CLASS 1 OBESITY DUE TO EXCESS CALORIES WITH SERIOUS COMORBIDITY AND BODY MASS INDEX (BMI) OF 31.0 TO 31.9 IN ADULT: ICD-10-CM

## 2025-04-29 DIAGNOSIS — K86.1 OTHER CHRONIC PANCREATITIS: Chronic | ICD-10-CM

## 2025-04-29 DIAGNOSIS — K31.84 DIABETIC GASTROPARESIS ASSOCIATED WITH TYPE 1 DIABETES MELLITUS: Primary | Chronic | ICD-10-CM

## 2025-04-29 PROCEDURE — 99999 PR PBB SHADOW E&M-EST. PATIENT-LVL V: CPT | Mod: PBBFAC,,, | Performed by: FAMILY MEDICINE

## 2025-04-29 RX ORDER — METOCLOPRAMIDE 10 MG/1
10 TABLET ORAL
Qty: 30 TABLET | Refills: 0 | Status: SHIPPED | OUTPATIENT
Start: 2025-04-29

## 2025-04-29 NOTE — PROGRESS NOTES
"PATIENT VISIT FAMILY MEDICINE    CC:   Chief Complaint   Patient presents with    Hospital Follow Up       HPI:    EGD in February showed mucosal thickening consistent with gastric outlet obstruction. His bowel movements have decreased from previous pattern of at least twice daily to current irregular pattern. Last bowel movement was on Sunday, which was solid but required straining.    MEDICATIONS:  He is currently taking Reglan with minimal symptomatic improvement, along with PPI and Carafate therapy.          Transitional Care Note    Family and/or Caretaker present at visit?  No.  Diagnostic tests reviewed/disposition: No diagnosic tests pending after this hospitalization.  Disease/illness education: nausea/vomiting likely 2/2 to gastroparesis  Home health/community services discussion/referrals: Patient does not have home health established from hospital visit.  They do not need home health.  If needed, we will set up home health for the patient.   Establishment or re-establishment of referral orders for community resources: referral to GI for follow up on reglan tx  Discussion with other health care providers: No discussion with other health care providers necessary.       MEDS: Current Medications[1]    OBJECTIVE:   Vitals:    04/29/25 1050   BP: 120/68   BP Location: Left arm   Patient Position: Sitting   Pulse: 99   SpO2: 97%   Weight: 94.5 kg (208 lb 5.4 oz)   Height: 5' 8" (1.727 m)     Body mass index is 31.68 kg/m².      Physical Exam  Vitals and nurse note reviewed  Constitutional:   Appearance: Normal appearance.   HENT:   Head: Normocephalic and atraumatic.   Eyes:   General: No scleral icterus.  Cardiovascular:   Rate and Rhythm: Normal rate and regular rhythm.   Pulmonary:   Effort: Pulmonary effort is normal.   Breath sounds: Normal breath sounds.   Neurological:   Mental Status: He is alert.            LABS:   A1C:  Recent Labs   Lab 04/19/25  1303   Hemoglobin A1c 7.9 H     CBC:  Recent Labs "   Lab 04/21/25  0607   WBC 5.26   RBC 4.99   HGB 13.1 L   HCT 41.0   Platelet Count 129 L   MCV 82   MCH 26.3 L   MCHC 32.0     CMP:  Recent Labs   Lab 04/21/25  0607   Glucose 242 H   Calcium 8.8   Albumin 3.3 L   Protein Total 6.7   Sodium 137   Potassium 3.9   CO2 27   Chloride 103   BUN 14   Creatinine 1.0   ALP 72   ALT 20   AST 17   Bilirubin Total 0.9     LIPIDS:  Recent Labs   Lab 02/28/25  0653 04/20/25  0520   TSH 1.509  --    HDL 20 L  --    HDL Cholesterol  --  25 L   Cholesterol Total  --  107 L   Cholesterol 77 L  --    Triglycerides 169 H  --    Triglyceride  --  145   LDL Cholesterol 23.2 L 53.0 L   HDL/Cholesterol Ratio 26.0 23.4   Non-HDL Cholesterol 57  --    Non HDL Cholesterol  --  82   Total Cholesterol/HDL Ratio 3.9  --    Cholesterol/HDL Ratio  --  4.3     TSH:  Recent Labs   Lab 02/28/25  0653   TSH 1.509         ASSESSMENT & PLAN:    Problem List Items Addressed This Visit          Neuro    Diabetic gastroparesis associated with type 1 diabetes mellitus - Primary (Chronic)    Overview   Uncontrolled. On reglan from recent hospitalization. Referral to GI. Was also supposed to have EUS in Feb per last EGD results but did not schedule.          Relevant Medications    metoclopramide HCl (REGLAN) 10 MG tablet    Other Relevant Orders    Ambulatory referral/consult to Gastroenterology       Cardiac/Vascular    Essential hypertension (Chronic)    Overview   Well controlled. Continue losartan           Mixed hyperlipidemia (Chronic)    Overview   Stable. Continue current medications.             Endocrine    Type 1 diabetes mellitus with hyperglycemia (Chronic)    Overview   Last A1c is 8.2 on 01/30/2025  Followed by Endocrine         Class 1 obesity due to excess calories with serious comorbidity and body mass index (BMI) of 31.0 to 31.9 in adult    Overview   Body mass index is 31.68 kg/m².  The patient is asked to make an attempt to improve diet and exercise patterns to aid in medical  management of this problem.                GI    Other chronic pancreatitis (Chronic)    Overview   2/2 to hyperTG. Continue current regimen            Other    JAZMYN (obstructive sleep apnea) (Chronic)    Overview   Recommend regular CPAP use             F/u as previously scheduled      RTC/ED precautions discussed where applicable.   Encouraged patient to let me know if there are any further questions/concerns.     Advise patient/caretaker to check with insurance regarding orders to avoid unexpected fees/costs.     The patient/caretaker indicates understanding of these issues and agrees with the plan.    This note was generated with the assistance of ambient listening technology. I attest to having reviewed and edited the generated note for accuracy, though some syntax or spelling errors may persist. Please contact the author of this note for any clarification.      Dr. Funmilayo Taylor MD  Family Medicine         [1]   Current Outpatient Medications:     allopurinoL (ZYLOPRIM) 300 MG tablet, Take 1 tablet (300 mg total) by mouth once daily., Disp: 90 tablet, Rfl: 3    aspirin (ECOTRIN) 81 MG EC tablet, Take 81 mg by mouth once daily., Disp: , Rfl:     atorvastatin (LIPITOR) 40 MG tablet, Take 1 tablet (40 mg total) by mouth once daily. Hold at DC, start next week 5/9, Disp: 90 tablet, Rfl: 3    blood sugar diagnostic (CONTOUR NEXT TEST STRIPS) Strp, To test glucose 4 times a day., Disp: 200 each, Rfl: 11    blood-glucose sensor (DEXCOM G7 SENSOR) Isha, Change sensor every 10 days., Disp: 3 each, Rfl: 3    cyclobenzaprine (FLEXERIL) 5 MG tablet, Take 1 tablet (5 mg total) by mouth 3 (three) times daily as needed for Muscle spasms., Disp: 90 tablet, Rfl: 2    fenofibrate 160 MG Tab, Take 1 tablet (160 mg total) by mouth once daily. Hold on DC, start next week: 5/9/2024., Disp: 90 tablet, Rfl: 3    icosapent ethyL (VASCEPA) 1 gram Cap, Take 2 capsules (2 g total) by mouth 2 (two) times daily., Disp: 360 capsule, Rfl: 3     insulin aspart U-100 (NOVOLOG FLEXPEN U-100 INSULIN) 100 unit/mL (3 mL) InPn pen, Inject 40 Units into the skin 3 (three) times daily with meals. Plus correction scale. Max TDD 140units., Disp: 140 mL, Rfl: 3    insulin glargine U-100, Lantus, (LANTUS SOLOSTAR U-100 INSULIN) 100 unit/mL (3 mL) InPn pen, Inject 55 Units into the skin 2 (two) times a day., Disp: 100 mL, Rfl: 3    losartan (COZAAR) 50 MG tablet, Take 1 tablet (50 mg total) by mouth once daily., Disp: 90 tablet, Rfl: 3    metFORMIN (GLUCOPHAGE) 500 MG tablet, Take 2 tablets (1,000 mg total) by mouth 2 (two) times daily with meals., Disp: 360 tablet, Rfl: 3    niacin 100 MG Tab, Take 2 tablets (200 mg total) by mouth every evening., Disp: 90 tablet, Rfl: 1    ondansetron (ZOFRAN) 4 MG tablet, Take 1 tablet (4 mg total) by mouth every 6 (six) hours as needed for Nausea., Disp: 30 tablet, Rfl: 1    pantoprazole (PROTONIX) 40 MG tablet, Take 1 tablet (40 mg total) by mouth 2 (two) times daily., Disp: 180 tablet, Rfl: 3    pregabalin (LYRICA) 150 MG capsule, Take 1 capsule (150 mg total) by mouth 2 (two) times daily., Disp: 60 capsule, Rfl: 5    sucralfate (CARAFATE) 100 mg/mL suspension, Take 10 mLs (1 g total) by mouth every 6 (six) hours as needed (Heart burn)., Disp: 420 mL, Rfl: 0    Lactobacillus rhamnosus GG (CULTURELLE) 10 billion cell capsule, Take 1 capsule by mouth once daily. (Patient not taking: Reported on 4/29/2025), Disp: , Rfl:     metoclopramide HCl (REGLAN) 10 MG tablet, Take 1 tablet (10 mg total) by mouth 3 (three) times daily before meals., Disp: 30 tablet, Rfl: 0

## 2025-04-30 ENCOUNTER — PATIENT MESSAGE (OUTPATIENT)
Dept: FAMILY MEDICINE | Facility: CLINIC | Age: 45
End: 2025-04-30
Payer: COMMERCIAL

## 2025-04-30 ENCOUNTER — OFFICE VISIT (OUTPATIENT)
Dept: PODIATRY | Facility: CLINIC | Age: 45
End: 2025-04-30
Payer: COMMERCIAL

## 2025-04-30 VITALS — WEIGHT: 210.75 LBS | BODY MASS INDEX: 32.05 KG/M2

## 2025-04-30 DIAGNOSIS — B35.1 ONYCHOMYCOSIS DUE TO DERMATOPHYTE: ICD-10-CM

## 2025-04-30 DIAGNOSIS — E66.09 CLASS 1 OBESITY DUE TO EXCESS CALORIES WITH SERIOUS COMORBIDITY AND BODY MASS INDEX (BMI) OF 31.0 TO 31.9 IN ADULT: ICD-10-CM

## 2025-04-30 DIAGNOSIS — E66.811 CLASS 1 OBESITY DUE TO EXCESS CALORIES WITH SERIOUS COMORBIDITY AND BODY MASS INDEX (BMI) OF 31.0 TO 31.9 IN ADULT: ICD-10-CM

## 2025-04-30 DIAGNOSIS — E10.40 TYPE 1 DIABETES MELLITUS WITH DIABETIC NEUROPATHY: ICD-10-CM

## 2025-04-30 DIAGNOSIS — E10.65 TYPE 1 DIABETES MELLITUS WITH HYPERGLYCEMIA: Primary | ICD-10-CM

## 2025-04-30 PROBLEM — I85.00 ESOPHAGEAL VARICES WITHOUT BLEEDING, UNSPECIFIED ESOPHAGEAL VARICES TYPE: Status: RESOLVED | Noted: 2025-01-29 | Resolved: 2025-02-06

## 2025-04-30 PROCEDURE — 99999 PR PBB SHADOW E&M-EST. PATIENT-LVL V: CPT | Mod: PBBFAC,,, | Performed by: PODIATRIST

## 2025-04-30 RX ORDER — PREGABALIN 150 MG/1
150 CAPSULE ORAL 2 TIMES DAILY
Qty: 60 CAPSULE | Refills: 5 | Status: SHIPPED | OUTPATIENT
Start: 2025-04-30

## 2025-04-30 RX ORDER — CICLOPIROX 80 MG/ML
SOLUTION TOPICAL NIGHTLY
Qty: 6.6 ML | Refills: 6 | Status: SHIPPED | OUTPATIENT
Start: 2025-04-30

## 2025-04-30 NOTE — TELEPHONE ENCOUNTER
No care due was identified.  [facility] Embedded Care Due Messages. Reference number: [id number].   4/30/2025 1:45:14 PM CDT

## 2025-04-30 NOTE — PROGRESS NOTES
[facility] - PODIATRY  [address]  [address]  [place] [address]  Dept: [phone]  Dept Fax: [phone]    Connor Clark Jr., DPM     Assessment:   MDM    Coding  1. Onychomycosis due to dermatophyte        2. Type 1 diabetes mellitus with hyperglycemia  Ambulatory referral/consult to Podiatry      3. Class 1 obesity due to excess calories with serious comorbidity and body mass index (BMI) of 31.0 to 31.9 in adult            Plan:     Procedures  1. Onychomycosis due to dermatophyte    2. Type 1 diabetes mellitus with hyperglycemia  Overview:  Last A1c is 8.2 on 01/30/2025  Followed by Endocrine    Orders:  -     Ambulatory referral/consult to Podiatry    3. Class 1 obesity due to excess calories with serious comorbidity and body mass index (BMI) of 31.0 to 31.9 in adult  Overview:  Body mass index is 31.68 kg/m².  The patient is asked to make an attempt to improve diet and exercise patterns to aid in medical management of this problem.            Bay was seen today for diabetic foot exam.    Diagnoses and all orders for this visit:    Onychomycosis due to dermatophyte    Type 1 diabetes mellitus with hyperglycemia  -     Ambulatory referral/consult to Podiatry    Class 1 obesity due to excess calories with serious comorbidity and body mass index (BMI) of 31.0 to 31.9 in adult      ADA Risk Classification: No LOPS,No PAD, No deformity - 0: rtc 12 months    -pt seen, evaluated, and managed  -dx discussed in detail. All questions/concerns addressed  -all tx options discussed. All alternatives, risks, benefits of all txs discussed  -comprehensive diabetic foot exam with risk assessment performed today  -the patient was educated about the diagnosis  -labs reviewed by me: A1c of 7.6. recs as below  - educated on antifungal footcare at home  - rec'd otc antifungal foot powder and spray. Use prn  - discussed nail bx in future in nonresponsive to rxd  medication  -Discussed importance of keeping feet dry and changing socks and shoes on a regular basis to prevent spread of fungus  -Shoe inspection. Diabetic Foot Education. Patient reminded of the importance of good nutrition and blood sugar control to help prevent podiatric complications of diabetes. Patient instructed on proper foot hygeine. We discussed wearing proper shoe gear, daily foot inspections, never walking without protective shoe gear, never putting sharp instruments to feet.    -rxs dispensed: [facility]  -referrals: dm education  -WB: wbat    Follow up in about 1 year (around 4/30/2026), or if symptoms worsen or fail to improve.    Subjective:      Patient ID: Bay Ibarra is a 44 y.o. male.    Chief Complaint:   Chief Complaint   Patient presents with    Diabetic Foot Exam       Bay Ibarra presents to the clinic upon referral from Dr. Taylor  for evaluation and treatment of diabetic feet. Patient relates no major problem with feet. Only complaints today consist of needing dm foot exam.      HPI    Last Podiatry Enc: Visit date not found  Last Enc w/ Me: 10/9/2023    Outside reports reviewed: historical medical records.  Family hx: as below  Past Medical History:   Diagnosis Date    Acute pancreatitis     Cellulitis of left lower extremity 01/11/2024    Diverticulosis of colon     Essential hypertension     Fatty liver     Gastric varices without bleeding 01/16/2020    Glaucomatous optic atrophy, right     Gout     Hx of acute pancreatitis 03/03/2017    Hypertriglyceridemia 08/02/2017    Obesity (BMI 30-39.9) 06/05/2015    Obesity (BMI 35.0-39.9 without comorbidity) 06/05/2015    Obstructive sleep apnea syndrome 12/18/2015    Pancytopenia 04/04/2024    Likely 2/2 to fatty liver. Will recheck. Refer to hepatology if worsening/persistent.       Type 2 diabetes mellitus with diabetic polyneuropathy, with long-term current use of insulin 10/16/2017    Type 2 diabetes mellitus with  hyperglycemia, with long-term current use of insulin 08/03/2017     Past Surgical History:   Procedure Laterality Date    DEBRIDEMENT OF FOOT Right 4/21/2023    Procedure: DEBRIDEMENT, FOOT;  Surgeon: Anju Melvin DPM;  Location: [facility] OR;  Service: Podiatry;  Laterality: Right;    ENDOSCOPIC ULTRASOUND OF UPPER GASTROINTESTINAL TRACT N/A 5/27/2019    Procedure: ULTRASOUND, UPPER GI TRACT, ENDOSCOPIC;  Surgeon: Zafar Velazquez MD;  Location: [facility]O [facility]  Service: Endoscopy;  Laterality: N/A;    ENDOSCOPIC ULTRASOUND OF UPPER GASTROINTESTINAL TRACT N/A 6/17/2024    Procedure: ULTRASOUND, UPPER GI TRACT, ENDOSCOPIC;  Surgeon: Jorge A Holland MD;  Location: [facility] (2ND FLR);  Service: Endoscopy;  Laterality: N/A;  5/15 portal-EUS please [facility] [place]. Acute on chronic pancreatitis with fullness of the pancreatic head. Holland-tt  6/10 lvm confirm procedure-tt    ERCP N/A 5/27/2019    Procedure: ERCP (ENDOSCOPIC RETROGRADE CHOLANGIOPANCREATOGRAPHY);  Surgeon: Zafar Velazquez MD;  Location: [facility] ([facility]);  Service: Endoscopy;  Laterality: N/A;    ESOPHAGOGASTRODUODENOSCOPY      ESOPHAGOGASTRODUODENOSCOPY N/A 1/31/2020    Procedure: EGD (ESOPHAGOGASTRODUODENOSCOPY);  Surgeon: Zafar Velazquez MD;  Location: [facility];  Service: Endoscopy;  Laterality: N/A;    ESOPHAGOGASTRODUODENOSCOPY N/A 2/16/2024    Procedure: EGD (ESOPHAGOGASTRODUODENOSCOPY);  Surgeon: Rashida Messer MD;  Location: [facility];  Service: Endoscopy;  Laterality: N/A;    ESOPHAGOGASTRODUODENOSCOPY N/A 2/4/2025    Procedure: EGD (ESOPHAGOGASTRODUODENOSCOPY);  Surgeon: Joon Mullins MD;  Location: [facility] ENDO;  Service: Endoscopy;  Laterality: N/A;    PLANTAR FASCIA RELEASE Right 4/21/2023    Procedure: RELEASE, FASCIA, PLANTAR;  Surgeon: Anju Melvin DPM;  Location: [facility] OR;  Service: Podiatry;  Laterality: Right;    RESECTION OF GASTROCNEMIUS MUSCLE Right 4/21/2023    Procedure: RESECTION, MUSCLE, GASTROCNEMIUS;  Surgeon:  Anju Melvin DPM;  [facility]: [facility];  Service: Podiatry;  Laterality: Right;     Family History   Problem Relation Name Age of Onset    Aneurysm Mother          AAA    Coronary artery disease Father          4 vessel bypass at 40, possible stent at 36    Diabetes type II Father      No Known Problems Sister      Other (prediabeties) Brother      Aneurysm Maternal Grandmother          Possible AAA    Diabetes type II Paternal Grandmother       Current Medications[1]  Review of patient's allergies indicates:  No Known Allergies  Social History[2]    ROS    REVIEW OF SYSTEMS: Negative as documented below as well as positive findings in bold.       Constitutional  Respiratory  Gastrointestinal  Skin   - Fever - Cough - Heartburn - Rash   - Chills - Spit blood - Nausea - Itching   - Weight Loss - Shortness of breath - Vomiting - Nail pain   - Malaise/Fatigue - Wheezing - Abdominal Pain  Wound/Ulcer   - Weight Gain   - Blood in Stool  Poor wound healing       - Diarrhea          Cardiovascular  Genitourinary  Neurological  HEENT   - Chest Pain - Dysuria - Burning Sensation of feet - Headache   - Palpitations - Hematuria - Tingling / Paresthesia - Congestion   - Pain at night in legs - Flank Pain - Dizziness - Sore Throat   - Cramping   - Tremor - Blurred Vision   - Leg Swelling   - Sensory Change - Double Vision   - Dizzy when standing   - Speech Change - Eye Redness       - Focal Weakness - Dry Eyes       - Loss of Consciousness          Endocrine  Musculoskeletal  Psychiatric   - Cold intolerance - Muscle Pain - Depression   - Heat intolerance - Neck Pain - Insomnia   - Anemia - Joint Pain - Memory Loss   -  Easy bruising, bleeding - Heel pain - Anxiety      Toe Pain        Leg/Ankle/Foot Pain         Objective:     Wt 95.6 kg (210 lb 12.2 oz)   BMI 32.05 kg/m²   Vitals:    04/30/25 1444   Weight: 95.6 kg (210 lb 12.2 oz)   PainSc: 0-No pain       Physical Exam    General Appearance:   Patient appears well developed,  well nourished  Patient appears stated age    Psychiatric:   Patient is oriented to time, place, and person.  Patient has appropriate mood and affect    Neck:  Trachea Midline  No visible masses    Respiratory/Ears:  No distress or labored breathing.  Able to differentiate between normal talking voice and whisper.  Able to follow commands    Eyes:  Visual Acuity intact  Lids and conjunctivae normal. No discoloration noted.    Physical Exam  Vitals and nursing note reviewed.   Feet:      Right foot:      Protective Sensation: 10 sites tested.  10 sites sensed.      Toenail Condition: Fungal disease present.     Left foot:      Protective Sensation: 10 sites tested.  10 sites sensed.      Toenail Condition: Fungal disease present.  Psychiatric:         Thought Content: Thought content normal.         Judgment: Judgment normal.       Ortho Exam  General    Nursing note and vitals reviewed.  Psychiatric: Judgment and thought content normal.             Foot Exam  Foot/Ankle Musculoskeletal Exam    B/l LE exam con't:  V:  DP 2/4, PT 2/4   CRT< 3s to all digits tested   Tibial and popliteal lymph nodes are w/o abnormality    hair growth present bilaterally, coloration normal, edema absent bilaterally, varicosities absent bilaterally    N:  Patient displays normal ankle reflexes   SILT in SP/DP/T/Malini/Saph distributions    Ortho: +Motor EHL/FHL/TA/GA   no TTP of foot or ankles   Compartments soft/compressible. No pain on passive stretch of big toe. No calf  Pain.     Derm:  skin intact, skin warm and dry, skin without ulcers or lesions, skin without induration, nails normal, nails discolored    Imaging / Labs:    Hemoglobin A1C   Date Value Ref Range Status   02/28/2025 7.6 (H) 4.0 - 5.6 % Final     Comment:     ADA Screening Guidelines:  5.7-6.4%  Consistent with prediabetes  >or=6.5%  Consistent with diabetes    High levels of fetal hemoglobin interfere with the HbA1C  assay. Heterozygous hemoglobin variants (HbS, HgC,  etc)do  not significantly interfere with this assay.   However, presence of multiple variants may affect accuracy.     11/22/2024 9.9 (H) 4.0 - 5.6 % Final     Comment:     ADA Screening Guidelines:  5.7-6.4%  Consistent with prediabetes  >or=6.5%  Consistent with diabetes    High levels of fetal hemoglobin interfere with the HbA1C  assay. Heterozygous hemoglobin variants (HbS, HgC, etc)do  not significantly interfere with this assay.   However, presence of multiple variants may affect accuracy.     08/23/2024 10.2 (H) 4.0 - 5.6 % Final     Comment:     ADA Screening Guidelines:  5.7-6.4%  Consistent with prediabetes  >or=6.5%  Consistent with diabetes    High levels of fetal hemoglobin interfere with the HbA1C  assay. Heterozygous hemoglobin variants (HbS, HgC, etc)do  not significantly interfere with this assay.   However, presence of multiple variants may affect accuracy.       Hemoglobin A1c   Date Value Ref Range Status   04/19/2025 7.9 (H) 4.0 - 5.6 % Final     Comment:     ADA Screening Guidelines:  5.7-6.4%  Consistent with prediabetes  >=6.5%  Consistent with diabetes    High levels of fetal hemoglobin interfere with the HbA1C  assay. Heterozygous hemoglobin variants (HbS, HgC, etc)do  not significantly interfere with this assay.   However, presence of multiple variants may affect accuracy.       XR NG/OG tube placement check, non-radiologist performed  Result Date: 4/20/2025  EXAMINATION: XR NG/OG TUBE PLACEMENT CHECK, NON-RADIOLOGIST PERFORMED CLINICAL HISTORY: NG tube placement; TECHNIQUE: CT abdomen pelvis from 04/20/2025 COMPARISON: None FINDINGS: Enteric tube with its tip in the mid stomach.  No dilated loops of small bowel.  Lung bases are clear.     As above. Electronically signed by: Matheus Bullard MD Date:    04/20/2025 Time:    15:57    CT Abdomen Pelvis  Without Contrast  Result Date: 4/20/2025  EXAMINATION: CT ABDOMEN PELVIS WITHOUT CONTRAST CLINICAL HISTORY: suspected Gastric outlet  obstruction; TECHNIQUE: Low dose axial images, sagittal and coronal reformations were obtained from the lung bases to the pubic symphysis.  Contrast was not administered. COMPARISON: Multiple prior exams, most recent CT abdomen pelvis from 02/02/202 FINDINGS: Heart: No pericardial effusion. Lung Bases: Well aerated, without consolidation or pleural fluid. Liver: Normal in size and attenuation, with no focal hepatic lesions. Gallbladder: No calcified gallstones. Bile Ducts: No evidence of dilated ducts. Pancreas: A few calcifications within the pancreas with mild adjacent inflammatory changes which may be seen with mild pancreatitis. Spleen: Enlarged in size measuring 15.4 cm. Adrenals: Unremarkable. Kidneys/ Ureters: No hydronephrosis.  No renal stones.  No significant abnormalities. Bladder: No evidence of wall thickening. Reproductive organs: Unremarkable. GI Tract/Mesentery: Severe distension of the stomach to the level of the distal stomach/proximal duodenum with diffuse wall thickening at this level.  Findings are not significantly changed compared to prior exam from 02/02/2025. Lymph nodes: No lymphadenopathy. Vasculature: No aneurysm.  Multiple collateral vessels in the anayeli hepatis likely representing cavernous transformation. Abdominal wall: Unremarkable. Bones: No acute fracture.     Marked distension of the stomach to the level of the proximal duodenum with circumferential wall thickening.  Findings are not significantly changed compared to prior exam elation and may represent gastric outlet obstruction.  Further evaluation with EGD as clinically indicated. Trace fat stranding adjacent to the pancreas which may represent mild acute on chronic pancreatitis.  No evidence of fluid collection. Additional stable findings as above. Electronically signed by: Matheus Bullard MD Date:    04/20/2025 Time:    14:52    X-Ray Chest AP Portable  Result Date: 4/19/2025  EXAMINATION: XR CHEST AP PORTABLE CLINICAL  HISTORY: Sepsis; TECHNIQUE: Single frontal view of the chest was performed. COMPARISON: Chest radiograph from 01/15/2023. FINDINGS: The lungs are clear, with normal appearance of pulmonary vasculature and no pleural effusion or pneumothorax. The cardiac silhouette is normal in size. The hilar and mediastinal contours are unremarkable. Bones are intact.     No acute abnormality. Electronically signed by: Matheus Bullard MD Date:    04/19/2025 Time:    15:09        Note: This was dictated using a computer transcription program. Although proofread, it may contain computer transcription errors and phonetic errors. Other human proofreading errors may also exist. Corrections may be performed at a later time. Please contact us for any clarification if needed.    Connor Clark DPM  [facility]         [1]   Current Outpatient Medications   Medication Sig Dispense Refill    allopurinoL (ZYLOPRIM) 300 MG tablet Take 1 tablet (300 mg total) by mouth once daily. 90 tablet 3    aspirin (ECOTRIN) 81 MG EC tablet Take 81 mg by mouth once daily.      atorvastatin (LIPITOR) 40 MG tablet Take 1 tablet (40 mg total) by mouth once daily. Hold at NH, start next week 5/9 90 tablet 3    blood sugar diagnostic (CONTOUR NEXT TEST STRIPS) Strp To test glucose 4 times a day. 200 each 11    blood-glucose sensor (DEXCOM G7 SENSOR) Isha Change sensor every 10 days. 3 each 3    cyclobenzaprine (FLEXERIL) 5 MG tablet Take 1 tablet (5 mg total) by mouth 3 (three) times daily as needed for Muscle spasms. 90 tablet 2    fenofibrate 160 MG Tab Take 1 tablet (160 mg total) by mouth once daily. Hold on DC, start next week: 5/9/2024. 90 tablet 3    icosapent ethyL (VASCEPA) 1 gram Cap Take 2 capsules (2 g total) by mouth 2 (two) times daily. 360 capsule 3    insulin aspart U-100 (NOVOLOG FLEXPEN U-100 INSULIN) 100 unit/mL (3 mL) InPn pen Inject 40 Units into the skin 3 (three) times daily with meals. Plus correction  scale. Max TDD 140units. 140 mL 3    insulin glargine U-100, Lantus, (LANTUS SOLOSTAR U-100 INSULIN) 100 unit/mL (3 mL) InPn pen Inject 55 Units into the skin 2 (two) times a day. 100 mL 3    losartan (COZAAR) 50 MG tablet Take 1 tablet (50 mg total) by mouth once daily. 90 tablet 3    metFORMIN (GLUCOPHAGE) 500 MG tablet Take 2 tablets (1,000 mg total) by mouth 2 (two) times daily with meals. 360 tablet 3    metoclopramide HCl (REGLAN) 10 MG tablet Take 1 tablet (10 mg total) by mouth 3 (three) times daily before meals. 30 tablet 0    niacin 100 MG Tab Take 2 tablets (200 mg total) by mouth every evening. 90 tablet 1    ondansetron (ZOFRAN) 4 MG tablet Take 1 tablet (4 mg total) by mouth every 6 (six) hours as needed for Nausea. 30 tablet 1    pantoprazole (PROTONIX) 40 MG tablet Take 1 tablet (40 mg total) by mouth 2 (two) times daily. 180 tablet 3    pregabalin (LYRICA) 150 MG capsule Take 1 capsule (150 mg total) by mouth 2 (two) times daily. 60 capsule 5    sucralfate (CARAFATE) 100 mg/mL suspension Take 10 mLs (1 g total) by mouth every 6 (six) hours as needed (Heart burn). 420 mL 0    Lactobacillus rhamnosus GG (CULTURELLE) 10 billion cell capsule Take 1 capsule by mouth once daily. (Patient not taking: Reported on 4/30/2025)       No current facility-administered medications for this visit.   [2]   Social History  Socioeconomic History    Marital status:    Tobacco Use    Smoking status: Never     Passive exposure: Never    Smokeless tobacco: Never   Substance and Sexual Activity    Alcohol use: No    Drug use: Never    Sexual activity: Not Currently     Partners: Female     Social Drivers of Health     Financial Resource Strain: Low Risk  (4/19/2025)    Overall Financial Resource Strain (CARDIA)     Difficulty of Paying Living Expenses: Not hard at all   Food Insecurity: No Food Insecurity (4/19/2025)    Hunger Vital Sign     Worried About Running Out of Food in the Last Year: Never true     Ran Out  of Food in the Last Year: Never true   Transportation Needs: No Transportation Needs (4/19/2025)    PRAPARE - Transportation     Lack of Transportation (Medical): No     Lack of Transportation (Non-Medical): No   Physical Activity: Inactive (2/4/2025)    Exercise Vital Sign     Days of Exercise per Week: 0 days     Minutes of Exercise per Session: 0 min   Stress: Stress Concern Present (4/19/2025)    [employer] - Occupational Stress Questionnaire     Feeling of Stress : Rather much   Housing Stability: High Risk (4/19/2025)    Housing Stability Vital Sign     Unable to Pay for Housing in the Last Year: Yes     Number of Times Moved in the Last Year: 0     Homeless in the Last Year: No

## 2025-04-30 NOTE — PATIENT INSTRUCTIONS
Diabetes: Inspecting Your Feet      Diabetes increases your chances of developing foot problems. So inspect your feet every day. This helps you find small skin irritations before they become serious ulcers or infections. If you have trouble seeing the bottoms of your feet, use a mirror or ask a family member or friend to help.  How to check your feet  Below are tips to help you look for foot problems. Try to check your feet at the same time each day, such as when you get out of bed in the morning:  Check the top of each foot. The tops of toes, back of the heel, and outer edge of the foot can get a lot of rubbing from poor-fitting shoes.  Check the bottom of each foot. Daily wear and tear often leads to problems at pressure spots.  Check the toes and nails. Fungal infections often occur between toes. Toenail problems can also be a sign of fungal infections or lead to breaks in the skin.  Check your shoes, too. Loose objects inside a shoe can injure the foot. Use your hand to feel inside your shoes for things like jeremy, loose stitching, or rough areas that could irritate your skin.  Warning signs  Look for any color changes in the foot. Redness with streaks can signal a severe infection, which needs immediate medical attention. Tell your healthcare provider right away if you have any of these problems:  Swelling, sometimes with color changes, may be a sign of poor blood flow or infection. Symptoms include tenderness and an increase in the size of your foot.  Warm or hot areas on your feet may be signs of infection. A foot that is cold may not be getting enough blood.  Sensations such as burning, tingling, or pins and needles can be signs of a problem. Also check for areas that may be numb.  Hot spots are caused by friction or pressure. Look for hot spots in areas that get a lot of rubbing. Hot spots can turn into blisters, calluses, or sores.  Cracks and sores are caused by dry or irritated skin. They are a sign  that the skin is breaking down, which can lead to infection.  Toenail problems to watch for include nails growing into the skin (ingrown toenail) and causing redness or pain. Thick, yellow, or discolored nails can signal a fungal infection.  Drainage and odor can develop from untreated sores and ulcers. Call your healthcare provider right away if you notice white or yellow drainage, bleeding, or unpleasant odor.   Date Last Reviewed: 6/1/2016 © 2000-2017 [employer]. [address] [place] [id number]. All rights reserved. This information is not intended as a substitute for professional medical care. Always follow your healthcare professional's instructions.    Long-Term Complications of Diabetes    Diabetes can cause health problems over time. These are called complications. They are more likely to happen if your blood sugar is often too high. Over time, high blood sugar can damage blood vessels in your body. It is important to keep your blood sugar in your target range. This can help prevent or delay complications from diabetes.  Possible complications  Complications of diabetes include:    Eye problems, including damage to the blood vessels in the eyes (retinopathy), pressure in the eye (glaucoma), and clouding of the eye's lens (a cataract). Eye problems can eventually lead to irreversible blindness.   Tooth and gum problems (periodontal disease), causing loss of teeth and bone  Blood vessel (vascular) disease leading to circulation problems, heart attack or stroke, or a need for amputation of a limb   Problems with sexual function leading to erectile dysfunction in men and sexual discomfort in women   Kidney disease (nephropathy) can eventually lead to kidney failure, which may require dialysis or kidney transplant   Nerve problems (neuropathy), causing pain or loss of feeling in your feet and other parts of your body, potentially leading to an amputation of a limb   High blood pressure  (hypertension), putting strain on your heart and blood vessels  Serious infections, possibly leading to loss of toes, feet, or limbs    How to avoid complications  The serious consequences of these complications may be avoidable for most people with diabetes by managing your blood glucose, blood pressure, and cholesterol levels. This can help you feel better and stay healthy. You can manage diabetes by tracking your blood sugar. You can also eat healthy and exercise to avoid gaining weight. And you should take medicine if directed by your healthcare provider.      Diabetes Foot Care Guidelines  Diabetic foot care is essential as diabetes can be dangerous to your feet--even a small cut can produce serious consequences. Diabetes may cause nerve damage that takes away the feeling in your feet. Diabetes may also reduce blood flow to the feet, making it harder to heal an injury or resist infection. Because of these problems, you may not notice a foreign object in your shoe. As a result, you could develop a blister or a sore. This could lead to an infection or a nonhealing wound that could put you at risk for an amputation.    To avoid serious foot problems that could result in losing a toe, foot or leg, follow these guidelines.    Inspect your feet daily. Check for cuts, blisters, redness, swelling or nail problems. Use a magnifying hand mirror to look at the bottom of your feet. Call your doctor if you notice anything.    Bathe feet in lukewarm, never hot, water. Keep your feet clean by washing them daily. Use only lukewarm water--the temperature you would use on a  baby.    Be gentle when bathing your feet. Wash them using a soft washcloth or sponge. Dry by blotting or patting and carefully dry between the toes.    Moisturize your feet but not between your toes. Use a moisturizer daily to keep dry skin from itching or cracking. But don't moisturize between the toes--that could encourage a fungal  infection.    Cut nails carefully. Cut them straight across and file the edges. Dont cut nails too short, as this could lead to ingrown toenails. If you have concerns about your nails, consult your doctor.    Never treat corns or calluses yourself. No bathroom surgery or medicated pads. Visit your doctor for appropriate treatment.    Wear clean, dry socks. Change them daily.    Consider socks made specifically for patients living with diabetes. These socks have extra cushioning, do not have elastic tops, are higher than the ankle and are made from fibers that wick moisture away from the skin.    Wear socks to bed. If your feet get cold at night, wear socks. Never use a heating pad or a hot water bottle.    Shake out your shoes and feel the inside before wearing. Remember, your feet may not be able to feel a pebble or other foreign object, so always inspect your shoes before putting them on.    Keep your feet warm and dry. Dont let your feet get wet in snow or rain. Wear warm socks and shoes in winter.    Consider using an antiperspirant on the soles of your feet. This is helpful if you have excessive sweating of the feet.    Never walk barefoot. Not even at home! Always wear shoes or slippers. You could step on something and get a scratch or cut.    Take care of your diabetes. Keep your blood sugar levels under control.    Do not smoke. Smoking restricts blood flow in your feet.    Get periodic foot exams. Seeing your foot and ankle surgeon on a regular basis can help prevent the foot complications of diabetes.      Obesity and Your Feet    Obesity is an ever-increasing problem in [place] society. Currently, up to one third of the [place] population is considered obese, defined as a body mass index greater than 30. Although it seems obvious, many studies have found a direct link between increased BMI and foot problems. Not only is there an increased risk of wear-and-tear problems (such as arthritis, tendonitis and  heel pain), but also an increased risk of developing type II diabetes. As little as one pound above your ideal weight can increase pressure in your hips, knees and ankles by as much as eight pounds. Simply walking up a flight of stairs or up an incline can increase pressure on the ankle by four to six times. Weight control can be an essential component to alleviate foot pain.    Your foot and ankle surgeon can be your biggest advocate in losing weight. A surgical procedure, such as gastric banding or gastric bypass, may be desired to help obesity. Often, the surgeon will require a large amount of weight loss (40 to 100+ lbs) before surgery is performed. In addition to diet modification, exercise, such as walking, is encouraged. This can be difficult with foot pain. A foot and ankle surgeon can advise on shoe selection, stretching and even orthotics to keep you walking and help you reach your goals.    Eating the Right Number of Calories (4113-7312 Guidelines)  Calories are a measure of the energy you get from food. If you eat more calories than you use, you will gain weight. If you eat fewer calories than you use, you will lose weight. Below are tables that give the number of calories needed each day. Look for your gender, age, and activity level. If you stick to this number, you should neither gain nor lose weight. Note that this is an estimated number of calories.* Your exact number may differ.  Women  Age in years Low activity level (calories/day) Moderate activity level (calories/day) High activity level (calories/day)   19 to 30 1,800-2,000 2,000-2,200 2,400   31 to 50 1,800 2,000 2,200   51 and older 1,600 1,800 2,000-2,200      Men  Age in years Low activity level  (calories/day) Moderate activity level (calories/day) High activity level (calories/day)   19 to 30 2,400-2,600 2,600-2,800 3,000   31 to 50 2,200-2,400 2,400-2,600 2,800-3,000   51 and older 2,000-2,200 2,200-2,400 2,400-2,800   Activity levels  defined  Low. Only light physical activity such as that done during typical daily life.  Moderate. Light physical activity done during typical daily life AND physical activity equal to walking about 1.5 to 3 miles a day at 3 to 4 miles per hour.  High. Light physical activity done during typical daily life AND physical activity equal to walking more than 3 miles a day at 3 to 4 miles per hour.

## 2025-05-06 ENCOUNTER — OFFICE VISIT (OUTPATIENT)
Dept: GASTROENTEROLOGY | Facility: CLINIC | Age: 45
End: 2025-05-06
Payer: COMMERCIAL

## 2025-05-06 VITALS
DIASTOLIC BLOOD PRESSURE: 78 MMHG | BODY MASS INDEX: 31.81 KG/M2 | SYSTOLIC BLOOD PRESSURE: 127 MMHG | WEIGHT: 209.88 LBS | HEIGHT: 68 IN | HEART RATE: 103 BPM

## 2025-05-06 DIAGNOSIS — K31.84 DIABETIC GASTROPARESIS ASSOCIATED WITH TYPE 1 DIABETES MELLITUS: Chronic | ICD-10-CM

## 2025-05-06 DIAGNOSIS — E10.43 DIABETIC GASTROPARESIS ASSOCIATED WITH TYPE 1 DIABETES MELLITUS: Chronic | ICD-10-CM

## 2025-05-06 PROCEDURE — 99999 PR PBB SHADOW E&M-EST. PATIENT-LVL V: CPT | Mod: PBBFAC,,, | Performed by: INTERNAL MEDICINE

## 2025-05-06 RX ORDER — METOCLOPRAMIDE 5 MG/1
5 TABLET ORAL
Qty: 360 TABLET | Refills: 3 | Status: SHIPPED | OUTPATIENT
Start: 2025-05-06

## 2025-05-06 NOTE — PROGRESS NOTES
GI Consultation Note  C/C:THIRD OPINION  HPI: Patient is a 44 y.o. male presents with known gastroparesis.  He had an inpatient EGD with Dr. Mullins on 2/4/2025 (large amount of retained food).  GES on 2/14/2025 - 78% retention @ 4 hours.      He has EGD with Dr. Messer, Dr. TERA Velazquez in the past for similar issues.     He feels reglan is helping (he currently takes 10mg TID).  He struggles with multiple eating small meals.       Past Medical History:   Diagnosis Date    Acute pancreatitis     Cellulitis of left lower extremity 01/11/2024    Diverticulosis of colon     Essential hypertension     Fatty liver     Gastric varices without bleeding 01/16/2020    Glaucomatous optic atrophy, right     Gout     Hx of acute pancreatitis 03/03/2017    Hypertriglyceridemia 08/02/2017    Obesity (BMI 30-39.9) 06/05/2015    Obesity (BMI 35.0-39.9 without comorbidity) 06/05/2015    Obstructive sleep apnea syndrome 12/18/2015    Pancytopenia 04/04/2024    Likely 2/2 to fatty liver. Will recheck. Refer to hepatology if worsening/persistent.       Type 2 diabetes mellitus with diabetic polyneuropathy, with long-term current use of insulin 10/16/2017    Type 2 diabetes mellitus with hyperglycemia, with long-term current use of insulin 08/03/2017     Past Surgical History:   Procedure Laterality Date    DEBRIDEMENT OF FOOT Right 4/21/2023    Procedure: DEBRIDEMENT, FOOT;  Surgeon: Anju Melvin DPM;  Location: [facility];  Service: Podiatry;  Laterality: Right;    ENDOSCOPIC ULTRASOUND OF UPPER GASTROINTESTINAL TRACT N/A 5/27/2019    Procedure: ULTRASOUND, UPPER GI TRACT, ENDOSCOPIC;  Surgeon: Zafar Velazquez MD;  Location: [facility] ENDO (2ND FLR);  Service: Endoscopy;  Laterality: N/A;    ENDOSCOPIC ULTRASOUND OF UPPER GASTROINTESTINAL TRACT N/A 6/17/2024    Procedure: ULTRASOUND, UPPER GI TRACT, ENDOSCOPIC;  Surgeon: Jorge A Holland MD;  Location: [facility] ENDO (2ND FLR);  Service: Endoscopy;  Laterality: N/A;  5/15 portal-EUS please [facility] or  [place]. Acute on chronic pancreatitis with fullness of the pancreatic head. Holland-tt  6/10 lvm confirm procedure-tt    ERCP N/A 5/27/2019    Procedure: ERCP (ENDOSCOPIC RETROGRADE CHOLANGIOPANCREATOGRAPHY);  Surgeon: Zafar Velazquez MD;  Location: [facility] ([address] [place] [address]);  Service: Endoscopy;  Laterality: N/A;    ESOPHAGOGASTRODUODENOSCOPY      ESOPHAGOGASTRODUODENOSCOPY N/A 1/31/2020    Procedure: EGD (ESOPHAGOGASTRODUODENOSCOPY);  Surgeon: Zafar Velazquez MD;  Location: [facility] ENDO;  Service: Endoscopy;  Laterality: N/A;    ESOPHAGOGASTRODUODENOSCOPY N/A 2/16/2024    Procedure: EGD (ESOPHAGOGASTRODUODENOSCOPY);  Surgeon: Rashida Messer MD;  Location: [facility];  Service: Endoscopy;  Laterality: N/A;    ESOPHAGOGASTRODUODENOSCOPY N/A 2/4/2025    Procedure: EGD (ESOPHAGOGASTRODUODENOSCOPY);  Surgeon: Joon Mullins MD;  Location: [facility];  Service: Endoscopy;  Laterality: N/A;    PLANTAR FASCIA RELEASE Right 4/21/2023    Procedure: RELEASE, FASCIA, PLANTAR;  Surgeon: Anju Melvin DPM;  Location: [facility] OR;  Service: Podiatry;  Laterality: Right;    RESECTION OF GASTROCNEMIUS MUSCLE Right 4/21/2023    Procedure: RESECTION, MUSCLE, GASTROCNEMIUS;  Surgeon: Anju Melvin DPM;  Location: [facility] OR;  Service: Podiatry;  Laterality: Right;     Current Medications[1]  Review of patient's allergies indicates:  No Known Allergies  Social History     Tobacco Use    Smoking status: Never     Passive exposure: Never    Smokeless tobacco: Never   Substance Use Topics    Alcohol use: No     Family History   Problem Relation Name Age of Onset    Aneurysm Mother          AAA    Coronary artery disease Father          4 vessel bypass at 40, possible stent at 36    Diabetes type II Father      No Known Problems Sister      Other (prediabeties) Brother      Aneurysm Maternal Grandmother          Possible AAA    Diabetes type II Paternal Grandmother       ROS:  Constitutional - No fever, chills, night sweats  Cardiovascular -  No CP/SOB  Pulmonary - No sore throat/cough  Neuro - No diplopia/blurred vision  Musculoskeletal - No joint pain/swelling   - No dysuria, hesitancy, urgency  GI - No dysphagia/odynophagia  Integument - No echymoses/rash  Ext - No edema, pain  Objective:  @IPVITALS(8)@  PE:  General - NAD, Appears stated age, No jaundice  ENT - OP clear, No sublingual jaundice  Eyes - No pallor, scleral icterus, or conjunctival injection  Chest - Regular rate, No spider angiomata  Pulmonary - No DTP, No accessory muscle use  Abdomen - BS present, Soft, NT, ND, No guarding  Ext - No cyanosis, clubbing, or edema  Neuro - A/O x 4, No asterixis, No ataxia  Integument - No rash, No guthrie erythema  Musculoskeletal - No joint effusion/erythema  Data Review  Lab Results   Component Value Date    WBC 5.26 04/21/2025    HGB 13.1 (L) 04/21/2025    HCT 41.0 04/21/2025    MCV 82 04/21/2025     (L) 04/21/2025     Lab Results   Component Value Date    ALT 20 04/21/2025    AST 17 04/21/2025    ALKPHOS 72 04/21/2025    BILITOT 0.9 04/21/2025     Lab Results   Component Value Date    CREATININE 1.0 04/21/2025    BUN 14 04/21/2025     04/21/2025    K 3.9 04/21/2025     04/21/2025    CO2 27 04/21/2025       Assessment:  1. Diabetic gastroparesis associated with type 1 diabetes mellitus  Ambulatory referral/consult to Gastroenterology          Plan:  Change reglan to 5mg PO Qac and hs  Symptoms of tardive dyskinesia discussed  Toño Velazquez         [1]   Current Outpatient Medications:     allopurinoL (ZYLOPRIM) 300 MG tablet, Take 1 tablet (300 mg total) by mouth once daily., Disp: 90 tablet, Rfl: 3    aspirin (ECOTRIN) 81 MG EC tablet, Take 81 mg by mouth once daily., Disp: , Rfl:     atorvastatin (LIPITOR) 40 MG tablet, Take 1 tablet (40 mg total) by mouth once daily. Hold at MD, start next week 5/9, Disp: 90 tablet, Rfl: 3    blood sugar diagnostic (CONTOUR NEXT TEST STRIPS) Strp, To test glucose 4 times a day., Disp: 200 each,  Rfl: 11    blood-glucose sensor (DEXCOM G7 SENSOR) Isha, Change sensor every 10 days., Disp: 3 each, Rfl: 3    ciclopirox (PENLAC) 8 % Soln, Apply topically to affected area nightly., Disp: 6.6 mL, Rfl: 6    cyclobenzaprine (FLEXERIL) 5 MG tablet, Take 1 tablet (5 mg total) by mouth 3 (three) times daily as needed for Muscle spasms., Disp: 90 tablet, Rfl: 2    fenofibrate 160 MG Tab, Take 1 tablet (160 mg total) by mouth once daily. Hold on DC, start next week: 5/9/2024., Disp: 90 tablet, Rfl: 3    icosapent ethyL (VASCEPA) 1 gram Cap, Take 2 capsules (2 g total) by mouth 2 (two) times daily., Disp: 360 capsule, Rfl: 3    insulin aspart U-100 (NOVOLOG FLEXPEN U-100 INSULIN) 100 unit/mL (3 mL) InPn pen, Inject 40 Units into the skin 3 (three) times daily with meals. Plus correction scale. Max TDD 140units., Disp: 140 mL, Rfl: 3    insulin glargine U-100, Lantus, (LANTUS SOLOSTAR U-100 INSULIN) 100 unit/mL (3 mL) InPn pen, Inject 55 Units into the skin 2 (two) times a day., Disp: 100 mL, Rfl: 3    Lactobacillus rhamnosus GG (CULTURELLE) 10 billion cell capsule, Take 1 capsule by mouth once daily. (Patient not taking: Reported on 4/30/2025), Disp: , Rfl:     losartan (COZAAR) 50 MG tablet, Take 1 tablet (50 mg total) by mouth once daily., Disp: 90 tablet, Rfl: 3    metFORMIN (GLUCOPHAGE) 500 MG tablet, Take 2 tablets (1,000 mg total) by mouth 2 (two) times daily with meals., Disp: 360 tablet, Rfl: 3    metoclopramide HCl (REGLAN) 10 MG tablet, Take 1 tablet (10 mg total) by mouth 3 (three) times daily before meals., Disp: 30 tablet, Rfl: 0    niacin 100 MG Tab, Take 2 tablets (200 mg total) by mouth every evening., Disp: 90 tablet, Rfl: 1    ondansetron (ZOFRAN) 4 MG tablet, Take 1 tablet (4 mg total) by mouth every 6 (six) hours as needed for Nausea., Disp: 30 tablet, Rfl: 1    pantoprazole (PROTONIX) 40 MG tablet, Take 1 tablet (40 mg total) by mouth 2 (two) times daily., Disp: 180 tablet, Rfl: 3    pregabalin  (LYRICA) 150 MG capsule, Take 1 capsule (150 mg total) by mouth 2 (two) times daily., Disp: 60 capsule, Rfl: 5    sucralfate (CARAFATE) 100 mg/mL suspension, Take 10 mLs (1 g total) by mouth every 6 (six) hours as needed (Heart burn)., Disp: 420 mL, Rfl: 0

## 2025-05-28 ENCOUNTER — PATIENT OUTREACH (OUTPATIENT)
Dept: ADMINISTRATIVE | Facility: HOSPITAL | Age: 45
End: 2025-05-28
Payer: COMMERCIAL

## 2025-05-28 NOTE — PROGRESS NOTES
05/28/2025  VB chart audit performed. Care Everywhere updates requested and reviewed  Overdue HM topic chart audit and/or requested. LINKS triggered and reconciled. Media reviewed.

## 2025-05-28 NOTE — PROGRESS NOTES
05/28/2025  VB chart audit performed. Care Everywhere updates requested and reviewed  Overdue HM topic chart audit and/or requested. LINKS triggered and reconciled. Media reviewed Lvm/portal sent regarding overdue health topics

## 2025-06-05 DIAGNOSIS — R10.84 GENERALIZED ABDOMINAL PAIN: ICD-10-CM

## 2025-06-05 DIAGNOSIS — R11.0 NAUSEA: ICD-10-CM

## 2025-06-05 NOTE — TELEPHONE ENCOUNTER
No care due was identified.  [facility] Embedded Care Due Messages. Reference number: [id number].   6/05/2025 4:47:56 PM CDT

## 2025-06-10 RX ORDER — ONDANSETRON 4 MG/1
4 TABLET, FILM COATED ORAL EVERY 6 HOURS PRN
Qty: 30 TABLET | Refills: 3 | Status: SHIPPED | OUTPATIENT
Start: 2025-06-10

## 2025-06-10 NOTE — PROGRESS NOTES
Subjective:      Patient ID: Bay Ibarra is a 44 y.o. male.    Chief Complaint:  Diabetes    History of Present Illness  Bay Ibarra is here for follow up of DM.  Previously seen by me on 3/2025.   This is a MyChart video visit.    The patient location is: [place]  The chief complaint leading to consultation is: DM  Visit type: Virtual visit with synchronous audio and video  Total time spent with patient: see below  Each patient to whom he or she provides medical services by telemedicine is:  (1) informed of the relationship between the physician and patient and the respective role of any other health care provider with respect to management of the patient; and (2) notified that he or she may decline to receive medical services by telemedicine and may withdraw from such care at any time.    With regards to Diabetes:    3/2021  Cpeptide 1.25 with glucose 315    Diagnosed: 2009  Education - last visit: 2/2025  FH: father, mother, paternal grandfather   2 siblings - denies DM   Known complications:  DKA + last episode 4/2025  RN -  Eye Exam: 7/2022 - needs to make an appointment    PN +  Podiatry: 4/2025  Nephropathy -  CAD -  Hx pancreatitis - 2013, 2x in 2017, 2024  Diabetic foot ulcer R greater toe - following with Podiatry     Diet/Exercise: reports that his diet has been poor over the last month   Eats 3 meals a day.   Snacks : occasionally    Drinks : coffee, water, unsweet tea, has cut back on diet soft drinks (down to 1 20 oz can a day)  Exercise - tries to stay active.   Recent illness, injury, steroids: 4/2025 - DKA     Current Regimen:  Metformin 1000mg twice daily   Lantus 45 units twice daily  Novolog 30 units plus correction scale before (or after) meals --- 150-200 increases to 35 units   Add correction scale as needed.   Blood sugar 200 to 250 add 2 units   Blood sugar greater than 251 add 4 units     Reports compliance.   When he takes prandial insulin prior to the meal he has  "hypoglycemia (gastroparesis).     Other medications tried:  Jardiance - stopped in February 2020 due to DKA   MFM  DPP4  "Other orals"    Glucose Monitor: Dexcom G7  6 times a day testing  Log reviewed: sensor reviewed    Hypoglycemia:  Yes - if taking prandial insulin prior to the meal AND increased activity outside   Knows how to correct with 15 grams of carbs- juice, coke, or a peppermint.      Diabetes Management Status    Hemoglobin A1C   Date Value Ref Range Status   02/28/2025 7.6 (H) 4.0 - 5.6 % Final     Comment:     ADA Screening Guidelines:  5.7-6.4%  Consistent with prediabetes  >or=6.5%  Consistent with diabetes    High levels of fetal hemoglobin interfere with the HbA1C  assay. Heterozygous hemoglobin variants (HbS, HgC, etc)do  not significantly interfere with this assay.   However, presence of multiple variants may affect accuracy.     11/22/2024 9.9 (H) 4.0 - 5.6 % Final     Comment:     ADA Screening Guidelines:  5.7-6.4%  Consistent with prediabetes  >or=6.5%  Consistent with diabetes    High levels of fetal hemoglobin interfere with the HbA1C  assay. Heterozygous hemoglobin variants (HbS, HgC, etc)do  not significantly interfere with this assay.   However, presence of multiple variants may affect accuracy.     08/23/2024 10.2 (H) 4.0 - 5.6 % Final     Comment:     ADA Screening Guidelines:  5.7-6.4%  Consistent with prediabetes  >or=6.5%  Consistent with diabetes    High levels of fetal hemoglobin interfere with the HbA1C  assay. Heterozygous hemoglobin variants (HbS, HgC, etc)do  not significantly interfere with this assay.   However, presence of multiple variants may affect accuracy.       Hemoglobin A1c   Date Value Ref Range Status   04/19/2025 7.9 (H) 4.0 - 5.6 % Final     Comment:     ADA Screening Guidelines:  5.7-6.4%  Consistent with prediabetes  >=6.5%  Consistent with diabetes    High levels of fetal hemoglobin interfere with the HbA1C  assay. Heterozygous hemoglobin variants (HbS, " "HgC, etc)do  not significantly interfere with this assay.   However, presence of multiple variants may affect accuracy.       Statin: Taking  ACE/ARB: Taking  Screening or Prevention Patient's value Goal Complete/Controlled?   HgA1C Testing and Control   Lab Results   Component Value Date    HGBA1C 7.9 (H) 04/19/2025      Annually/Less than 8% No   Lipid profile : 04/20/2025 Annually Yes   LDL control Lab Results   Component Value Date    LDLCALC 53.0 (L) 04/20/2025    Annually/Less than 100 mg/dl  Yes   Nephropathy screening Lab Results   Component Value Date    LABMICR 33.0 10/28/2024     Lab Results   Component Value Date    PROTEINUA 2+ (A) 04/19/2025    Annually Yes   Blood pressure BP Readings from Last 1 Encounters:   05/06/25 127/78    Less than 140/90 Yes   Dilated retinal exam : 01/29/2025 Annually Yes   Foot exam   : 04/30/2025 Annually Yes     Review of Systems  As above     There were no vitals taken for this visit.      There is no height or weight on file to calculate BMI.    Lab Review:   Lab Results   Component Value Date    HGBA1C 7.9 (H) 04/19/2025    HGBA1C 7.6 (H) 02/28/2025    HGBA1C 9.9 (H) 11/22/2024       Lab Results   Component Value Date    CHOL 107 (L) 04/20/2025    HDL 25 (L) 04/20/2025    LDLCALC 53.0 (L) 04/20/2025    TRIG 145 04/20/2025    CHOLHDL 23.4 04/20/2025     Lab Results   Component Value Date     04/21/2025    K 3.9 04/21/2025     04/21/2025    CO2 27 04/21/2025     (H) 04/21/2025    BUN 14 04/21/2025    CREATININE 1.0 04/21/2025    CALCIUM 8.8 04/21/2025    PROT 6.7 04/21/2025    ALBUMIN 3.3 (L) 04/21/2025    BILITOT 0.9 04/21/2025    ALKPHOS 72 04/21/2025    AST 17 04/21/2025    ALT 20 04/21/2025    ANIONGAP 7 (L) 04/21/2025    ESTGFRAFRICA >60.0 03/01/2022    EGFRNONAA >60.0 03/01/2022    TSH 1.509 02/28/2025     No results found for: "[id number]"  Assessment and Plan     1. Type 1 diabetes mellitus with hyperglycemia  Comprehensive Metabolic Panel    " Hemoglobin A1C    Microalbumin/Creatinine Ratio, Urine          Type 1 diabetes mellitus with hyperglycemia  -- Labs prior to follow up.  -- A1c goal <7.0%.  -- Medications discussed:  MFM  GLP1/DPP4i - avoid given recurrent pancreatitis  SGLT2i - DKA  SFU  Insulin   -- Reviewed logs/CGM:  Recent hyperglycemia - reports diet has been poor.  Reach out to me sooner for any glucose <70 or consistently >200.  -- Encouraged dietary/ lifestyle changes - plans to decrease portion sizes/ increase activity.   -- Medication Changes:   Metformin 1000mg twice daily   Lantus 45 units twice daily  Novolog 30 units plus correction scale with meals --- 150-200 increases to 35 units   Add correction scale as needed.   Blood sugar 200 to 250 add 2 units   Blood sugar greater than 251 add 4 units     -- Reviewed goals of therapy are to get the best control we can without hypoglycemia.  -- Reviewed patient's current insulin regimen. Clarified proper insulin dose and timing in relation to meals, etc. Insulin injection sites and proper rotation instructed.    -- Advised frequent self blood glucose monitoring.  Patient encouraged to document glucose results and bring them to every clinic visit.  -- Hypoglycemia precautions discussed. Instructed on precautions before driving.    -- Call for Bg repeatedly < 90 or > 180.   -- Close adherence to lifestyle changes recommended.   -- Periodic follow ups for eye evaluations, foot care and dental care suggested.      Follow up in about 3 months (around 9/12/2025).    I spent 30 minutes face-to-face with the patient, over half of the visit was spent on counseling and/or coordinating the care of the patient.    Counseling includes:  Diagnostic results, impressions, recommendations   Prognosis   Risk and benefits of management/treatment options   Instructions for management treatment and or follow-up   Importance of compliance with management   Risk factor reduction   Patient education    Visit today  included increased complexity associated with the care of the problems addressed and managing the longitudinal care of the patient due to the serious and/or complex managed problems.

## 2025-06-11 DIAGNOSIS — K29.00 ACUTE SUPERFICIAL GASTRITIS WITHOUT HEMORRHAGE: ICD-10-CM

## 2025-06-11 RX ORDER — PANTOPRAZOLE SODIUM 40 MG/1
40 TABLET, DELAYED RELEASE ORAL 2 TIMES DAILY
Qty: 180 TABLET | Refills: 3 | Status: SHIPPED | OUTPATIENT
Start: 2025-06-11

## 2025-06-11 NOTE — TELEPHONE ENCOUNTER
Refill Routing Note   Medication(s) are not appropriate for processing by [facility] for the following reason(s):        Outside of protocol    [facility] action(s):  Route        Medication Therapy Plan: PPI dose outside of [facility] protocol      Appointments  past 12m or future 3m with PCP    Date Provider   Last Visit   4/29/2025 Funmilayo Taylor MD   Next Visit   Visit date not found Funmilayo Taylor MD   ED visits in past 90 days: 0        Note composed:2:09 PM 06/11/2025

## 2025-06-11 NOTE — TELEPHONE ENCOUNTER
No care due was identified.  [facility] Embedded Care Due Messages. Reference number: [id number].   6/11/2025 1:53:46 PM CDT

## 2025-06-12 ENCOUNTER — OFFICE VISIT (OUTPATIENT)
Dept: ENDOCRINOLOGY | Facility: CLINIC | Age: 45
End: 2025-06-12
Payer: COMMERCIAL

## 2025-06-12 DIAGNOSIS — E10.65 TYPE 1 DIABETES MELLITUS WITH HYPERGLYCEMIA: Primary | Chronic | ICD-10-CM

## 2025-06-12 NOTE — Clinical Note
Upload dexcom Vv in 3 months with labs prior Orders Placed This Encounter     Comprehensive Metabolic Panel     Hemoglobin A1C     Microalbumin/Creatinine Ratio, Urine

## 2025-06-12 NOTE — ASSESSMENT & PLAN NOTE
-- Labs prior to follow up.  -- A1c goal <7.0%.  -- Medications discussed:  MFM  GLP1/DPP4i - avoid given recurrent pancreatitis  SGLT2i - DKA  SFU  Insulin   -- Reviewed logs/CGM:  Recent hyperglycemia - reports diet has been poor.  Reach out to me sooner for any glucose <70 or consistently >200.  -- Encouraged dietary/ lifestyle changes - plans to decrease portion sizes/ increase activity.   -- Medication Changes:   Metformin 1000mg twice daily   Lantus 45 units twice daily  Novolog 30 units plus correction scale with meals --- 150-200 increases to 35 units   Add correction scale as needed.   Blood sugar 200 to 250 add 2 units   Blood sugar greater than 251 add 4 units     -- Reviewed goals of therapy are to get the best control we can without hypoglycemia.  -- Reviewed patient's current insulin regimen. Clarified proper insulin dose and timing in relation to meals, etc. Insulin injection sites and proper rotation instructed.    -- Advised frequent self blood glucose monitoring.  Patient encouraged to document glucose results and bring them to every clinic visit.  -- Hypoglycemia precautions discussed. Instructed on precautions before driving.    -- Call for Bg repeatedly < 90 or > 180.   -- Close adherence to lifestyle changes recommended.   -- Periodic follow ups for eye evaluations, foot care and dental care suggested.

## 2025-06-27 ENCOUNTER — PATIENT MESSAGE (OUTPATIENT)
Dept: ENDOCRINOLOGY | Facility: CLINIC | Age: 45
End: 2025-06-27
Payer: COMMERCIAL

## 2025-07-10 ENCOUNTER — TELEPHONE (OUTPATIENT)
Dept: ENDOCRINOLOGY | Facility: CLINIC | Age: 45
End: 2025-07-10
Payer: COMMERCIAL

## 2025-07-18 DIAGNOSIS — E10.40 TYPE 1 DIABETES MELLITUS WITH DIABETIC NEUROPATHY: ICD-10-CM

## 2025-07-18 RX ORDER — BLOOD-GLUCOSE SENSOR
EACH MISCELLANEOUS
Qty: 3 EACH | Refills: 3 | Status: SHIPPED | OUTPATIENT
Start: 2025-07-18

## 2025-07-21 NOTE — TELEPHONE ENCOUNTER
No care due was identified.  [facility] Embedded Care Due Messages. Reference number: [id number].   7/21/2025 4:58:53 PM CDT

## 2025-07-22 RX ORDER — LOSARTAN POTASSIUM 50 MG/1
50 TABLET ORAL DAILY
Qty: 90 TABLET | Refills: 3 | Status: SHIPPED | OUTPATIENT
Start: 2025-07-22

## 2025-07-22 NOTE — TELEPHONE ENCOUNTER
Refill Routing Note   Medication(s) are not appropriate for processing by [facility] for the following reason(s):        No active prescription written by provider    [facility] action(s):  Defer               Appointments  past 12m or future 3m with PCP    Date Provider   Last Visit   4/29/2025 Funmilayo Taylor MD   Next Visit   Visit date not found Funmilayo Taylor MD   ED visits in past 90 days: 0        Note composed:6:36 AM 07/22/2025

## 2025-07-25 DIAGNOSIS — E78.2 MIXED HYPERLIPIDEMIA: ICD-10-CM

## 2025-07-28 RX ORDER — FENOFIBRATE 160 MG/1
160 TABLET ORAL DAILY
Qty: 90 TABLET | Refills: 3 | Status: SHIPPED | OUTPATIENT
Start: 2025-07-28

## 2025-07-28 RX ORDER — ATORVASTATIN CALCIUM 40 MG/1
40 TABLET, FILM COATED ORAL DAILY
Qty: 90 TABLET | Refills: 3 | Status: SHIPPED | OUTPATIENT
Start: 2025-07-28

## 2025-08-08 ENCOUNTER — PATIENT MESSAGE (OUTPATIENT)
Dept: FAMILY MEDICINE | Facility: CLINIC | Age: 45
End: 2025-08-08
Payer: COMMERCIAL

## 2025-08-08 ENCOUNTER — PATIENT MESSAGE (OUTPATIENT)
Dept: ENDOCRINOLOGY | Facility: CLINIC | Age: 45
End: 2025-08-08
Payer: COMMERCIAL